# Patient Record
Sex: MALE | Race: WHITE | NOT HISPANIC OR LATINO | Employment: OTHER | URBAN - METROPOLITAN AREA
[De-identification: names, ages, dates, MRNs, and addresses within clinical notes are randomized per-mention and may not be internally consistent; named-entity substitution may affect disease eponyms.]

---

## 2017-01-06 ENCOUNTER — ANTICOAGULATION MONITORING (OUTPATIENT)
Dept: VASCULAR LAB | Facility: MEDICAL CENTER | Age: 77
End: 2017-01-06
Attending: NURSE PRACTITIONER
Payer: MEDICARE

## 2017-01-06 DIAGNOSIS — I48.91 ATRIAL FIBRILLATION, UNSPECIFIED TYPE (HCC): ICD-10-CM

## 2017-01-06 LAB — INR PPP: 2.2 (ref 2–3.5)

## 2017-01-06 PROCEDURE — 99211 OFF/OP EST MAY X REQ PHY/QHP: CPT | Performed by: NURSE PRACTITIONER

## 2017-01-06 PROCEDURE — 85610 PROTHROMBIN TIME: CPT

## 2017-01-06 NOTE — MR AVS SNAPSHOT
Duane Parkinson   2017 7:45 AM   Anticoagulation Monitoring   MRN: [id number]    Department:  Vascular Medicine   Dept Phone:  [phone]    Description:  Male : 1940   Provider:  [facility] EXAM 4           Allergies as of 2017     Allergen Noted Reactions    Feldene [Piroxicam] 2011       Percodan [Oxycodone-Aspirin] 2011         You were diagnosed with     Atrial fibrillation, unspecified type (HCC)   [[id number]]         Vital Signs     Smoking Status                   Former Smoker           Basic Information     Date Of Birth Sex Race Ethnicity Preferred Language    1940 Male White Non- English      Your appointments     2017  7:45 AM   Established Patient with [facility] EXAM 4   [facility]  (--)    [address]  [place] [address]   [phone]            2017  8:15 AM   FOLLOW UP with Vickey Rutherford M.D.   [facility] (--)    [address], [address]  [place] [address]   [phone]            2017  8:00 AM   Established Patient with Fransisco Graham M.D.   [facility] [address]    [address]  [address]  [place] [address]   [phone]           You will be receiving a confirmation call a few days before your appointment from our automated call confirmation system.              Problem List              ICD-10-CM Priority Class Noted - Resolved    Aortic stenosis I35.0   2011 - Present    Atrial fibrillation (HCC) I48.91   2011 - Present    Long term current use of anticoagulant therapy Z79.01   2011 - Present    Lumbar Disc Degeneration M51.37   2011 - Present    Mitral valve disorder I05.9   2011 - Present    Osteoarthrosis involving lower leg M17.9   2011 - Present    Essential hypertension I10   2015 - Present    Dyslipidemia E78.5   2015 - Present    IGT (impaired  glucose tolerance) R73.02   1/7/2015 - Present    LORENA (obstructive sleep apnea) G47.33   4/21/2016 - Present    Insomnia G47.00   5/26/2016 - Present    Localized primary osteoarthritis of carpometacarpal joint of left thumb M18.12   5/27/2016 - Present      Health Maintenance        Date Due Completion Dates    DIABETES MONOFILAMTENT / LE EXAM 1940 ---    IMM DTaP/Tdap/Td Vaccine (1 - Tdap) 1/11/1957 ---    IMM ZOSTER VACCINE 1/11/1998 ---    A1C SCREENING 8/22/2016 2/22/2016, 9/1/2015    FASTING LIPID PROFILE 9/1/2016 9/1/2015    URINE ACR / MICROALBUMIN 9/1/2016 9/1/2015    SERUM CREATININE 5/23/2017 5/23/2016, 2/22/2016, 9/1/2015    RETINAL SCREENING 9/6/2017 9/6/2016    IMM PNEUMOCOCCAL 65+ (ADULT) LOW/MEDIUM RISK SERIES (2 of 2 - PPSV23) 12/22/2017 12/22/2016    COLONOSCOPY 1/7/2022 1/7/2012 (N/S)    Override on 1/7/2012: (N/S)            Results     POCT Protime      Component    INR    2.2                        Current Immunizations     13-VALENT PCV PREVNAR 12/22/2016    Influenza Vaccine Adult HD 11/7/2016  7:55 AM, 1/7/2015      Below and/or attached are the medications your provider expects you to take. Review all of your home medications and newly ordered medications with your provider and/or pharmacist. Follow medication instructions as directed by your provider and/or pharmacist. Please keep your medication list with you and share with your provider. Update the information when medications are discontinued, doses are changed, or new medications (including over-the-counter products) are added; and carry medication information at all times in the event of emergency situations     Allergies:  FELDENE - (reactions not documented)     PERCODAN - (reactions not documented)               Medications  Valid as of: January 06, 2017 -  7:49 AM    Generic Name Brand Name Tablet Size Instructions for use    Ascorbic Acid (Tab) Vitamin C 1000 MG Take  by mouth.        Cholecalciferol (Tab) Vitamin D3 2000  UNITS Take  by mouth.        Clindamycin HCl (Cap) CLEOCIN 150 MG Take 1 Cap by mouth 2 Times a Day. Permanent, one am and 2 pm        Cyclobenzaprine HCl   by Does not apply route 1 time daily as needed.        Digoxin (Tab) LANOXIN 250 MCG Take 1 Tab by mouth every day.        Gabapentin (Cap) NEURONTIN 300 MG Take 2 Caps by mouth 2 Times a Day.        Glucosamine-Chondroit-Vit C-Mn (Cap) GLUCOSAMINE 1500 COMPLEX  Take  by mouth every day.        Lysine HCl (Tab) lysine 500 MG Take 500 mg by mouth every day.        Metoprolol Succinate (TABLET SR 24 HR) TOPROL XL 50 MG Take 1 Tab by mouth every day.        Multiple Vitamin (Tab) THERAGRAN  Take 1 Tab by mouth every day.        Omega-3 Fatty Acids (Cap) OMEGA 3 FA 1000 MG Take  by mouth every day.        Pravastatin Sodium (Tab) PRAVACHOL 80 MG Take 1 Tab by mouth every day.        Psyllium   Take  by mouth every day.        Warfarin Sodium (Tab) COUMADIN 4 MG Take 4 mg by mouth every day. Take as directed.         Zolpidem Tartrate (Tab) AMBIEN 5 MG         .                 Medicines prescribed today were sent to:     [id number] [place], [place] - [address]    [address] [facility] [address]    Phone: [phone] Fax: [phone]    Open 24 Hours?: No      Medication refill instructions:       If your prescription bottle indicates you have medication refills left, it is not necessary to call your provider’s office. Please contact your pharmacy and they will refill your medication.    If your prescription bottle indicates you do not have any refills left, you may request refills at any time through one of the following ways: The online simplifyMD system (except Urgent Care), by calling your provider’s office, or by asking your pharmacy to contact your provider’s office with a refill request. Medication refills are processed only during regular business hours and may not be available until the next business day. Your provider may request additional  information or to have a follow-up visit with you prior to refilling your medication.   *Please Note: Medication refills are assigned a new Rx number when refilled electronically. Your pharmacy may indicate that no refills were authorized even though a new prescription for the same medication is available at the pharmacy. Please request the medicine by name with the pharmacy before contacting your provider for a refill.        Warfarin Dosing Calendar   January 2017 Details    Sun Mon Tue Wed Thu Fri Sat     1               2               3               4               5               6   2.2   4 mg   See details      7      4 mg           8      4 mg         9      4 mg         10      4 mg         11      4 mg         12      4 mg         13      4 mg         14      6 mg           15      4 mg         16      4 mg         17      4 mg         18      4 mg         19      4 mg         20      4 mg         21      4 mg           22      4 mg         23      4 mg         24      4 mg         25      4 mg         26      4 mg         27      4 mg         28                 29               30               31                    Date Details   01/06 This INR check   INR: 2.2       Date of next INR:  1/27/2017         How to take your warfarin dose     To take:  4 mg Take 1 of the 4 mg tablets.    To take:  6 mg Take 1.5 of the 4 mg tablets.              [facility] Access Code: Activation code not generated  Current [facility] Status: Active

## 2017-01-06 NOTE — PROGRESS NOTES
Anticoagulation Summary as of 1/6/2017     INR goal 2.0-3.0   Selected INR 2.2 (1/6/2017)   Maintenance plan 6 mg (4 mg x 1.5) on Sat; 4 mg (4 mg x 1) all other days   Weekly total 30 mg   Plan last modified CLAYTON Jacob (10/10/2016)   Next INR check 1/27/2017   Target end date Indefinite    Indications   Atrial fibrillation (HCC) [I48.91]         Anticoagulation Episode Summary     INR check location     Preferred lab     Send INR reminders to     Comments Pt goes by Kinsey      Anticoagulation Care Providers     Provider Role Specialty Phone number    Vickey Rutherford M.D. Referring Cardiology [phone]    [facility] Responsible  [phone]        Patient is therapeutic today. Denies any medication or diet changes. No current symptoms of bleeding or thrombosis reported. Continue current regimen. Follow up in 3 weeks.    Next Appointment: Friday, January 27, 2017 at 7:45 am.      Britney DOE

## 2017-01-09 LAB — INR BLD: 2.2 (ref 0.9–1.2)

## 2017-01-27 ENCOUNTER — ANTICOAGULATION VISIT (OUTPATIENT)
Dept: VASCULAR LAB | Facility: MEDICAL CENTER | Age: 77
End: 2017-01-27
Attending: NURSE PRACTITIONER
Payer: MEDICARE

## 2017-01-27 DIAGNOSIS — I48.91 ATRIAL FIBRILLATION, UNSPECIFIED TYPE (HCC): ICD-10-CM

## 2017-01-27 LAB
INR BLD: 2.7 (ref 0.9–1.2)
INR PPP: 2.7 (ref 2–3.5)

## 2017-01-27 PROCEDURE — 99211 OFF/OP EST MAY X REQ PHY/QHP: CPT

## 2017-01-27 PROCEDURE — 85610 PROTHROMBIN TIME: CPT

## 2017-01-27 NOTE — MR AVS SNAPSHOT
Duane Parkinson   2017 7:45 AM   Anticoagulation Visit   MRN: [id number]    Department:  Vascular Medicine   Dept Phone:  [phone]    Description:  Male : 1940   Provider:  [facility] EXAM 4           Allergies as of 2017     Allergen Noted Reactions    Feldene [Piroxicam] 2011       Percodan [Oxycodone-Aspirin] 2011         Vital Signs     Smoking Status                   Former Smoker           Basic Information     Date Of Birth Sex Race Ethnicity Preferred Language    1940 Male White Non- English      Your appointments     2017  8:00 AM   Established Patient with [facility] EXAM 4   [facility]  (--)    [address]  [place] [address]   [phone]            2017  8:15 AM   FOLLOW UP with Vickey Rutherford M.D.   [facility] (--)    [address], [address]  [place] [address] [phone]            2017  8:00 AM   Established Patient with Fransisco Graham M.D.   [facility] [address]    [address]  [address]  [place] [address] [phone]           You will be receiving a confirmation call a few days before your appointment from our automated call confirmation system.              Problem List              ICD-10-CM Priority Class Noted - Resolved    Aortic stenosis I35.0   2011 - Present    Atrial fibrillation (CMS-HCC) I48.91   2011 - Present    Long term current use of anticoagulant therapy Z79.01   2011 - Present    Lumbar Disc Degeneration M51.37   2011 - Present    Mitral valve disorder I05.9   2011 - Present    Osteoarthrosis involving lower leg M17.9   2011 - Present    Essential hypertension I10   2015 - Present    Dyslipidemia E78.5   2015 - Present    IGT (impaired glucose tolerance) R73.02   2015 - Present    LORENA (obstructive sleep apnea) G47.33    4/21/2016 - Present    Insomnia G47.00   5/26/2016 - Present    Localized primary osteoarthritis of carpometacarpal joint of left thumb M18.12   5/27/2016 - Present      Health Maintenance        Date Due Completion Dates    DIABETES MONOFILAMENT / LE EXAM 1940 ---    IMM DTaP/Tdap/Td Vaccine (1 - Tdap) 1/11/1957 ---    IMM ZOSTER VACCINE 1/11/1998 ---    A1C SCREENING 8/22/2016 2/22/2016, 9/1/2015    FASTING LIPID PROFILE 9/1/2016 9/1/2015    URINE ACR / MICROALBUMIN 9/1/2016 9/1/2015    SERUM CREATININE 5/23/2017 5/23/2016, 2/22/2016, 9/1/2015    RETINAL SCREENING 9/6/2017 9/6/2016    IMM PNEUMOCOCCAL 65+ (ADULT) LOW/MEDIUM RISK SERIES (2 of 2 - PPSV23) 12/22/2017 12/22/2016    COLONOSCOPY 1/7/2022 1/7/2012 (N/S)    Override on 1/7/2012: (N/S)            Results     POCT Protime      Component    INR    2.7                        Current Immunizations     13-VALENT PCV PREVNAR 12/22/2016    Influenza Vaccine Adult HD 11/7/2016  7:55 AM, 1/7/2015      Below and/or attached are the medications your provider expects you to take. Review all of your home medications and newly ordered medications with your provider and/or pharmacist. Follow medication instructions as directed by your provider and/or pharmacist. Please keep your medication list with you and share with your provider. Update the information when medications are discontinued, doses are changed, or new medications (including over-the-counter products) are added; and carry medication information at all times in the event of emergency situations     Allergies:  FELDENE - (reactions not documented)     PERCODAN - (reactions not documented)               Medications  Valid as of: January 27, 2017 -  8:56 AM    Generic Name Brand Name Tablet Size Instructions for use    Ascorbic Acid (Tab) Vitamin C 1000 MG Take  by mouth.        Cholecalciferol (Tab) Vitamin D3 2000 UNITS Take  by mouth.        Clindamycin HCl (Cap) CLEOCIN 150 MG Take 1 Cap by mouth 2  Times a Day. Permanent, one am and 2 pm        Cyclobenzaprine HCl   by Does not apply route 1 time daily as needed.        Digoxin (Tab) LANOXIN 250 MCG Take 1 Tab by mouth every day.        Gabapentin (Cap) NEURONTIN 300 MG Take 2 Caps by mouth 2 Times a Day.        Glucosamine-Chondroit-Vit C-Mn (Cap) GLUCOSAMINE 1500 COMPLEX  Take  by mouth every day.        Lysine HCl (Tab) lysine 500 MG Take 500 mg by mouth every day.        Metoprolol Succinate (TABLET SR 24 HR) TOPROL XL 50 MG Take 1 Tab by mouth every day.        Multiple Vitamin (Tab) THERAGRAN  Take 1 Tab by mouth every day.        Omega-3 Fatty Acids (Cap) OMEGA 3 FA 1000 MG Take  by mouth every day.        Pravastatin Sodium (Tab) PRAVACHOL 80 MG Take 1 Tab by mouth every day.        Psyllium   Take  by mouth every day.        Warfarin Sodium (Tab) COUMADIN 4 MG Take 4 mg by mouth every day. Take as directed.         Zolpidem Tartrate (Tab) AMBIEN 5 MG         .                 Medicines prescribed today were sent to:     [id number] - [place] - [address]    [address] [address]    Phone: [phone] Fax: [phone]    Open 24 Hours?: No      Medication refill instructions:       If your prescription bottle indicates you have medication refills left, it is not necessary to call your provider’s office. Please contact your pharmacy and they will refill your medication.    If your prescription bottle indicates you do not have any refills left, you may request refills at any time through one of the following ways: The online [employer] system (except Urgent Care), by calling your provider’s office, or by asking your pharmacy to contact your provider’s office with a refill request. Medication refills are processed only during regular business hours and may not be available until the next business day. Your provider may request additional information or to have a follow-up visit with you prior to refilling your medication.   *Please  Note: Medication refills are assigned a new Rx number when refilled electronically. Your pharmacy may indicate that no refills were authorized even though a new prescription for the same medication is available at the pharmacy. Please request the medicine by name with the pharmacy before contacting your provider for a refill.        Warfarin Dosing Calendar   January 2017 Details    Sun Mon Tue Wed Thu Fri Sat     1               2               3               4               5               6               7                 8               9               10               11               12               13               14                 15               16               17               18               19               20               21                 22               23               24               25               26               27   2.7   4 mg   See details      28      6 mg           29      4 mg         30      4 mg         31      4 mg              Date Details   01/27 This INR check   INR: 2.7               How to take your warfarin dose     To take:  4 mg Take 1 of the 4 mg tablets.    To take:  6 mg Take 1.5 of the 4 mg tablets.           Warfarin Dosing Calendar   February 2017 Details    Sun Mon Tue Wed Thu Fri Sat        1      4 mg         2      4 mg         3      4 mg         4      4 mg           5      4 mg         6      4 mg         7      4 mg         8      4 mg         9      4 mg         10      4 mg         11      6 mg           12      4 mg         13      4 mg         14      4 mg         15      4 mg         16      4 mg         17      4 mg         18      4 mg           19      4 mg         20      4 mg         21      4 mg         22      4 mg         23      4 mg         24      4 mg         25                 26               27               28                    Date Details   No additional details    Date of next INR:  2/24/2017         How to take your warfarin  dose     To take:  4 mg Take 1 of the 4 mg tablets.    To take:  6 mg Take 1.5 of the 4 mg tablets.              [employer] Access Code: Activation code not generated  Current [employer] Status: Active

## 2017-01-27 NOTE — PROGRESS NOTES
Anticoagulation Summary as of 1/27/2017     INR goal 2.0-3.0   Selected INR 2.7 (1/27/2017)   Maintenance plan 6 mg (4 mg x 1.5) on Sat; 4 mg (4 mg x 1) all other days   Weekly total 30 mg   Plan last modified CLAYTON Jacob (10/10/2016)   Next INR check 2/24/2017   Target end date Indefinite    Indications   Atrial fibrillation (CMS-HCC) [I48.91]         Anticoagulation Episode Summary     INR check location     Preferred lab     Send INR reminders to     Comments Pt goes by Kin[facility]      Anticoagulation Care Providers     Provider Role Specialty Phone number    Vickey Rutherford M.D. Referring Cardiology [phone]    [facility] Responsible  [phone]        Anticoagulation Patient Findings    Patient's INR was therapeutic today in clinic.    Pt denies any unusual s/s of bleeding, bruising, clotting or any changes to diet or medications.  Confirmed dosing regimen.  Pt is to continue with current warfarin dosing regimen.    Follow up in 4 weeks.    Sin Huitron, PHARMD

## 2017-02-24 ENCOUNTER — ANTICOAGULATION VISIT (OUTPATIENT)
Dept: VASCULAR LAB | Facility: MEDICAL CENTER | Age: 77
End: 2017-02-24
Attending: INTERNAL MEDICINE
Payer: MEDICARE

## 2017-02-24 DIAGNOSIS — I48.91 ATRIAL FIBRILLATION, UNSPECIFIED TYPE (HCC): ICD-10-CM

## 2017-02-24 LAB
INR BLD: 1.8 (ref 0.9–1.2)
INR PPP: 1.8 (ref 2–3.5)

## 2017-02-24 PROCEDURE — 99212 OFFICE O/P EST SF 10 MIN: CPT | Performed by: NURSE PRACTITIONER

## 2017-02-24 PROCEDURE — 85610 PROTHROMBIN TIME: CPT

## 2017-02-24 NOTE — MR AVS SNAPSHOT
Duane Parkinson   2017 8:00 AM   Anticoagulation Visit   MRN: [id number]    Department:  Vascular Medicine   Dept Phone:  [phone]    Description:  Male : 1940   Provider:  [facility] EXAM 4           Allergies as of 2017     Allergen Noted Reactions    Feldene [Piroxicam] 2011       Percodan [Oxycodone-Aspirin] 2011         You were diagnosed with     Atrial fibrillation, unspecified type (CMS-HCC)   [7554119]         Vital Signs     Smoking Status                   Former Smoker           Basic Information     Date Of Birth Sex Race Ethnicity Preferred Language    1940 Male White Non- English      Your appointments     Mar 24, 2017  8:00 AM   Established Patient with [facility] EXAM 4   [facility]  (--)    [address]  [place] [address]   [phone]            2017  8:15 AM   FOLLOW UP with Vickey Rutherford M.D.   [facility] (--)    [address], [address]  [place] [address]   [phone]            2017  8:00 AM   Established Patient with Fransisco Graham M.D.   [facility] [address]    [address]  [address]  [place] [address]   [phone]           You will be receiving a confirmation call a few days before your appointment from our automated call confirmation system.              Problem List              ICD-10-CM Priority Class Noted - Resolved    Aortic stenosis I35.0   2011 - Present    Atrial fibrillation (CMS-HCC) I48.91   2011 - Present    Long term current use of anticoagulant therapy Z79.01   2011 - Present    Lumbar Disc Degeneration M51.37   2011 - Present    Mitral valve disorder I05.9   2011 - Present    Osteoarthrosis involving lower leg M17.9   2011 - Present    Essential hypertension I10   2015 - Present    Dyslipidemia E78.5   2015 - Present    IGT (impaired  glucose tolerance) R73.02   1/7/2015 - Present    LORENA (obstructive sleep apnea) G47.33   4/21/2016 - Present    Insomnia G47.00   5/26/2016 - Present    Localized primary osteoarthritis of carpometacarpal joint of left thumb M18.12   5/27/2016 - Present      Health Maintenance        Date Due Completion Dates    DIABETES MONOFILAMENT / LE EXAM 1940 ---    IMM DTaP/Tdap/Td Vaccine (1 - Tdap) 1/11/1957 ---    IMM ZOSTER VACCINE 1/11/1998 ---    A1C SCREENING 8/22/2016 2/22/2016, 9/1/2015    FASTING LIPID PROFILE 9/1/2016 9/1/2015    URINE ACR / MICROALBUMIN 9/1/2016 9/1/2015    SERUM CREATININE 5/23/2017 5/23/2016, 2/22/2016, 9/1/2015    RETINAL SCREENING 9/6/2017 9/6/2016    IMM PNEUMOCOCCAL 65+ (ADULT) LOW/MEDIUM RISK SERIES (2 of 2 - PPSV23) 12/22/2017 12/22/2016    COLONOSCOPY 1/7/2022 1/7/2012 (N/S)    Override on 1/7/2012: (N/S)            Results     POCT Protime      Component    INR    1.8                        Current Immunizations     13-VALENT PCV PREVNAR 12/22/2016    Influenza Vaccine Adult HD 11/7/2016  7:55 AM, 1/7/2015      Below and/or attached are the medications your provider expects you to take. Review all of your home medications and newly ordered medications with your provider and/or pharmacist. Follow medication instructions as directed by your provider and/or pharmacist. Please keep your medication list with you and share with your provider. Update the information when medications are discontinued, doses are changed, or new medications (including over-the-counter products) are added; and carry medication information at all times in the event of emergency situations     Allergies:  FELDENE - (reactions not documented)     PERCODAN - (reactions not documented)               Medications  Valid as of: February 24, 2017 -  8:10 AM    Generic Name Brand Name Tablet Size Instructions for use    Ascorbic Acid (Tab) Vitamin C 1000 MG Take  by mouth.        Cholecalciferol (Tab) Vitamin D3 2000  UNITS Take  by mouth.        Clindamycin HCl (Cap) CLEOCIN 150 MG Take 1 Cap by mouth 2 Times a Day. Permanent, one am and 2 pm        Cyclobenzaprine HCl   by Does not apply route 1 time daily as needed.        Digoxin (Tab) LANOXIN 250 MCG Take 1 Tab by mouth every day.        Gabapentin (Cap) NEURONTIN 300 MG Take 2 Caps by mouth 2 Times a Day.        Glucosamine-Chondroit-Vit C-Mn (Cap) GLUCOSAMINE 1500 COMPLEX  Take  by mouth every day.        Lysine HCl (Tab) lysine 500 MG Take 500 mg by mouth every day.        Metoprolol Succinate (TABLET SR 24 HR) TOPROL XL 50 MG Take 1 Tab by mouth every day.        Multiple Vitamin (Tab) THERAGRAN  Take 1 Tab by mouth every day.        Omega-3 Fatty Acids (Cap) OMEGA 3 FA 1000 MG Take  by mouth every day.        Pravastatin Sodium (Tab) PRAVACHOL 80 MG Take 1 Tab by mouth every day.        Psyllium   Take  by mouth every day.        Warfarin Sodium (Tab) COUMADIN 4 MG Take 4 mg by mouth every day. Take as directed.         Zolpidem Tartrate (Tab) AMBIEN 5 MG         .                 Medicines prescribed today were sent to:     [id number] - [place] [place] - [address]    [address] [facility] [address]    Phone: [phone] Fax: [phone]    Open 24 Hours?: No      Medication refill instructions:       If your prescription bottle indicates you have medication refills left, it is not necessary to call your provider’s office. Please contact your pharmacy and they will refill your medication.    If your prescription bottle indicates you do not have any refills left, you may request refills at any time through one of the following ways: The online [employer] system (except Urgent Care), by calling your provider’s office, or by asking your pharmacy to contact your provider’s office with a refill request. Medication refills are processed only during regular business hours and may not be available until the next business day. Your provider may request additional  information or to have a follow-up visit with you prior to refilling your medication.   *Please Note: Medication refills are assigned a new Rx number when refilled electronically. Your pharmacy may indicate that no refills were authorized even though a new prescription for the same medication is available at the pharmacy. Please request the medicine by name with the pharmacy before contacting your provider for a refill.        Warfarin Dosing Calendar   February 2017 Details    Sun Mon Tue Wed Thu Fri Sat        1               2               3               4                 5               6               7               8               9               10               11                 12               13               14               15               16               17               18                 19               20               21               22               23               24   1.8   6 mg   See details      25      6 mg           26      4 mg         27      4 mg         28      4 mg              Date Details   02/24 This INR check   INR: 1.8               How to take your warfarin dose     To take:  4 mg Take 1 of the 4 mg tablets.    To take:  6 mg Take 1.5 of the 4 mg tablets.           Warfarin Dosing Calendar   March 2017 Details    Sun Mon Tue Wed Thu Fri Sat        1      4 mg         2      4 mg         3      4 mg         4      4 mg           5      4 mg         6      4 mg         7      4 mg         8      4 mg         9      4 mg         10      4 mg         11      6 mg           12      4 mg         13      4 mg         14      4 mg         15      4 mg         16      4 mg         17      4 mg         18      4 mg           19      4 mg         20      4 mg         21      4 mg         22      4 mg         23      4 mg         24      4 mg         25                 26               27               28               29               30               31                 Date Details    No additional details    Date of next INR:  3/24/2017         How to take your warfarin dose     To take:  4 mg Take 1 of the 4 mg tablets.    To take:  6 mg Take 1.5 of the 4 mg tablets.              [employer] Access Code: Activation code not generated  Current [employer] Status: Active

## 2017-02-24 NOTE — PROGRESS NOTES
Anticoagulation Summary as of 2/24/2017     INR goal 2.0-3.0   Selected INR 1.8! (2/24/2017)   Maintenance plan 6 mg (4 mg x 1.5) on Sat; 4 mg (4 mg x 1) all other days   Weekly total 30 mg   Plan last modified CLAYTON Jacob (10/10/2016)   Next INR check 3/24/2017   Target end date Indefinite    Indications   Atrial fibrillation (CMS-HCC) [I48.91]         Anticoagulation Episode Summary     INR check location     Preferred lab     Send INR reminders to     Comments Pt goes by [facility]      Anticoagulation Care Providers     Provider Role Specialty Phone number    Vickey Rutherford M.D. Referring Cardiology [phone]    [facility] Responsible  [phone]        Patient is subtherapeutic today. Missed dose yesterday. Denies any medication or diet changes. No current symptoms of bleeding or thrombosis reported. Will increase tonight then continue current regimen. Follow up in 4 weeks per pt's preference.    Next Appointment: Friday , March 24, 2017 at 8:00 am.    Britnye DOE

## 2017-03-24 ENCOUNTER — ANTICOAGULATION VISIT (OUTPATIENT)
Dept: VASCULAR LAB | Facility: MEDICAL CENTER | Age: 77
End: 2017-03-24
Attending: INTERNAL MEDICINE
Payer: MEDICARE

## 2017-03-24 DIAGNOSIS — I48.91 ATRIAL FIBRILLATION, UNSPECIFIED TYPE (HCC): ICD-10-CM

## 2017-03-24 LAB
INR BLD: 2.3 (ref 0.9–1.2)
INR PPP: 2.3 (ref 2–3.5)

## 2017-03-24 PROCEDURE — 99211 OFF/OP EST MAY X REQ PHY/QHP: CPT | Performed by: NURSE PRACTITIONER

## 2017-03-24 PROCEDURE — 85610 PROTHROMBIN TIME: CPT

## 2017-03-24 NOTE — PROGRESS NOTES
Anticoagulation Summary as of 3/24/2017     INR goal 2.0-3.0   Selected INR 2.3 (3/24/2017)   Maintenance plan 6 mg (4 mg x 1.5) on Sat; 4 mg (4 mg x 1) all other days   Weekly total 30 mg   Plan last modified CLAYTON Jacob (10/10/2016)   Next INR check 4/21/2017   Target end date Indefinite    Indications   Atrial fibrillation (CMS-HCC) [I48.91]         Anticoagulation Episode Summary     INR check location     Preferred lab     Send INR reminders to     Comments Pt goes by Kinsey      Anticoagulation Care Providers     Provider Role Specialty Phone number    Vickey Rutherford M.D. Referring Cardiology [phone]    [facility] Responsible  [phone]        Patient is therapeutic today. Denies any medication or diet changes. No current symptoms of bleeding or thrombosis reported. Continue current regimen. Follow up in 4 weeks.    Next Appointment: Friday , April 21, 2017 at 7:30 am.     Britney DOE

## 2017-03-24 NOTE — MR AVS SNAPSHOT
Duane Vargasley   3/24/2017 8:00 AM   Anticoagulation Visit   MRN: [id number]    Department:  Vascular Medicine   Dept Phone:  [phone]    Description:  Male : 1940   Provider:  [facility] EXAM 4           Allergies as of 3/24/2017     Allergen Noted Reactions    Feldene [Piroxicam] 2011       Percodan [Oxycodone-Aspirin] 2011         You were diagnosed with     Atrial fibrillation, unspecified type (CMS-[facility])   [1212130]         Vital Signs     Smoking Status                   Former Smoker           Basic Information     Date Of Birth Sex Race Ethnicity Preferred Language    1940 Male White Non- English      Your appointments     Mar 24, 2017  8:00 AM   Established Patient with V EXAM 4   [facility]  (--)    [address]  [place] [address]   [phone]            2017  8:15 AM   FOLLOW UP with Vickey Rutherford M.D.   [facility] (--)    [address], [address]  [place] [address]   [phone]            2017  7:30 AM   Established Patient with [facility] EXAM 4   [facility]  (--)    [address]  [place] [address]   [phone]            2017  8:00 AM   Established Patient with Fransisco Graham M.D.   [facility] [address]    [address]  [address]  [place] [address]   [phone]           You will be receiving a confirmation call a few days before your appointment from our automated call confirmation system.              Problem List              ICD-10-CM Priority Class Noted - Resolved    Aortic stenosis I35.0   2011 - Present    Atrial fibrillation (CMS-[facility]) I48.91   2011 - Present    Long term current use of anticoagulant therapy Z79.01   2011 - Present    Lumbar Disc Degeneration M51.37   2011 - Present    Mitral valve disorder I05.9    12/30/2011 - Present    Osteoarthrosis involving lower leg M17.9   12/30/2011 - Present    Essential hypertension I10   1/7/2015 - Present    Dyslipidemia E78.5   1/7/2015 - Present    IGT (impaired glucose tolerance) R73.02   1/7/2015 - Present    LORENA (obstructive sleep apnea) G47.33   4/21/2016 - Present    Insomnia G47.00   5/26/2016 - Present    Localized primary osteoarthritis of carpometacarpal joint of left thumb M18.12   5/27/2016 - Present      Health Maintenance        Date Due Completion Dates    DIABETES MONOFILAMENT / LE EXAM 1940 ---    IMM DTaP/Tdap/Td Vaccine (1 - Tdap) 1/11/1957 ---    IMM ZOSTER VACCINE 1/11/1998 ---    A1C SCREENING 8/22/2016 2/22/2016, 9/1/2015    FASTING LIPID PROFILE 9/1/2016 9/1/2015    URINE ACR / MICROALBUMIN 9/1/2016 9/1/2015    SERUM CREATININE 5/23/2017 5/23/2016, 2/22/2016, 9/1/2015    RETINAL SCREENING 9/6/2017 9/6/2016    IMM PNEUMOCOCCAL 65+ (ADULT) LOW/MEDIUM RISK SERIES (2 of 2 - PPSV23) 12/22/2017 12/22/2016    COLONOSCOPY 1/7/2022 1/7/2012 (N/S)    Override on 1/7/2012: (N/S)            Results     POCT Protime      Component    INR    2.3                        Current Immunizations     13-VALENT PCV PREVNAR 12/22/2016    Influenza Vaccine Adult HD 11/7/2016  7:55 AM, 1/7/2015      Below and/or attached are the medications your provider expects you to take. Review all of your home medications and newly ordered medications with your provider and/or pharmacist. Follow medication instructions as directed by your provider and/or pharmacist. Please keep your medication list with you and share with your provider. Update the information when medications are discontinued, doses are changed, or new medications (including over-the-counter products) are added; and carry medication information at all times in the event of emergency situations     Allergies:  FELDENE - (reactions not documented)     PERCODAN - (reactions not documented)               Medications  Valid  as of: March 24, 2017 -  7:58 AM    Generic Name Brand Name Tablet Size Instructions for use    Ascorbic Acid (Tab) Vitamin C 1000 MG Take  by mouth.        Cholecalciferol (Tab) Vitamin D3 2000 UNITS Take  by mouth.        Clindamycin HCl (Cap) CLEOCIN 150 MG Take 1 Cap by mouth 2 Times a Day. Permanent, one am and 2 pm        Cyclobenzaprine HCl   by Does not apply route 1 time daily as needed.        Digoxin (Tab) LANOXIN 250 MCG Take 1 Tab by mouth every day.        Gabapentin (Cap) NEURONTIN 300 MG Take 2 Caps by mouth 2 Times a Day.        Glucosamine-Chondroit-Vit C-Mn (Cap) GLUCOSAMINE 1500 COMPLEX  Take  by mouth every day.        Lysine HCl (Tab) lysine 500 MG Take 500 mg by mouth every day.        Metoprolol Succinate (TABLET SR 24 HR) TOPROL XL 50 MG Take 1 Tab by mouth every day.        Multiple Vitamin (Tab) THERAGRAN  Take 1 Tab by mouth every day.        Omega-3 Fatty Acids (Cap) OMEGA 3 FA 1000 MG Take  by mouth every day.        Pravastatin Sodium (Tab) PRAVACHOL 80 MG Take 1 Tab by mouth every day.        Psyllium   Take  by mouth every day.        Warfarin Sodium (Tab) COUMADIN 4 MG Take 4 mg by mouth every day. Take as directed.         Zolpidem Tartrate (Tab) AMBIEN 5 MG         .                 Medicines prescribed today were sent to:     [id number] - [place] - [address] [facility]    [address] [facility] [address]    Phone: [phone] Fax: [phone]    Open 24 Hours?: No      Medication refill instructions:       If your prescription bottle indicates you have medication refills left, it is not necessary to call your provider’s office. Please contact your pharmacy and they will refill your medication.    If your prescription bottle indicates you do not have any refills left, you may request refills at any time through one of the following ways: The online [employer] system (except Urgent Care), by calling your provider’s office, or by asking your pharmacy to contact your provider’s  office with a refill request. Medication refills are processed only during regular business hours and may not be available until the next business day. Your provider may request additional information or to have a follow-up visit with you prior to refilling your medication.   *Please Note: Medication refills are assigned a new Rx number when refilled electronically. Your pharmacy may indicate that no refills were authorized even though a new prescription for the same medication is available at the pharmacy. Please request the medicine by name with the pharmacy before contacting your provider for a refill.        Warfarin Dosing Calendar March 2017 Details    Sun Mon Tue Wed Thu Fri Sat        1               2               3               4                 5               6               7               8               9               10               11                 12               13               14               15               16               17               18                 19               20               21               22               23               24   2.3   4 mg   See details      25      6 mg           26      4 mg         27      4 mg         28      4 mg         29      4 mg         30      4 mg         31      4 mg           Date Details   03/24 This INR check   INR: 2.3               How to take your warfarin dose     To take:  4 mg Take 1 of the 4 mg tablets.    To take:  6 mg Take 1.5 of the 4 mg tablets.           Warfarin Dosing Calendar April 2017 Details    Sun Mon Tue Wed Thu Fri Sat           1      4 mg           2      4 mg         3      4 mg         4      4 mg         5      4 mg         6      4 mg         7      4 mg         8      6 mg           9      4 mg         10      4 mg         11      4 mg         12      4 mg         13      4 mg         14      4 mg         15      6 mg           16      4 mg         17      4 mg         18      4 mg         19      4 mg          20      4 mg         21      4 mg         22                 23               24               25               26               27               28               29                 30                      Date Details   No additional details    Date of next INR:  4/21/2017         How to take your warfarin dose     To take:  4 mg Take 1 of the 4 mg tablets.    To take:  6 mg Take 1.5 of the 4 mg tablets.              [employer] Access Code: Activation code not generated  Current [employer] Status: Active

## 2017-04-10 DIAGNOSIS — I47.10 SVT (SUPRAVENTRICULAR TACHYCARDIA): ICD-10-CM

## 2017-04-10 RX ORDER — DIGOXIN 250 MCG
250 TABLET ORAL DAILY
Qty: 30 TAB | Refills: 6 | Status: SHIPPED | OUTPATIENT
Start: 2017-04-10 | End: 2017-11-14 | Stop reason: SDUPTHER

## 2017-04-17 ENCOUNTER — OFFICE VISIT (OUTPATIENT)
Dept: CARDIOLOGY | Facility: MEDICAL CENTER | Age: 77
End: 2017-04-17
Payer: MEDICARE

## 2017-04-17 VITALS
SYSTOLIC BLOOD PRESSURE: 110 MMHG | OXYGEN SATURATION: 96 % | WEIGHT: 229 LBS | BODY MASS INDEX: 32.78 KG/M2 | HEART RATE: 86 BPM | DIASTOLIC BLOOD PRESSURE: 80 MMHG | HEIGHT: 70 IN

## 2017-04-17 DIAGNOSIS — I35.0 AORTIC VALVE STENOSIS, UNSPECIFIED ETIOLOGY: ICD-10-CM

## 2017-04-17 DIAGNOSIS — I10 ESSENTIAL HYPERTENSION: ICD-10-CM

## 2017-04-17 DIAGNOSIS — I05.9 MITRAL VALVE DISORDER: ICD-10-CM

## 2017-04-17 DIAGNOSIS — I48.91 ATRIAL FIBRILLATION, UNSPECIFIED TYPE (HCC): ICD-10-CM

## 2017-04-17 DIAGNOSIS — Z79.01 LONG TERM CURRENT USE OF ANTICOAGULANT THERAPY: ICD-10-CM

## 2017-04-17 DIAGNOSIS — G47.33 OSA (OBSTRUCTIVE SLEEP APNEA): ICD-10-CM

## 2017-04-17 DIAGNOSIS — E78.5 DYSLIPIDEMIA: ICD-10-CM

## 2017-04-17 PROCEDURE — 1101F PT FALLS ASSESS-DOCD LE1/YR: CPT | Performed by: INTERNAL MEDICINE

## 2017-04-17 PROCEDURE — 4040F PNEUMOC VAC/ADMIN/RCVD: CPT | Performed by: INTERNAL MEDICINE

## 2017-04-17 PROCEDURE — G8419 CALC BMI OUT NRM PARAM NOF/U: HCPCS | Performed by: INTERNAL MEDICINE

## 2017-04-17 PROCEDURE — G8432 DEP SCR NOT DOC, RNG: HCPCS | Performed by: INTERNAL MEDICINE

## 2017-04-17 PROCEDURE — 99214 OFFICE O/P EST MOD 30 MIN: CPT | Performed by: INTERNAL MEDICINE

## 2017-04-17 PROCEDURE — 1036F TOBACCO NON-USER: CPT | Performed by: INTERNAL MEDICINE

## 2017-04-17 ASSESSMENT — ENCOUNTER SYMPTOMS
EYES NEGATIVE: 1
SORE THROAT: 0
PALPITATIONS: 0
LOSS OF CONSCIOUSNESS: 0
FEVER: 0
HEMOPTYSIS: 0
DIZZINESS: 0
COUGH: 0
CONSTITUTIONAL NEGATIVE: 1
BACK PAIN: 1
ORTHOPNEA: 0
NEUROLOGICAL NEGATIVE: 1
PND: 0
STRIDOR: 0
GASTROINTESTINAL NEGATIVE: 1
RESPIRATORY NEGATIVE: 1
CLAUDICATION: 0
BRUISES/BLEEDS EASILY: 0
WEAKNESS: 0
CARDIOVASCULAR NEGATIVE: 1
WHEEZING: 0
SPUTUM PRODUCTION: 0
CHILLS: 0
SHORTNESS OF BREATH: 0

## 2017-04-17 NOTE — MR AVS SNAPSHOT
"        Duane Parkinson   2017 8:15 AM   Office Visit   MRN: [id number]    Department:  [facility]   Dept Phone:  [phone]    Description:  Male : 1940   Provider:  Vickey Rutherford M.D.           Reason for Visit     Follow-Up           Allergies as of 2017     Allergen Noted Reactions    Feldene [Piroxicam] 2011       Percodan [Oxycodone-Aspirin] 2011         You were diagnosed with     Aortic valve stenosis, unspecified etiology   [0140634]       Dyslipidemia   [432316]       Essential hypertension   [9194422]       Atrial fibrillation, unspecified type (CMS-HCC)   [3368724]       Long term current use of anticoagulant therapy   [4076799]       Mitral valve disorder   [624631]       LORENA (obstructive sleep apnea)   [499417]         Vital Signs     Blood Pressure Pulse Height Weight Body Mass Index Oxygen Saturation    110/80 mmHg 86 1.778 m (5' 10\") 103.874 kg (229 lb) 32.86 kg/m2 96%    Smoking Status                   Former Smoker           Basic Information     Date Of Birth Sex Race Ethnicity Preferred Language    1940 Male White Non- English      Your appointments     2017  7:30 AM   Established Patient with [facility] EXAM 4   [facility]  (--)    [address] [address]   [phone]            2017  8:00 AM   Established Patient with Fransisco Graham M.D.   [facility] [address]    [address]  [address]  [facility] [address]   [phone]           You will be receiving a confirmation call a few days before your appointment from our automated call confirmation system.              Problem List              ICD-10-CM Priority Class Noted - Resolved    Aortic stenosis I35.0   2011 - Present    Atrial fibrillation (CMS-HCC) I48.91   2011 - Present    Long term current use of anticoagulant therapy Z79.01   2011 - Present    Lumbar Disc " Degeneration M51.37   12/30/2011 - Present    Mitral valve disorder I05.9   12/30/2011 - Present    Osteoarthrosis involving lower leg M17.9   12/30/2011 - Present    Essential hypertension I10   1/7/2015 - Present    Dyslipidemia E78.5   1/7/2015 - Present    IGT (impaired glucose tolerance) R73.02   1/7/2015 - Present    LORENA (obstructive sleep apnea) G47.33   4/21/2016 - Present    Insomnia G47.00   5/26/2016 - Present    Localized primary osteoarthritis of carpometacarpal joint of left thumb M18.12   5/27/2016 - Present      Health Maintenance        Date Due Completion Dates    DIABETES MONOFILAMENT / LE EXAM 1940 ---    IMM DTaP/Tdap/Td Vaccine (1 - Tdap) 1/11/1957 ---    IMM ZOSTER VACCINE 1/11/1998 ---    A1C SCREENING 8/22/2016 2/22/2016, 9/1/2015    FASTING LIPID PROFILE 9/1/2016 9/1/2015    URINE ACR / MICROALBUMIN 9/1/2016 9/1/2015    SERUM CREATININE 5/23/2017 5/23/2016, 2/22/2016, 9/1/2015    RETINAL SCREENING 9/6/2017 9/6/2016    IMM PNEUMOCOCCAL 65+ (ADULT) LOW/MEDIUM RISK SERIES (2 of 2 - PPSV23) 12/22/2017 12/22/2016    COLONOSCOPY 1/7/2022 1/7/2012 (N/S)    Override on 1/7/2012: (N/S)            Current Immunizations     13-VALENT PCV PREVNAR 12/22/2016    Influenza Vaccine Adult HD 11/7/2016  7:55 AM, 1/7/2015      Below and/or attached are the medications your provider expects you to take. Review all of your home medications and newly ordered medications with your provider and/or pharmacist. Follow medication instructions as directed by your provider and/or pharmacist. Please keep your medication list with you and share with your provider. Update the information when medications are discontinued, doses are changed, or new medications (including over-the-counter products) are added; and carry medication information at all times in the event of emergency situations     Allergies:  FELDENE - (reactions not documented)     PERCODAN - (reactions not documented)               Medications  Valid as  of: April 17, 2017 -  8:45 AM    Generic Name Brand Name Tablet Size Instructions for use    Ascorbic Acid (Tab) Vitamin C 1000 MG Take  by mouth.        Cholecalciferol (Tab) Vitamin D3 2000 UNITS Take  by mouth.        Clindamycin HCl (Cap) CLEOCIN 150 MG Take 1 Cap by mouth 2 Times a Day. Permanent, one am and 2 pm        Cyclobenzaprine HCl   by Does not apply route 1 time daily as needed.        Digoxin (Tab) LANOXIN 250 MCG Take 1 Tab by mouth every day.        Gabapentin (Cap) NEURONTIN 300 MG Take 2 Caps by mouth 2 Times a Day.        Glucosamine-Chondroit-Vit C-Mn (Cap) GLUCOSAMINE 1500 COMPLEX  Take  by mouth every day.        Lysine HCl (Tab) lysine 500 MG Take 500 mg by mouth every day.        Metoprolol Succinate (TABLET SR 24 HR) TOPROL XL 50 MG Take 1 Tab by mouth every day.        Multiple Vitamin (Tab) THERAGRAN  Take 1 Tab by mouth every day.        Omega-3 Fatty Acids (Cap) OMEGA 3 FA 1000 MG Take  by mouth every day.        Pravastatin Sodium (Tab) PRAVACHOL 80 MG Take 1 Tab by mouth every day.        Psyllium   Take  by mouth every day.        Warfarin Sodium (Tab) COUMADIN 4 MG Take 4 mg by mouth every day. Take as directed.         Zolpidem Tartrate (Tab) AMBIEN 5 MG         .                 Medicines prescribed today were sent to:     [id number] - [place] - [address] [address]    Phone: [phone] Fax: [phone]    Open 24 Hours?: No      Medication refill instructions:       If your prescription bottle indicates you have medication refills left, it is not necessary to call your provider’s office. Please contact your pharmacy and they will refill your medication.    If your prescription bottle indicates you do not have any refills left, you may request refills at any time through one of the following ways: The online [employer] system (except Urgent Care), by calling your provider’s office, or by asking your pharmacy to contact your provider’s office  with a refill request. Medication refills are processed only during regular business hours and may not be available until the next business day. Your provider may request additional information or to have a follow-up visit with you prior to refilling your medication.   *Please Note: Medication refills are assigned a new Rx number when refilled electronically. Your pharmacy may indicate that no refills were authorized even though a new prescription for the same medication is available at the pharmacy. Please request the medicine by name with the pharmacy before contacting your provider for a refill.           [employer] Access Code: Activation code not generated  Current [employer] Status: Active

## 2017-04-17 NOTE — PROGRESS NOTES
Subjective:   Duane Parkinson is a 77 y.o. male who presents today as a follow-up for his mild aortic stenosis peripheral vascular disease and hypertension. Since he was last seen he continues to be limited mostly by his back pain. He's had no lower extremity edema shortness of breath seemed to be palpitations or lightheadedness. This blood pressure is well controlled. He has a neurostimulator in his back that is not happy with 90s planning on seeing orthopedics within the next month for this issue.    Past Medical History   Diagnosis Date   • Atrial fibrillation (CMS-[facility])    • Encounter for long-term (current) use of other medications    • Urinary incontinence    • Heart valve disease    • Snoring    • Hypertension    • Hyperlipidemia    • Clotting disorder (CMS-[facility])    • Insomnia    • Pyogenic arthritis, lower leg (CMS-[facility])      thumb, back   • Diabetes (CMS-HCC)      diet controlled   • Sleep apnea      uses CPAP   • Pain      back & thumb     Past Surgical History   Procedure Laterality Date   • Cardioversion       on 7/17/06, sinus rhythm until January 2007,  paroxysmal atrial fibrillation   • Knee arthroplasty total     • Other orthopedic surgery       lower back   • Laminotomy N/A      multiple lumbar spine   • Toe arthroplasty     • Turp-vapor N/A    • Pr percut implnt neuroelect,epidural     • Finger arthroplasty Left 5/27/2016     Procedure: FINGER ARTHROPLASTY THUMB CARPOMETACARPAL EXCISIONAL, EXCISE TRAPEZIUM;  Surgeon: Raheel Salgado M.D.;  Location: SURGERY SAME DAY [facility];  Service:      Family History   Problem Relation Age of Onset   • Other Mother      unknown   • Heart Disease Mother    • Cancer Father      prostate   • Diabetes Brother    • Heart Disease Son      History   Smoking status   • Former Smoker -- 1.00 packs/day for 20 years   • Types: Cigarettes   • Quit date: 01/16/1972   Smokeless tobacco   • Former User   • Quit date: 01/16/1972     Allergies   Allergen Reactions   •  Feldene [Piroxicam]    • Percodan [Oxycodone-Aspirin]      Outpatient Encounter Prescriptions as of 4/17/2017   Medication Sig Dispense Refill   • digoxin (LANOXIN) 250 MCG Tab Take 1 Tab by mouth every day. 30 Tab 6   • gabapentin (NEURONTIN) 300 MG Cap Take 2 Caps by mouth 2 Times a Day. 120 Cap 6   • clindamycin (CLEOCIN) 150 MG Cap Take 1 Cap by mouth 2 Times a Day. Permanent, one am and 2 pm 40 Cap 0   • pravastatin (PRAVACHOL) 80 MG tablet Take 1 Tab by mouth every day. 90 Tab 3   • metoprolol SR (TOPROL XL) 50 MG TABLET SR 24 HR Take 1 Tab by mouth every day. 90 Tab 3   • zolpidem (AMBIEN) 5 MG Tab      • lysine 500 MG Tab Take 500 mg by mouth every day.     • Ascorbic Acid (VITAMIN C) 1000 MG Tab Take  by mouth.     • Cholecalciferol (VITAMIN D3) 2000 UNITS Tab Take  by mouth.     • multivitamin (THERAGRAN) TABS Take 1 Tab by mouth every day.     • CYCLOBENZAPRINE HCL by Does not apply route 1 time daily as needed.     • docosahexanoic acid (FISH OIL) 1000 MG CAPS Take  by mouth every day.     • Glucosamine-Chondroit-Vit C-Mn (GLUCOSAMINE 1500 COMPLEX) CAPS Take  by mouth every day.     • Psyllium (METAMUCIL PO) Take  by mouth every day.     • warfarin (COUMADIN) 4 MG TABS Take 4 mg by mouth every day. Take as directed.        No facility-administered encounter medications on file as of 4/17/2017.     Review of Systems   Constitutional: Negative.  Negative for fever, chills and malaise/fatigue.   HENT: Negative.  Negative for sore throat.    Eyes: Negative.    Respiratory: Negative.  Negative for cough, hemoptysis, sputum production, shortness of breath, wheezing and stridor.    Cardiovascular: Negative.  Negative for chest pain, palpitations, orthopnea, claudication, leg swelling and PND.   Gastrointestinal: Negative.    Genitourinary: Negative.    Musculoskeletal: Positive for back pain.   Skin: Negative.    Neurological: Negative.  Negative for dizziness, loss of consciousness and weakness.  "  Endo/Heme/Allergies: Negative.  Does not bruise/bleed easily.   All other systems reviewed and are negative.       Objective:   /80 mmHg  Pulse 86  Ht 1.778 m (5' 10\")  Wt 103.874 kg (229 lb)  BMI 32.86 kg/m2  SpO2 96%    Physical Exam   Constitutional: He is oriented to person, place, and time. He appears well-developed and well-nourished. No distress.   HENT:   Head: Normocephalic.   Mouth/Throat: Oropharynx is clear and moist.   Eyes: EOM are normal. Pupils are equal, round, and reactive to light. Right eye exhibits no discharge. Left eye exhibits no discharge. No scleral icterus.   Neck: Normal range of motion. Neck supple. No JVD present. No tracheal deviation present.   Cardiovascular: Normal rate, regular rhythm, S1 normal, S2 normal, intact distal pulses and normal pulses.  Exam reveals no gallop, no S3, no S4 and no friction rub.    Murmur heard.   Systolic murmur is present with a grade of 3/6    No diastolic murmur is present   Pulses:       Carotid pulses are 2+ on the right side, and 2+ on the left side.       Radial pulses are 2+ on the right side, and 2+ on the left side.        Dorsalis pedis pulses are 2+ on the right side, and 2+ on the left side.        Posterior tibial pulses are 2+ on the right side, and 2+ on the left side.   Preserved A2   Pulmonary/Chest: Effort normal and breath sounds normal. No respiratory distress. He has no wheezes. He has no rales.   Abdominal: Soft. Bowel sounds are normal. He exhibits no distension and no mass. There is no tenderness. There is no rebound and no guarding.   Musculoskeletal: He exhibits no edema.   Neurological: He is alert and oriented to person, place, and time. No cranial nerve deficit.   Skin: Skin is warm and dry. He is not diaphoretic. No pallor.   Psychiatric: He has a normal mood and affect. His behavior is normal. Judgment and thought content normal.   Nursing note and vitals reviewed.      Assessment:     1. Aortic valve stenosis, " unspecified etiology     2. Dyslipidemia     3. Essential hypertension     4. Atrial fibrillation, unspecified type (CMS-HCC)     5. Long term current use of anticoagulant therapy     6. Mitral valve disorder     7. LORENA (obstructive sleep apnea)         Medical Decision Making:  Today's Assessment / Status / Plan:     75 y/o M with atrial fibrillation and controlled VR, aortic stenosis, last HUY of 1.2 cm2 peak velocity of 3.2 m/sec. his repeat echocardiogram confirmed these findings. He is asymptomatic as far as I can tell. We will supply see him back in 6 months or sooner should he have any changes in his clinical status.    1. A-fib   - dig 250 mcg daily  - metop sr 50 daily  - followed by coumadin clinic    2. Aortic stenosis, moderate, clinical follow up, repeat echo in 1-2 years    - no changes    - repeat echo if changes in symptoms    Thank for you allowing me to take part in your patient's care, please call should you have any questions or would like to discuss this patient.

## 2017-04-17 NOTE — Clinical Note
[facility]   [address]  [place] [address]  Phone: [phone]  Fax: [phone]              Duane Parkinson  1940    Encounter Date: 4/17/2017    Vickey Rutherford M.D.          PROGRESS NOTE:  Subjective:   Duane Parkinson is a 77 y.o. male who presents today as a follow-up for his mild aortic stenosis peripheral vascular disease and hypertension. Since he was last seen he continues to be limited mostly by his back pain. He's had no lower extremity edema shortness of breath seemed to be palpitations or lightheadedness. This blood pressure is well controlled. He has a neurostimulator in his back that is not happy with 90s planning on seeing orthopedics within the next month for this issue.    Past Medical History   Diagnosis Date   • Atrial fibrillation (CMS-HCC)    • Encounter for long-term (current) use of other medications    • Urinary incontinence    • Heart valve disease    • Snoring    • Hypertension    • Hyperlipidemia    • Clotting disorder (CMS-[facility])    • Insomnia    • Pyogenic arthritis, lower leg (CMS-[facility])      thumb, back   • Diabetes (CMS-HCC)      diet controlled   • Sleep apnea      uses CPAP   • Pain      back & thumb     Past Surgical History   Procedure Laterality Date   • Cardioversion       on 7/17/06, sinus rhythm until January 2007,  paroxysmal atrial fibrillation   • Knee arthroplasty total     • Other orthopedic surgery       lower back   • Laminotomy N/A      multiple lumbar spine   • Toe arthroplasty     • Turp-vapor N/A    • Pr percut implnt neuroelect,epidural     • Finger arthroplasty Left 5/27/2016     Procedure: FINGER ARTHROPLASTY THUMB CARPOMETACARPAL EXCISIONAL, EXCISE TRAPEZIUM;  Surgeon: Raheel Salgado M.D.;  Location: SURGERY SAME DAY [facility] [place];  Service:      Family History   Problem Relation Age of Onset   • Other Mother      unknown   • Heart Disease Mother    • Cancer Father      prostate   •  Diabetes Brother    • Heart Disease Son      History   Smoking status   • Former Smoker -- 1.00 packs/day for 20 years   • Types: Cigarettes   • Quit date: 01/16/1972   Smokeless tobacco   • Former User   • Quit date: 01/16/1972     Allergies   Allergen Reactions   • Feldene [Piroxicam]    • Percodan [Oxycodone-Aspirin]      Outpatient Encounter Prescriptions as of 4/17/2017   Medication Sig Dispense Refill   • digoxin (LANOXIN) 250 MCG Tab Take 1 Tab by mouth every day. 30 Tab 6   • gabapentin (NEURONTIN) 300 MG Cap Take 2 Caps by mouth 2 Times a Day. 120 Cap 6   • clindamycin (CLEOCIN) 150 MG Cap Take 1 Cap by mouth 2 Times a Day. Permanent, one am and 2 pm 40 Cap 0   • pravastatin (PRAVACHOL) 80 MG tablet Take 1 Tab by mouth every day. 90 Tab 3   • metoprolol SR (TOPROL XL) 50 MG TABLET SR 24 HR Take 1 Tab by mouth every day. 90 Tab 3   • zolpidem (AMBIEN) 5 MG Tab      • lysine 500 MG Tab Take 500 mg by mouth every day.     • Ascorbic Acid (VITAMIN C) 1000 MG Tab Take  by mouth.     • Cholecalciferol (VITAMIN D3) 2000 UNITS Tab Take  by mouth.     • multivitamin (THERAGRAN) TABS Take 1 Tab by mouth every day.     • CYCLOBENZAPRINE HCL by Does not apply route 1 time daily as needed.     • docosahexanoic acid (FISH OIL) 1000 MG CAPS Take  by mouth every day.     • Glucosamine-Chondroit-Vit C-Mn (GLUCOSAMINE 1500 COMPLEX) CAPS Take  by mouth every day.     • Psyllium (METAMUCIL PO) Take  by mouth every day.     • warfarin (COUMADIN) 4 MG TABS Take 4 mg by mouth every day. Take as directed.        No facility-administered encounter medications on file as of 4/17/2017.     Review of Systems   Constitutional: Negative.  Negative for fever, chills and malaise/fatigue.   HENT: Negative.  Negative for sore throat.    Eyes: Negative.    Respiratory: Negative.  Negative for cough, hemoptysis, sputum production, shortness of breath, wheezing and stridor.    Cardiovascular: Negative.  Negative for chest pain, palpitations,  "orthopnea, claudication, leg swelling and PND.   Gastrointestinal: Negative.    Genitourinary: Negative.    Musculoskeletal: Positive for back pain.   Skin: Negative.    Neurological: Negative.  Negative for dizziness, loss of consciousness and weakness.   Endo/Heme/Allergies: Negative.  Does not bruise/bleed easily.   All other systems reviewed and are negative.       Objective:   /80 mmHg  Pulse 86  Ht 1.778 m (5' 10\")  Wt 103.874 kg (229 lb)  BMI 32.86 kg/m2  SpO2 96%    Physical Exam   Constitutional: He is oriented to person, place, and time. He appears well-developed and well-nourished. No distress.   HENT:   Head: Normocephalic.   Mouth/Throat: Oropharynx is clear and moist.   Eyes: EOM are normal. Pupils are equal, round, and reactive to light. Right eye exhibits no discharge. Left eye exhibits no discharge. No scleral icterus.   Neck: Normal range of motion. Neck supple. No JVD present. No tracheal deviation present.   Cardiovascular: Normal rate, regular rhythm, S1 normal, S2 normal, intact distal pulses and normal pulses.  Exam reveals no gallop, no S3, no S4 and no friction rub.    Murmur heard.   Systolic murmur is present with a grade of 3/6    No diastolic murmur is present   Pulses:       Carotid pulses are 2+ on the right side, and 2+ on the left side.       Radial pulses are 2+ on the right side, and 2+ on the left side.        Dorsalis pedis pulses are 2+ on the right side, and 2+ on the left side.        Posterior tibial pulses are 2+ on the right side, and 2+ on the left side.   Preserved A2   Pulmonary/Chest: Effort normal and breath sounds normal. No respiratory distress. He has no wheezes. He has no rales.   Abdominal: Soft. Bowel sounds are normal. He exhibits no distension and no mass. There is no tenderness. There is no rebound and no guarding.   Musculoskeletal: He exhibits no edema.   Neurological: He is alert and oriented to person, place, and time. No cranial nerve deficit.  "   Skin: Skin is warm and dry. He is not diaphoretic. No pallor.   Psychiatric: He has a normal mood and affect. His behavior is normal. Judgment and thought content normal.   Nursing note and vitals reviewed.      Assessment:     1. Aortic valve stenosis, unspecified etiology     2. Dyslipidemia     3. Essential hypertension     4. Atrial fibrillation, unspecified type (CMS-HCC)     5. Long term current use of anticoagulant therapy     6. Mitral valve disorder     7. LORENA (obstructive sleep apnea)         Medical Decision Making:  Today's Assessment / Status / Plan:     77 y/o M with atrial fibrillation and controlled VR, aortic stenosis, last HUY of 1.2 cm2 peak velocity of 3.2 m/sec. his repeat echocardiogram confirmed these findings. He is asymptomatic as far as I can tell. We will supply see him back in 6 months or sooner should he have any changes in his clinical status.    1. A-fib   - dig 250 mcg daily  - metop sr 50 daily  - followed by coumadin clinic    2. Aortic stenosis, moderate, clinical follow up, repeat echo in 1-2 years    - no changes    - repeat echo if changes in symptoms    Thank for you allowing me to take part in your patient's care, please call should you have any questions or would like to discuss this patient.      Fransisco Graham M.D.  [address] [place] [address] [place] [address]  VIA In Basket

## 2017-04-24 DIAGNOSIS — I10 ESSENTIAL HYPERTENSION: ICD-10-CM

## 2017-04-25 RX ORDER — METOPROLOL SUCCINATE 50 MG/1
TABLET, EXTENDED RELEASE ORAL
Qty: 90 TAB | Refills: 3 | Status: SHIPPED | OUTPATIENT
Start: 2017-04-25 | End: 2018-07-18 | Stop reason: SDUPTHER

## 2017-05-01 ENCOUNTER — ANTICOAGULATION VISIT (OUTPATIENT)
Dept: VASCULAR LAB | Facility: MEDICAL CENTER | Age: 77
End: 2017-05-01
Attending: INTERNAL MEDICINE
Payer: MEDICARE

## 2017-05-01 DIAGNOSIS — I48.91 ATRIAL FIBRILLATION, UNSPECIFIED TYPE (HCC): ICD-10-CM

## 2017-05-01 LAB — INR PPP: 2.8 (ref 2–3.5)

## 2017-05-01 PROCEDURE — 85610 PROTHROMBIN TIME: CPT

## 2017-05-01 PROCEDURE — 99211 OFF/OP EST MAY X REQ PHY/QHP: CPT | Performed by: NURSE PRACTITIONER

## 2017-05-01 NOTE — PROGRESS NOTES
Anticoagulation Summary as of 5/1/2017     INR goal 2.0-3.0   Selected INR 2.8 (5/1/2017)   Maintenance plan 6 mg (4 mg x 1.5) on Sat; 4 mg (4 mg x 1) all other days   Weekly total 30 mg   Plan last modified CLAYTON Jacob (10/10/2016)   Next INR check 6/19/2017   Target end date Indefinite    Indications   Atrial fibrillation (CMS-HCC) [I48.91]         Anticoagulation Episode Summary     INR check location     Preferred lab     Send INR reminders to     Comments Pt goes by [facility]      Anticoagulation Care Providers     Provider Role Specialty Phone number    Vickey Rutherford M.D. Referring Cardiology [phone]    [facility] Responsible  [phone]        Patient is therapeutic today. Denies any medication or diet changes. No current symptoms of bleeding or thrombosis reported. Continue current regimen. Follow up in 7 weeks.    Next Appointment: Monday, June 19, 2017 at 7:45 am.     Britney DOE

## 2017-05-01 NOTE — MR AVS SNAPSHOT
Duane Vargasley   2017 8:15 AM   Anticoagulation Visit   MRN: [id number]    Department:  Vascular Medicine   Dept Phone:  [phone]    Description:  Male : 1940   Provider:  [facility] EXAM 5           Allergies as of 2017     Allergen Noted Reactions    Feldene [Piroxicam] 2011       Percodan [Oxycodone-Aspirin] 2011         You were diagnosed with     Atrial fibrillation, unspecified type (CMS-HCC)   [7219560]         Vital Signs     Smoking Status                   Former Smoker           Basic Information     Date Of Birth Sex Race Ethnicity Preferred Language    1940 Male White Non- English      Your appointments     May 01, 2017  8:15 AM   Established Patient with V EXAM 5   [facility]  (--)    [address] [facility] [address]   [phone]            2017  7:45 AM   Established Patient with [facility] EXAM 5   [facility]  (--)    [address] [facility] [address]   [phone]            2017  8:00 AM   Established Patient with Fransisco Graham M.D.   [facility] [address]    [address]  [address]  [facility] [id number] [phone]           You will be receiving a confirmation call a few days before your appointment from our automated call confirmation system.              Problem List              ICD-10-CM Priority Class Noted - Resolved    Aortic stenosis I35.0   2011 - Present    Atrial fibrillation (CMS-HCC) I48.91   2011 - Present    Long term current use of anticoagulant therapy Z79.01   2011 - Present    Lumbar Disc Degeneration M51.37   2011 - Present    Mitral valve disorder I05.9   2011 - Present    Osteoarthrosis involving lower leg M17.9   2011 - Present    Essential hypertension I10   2015 - Present    Dyslipidemia E78.5   2015 - Present    IGT (impaired  glucose tolerance) R73.02   1/7/2015 - Present    LORENA (obstructive sleep apnea) G47.33   4/21/2016 - Present    Insomnia G47.00   5/26/2016 - Present    Localized primary osteoarthritis of carpometacarpal joint of left thumb M18.12   5/27/2016 - Present      Health Maintenance        Date Due Completion Dates    DIABETES MONOFILAMENT / LE EXAM 1940 ---    IMM DTaP/Tdap/Td Vaccine (1 - Tdap) 1/11/1957 ---    IMM ZOSTER VACCINE 1/11/1998 ---    A1C SCREENING 8/22/2016 2/22/2016, 9/1/2015    FASTING LIPID PROFILE 9/1/2016 9/1/2015    URINE ACR / MICROALBUMIN 9/1/2016 9/1/2015    SERUM CREATININE 5/23/2017 5/23/2016, 2/22/2016, 9/1/2015    RETINAL SCREENING 9/6/2017 9/6/2016    IMM PNEUMOCOCCAL 65+ (ADULT) LOW/MEDIUM RISK SERIES (2 of 2 - PPSV23) 12/22/2017 12/22/2016    COLONOSCOPY 1/7/2022 1/7/2012 (N/S)    Override on 1/7/2012: (N/S)            Results     POCT Protime      Component    INR    2.8                        Current Immunizations     13-VALENT PCV PREVNAR 12/22/2016    Influenza Vaccine Adult HD 11/7/2016  7:55 AM, 1/7/2015      Below and/or attached are the medications your provider expects you to take. Review all of your home medications and newly ordered medications with your provider and/or pharmacist. Follow medication instructions as directed by your provider and/or pharmacist. Please keep your medication list with you and share with your provider. Update the information when medications are discontinued, doses are changed, or new medications (including over-the-counter products) are added; and carry medication information at all times in the event of emergency situations     Allergies:  FELDENE - (reactions not documented)     PERCODAN - (reactions not documented)               Medications  Valid as of: May 01, 2017 -  8:11 AM    Generic Name Brand Name Tablet Size Instructions for use    Ascorbic Acid (Tab) Vitamin C 1000 MG Take  by mouth.        Cholecalciferol (Tab) Vitamin D3 2000 UNITS  Take  by mouth.        Clindamycin HCl (Cap) CLEOCIN 150 MG Take 1 Cap by mouth 2 Times a Day. Permanent, one am and 2 pm        Cyclobenzaprine HCl   by Does not apply route 1 time daily as needed.        Digoxin (Tab) LANOXIN 250 MCG Take 1 Tab by mouth every day.        Gabapentin (Cap) NEURONTIN 300 MG Take 2 Caps by mouth 2 Times a Day.        Glucosamine-Chondroit-Vit C-Mn (Cap) GLUCOSAMINE 1500 COMPLEX  Take  by mouth every day.        Lysine HCl (Tab) lysine 500 MG Take 500 mg by mouth every day.        Metoprolol Succinate (TABLET SR 24 HR) TOPROL XL 50 MG TAKE 1 TABLET BY MOUTH DAILY -GENERIC FOR TOPROL XL        Multiple Vitamin (Tab) THERAGRAN  Take 1 Tab by mouth every day.        Omega-3 Fatty Acids (Cap) OMEGA 3 FA 1000 MG Take  by mouth every day.        Pravastatin Sodium (Tab) PRAVACHOL 80 MG Take 1 Tab by mouth every day.        Psyllium   Take  by mouth every day.        Warfarin Sodium (Tab) COUMADIN 4 MG Take 4 mg by mouth every day. Take as directed.         Zolpidem Tartrate (Tab) AMBIEN 5 MG         .                 Medicines prescribed today were sent to:     [id number] - [place], [place] - [address]    [address] [facility] [address]    Phone: [phone] Fax: [phone]    Open 24 Hours?: No      Medication refill instructions:       If your prescription bottle indicates you have medication refills left, it is not necessary to call your provider’s office. Please contact your pharmacy and they will refill your medication.    If your prescription bottle indicates you do not have any refills left, you may request refills at any time through one of the following ways: The online [employer] system (except Urgent Care), by calling your provider’s office, or by asking your pharmacy to contact your provider’s office with a refill request. Medication refills are processed only during regular business hours and may not be available until the next business day. Your provider may request  additional information or to have a follow-up visit with you prior to refilling your medication.   *Please Note: Medication refills are assigned a new Rx number when refilled electronically. Your pharmacy may indicate that no refills were authorized even though a new prescription for the same medication is available at the pharmacy. Please request the medicine by name with the pharmacy before contacting your provider for a refill.        Warfarin Dosing Calendar   May 2017 Details    Sun Mon Tue Wed Thu Fri Sat      1   2.8   4 mg   See details      2      4 mg         3      4 mg         4      4 mg         5      4 mg         6      6 mg           7      4 mg         8      4 mg         9      4 mg         10      4 mg         11      4 mg         12      4 mg         13      4 mg           14      4 mg         15      4 mg         16      4 mg         17      4 mg         18      4 mg         19      4 mg         20      6 mg           21      4 mg         22      4 mg         23      4 mg         24      4 mg         25      4 mg         26      4 mg         27      4 mg           28      4 mg         29      4 mg         30      4 mg         31      4 mg             Date Details   05/01 This INR check   INR: 2.8               How to take your warfarin dose     To take:  4 mg Take 1 of the 4 mg tablets.    To take:  6 mg Take 1.5 of the 4 mg tablets.           Warfarin Dosing Calendar   June 2017 Details    Sun Mon Tue Wed Thu Fri Sat         1      4 mg         2      4 mg         3      6 mg           4      4 mg         5      4 mg         6      4 mg         7      4 mg         8      4 mg         9      4 mg         10      4 mg           11      4 mg         12      4 mg         13      4 mg         14      4 mg         15      4 mg         16      4 mg         17      6 mg           18      4 mg         19      4 mg         20               21               22               23               24                  25               26               27               28               29               30                 Date Details   No additional details    Date of next INR:  6/19/2017         How to take your warfarin dose     To take:  4 mg Take 1 of the 4 mg tablets.    To take:  6 mg Take 1.5 of the 4 mg tablets.              Feedback Access Code: Activation code not generated  Current Feedback Status: Active

## 2017-05-12 LAB — INR BLD: 2.8 (ref 0.9–1.2)

## 2017-06-16 ENCOUNTER — TELEPHONE (OUTPATIENT)
Dept: MEDICAL GROUP | Facility: MEDICAL CENTER | Age: 77
End: 2017-06-16

## 2017-06-16 NOTE — TELEPHONE ENCOUNTER
Future Appointments       Provider Department Center    6/19/2017 7:45 AM [facility] EXAM 5 [facility]      6/22/2017 8:00 AM Fransisco Graham M.D. [facility] [address]        ESTABLISHED PATIENT PRE-VISIT PLANNING     Note: Patient will not be contacted if there is no indication to call.     1.  Reviewed note from last office visit with PCP and/or other med group provider: Yes    2.  If any orders were placed at last visit, do we have Results/Consult Notes?        •  Labs - Labs ordered, NOT completed. Patient advised to complete prior to next appointment.via voicemail       •  Imaging - Imaging was not ordered at last office visit.       •  Referrals - No referrals were ordered at last office visit.    3.  Immunizations were updated in [employer] using [employer]?: Yes       •  [employer] Recommendations: HEPATITIS A  TD    4.  Patient is due for the following Health Maintenance Topics:   Health Maintenance Due   Topic Date Due   • DIABETES MONOFILAMENT / LE EXAM  1940   • A1C SCREENING  08/22/2016   • FASTING LIPID PROFILE  09/01/2016   • URINE ACR / MICROALBUMIN  09/01/2016   • SERUM CREATININE  05/23/2017   • Annual Wellness Visit  05/27/2017           5.  Patient was informed via voicemail to arrive 15 min prior to their scheduled appointment and bring in their medication bottles.

## 2017-06-19 ENCOUNTER — ANTICOAGULATION VISIT (OUTPATIENT)
Dept: VASCULAR LAB | Facility: MEDICAL CENTER | Age: 77
End: 2017-06-19
Attending: INTERNAL MEDICINE
Payer: MEDICARE

## 2017-06-19 VITALS — DIASTOLIC BLOOD PRESSURE: 56 MMHG | HEART RATE: 81 BPM | SYSTOLIC BLOOD PRESSURE: 92 MMHG

## 2017-06-19 DIAGNOSIS — I48.91 ATRIAL FIBRILLATION, UNSPECIFIED TYPE (HCC): ICD-10-CM

## 2017-06-19 LAB
INR BLD: 2.4 (ref 0.9–1.2)
INR PPP: 2.4 (ref 2–3.5)

## 2017-06-19 PROCEDURE — 85610 PROTHROMBIN TIME: CPT

## 2017-06-19 PROCEDURE — 99211 OFF/OP EST MAY X REQ PHY/QHP: CPT

## 2017-06-19 NOTE — PROGRESS NOTES
OP Anticoagulation Service Note    Date: 6/19/2017    Anticoagulation Summary as of 6/19/2017     INR goal 2.0-3.0   Selected INR 2.4 (6/19/2017)   Maintenance plan 6 mg (4 mg x 1.5) on Sat; 4 mg (4 mg x 1) all other days   Weekly total 30 mg   Plan last modified CLAYTON Jacob (10/10/2016)   Next INR check 8/14/2017   Target end date Indefinite    Indications   Atrial fibrillation (CMS-HCC) [I48.91]         Anticoagulation Episode Summary     INR check location     Preferred lab     Send INR reminders to     Comments Pt goes by Kinsey      Anticoagulation Care Providers     Provider Role Specialty Phone number    Vickey Rutherford M.D. Referring Cardiology [phone]    [facility] Responsible  [phone]        Plan:  Patient is therapeutic today. Confirmed dosing regimen. He reports he stopped his warfarin x 5 days for a procedure on his feet a few weeks ago. He reports starting CoQ10 a few weeks ago.  Patient denies any signs or symptoms of bleeding or clotting. Instructed patient to call clinic if any unusual bleeding or bruising occurs. Will continue dosing as outlined. Will follow-up with patient in 8 week(s).        Tiana Hebert, GRETCHEND

## 2017-06-19 NOTE — MR AVS SNAPSHOT
Duane Vargasley   2017 7:45 AM   Anticoagulation Visit   MRN: [id number]    Department:  Vascular Medicine   Dept Phone:  [phone]    Description:  Male : 1940   Provider:  [facility] EXAM 5           Allergies as of 2017     Allergen Noted Reactions    Feldene [Piroxicam] 2011       Percodan [Oxycodone-Aspirin] 2011         You were diagnosed with     Atrial fibrillation, unspecified type (CMS-[facility])   [2175011]         Vital Signs     Smoking Status                   Former Smoker           Basic Information     Date Of Birth Sex Race Ethnicity Preferred Language    1940 Male White Non- English      Your appointments     2017  7:45 AM   Established Patient with [facility] EXAM 5   [facility]  (--)    [address] [facility] [address]   [phone]            2017  8:00 AM   Established Patient with Fransisco Graham M.D.   [facility] [address]    [address]  [address]  [facility] [id number] [phone]           You will be receiving a confirmation call a few days before your appointment from our automated call confirmation system.            Aug 14, 2017  7:30 AM   Established Patient with [facility] EXAM 5   [facility]  (--)    [address] [facility] [address]   [phone]              Problem List              ICD-10-CM Priority Class Noted - Resolved    Aortic stenosis I35.0   2011 - Present    Atrial fibrillation (CMS-HCC) I48.91   2011 - Present    Long term current use of anticoagulant therapy Z79.01   2011 - Present    Lumbar Disc Degeneration M51.37   2011 - Present    Mitral valve disorder I05.9   2011 - Present    Osteoarthrosis involving lower leg M17.10   2011 - Present    Essential hypertension I10   2015 - Present    Dyslipidemia E78.5   2015 - Present    IGT  (impaired glucose tolerance) R73.02   1/7/2015 - Present    LROENA (obstructive sleep apnea) G47.33   4/21/2016 - Present    Insomnia G47.00   5/26/2016 - Present    Localized primary osteoarthritis of carpometacarpal joint of left thumb M18.12   5/27/2016 - Present      Health Maintenance        Date Due Completion Dates    DIABETES MONOFILAMENT / LE EXAM 1940 ---    A1C SCREENING 8/22/2016 2/22/2016, 9/1/2015    FASTING LIPID PROFILE 9/1/2016 9/1/2015    URINE ACR / MICROALBUMIN 9/1/2016 9/1/2015    SERUM CREATININE 5/23/2017 5/23/2016, 2/22/2016, 9/1/2015    RETINAL SCREENING 9/6/2017 9/6/2016    IMM DTaP/Tdap/Td Vaccine (2 - Td) 6/29/2019 6/29/2009    COLONOSCOPY 1/7/2022 1/7/2012 (N/S)    Override on 1/7/2012: (N/S)            Results     POCT Protime      Component    INR    2.4                        Current Immunizations     13-VALENT PCV PREVNAR 12/22/2016    Influenza Vaccine Adult HD 11/7/2016  7:55 AM, 1/7/2015    Pneumococcal polysaccharide vaccine (PPSV-23) 11/4/2011    SHINGLES VACCINE 4/1/2014    Tdap Vaccine 6/29/2009      Below and/or attached are the medications your provider expects you to take. Review all of your home medications and newly ordered medications with your provider and/or pharmacist. Follow medication instructions as directed by your provider and/or pharmacist. Please keep your medication list with you and share with your provider. Update the information when medications are discontinued, doses are changed, or new medications (including over-the-counter products) are added; and carry medication information at all times in the event of emergency situations     Allergies:  FELDENE - (reactions not documented)     PERCODAN - (reactions not documented)               Medications  Valid as of: June 19, 2017 -  7:33 AM    Generic Name Brand Name Tablet Size Instructions for use    Ascorbic Acid (Tab) Vitamin C 1000 MG Take  by mouth.        Cholecalciferol (Tab) Vitamin D3 2000 UNITS  Take  by mouth.        Clindamycin HCl (Cap) CLEOCIN 150 MG Take 1 Cap by mouth 2 Times a Day. Permanent, one am and 2 pm        Cyclobenzaprine HCl   by Does not apply route 1 time daily as needed.        Digoxin (Tab) LANOXIN 250 MCG Take 1 Tab by mouth every day.        Gabapentin (Cap) NEURONTIN 300 MG Take 2 Caps by mouth 2 Times a Day.        Glucosamine-Chondroit-Vit C-Mn (Cap) GLUCOSAMINE 1500 COMPLEX  Take  by mouth every day.        Lysine HCl (Tab) lysine 500 MG Take 500 mg by mouth every day.        Metoprolol Succinate (TABLET SR 24 HR) TOPROL XL 50 MG TAKE 1 TABLET BY MOUTH DAILY -GENERIC FOR TOPROL XL        Multiple Vitamin (Tab) THERAGRAN  Take 1 Tab by mouth every day.        Omega-3 Fatty Acids (Cap) OMEGA 3 FA 1000 MG Take  by mouth every day.        Pravastatin Sodium (Tab) PRAVACHOL 80 MG Take 1 Tab by mouth every day.        Psyllium   Take  by mouth every day.        Warfarin Sodium (Tab) COUMADIN 4 MG Take 4 mg by mouth every day. Take as directed.         Zolpidem Tartrate (Tab) AMBIEN 5 MG         .                 Medicines prescribed today were sent to:     [id number] - [place], [place] - [address]    [address] [facility] [address]    Phone: [phone] Fax: [phone]    Open 24 Hours?: No      Medication refill instructions:       If your prescription bottle indicates you have medication refills left, it is not necessary to call your provider’s office. Please contact your pharmacy and they will refill your medication.    If your prescription bottle indicates you do not have any refills left, you may request refills at any time through one of the following ways: The online [employer] system (except Urgent Care), by calling your provider’s office, or by asking your pharmacy to contact your provider’s office with a refill request. Medication refills are processed only during regular business hours and may not be available until the next business day. Your provider may request  additional information or to have a follow-up visit with you prior to refilling your medication.   *Please Note: Medication refills are assigned a new Rx number when refilled electronically. Your pharmacy may indicate that no refills were authorized even though a new prescription for the same medication is available at the pharmacy. Please request the medicine by name with the pharmacy before contacting your provider for a refill.        Warfarin Dosing Calendar   June 2017 Details    Sun Mon Tue Wed Thu Fri Sat         1               2               3                 4               5               6               7               8               9               10                 11               12               13               14               15               16               17                 18               19   2.4   4 mg   See details      20      4 mg         21      4 mg         22      4 mg         23      4 mg         24      4 mg           25      4 mg         26      4 mg         27      4 mg         28      4 mg         29      4 mg         30      4 mg           Date Details   06/19 This INR check   INR: 2.4               How to take your warfarin dose     To take:  4 mg Take 1 of the 4 mg tablets.           Warfarin Dosing Calendar   July 2017 Details    Sun Mon Tue Wed Thu Fri Sat           1      6 mg           2      4 mg         3      4 mg         4      4 mg         5      4 mg         6      4 mg         7      4 mg         8      4 mg           9      4 mg         10      4 mg         11      4 mg         12      4 mg         13      4 mg         14      4 mg         15      6 mg           16      4 mg         17      4 mg         18      4 mg         19      4 mg         20      4 mg         21      4 mg         22      4 mg           23      4 mg         24      4 mg         25      4 mg         26      4 mg         27      4 mg         28      4 mg         29      6 mg           30       4 mg         31      4 mg               Date Details   No additional details            How to take your warfarin dose     To take:  4 mg Take 1 of the 4 mg tablets.    To take:  6 mg Take 1.5 of the 4 mg tablets.           Warfarin Dosing Calendar   August 2017 Details    Sun Mon Tue Wed Thu Fri Sat       1      4 mg         2      4 mg         3      4 mg         4      4 mg         5      4 mg           6      4 mg         7      4 mg         8      4 mg         9      4 mg         10      4 mg         11      4 mg         12      6 mg           13      4 mg         14      4 mg         15               16               17               18               19                 20               21               22               23               24               25               26                 27               28               29               30               31                  Date Details   No additional details    Date of next INR:  8/14/2017         How to take your warfarin dose     To take:  4 mg Take 1 of the 4 mg tablets.    To take:  6 mg Take 1.5 of the 4 mg tablets.              [employer] Access Code: Activation code not generated  Current [employer] Status: Active

## 2017-06-20 ENCOUNTER — HOSPITAL ENCOUNTER (OUTPATIENT)
Dept: LAB | Facility: MEDICAL CENTER | Age: 77
End: 2017-06-20
Attending: INTERNAL MEDICINE
Payer: MEDICARE

## 2017-06-20 DIAGNOSIS — R73.02 IGT (IMPAIRED GLUCOSE TOLERANCE): ICD-10-CM

## 2017-06-20 DIAGNOSIS — E78.5 DYSLIPIDEMIA: ICD-10-CM

## 2017-06-20 LAB
ALBUMIN SERPL BCP-MCNC: 3.7 G/DL (ref 3.2–4.9)
ALBUMIN/GLOB SERPL: 1.2 G/DL
ALP SERPL-CCNC: 57 U/L (ref 30–99)
ALT SERPL-CCNC: 34 U/L (ref 2–50)
ANION GAP SERPL CALC-SCNC: 4 MMOL/L (ref 0–11.9)
AST SERPL-CCNC: 33 U/L (ref 12–45)
BILIRUB SERPL-MCNC: 0.8 MG/DL (ref 0.1–1.5)
BUN SERPL-MCNC: 11 MG/DL (ref 8–22)
CALCIUM SERPL-MCNC: 9.1 MG/DL (ref 8.5–10.5)
CHLORIDE SERPL-SCNC: 110 MMOL/L (ref 96–112)
CHOLEST SERPL-MCNC: 110 MG/DL (ref 100–199)
CO2 SERPL-SCNC: 25 MMOL/L (ref 20–33)
CREAT SERPL-MCNC: 0.74 MG/DL (ref 0.5–1.4)
CREAT UR-MCNC: 146.2 MG/DL
EST. AVERAGE GLUCOSE BLD GHB EST-MCNC: 114 MG/DL
GFR SERPL CREATININE-BSD FRML MDRD: >60 ML/MIN/1.73 M 2
GLOBULIN SER CALC-MCNC: 3.1 G/DL (ref 1.9–3.5)
GLUCOSE SERPL-MCNC: 95 MG/DL (ref 65–99)
HBA1C MFR BLD: 5.6 % (ref 0–5.6)
HDLC SERPL-MCNC: 35 MG/DL
LDLC SERPL CALC-MCNC: 53 MG/DL
MICROALBUMIN UR-MCNC: <0.7 MG/DL
MICROALBUMIN/CREAT UR: NORMAL MG/G (ref 0–30)
POTASSIUM SERPL-SCNC: 4.2 MMOL/L (ref 3.6–5.5)
PROT SERPL-MCNC: 6.8 G/DL (ref 6–8.2)
SODIUM SERPL-SCNC: 139 MMOL/L (ref 135–145)
TRIGL SERPL-MCNC: 109 MG/DL (ref 0–149)

## 2017-06-20 PROCEDURE — 82043 UR ALBUMIN QUANTITATIVE: CPT

## 2017-06-20 PROCEDURE — 80061 LIPID PANEL: CPT

## 2017-06-20 PROCEDURE — 82570 ASSAY OF URINE CREATININE: CPT

## 2017-06-20 PROCEDURE — 83036 HEMOGLOBIN GLYCOSYLATED A1C: CPT | Mod: GA

## 2017-06-20 PROCEDURE — 36415 COLL VENOUS BLD VENIPUNCTURE: CPT

## 2017-06-20 PROCEDURE — 80053 COMPREHEN METABOLIC PANEL: CPT

## 2017-06-22 ENCOUNTER — OFFICE VISIT (OUTPATIENT)
Dept: MEDICAL GROUP | Facility: MEDICAL CENTER | Age: 77
End: 2017-06-22
Payer: MEDICARE

## 2017-06-22 VITALS
RESPIRATION RATE: 16 BRPM | WEIGHT: 229.2 LBS | HEIGHT: 70 IN | DIASTOLIC BLOOD PRESSURE: 68 MMHG | TEMPERATURE: 97.9 F | SYSTOLIC BLOOD PRESSURE: 114 MMHG | HEART RATE: 78 BPM | OXYGEN SATURATION: 96 % | BODY MASS INDEX: 32.81 KG/M2

## 2017-06-22 DIAGNOSIS — I05.9 MITRAL VALVE DISORDER: ICD-10-CM

## 2017-06-22 DIAGNOSIS — Z00.00 MEDICARE ANNUAL WELLNESS VISIT, SUBSEQUENT: ICD-10-CM

## 2017-06-22 DIAGNOSIS — F51.01 PRIMARY INSOMNIA: ICD-10-CM

## 2017-06-22 DIAGNOSIS — G47.33 OSA (OBSTRUCTIVE SLEEP APNEA): ICD-10-CM

## 2017-06-22 DIAGNOSIS — I35.0 AORTIC VALVE STENOSIS, UNSPECIFIED ETIOLOGY: ICD-10-CM

## 2017-06-22 DIAGNOSIS — E66.9 OBESITY (BMI 30.0-34.9): ICD-10-CM

## 2017-06-22 DIAGNOSIS — E78.5 DYSLIPIDEMIA: ICD-10-CM

## 2017-06-22 DIAGNOSIS — R73.02 IGT (IMPAIRED GLUCOSE TOLERANCE): ICD-10-CM

## 2017-06-22 DIAGNOSIS — I48.91 ATRIAL FIBRILLATION, UNSPECIFIED TYPE (HCC): ICD-10-CM

## 2017-06-22 DIAGNOSIS — I10 ESSENTIAL HYPERTENSION: ICD-10-CM

## 2017-06-22 PROBLEM — E66.811 OBESITY (BMI 30.0-34.9): Status: ACTIVE | Noted: 2017-06-22

## 2017-06-22 PROCEDURE — G0439 PPPS, SUBSEQ VISIT: HCPCS | Performed by: INTERNAL MEDICINE

## 2017-06-22 ASSESSMENT — PATIENT HEALTH QUESTIONNAIRE - PHQ9: CLINICAL INTERPRETATION OF PHQ2 SCORE: 0

## 2017-06-22 NOTE — MR AVS SNAPSHOT
"        Duane Parkinson   2017 8:00 AM   Office Visit   MRN: [id number]    Department:  [address] [place] [address] Group   Dept Phone:  [phone]    Description:  Male : 1940   Provider:  Fransisco Graham M.D.           Reason for Visit     Results review lab results      Allergies as of 2017     Allergen Noted Reactions    Feldene [Piroxicam] 2011       Percodan [Oxycodone-Aspirin] 2011         You were diagnosed with     Medicare annual wellness visit, subsequent   [992650]       Atrial fibrillation, unspecified type (CMS-HCC)   [5353793]       Aortic valve stenosis, unspecified etiology   [5936245]       Mitral valve disorder   [469781]       Essential hypertension   [4937659]       Dyslipidemia   [156368]       Osteoarthrosis involving lower leg   [7644193]       Obesity (BMI 30.0-34.9)   [653997]       LORENA (obstructive sleep apnea)   [810024]       IGT (impaired glucose tolerance)   [505189]       Primary insomnia   [986257]         Vital Signs     Blood Pressure Pulse Temperature Respirations Height Weight    114/68 mmHg 78 36.6 °C (97.9 °F) 16 1.778 m (5' 10\") 103.964 kg (229 lb 3.2 oz)    Body Mass Index Oxygen Saturation Smoking Status             32.89 kg/m2 96% Former Smoker         Basic Information     Date Of Birth Sex Race Ethnicity Preferred Language    1940 Male White Non- English      Your appointments     Aug 14, 2017  7:30 AM   Established Patient with V EXAM 5   [facility]  (--)    [address] [facility] [place] [address]   [phone]            Dec 22, 2017  7:20 AM   Established Patient with Fransisco Graham M.D.   [facility] [address]    [address]  [address]  [place] [address] [phone]           You will be receiving a confirmation call a few days before your appointment from our automated call confirmation system.              Problem List              ICD-10-CM Priority " Class Noted - Resolved    Aortic stenosis I35.0   12/30/2011 - Present    Atrial fibrillation (CMS-[facility]) I48.91   12/30/2011 - Present    Long term current use of anticoagulant therapy Z79.01   12/30/2011 - Present    Lumbar Disc Degeneration M51.37   12/30/2011 - Present    Mitral valve disorder I05.9   12/30/2011 - Present    Osteoarthrosis involving lower leg M17.10   12/30/2011 - Present    Essential hypertension I10   1/7/2015 - Present    Dyslipidemia E78.5   1/7/2015 - Present    IGT (impaired glucose tolerance) R73.02   1/7/2015 - Present    LORENA (obstructive sleep apnea) G47.33   4/21/2016 - Present    Insomnia G47.00   5/26/2016 - Present    Localized primary osteoarthritis of carpometacarpal joint of left thumb M18.12   5/27/2016 - Present    Obesity (BMI 30.0-34.9) E66.9   6/22/2017 - Present      Health Maintenance        Date Due Completion Dates    IMM DTaP/Tdap/Td Vaccine (2 - Td) 6/29/2019 6/29/2009    COLONOSCOPY 1/7/2022 1/7/2012 (N/S)    Override on 1/7/2012: (N/S)            Current Immunizations     13-VALENT PCV PREVNAR 12/22/2016    Influenza Vaccine Adult HD 11/7/2016  7:55 AM, 1/7/2015    Pneumococcal polysaccharide vaccine (PPSV-23) 11/4/2011    SHINGLES VACCINE 4/1/2014    Tdap Vaccine 6/29/2009      Below and/or attached are the medications your provider expects you to take. Review all of your home medications and newly ordered medications with your provider and/or pharmacist. Follow medication instructions as directed by your provider and/or pharmacist. Please keep your medication list with you and share with your provider. Update the information when medications are discontinued, doses are changed, or new medications (including over-the-counter products) are added; and carry medication information at all times in the event of emergency situations     Allergies:  FELDENE - (reactions not documented)     PERCODAN - (reactions not documented)               Medications  Valid as of: June 22,  2017 -  8:16 AM    Generic Name Brand Name Tablet Size Instructions for use    Cholecalciferol (Tab) Vitamin D3 2000 UNITS Take  by mouth.        Clindamycin HCl (Cap) CLEOCIN 150 MG Take 1 Cap by mouth 2 Times a Day. Permanent, one am and 2 pm        Digoxin (Tab) LANOXIN 250 MCG Take 1 Tab by mouth every day.        Gabapentin (Cap) NEURONTIN 300 MG Take 2 Caps by mouth 2 Times a Day.        Glucosamine-Chondroit-Vit C-Mn (Cap) GLUCOSAMINE 1500 COMPLEX  Take  by mouth every day.        Lysine HCl (Tab) lysine 500 MG Take 500 mg by mouth every day.        Metoprolol Succinate (TABLET SR 24 HR) TOPROL XL 50 MG TAKE 1 TABLET BY MOUTH DAILY -GENERIC FOR TOPROL XL        Multiple Vitamin (Tab) THERAGRAN  Take 1 Tab by mouth every day.        Omega-3 Fatty Acids (Cap) OMEGA 3 FA 1000 MG Take  by mouth every day.        Pravastatin Sodium (Tab) PRAVACHOL 80 MG Take 1 Tab by mouth every day.        Warfarin Sodium (Tab) COUMADIN 4 MG Take 4 mg by mouth every day. Take as directed.         Zolpidem Tartrate (Tab) AMBIEN 5 MG         .                 Medicines prescribed today were sent to:     [id number] - [place], [place] - [address]    [address] [facility] [address]    Phone: [phone] Fax: [phone]    Open 24 Hours?: No      Medication refill instructions:       If your prescription bottle indicates you have medication refills left, it is not necessary to call your provider’s office. Please contact your pharmacy and they will refill your medication.    If your prescription bottle indicates you do not have any refills left, you may request refills at any time through one of the following ways: The online [employer] system (except Urgent Care), by calling your provider’s office, or by asking your pharmacy to contact your provider’s office with a refill request. Medication refills are processed only during regular business hours and may not be available until the next business day. Your provider may request  additional information or to have a follow-up visit with you prior to refilling your medication.   *Please Note: Medication refills are assigned a new Rx number when refilled electronically. Your pharmacy may indicate that no refills were authorized even though a new prescription for the same medication is available at the pharmacy. Please request the medicine by name with the pharmacy before contacting your provider for a refill.        Referral     A referral request has been sent to our patient care coordination department. Please allow 3-5 business days for us to process this request and contact you either by phone or mail. If you do not hear from us by the 5th business day, please call us at [phone].           [employer] Access Code: Activation code not generated  Current [employer] Status: Active

## 2017-06-22 NOTE — PROGRESS NOTES
CC: Follow-up multiple issues due for blood work.    HPI:   Duane presents today with the following.    1. Medicare annual wellness visit, subsequent  Screenings performed below    2. Atrial fibrillation, unspecified type (CMS-HCC)  Maintain on rate control agents good control today denying any chest pain or palpitations. He is maintained on anticoagulation and no sources of bleeding followed by cardiology.    3. Aortic valve stenosis, unspecified etiology  Denies any chest pain or shortness of breath no edema    4. Mitral valve disorder  Again no symptomology.    5. Essential hypertension  Blood pressure well controlled on current medications denying any side effects.    6. Dyslipidemia  Maintain on statin with good control denying any myalgias.    7. Osteoarthrosis involving lower leg  Is followed by orthopedics denying any advancement of symptoms.    8. Obesity (BMI 30.0-34.9)  Weight has been much higher in the past he has lost weight and actually has off all lectured her medications. It is difficult for activity but he is trying watch his diet.    9. LORENA (obstructive sleep apnea)  Maintain on CPAP his pulmonologist has retired would like referral to a new one. He is compliant wearing it nightly.    10. IGT (impaired glucose tolerance)  A1c comes back at 5.6 actually in the normal range. He does see an endocrinologist unclear why.    11. Primary insomnia  Reports his sleep is currently doing well again maintain on CPAP. He does take zolpidem as needed      Depression Screening    Little interest or pleasure in doing things?  0 - not at all  Feeling down, depressed , or hopeless? 0 - not at all  Trouble falling or staying asleep, or sleeping too much?     Feeling tired or having little energy?     Poor appetite or overeating?     Feeling bad about yourself - or that you are a failure or have let yourself or your family down?    Trouble concentrating on things, such as reading the newspaper or watching television?     Moving or speaking so slowly that other people could have noticed.  Or the opposite - being so fidgety or restless that you have been moving around a lot more than usual?     Thoughts that you would be better off dead, or of hurting yourself?     Patient Health Questionnaire Score:    If depressive symptoms identified deferred to follow up visit unless specifically addressed in assessment and plan.    Interpretation of PHQ-9 Total Score   Score Severity   1-4 Minimal Depression   5-9 Mild Depression   10-14 Moderate Depression   15-19 Moderately Severe Depression   20-27 Severe Depression    Screening for Cognitive Impairment    Three Minute Recall (banana, sunrise, fence)  2/3    Draw clock face with all 12 numbers set to the hand to show 10 minures past 11 o'clock  1 5/5  If cognitive concerns identified deferred to follow up visit unless specifically addressed in assessment and plan.    Fall Risk Assessment    Has the patient had two or more falls in the last year or any fall with injury in the last year?  No  If Fall Risk identified deferred to follow up visit unless specifically addressed in assessment and plan.    Safety Assessment    Throw rugs on floor.  Yes  Handrails on all stairs.  No  Good lighting in all hallways.  Yes  Difficulty hearing.  No  Patient counseled about all safety risks that were identified.    Functional Assessment ADLs    Are there any barriers preventing you from cooking for yourself or meeting nutritional needs?  No.    Are there any barriers preventing you from driving safely or obtaining transportation?  No.    Are there any barriers preventing you from using a telephone or calling for help?  No.    Are there any barriers preventing you from shopping?  No.    Are there any barriers preventing you from taking care of your own finances?  No.    Are there any barriers preventing you from managing your medications?  No.    Are currently engaing any exercise or physical activity?  No.        Health Maintenance Summary                Annual Wellness Visit Next Due 6/23/2018      Done 6/22/2017 Visit Dx: Medicare annual wellness visit, subsequent     Patient has more history with this topic...    IMM DTaP/Tdap/Td Vaccine Next Due 6/29/2019      Done 6/29/2009 Imm Admin: Tdap Vaccine    COLONOSCOPY Next Due 1/7/2022      Not specified 1/7/2012           Patient Care Team:  Fransisco Graham M.D. as PCP - General (Internal Medicine)  BERTA Irvin M.D. as Consulting Physician (Phys Med and Rehab)  Jarad Joseph M.D. as Consulting Physician (Gastroenterology)  CLAYTON Navarrete as Consulting Physician (Dermatology)  Alessio Cutler M.D. (Orthopaedics)  Neville Minor D.P.M. (Podiatry)  Vickey Rutherford M.D. as Consulting Physician (Cardiology)  [facility] as Consulting Physician      Patient Active Problem List    Diagnosis Date Noted   • Obesity (BMI 30.0-34.9) 06/22/2017   • Localized primary osteoarthritis of carpometacarpal joint of left thumb 05/27/2016   • Insomnia 05/26/2016   • LORENA (obstructive sleep apnea) 04/21/2016   • Essential hypertension 01/07/2015   • Dyslipidemia 01/07/2015   • IGT (impaired glucose tolerance) 01/07/2015   • Aortic stenosis 12/30/2011   • Atrial fibrillation (CMS-[facility]) 12/30/2011   • Long term current use of anticoagulant therapy 12/30/2011   • Lumbar Disc Degeneration 12/30/2011   • Mitral valve disorder 12/30/2011   • Osteoarthrosis involving lower leg 12/30/2011       Current Outpatient Prescriptions   Medication Sig Dispense Refill   • metoprolol SR (TOPROL XL) 50 MG TABLET SR 24 HR TAKE 1 TABLET BY MOUTH DAILY -GENERIC FOR TOPROL XL 90 Tab 3   • digoxin (LANOXIN) 250 MCG Tab Take 1 Tab by mouth every day. 30 Tab 6   • gabapentin (NEURONTIN) 300 MG Cap Take 2 Caps by mouth 2 Times a Day. 120 Cap 6   • clindamycin (CLEOCIN) 150 MG Cap Take 1 Cap by mouth 2 Times a Day. Permanent, one am and 2 pm 40 Cap 0   • pravastatin (PRAVACHOL)  "80 MG tablet Take 1 Tab by mouth every day. 90 Tab 3   • lysine 500 MG Tab Take 500 mg by mouth every day.     • Cholecalciferol (VITAMIN D3) 2000 UNITS Tab Take  by mouth.     • multivitamin (THERAGRAN) TABS Take 1 Tab by mouth every day.     • docosahexanoic acid (FISH OIL) 1000 MG CAPS Take  by mouth every day.     • Glucosamine-Chondroit-Vit C-Mn (GLUCOSAMINE 1500 COMPLEX) CAPS Take  by mouth every day.     • warfarin (COUMADIN) 4 MG TABS Take 4 mg by mouth every day. Take as directed.      • zolpidem (AMBIEN) 5 MG Tab        No current facility-administered medications for this visit.         Allergies as of 06/22/2017 - Kurt as Reviewed 06/22/2017   Allergen Reaction Noted   • Feldene [piroxicam]  12/30/2011   • Percodan [oxycodone-aspirin]  12/30/2011        ROS: As per HPI.    /68 mmHg  Pulse 78  Temp(Src) 36.6 °C (97.9 °F)  Resp 16  Ht 1.778 m (5' 10\")  Wt 103.964 kg (229 lb 3.2 oz)  BMI 32.89 kg/m2  SpO2 96%    Physical Exam:  Gen:         Alert and oriented, No apparent distress.  Neck:        No Lymphadenopathy or Bruits.  Lungs:     Clear to auscultation bilaterally  CV:          Regular rate and rhythm. No murmurs, rubs or gallops.  Abd:         Soft non tender, non distended. Normal active bowel sounds.  No  Hepatosplenomegaly, No pulsatile masses.                   Ext:          No clubbing, cyanosis, edema.      Assessment and Plan.   77 y.o. male with the following issues.    1. Medicare annual wellness visit, subsequent  Discussed healthy lifestyle habits as well as screening regimens.  - Annual Wellness Visit - Includes PPPS Subsequent ()    2. Atrial fibrillation, unspecified type (CMS-HCC)  Clinical stable no change to therapy  - Annual Wellness Visit - Includes PPPS Subsequent ()    3. Aortic valve stenosis, unspecified etiology  Clinically stable follow cardiology  - Annual Wellness Visit - Includes PPPS Subsequent ()    4. Mitral valve disorder  Denies any " symptomology  - Annual Wellness Visit - Includes PPPS Subsequent ()    5. Essential hypertension  Currently well controlled, Discuss diet, exercise and salt restriction.  - Annual Wellness Visit - Includes PPPS Subsequent ()    6. Dyslipidemia  Lipids currently well controlled. Discussed continued diet and exercise recheck 6 months to 1 year.  - Annual Wellness Visit - Includes PPPS Subsequent ()    7. Osteoarthrosis involving lower leg  Clinically stable follow specialist  - Annual Wellness Visit - Includes PPPS Subsequent ()    8. Obesity (BMI 30.0-34.9)  Discussed diet exercise and weight loss strategies. Have set a goal for 15 pounds in 3 months and will followup at that time.  - Annual Wellness Visit - Includes PPPS Subsequent ()  - Patient identified as having weight management issue.  Appropriate orders and counseling given.    9. LORENA (obstructive sleep apnea)  Continue CPAP referred to pulmonology  - REFERRAL TO PULMONOLOGY    10. IGT (impaired glucose tolerance)  No longer diabetic removing from the chart will see back in 6 months and continue to monitor.    11. Primary insomnia  Clinically stable refill medications when necessary.

## 2017-07-20 ENCOUNTER — ANTICOAGULATION VISIT (OUTPATIENT)
Dept: VASCULAR LAB | Facility: MEDICAL CENTER | Age: 77
End: 2017-07-20
Attending: INTERNAL MEDICINE
Payer: MEDICARE

## 2017-07-20 VITALS — SYSTOLIC BLOOD PRESSURE: 109 MMHG | HEART RATE: 69 BPM | DIASTOLIC BLOOD PRESSURE: 64 MMHG

## 2017-07-20 DIAGNOSIS — I48.91 ATRIAL FIBRILLATION, UNSPECIFIED TYPE (HCC): ICD-10-CM

## 2017-07-20 LAB — INR PPP: 2.5 (ref 2–3.5)

## 2017-07-20 PROCEDURE — 99211 OFF/OP EST MAY X REQ PHY/QHP: CPT | Performed by: NURSE PRACTITIONER

## 2017-07-20 PROCEDURE — 85610 PROTHROMBIN TIME: CPT

## 2017-07-20 NOTE — PROGRESS NOTES
Anticoagulation Summary as of 7/20/2017     INR goal 2.0-3.0   Selected INR 2.5 (7/20/2017)   Maintenance plan 6 mg (4 mg x 1.5) on Sat; 4 mg (4 mg x 1) all other days   Weekly total 30 mg   Plan last modified CLAYTON Jacob (10/10/2016)   Next INR check 8/31/2017   Target end date Indefinite    Indications   Atrial fibrillation (CMS-HCC) [I48.91]         Anticoagulation Episode Summary     INR check location     Preferred lab     Send INR reminders to     Comments Pt goes by [facility]      Anticoagulation Care Providers     Provider Role Specialty Phone number    Vickey Rutherford M.D. Referring Cardiology [phone]    [facility] Responsible  [phone]        Anticoagulation Patient Findings      Current Outpatient Prescriptions on File Prior to Visit   Medication Sig Dispense Refill   • metoprolol SR (TOPROL XL) 50 MG TABLET SR 24 HR TAKE 1 TABLET BY MOUTH DAILY -GENERIC FOR TOPROL XL 90 Tab 3   • digoxin (LANOXIN) 250 MCG Tab Take 1 Tab by mouth every day. 30 Tab 6   • gabapentin (NEURONTIN) 300 MG Cap Take 2 Caps by mouth 2 Times a Day. 120 Cap 6   • clindamycin (CLEOCIN) 150 MG Cap Take 1 Cap by mouth 2 Times a Day. Permanent, one am and 2 pm 40 Cap 0   • pravastatin (PRAVACHOL) 80 MG tablet Take 1 Tab by mouth every day. 90 Tab 3   • zolpidem (AMBIEN) 5 MG Tab      • lysine 500 MG Tab Take 500 mg by mouth every day.     • Cholecalciferol (VITAMIN D3) 2000 UNITS Tab Take  by mouth.     • multivitamin (THERAGRAN) TABS Take 1 Tab by mouth every day.     • docosahexanoic acid (FISH OIL) 1000 MG CAPS Take  by mouth every day.     • Glucosamine-Chondroit-Vit C-Mn (GLUCOSAMINE 1500 COMPLEX) CAPS Take  by mouth every day.     • warfarin (COUMADIN) 4 MG TABS Take 4 mg by mouth every day. Take as directed.        No current facility-administered medications on file prior to visit.       Lab Results   Component Value Date/Time    SODIUM 139 06/20/2017 07:36 AM    POTASSIUM 4.2 06/20/2017  07:36 AM    CHLORIDE 110 06/20/2017 07:36 AM    CO2 25 06/20/2017 07:36 AM    GLUCOSE 95 06/20/2017 07:36 AM    BUN 11 06/20/2017 07:36 AM    CREATININE 0.74 06/20/2017 07:36 AM          Duane Parkinson seen in clinic today  INR  therapeutic. Patient is having an ablation today and needed to be in range for the procedure.    Denies signs/symptoms of bleeding and/or thrombosis.    Denies changes to diet or medications.   Follow up appointment in 6 week(s).      Continue current medication regimen.        MARKELL Benedict.   Sin Huitron, PharmD

## 2017-07-20 NOTE — MR AVS SNAPSHOT
Duane Parkinson   2017 8:30 AM   Anticoagulation Visit   MRN: [id number]    Department:  Vascular Medicine   Dept Phone:  [phone]    Description:  Male : 1940   Provider:  V EXAM 5           Allergies as of 2017     Allergen Noted Reactions    Feldene [Piroxicam] 2011       Percodan [Oxycodone-Aspirin] 2011         You were diagnosed with     Atrial fibrillation, unspecified type (CMS-[facility])   [[id number]]         Vital Signs     Blood Pressure Pulse Smoking Status             109/64 mmHg 69 Former Smoker         Basic Information     Date Of Birth Sex Race Ethnicity Preferred Language    1940 Male White Non- English      Your appointments     2017  8:30 AM   Established Patient with IHV EXAM 5   [facility]  (--)    [address]  [place] [address]   [phone]            Aug 31, 2017  7:30 AM   Established Patient with IHV EXAM 4   [facility]  (--)    [address]  [place] [address]   [phone]            Sep 13, 2017 11:40 AM   New Patient with C Rotation   [facility] (--)    [address]  [address]  [place] [id number] [phone]           Please bring enclosed paperwork completed along with your insurance card and photo ID.            Dec 22, 2017  7:20 AM   Established Patient with Fransisco Graham M.D.   [facility] [address]    [address]  [place] [id number] [phone]           You will be receiving a confirmation call a few days before your appointment from our automated call confirmation system.              Problem List              ICD-10-CM Priority Class Noted - Resolved    Aortic stenosis I35.0   2011 - Present    Atrial fibrillation (CMS-HCC) I48.91   2011 - Present    Long term current use of anticoagulant therapy Z79.01    12/30/2011 - Present    Lumbar Disc Degeneration M51.37   12/30/2011 - Present    Mitral valve disorder I05.9   12/30/2011 - Present    Osteoarthrosis involving lower leg M17.10   12/30/2011 - Present    Essential hypertension I10   1/7/2015 - Present    Dyslipidemia E78.5   1/7/2015 - Present    IGT (impaired glucose tolerance) R73.02   1/7/2015 - Present    LORENA (obstructive sleep apnea) G47.33   4/21/2016 - Present    Insomnia G47.00   5/26/2016 - Present    Localized primary osteoarthritis of carpometacarpal joint of left thumb M18.12   5/27/2016 - Present    Obesity (BMI 30.0-34.9) E66.9   6/22/2017 - Present      Health Maintenance        Date Due Completion Dates    IMM INFLUENZA (1) 9/1/2017 11/7/2016, 1/7/2015    IMM DTaP/Tdap/Td Vaccine (2 - Td) 6/29/2019 6/29/2009    COLONOSCOPY 1/7/2022 1/7/2012 (N/S)    Override on 1/7/2012: (N/S)            Results     POCT Protime      Component    INR    2.5                        Current Immunizations     13-VALENT PCV PREVNAR 12/22/2016    Influenza Vaccine Adult HD 11/7/2016  7:55 AM, 1/7/2015    Pneumococcal polysaccharide vaccine (PPSV-23) 11/4/2011    SHINGLES VACCINE 4/1/2014    Tdap Vaccine 6/29/2009      Below and/or attached are the medications your provider expects you to take. Review all of your home medications and newly ordered medications with your provider and/or pharmacist. Follow medication instructions as directed by your provider and/or pharmacist. Please keep your medication list with you and share with your provider. Update the information when medications are discontinued, doses are changed, or new medications (including over-the-counter products) are added; and carry medication information at all times in the event of emergency situations     Allergies:  FELDENE - (reactions not documented)     PERCODAN - (reactions not documented)               Medications  Valid as of: July 20, 2017 -  8:26 AM    Generic Name Brand Name Tablet Size  Instructions for use    Cholecalciferol (Tab) Vitamin D3 2000 UNITS Take  by mouth.        Clindamycin HCl (Cap) CLEOCIN 150 MG Take 1 Cap by mouth 2 Times a Day. Permanent, one am and 2 pm        Digoxin (Tab) LANOXIN 250 MCG Take 1 Tab by mouth every day.        Gabapentin (Cap) NEURONTIN 300 MG Take 2 Caps by mouth 2 Times a Day.        Glucosamine-Chondroit-Vit C-Mn (Cap) GLUCOSAMINE 1500 COMPLEX  Take  by mouth every day.        Lysine HCl (Tab) lysine 500 MG Take 500 mg by mouth every day.        Metoprolol Succinate (TABLET SR 24 HR) TOPROL XL 50 MG TAKE 1 TABLET BY MOUTH DAILY -GENERIC FOR TOPROL XL        Multiple Vitamin (Tab) THERAGRAN  Take 1 Tab by mouth every day.        Omega-3 Fatty Acids (Cap) OMEGA 3 FA 1000 MG Take  by mouth every day.        Pravastatin Sodium (Tab) PRAVACHOL 80 MG Take 1 Tab by mouth every day.        Warfarin Sodium (Tab) COUMADIN 4 MG Take 4 mg by mouth every day. Take as directed.         Zolpidem Tartrate (Tab) AMBIEN 5 MG         .                 Medicines prescribed today were sent to:     [id number] - [place], [place] - [address]    [address] [address]    Phone: [phone] Fax: [phone]    Open 24 Hours?: No      Medication refill instructions:       If your prescription bottle indicates you have medication refills left, it is not necessary to call your provider’s office. Please contact your pharmacy and they will refill your medication.    If your prescription bottle indicates you do not have any refills left, you may request refills at any time through one of the following ways: The online [employer] system (except Urgent Care), by calling your provider’s office, or by asking your pharmacy to contact your provider’s office with a refill request. Medication refills are processed only during regular business hours and may not be available until the next business day. Your provider may request additional information or to have a follow-up visit with  you prior to refilling your medication.   *Please Note: Medication refills are assigned a new Rx number when refilled electronically. Your pharmacy may indicate that no refills were authorized even though a new prescription for the same medication is available at the pharmacy. Please request the medicine by name with the pharmacy before contacting your provider for a refill.        Warfarin Dosing Calendar   July 2017 Details    Sun Mon Tue Wed Thu Fri Sat           1                 2               3               4               5               6               7               8                 9               10               11               12               13               14               15                 16               17               18               19               20   2.5   4 mg   See details      21      4 mg         22      4 mg           23      4 mg         24      4 mg         25      4 mg         26      4 mg         27      4 mg         28      4 mg         29      6 mg           30      4 mg         31      4 mg               Date Details   07/20 This INR check   INR: 2.5               How to take your warfarin dose     To take:  4 mg Take 1 of the 4 mg tablets.    To take:  6 mg Take 1.5 of the 4 mg tablets.           Warfarin Dosing Calendar   August 2017 Details    Sun Mon Tue Wed Thu Fri Sat       1      4 mg         2      4 mg         3      4 mg         4      4 mg         5      4 mg           6      4 mg         7      4 mg         8      4 mg         9      4 mg         10      4 mg         11      4 mg         12      6 mg           13      4 mg         14      4 mg         15      4 mg         16      4 mg         17      4 mg         18      4 mg         19      6 mg           20      4 mg         21      4 mg         22      4 mg         23      4 mg         24      4 mg         25      4 mg         26      6 mg           27      4 mg         28      4 mg         29      4 mg            30      4 mg         31      4 mg            Date Details   No additional details    Date of next INR:  8/31/2017         How to take your warfarin dose     To take:  4 mg Take 1 of the 4 mg tablets.    To take:  6 mg Take 1.5 of the 4 mg tablets.              [employer] Access Code: Activation code not generated  Current [employer] Status: Active

## 2017-07-26 LAB — INR BLD: 2.5 (ref 0.9–1.2)

## 2017-08-31 ENCOUNTER — ANTICOAGULATION VISIT (OUTPATIENT)
Dept: VASCULAR LAB | Facility: MEDICAL CENTER | Age: 77
End: 2017-08-31
Attending: INTERNAL MEDICINE
Payer: MEDICARE

## 2017-08-31 VITALS — SYSTOLIC BLOOD PRESSURE: 120 MMHG | DIASTOLIC BLOOD PRESSURE: 71 MMHG | HEART RATE: 55 BPM

## 2017-08-31 DIAGNOSIS — I48.91 ATRIAL FIBRILLATION, UNSPECIFIED TYPE (HCC): ICD-10-CM

## 2017-08-31 LAB
INR BLD: 2.2 (ref 0.9–1.2)
INR PPP: 2.2 (ref 2–3.5)

## 2017-08-31 PROCEDURE — 85610 PROTHROMBIN TIME: CPT

## 2017-08-31 PROCEDURE — 99211 OFF/OP EST MAY X REQ PHY/QHP: CPT

## 2017-08-31 NOTE — PROGRESS NOTES
Anticoagulation Summary  As of 8/31/2017    INR goal:   2.0-3.0   TTR:   80.5 % (10.5 mo)   Today's INR:   2.2   Maintenance plan:   6 mg (4 mg x 1.5) on Sat; 4 mg (4 mg x 1) all other days   Weekly total:   30 mg   Plan last modified:   CLAYTON Jacob (10/10/2016)   Next INR check:   10/26/2017   Target end date:   Indefinite    Indications    Atrial fibrillation (CMS-HCC) [I48.91]             Anticoagulation Episode Summary     INR check location:       Preferred lab:       Send INR reminders to:       Comments:   Pt goes by Kinsey      Anticoagulation Care Providers     Provider Role Specialty Phone number    Vickey Rutherford M.D. Referring Cardiology [phone]    [facility] Responsible  [phone]        Anticoagulation Patient Findings    Patient's INR was therapeutic today in clinic.    Pt denies any unusual s/s of bleeding, bruising, clotting or any changes to diet or medications.   Confirmed dosing regimen.  Pt is to continue with current warfarin dosing regimen.    Follow up in 8 weeks.    Sin Huitron, AbhishekD

## 2017-09-13 ENCOUNTER — SLEEP CENTER VISIT (OUTPATIENT)
Dept: SLEEP MEDICINE | Facility: MEDICAL CENTER | Age: 77
End: 2017-09-13
Payer: MEDICARE

## 2017-09-13 VITALS
SYSTOLIC BLOOD PRESSURE: 118 MMHG | TEMPERATURE: 97.7 F | HEIGHT: 72 IN | HEART RATE: 81 BPM | OXYGEN SATURATION: 96 % | DIASTOLIC BLOOD PRESSURE: 70 MMHG | RESPIRATION RATE: 18 BRPM

## 2017-09-13 DIAGNOSIS — G47.10 HYPERSOMNOLENCE: ICD-10-CM

## 2017-09-13 DIAGNOSIS — G47.31 COMPLEX SLEEP APNEA SYNDROME: ICD-10-CM

## 2017-09-13 PROCEDURE — 99204 OFFICE O/P NEW MOD 45 MIN: CPT | Performed by: INTERNAL MEDICINE

## 2017-09-13 RX ORDER — ZOLPIDEM TARTRATE 5 MG/1
TABLET ORAL
Qty: 3 TAB | Refills: 0 | Status: SHIPPED | OUTPATIENT
Start: 2017-09-13 | End: 2017-12-22

## 2017-09-13 RX ORDER — CYCLOBENZAPRINE HCL 10 MG
10 TABLET ORAL EVERY 8 HOURS PRN
COMMUNITY
Start: 2017-06-23 | End: 2018-06-22

## 2017-09-22 NOTE — PROGRESS NOTES
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    HPI:   Mr. Parkinson is a 77-year-old man referred by Dr. 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    He was evaluated by Dr. Lewis in March 2016 with excessive daytime somnolence and at least mild nocturnal hypoxemia.  He underwent a home sleep test that suggested an apnea hypopnea index of 22 events per hour with a lowest arterial oxygen saturation of 81% on room air.  A titration polysomnogram was done on April 14, 2016.  That study demonstrated persisting central apnea and hypopnea events on CPAP, requiring the application of BiPAP therapy.  Hypopnea episodes were suppressed with expiratory pressures of 10 and above but central apnea episodes persisted.  Overall there were 49 episodes of central apnea in that study with no obstructive apneas and 57 episodes of hypopnea.  He was treated with BiPAP at 10/6 cm water pressure but noticed no improvement in daytime alertness.  He was last seen by Dr. Lewis on January 13, 2017 where the emergence central apneas were noted and the patient was to undergo polysomnographic titration of servo adaptive BiPAP ventilation.  That study has not yet been done.    He has a regular sleep schedule sufficient time in bed.  He does not have symptoms suggesting parasomnia or restless leg syndrome.  He continues to use the BiPAP nightly and is not having unusual problems with fit, leakage or airway dryness.    He does have a history of chronic atrial fibrillation with ongoing anticoagulation, and a history of aortic stenosis and systemic arterial hypertension.        Patient Active Problem List    Diagnosis Date Noted   • Obesity (BMI 30.0-34.9) 06/22/2017   • Localized primary osteoarthritis of carpometacarpal joint of left thumb 05/27/2016   • Insomnia 05/26/2016   • LORENA (obstructive sleep apnea) 04/21/2016   • Essential hypertension 01/07/2015   •  Dyslipidemia 01/07/2015   • IGT (impaired glucose tolerance) 01/07/2015   • Aortic stenosis 12/30/2011   • Atrial fibrillation (CMS-HCC) 12/30/2011   • Long term current use of anticoagulant therapy 12/30/2011   • Lumbar Disc Degeneration 12/30/2011   • Mitral valve disorder 12/30/2011   • Osteoarthrosis involving lower leg 12/30/2011       Past Medical History:   Diagnosis Date   • Apnea, sleep    • Atrial fibrillation (CMS-HCC)    • Back pain    • Clotting disorder (CMS-HCC)    • Depression    • Diabetes (CMS-HCC)     diet controlled   • Encounter for long-term (current) use of other medications    • Gasping for breath    • Vietnamese measles    • Heart valve disease    • Hyperlipidemia    • Hypertension    • Insomnia    • Mumps    • Nasal drainage    • Pain     back & thumb   • Pyogenic arthritis, lower leg (CMS-[facility])     thumb, back   • Restless leg syndrome    • Sleep apnea     uses CPAP   • Snoring    • Tonsillitis    • Urinary incontinence    • Valvular heart disease        Past Surgical History:   Procedure Laterality Date   • FINGER ARTHROPLASTY Left 5/27/2016    Procedure: FINGER ARTHROPLASTY THUMB CARPOMETACARPAL EXCISIONAL, EXCISE TRAPEZIUM;  Surgeon: Raheel Salgado M.D.;  Location: SURGERY SAME DAY [facility];  Service:    • CARDIOVERSION      on 7/17/06, sinus rhythm until January 2007,  paroxysmal atrial fibrillation   • KNEE ARTHROPLASTY TOTAL     • LAMINOTOMY N/A     multiple lumbar spine   • OTHER ORTHOPEDIC SURGERY      lower back   • PB PERCUT IMPLNT NEUROELECT,EPIDURAL     • TOE ARTHROPLASTY     • TURP-VAPOR N/A        Family History   Problem Relation Age of Onset   • Other Mother      unknown   • Heart Disease Mother    • Cancer Father      prostate   • Diabetes Brother    • Heart Disease Son    • Sleep Apnea Neg Hx        Social History     Social History   • Marital status:      Spouse name: N/A   • Number of children: N/A   • Years of education: N/A     Occupational History   •  "Not on file.     Social History Main Topics   • Smoking status: Former Smoker     Packs/day: 1.00     Years: 20.00     Types: Cigarettes     Quit date: 1/16/1972   • Smokeless tobacco: Former User     Quit date: 1/16/1972   • Alcohol use No   • Drug use: No   • Sexual activity: Not Currently     Partners: Female     Other Topics Concern   • Not on file     Social History Narrative   • No narrative on file       Current Outpatient Prescriptions   Medication Sig Dispense Refill   • cyclobenzaprine (FLEXERIL) 10 MG Tab every 8 hours as needed.     • zolpidem (AMBIEN) 5 MG Tab Take 1-2 tablets by mouth every evening as needed for insomnia. Bring to sleep study. 3 Tab 0   • metoprolol SR (TOPROL XL) 50 MG TABLET SR 24 HR TAKE 1 TABLET BY MOUTH DAILY -GENERIC FOR TOPROL XL 90 Tab 3   • digoxin (LANOXIN) 250 MCG Tab Take 1 Tab by mouth every day. 30 Tab 6   • gabapentin (NEURONTIN) 300 MG Cap Take 2 Caps by mouth 2 Times a Day. 120 Cap 6   • clindamycin (CLEOCIN) 150 MG Cap Take 1 Cap by mouth 2 Times a Day. Permanent, one am and 2 pm 40 Cap 0   • pravastatin (PRAVACHOL) 80 MG tablet Take 1 Tab by mouth every day. 90 Tab 3   • lysine 500 MG Tab Take 500 mg by mouth every day.     • Cholecalciferol (VITAMIN D3) 2000 UNITS Tab Take  by mouth.     • multivitamin (THERAGRAN) TABS Take 1 Tab by mouth every day.     • docosahexanoic acid (FISH OIL) 1000 MG CAPS Take  by mouth every day.     • Glucosamine-Chondroit-Vit C-Mn (GLUCOSAMINE 1500 COMPLEX) CAPS Take  by mouth every day.     • warfarin (COUMADIN) 4 MG TABS Take 4 mg by mouth every day. Take as directed.      • zolpidem (AMBIEN) 5 MG Tab        No current facility-administered medications for this visit.     \"CURRENT RX\"      Allergies: Feldene [piroxicam] and Percodan [oxycodone-aspirin]      ROS  Positive for the sleep, cardiac and endocrine issues reviewed above as well as the items in the past medical history and problem list.  All other aspects of the CPT review of " systems process are negative.      Physical Exam:   /70   Pulse 81   Temp 36.5 °C (97.7 °F)   Resp 18   Ht 1.829 m (6')   SpO2 96%    Head and neck examination demonstrates no mucosal lesion, purulent drainage or evident polyps. The pharynx is benign with a Mallampati II presentation. The neck is supple without thyromegaly. On chest examination there are symmetrical bilateral breath sounds without rales, wheezing or consolidation. On cardiac examination, the apical impulse and heart sounds are normal and the rhythm is regular. There is no murmur, gallop or rub and no jugular venous distention. The abdomen is soft with active bowel sounds and no palpable hepatosplenomegaly, mass, guarding or rebound. The extremities show no clubbing, cyanosis or edema and no signs of deep venous thrombosis. There is no warmth, redness, tenderness or palpable venous cord in the calves. The skin is clear, warm and dry. There is no unusual peripheral lymphadenopathy. Peripheral pulses are palpable in all 4 extremities. On neurologic examination, cranial nerve function is intact, motor tone is symmetrical, and the patient is alert, oriented and responsive.       Problems:  1. Complex sleep apnea syndrome  He presented with non-refreshing sleep, documented nocturnal hypoxemia and at least some daytime fatigue and somnolence.  Polysomnography demonstrated an apnea hypopnea index of 22 events per hour with a lowest arterial oxygen saturation of 81% on room air.  Polysomnography suggested no real response to CPAP or BiPAP therapy and he has not done well on BiPAP.  The treatment emergent central apneas as well as his history of atrial fibrillation strongly suggest complex sleep apnea.  Effective treatment is required in only to improve levels of daytime alertness but to reduce the further cardiac and neurologic risks associated with untreated sleep apnea hypopnea.  The only effective treatment in this circumstance would be servo  adaptive BiPAP ventilation. Echocardiography on February 18, 2016 demonstrated a preserved systolic ejection fraction of 60% but grade III diastolic dysfunction.    2. Hypersomnolence  Related to suboptimally treated sleep-disordered breathing.  He is spending sufficient time in bed and does not seem to have major issues with sleep hygiene.       Plan:   Polysomnography dedicated to titration of ASV.    Return visit afterwards to discuss test results and treatment options.    We appreciate the opportunity to assist in his care.

## 2017-10-26 ENCOUNTER — ANTICOAGULATION VISIT (OUTPATIENT)
Dept: VASCULAR LAB | Facility: MEDICAL CENTER | Age: 77
End: 2017-10-26
Attending: INTERNAL MEDICINE
Payer: MEDICARE

## 2017-10-26 VITALS — SYSTOLIC BLOOD PRESSURE: 137 MMHG | HEART RATE: 88 BPM | DIASTOLIC BLOOD PRESSURE: 81 MMHG

## 2017-10-26 DIAGNOSIS — I48.91 ATRIAL FIBRILLATION, UNSPECIFIED TYPE (HCC): ICD-10-CM

## 2017-10-26 LAB — INR PPP: 3.3 (ref 2–3.5)

## 2017-10-26 PROCEDURE — 99212 OFFICE O/P EST SF 10 MIN: CPT

## 2017-10-26 PROCEDURE — 85610 PROTHROMBIN TIME: CPT

## 2017-10-26 NOTE — PROGRESS NOTES
Anticoagulation Summary  As of 10/26/2017    INR goal:   2.0-3.0   TTR:   79.3 % (1 y)   Today's INR:   3.3!   Maintenance plan:   6 mg (4 mg x 1.5) on Sat; 4 mg (4 mg x 1) all other days   Weekly total:   30 mg   Plan last modified:   CLAYTON Jacob (10/10/2016)   Next INR check:   11/30/2017   Target end date:   Indefinite    Indications    Atrial fibrillation (CMS-HCC) [I48.91]             Anticoagulation Episode Summary     INR check location:       Preferred lab:       Send INR reminders to:       Comments:   Pt goes by Kinsey      Anticoagulation Care Providers     Provider Role Specialty Phone number    Vickey Rutherford M.D. Referring Cardiology [phone]    [facility] Responsible  [phone]        Anticoagulation Patient Findings      HPI:  Duane Parkinson seen in clinic today, on anticoagulation therapy with warfarin for afib.   Changes to current medical/health status since last appt: None.   Denies signs/symptoms of bleeding and/or thrombosis since the last appt.    Denies any interval changes to diet  Denies any interval changes to medications since last appt.   Denies any complications or cost restrictions with current therapy.   BP recorded in vitals.  Denies alcohol or cranberry use.     A/P   INR  SUPRA-therapeutic.   Reduce today then Pt is to continue with current warfarin dosing regimen.     Follow up appointment in 5 weeks per pt request.    Sin Huitron, PharmD

## 2017-10-30 LAB — INR BLD: 3.3 (ref 0.9–1.2)

## 2017-11-14 DIAGNOSIS — I47.10 SVT (SUPRAVENTRICULAR TACHYCARDIA): ICD-10-CM

## 2017-11-14 RX ORDER — DIGOXIN 250 MCG
250 TABLET ORAL DAILY
Qty: 30 TAB | Refills: 6 | Status: SHIPPED | OUTPATIENT
Start: 2017-11-14 | End: 2018-06-13 | Stop reason: SDUPTHER

## 2017-11-22 ENCOUNTER — SLEEP STUDY (OUTPATIENT)
Dept: SLEEP MEDICINE | Facility: MEDICAL CENTER | Age: 77
End: 2017-11-22
Attending: INTERNAL MEDICINE
Payer: MEDICARE

## 2017-11-22 DIAGNOSIS — G47.10 HYPERSOMNOLENCE: ICD-10-CM

## 2017-11-22 DIAGNOSIS — G47.31 COMPLEX SLEEP APNEA SYNDROME: ICD-10-CM

## 2017-11-22 PROCEDURE — 95811 POLYSOM 6/>YRS CPAP 4/> PARM: CPT | Performed by: INTERNAL MEDICINE

## 2017-11-27 NOTE — PROCEDURES
Clinical Comments:  The patient underwent an AutoSV titration using the standard montage for measurement of parameters of sleep, respiratory events, movement abnormalities, heart rate and rhythm. A microphone was used to monitor snoring.      ANALYSIS:  The total recording time was 470.4 minutes with a sleep period of 461.3 minutes and the total sleep time was 401.3 minutes with a sleep efficiency of 85.3%.  The sleep latency was 9.1 minutes, and REM latency was 134.5 minutes.  The patient experienced 42 arousals in total, for an arousal index of 6.3    RESPIRATORY: The patient had 0 apneas in total.  Of these, 0 were obstructive apneas, and 0 were central apneas.  This resulted in an apnea index (AI) of 0.0.  The patient had 35 hypopneas, for a hypopnea index of 5.2.  The overall AHI was 5.2, while the AHI during Stage R sleep was 10.2.  AHI while supine was 0.0.    OXIMETRY: Oxygen saturation monitoring showed a mean SpO2 of 93.5%, with a minimum oxygen saturation of 88.0%.  Oxygen saturations were less than or = 89% for 0.2 minutes of sleep time.    CARDIAC: The highest heart rate during the recording was 103.0 beats per minute.  The average heart rate during sleep was 73.6 bpm.    LIMB MOVEMENTS: There were a total of 266 PLMs during sleep, of which 11 were PLMs arousals.  This resulted in a PLMS index of 39.8.      Interpretation:    Mr. Parkinson presented with excessive daytime somnolence. A home sleep test elsewhere demonstrated an apnea hypopnea index of 22 events per hour with the lowest arterial oxygen saturation of 81% on room air. A titration polysomnogram done elsewhere demonstrated no effective response to CPAP therapy and a suboptimal response to BiPAP therapy with persistence of central apnea episodes. The patient had not done well clinically on BiPAP at 10/6 cm water pressure. The BiPAP data recording chip suggested a predominance of central apnea episodes consistent with complex sleep apnea. This  study was designed to titrate therapy.    There is fragmentation of sleep with increased wake after sleep onset time but significant REM sleep time was recorded. There are frequent periodic limb movements but the periodic limb movement with arousal index is only 1.6 events per hour. The average ventricular heart rate was 74 bpm with atrial fibrillation throughout the night.    Servo adaptive BiPAP ventilation was applied with a minimum expiratory pressure of 5-7 cm water. The ASV suppressed the central apnea events but hypopnea events persisted. In the final pressure stage, the apnea hypopnea index was 6.9 events per hour, consisting exclusively of scattered hypopneas. The mean arterial oxygen saturation was 93% with a minimum saturation of 88% on room air. That stage in the titration included 31 minutes of REM sleep, 65 minutes of non-REM sleep time, but no time in the supine body position.    Assessment:  This study demonstrates a significant but perhaps incomplete response to servo adaptive BiPAP ventilation in a patient with previously demonstrated sleep apnea hypopnea unresponsive to CPAP and BiPAP therapy, consistent with complex sleep apnea. Atrial fibrillation with a controlled ventricular rate.    Recommendations:  ASV therapy. With the persistent hypopnea episodes at the end of the titration and with the absence of supine sleep time, higher expiratory pressures are probably required. This might be best accomplished with Respironics ASV employing a minimum expiratory pressure of 5 and a maximum expiratory pressure of 10 cm water with pressure support of 4-15 cm water. If ResMed ASV is used, a minimum expiratory pressure of 8 cm water is suggested. He did best with a medium Dreamware mask.

## 2017-11-29 ENCOUNTER — SLEEP CENTER VISIT (OUTPATIENT)
Dept: SLEEP MEDICINE | Facility: MEDICAL CENTER | Age: 77
End: 2017-11-29
Payer: MEDICARE

## 2017-11-29 VITALS
HEART RATE: 66 BPM | HEIGHT: 72 IN | BODY MASS INDEX: 30.61 KG/M2 | SYSTOLIC BLOOD PRESSURE: 112 MMHG | DIASTOLIC BLOOD PRESSURE: 68 MMHG | RESPIRATION RATE: 16 BRPM | OXYGEN SATURATION: 99 % | WEIGHT: 226 LBS | TEMPERATURE: 97.7 F

## 2017-11-29 DIAGNOSIS — G47.31 CENTRAL SLEEP APNEA: ICD-10-CM

## 2017-11-29 DIAGNOSIS — G47.33 OSA (OBSTRUCTIVE SLEEP APNEA): ICD-10-CM

## 2017-11-29 DIAGNOSIS — E66.9 OBESITY (BMI 30.0-34.9): ICD-10-CM

## 2017-11-29 PROCEDURE — 99213 OFFICE O/P EST LOW 20 MIN: CPT | Performed by: NURSE PRACTITIONER

## 2017-11-29 NOTE — PROGRESS NOTES
CC:  Here for f/u sleep issues as listed below    HPI:   Duane presents today for follow up obstructive sleep apnea, central sleep apnea.  History of atrial fibrillation on Coumadin. HST from 2016 indicated an AHI of 22 and low oxygenation of 81%.  Currently he is being treated with BIPAP @ 10/6cmH20.  Compliance download is unavailable, but admits he uses it every single night for an average of 68 hours.   He completed a titration study for continued hypersomnolence completed November 2017 that was successful on ASV therapy. He did best on ASV pressure of EPAP of 7 with this pressure support between 4 and 15 with a reduced AHI of 6.9, mean oxygenation 92.5%, and REM rebound. Recommendations are to place him on auto therapy due to residual hypopnea episodes.  He does tolerate pressure and mask well.  He has never woken up refreshed and continues to be tired throughout the day.  He rarely naps, but could easily. Instead he moves about so he has better quality of sleep at night.  They deny morning H/A. He sleeps better overall. He will continue to clean supplies weekly and change them as insurance allows.       Patient Active Problem List    Diagnosis Date Noted   • Central sleep apnea 11/29/2017   • Obesity (BMI 30.0-34.9) 06/22/2017   • Localized primary osteoarthritis of carpometacarpal joint of left thumb 05/27/2016   • Insomnia 05/26/2016   • Essential hypertension 01/07/2015   • Dyslipidemia 01/07/2015   • IGT (impaired glucose tolerance) 01/07/2015   • Aortic stenosis 12/30/2011   • Atrial fibrillation (CMS-HCC) 12/30/2011   • Long term current use of anticoagulant therapy 12/30/2011   • Lumbar Disc Degeneration 12/30/2011   • Mitral valve disorder 12/30/2011   • Osteoarthrosis involving lower leg 12/30/2011       Past Medical History:   Diagnosis Date   • Apnea, sleep    • Atrial fibrillation (CMS-[facility])    • Back pain    • Clotting disorder (CMS-[facility])    • Depression    • Diabetes (CMS-[facility])     diet controlled    • Encounter for long-term (current) use of other medications    • Gasping for breath    • Malag[place] measles    • Heart valve disease    • Hyperlipidemia    • Hypertension    • Insomnia    • Mumps    • Nasal drainage    • Pain     back & thumb   • Pyogenic arthritis, lower leg (CMS-HCC)     thumb, back   • Restless leg syndrome    • Sleep apnea     uses CPAP   • Snoring    • Tonsillitis    • Urinary incontinence    • Valvular heart disease        Past Surgical History:   Procedure Laterality Date   • FINGER ARTHROPLASTY Left 5/27/2016    Procedure: FINGER ARTHROPLASTY THUMB CARPOMETACARPAL EXCISIONAL, EXCISE TRAPEZIUM;  Surgeon: Raheel Salgado M.D.;  Location: SURGERY SAME DAY [facility];  Service:    • CARDIOVERSION      on 7/17/06, sinus rhythm until January 2007,  paroxysmal atrial fibrillation   • KNEE ARTHROPLASTY TOTAL     • LAMINOTOMY N/A     multiple lumbar spine   • OTHER ORTHOPEDIC SURGERY      lower back   • PB PERCUT IMPLNT NEUROELECT,EPIDURAL     • TOE ARTHROPLASTY     • TURP-VAPOR N/A        Family History   Problem Relation Age of Onset   • Other Mother      unknown   • Heart Disease Mother    • Cancer Father      prostate   • Diabetes Brother    • Heart Disease Son    • Sleep Apnea Neg Hx        Social History     Social History   • Marital status:      Spouse name: N/A   • Number of children: N/A   • Years of education: N/A     Occupational History   • Not on file.     Social History Main Topics   • Smoking status: Former Smoker     Packs/day: 1.00     Years: 20.00     Types: Cigarettes     Quit date: 1/16/1972   • Smokeless tobacco: Former User     Quit date: 1/16/1972   • Alcohol use No      Comment: 0-1   • Drug use: No   • Sexual activity: Not Currently     Partners: Female     Other Topics Concern   • Not on file     Social History Narrative   • No narrative on file       Current Outpatient Prescriptions   Medication Sig Dispense Refill   • Coenzyme Q10 (COQ10 PO) Take  by mouth.      • digoxin (LANOXIN) 250 MCG Tab Take 1 Tab by mouth every day. 30 Tab 6   • pravastatin (PRAVACHOL) 80 MG tablet TAKE 1 TABLET BY MOUTH DAILY -GENERIC FOR PRAVACHOL 90 Tab 3   • cyclobenzaprine (FLEXERIL) 10 MG Tab every 8 hours as needed.     • zolpidem (AMBIEN) 5 MG Tab Take 1-2 tablets by mouth every evening as needed for insomnia. Bring to sleep study. (Patient not taking: Reported on 11/29/2017) 3 Tab 0   • metoprolol SR (TOPROL XL) 50 MG TABLET SR 24 HR TAKE 1 TABLET BY MOUTH DAILY -GENERIC FOR TOPROL XL 90 Tab 3   • gabapentin (NEURONTIN) 300 MG Cap Take 2 Caps by mouth 2 Times a Day. 120 Cap 6   • clindamycin (CLEOCIN) 150 MG Cap Take 1 Cap by mouth 2 Times a Day. Permanent, one am and 2 pm 40 Cap 0   • zolpidem (AMBIEN) 5 MG Tab      • lysine 500 MG Tab Take 500 mg by mouth every day.     • Cholecalciferol (VITAMIN D3) 2000 UNITS Tab Take  by mouth.     • multivitamin (THERAGRAN) TABS Take 1 Tab by mouth every day.     • docosahexanoic acid (FISH OIL) 1000 MG CAPS Take  by mouth every day.     • Glucosamine-Chondroit-Vit C-Mn (GLUCOSAMINE 1500 COMPLEX) CAPS Take  by mouth every day.     • warfarin (COUMADIN) 4 MG TABS Take 4 mg by mouth every day. Take as directed.        No current facility-administered medications for this visit.           Allergies: Feldene [piroxicam] and Percodan [oxycodone-aspirin]      ROS   Gen: Denies fever, chills, unintentional weight loss  Resp:Denies Dyspnea  CV: Denies chest pain, chest tightness  Sleep:Denies morning headache, insomnia, snoring, gasping for air, apnea  Neuro: Denies frequent headaches, weakness, dizziness  See HPI.  All other systems reviewed and negative        Vital signs for this encounter:  Vitals:    11/29/17 1046   Height: 1.829 m (6')   Weight: 102.5 kg (226 lb)   Weight % change since last entry.: 0 %   BP: 112/68   Pulse: 66   BMI (Calculated): 30.65   Resp: 16   Temp: 36.5 °C (97.7 °F)   O2 sat % room air: 99 %                   Physical Exam:    Gen:         Alert and oriented, No apparent distress.   Neck:        No Lymphadenopathy.  Lungs:     Clear to auscultation bilaterally.    CV:          Regular rate and rhythm. No murmurs, rubs or gallops.   Abd:         Soft non tender, non distended.            Ext:          No clubbing, cyanosis, edema.    Assessment   1. Central sleep apnea  DME ASV   2. LORENA (obstructive sleep apnea)     3. Obesity (BMI 30.0-34.9)         PLAN:   Patient Instructions   1) Start ASV at 8/10/PS [id number]. Stop Bipap  2) Clean mask and supplies weekly and change them as insurance allows  3) Vaccines: Up to date with Prevnar 13 and Pneumovax 23  4) Return in about 2 months (around 1/29/2018) for Compliance, review of symptoms, if not sooner, follow up with NADER Haywood.

## 2017-11-29 NOTE — PATIENT INSTRUCTIONS
1) Start ASV at 8/10/PS [id number]. Stop Bipap  2) Clean mask and supplies weekly and change them as insurance allows  3) Vaccines: Up to date with Prevnar 13 and Pneumovax 23  4) Return in about 2 months (around 1/29/2018) for Compliance, review of symptoms, if not sooner, follow up with NADER Haywood.

## 2017-11-30 ENCOUNTER — ANTICOAGULATION VISIT (OUTPATIENT)
Dept: VASCULAR LAB | Facility: MEDICAL CENTER | Age: 77
End: 2017-11-30
Attending: INTERNAL MEDICINE
Payer: MEDICARE

## 2017-11-30 VITALS — SYSTOLIC BLOOD PRESSURE: 124 MMHG | HEART RATE: 84 BPM | DIASTOLIC BLOOD PRESSURE: 75 MMHG

## 2017-11-30 DIAGNOSIS — I48.91 ATRIAL FIBRILLATION, UNSPECIFIED TYPE (HCC): ICD-10-CM

## 2017-11-30 LAB
INR BLD: 2.4 (ref 0.9–1.2)
INR PPP: 2.4 (ref 2–3.5)

## 2017-11-30 PROCEDURE — 85610 PROTHROMBIN TIME: CPT

## 2017-11-30 PROCEDURE — 90662 IIV NO PRSV INCREASED AG IM: CPT

## 2017-11-30 PROCEDURE — 99211 OFF/OP EST MAY X REQ PHY/QHP: CPT | Mod: 25

## 2017-11-30 PROCEDURE — 90471 IMMUNIZATION ADMIN: CPT

## 2017-11-30 NOTE — PROGRESS NOTES
Anticoagulation Summary  As of 11/30/2017    INR goal:   2.0-3.0   TTR:   78.3 % (1.1 y)   Today's INR:   2.4   Maintenance plan:   6 mg (4 mg x 1.5) on Sat; 4 mg (4 mg x 1) all other days   Weekly total:   30 mg   Plan last modified:   CLAYTON Jacob (10/10/2016)   Next INR check:   1/11/2018   Target end date:   Indefinite    Indications    Atrial fibrillation (CMS-HCC) [I48.91]             Anticoagulation Episode Summary     INR check location:       Preferred lab:       Send INR reminders to:       Comments:   Pt goes by Kinsey      Anticoagulation Care Providers     Provider Role Specialty Phone number    Vickey Rutherford M.D. Referring Cardiology [phone]    [facility] Responsible  [phone]        Anticoagulation Patient Findings      HPI:  Duane Parkinson seen in clinic today, on anticoagulation therapy with warfarin for Afib.   Changes to current medical/health status since last appt: None  Denies signs/symptoms of bleeding and/or thrombosis since the last appt.    Denies any interval changes to diet  Denies any interval changes to medications since last appt.   Denies any complications or cost restrictions with current therapy.   BP recorded in vitals.  Confirmed dosing regimen.   Pt consented to receiving immunization today.     A/P   INR  therapeutic.   Pt is to continue with current warfarin dosing regimen.     Follow up appointment in 6 week(s).    Sin Huitron, AbihshekD

## 2017-12-04 ENCOUNTER — TELEPHONE (OUTPATIENT)
Dept: SLEEP MEDICINE | Facility: MEDICAL CENTER | Age: 77
End: 2017-12-04

## 2017-12-04 NOTE — TELEPHONE ENCOUNTER
Per  patient walked in stating [facility] cannot order the mask. [facility] does not supply ASV machines and will be switched to DME:  [facility] / andrea [phone] / susan [phone]

## 2017-12-22 ENCOUNTER — OFFICE VISIT (OUTPATIENT)
Dept: MEDICAL GROUP | Facility: MEDICAL CENTER | Age: 77
End: 2017-12-22
Payer: MEDICARE

## 2017-12-22 VITALS
RESPIRATION RATE: 16 BRPM | DIASTOLIC BLOOD PRESSURE: 74 MMHG | OXYGEN SATURATION: 94 % | HEART RATE: 72 BPM | HEIGHT: 72 IN | WEIGHT: 229 LBS | SYSTOLIC BLOOD PRESSURE: 126 MMHG | BODY MASS INDEX: 31.02 KG/M2 | TEMPERATURE: 96.7 F

## 2017-12-22 DIAGNOSIS — E78.5 DYSLIPIDEMIA: ICD-10-CM

## 2017-12-22 DIAGNOSIS — G62.9 NEUROPATHY: ICD-10-CM

## 2017-12-22 DIAGNOSIS — E66.9 OBESITY (BMI 30.0-34.9): ICD-10-CM

## 2017-12-22 DIAGNOSIS — R73.02 IGT (IMPAIRED GLUCOSE TOLERANCE): ICD-10-CM

## 2017-12-22 PROCEDURE — 99214 OFFICE O/P EST MOD 30 MIN: CPT | Performed by: INTERNAL MEDICINE

## 2017-12-22 RX ORDER — HYDROCODONE BITARTRATE AND ACETAMINOPHEN 5; 325 MG/1; MG/1
TABLET ORAL
COMMUNITY
Start: 2017-11-01 | End: 2017-12-22

## 2017-12-22 NOTE — PROGRESS NOTES
CC: Multiple issues    HPI:   Duane presents today with the following.    1. Neuropathy (CMS-HCC)  Followed by pain specialist maintain on gabapentin. He did have an EMG results are not available. He is not taking narcotics per his report. Reports overly constipating. He is taking gabapentin currently reports when he remembers take it as pain is fairly well controlled.    2. Obesity (BMI 30.0-34.9)  Weight persistently elevated having some difficulties with exercise because of knee and back pain.    3. IGT (impaired glucose tolerance)  Blood sugars elevated in the past last A1c of 5.6. Weight has not gone up significantly. Currently not on any medications.    4. Dyslipidemia  Maintain on statin without myalgia coming due for blood work before next visit. Denying any chest pain or shortness of breath no edema.      Patient Active Problem List    Diagnosis Date Noted   • Neuropathy (CMS-[facility]) 12/22/2017   • Central sleep apnea 11/29/2017   • Obesity (BMI 30.0-34.9) 06/22/2017   • Localized primary osteoarthritis of carpometacarpal joint of left thumb 05/27/2016   • Insomnia 05/26/2016   • Essential hypertension 01/07/2015   • Dyslipidemia 01/07/2015   • IGT (impaired glucose tolerance) 01/07/2015   • Aortic stenosis 12/30/2011   • Atrial fibrillation (CMS-HCC) 12/30/2011   • Long term current use of anticoagulant therapy 12/30/2011   • Lumbar Disc Degeneration 12/30/2011   • Mitral valve disorder 12/30/2011   • Osteoarthrosis involving lower leg 12/30/2011       Current Outpatient Prescriptions   Medication Sig Dispense Refill   • Coenzyme Q10 (COQ10 PO) Take  by mouth.     • digoxin (LANOXIN) 250 MCG Tab Take 1 Tab by mouth every day. 30 Tab 6   • pravastatin (PRAVACHOL) 80 MG tablet TAKE 1 TABLET BY MOUTH DAILY -GENERIC FOR PRAVACHOL 90 Tab 3   • cyclobenzaprine (FLEXERIL) 10 MG Tab every 8 hours as needed.     • metoprolol SR (TOPROL XL) 50 MG TABLET SR 24 HR TAKE 1 TABLET BY MOUTH DAILY -GENERIC FOR TOPROL XL 90 Tab  3   • gabapentin (NEURONTIN) 300 MG Cap Take 2 Caps by mouth 2 Times a Day. 120 Cap 6   • clindamycin (CLEOCIN) 150 MG Cap Take 1 Cap by mouth 2 Times a Day. Permanent, one am and 2 pm 40 Cap 0   • Cholecalciferol (VITAMIN D3) 2000 UNITS Tab Take  by mouth.     • multivitamin (THERAGRAN) TABS Take 1 Tab by mouth every day.     • docosahexanoic acid (FISH OIL) 1000 MG CAPS Take  by mouth every day.     • warfarin (COUMADIN) 4 MG TABS Take 4 mg by mouth every day. Take as directed.        No current facility-administered medications for this visit.          Allergies as of 12/22/2017 - Reviewed 12/22/2017   Allergen Reaction Noted   • Feldene [piroxicam]  12/30/2011   • Percodan [oxycodone-aspirin]  12/30/2011        ROS: As per HPI.    /74   Pulse 72   Temp 35.9 °C (96.7 °F)   Resp 16   Ht 1.829 m (6')   Wt 103.9 kg (229 lb)   SpO2 94%   BMI 31.06 kg/m²     Physical Exam:  Gen:         Alert and oriented, No apparent distress.  Neck:        No Lymphadenopathy or Bruits.  Lungs:     Clear to auscultation bilaterally  CV:          Regular rate and rhythm. No murmurs, rubs or gallops.               Ext:          No clubbing, cyanosis, edema.      Assessment and Plan.   77 y.o. male with the following issues.    1. Neuropathy (CMS-HCC)  Discussion about titrating gabapentin he will follow up with pain specialist.    2. Obesity (BMI 30.0-34.9)  Discussion about diet exercise and weight loss  - Patient identified as having weight management issue.  Appropriate orders and counseling given.    3. IGT (impaired glucose tolerance)  Again discussed measures for glycemic control short of medications.  - HEMOGLOBIN A1C; Future    4. Dyslipidemia  Continue statin recheck blood work prior to visit.  - COMP METABOLIC PANEL; Future  - LIPID PROFILE; Future

## 2018-01-04 DIAGNOSIS — Z79.01 CHRONIC ANTICOAGULATION: ICD-10-CM

## 2018-01-08 DIAGNOSIS — I48.91 ATRIAL FIBRILLATION, UNSPECIFIED TYPE (HCC): ICD-10-CM

## 2018-01-08 RX ORDER — WARFARIN SODIUM 4 MG/1
TABLET ORAL
Qty: 60 TAB | Refills: 5 | Status: SHIPPED | OUTPATIENT
Start: 2018-01-08 | End: 2019-03-11

## 2018-01-11 ENCOUNTER — ANTICOAGULATION VISIT (OUTPATIENT)
Dept: VASCULAR LAB | Facility: MEDICAL CENTER | Age: 78
End: 2018-01-11
Attending: INTERNAL MEDICINE
Payer: MEDICARE

## 2018-01-11 VITALS — DIASTOLIC BLOOD PRESSURE: 69 MMHG | SYSTOLIC BLOOD PRESSURE: 123 MMHG | HEART RATE: 78 BPM

## 2018-01-11 DIAGNOSIS — I48.91 ATRIAL FIBRILLATION, UNSPECIFIED TYPE (HCC): ICD-10-CM

## 2018-01-11 LAB — INR PPP: 2.4 (ref 2–3.5)

## 2018-01-11 PROCEDURE — 99211 OFF/OP EST MAY X REQ PHY/QHP: CPT

## 2018-01-11 PROCEDURE — 85610 PROTHROMBIN TIME: CPT

## 2018-01-11 NOTE — PROGRESS NOTES
Anticoagulation Summary  As of 1/11/2018    INR goal:   2.0-3.0   TTR:   80.3 % (1.2 y)   Today's INR:   2.4   Maintenance plan:   6 mg (4 mg x 1.5) on Sat; 4 mg (4 mg x 1) all other days   Weekly total:   30 mg   Plan last modified:   CLAYTON Jacob (10/10/2016)   Next INR check:   3/8/2018   Target end date:   Indefinite    Indications    Atrial fibrillation (CMS-HCC) [I48.91]             Anticoagulation Episode Summary     INR check location:       Preferred lab:       Send INR reminders to:       Comments:   Pt goes by Kinsey      Anticoagulation Care Providers     Provider Role Specialty Phone number    Vickey Rutherford M.D. Referring Cardiology [phone]    [facility] Responsible  [phone]        Anticoagulation Patient Findings      HPI:  Duane Parkinson seen in clinic today, on anticoagulation therapy with warfarin for AFib.  Changes to current medical/health status since last appt: none  Denies signs/symptoms of bleeding and/or thrombosis since the last appt.    Denies any interval changes to diet  Denies any interval changes to medications since last appt.   Denies any complications or cost restrictions with current therapy.   BP recorded in vitals.  Confirmed dosing regimen.     A/P   INR  therapeutic.   Pt is to continue with current warfarin dosing regimen.     Follow up appointment in 8 weeks per pt.     Sin Huitron, PharmD

## 2018-01-12 LAB — INR BLD: 2.4 (ref 0.9–1.2)

## 2018-02-01 ENCOUNTER — SLEEP CENTER VISIT (OUTPATIENT)
Dept: SLEEP MEDICINE | Facility: MEDICAL CENTER | Age: 78
End: 2018-02-01
Payer: MEDICARE

## 2018-02-01 VITALS
WEIGHT: 226 LBS | OXYGEN SATURATION: 98 % | HEIGHT: 72 IN | BODY MASS INDEX: 30.61 KG/M2 | DIASTOLIC BLOOD PRESSURE: 68 MMHG | SYSTOLIC BLOOD PRESSURE: 106 MMHG | TEMPERATURE: 96.3 F | HEART RATE: 83 BPM | RESPIRATION RATE: 16 BRPM

## 2018-02-01 DIAGNOSIS — G47.31 CENTRAL SLEEP APNEA: ICD-10-CM

## 2018-02-01 DIAGNOSIS — G47.33 OSA (OBSTRUCTIVE SLEEP APNEA): ICD-10-CM

## 2018-02-01 PROCEDURE — 99213 OFFICE O/P EST LOW 20 MIN: CPT | Performed by: NURSE PRACTITIONER

## 2018-02-01 RX ORDER — MULTIVIT WITH MINERALS/LUTEIN
TABLET ORAL
COMMUNITY
End: 2018-06-13

## 2018-02-01 NOTE — PROGRESS NOTES
CC:  Here for f/u sleep issues as listed below    HPI:   Duane presents today for follow up obstructive sleep apnea, central sleep apnea.  History of atrial fibrillation on Coumadin. HST from 2016 indicated an AHI of 22 and low oxygenation of 81%.  He completed a titration study for continued hypersomnolence completed November 2017 that was successful on ASV therapy.   Currently he is being treated with ASV @ 8/10, PS 4/15, max pressure [id number].  Compliance download from the dates 12/30/2017 - 1/28/2018 indicates he is wearing the device 100% for an avg of 7 hours and 50 minutes per night with a reduced AHI of 4.3. Avg EPAP is 8.1 and PS is 6. He does not tolerate pressure and feels it is too much. He likes mask well.  He does not notice he wakes up refreshed and continues to be tired throughout the day. They deny morning H/A. He doesn't notice sleeps better overall. He rarely naps, but could easily. Instead he moves about so he has better quality of sleep at night.   He will continue to clean supplies weekly and change them as insurance allows.        Patient Active Problem List    Diagnosis Date Noted   • Neuropathy (CMS-[facility]) 12/22/2017   • Central sleep apnea 11/29/2017   • Obesity (BMI 30.0-34.9) 06/22/2017   • Localized primary osteoarthritis of carpometacarpal joint of left thumb 05/27/2016   • Insomnia 05/26/2016   • Essential hypertension 01/07/2015   • Dyslipidemia 01/07/2015   • IGT (impaired glucose tolerance) 01/07/2015   • Aortic stenosis 12/30/2011   • Atrial fibrillation (CMS-[facility]) 12/30/2011   • Long term current use of anticoagulant therapy 12/30/2011   • Lumbar Disc Degeneration 12/30/2011   • Mitral valve disorder 12/30/2011   • Osteoarthrosis involving lower leg 12/30/2011       Past Medical History:   Diagnosis Date   • Apnea, sleep    • Atrial fibrillation (CMS-HCC)    • Back pain    • Clotting disorder (CMS-HCC)    • Depression    • Diabetes (CMS-HCC)     diet controlled   • Encounter for  long-term (current) use of other medications    • Gasping for breath    • [place] measles    • Heart valve disease    • Hyperlipidemia    • Hypertension    • Insomnia    • Mumps    • Nasal drainage    • Pain     back & thumb   • Pyogenic arthritis, lower leg (CMS-HCC)     thumb, back   • Restless leg syndrome    • Sleep apnea     uses CPAP   • Snoring    • Tonsillitis    • Urinary incontinence    • Valvular heart disease        Past Surgical History:   Procedure Laterality Date   • FINGER ARTHROPLASTY Left 5/27/2016    Procedure: FINGER ARTHROPLASTY THUMB CARPOMETACARPAL EXCISIONAL, EXCISE TRAPEZIUM;  Surgeon: Raheel Salgado M.D.;  Location: SURGERY SAME DAY [facility];  Service:    • CARDIOVERSION      on 7/17/06, sinus rhythm until January 2007,  paroxysmal atrial fibrillation   • KNEE ARTHROPLASTY TOTAL     • LAMINOTOMY N/A     multiple lumbar spine   • OTHER ORTHOPEDIC SURGERY      lower back   • PB PERCUT IMPLNT NEUROELECT,EPIDURAL     • TOE ARTHROPLASTY     • TURP-VAPOR N/A        Family History   Problem Relation Age of Onset   • Other Mother      unknown   • Heart Disease Mother    • Cancer Father      prostate   • Diabetes Brother    • Heart Disease Son    • Sleep Apnea Neg Hx        Social History     Social History   • Marital status:      Spouse name: N/A   • Number of children: N/A   • Years of education: N/A     Occupational History   • Not on file.     Social History Main Topics   • Smoking status: Former Smoker     Packs/day: 1.00     Years: 20.00     Types: Cigarettes     Quit date: 1/16/1972   • Smokeless tobacco: Former User     Quit date: 1/16/1972   • Alcohol use No      Comment: occ    • Drug use: No   • Sexual activity: Not Currently     Partners: Female     Other Topics Concern   • Not on file     Social History Narrative   • No narrative on file       Current Outpatient Prescriptions   Medication Sig Dispense Refill   • Glucosamine HCl (GLUCOSAMINE PO) Take  by mouth.     •  Psyllium (METAMUCIL PO) Take  by mouth.     • VITAMIN B COMPLEX-C PO Take 100 mg by mouth.     • Ascorbic Acid (VITAMIN C) 1000 MG Tab Take  by mouth.     • warfarin (COUMADIN) 4 MG Tab Take 1 to 2 tablets by mouth daily as directed by [facility]. 60 Tab 5   • Coenzyme Q10 (COQ10 PO) Take  by mouth.     • digoxin (LANOXIN) 250 MCG Tab Take 1 Tab by mouth every day. 30 Tab 6   • pravastatin (PRAVACHOL) 80 MG tablet TAKE 1 TABLET BY MOUTH DAILY -GENERIC FOR PRAVACHOL 90 Tab 3   • cyclobenzaprine (FLEXERIL) 10 MG Tab Take 10 mg by mouth every 8 hours as needed.     • metoprolol SR (TOPROL XL) 50 MG TABLET SR 24 HR TAKE 1 TABLET BY MOUTH DAILY -GENERIC FOR TOPROL XL 90 Tab 3   • gabapentin (NEURONTIN) 300 MG Cap Take 2 Caps by mouth 2 Times a Day. 120 Cap 6   • clindamycin (CLEOCIN) 150 MG Cap Take 1 Cap by mouth 2 Times a Day. Permanent, one am and 2 pm 40 Cap 0   • Cholecalciferol (VITAMIN D3) 2000 UNITS Tab Take  by mouth.     • multivitamin (THERAGRAN) TABS Take 1 Tab by mouth every day.     • docosahexanoic acid (FISH OIL) 1000 MG CAPS Take  by mouth every day.       No current facility-administered medications for this visit.           Allergies: Feldene [piroxicam] and Percodan [oxycodone-aspirin]      ROS   Gen: Denies fever, chills, unintentional weight loss, fatigue  Resp:Denies Dyspnea  CV: Denies chest pain, chest tightness  Sleep:Denies morning headache, insomnia, snoring, gasping for air, apnea  Neuro: Denies frequent headaches, weakness, dizziness  See HPI.  All other systems reviewed and negative        Vital signs for this encounter:  Vitals:    02/01/18 0911   Height: 1.829 m (6')   Weight: 102.5 kg (226 lb)   Weight % change since last entry.: 0 %   BP: 106/68   Pulse: 83   BMI (Calculated): 30.65   Resp: 16   Temp: (!) 35.7 °C (96.3 °F)   O2 sat % room air: 98 %                   Physical Exam:   Gen:         Alert and oriented, No apparent distress.   Neck:        No Lymphadenopathy.  Lungs:      Clear to auscultation bilaterally.    CV:          Regular rate and rhythm. No murmurs, rubs or gallops.   Abd:         Soft non tender, non distended.            Ext:          No clubbing, cyanosis, edema.    Assessment   1. LORENA (obstructive sleep apnea)  DME OTHER   2. Central sleep apnea         PLAN:   Patient Instructions   1) Continue ASV @ and change EPAP to7/10, PS 4/15, max pressure [id number]  2) Clean mask and supplies weekly and change them as insurance allows  3) Vaccines: Up to date with Prevnar 13, Pneumovax 23, flu  4) f/u 3 months

## 2018-02-01 NOTE — PATIENT INSTRUCTIONS
1) Continue ASV @ and change EPAP to7/10, PS 4/15, max pressure [id number]  2) Clean mask and supplies weekly and change them as insurance allows  3) Vaccines: Up to date with Prevnar 13, Pneumovax 23, flu  4) Return in about 3 months (around 5/1/2018) for Compliance, review of symptoms, if not sooner, follow up with NADER Haywood.

## 2018-02-20 ENCOUNTER — APPOINTMENT (RX ONLY)
Dept: URBAN - METROPOLITAN AREA CLINIC 4 | Facility: CLINIC | Age: 78
Setting detail: DERMATOLOGY
End: 2018-02-20

## 2018-02-20 DIAGNOSIS — D485 NEOPLASM OF UNCERTAIN BEHAVIOR OF SKIN: ICD-10-CM

## 2018-02-20 DIAGNOSIS — L82.1 OTHER SEBORRHEIC KERATOSIS: ICD-10-CM

## 2018-02-20 DIAGNOSIS — D22 MELANOCYTIC NEVI: ICD-10-CM

## 2018-02-20 DIAGNOSIS — L57.0 ACTINIC KERATOSIS: ICD-10-CM

## 2018-02-20 DIAGNOSIS — L81.4 OTHER MELANIN HYPERPIGMENTATION: ICD-10-CM

## 2018-02-20 DIAGNOSIS — D18.0 HEMANGIOMA: ICD-10-CM

## 2018-02-20 PROBLEM — D22.72 MELANOCYTIC NEVI OF LEFT LOWER LIMB, INCLUDING HIP: Status: ACTIVE | Noted: 2018-02-20

## 2018-02-20 PROBLEM — D22.61 MELANOCYTIC NEVI OF RIGHT UPPER LIMB, INCLUDING SHOULDER: Status: ACTIVE | Noted: 2018-02-20

## 2018-02-20 PROBLEM — Z85.828 PERSONAL HISTORY OF OTHER MALIGNANT NEOPLASM OF SKIN: Status: ACTIVE | Noted: 2018-02-20

## 2018-02-20 PROBLEM — D22.5 MELANOCYTIC NEVI OF TRUNK: Status: ACTIVE | Noted: 2018-02-20

## 2018-02-20 PROBLEM — D22.62 MELANOCYTIC NEVI OF LEFT UPPER LIMB, INCLUDING SHOULDER: Status: ACTIVE | Noted: 2018-02-20

## 2018-02-20 PROBLEM — D22.71 MELANOCYTIC NEVI OF RIGHT LOWER LIMB, INCLUDING HIP: Status: ACTIVE | Noted: 2018-02-20

## 2018-02-20 PROBLEM — I10 ESSENTIAL (PRIMARY) HYPERTENSION: Status: ACTIVE | Noted: 2018-02-20

## 2018-02-20 PROBLEM — E78.5 HYPERLIPIDEMIA, UNSPECIFIED: Status: ACTIVE | Noted: 2018-02-20

## 2018-02-20 PROBLEM — D48.5 NEOPLASM OF UNCERTAIN BEHAVIOR OF SKIN: Status: ACTIVE | Noted: 2018-02-20

## 2018-02-20 PROBLEM — D18.01 HEMANGIOMA OF SKIN AND SUBCUTANEOUS TISSUE: Status: ACTIVE | Noted: 2018-02-20

## 2018-02-20 PROCEDURE — 17000 DESTRUCT PREMALG LESION: CPT

## 2018-02-20 PROCEDURE — ? BIOPSY BY SHAVE METHOD

## 2018-02-20 PROCEDURE — 17003 DESTRUCT PREMALG LES 2-14: CPT

## 2018-02-20 PROCEDURE — ? LIQUID NITROGEN

## 2018-02-20 PROCEDURE — ? SUNSCREEN RECOMMENDATIONS

## 2018-02-20 PROCEDURE — 99213 OFFICE O/P EST LOW 20 MIN: CPT | Mod: 25

## 2018-02-20 PROCEDURE — 11101: CPT

## 2018-02-20 PROCEDURE — ? COUNSELING

## 2018-02-20 PROCEDURE — 11100: CPT | Mod: 59

## 2018-02-20 ASSESSMENT — LOCATION ZONE DERM
LOCATION ZONE: LEG
LOCATION ZONE: SCALP
LOCATION ZONE: NECK
LOCATION ZONE: ARM
LOCATION ZONE: FACE
LOCATION ZONE: TRUNK
LOCATION ZONE: HAND

## 2018-02-20 ASSESSMENT — LOCATION DETAILED DESCRIPTION DERM
LOCATION DETAILED: RIGHT ANTERIOR PROXIMAL THIGH
LOCATION DETAILED: MEDIAL FRONTAL SCALP
LOCATION DETAILED: RIGHT RIB CAGE
LOCATION DETAILED: RIGHT CENTRAL MALAR CHEEK
LOCATION DETAILED: LEFT SUPERIOR FOREHEAD
LOCATION DETAILED: LEFT ANTERIOR PROXIMAL THIGH
LOCATION DETAILED: RIGHT DISTAL POSTERIOR UPPER ARM
LOCATION DETAILED: LEFT INFERIOR ANTERIOR NECK
LOCATION DETAILED: LEFT SUPERIOR MEDIAL UPPER BACK
LOCATION DETAILED: RIGHT SUPERIOR MEDIAL MIDBACK
LOCATION DETAILED: LEFT PROXIMAL POSTERIOR UPPER ARM
LOCATION DETAILED: LEFT PROXIMAL DORSAL FOREARM
LOCATION DETAILED: LEFT CENTRAL MALAR CHEEK
LOCATION DETAILED: RIGHT RADIAL DORSAL HAND
LOCATION DETAILED: RIGHT PROXIMAL DORSAL FOREARM
LOCATION DETAILED: PERIUMBILICAL SKIN
LOCATION DETAILED: RIGHT DISTAL DORSAL FOREARM
LOCATION DETAILED: LEFT ULNAR DORSAL HAND
LOCATION DETAILED: SUPERIOR THORACIC SPINE
LOCATION DETAILED: RIGHT SUPERIOR FOREHEAD

## 2018-02-20 ASSESSMENT — LOCATION SIMPLE DESCRIPTION DERM
LOCATION SIMPLE: LEFT CHEEK
LOCATION SIMPLE: LEFT FOREARM
LOCATION SIMPLE: UPPER BACK
LOCATION SIMPLE: LEFT POSTERIOR UPPER ARM
LOCATION SIMPLE: RIGHT LOWER BACK
LOCATION SIMPLE: RIGHT CHEEK
LOCATION SIMPLE: LEFT UPPER BACK
LOCATION SIMPLE: RIGHT POSTERIOR UPPER ARM
LOCATION SIMPLE: FRONTAL SCALP
LOCATION SIMPLE: RIGHT HAND
LOCATION SIMPLE: RIGHT THIGH
LOCATION SIMPLE: RIGHT FOREHEAD
LOCATION SIMPLE: LEFT FOREHEAD
LOCATION SIMPLE: RIGHT FOREARM
LOCATION SIMPLE: LEFT THIGH
LOCATION SIMPLE: LEFT ANTERIOR NECK
LOCATION SIMPLE: LEFT HAND
LOCATION SIMPLE: ABDOMEN

## 2018-02-20 NOTE — PROCEDURE: BIOPSY BY SHAVE METHOD
Render Post-Care Instructions In Note?: no
Detail Level: Detailed
Silver Nitrate Text: The wound bed was treated with silver nitrate after the biopsy was performed.
Dressing: bandage
Hemostasis: Aluminum Chloride and Electrocautery
Cryotherapy Text: The wound bed was treated with cryotherapy after the biopsy was performed.
Billing Type: Third-Party Bill
Consent: Written consent was obtained and risks were reviewed including but not limited to scarring, infection, bleeding, scabbing, incomplete removal, nerve damage and allergy to anesthesia.
Electrodesiccation And Curettage Text: The wound bed was treated with electrodesiccation and curettage after the biopsy was performed.
Size Of Lesion In Cm: 0
Electrodesiccation Text: The wound bed was treated with electrodesiccation after the biopsy was performed.
Lab: [id number]
Biopsy Method: Personna blade
Wound Care: Vaseline
Curettage Text: The wound bed was treated with curettage after the biopsy was performed.
Anesthesia Volume In Cc: 2
Biopsy Type: H and E
Notification Instructions: Patient will be notified of biopsy results. However, patient instructed to call the office if not contacted within 2 weeks.
Type Of Destruction Used: Electrodesiccation and Curettage
Lab Facility: 
Anesthesia Type: 1% lidocaine with epinephrine
Post-Care Instructions: I reviewed with the patient in detail post-care instructions. Patient is to keep the biopsy site dry overnight, and then apply bacitracin twice daily until healed. Patient may apply hydrogen peroxide soaks to remove any crusting.
Destruction After The Procedure: Yes

## 2018-02-20 NOTE — HPI: FULL BODY SKIN EXAMINATION
How Severe Are Your Spot(S)?: moderate
What Is The Reason For Today's Visit?: Full Body Skin Examination
What Is The Reason For Today's Visit? (Being Monitored For X): the risk of recurrence of previously treated lesion(s)
Additional History: Spot on right wrist for 10-11 months. FBE.

## 2018-02-20 NOTE — PROCEDURE: LIQUID NITROGEN
Number Of Freeze-Thaw Cycles: 2 freeze-thaw cycles
Consent: The patient's consent was obtained including but not limited to risks of crusting, scabbing, blistering, scarring, darker or lighter pigmentary change, recurrence, incomplete removal and infection.
Detail Level: Detailed
Post-Care Instructions: I reviewed with the patient in detail post-care instructions. Patient is to wear sunprotection, and avoid picking at any of the treated lesions. Pt may apply Vaseline to crusted or scabbing areas.
Duration Of Freeze Thaw-Cycle (Seconds): 3
Render Post-Care Instructions In Note?: no

## 2018-03-08 ENCOUNTER — ANTICOAGULATION VISIT (OUTPATIENT)
Dept: VASCULAR LAB | Facility: MEDICAL CENTER | Age: 78
End: 2018-03-08
Attending: INTERNAL MEDICINE
Payer: MEDICARE

## 2018-03-08 VITALS — HEART RATE: 61 BPM | DIASTOLIC BLOOD PRESSURE: 71 MMHG | SYSTOLIC BLOOD PRESSURE: 116 MMHG

## 2018-03-08 DIAGNOSIS — I48.91 ATRIAL FIBRILLATION, UNSPECIFIED TYPE (HCC): ICD-10-CM

## 2018-03-08 LAB
INR BLD: 1.7 (ref 0.9–1.2)
INR PPP: 1.7 (ref 2–3.5)

## 2018-03-08 PROCEDURE — 85610 PROTHROMBIN TIME: CPT

## 2018-03-08 PROCEDURE — 99212 OFFICE O/P EST SF 10 MIN: CPT

## 2018-03-08 NOTE — PROGRESS NOTES
Anticoagulation Summary  As of 3/8/2018    INR goal:   2.0-3.0   TTR:   77.7 % (1.4 y)   Today's INR:   1.7!   Maintenance plan:   6 mg (4 mg x 1.5) on Sat; 4 mg (4 mg x 1) all other days   Weekly total:   30 mg   Plan last modified:   CLAYTON Jacob (10/10/2016)   Next INR check:   5/3/2018   Target end date:   Indefinite    Indications    Atrial fibrillation (CMS-HCC) [I48.91]             Anticoagulation Episode Summary     INR check location:       Preferred lab:       Send INR reminders to:       Comments:   Pt goes by Kinsey      Anticoagulation Care Providers     Provider Role Specialty Phone number    Vickey Rutherford M.D. Referring Cardiology [phone]    [facility] Anticoagulation Services Responsible  [phone]        Anticoagulation Patient Findings      HPI:  Duane Parkinson seen in clinic today, on anticoagulation therapy with warfarin for AF.  Changes to current medical/health status since last appt: none.  Denies signs/symptoms of bleeding and/or thrombosis since the last appt.    Denies any interval changes to diet  Denies any interval changes to medications since last appt.   Denies any complications or cost restrictions with current therapy.   BP recorded in vitals.  States he missed a dose recently.     A/P   INR  SUB-therapeutic.   Bolus 8 mg today then Pt is to continue with current warfarin dosing regimen.     Follow up appointment in 8 weeks per pt.     Sin Huitron, AbhishekD

## 2018-04-03 ENCOUNTER — APPOINTMENT (RX ONLY)
Dept: URBAN - METROPOLITAN AREA CLINIC 4 | Facility: CLINIC | Age: 78
Setting detail: DERMATOLOGY
End: 2018-04-03

## 2018-04-03 DIAGNOSIS — L57.0 ACTINIC KERATOSIS: ICD-10-CM

## 2018-04-03 PROCEDURE — ? CURETTAGE AND DESTRUCTION

## 2018-04-03 PROCEDURE — ? ADDITIONAL NOTES

## 2018-04-03 PROCEDURE — 17000 DESTRUCT PREMALG LESION: CPT

## 2018-04-03 PROCEDURE — 99212 OFFICE O/P EST SF 10 MIN: CPT | Mod: 25

## 2018-04-03 ASSESSMENT — LOCATION SIMPLE DESCRIPTION DERM
LOCATION SIMPLE: RIGHT FOREARM
LOCATION SIMPLE: RIGHT UPPER BACK

## 2018-04-03 ASSESSMENT — LOCATION DETAILED DESCRIPTION DERM
LOCATION DETAILED: RIGHT SUPERIOR MEDIAL UPPER BACK
LOCATION DETAILED: RIGHT VENTRAL DISTAL FOREARM

## 2018-04-03 ASSESSMENT — LOCATION ZONE DERM
LOCATION ZONE: ARM
LOCATION ZONE: TRUNK

## 2018-04-03 NOTE — PROCEDURE: CURETTAGE AND DESTRUCTION
Bill For Surgical Tray: no
Number Of Curettages: 3
What Was Performed First?: Curettage
Anesthesia Type: 1% lidocaine with epinephrine
Detail Level: Detailed
Additional Information: (Optional): The wound was cleaned, and a pressure dressing was applied.  The patient received detailed post-op instructions.
Consent was obtained from the patient. The risks, benefits and alternatives to therapy were discussed in detail. Specifically, the risks of infection, scarring, bleeding, prolonged wound healing, nerve injury, incomplete removal, allergy to anesthesia and recurrence were addressed. Alternatives to ED&C, such as: surgical removal and XRT were also discussed.  Prior to the procedure, the treatment site was clearly identified and confirmed by the patient. All components of Universal Protocol/PAUSE Rule completed.
Cautery Type: electrodesiccation
Post-Care Instructions: I reviewed with the patient in detail post-care instructions. Patient is to keep the area dry for 48 hours, and not to engage in any swimming until the area is healed. Should the patient develop any fevers, chills, bleeding, severe pain patient will contact the office immediately.
Size Of Lesion After Curettage: 0

## 2018-04-03 NOTE — PROCEDURE: ADDITIONAL NOTES
Additional Notes: Area was recently biopsied and path read AK, area is still crusty so ED&C was preformed to get the remaining
Additional Notes: Area was recently biopsied, path read AK. Clear margins.

## 2018-05-03 ENCOUNTER — ANTICOAGULATION VISIT (OUTPATIENT)
Dept: VASCULAR LAB | Facility: MEDICAL CENTER | Age: 78
End: 2018-05-03
Attending: INTERNAL MEDICINE
Payer: MEDICARE

## 2018-05-03 VITALS — SYSTOLIC BLOOD PRESSURE: 137 MMHG | DIASTOLIC BLOOD PRESSURE: 91 MMHG | HEART RATE: 62 BPM

## 2018-05-03 DIAGNOSIS — I48.91 ATRIAL FIBRILLATION, UNSPECIFIED TYPE (HCC): ICD-10-CM

## 2018-05-03 LAB
INR BLD: 3.5 (ref 0.9–1.2)
INR PPP: 3.5 (ref 2–3.5)

## 2018-05-03 PROCEDURE — 99212 OFFICE O/P EST SF 10 MIN: CPT | Performed by: NURSE PRACTITIONER

## 2018-05-03 PROCEDURE — 85610 PROTHROMBIN TIME: CPT

## 2018-05-03 NOTE — PROGRESS NOTES
Anticoagulation Summary  As of 5/3/2018    INR goal:   2.0-3.0   TTR:   75.5 % (1.5 y)   Today's INR:   3.5!   Warfarin maintenance plan:   6 mg (4 mg x 1.5) on Sat; 4 mg (4 mg x 1) all other days   Weekly warfarin total:   30 mg   Plan last modified:   CLAYTON Jacob (10/10/2016)   Next INR check:   5/31/2018   Target end date:   Indefinite    Indications    Atrial fibrillation (HCC) [I48.91]             Anticoagulation Episode Summary     INR check location:       Preferred lab:       Send INR reminders to:       Comments:   Pt goes by Kinsey      Anticoagulation Care Providers     Provider Role Specialty Phone number    Vickey Rutherford M.D. Referring Cardiology [phone]    [facility] Anticoagulation Services Responsible  [phone]        Anticoagulation Patient Findings      HPI:  Duane Parkinson seen in clinic today for follow up on anticoagulation therapy in the presence of AF. Denies any changes to current medical/health status since last appointment. He is eating less greens but will return to his normal diet. Denies any medication changes. No current symptoms of bleeding or thrombosis reported.    A/P:   INR supratherapeutic. Decrease tonight then continue current regimen. BP recorded in vitals.    Follow up appointment in 4 week(s) per pt's preference.    Next Appointment: Thursday, May 31, 2018 at 7:30 am.     Britney DOE

## 2018-05-31 ENCOUNTER — ANTICOAGULATION VISIT (OUTPATIENT)
Dept: VASCULAR LAB | Facility: MEDICAL CENTER | Age: 78
End: 2018-05-31
Attending: INTERNAL MEDICINE
Payer: MEDICARE

## 2018-05-31 VITALS — HEART RATE: 59 BPM | SYSTOLIC BLOOD PRESSURE: 96 MMHG | DIASTOLIC BLOOD PRESSURE: 50 MMHG

## 2018-05-31 DIAGNOSIS — Z79.01 CHRONIC ANTICOAGULATION: ICD-10-CM

## 2018-05-31 LAB
INR BLD: 2.3 (ref 0.9–1.2)
INR PPP: 2.3 (ref 2–3.5)

## 2018-05-31 PROCEDURE — 99211 OFF/OP EST MAY X REQ PHY/QHP: CPT

## 2018-05-31 PROCEDURE — 85610 PROTHROMBIN TIME: CPT

## 2018-05-31 NOTE — PROGRESS NOTES
Anticoagulation Summary  As of 5/31/2018    INR goal:   2.0-3.0   TTR:   74.7 % (1.6 y)   Today's INR:   2.3   Warfarin maintenance plan:   6 mg (4 mg x 1.5) on Sat; 4 mg (4 mg x 1) all other days   Weekly warfarin total:   30 mg   Plan last modified:   CLAYTON Jacob (10/10/2016)   Next INR check:   7/12/2018   Target end date:   Indefinite    Indications    Atrial fibrillation (HCC) [I48.91]             Anticoagulation Episode Summary     INR check location:       Preferred lab:       Send INR reminders to:       Comments:   Pt goes by Kinsey      Anticoagulation Care Providers     Provider Role Specialty Phone number    Vickey Rutherford M.D. Referring Cardiology [phone]    [facility] Anticoagulation Services Responsible  [phone]        Anticoagulation Patient Findings      HPI:  Duane Parkinson seen in clinic today, on anticoagulation therapy with warfarin for AF.  Changes to current medical/health status since last appt: none  Denies signs/symptoms of bleeding and/or thrombosis since the last appt.    Denies any interval changes to diet  Denies any interval changes to medications since last appt.   Denies any complications or cost restrictions with current therapy.   BP recorded in vitals.  Confirmed dosing regimen.     A/P   INR  therapeutic.   Pt is to continue with current warfarin dosing regimen.     Follow up appointment in 6 week(s).    Sin Huitron, PharmD

## 2018-06-07 LAB
ALBUMIN SERPL-MCNC: 3.8 G/DL (ref 3.5–4.7)
ALBUMIN/GLOB SERPL: 1.2 {RATIO} (ref 1.2–2.2)
ALP SERPL-CCNC: 71 IU/L (ref 39–117)
ALT SERPL-CCNC: 42 IU/L (ref 0–44)
AST SERPL-CCNC: 41 IU/L (ref 0–40)
BILIRUB SERPL-MCNC: 0.5 MG/DL (ref 0–1.2)
BUN SERPL-MCNC: 13 MG/DL (ref 8–27)
BUN/CREAT SERPL: 15 (ref 10–24)
CALCIUM SERPL-MCNC: 8.9 MG/DL (ref 8.6–10.2)
CHLORIDE SERPL-SCNC: 105 MMOL/L (ref 96–106)
CHOLEST SERPL-MCNC: 123 MG/DL (ref 100–199)
CO2 SERPL-SCNC: 23 MMOL/L (ref 18–29)
CREAT SERPL-MCNC: 0.86 MG/DL (ref 0.76–1.27)
GFR SERPLBLD CREATININE-BSD FMLA CKD-EPI: 82 ML/MIN/1.73
GFR SERPLBLD CREATININE-BSD FMLA CKD-EPI: 95 ML/MIN/1.73
GLOBULIN SER CALC-MCNC: 3.3 G/DL (ref 1.5–4.5)
GLUCOSE SERPL-MCNC: 97 MG/DL (ref 65–99)
HBA1C MFR BLD: 5.4 % (ref 4.8–5.6)
HDLC SERPL-MCNC: 32 MG/DL
LABORATORY COMMENT REPORT: ABNORMAL
LDLC SERPL CALC-MCNC: 53 MG/DL (ref 0–99)
POTASSIUM SERPL-SCNC: 4.9 MMOL/L (ref 3.5–5.2)
PROT SERPL-MCNC: 7.1 G/DL (ref 6–8.5)
SODIUM SERPL-SCNC: 139 MMOL/L (ref 134–144)
TRIGL SERPL-MCNC: 189 MG/DL (ref 0–149)
VLDLC SERPL CALC-MCNC: 38 MG/DL (ref 5–40)

## 2018-06-13 DIAGNOSIS — I47.10 SVT (SUPRAVENTRICULAR TACHYCARDIA): ICD-10-CM

## 2018-06-13 RX ORDER — DIGOXIN 250 MCG
250 TABLET ORAL DAILY
Qty: 30 TAB | Refills: 3 | Status: SHIPPED | OUTPATIENT
Start: 2018-06-13 | End: 2018-06-13 | Stop reason: SDUPTHER

## 2018-06-13 RX ORDER — DIGOXIN 250 MCG
250 TABLET ORAL DAILY
Qty: 30 TAB | Refills: 3 | Status: SHIPPED | OUTPATIENT
Start: 2018-06-13 | End: 2018-08-24 | Stop reason: SDUPTHER

## 2018-06-15 ENCOUNTER — ANTICOAGULATION MONITORING (OUTPATIENT)
Dept: VASCULAR LAB | Facility: MEDICAL CENTER | Age: 78
End: 2018-06-15

## 2018-06-15 NOTE — PROGRESS NOTES
Anticoagulation Summary  As of 6/15/2018    INR goal:   2.0-3.0   TTR:   74.7 % (1.6 y)   Today's INR:      Warfarin maintenance plan:   6 mg (4 mg x 1.5) on Sat; 4 mg (4 mg x 1) all other days   Weekly warfarin total:   30 mg   Plan last modified:   CLAYTON Jacob (10/10/2016)   Next INR check:   6/29/2018   Target end date:   Indefinite    Indications    Atrial fibrillation (HCC) [I48.91]             Anticoagulation Episode Summary     INR check location:       Preferred lab:       Send INR reminders to:       Comments:   Pt goes by Kinsey      Anticoagulation Care Providers     Provider Role Specialty Phone number    Vickey Rutherford M.D. Referring Cardiology [phone]    [facility] Anticoagulation Services Responsible  [phone]        Anticoagulation Patient Findings    He will be having a liver biopsy on 6/26, he will follow the plan below for the procedure.     2017 ACC Expert Consensus Decision Pathway for Periprocedural  Management of Anticoagulation in Patients With Nonvalvular Atrial Fibrillation.    [id number] <5, low risk (with no PMH of stroke)   no bridge needed      6/21 stop warfarin until after procedure  6/26 procedure, restart warfarin within 24hrs  (if approved by MD) at 6mg once daily   6/29 next coagulation clinic appt.       Sean Guzman, AbhishekD

## 2018-06-20 ENCOUNTER — HOSPITAL ENCOUNTER (OUTPATIENT)
Dept: CARDIOLOGY | Facility: MEDICAL CENTER | Age: 78
End: 2018-06-20
Attending: INTERNAL MEDICINE | Admitting: INTERNAL MEDICINE
Payer: MEDICARE

## 2018-06-20 LAB
INR PPP: 2.12 (ref 0.87–1.13)
PLATELET # BLD AUTO: 245 K/UL (ref 164–446)
PROTHROMBIN TIME: 23.4 SEC (ref 12–14.6)

## 2018-06-20 PROCEDURE — 85610 PROTHROMBIN TIME: CPT

## 2018-06-20 PROCEDURE — 36415 COLL VENOUS BLD VENIPUNCTURE: CPT

## 2018-06-20 PROCEDURE — 85049 AUTOMATED PLATELET COUNT: CPT

## 2018-06-20 RX ORDER — ASCORBIC ACID 500 MG
1000 TABLET ORAL EVERY MORNING
Status: ON HOLD | COMMUNITY
End: 2019-07-30

## 2018-06-20 RX ORDER — SODIUM PHOSPHATE,MONO-DIBASIC 19G-7G/118
500 ENEMA (ML) RECTAL 2 TIMES DAILY WITH MEALS
COMMUNITY
End: 2019-07-22

## 2018-06-20 RX ORDER — THIAMINE MONONITRATE (VIT B1) 100 MG
100 TABLET ORAL DAILY
COMMUNITY
End: 2018-11-07

## 2018-06-22 ENCOUNTER — OFFICE VISIT (OUTPATIENT)
Dept: MEDICAL GROUP | Facility: MEDICAL CENTER | Age: 78
End: 2018-06-22
Payer: MEDICARE

## 2018-06-22 VITALS
TEMPERATURE: 97.2 F | RESPIRATION RATE: 16 BRPM | DIASTOLIC BLOOD PRESSURE: 82 MMHG | HEIGHT: 73 IN | WEIGHT: 226 LBS | HEART RATE: 72 BPM | SYSTOLIC BLOOD PRESSURE: 116 MMHG | OXYGEN SATURATION: 92 % | BODY MASS INDEX: 29.95 KG/M2

## 2018-06-22 DIAGNOSIS — K75.4 AUTOIMMUNE HEPATITIS (HCC): ICD-10-CM

## 2018-06-22 DIAGNOSIS — I10 ESSENTIAL HYPERTENSION: ICD-10-CM

## 2018-06-22 DIAGNOSIS — M51.37 DEGENERATION OF LUMBAR OR LUMBOSACRAL INTERVERTEBRAL DISC: ICD-10-CM

## 2018-06-22 PROCEDURE — 99214 OFFICE O/P EST MOD 30 MIN: CPT | Performed by: INTERNAL MEDICINE

## 2018-06-22 NOTE — PROGRESS NOTES
CC: Back pain with upcoming liver biopsy.    HPI:   Duane presents today with the following.    1. Autoimmune hepatitis (HCC)  Surprised to see in the chart that he has a liver biopsy scheduled for next month.  The only note in his chart is from less than a month ago from digestive health reporting a history of autoimmune hepatitis.  This very slight AST elevation.  Apparently had an ultrasound elsewhere showing steatosis.  He denies abdominal pain or jaundice.  Biopsy is scheduled for next week.    2. Lumbar Disc Degeneration  Does have chronic back pain will be planning on seeing a neurosurgeon in the coming month.  He is questioning whether he can use some over-the-counter lidocaine patches.    3. Essential hypertension  Well-controlled on current medications denying any chest pain or shortness of breath.      Patient Active Problem List    Diagnosis Date Noted   • Autoimmune hepatitis (HCC) 06/22/2018   • Neuropathy (HCC) 12/22/2017   • Central sleep apnea 11/29/2017   • Obesity (BMI 30.0-34.9) 06/22/2017   • Localized primary osteoarthritis of carpometacarpal joint of left thumb 05/27/2016   • Insomnia 05/26/2016   • Essential hypertension 01/07/2015   • Dyslipidemia 01/07/2015   • IGT (impaired glucose tolerance) 01/07/2015   • Aortic stenosis 12/30/2011   • Atrial fibrillation (HCC) 12/30/2011   • Long term current use of anticoagulant therapy 12/30/2011   • Lumbar Disc Degeneration 12/30/2011   • Mitral valve disorder 12/30/2011   • Osteoarthrosis involving lower leg 12/30/2011       Current Outpatient Prescriptions   Medication Sig Dispense Refill   • glucosamine Sulfate 500 MG Cap Take 500 mg by mouth 2 times a day, with meals.     • thiamine (THIAMINE) 100 MG Tab Take 100 mg by mouth every day. Super Complex     • ascorbic acid (ASCORBIC ACID) 500 MG Tab Take 1,000 mg by mouth every evening.     • digoxin (LANOXIN) 250 MCG Tab Take 1 Tab by mouth every day. 30 Tab 3   • Psyllium (METAMUCIL PO) Take  by  "mouth.     • warfarin (COUMADIN) 4 MG Tab Take 1 to 2 tablets by mouth daily as directed by [facility]. 60 Tab 5   • Coenzyme Q10 (COQ10 PO) Take  by mouth.     • pravastatin (PRAVACHOL) 80 MG tablet TAKE 1 TABLET BY MOUTH DAILY -GENERIC FOR PRAVACHOL 90 Tab 3   • metoprolol SR (TOPROL XL) 50 MG TABLET SR 24 HR TAKE 1 TABLET BY MOUTH DAILY -GENERIC FOR TOPROL XL 90 Tab 3   • gabapentin (NEURONTIN) 300 MG Cap Take 2 Caps by mouth 2 Times a Day. (Patient taking differently: Take 300 mg by mouth. 1 cap in am, 1 cap at noon, 1 cap in pm, 3 caps at bedtime) 120 Cap 6   • clindamycin (CLEOCIN) 150 MG Cap Take 1 Cap by mouth 2 Times a Day. Permanent, one am and 2 pm 40 Cap 0   • Cholecalciferol (VITAMIN D3) 2000 UNITS Tab Take  by mouth every 7 days. Mondays     • multivitamin (THERAGRAN) TABS Take 1 Tab by mouth every day.     • docosahexanoic acid (FISH OIL) 1000 MG CAPS Take  by mouth every day.       No current facility-administered medications for this visit.          Allergies as of 06/22/2018 - Reviewed 06/22/2018   Allergen Reaction Noted   • Feldene [piroxicam] Itching 12/30/2011   • Percodan [oxycodone-aspirin] Itching 12/30/2011        ROS: Denies Chest pain, SOB, LE edema.    /82   Pulse 72   Temp 36.2 °C (97.2 °F)   Resp 16   Ht 1.854 m (6' 1\")   Wt 102.5 kg (226 lb)   SpO2 92%   BMI 29.82 kg/m²     Physical Exam:  Gen:         Alert and oriented, No apparent distress.  Neck:        No Lymphadenopathy or Bruits.  Lungs:     Clear to auscultation bilaterally  CV:          Regular rate and rhythm. No murmurs, rubs or gallops.               Ext:          No clubbing, cyanosis, edema.      Assessment and Plan.   78 y.o. male with the following issues.    1. Autoimmune hepatitis (HCC)  We will get further records from digestive health he is somewhat of a poor historian therefore very difficult to piece together.    2. Lumbar Disc Degeneration  Have approved the Lidoderm patches over-the-counter " will see back after he sees the neurosurgeon.    3. Essential hypertension  Currently well controlled, Discuss diet, exercise and salt restriction.  No change to medication therapy.

## 2018-06-26 ENCOUNTER — APPOINTMENT (OUTPATIENT)
Dept: RADIOLOGY | Facility: MEDICAL CENTER | Age: 78
End: 2018-06-26
Attending: INTERNAL MEDICINE
Payer: MEDICARE

## 2018-06-26 ENCOUNTER — HOSPITAL ENCOUNTER (OUTPATIENT)
Facility: MEDICAL CENTER | Age: 78
End: 2018-06-26
Attending: INTERNAL MEDICINE | Admitting: INTERNAL MEDICINE
Payer: MEDICARE

## 2018-06-26 VITALS
BODY MASS INDEX: 29.16 KG/M2 | WEIGHT: 220 LBS | HEIGHT: 73 IN | OXYGEN SATURATION: 99 % | RESPIRATION RATE: 17 BRPM | DIASTOLIC BLOOD PRESSURE: 78 MMHG | SYSTOLIC BLOOD PRESSURE: 136 MMHG | HEART RATE: 74 BPM

## 2018-06-26 DIAGNOSIS — K74.69 OTHER CIRRHOSIS OF LIVER (HCC): ICD-10-CM

## 2018-06-26 DIAGNOSIS — K75.4 AUTOIMMUNE HEPATITIS (HCC): ICD-10-CM

## 2018-06-26 LAB — INR BLD: 1.1 (ref 0.9–1.2)

## 2018-06-26 PROCEDURE — 88305 TISSUE EXAM BY PATHOLOGIST: CPT

## 2018-06-26 PROCEDURE — 47000 NEEDLE BIOPSY OF LIVER PERQ: CPT

## 2018-06-26 PROCEDURE — 76942 ECHO GUIDE FOR BIOPSY: CPT

## 2018-06-26 PROCEDURE — 160002 HCHG RECOVERY MINUTES (STAT)

## 2018-06-26 PROCEDURE — 700111 HCHG RX REV CODE 636 W/ 250 OVERRIDE (IP)

## 2018-06-26 RX ORDER — ONDANSETRON 2 MG/ML
4 INJECTION INTRAMUSCULAR; INTRAVENOUS PRN
Status: DISCONTINUED | OUTPATIENT
Start: 2018-06-26 | End: 2018-06-26 | Stop reason: HOSPADM

## 2018-06-26 RX ORDER — MIDAZOLAM HYDROCHLORIDE 1 MG/ML
INJECTION INTRAMUSCULAR; INTRAVENOUS
Status: COMPLETED
Start: 2018-06-26 | End: 2018-06-26

## 2018-06-26 RX ORDER — SODIUM CHLORIDE 9 MG/ML
500 INJECTION, SOLUTION INTRAVENOUS PRN
Status: DISCONTINUED | OUTPATIENT
Start: 2018-06-26 | End: 2018-06-26 | Stop reason: HOSPADM

## 2018-06-26 RX ORDER — MIDAZOLAM HYDROCHLORIDE 1 MG/ML
.5-2 INJECTION INTRAMUSCULAR; INTRAVENOUS PRN
Status: DISCONTINUED | OUTPATIENT
Start: 2018-06-26 | End: 2018-06-26 | Stop reason: HOSPADM

## 2018-06-26 RX ADMIN — MIDAZOLAM HYDROCHLORIDE 2 MG: 1 INJECTION INTRAMUSCULAR; INTRAVENOUS at 08:20

## 2018-06-26 RX ADMIN — FENTANYL CITRATE 25 MCG: 50 INJECTION, SOLUTION INTRAMUSCULAR; INTRAVENOUS at 08:20

## 2018-06-26 RX ADMIN — MIDAZOLAM 2 MG: 1 INJECTION INTRAMUSCULAR; INTRAVENOUS at 08:20

## 2018-06-26 ASSESSMENT — PAIN SCALES - GENERAL: PAINLEVEL_OUTOF10: 0

## 2018-06-26 NOTE — DISCHARGE INSTRUCTIONS
ACTIVITY: Rest and take it easy for the first 24 hours.  A responsible adult is recommended to remain with you during that time.  It is normal to feel sleepy.  We encourage you to not do anything that requires balance, judgment or coordination.    MILD FLU-LIKE SYMPTOMS ARE NORMAL. YOU MAY EXPERIENCE GENERALIZED MUSCLE ACHES, THROAT IRRITATION, HEADACHE AND/OR SOME NAUSEA.    FOR 24 HOURS DO NOT:  Drive, operate machinery or run household appliances.  Drink beer or alcoholic beverages.   Make important decisions or sign legal documents.    SPECIAL INSTRUCTIONS:  Keep surgical site clean dry and covered until fully healed. Do not lift over 10 lbs for 2 week      DIET: To avoid nausea, slowly advance diet as tolerated, avoiding spicy or greasy foods for the first day.  Add more substantial food to your diet according to your physician's instructions.  Babies can be fed formula or breast milk as soon as they are hungry.  INCREASE FLUIDS AND FIBER TO AVOID CONSTIPATION.    SURGICAL DRESSING/BATHING: Keep surgical site clean dry and covered until fully healed.    FOLLOW-UP APPOINTMENT:  A follow-up appointment should be arranged with your doctor in 1 week; call to schedule.    You should CALL YOUR PHYSICIAN if you develop:  Fever greater than 101 degrees F.  Pain not relieved by medication, or persistent nausea or vomiting.  Excessive bleeding (blood soaking through dressing) or unexpected drainage from the wound.  Extreme redness or swelling around the incision site, drainage of pus or foul smelling drainage.  Inability to urinate or empty your bladder within 8 hours.  Problems with breathing or chest pain.    You should call 911 if you develop problems with breathing or chest pain.  If you are unable to contact your doctor or surgical center, you should go to the nearest emergency room or urgent care center.     If any questions arise, call your doctor.  If your doctor is not available, please feel free to call the  Surgical Center at [phone].  The Center is open Monday through Friday from 7AM to 7PM.  You can also call the HEALTH HOTLINE open 24 hours/day, 7 days/week and speak to a nurse at [phone], or toll free at [phone].    A registered nurse may call you a few days after your surgery to see how you are doing after your procedure.    MEDICATIONS: Resume taking daily medication.  Take prescribed pain medication with food.  If no medication is prescribed, you may take non-aspirin pain medication if needed.  PAIN MEDICATION CAN BE VERY CONSTIPATING.  Take a stool softener or laxative such as senokot, pericolace, or milk of magnesia if needed.      If your physician has prescribed pain medication that includes Acetaminophen (Tylenol), do not take additional Acetaminophen (Tylenol) while taking the prescribed medication.    Depression / Suicide Risk    As you are discharged from this [facility] facility, it is important to learn how to keep safe from harming yourself.    Recognize the warning signs:  · Abrupt changes in personality, positive or negative- including increase in energy   · Giving away possessions  · Change in eating patterns- significant weight changes-  positive or negative  · Change in sleeping patterns- unable to sleep or sleeping all the time   · Unwillingness or inability to communicate  · Depression  · Unusual sadness, discouragement and loneliness  · Talk of wanting to die  · Neglect of personal appearance   · Rebelliousness- reckless behavior  · Withdrawal from people/activities they love  · Confusion- inability to concentrate     If you or a loved one observes any of these behaviors or has concerns about self-harm, here's what you can do:  · Talk about it- your feelings and reasons for harming yourself  · Remove any means that you might use to hurt yourself (examples: pills, rope, extension cords, firearm)  · Get professional help from the community (Mental Health, Substance Abuse,  psychological counseling)  · Do not be alone:Call your Safe Contact- someone whom you trust who will be there for you.  · Call your local CRISIS HOTLINE 658-5221 or 638-947-7057  · Call your local Children's Mobile Crisis Response Team [place] (561) 172-7793 or www.Channelinsight  · Call the toll free National Suicide Prevention Hotlines   · National Suicide Prevention Lifeline 678-889-KLQB (9167)  · [employer] 800-SUICIDE (781-3664)      Liver Biopsy, Care After  Refer to this sheet in the next few weeks. These instructions provide you with information on caring for yourself after your procedure. Your health care provider may also give you more specific instructions. Your treatment has been planned according to current medical practices, but problems sometimes occur. Call your health care provider if you have any problems or questions after your procedure.  WHAT TO EXPECT AFTER THE PROCEDURE  After your procedure, it is typical to have the following:  · A small amount of discomfort in the area where the biopsy was done and in the right shoulder or shoulder blade.  · A small amount of bruising around the area where the biopsy was done and on the skin over the liver.  · Sleepiness and fatigue for the rest of the day.  HOME CARE INSTRUCTIONS   · Rest at home for 1-2 days or as directed by your health care provider.  · Have a friend or family member stay with you for at least 24 hours.  · Because of the medicines used during the procedure, you should not do the following things in the first 24 hours:  ¨ Drive.  ¨ Use machinery.  ¨ Be responsible for the care of other people.  ¨ Sign legal documents.  ¨ Take a bath or shower.  · There are many different ways to close and cover an incision, including stitches, skin glue, and adhesive strips. Follow your health care provider's instructions on:  ¨ Incision care.  ¨ Bandage (dressing) changes and removal.  ¨ Incision closure removal.  · Do not drink  alcohol in the first week.  · Do not lift more than 5 pounds or play contact sports for 2 weeks after this test.  · Take medicines only as directed by your health care provider. Do not take medicine containing aspirin or non-steroidal anti-inflammatory medicines such as ibuprofen for 1 week after this test.  · It is your responsibility to get your test results.  SEEK MEDICAL CARE IF:   · You have increased bleeding from an incision that results in more than a small spot of blood.  · You have redness, swelling, or increasing pain in any incisions.  · You notice a discharge or a bad smell coming from any of your incisions.  · You have a fever or chills.  SEEK IMMEDIATE MEDICAL CARE IF:   · You develop swelling, bloating, or pain in your abdomen.  · You become dizzy or faint.  · You develop a rash.  · You are nauseous or vomit.  · You have difficulty breathing, feel short of breath, or feel faint.  · You develop chest pain.  · You have problems with your speech or vision.  · You have trouble balancing or moving your arms or legs.     This information is not intended to replace advice given to you by your health care provider. Make sure you discuss any questions you have with your health care provider.     Document Released: 07/07/2006 Document Revised: 01/08/2016 Document Reviewed: 02/13/2015  [employer] Interactive Patient Education ©2016 [employer]      Liver Biopsy  The liver is a large organ in the upper right-hand side of your abdomen. A liver biopsy is a procedure in which a tissue sample is taken from the liver and examined under a microscope. The procedure is done to confirm a suspected problem.  There are three types of liver biopsies:  · Percutaneous. In this type, an incision is made in your abdomen. The sample is removed through the incision with a needle.  · Laparoscopic. In this type, several incisions are made in the abdomen. A tiny camera is passed through one of the incisions to help guide the health  care provider. The sample is removed through the other incision or incisions.  · Transjugular. In this type, an incision is made in the neck. A tube is passed through the incision to the liver. The sample is removed through the tube with a needle.  Tell a health care provider about:  · Any allergies you have.  · All medicines you are taking, including vitamins, herbs, eye drops, creams, and over-the-counter medicines.  · Any problems you or family members have had with anesthetic medicines.  · Any blood disorders you have.  · Any surgeries you have had.  · Any medical conditions you have.  · Possibility of pregnancy, if this applies.  What are the risks?  Generally, this is a safe procedure. However, problems can occur and include:  · Bleeding.  · Infection.  · Bruising.  · Collapsed lung.  · Leak of digestive juices (bile) from the liver or gallbladder.  · Problems with heart rhythm.  · Pain at the biopsy site or in the right shoulder.  · Low blood pressure (hypotension).  · Injury to nearby organs or tissues.  What happens before the procedure?  · Your health care provider may do some blood or urine tests. These will help your health care provider learn how well your kidneys and liver are working and how well your blood clots.  · Ask your health care provider if you will be able to go home the day of the procedure. Arrange for someone to take you home and stay with you for at least 24 hours.  · Do not eat or drink anything after midnight on the night before the procedure or as directed by your health care provider.  · Ask your health care provider about:  ¨ Changing or stopping your regular medicines. This is especially important if you are taking diabetes medicines or blood thinners.  ¨ Taking medicines such as aspirin and ibuprofen. These medicines can thin your blood. Do not take these medicines before your procedure if your health care provider asks you not to.  What happens during the procedure?  Regardless  of the type of biopsy that will be done, you will have an IV line placed. Through this line, you will receive fluids and medicine to relax you. If you will be having a laparoscopic biopsy, you may also receive medicine through this line to make you sleep during the procedure (general anesthetic).  Percutaneous Liver Biopsy  · You will positioned on your back, with your right hand over your head.  · A health care provider will locate your liver by tapping and pressing on the right side of your abdomen or with the help of an ultrasound machine or CT scan.  · An area at the bottom of your last right rib will be numbed.  · An incision will be made in the numbed area.  · The biopsy needle will be inserted into the incision.  · Several samples of liver tissue will be taken with the biopsy needle. You will be asked to hold your breath as each sample is taken.  Laparoscopic Liver Biopsy  · You will be positioned on your back.  · Several small incisions will be made in your abdomen.  · Your doctor will pass a tiny camera through one incision. The camera will allow the liver to be viewed on a TV monitor in the operating room.  · Tools will be passed through the other incision or incisions. These tools will be used to remove samples of liver tissue.  Transjugular Liver Biopsy  · You will be positioned on your back on an X-ray table, with your head turned to your left.  · An area on your neck just over your jugular vein will be numbed.  · An incision will be made in the numbed area.  · A tiny tube will be inserted through the incision. It will be pushed through the jugular vein to a blood vessel in the liver called the hepatic vein.  · Dye will be inserted through the tube, and X-rays will be taken. The dye will make the blood vessels in the liver light up on the X-rays.  · The biopsy needle will be pushed through the tube until it reaches the liver.  · Samples of liver tissue will be taken with the biopsy needle.  · The needle  and the tube will be removed.  After the samples are obtained, the incision or incisions will be closed.  What happens after the procedure?  · You will be taken to a recovery area.  · You may have to lie on your right side for 1-2 hours. This will prevent bleeding from the biopsy site.  · Your progress will be watched. Your blood pressure, pulse, and the biopsy site will be checked often.  · You may have some pain or feel sick. If this happens, tell your health care provider.  · As you begin to feel better, you will be offered ice and beverages.  · You may be allowed to go home when the medicines have worn off and you can walk, drink, eat, and use the bathroom.  This information is not intended to replace advice given to you by your health care provider. Make sure you discuss any questions you have with your health care provider.  Document Released: 03/09/2005 Document Revised: 05/22/2017 Document Reviewed: 02/13/2015  [employer] Interactive Patient Education © 2017 [employer]

## 2018-06-26 NOTE — PROGRESS NOTES
Discharge instructions reviewed with patient and family, all questions answered. IV removed and belongings returned.  Patient ambulated out in a stable condition.       ACTIVITY: Rest and take it easy for the first 24 hours.  A responsible adult is recommended to remain with you during that time.  It is normal to feel sleepy.  We encourage you to not do anything that requires balance, judgment or coordination.    MILD FLU-LIKE SYMPTOMS ARE NORMAL. YOU MAY EXPERIENCE GENERALIZED MUSCLE ACHES, THROAT IRRITATION, HEADACHE AND/OR SOME NAUSEA.    FOR 24 HOURS DO NOT:  Drive, operate machinery or run household appliances.  Drink beer or alcoholic beverages.   Make important decisions or sign legal documents.    SPECIAL INSTRUCTIONS:  Keep surgical site clean dry and covered until fully healed. Do not lift over 10 lbs for 2 week      DIET: To avoid nausea, slowly advance diet as tolerated, avoiding spicy or greasy foods for the first day.  Add more substantial food to your diet according to your physician's instructions.  Babies can be fed formula or breast milk as soon as they are hungry.  INCREASE FLUIDS AND FIBER TO AVOID CONSTIPATION.    SURGICAL DRESSING/BATHING: Keep surgical site clean dry and covered until fully healed.    FOLLOW-UP APPOINTMENT:  A follow-up appointment should be arranged with your doctor in 1 week; call to schedule.    You should CALL YOUR PHYSICIAN if you develop:  Fever greater than 101 degrees F.  Pain not relieved by medication, or persistent nausea or vomiting.  Excessive bleeding (blood soaking through dressing) or unexpected drainage from the wound.  Extreme redness or swelling around the incision site, drainage of pus or foul smelling drainage.  Inability to urinate or empty your bladder within 8 hours.  Problems with breathing or chest pain.    You should call 911 if you develop problems with breathing or chest pain.  If you are unable to contact your doctor or surgical center, you should  go to the nearest emergency room or urgent care center.     If any questions arise, call your doctor.  If your doctor is not available, please feel free to call the Surgical Center at [phone].  The Center is open Monday through Friday from 7AM to 7PM.  You can also call the HEALTH HOTLINE open 24 hours/day, 7 days/week and speak to a nurse at [phone], or toll free at [phone].    A registered nurse may call you a few days after your surgery to see how you are doing after your procedure.    MEDICATIONS: Resume taking daily medication.  Take prescribed pain medication with food.  If no medication is prescribed, you may take non-aspirin pain medication if needed.  PAIN MEDICATION CAN BE VERY CONSTIPATING.  Take a stool softener or laxative such as senokot, pericolace, or milk of magnesia if needed.      If your physician has prescribed pain medication that includes Acetaminophen (Tylenol), do not take additional Acetaminophen (Tylenol) while taking the prescribed medication.    Depression / Suicide Risk    As you are discharged from this [facility] facility, it is important to learn how to keep safe from harming yourself.    Recognize the warning signs:  · Abrupt changes in personality, positive or negative- including increase in energy   · Giving away possessions  · Change in eating patterns- significant weight changes-  positive or negative  · Change in sleeping patterns- unable to sleep or sleeping all the time   · Unwillingness or inability to communicate  · Depression  · Unusual sadness, discouragement and loneliness  · Talk of wanting to die  · Neglect of personal appearance   · Rebelliousness- reckless behavior  · Withdrawal from people/activities they love  · Confusion- inability to concentrate     If you or a loved one observes any of these behaviors or has concerns about self-harm, here's what you can do:  · Talk about it- your feelings and reasons for harming yourself  · Remove any  means that you might use to hurt yourself (examples: pills, rope, extension cords, firearm)  · Get professional help from the community (Mental Health, Substance Abuse, psychological counseling)  · Do not be alone:Call your Safe Contact- someone whom you trust who will be there for you.  · Call your local CRISIS HOTLINE 749-9874 or 063-618-7146  · Call your local Children's Mobile Crisis Response Team [place] (666) 557-0574 or [employer]  · Call the toll free National Suicide Prevention Hotlines   · National Suicide Prevention Lifeline 529-758-KHER (8321)  · National Hope Line Network 800-SUICIDE (418-4570)      Liver Biopsy, Care After  Refer to this sheet in the next few weeks. These instructions provide you with information on caring for yourself after your procedure. Your health care provider may also give you more specific instructions. Your treatment has been planned according to current medical practices, but problems sometimes occur. Call your health care provider if you have any problems or questions after your procedure.  WHAT TO EXPECT AFTER THE PROCEDURE  After your procedure, it is typical to have the following:  · A small amount of discomfort in the area where the biopsy was done and in the right shoulder or shoulder blade.  · A small amount of bruising around the area where the biopsy was done and on the skin over the liver.  · Sleepiness and fatigue for the rest of the day.  HOME CARE INSTRUCTIONS   · Rest at home for 1-2 days or as directed by your health care provider.  · Have a friend or family member stay with you for at least 24 hours.  · Because of the medicines used during the procedure, you should not do the following things in the first 24 hours:  ¨ Drive.  ¨ Use machinery.  ¨ Be responsible for the care of other people.  ¨ Sign legal documents.  ¨ Take a bath or shower.  · There are many different ways to close and cover an incision, including stitches, skin glue, and  adhesive strips. Follow your health care provider's instructions on:  ¨ Incision care.  ¨ Bandage (dressing) changes and removal.  ¨ Incision closure removal.  · Do not drink alcohol in the first week.  · Do not lift more than 5 pounds or play contact sports for 2 weeks after this test.  · Take medicines only as directed by your health care provider. Do not take medicine containing aspirin or non-steroidal anti-inflammatory medicines such as ibuprofen for 1 week after this test.  · It is your responsibility to get your test results.  SEEK MEDICAL CARE IF:   · You have increased bleeding from an incision that results in more than a small spot of blood.  · You have redness, swelling, or increasing pain in any incisions.  · You notice a discharge or a bad smell coming from any of your incisions.  · You have a fever or chills.  SEEK IMMEDIATE MEDICAL CARE IF:   · You develop swelling, bloating, or pain in your abdomen.  · You become dizzy or faint.  · You develop a rash.  · You are nauseous or vomit.  · You have difficulty breathing, feel short of breath, or feel faint.  · You develop chest pain.  · You have problems with your speech or vision.  · You have trouble balancing or moving your arms or legs.     This information is not intended to replace advice given to you by your health care provider. Make sure you discuss any questions you have with your health care provider.     Document Released: 07/07/2006 Document Revised: 01/08/2016 Document Reviewed: 02/13/2015  [employer] Interactive Patient Education ©2016 [employer]      Liver Biopsy  The liver is a large organ in the upper right-hand side of your abdomen. A liver biopsy is a procedure in which a tissue sample is taken from the liver and examined under a microscope. The procedure is done to confirm a suspected problem.  There are three types of liver biopsies:  · Percutaneous. In this type, an incision is made in your abdomen. The sample is removed through the  incision with a needle.  · Laparoscopic. In this type, several incisions are made in the abdomen. A tiny camera is passed through one of the incisions to help guide the health care provider. The sample is removed through the other incision or incisions.  · Transjugular. In this type, an incision is made in the neck. A tube is passed through the incision to the liver. The sample is removed through the tube with a needle.  Tell a health care provider about:  · Any allergies you have.  · All medicines you are taking, including vitamins, herbs, eye drops, creams, and over-the-counter medicines.  · Any problems you or family members have had with anesthetic medicines.  · Any blood disorders you have.  · Any surgeries you have had.  · Any medical conditions you have.  · Possibility of pregnancy, if this applies.  What are the risks?  Generally, this is a safe procedure. However, problems can occur and include:  · Bleeding.  · Infection.  · Bruising.  · Collapsed lung.  · Leak of digestive juices (bile) from the liver or gallbladder.  · Problems with heart rhythm.  · Pain at the biopsy site or in the right shoulder.  · Low blood pressure (hypotension).  · Injury to nearby organs or tissues.  What happens before the procedure?  · Your health care provider may do some blood or urine tests. These will help your health care provider learn how well your kidneys and liver are working and how well your blood clots.  · Ask your health care provider if you will be able to go home the day of the procedure. Arrange for someone to take you home and stay with you for at least 24 hours.  · Do not eat or drink anything after midnight on the night before the procedure or as directed by your health care provider.  · Ask your health care provider about:  ¨ Changing or stopping your regular medicines. This is especially important if you are taking diabetes medicines or blood thinners.  ¨ Taking medicines such as aspirin and ibuprofen. These  medicines can thin your blood. Do not take these medicines before your procedure if your health care provider asks you not to.  What happens during the procedure?  Regardless of the type of biopsy that will be done, you will have an IV line placed. Through this line, you will receive fluids and medicine to relax you. If you will be having a laparoscopic biopsy, you may also receive medicine through this line to make you sleep during the procedure (general anesthetic).  Percutaneous Liver Biopsy  · You will positioned on your back, with your right hand over your head.  · A health care provider will locate your liver by tapping and pressing on the right side of your abdomen or with the help of an ultrasound machine or CT scan.  · An area at the bottom of your last right rib will be numbed.  · An incision will be made in the numbed area.  · The biopsy needle will be inserted into the incision.  · Several samples of liver tissue will be taken with the biopsy needle. You will be asked to hold your breath as each sample is taken.  Laparoscopic Liver Biopsy  · You will be positioned on your back.  · Several small incisions will be made in your abdomen.  · Your doctor will pass a tiny camera through one incision. The camera will allow the liver to be viewed on a TV monitor in the operating room.  · Tools will be passed through the other incision or incisions. These tools will be used to remove samples of liver tissue.  Transjugular Liver Biopsy  · You will be positioned on your back on an X-ray table, with your head turned to your left.  · An area on your neck just over your jugular vein will be numbed.  · An incision will be made in the numbed area.  · A tiny tube will be inserted through the incision. It will be pushed through the jugular vein to a blood vessel in the liver called the hepatic vein.  · Dye will be inserted through the tube, and X-rays will be taken. The dye will make the blood vessels in the liver light up  on the X-rays.  · The biopsy needle will be pushed through the tube until it reaches the liver.  · Samples of liver tissue will be taken with the biopsy needle.  · The needle and the tube will be removed.  After the samples are obtained, the incision or incisions will be closed.  What happens after the procedure?  · You will be taken to a recovery area.  · You may have to lie on your right side for 1-2 hours. This will prevent bleeding from the biopsy site.  · Your progress will be watched. Your blood pressure, pulse, and the biopsy site will be checked often.  · You may have some pain or feel sick. If this happens, tell your health care provider.  · As you begin to feel better, you will be offered ice and beverages.  · You may be allowed to go home when the medicines have worn off and you can walk, drink, eat, and use the bathroom.  This information is not intended to replace advice given to you by your health care provider. Make sure you discuss any questions you have with your health care provider.  Document Released: 03/09/2005 Document Revised: 05/22/2017 Document Reviewed: 02/13/2015  [employer] Interactive Patient Education © 2017 [employer]

## 2018-06-26 NOTE — OR SURGEON
Immediate Post- Operative Note        PostOp Diagnosis: liver disease      Procedure(s): US guided liver biopsy      Estimated Blood Loss: Less than 5 ml        Complications: None            6/26/2018     8:42 AM     Giovanni Parr

## 2018-06-26 NOTE — PROGRESS NOTES
OP IR RN note:    Site Marked and Confirmed with MD, patient and RN pre procedure   Liver BX by MD Parr assisted by RT Christy, Right mid abdomen access site;  End tidal CO2 range 25-33 during procedure   Patient tolerated procedure, hemodynamically stable; pt awake and talking post procedure; see flow sheet for vitals and post op print out    patient transported back to OP IR pod 1 via IR RN monitored    Pt to lay on right side for 1 hour with Q15 RN site checks then to lay on back for additional hour with site checks   x2 cores in [place] sent to lab

## 2018-06-28 ENCOUNTER — SLEEP CENTER VISIT (OUTPATIENT)
Dept: SLEEP MEDICINE | Facility: MEDICAL CENTER | Age: 78
End: 2018-06-28
Payer: MEDICARE

## 2018-06-28 VITALS
HEIGHT: 73 IN | WEIGHT: 222 LBS | OXYGEN SATURATION: 94 % | DIASTOLIC BLOOD PRESSURE: 68 MMHG | HEART RATE: 90 BPM | BODY MASS INDEX: 29.42 KG/M2 | TEMPERATURE: 97.5 F | RESPIRATION RATE: 16 BRPM | SYSTOLIC BLOOD PRESSURE: 112 MMHG

## 2018-06-28 DIAGNOSIS — G47.31 CENTRAL SLEEP APNEA: ICD-10-CM

## 2018-06-28 DIAGNOSIS — G47.33 OSA (OBSTRUCTIVE SLEEP APNEA): ICD-10-CM

## 2018-06-28 PROBLEM — E66.9 OBESITY (BMI 30.0-34.9): Status: RESOLVED | Noted: 2017-06-22 | Resolved: 2018-06-28

## 2018-06-28 PROBLEM — E66.811 OBESITY (BMI 30.0-34.9): Status: RESOLVED | Noted: 2017-06-22 | Resolved: 2018-06-28

## 2018-06-28 PROCEDURE — 99213 OFFICE O/P EST LOW 20 MIN: CPT | Performed by: NURSE PRACTITIONER

## 2018-06-28 RX ORDER — LYSINE 500 MG
TABLET ORAL PRN
COMMUNITY
End: 2019-10-02

## 2018-06-28 RX ORDER — CYCLOBENZAPRINE HCL 10 MG
10 TABLET ORAL 3 TIMES DAILY PRN
COMMUNITY
End: 2020-08-24

## 2018-06-28 NOTE — PROGRESS NOTES
CC:  Here for f/u sleep issues as listed below    HPI:   Duane presents today for follow up obstructive sleep apnea, central sleep apnea.  History of atrial fibrillation on Coumadin, aortic stenosis, grade III diastolic dysfunction. HST from 2016 indicated an AHI of 22 and low oxygenation of 81%.  He completed a titration study for continued hypersomnolence completed November 2017 that was successful on ASV therapy.   Currently he is being treated with ASV @ 7/10, PS 4/15, max pressure [id number].  Compliance download from the dates 5/29/2018 - 6/27/2018 indicates he is wearing the device 96.7% for an avg of 7 hours and 40 minutes per night with a reduced AHI of 3.9.  Avg EPAP is 7.3 and PS is 6.6. He does tolerate pressure. He likes mask well.  He does not notice he wakes up refreshed and continues to be tired throughout the day. They deny morning H/A. He doesn't notice he sleep better overall. He rarely naps, but could easily doze. He feels fatigued without change since starting the machine.  He moves about so he has better quality of sleep at night and does not nap.  He will continue to clean supplies weekly and change them as insurance allows.          Patient Active Problem List    Diagnosis Date Noted   • Autoimmune hepatitis (HCC) 06/22/2018   • Neuropathy (HCC) 12/22/2017   • Central sleep apnea 11/29/2017   • Localized primary osteoarthritis of carpometacarpal joint of left thumb 05/27/2016   • Insomnia 05/26/2016   • LORENA (obstructive sleep apnea) 04/21/2016   • Essential hypertension 01/07/2015   • Dyslipidemia 01/07/2015   • IGT (impaired glucose tolerance) 01/07/2015   • Aortic stenosis 12/30/2011   • Atrial fibrillation (HCC) 12/30/2011   • Long term current use of anticoagulant therapy 12/30/2011   • Lumbar Disc Degeneration 12/30/2011   • Mitral valve disorder 12/30/2011   • Osteoarthrosis involving lower leg 12/30/2011       Past Medical History:   Diagnosis Date   • Apnea, sleep    • Atrial fibrillation  (Formerly [facility])    • Back pain    • Cataract     early   • Clotting disorder (Formerly [facility])    • Depression     Lost wife 10 yeasr ago; still gets tearful   • Diabetes (Formerly [facility])     diet controlled   • Encounter for long-term (current) use of other medications    • Gasping for breath    • [place] measles    • Heart murmur    • Heart valve disease    • Hyperlipidemia    • Hypertension    • Insomnia    • Mumps    • Nasal drainage    • Pain     back & thumb   • Pyogenic arthritis, lower leg (HCC)     thumb, back   • Restless leg syndrome    • Sleep apnea     uses CPAP   • Snoring    • Tonsillitis    • Urinary incontinence    • Valvular heart disease        Past Surgical History:   Procedure Laterality Date   • FINGER ARTHROPLASTY Left 5/27/2016    Procedure: FINGER ARTHROPLASTY THUMB CARPOMETACARPAL EXCISIONAL, EXCISE TRAPEZIUM;  Surgeon: Raheel Salgado M.D.;  Location: SURGERY SAME DAY [facility];  Service:    • CARDIOVERSION      on 7/17/06, sinus rhythm until January 2007,  paroxysmal atrial fibrillation   • KNEE ARTHROPLASTY TOTAL     • LAMINOTOMY N/A     multiple lumbar spine   • OTHER ORTHOPEDIC SURGERY      lower back   • PB PERCUT IMPLNT NEUROELECT,EPIDURAL     • TOE ARTHROPLASTY     • TURP-VAPOR N/A        Family History   Problem Relation Age of Onset   • Other Mother      unknown   • Heart Disease Mother    • Cancer Father      prostate   • Diabetes Brother    • Heart Disease Son    • Sleep Apnea Neg Hx        Social History     Social History   • Marital status:      Spouse name: N/A   • Number of children: N/A   • Years of education: N/A     Occupational History   • Not on file.     Social History Main Topics   • Smoking status: Former Smoker     Packs/day: 1.00     Years: 20.00     Types: Cigarettes     Quit date: 1/16/1972   • Smokeless tobacco: Never Used   • Alcohol use No   • Drug use: No   • Sexual activity: Not Currently     Partners: Female     Other Topics Concern   • Not on file     Social History  Narrative   • No narrative on file       Current Outpatient Prescriptions   Medication Sig Dispense Refill   • cyclobenzaprine (FLEXERIL) 10 MG Tab Take 10 mg by mouth 3 times a day as needed.     • L-Lysine 500 MG Tab Take  by mouth.     • B Complex Vitamins (VITAMIN B COMPLEX PO) Take 100 Units by mouth.     • glucosamine Sulfate 500 MG Cap Take 500 mg by mouth 2 times a day, with meals.     • ascorbic acid (ASCORBIC ACID) 500 MG Tab Take 1,000 mg by mouth every evening.     • digoxin (LANOXIN) 250 MCG Tab Take 1 Tab by mouth every day. 30 Tab 3   • Psyllium (METAMUCIL PO) Take  by mouth.     • warfarin (COUMADIN) 4 MG Tab Take 1 to 2 tablets by mouth daily as directed by [facility]. 60 Tab 5   • Coenzyme Q10 (COQ10 PO) Take  by mouth.     • pravastatin (PRAVACHOL) 80 MG tablet TAKE 1 TABLET BY MOUTH DAILY -GENERIC FOR PRAVACHOL 90 Tab 3   • metoprolol SR (TOPROL XL) 50 MG TABLET SR 24 HR TAKE 1 TABLET BY MOUTH DAILY -GENERIC FOR TOPROL XL 90 Tab 3   • gabapentin (NEURONTIN) 300 MG Cap Take 2 Caps by mouth 2 Times a Day. (Patient taking differently: Take 300 mg by mouth. 1 cap in am, 1 cap at noon, 1 cap in pm, 3 caps at bedtime) 120 Cap 6   • clindamycin (CLEOCIN) 150 MG Cap Take 1 Cap by mouth 2 Times a Day. Permanent, one am and 2 pm 40 Cap 0   • Cholecalciferol (VITAMIN D3) 2000 UNITS Tab Take  by mouth every 7 days. Mondays     • docosahexanoic acid (FISH OIL) 1000 MG CAPS Take  by mouth every day.     • thiamine (THIAMINE) 100 MG Tab Take 100 mg by mouth every day. Super Complex     • multivitamin (THERAGRAN) TABS Take 1 Tab by mouth every day.       No current facility-administered medications for this visit.           Allergies: Feldene [piroxicam] and Percodan [oxycodone-aspirin]      ROS   Gen: Denies fever, chills, unintentional weight loss,  Resp:Denies Dyspnea  CV: Denies chest pain, chest tightness  Sleep:Denies morning headache, insomnia, daytime somnolence, snoring, gasping for air,  "apnea  Neuro: Denies frequent headaches, weakness, dizziness  See HPI.  All other systems reviewed and negative        Vital signs for this encounter:  Vitals:    06/28/18 0839   Height: 1.854 m (6' 1\")   Weight: 100.7 kg (222 lb)   Weight % change since last entry.: 0 %   BP: 112/68   Pulse: 90   BMI (Calculated): 29.29   Resp: 16   Temp: 36.4 °C (97.5 °F)   O2 sat % room air: 94 %                   Physical Exam:   Gen:         Alert and oriented, No apparent distress.   Neck:        No Lymphadenopathy.  Lungs:     Clear to auscultation bilaterally.    CV:          Regular rate and rhythm. Grade 2 systolic murmur   Abd:         Soft non tender, non distended.            Ext:          No clubbing, cyanosis, edema.    Assessment   1. LORENA (obstructive sleep apnea)  DME [facility]   2. Central sleep apnea  DME [facility]       Patient is clinically stable and adequately treated per his compliance and will proceed with following plan.After discussing his case with Dr. Garibay, his continued fatigue most likely is multifactorial including cardiovascular disease and autoimmune hepatitis. Will get an over night oximetry to confirm oxygenation is adequate at night.     PLAN:   Patient Instructions   1) Continue ASV @ EPAP 7/10, PS 4/15, max pressure [id number]  2) Clean mask and supplies weekly and change them as insurance allows  3) Vaccines: Up to date with Prevnar 13, Pneumovax 23, flu  4) [facility]NOX on current settings - can call with results  5) Return in about 3 months (around 9/28/2018) for Compliance, review of symptoms, if not sooner.          "

## 2018-06-28 NOTE — PATIENT INSTRUCTIONS
1) Continue ASV @ and change EPAP to7/10, PS 4/15, max pressure [id number]  2) Clean mask and supplies weekly and change them as insurance allows  3) Vaccines: Up to date with Prevnar 13, Pneumovax 23, flu  4) Return in about 3 months (around 9/28/2018) for Compliance, review of symptoms, if not sooner.

## 2018-06-29 ENCOUNTER — ANTICOAGULATION VISIT (OUTPATIENT)
Dept: VASCULAR LAB | Facility: MEDICAL CENTER | Age: 78
End: 2018-06-29
Attending: INTERNAL MEDICINE
Payer: MEDICARE

## 2018-06-29 VITALS — SYSTOLIC BLOOD PRESSURE: 112 MMHG | DIASTOLIC BLOOD PRESSURE: 67 MMHG | HEART RATE: 58 BPM

## 2018-06-29 DIAGNOSIS — Z79.01 CHRONIC ANTICOAGULATION: ICD-10-CM

## 2018-06-29 LAB
INR BLD: 1.4 (ref 0.9–1.2)
INR PPP: 1.4 (ref 2–3.5)

## 2018-06-29 PROCEDURE — 99212 OFFICE O/P EST SF 10 MIN: CPT

## 2018-06-29 PROCEDURE — 85610 PROTHROMBIN TIME: CPT

## 2018-06-29 NOTE — PROGRESS NOTES
Anticoagulation Summary  As of 6/29/2018    INR goal:   2.0-3.0   TTR:   74.7 % (1.7 y)   Today's INR:   1.4!   Warfarin maintenance plan:   6 mg (4 mg x 1.5) on Sat; 4 mg (4 mg x 1) all other days   Weekly warfarin total:   30 mg   Plan last modified:   CLAYTON Jacob (10/10/2016)   Next INR check:   7/12/2018   Target end date:   Indefinite    Indications    Atrial fibrillation (HCC) [I48.91]             Anticoagulation Episode Summary     INR check location:       Preferred lab:       Send INR reminders to:       Comments:   Pt goes by Kinsey      Anticoagulation Care Providers     Provider Role Specialty Phone number    Vickey Rutherford M.D. Referring Cardiology [phone]    [facility] Responsible  [phone]        Anticoagulation Patient Findings      HPI:  Duane Ángelshira Vargasley seen in clinic today, on anticoagulation therapy with warfarin for AF.  Changes to current medical/health status since last appt: Recent procedure, he held doses without bridging.   Denies signs/symptoms of bleeding and/or thrombosis since the last appt.    Denies any interval changes to diet  Denies any interval changes to medications since last appt.   Denies any complications or cost restrictions with current therapy.   BP recorded in vitals.  Confirmed dosing regimen.     A/P   INR  SUB-therapeutic.   Bolus 6 mg x2 more days then Pt is to continue with current warfarin dosing regimen.     Follow up appointment in 2 week(s).    Sin Huitron, PharmD

## 2018-07-12 ENCOUNTER — ANTICOAGULATION VISIT (OUTPATIENT)
Dept: VASCULAR LAB | Facility: MEDICAL CENTER | Age: 78
End: 2018-07-12
Attending: INTERNAL MEDICINE
Payer: MEDICARE

## 2018-07-12 VITALS — SYSTOLIC BLOOD PRESSURE: 105 MMHG | HEART RATE: 58 BPM | DIASTOLIC BLOOD PRESSURE: 65 MMHG

## 2018-07-12 DIAGNOSIS — I48.91 ATRIAL FIBRILLATION, UNSPECIFIED TYPE (HCC): ICD-10-CM

## 2018-07-12 LAB — INR PPP: 2 (ref 2–3.5)

## 2018-07-12 PROCEDURE — 99211 OFF/OP EST MAY X REQ PHY/QHP: CPT | Performed by: NURSE PRACTITIONER

## 2018-07-12 PROCEDURE — 85610 PROTHROMBIN TIME: CPT

## 2018-07-12 NOTE — PROGRESS NOTES
Anticoagulation Summary  As of 7/12/2018    INR goal:   2.0-3.0   TTR:   73.2 % (1.7 y)   Today's INR:   2.0   Warfarin maintenance plan:   6 mg (4 mg x 1.5) on Sat; 4 mg (4 mg x 1) all other days   Weekly warfarin total:   30 mg   Plan last modified:   CLAYTON Jacob (10/10/2016)   Next INR check:      Target end date:   Indefinite    Indications    Atrial fibrillation (HCC) [I48.91]             Anticoagulation Episode Summary     INR check location:       Preferred lab:       Send INR reminders to:       Comments:   Pt goes by Kinsey      Anticoagulation Care Providers     Provider Role Specialty Phone number    Vickey Rutherford M.D. Referring Cardiology [phone]    [facility] Antico[facility] Responsible  [phone]        Anticoagulation Patient Findings      HPI:  Duane Parkinson seen in clinic today for follow up on anticoagulation therapy in the presence of AF. Denies any changes to current medical/health status since last appointment. Denies any medication or diet changes. No current symptoms of bleeding or thrombosis reported.    A/P:   INR therapeutic. Discussed increasing regimen to 6 mg every Sat, however, pt prefers to continue current regimen. BP recorded in vitals.    Follow up appointment in 2 week(s).    Next Appointment: Thursday, July 26, 2018 at 7:45 am.     Britney DOE

## 2018-07-13 LAB — INR BLD: 2 (ref 0.9–1.2)

## 2018-07-16 ENCOUNTER — PHYSICAL THERAPY (OUTPATIENT)
Dept: PHYSICAL THERAPY | Facility: REHABILITATION | Age: 78
End: 2018-07-16
Attending: NEUROLOGICAL SURGERY
Payer: MEDICARE

## 2018-07-16 DIAGNOSIS — M48.061 SPINAL STENOSIS OF LUMBAR REGION, UNSPECIFIED WHETHER NEUROGENIC CLAUDICATION PRESENT: ICD-10-CM

## 2018-07-16 PROCEDURE — 97163 PT EVAL HIGH COMPLEX 45 MIN: CPT

## 2018-07-16 PROCEDURE — G8979 MOBILITY GOAL STATUS: HCPCS | Mod: CI

## 2018-07-16 PROCEDURE — G8978 MOBILITY CURRENT STATUS: HCPCS | Mod: CJ

## 2018-07-16 ASSESSMENT — ENCOUNTER SYMPTOMS
PAIN SCALE: 6
PAIN TIMING: CONSTANT
EXACERBATED BY: STANDING
QUALITY: ACHING
EXACERBATED BY: ACTIVITY
PAIN SCALE AT LOWEST: 6
EXACERBATED BY: BENDING
EXACERBATED BY: LIFTING
PAIN SCALE AT HIGHEST: 6
EXACERBATED BY: CARRYING
EXACERBATED BY: STAIR CLIMBING

## 2018-07-16 NOTE — OP THERAPY EVALUATION
Outpatient Physical Therapy  INITIAL EVALUATION    [facility] Physical Therapy [id number], [address]  [place] [address]  Phone:  [phone]    Date of Evaluation: 2018    Patient: Duane Parkinson  YOB: 1940  MRN: [id number]     Referring Provider: Neville James M.D.  [address]  [address]  [place] [address]   Referring Diagnosis Low back pain [M54.5]     Time Calculation  Start time: 0200  Stop time: 0305 Time Calculation (min): 65 minutes     Physical Therapy Occurrence Codes    Date of onset of impairment:  1978   Date physical therapy care plan established or reviewed:  18   Date physical therapy treatment started:  18          Chief Complaint: No chief complaint on file.    Visit Diagnoses     ICD-10-CM   1. Spinal stenosis of lumbar region, unspecified whether neurogenic claudication present M48.061         Subjective:   History of Present Illness:     Mechanism of injury:  Long history dating back to the s of low back problems. Has had nerve ablasions, SIJ fusion, laminotomy but no fusions. Has a stimulator installed in low back for pain inhibition, bilateral TKA. Experiencing neuropathy in both feet. Denies any falls. Walking without any assistive devices. Home environment: lives alone, yard, wood floors, One step within home no rails, does own cooking and grocery shopping, vacuuming in particular is painful. Able to walk a long distance with a cart but is limited without that support secondary to back pain. Reports sensation to feet is good.   Pain:     Current pain ratin    At best pain ratin    At worst pain ratin    Location:  Lumbar, central     Quality:  Aching    Pain timing:  Constant    Aggravating factors:  Activity, stair climbing, lifting, bending, carrying and standing  Patient Goals:     Other patient goals:  Go  hunting; improve standing tolerance; wood working and metal working      Past Medical  History:   Diagnosis Date   • Apnea, sleep    • Atrial fibrillation (HCC)    • Back pain    • Cataract     early   • Clotting disorder (HCC)    • Depression     Lost wife 10 yeasr ago; still gets tearful   • Diabetes (HCC)     diet controlled   • Encounter for long-term (current) use of other medications    • Gasping for breath    • Burmese measles    • Heart murmur    • Heart valve disease    • Hyperlipidemia    • Hypertension    • Insomnia    • Mumps    • Nasal drainage    • Pain     back & thumb   • Pyogenic arthritis, lower leg (HCC)     thumb, back   • Restless leg syndrome    • Sleep apnea     uses CPAP   • Snoring    • Tonsillitis    • Urinary incontinence    • Valvular heart disease      Past Surgical History:   Procedure Laterality Date   • FINGER ARTHROPLASTY Left 5/27/2016    Procedure: FINGER ARTHROPLASTY THUMB CARPOMETACARPAL EXCISIONAL, EXCISE TRAPEZIUM;  Surgeon: Raheel Salgado M.D.;  Location: SURGERY SAME DAY [facility];  Service:    • CARDIOVERSION      on 7/17/06, sinus rhythm until January 2007,  paroxysmal atrial fibrillation   • KNEE ARTHROPLASTY TOTAL     • LAMINOTOMY N/A     multiple lumbar spine   • OTHER ORTHOPEDIC SURGERY      lower back   • PB PERCUT IMPLNT NEUROELECT,EPIDURAL     • TOE ARTHROPLASTY     • TURP-VAPOR N/A      Social History   Substance Use Topics   • Smoking status: Former Smoker     Packs/day: 1.00     Years: 20.00     Types: Cigarettes     Quit date: 1/16/1972   • Smokeless tobacco: Never Used   • Alcohol use No     Family and Occupational History     Social History   • Marital status:      Spouse name: N/A   • Number of children: N/A   • Years of education: N/A       Objective     Observations   Central spine     Positive for hypolordosis and thoracic kyphosis.    Additional Observation Details  Scar down spine T5-T7 and second scar down the entire length of lumbar  Lumbar scoliosis  and Atrophy of gluteal muscles bilateral     Neurological Testing      Myotome testing   Lumbar (right)   L4 (ankle dorsiflexors): 4-    Strength:      Right Hip   Planes of Motion   Flexion: 4-  Extension: 4-  Abduction: 4-    Isolated Muscles   Gluteus maximums: 4-    Functional Assessment     Comments  BALANCE    Good standing static balance  Fair dynamic balance    Measures include: narrow ELZA, sharpened rhomberg, tandem stance, multi directional stepping        Therapeutic Exercises (CPT 52647):     1. Knee sway str in hip flexion    2. Sit to stand    3. Unilateral sh. extension thoracolumbar stabilization      Time-based treatments/modalities:  Therapeutic exercise minutes (CPT 32551): 15 minutes       Assessment, Response and Plan:   Impairments: pain with function    Assessment details:  Mr. Parkinson is a pleasan 78 year old man who is referred to PT for lumbar stenosis with report of neuropathy going to both feet. He had good sensation in his feet for light touch but did report a burning sensation in his left foot, he reported that neuropathic leg pain has been reducing with gabapentin. His functional limitaiton is primarily standing tolerance, he is able to stand for long durations of time with trunk support via grocery cart but is limited to minutes before back pain requires him to sit down. His balance was good for an 78 year old and he represents a low fall risk. He will benefit from education on stretches for back pain relief, strengthening exercises to help improve standing endurance, and exercises to help him maintain good proprioception and strength in his feet for balance.   Prognosis: fair    Goals:   Short Term Goals:   Establish stretch and mobilization strategies to help improve mobility and reduce pain  Exercise for thoracolumbar endurance extension that is well tolerated  Exercise for improving lower quarter proprioception and balance.   Short term goal time span:  2-4 weeks      Long Term Goals:    Improved standing tolerance to 30 min with reduced low back  pain  Demo independence for HEP  Improved Esdras LBP score   Long term goal time span:  4-6 weeks    Plan:   Frequency:  2x week  Duration in weeks:  6      Functional Limitation G-Codes and Severity Modifiers  PT Functional Assessment Tool Used: Esdras LBP  PT Functional Assessment Score: 29  Esdras Francis Low Back Pain and Disability Score: 29.17   Current: Mobility: Current (): CJ   Goal: Mobility: Goal (): CI     Referring provider co-signature:  I have reviewed this plan of care and my co-signature certifies the need for services.  Certification Dates:   From 7/16/18     To 9/1/18    Physician Signature: ________________________________ Date: ______________

## 2018-07-18 ENCOUNTER — PHYSICAL THERAPY (OUTPATIENT)
Dept: PHYSICAL THERAPY | Facility: REHABILITATION | Age: 78
End: 2018-07-18
Attending: NEUROLOGICAL SURGERY
Payer: MEDICARE

## 2018-07-18 DIAGNOSIS — I10 ESSENTIAL HYPERTENSION: ICD-10-CM

## 2018-07-18 DIAGNOSIS — M48.061 SPINAL STENOSIS OF LUMBAR REGION, UNSPECIFIED WHETHER NEUROGENIC CLAUDICATION PRESENT: ICD-10-CM

## 2018-07-18 PROCEDURE — 97110 THERAPEUTIC EXERCISES: CPT

## 2018-07-18 PROCEDURE — 97014 ELECTRIC STIMULATION THERAPY: CPT

## 2018-07-18 RX ORDER — METOPROLOL SUCCINATE 50 MG/1
50 TABLET, EXTENDED RELEASE ORAL DAILY
Qty: 90 TAB | Refills: 0 | Status: SHIPPED | OUTPATIENT
Start: 2018-07-18 | End: 2018-10-23 | Stop reason: SDUPTHER

## 2018-07-18 NOTE — OP THERAPY DAILY TREATMENT
Outpatient Physical Therapy  DAILY TREATMENT     [facility] Outpatient Physical Therapy [address] [facility], [address]  [place] [address]  Phone:  [phone]    Date: 07/18/2018    Patient: Duane Parkinson  YOB: 1940  MRN: [id number]     Time Calculation  Start time: 1100  Stop time: 1135 Time Calculation (min): 35 minutes     Chief Complaint: No chief complaint on file.    Visit #: 2    SUBJECTIVE:  My back was killing me yesterday, more than usual pain. Pain was in lumbar spine, it did not cause increased burning or numbness in feet. Back felt a little better on Monday after eval.     OBJECTIVE:  Current objective measures:        Unstable L. Caudal rotation lumbar spine, observed with L. Clams and supine knee sways, supine knee sways causing sharp pain and L. Clams requiring increased manual stabilization in lumbar to prevent unwanted rotation    Therapeutic Exercises (CPT 85887):     1. Thoracic rotation in SL    2. Clams in SL    3. Thoracic extension seated w/ TL towel support.       Time-based treatments/modalities:  Therapeutic exercise minutes (CPT 62268): 35 minutes         ASSESSMENT:   Response to treatment: TL junction scoliosis with instability on L. Side of spine. Reported that sitting with good posture causes pain. Even in seated position significant TL lordosis present, sxs improved with support roll allowing us to work on breathing with gentle thoracic extension. Tolerated treatment well.     PLAN/RECOMMENDATIONS:   Plan for treatment: therapy treatment to continue next visit.  Planned interventions for next visit: continue with current treatment.

## 2018-07-23 ENCOUNTER — PHYSICAL THERAPY (OUTPATIENT)
Dept: PHYSICAL THERAPY | Facility: REHABILITATION | Age: 78
End: 2018-07-23
Attending: NEUROLOGICAL SURGERY
Payer: MEDICARE

## 2018-07-23 NOTE — OP THERAPY DAILY TREATMENT
"  Outpatient Physical Therapy  DAILY TREATMENT     [facility] Outpatient Physical Therapy [address], [address]  [place] [address]  Phone:  [phone]    Date: 07/23/2018    Patient: Duane Parkinson  YOB: 1940  MRN: [id number]     Time Calculation             Chief Complaint: No chief complaint on file.    Visit #: 3    SUBJECTIVE:  Every time I see you my back hurts worse the next AM.     OBJECTIVE:  Current objective measures:     TL hyperextension and scoliosis  Pain indicated to be at lower lumbar, L4/5 region      Therapeutic Exercises (CPT 29905):     1. L. clams, 2 x 10 x 5'' hold    2. Knee sway for controlled caudal rotation, 20B    3. Shuttle squat, 3 bands 2 x 20      Time-based treatments/modalities:          ASSESSMENT:   Response to treatment: Patient reporting adverse response with PT, pain the next day in lumbar. Revised program for working on improved control of lumbar, hip strengthening with a soraida spine.     PLAN/RECOMMENDATIONS:   Plan for treatment: {AMB OP THERAPY - THERAPY PLAN:[id number]::\"therapy treatment to continue next visit\"}.  Planned interventions for next visit: {PT PLANNED THERAPY INTERVENTIONS:[id number]}.      "

## 2018-07-25 ENCOUNTER — APPOINTMENT (OUTPATIENT)
Dept: PHYSICAL THERAPY | Facility: REHABILITATION | Age: 78
End: 2018-07-25
Attending: NEUROLOGICAL SURGERY
Payer: MEDICARE

## 2018-07-26 ENCOUNTER — ANTICOAGULATION VISIT (OUTPATIENT)
Dept: VASCULAR LAB | Facility: MEDICAL CENTER | Age: 78
End: 2018-07-26
Attending: INTERNAL MEDICINE
Payer: MEDICARE

## 2018-07-26 VITALS — HEART RATE: 66 BPM | DIASTOLIC BLOOD PRESSURE: 77 MMHG | SYSTOLIC BLOOD PRESSURE: 134 MMHG

## 2018-07-26 DIAGNOSIS — I48.91 ATRIAL FIBRILLATION, UNSPECIFIED TYPE (HCC): ICD-10-CM

## 2018-07-26 LAB — INR PPP: 1.9 (ref 2–3.5)

## 2018-07-26 PROCEDURE — 85610 PROTHROMBIN TIME: CPT

## 2018-07-26 PROCEDURE — 99212 OFFICE O/P EST SF 10 MIN: CPT | Performed by: NURSE PRACTITIONER

## 2018-07-26 NOTE — PROGRESS NOTES
Anticoagulation Summary  As of 7/26/2018    INR goal:   2.0-3.0   TTR:   71.6 % (1.8 y)   Today's INR:   1.9!   Warfarin maintenance plan:   6 mg (4 mg x 1.5) on Sat; 4 mg (4 mg x 1) all other days   Weekly warfarin total:   30 mg   Plan last modified:   CLAYTON Jacob (10/10/2016)   Next INR check:   8/9/2018   Target end date:   Indefinite    Indications    Atrial fibrillation (HCC) [I48.91]             Anticoagulation Episode Summary     INR check location:       Preferred lab:       Send INR reminders to:       Comments:   Pt goes by Kinsey      Anticoagulation Care Providers     Provider Role Specialty Phone number    Vickey Rutherford M.D. Referring Cardiology [phone]    [facility] Responsible  [phone]        Anticoagulation Patient Findings      HPI:  Duane Parkinson seen in clinic today for follow up on anticoagulation therapy in the presence of AF. Denies any changes to current medical/health status since last appointment. Denies any medication or diet changes. No current symptoms of bleeding or thrombosis reported.    A/P:   INR slightly subtherapeutic. INR trends low end of range. Will increase regimen to 6 mg every Saturday instead of every other Sat. BP recorded in vitals.    Follow up appointment in 2 week(s).    Next Appointment: Thursday, August 9, 2018 at 7:45 am.    Britney DOE

## 2018-07-27 ENCOUNTER — OFFICE VISIT (OUTPATIENT)
Dept: CARDIOLOGY | Facility: MEDICAL CENTER | Age: 78
End: 2018-07-27
Payer: MEDICARE

## 2018-07-27 VITALS
WEIGHT: 220 LBS | DIASTOLIC BLOOD PRESSURE: 72 MMHG | SYSTOLIC BLOOD PRESSURE: 106 MMHG | OXYGEN SATURATION: 96 % | BODY MASS INDEX: 29.8 KG/M2 | HEIGHT: 72 IN | HEART RATE: 108 BPM

## 2018-07-27 DIAGNOSIS — I48.91 ATRIAL FIBRILLATION, UNSPECIFIED TYPE (HCC): ICD-10-CM

## 2018-07-27 DIAGNOSIS — G47.33 OSA (OBSTRUCTIVE SLEEP APNEA): ICD-10-CM

## 2018-07-27 DIAGNOSIS — Z79.899 HIGH RISK MEDICATION USE: ICD-10-CM

## 2018-07-27 DIAGNOSIS — E78.5 DYSLIPIDEMIA: ICD-10-CM

## 2018-07-27 DIAGNOSIS — I70.0 ATHEROSCLEROSIS OF AORTA (HCC): ICD-10-CM

## 2018-07-27 DIAGNOSIS — I35.0 AORTIC VALVE STENOSIS, ETIOLOGY OF CARDIAC VALVE DISEASE UNSPECIFIED: ICD-10-CM

## 2018-07-27 LAB — INR BLD: 1.9 (ref 0.9–1.2)

## 2018-07-27 PROCEDURE — 99214 OFFICE O/P EST MOD 30 MIN: CPT | Performed by: INTERNAL MEDICINE

## 2018-07-27 ASSESSMENT — ENCOUNTER SYMPTOMS
COUGH: 0
STRIDOR: 0
GASTROINTESTINAL NEGATIVE: 1
DIZZINESS: 0
HEMOPTYSIS: 0
SHORTNESS OF BREATH: 0
SPUTUM PRODUCTION: 0
ORTHOPNEA: 0
RESPIRATORY NEGATIVE: 1
WHEEZING: 0
SORE THROAT: 0
PALPITATIONS: 0
EYES NEGATIVE: 1
FEVER: 0
NEUROLOGICAL NEGATIVE: 1
WEAKNESS: 0
BRUISES/BLEEDS EASILY: 0
CONSTITUTIONAL NEGATIVE: 1
CARDIOVASCULAR NEGATIVE: 1
LOSS OF CONSCIOUSNESS: 0
CHILLS: 0
MUSCULOSKELETAL NEGATIVE: 1
CLAUDICATION: 0
PND: 0

## 2018-07-27 NOTE — PROGRESS NOTES
Chief Complaint   Patient presents with   • Aortic Stenosis     F/V: AFIB       Subjective:   Duane Parkinson is a 78 y.o. male who presents today as a follow-up for his aortic stenosis atrial fibrillation and hyperlipidemia.  He continues on the pravastatin.  His last lipids are not within our system.  He gets a lot of his labs done outside of our system.  He has had no chest pain syncope lower extremity edema or shortness of breath.  He did recently have a liver biopsy results are not available.  He can is on warfarin for his atrial fibrillation.    Past Medical History:   Diagnosis Date   • Apnea, sleep    • Atrial fibrillation (HCC)    • Back pain    • Cataract     early   • Clotting disorder (HCC)    • Depression     Lost wife 10 yeasr ago; still gets tearful   • Diabetes (HCC)     diet controlled   • Encounter for long-term (current) use of other medications    • Gasping for breath    • Sinhala measles    • Heart murmur    • Heart valve disease    • Hyperlipidemia    • Hypertension    • Insomnia    • Mumps    • Nasal drainage    • Pain     back & thumb   • Pyogenic arthritis, lower leg (HCC)     thumb, back   • Restless leg syndrome    • Sleep apnea     uses CPAP   • Snoring    • Tonsillitis    • Urinary incontinence    • Valvular heart disease      Past Surgical History:   Procedure Laterality Date   • FINGER ARTHROPLASTY Left 5/27/2016    Procedure: FINGER ARTHROPLASTY THUMB CARPOMETACARPAL EXCISIONAL, EXCISE TRAPEZIUM;  Surgeon: Raheel Salgado M.D.;  Location: SURGERY SAME DAY [facility];  Service:    • CARDIOVERSION      on 7/17/06, sinus rhythm until January 2007,  paroxysmal atrial fibrillation   • KNEE ARTHROPLASTY TOTAL     • LAMINOTOMY N/A     multiple lumbar spine   • OTHER ORTHOPEDIC SURGERY      lower back   • PB PERCUT IMPLNT NEUROELECT,EPIDURAL     • TOE ARTHROPLASTY     • TURP-VAPOR N/A      Family History   Problem Relation Age of Onset   • Other Mother         unknown   • Heart  Disease Mother    • Cancer Father         prostate   • Diabetes Brother    • Heart Disease Son    • Sleep Apnea Neg Hx      Social History     Social History   • Marital status:      Spouse name: N/A   • Number of children: N/A   • Years of education: N/A     Occupational History   • Not on file.     Social History Main Topics   • Smoking status: Former Smoker     Packs/day: 1.00     Years: 20.00     Types: Cigarettes     Quit date: 1/16/1972   • Smokeless tobacco: Never Used   • Alcohol use No   • Drug use: No   • Sexual activity: Not Currently     Partners: Female     Other Topics Concern   • Not on file     Social History Narrative   • No narrative on file     Allergies   Allergen Reactions   • Feldene [Piroxicam] Itching   • Percodan [Oxycodone-Aspirin] Itching     Outpatient Encounter Prescriptions as of 7/27/2018   Medication Sig Dispense Refill   • metoprolol SR (TOPROL XL) 50 MG TABLET SR 24 HR Take 1 Tab by mouth every day. Needs to be seen for further refills. Thank you 90 Tab 0   • cyclobenzaprine (FLEXERIL) 10 MG Tab Take 10 mg by mouth 3 times a day as needed.     • L-Lysine 500 MG Tab Take  by mouth.     • B Complex Vitamins (VITAMIN B COMPLEX PO) Take 100 Units by mouth.     • glucosamine Sulfate 500 MG Cap Take 500 mg by mouth 2 times a day, with meals.     • thiamine (THIAMINE) 100 MG Tab Take 100 mg by mouth every day. Super Complex     • ascorbic acid (ASCORBIC ACID) 500 MG Tab Take 1,000 mg by mouth every evening.     • digoxin (LANOXIN) 250 MCG Tab Take 1 Tab by mouth every day. 30 Tab 3   • Psyllium (METAMUCIL PO) Take  by mouth.     • warfarin (COUMADIN) 4 MG Tab Take 1 to 2 tablets by mouth daily as directed by [facility]. 60 Tab 5   • Coenzyme Q10 (COQ10 PO) Take  by mouth.     • pravastatin (PRAVACHOL) 80 MG tablet TAKE 1 TABLET BY MOUTH DAILY -GENERIC FOR PRAVACHOL 90 Tab 3   • clindamycin (CLEOCIN) 150 MG Cap Take 1 Cap by mouth 2 Times a Day. Permanent, one am and 2 pm 40 Cap  0   • Cholecalciferol (VITAMIN D3) 2000 UNITS Tab Take  by mouth every day. Mondays     • multivitamin (THERAGRAN) TABS Take 1 Tab by mouth every day.     • docosahexanoic acid (FISH OIL) 1000 MG CAPS Take  by mouth every day.     • [DISCONTINUED] gabapentin (NEURONTIN) 300 MG Cap Take 2 Caps by mouth 2 Times a Day. (Patient taking differently: Take 300 mg by mouth. 1 cap in am, 1 cap at noon, 1 cap in pm, 3 caps at bedtime) 120 Cap 6     No facility-administered encounter medications on file as of 7/27/2018.      Review of Systems   Constitutional: Negative.  Negative for chills, fever and malaise/fatigue.   HENT: Negative.  Negative for sore throat.    Eyes: Negative.    Respiratory: Negative.  Negative for cough, hemoptysis, sputum production, shortness of breath, wheezing and stridor.    Cardiovascular: Negative.  Negative for chest pain, palpitations, orthopnea, claudication, leg swelling and PND.   Gastrointestinal: Negative.    Genitourinary: Negative.    Musculoskeletal: Negative.    Skin: Negative.    Neurological: Negative.  Negative for dizziness, loss of consciousness and weakness.   Endo/Heme/Allergies: Negative.  Does not bruise/bleed easily.   All other systems reviewed and are negative.       Objective:   /72   Pulse (!) 108   Ht 1.829 m (6')   Wt 99.8 kg (220 lb)   SpO2 96%   BMI 29.84 kg/m²     Physical Exam   Constitutional: He appears well-developed and well-nourished. No distress.   HENT:   Head: Normocephalic and atraumatic.   Right Ear: External ear normal.   Left Ear: External ear normal.   Nose: Nose normal.   Mouth/Throat: No oropharyngeal exudate.   Eyes: Pupils are equal, round, and reactive to light. Conjunctivae and EOM are normal. Right eye exhibits no discharge. Left eye exhibits no discharge. No scleral icterus.   Neck: Neck supple. No JVD present.   Cardiovascular: Normal rate, regular rhythm and intact distal pulses.  Exam reveals no gallop and no friction rub.    No  murmur heard.  Pulmonary/Chest: Effort normal. No stridor. No respiratory distress. He has no wheezes. He has no rales. He exhibits no tenderness.   Abdominal: Soft. He exhibits no distension. There is no guarding.   Musculoskeletal: Normal range of motion. He exhibits no edema, tenderness or deformity.   Neurological: He is alert. He has normal reflexes. He displays normal reflexes. No cranial nerve deficit. He exhibits normal muscle tone. Coordination normal.   Skin: Skin is warm and dry. No rash noted. He is not diaphoretic. No erythema. No pallor.   Psychiatric: He has a normal mood and affect. His behavior is normal. Judgment and thought content normal.   Nursing note and vitals reviewed.      Assessment:     1. High risk medication use  DIGOXIN   2. Dyslipidemia  Carotid Duplex   3. Atrial fibrillation, unspecified type (HCC)     4. LORENA (obstructive sleep apnea)     5. Aortic valve stenosis, etiology of cardiac valve disease unspecified  Echocardiogram Comp w/o Cont    Carotid Duplex   6. Atherosclerosis of aorta (HCC)   Carotid Duplex       Medical Decision Making:  Today's Assessment / Status / Plan:     77 y/o M with atrial fibrillation and controlled VR, aortic stenosis, last HUY of 1.2 cm2 peak velocity of 3.2 m/sec. we will go ahead and check a dig level today.  It has been 2 years since his last echocardiogram will we will repeat that today.  We will also check a carotid duplex given his reported history of some stenosis but no duplex within our system.  I will see him back in 6 months.     1. A-fib   - dig 250 mcg daily  - metop sr 50 daily  - followed by coumadin clinic  - dig level today     2. Aortic stenosis, moderate    - no changes    - repeat echo    3. HLD    - cont prava 80    Thank for you allowing me to take part in your patient's care, please call should you have any questions or would like to discuss this patient.

## 2018-07-27 NOTE — LETTER
[facility]   [address], [facility] [address]  [place] [address]  Phone: [phone]  Fax: [phone]              Duane Parkinson  1940    Encounter Date: 7/27/2018    Vickey Rutherford M.D.          PROGRESS NOTE:  Chief Complaint   Patient presents with   • Aortic Stenosis     F/V: AFIB       Subjective:   Duane Parkinson is a 78 y.o. male who presents today as a follow-up for his aortic stenosis atrial fibrillation and hyperlipidemia.  He continues on the pravastatin.  His last lipids are not within our system.  He gets a lot of his labs done outside of our system.  He has had no chest pain syncope lower extremity edema or shortness of breath.  He did recently have a liver biopsy results are not available.  He can is on warfarin for his atrial fibrillation.    Past Medical History:   Diagnosis Date   • Apnea, sleep    • Atrial fibrillation (HCC)    • Back pain    • Cataract     early   • Clotting disorder (HCC)    • Depression     Lost wife 10 yeasr ago; still gets tearful   • Diabetes (HCC)     diet controlled   • Encounter for long-term (current) use of other medications    • Gasping for breath    • Arabic measles    • Heart murmur    • Heart valve disease    • Hyperlipidemia    • Hypertension    • Insomnia    • Mumps    • Nasal drainage    • Pain     back & thumb   • Pyogenic arthritis, lower leg (HCC)     thumb, back   • Restless leg syndrome    • Sleep apnea     uses CPAP   • Snoring    • Tonsillitis    • Urinary incontinence    • Valvular heart disease      Past Surgical History:   Procedure Laterality Date   • FINGER ARTHROPLASTY Left 5/27/2016    Procedure: FINGER ARTHROPLASTY THUMB CARPOMETACARPAL EXCISIONAL, EXCISE TRAPEZIUM;  Surgeon: Raheel Salgado M.D.;  Location: SURGERY SAME DAY [facility];  Service:    • CARDIOVERSION      on 7/17/06, sinus rhythm until January 2007,  paroxysmal atrial fibrillation   • KNEE ARTHROPLASTY TOTAL     •  LAMINOTOMY N/A     multiple lumbar spine   • OTHER ORTHOPEDIC SURGERY      lower back   • PB PERCUT IMPLNT NEUROELECT,EPIDURAL     • TOE ARTHROPLASTY     • TURP-VAPOR N/A      Family History   Problem Relation Age of Onset   • Other Mother         unknown   • Heart Disease Mother    • Cancer Father         prostate   • Diabetes Brother    • Heart Disease Son    • Sleep Apnea Neg Hx      Social History     Social History   • Marital status:      Spouse name: N/A   • Number of children: N/A   • Years of education: N/A     Occupational History   • Not on file.     Social History Main Topics   • Smoking status: Former Smoker     Packs/day: 1.00     Years: 20.00     Types: Cigarettes     Quit date: 1/16/1972   • Smokeless tobacco: Never Used   • Alcohol use No   • Drug use: No   • Sexual activity: Not Currently     Partners: Female     Other Topics Concern   • Not on file     Social History Narrative   • No narrative on file     Allergies   Allergen Reactions   • Feldene [Piroxicam] Itching   • Percodan [Oxycodone-Aspirin] Itching     Outpatient Encounter Prescriptions as of 7/27/2018   Medication Sig Dispense Refill   • metoprolol SR (TOPROL XL) 50 MG TABLET SR 24 HR Take 1 Tab by mouth every day. Needs to be seen for further refills. Thank you 90 Tab 0   • cyclobenzaprine (FLEXERIL) 10 MG Tab Take 10 mg by mouth 3 times a day as needed.     • L-Lysine 500 MG Tab Take  by mouth.     • B Complex Vitamins (VITAMIN B COMPLEX PO) Take 100 Units by mouth.     • glucosamine Sulfate 500 MG Cap Take 500 mg by mouth 2 times a day, with meals.     • thiamine (THIAMINE) 100 MG Tab Take 100 mg by mouth every day. Super Complex     • ascorbic acid (ASCORBIC ACID) 500 MG Tab Take 1,000 mg by mouth every evening.     • digoxin (LANOXIN) 250 MCG Tab Take 1 Tab by mouth every day. 30 Tab 3   • Psyllium (METAMUCIL PO) Take  by mouth.     • warfarin (COUMADIN) 4 MG Tab Take 1 to 2 tablets by mouth daily as directed by Coumadin  Clinic. 60 Tab 5   • Coenzyme Q10 (COQ10 PO) Take  by mouth.     • pravastatin (PRAVACHOL) 80 MG tablet TAKE 1 TABLET BY MOUTH DAILY -GENERIC FOR PRAVACHOL 90 Tab 3   • clindamycin (CLEOCIN) 150 MG Cap Take 1 Cap by mouth 2 Times a Day. Permanent, one am and 2 pm 40 Cap 0   • Cholecalciferol (VITAMIN D3) 2000 UNITS Tab Take  by mouth every day. Mondays     • multivitamin (THERAGRAN) TABS Take 1 Tab by mouth every day.     • docosahexanoic acid (FISH OIL) 1000 MG CAPS Take  by mouth every day.     • [DISCONTINUED] gabapentin (NEURONTIN) 300 MG Cap Take 2 Caps by mouth 2 Times a Day. (Patient taking differently: Take 300 mg by mouth. 1 cap in am, 1 cap at noon, 1 cap in pm, 3 caps at bedtime) 120 Cap 6     No facility-administered encounter medications on file as of 7/27/2018.      Review of Systems   Constitutional: Negative.  Negative for chills, fever and malaise/fatigue.   HENT: Negative.  Negative for sore throat.    Eyes: Negative.    Respiratory: Negative.  Negative for cough, hemoptysis, sputum production, shortness of breath, wheezing and stridor.    Cardiovascular: Negative.  Negative for chest pain, palpitations, orthopnea, claudication, leg swelling and PND.   Gastrointestinal: Negative.    Genitourinary: Negative.    Musculoskeletal: Negative.    Skin: Negative.    Neurological: Negative.  Negative for dizziness, loss of consciousness and weakness.   Endo/Heme/Allergies: Negative.  Does not bruise/bleed easily.   All other systems reviewed and are negative.       Objective:   /72   Pulse (!) 108   Ht 1.829 m (6')   Wt 99.8 kg (220 lb)   SpO2 96%   BMI 29.84 kg/m²      Physical Exam   Constitutional: He appears well-developed and well-nourished. No distress.   HENT:   Head: Normocephalic and atraumatic.   Right Ear: External ear normal.   Left Ear: External ear normal.   Nose: Nose normal.   Mouth/Throat: No oropharyngeal exudate.   Eyes: Pupils are equal, round, and reactive to light.  Conjunctivae and EOM are normal. Right eye exhibits no discharge. Left eye exhibits no discharge. No scleral icterus.   Neck: Neck supple. No JVD present.   Cardiovascular: Normal rate, regular rhythm and intact distal pulses.  Exam reveals no gallop and no friction rub.    No murmur heard.  Pulmonary/Chest: Effort normal. No stridor. No respiratory distress. He has no wheezes. He has no rales. He exhibits no tenderness.   Abdominal: Soft. He exhibits no distension. There is no guarding.   Musculoskeletal: Normal range of motion. He exhibits no edema, tenderness or deformity.   Neurological: He is alert. He has normal reflexes. He displays normal reflexes. No cranial nerve deficit. He exhibits normal muscle tone. Coordination normal.   Skin: Skin is warm and dry. No rash noted. He is not diaphoretic. No erythema. No pallor.   Psychiatric: He has a normal mood and affect. His behavior is normal. Judgment and thought content normal.   Nursing note and vitals reviewed.      Assessment:     1. High risk medication use  DIGOXIN   2. Dyslipidemia  Carotid Duplex   3. Atrial fibrillation, unspecified type (HCC)     4. LORENA (obstructive sleep apnea)     5. Aortic valve stenosis, etiology of cardiac valve disease unspecified  Echocardiogram Comp w/o Cont    Carotid Duplex   6. Atherosclerosis of aorta (HCC)   Carotid Duplex       Medical Decision Making:  Today's Assessment / Status / Plan:     79 y/o M with atrial fibrillation and controlled VR, aortic stenosis, last HUY of 1.2 cm2 peak velocity of 3.2 m/sec. we will go ahead and check a dig level today.  It has been 2 years since his last echocardiogram will we will repeat that today.  We will also check a carotid duplex given his reported history of some stenosis but no duplex within our system.  I will see him back in 6 months.     1. A-fib   - dig 250 mcg daily  - metop sr 50 daily  - followed by coumadin clinic  - dig level today     2. Aortic stenosis, moderate    - no  changes    - repeat echo    3. HLD    - cont prava 80    Thank for you allowing me to take part in your patient's care, please call should you have any questions or would like to discuss this patient.      Fransisco Graham M.D.  [address] [place] [id number]  VIA In Basket

## 2018-07-28 LAB — DIGOXIN SERPL-MCNC: 0.6 NG/ML (ref 0.5–0.9)

## 2018-07-30 ENCOUNTER — PHYSICAL THERAPY (OUTPATIENT)
Dept: PHYSICAL THERAPY | Facility: REHABILITATION | Age: 78
End: 2018-07-30
Attending: NEUROLOGICAL SURGERY
Payer: MEDICARE

## 2018-07-30 DIAGNOSIS — M48.061 SPINAL STENOSIS OF LUMBAR REGION, UNSPECIFIED WHETHER NEUROGENIC CLAUDICATION PRESENT: ICD-10-CM

## 2018-07-30 PROCEDURE — 97535 SELF CARE MNGMENT TRAINING: CPT

## 2018-07-30 PROCEDURE — 97110 THERAPEUTIC EXERCISES: CPT

## 2018-07-30 NOTE — OP THERAPY DAILY TREATMENT
Outpatient Physical Therapy  DAILY TREATMENT     [facility] Outpatient Physical Therapy [address], [address]  [place] [address]  Phone:  [phone]    Date: 07/30/2018    Patient: Duane Parkinson  YOB: 1940  MRN: [id number]     Time Calculation  Start time: 0930  Stop time: 1000 Time Calculation (min): 30 minutes     Chief Complaint: No chief complaint on file.    Visit #: 4    SUBJECTIVE:  Feeling a difference after PT sessions now, some reduced back pain. Having difficulty recalling what to do at home though.     OBJECTIVE:  Current objective measures:           Therapeutic Exercises (CPT 22332):     1. Clams, 2 x 10 x 5'' hold    2. Knee sway for controlled caudal rotation, 20B    3. Shuttle squat, 3 bands 2 x 20    4. Sit to stand    5. Sitting posture w/ towel roll TL junction      Time-based treatments/modalities:  Therapeutic exercise minutes (CPT 46683): 20 minutes  Functional training, self care minutes (CPT 49438): 10 minutes         ASSESSMENT:   Response to treatment: Reviewed all exercises up to this point per patient request. Tolerated treatment well.     PLAN/RECOMMENDATIONS:   Plan for treatment: therapy treatment to continue next visit.  Planned interventions for next visit: continue with current treatment.

## 2018-08-01 ENCOUNTER — PHYSICAL THERAPY (OUTPATIENT)
Dept: PHYSICAL THERAPY | Facility: REHABILITATION | Age: 78
End: 2018-08-01
Attending: NEUROLOGICAL SURGERY
Payer: MEDICARE

## 2018-08-01 DIAGNOSIS — M48.061 SPINAL STENOSIS OF LUMBAR REGION, UNSPECIFIED WHETHER NEUROGENIC CLAUDICATION PRESENT: ICD-10-CM

## 2018-08-01 PROCEDURE — 97110 THERAPEUTIC EXERCISES: CPT

## 2018-08-01 NOTE — OP THERAPY DAILY TREATMENT
Outpatient Physical Therapy  DAILY TREATMENT     [facility] Outpatient Physical Therapy [address], [address]  [place] [address]  Phone:  [phone]    Date: 08/01/2018    Patient: Duane Parkinson  YOB: 1940  MRN: [id number]     Time Calculation  Start time: 1030  Stop time: 1100 Time Calculation (min): 30 minutes     Chief Complaint: No chief complaint on file.    Visit #: 5    SUBJECTIVE:  Feeling pretty good today. Not too much back pain. Print out helped recall exercises.    OBJECTIVE:  Current objective measures:           Therapeutic Exercises (CPT 75121):     1. Thoracic rotation perch stand, green 2 x 15B    2. Shuttle squat, 3 bands 4 min    3. Thoracic extension seated, 20      Time-based treatments/modalities:  Therapeutic exercise minutes (CPT 25702): 30 minutes         ASSESSMENT:   Response to treatment: Tolerated treatment well, reporting reduced pain with gait post tx.     PLAN/RECOMMENDATIONS:   Plan for treatment: therapy treatment to continue next visit.  Planned interventions for next visit: continue with current treatment.

## 2018-08-09 ENCOUNTER — ANTICOAGULATION VISIT (OUTPATIENT)
Dept: VASCULAR LAB | Facility: MEDICAL CENTER | Age: 78
End: 2018-08-09
Attending: INTERNAL MEDICINE
Payer: MEDICARE

## 2018-08-09 VITALS — HEART RATE: 95 BPM | DIASTOLIC BLOOD PRESSURE: 62 MMHG | SYSTOLIC BLOOD PRESSURE: 91 MMHG

## 2018-08-09 DIAGNOSIS — I48.91 ATRIAL FIBRILLATION, UNSPECIFIED TYPE (HCC): ICD-10-CM

## 2018-08-09 LAB
INR BLD: 1.7 (ref 0.9–1.2)
INR PPP: 1.7 (ref 2–3.5)

## 2018-08-09 PROCEDURE — 99212 OFFICE O/P EST SF 10 MIN: CPT | Performed by: NURSE PRACTITIONER

## 2018-08-09 PROCEDURE — 85610 PROTHROMBIN TIME: CPT

## 2018-08-09 NOTE — PROGRESS NOTES
Anticoagulation Summary  As of 8/9/2018    INR goal:   2.0-3.0   TTR:   70.1 % (1.8 y)   Today's INR:   1.7!   Warfarin maintenance plan:   6 mg (4 mg x 1.5) on Sat; 4 mg (4 mg x 1) all other days   Weekly warfarin total:   30 mg   Plan last modified:   CLAYTON Jacob (10/10/2016)   Next INR check:   8/23/2018   Target end date:   Indefinite    Indications    Atrial fibrillation (HCC) [I48.91]             Anticoagulation Episode Summary     INR check location:       Preferred lab:       Send INR reminders to:       Comments:   Pt goes by Kinsey      Anticoagulation Care Providers     Provider Role Specialty Phone number    Vickey Rutherford M.D. Referring Cardiology [phone]    [facility] Responsible  [phone]        Anticoagulation Patient Findings      HPI:  Duane Parkinson seen in clinic today for follow up on anticoagulation therapy in the presence of AF. Off warfarin last week for a procedure with Dr. James. Restarted last Friday. Denies any medication or diet changes. No current symptoms of bleeding or thrombosis reported.    A/P:   INR subtherapeutic. Increase tonight then continue current regimen. BP recorded in vitals. BP 91/62, 116/68 - repeated. He will recheck at home tonight.     Follow up appointment in 2 week(s).    Next Appointment: Thursday, August 23, 2018 at 8:00 am.    Britney DOE

## 2018-08-10 ENCOUNTER — HOSPITAL ENCOUNTER (OUTPATIENT)
Dept: CARDIOLOGY | Facility: MEDICAL CENTER | Age: 78
End: 2018-08-10
Attending: INTERNAL MEDICINE
Payer: MEDICARE

## 2018-08-10 ENCOUNTER — HOSPITAL ENCOUNTER (OUTPATIENT)
Dept: RADIOLOGY | Facility: MEDICAL CENTER | Age: 78
End: 2018-08-10
Attending: INTERNAL MEDICINE
Payer: MEDICARE

## 2018-08-10 DIAGNOSIS — E78.5 DYSLIPIDEMIA: ICD-10-CM

## 2018-08-10 DIAGNOSIS — I70.0 ATHEROSCLEROSIS OF AORTA (HCC): ICD-10-CM

## 2018-08-10 DIAGNOSIS — I35.0 AORTIC VALVE STENOSIS, ETIOLOGY OF CARDIAC VALVE DISEASE UNSPECIFIED: ICD-10-CM

## 2018-08-10 PROCEDURE — 93306 TTE W/DOPPLER COMPLETE: CPT | Mod: 26 | Performed by: INTERNAL MEDICINE

## 2018-08-10 PROCEDURE — 93880 EXTRACRANIAL BILAT STUDY: CPT

## 2018-08-10 PROCEDURE — 93306 TTE W/DOPPLER COMPLETE: CPT

## 2018-08-10 PROCEDURE — 93308 TTE F-UP OR LMTD: CPT | Mod: 26 | Performed by: INTERNAL MEDICINE

## 2018-08-12 LAB
LV EJECT FRACT  99904: 70
LV EJECT FRACT MOD 2C 99903: 68.31
LV EJECT FRACT MOD 4C 99902: 71.99
LV EJECT FRACT MOD BP 99901: 70.9

## 2018-08-23 ENCOUNTER — ANTICOAGULATION VISIT (OUTPATIENT)
Dept: VASCULAR LAB | Facility: MEDICAL CENTER | Age: 78
End: 2018-08-23
Attending: INTERNAL MEDICINE
Payer: MEDICARE

## 2018-08-23 VITALS — DIASTOLIC BLOOD PRESSURE: 51 MMHG | SYSTOLIC BLOOD PRESSURE: 123 MMHG | HEART RATE: 62 BPM

## 2018-08-23 DIAGNOSIS — I48.91 ATRIAL FIBRILLATION, UNSPECIFIED TYPE (HCC): ICD-10-CM

## 2018-08-23 LAB
INR BLD: 2.7 (ref 0.9–1.2)
INR PPP: 2.7 (ref 2–3.5)

## 2018-08-23 PROCEDURE — 85610 PROTHROMBIN TIME: CPT

## 2018-08-23 PROCEDURE — 99211 OFF/OP EST MAY X REQ PHY/QHP: CPT | Performed by: PHARMACIST

## 2018-08-23 NOTE — PROGRESS NOTES
Anticoagulation Summary  As of 8/23/2018    INR goal:   2.0-3.0   TTR:   70.1 % (1.8 y)   Today's INR:   2.7   Warfarin maintenance plan:   6 mg (4 mg x 1.5) on Sat; 4 mg (4 mg x 1) all other days   Weekly warfarin total:   30 mg   Plan last modified:   CLAYTON Jacob (10/10/2016)   Next INR check:   9/20/2018   Target end date:   Indefinite    Indications    Atrial fibrillation (HCC) [I48.91]             Anticoagulation Episode Summary     INR check location:       Preferred lab:       Send INR reminders to:       Comments:   Pt goes by Kinsey      Anticoagulation Care Providers     Provider Role Specialty Phone number    Vickey Rutherford M.D. Referring Cardiology [phone]    [facility] Responsible  [phone]        Anticoagulation Patient Findings      HPI:  Duane Parkinson seen in clinic today, on anticoagulation therapy with warfarin for Afib  Changes to current medical/health status since last appt: denies  Denies signs/symptoms of bleeding and/or thrombosis since the last appt.    Denies any interval changes to diet  Denies any interval changes to medications since last appt.   Denies any complications or cost restrictions with current therapy.   BP recorded in vitals.      A/P   INR  is-therapeutic.   Will continue with the same warfarin dosing.     Follow up appointment in 4 week(s).    eKll Cano, PharmD

## 2018-08-24 DIAGNOSIS — I47.10 SVT (SUPRAVENTRICULAR TACHYCARDIA): ICD-10-CM

## 2018-08-24 RX ORDER — DIGOXIN 250 MCG
250 TABLET ORAL DAILY
Qty: 90 TAB | Refills: 3 | Status: CANCELLED | OUTPATIENT
Start: 2018-08-24

## 2018-08-24 RX ORDER — DIGOXIN 125 MCG
250 TABLET ORAL DAILY
Qty: 180 TAB | Refills: 3 | Status: SHIPPED | OUTPATIENT
Start: 2018-08-24 | End: 2018-10-19 | Stop reason: SDUPTHER

## 2018-09-15 DIAGNOSIS — E78.2 MIXED HYPERLIPIDEMIA: ICD-10-CM

## 2018-09-17 RX ORDER — PRAVASTATIN SODIUM 80 MG/1
TABLET ORAL
Qty: 90 TAB | Refills: 2 | Status: SHIPPED | OUTPATIENT
Start: 2018-09-17 | End: 2019-06-10 | Stop reason: SDUPTHER

## 2018-09-20 ENCOUNTER — ANTICOAGULATION VISIT (OUTPATIENT)
Dept: VASCULAR LAB | Facility: MEDICAL CENTER | Age: 78
End: 2018-09-20
Attending: INTERNAL MEDICINE
Payer: MEDICARE

## 2018-09-20 VITALS — DIASTOLIC BLOOD PRESSURE: 65 MMHG | SYSTOLIC BLOOD PRESSURE: 100 MMHG | HEART RATE: 65 BPM

## 2018-09-20 DIAGNOSIS — Z79.01 CHRONIC ANTICOAGULATION: ICD-10-CM

## 2018-09-20 LAB
INR BLD: 3.2 (ref 0.9–1.2)
INR PPP: 3.2 (ref 2–3.5)

## 2018-09-20 PROCEDURE — 99212 OFFICE O/P EST SF 10 MIN: CPT

## 2018-09-20 PROCEDURE — 85610 PROTHROMBIN TIME: CPT

## 2018-09-20 NOTE — PROGRESS NOTES
Anticoagulation Summary  As of 9/20/2018    INR goal:   2.0-3.0   TTR:   69.6 % (1.9 y)   Today's INR:   3.2!   Warfarin maintenance plan:   6 mg (4 mg x 1.5) on Sat; 4 mg (4 mg x 1) all other days   Weekly warfarin total:   30 mg   Plan last modified:   CLAYTON Jacob (10/10/2016)   Next INR check:   10/18/2018   Target end date:   Indefinite    Indications    Atrial fibrillation (HCC) [I48.91]             Anticoagulation Episode Summary     INR check location:       Preferred lab:       Send INR reminders to:       Comments:   Pt goes by Kinsey      Anticoagulation Care Providers     Provider Role Specialty Phone number    Vickey Rutherford M.D. Referring Cardiology [phone]    [facility] Responsible  [phone]        Anticoagulation Patient Findings      HPI:  Duane Parkinson seen in clinic today, on anticoagulation therapy with warfarin for AF.  Changes to current medical/health status since last appt: none  Denies signs/symptoms of bleeding and/or thrombosis since the last appt.    Denies any interval changes to diet  Denies any interval changes to medications since last appt.   Denies any complications or cost restrictions with current therapy.   BP recorded in vitals.  Confirmed dosing regimen.     A/P   INR  SUPRA-therapeutic.   Unknown cause of elevated INR.   Reduce today then Pt is to continue with current warfarin dosing regimen.     Pt has upcoming EGD, low Chads and no hx of TIA or stroke.  Pt to hold warfarin 5 days prior then restart normal regimen after procedure.  Pt understands these directions.     Follow up appointment in 4 week(s).    Sin Huitron, PharmD

## 2018-09-24 ENCOUNTER — OFFICE VISIT (OUTPATIENT)
Dept: MEDICAL GROUP | Facility: MEDICAL CENTER | Age: 78
End: 2018-09-24
Payer: MEDICARE

## 2018-09-24 VITALS
OXYGEN SATURATION: 97 % | WEIGHT: 221 LBS | BODY MASS INDEX: 29.93 KG/M2 | TEMPERATURE: 97.5 F | HEIGHT: 72 IN | DIASTOLIC BLOOD PRESSURE: 78 MMHG | RESPIRATION RATE: 16 BRPM | HEART RATE: 80 BPM | SYSTOLIC BLOOD PRESSURE: 122 MMHG

## 2018-09-24 DIAGNOSIS — K75.4 AUTOIMMUNE HEPATITIS (HCC): ICD-10-CM

## 2018-09-24 DIAGNOSIS — I35.0 AORTIC VALVE STENOSIS, ETIOLOGY OF CARDIAC VALVE DISEASE UNSPECIFIED: ICD-10-CM

## 2018-09-24 DIAGNOSIS — I10 ESSENTIAL HYPERTENSION: ICD-10-CM

## 2018-09-24 DIAGNOSIS — Z23 NEED FOR VACCINATION: ICD-10-CM

## 2018-09-24 PROCEDURE — 99214 OFFICE O/P EST MOD 30 MIN: CPT | Mod: 25 | Performed by: INTERNAL MEDICINE

## 2018-09-24 PROCEDURE — G0008 ADMIN INFLUENZA VIRUS VAC: HCPCS | Performed by: INTERNAL MEDICINE

## 2018-09-24 PROCEDURE — 90662 IIV NO PRSV INCREASED AG IM: CPT | Performed by: INTERNAL MEDICINE

## 2018-09-24 NOTE — PROGRESS NOTES
CC: Follow-up liver, aortic stenosis.    HPI:   Duane presents today with the following.    1. Autoimmune hepatitis (HCC)  Presents after having biopsy of his liver without any specific findings of disease process.  It still being called autoimmune hepatitis liver function tests have remained stable.  Denies abdominal pain or jaundice.  He does have upcoming endoscopy.    2. Aortic valve stenosis, etiology of cardiac valve disease unspecified  Recent echocardiogram shows severe aortic stenosis.  He is denying any dizziness or chest pains.  He has not followed back up with cardiology nor does he have an appointment available.    3. Essential hypertension  Well-controlled again no dizziness    4. Need for vaccination      Patient Active Problem List    Diagnosis Date Noted   • High risk medication use 07/27/2018   • Autoimmune hepatitis (HCC) 06/22/2018   • Neuropathy (HCC) 12/22/2017   • Central sleep apnea 11/29/2017   • Localized primary osteoarthritis of carpometacarpal joint of left thumb 05/27/2016   • Insomnia 05/26/2016   • LORENA (obstructive sleep apnea) 04/21/2016   • Essential hypertension 01/07/2015   • Dyslipidemia 01/07/2015   • IGT (impaired glucose tolerance) 01/07/2015   • Aortic stenosis 12/30/2011   • Atrial fibrillation (HCC) 12/30/2011   • Long term current use of anticoagulant therapy 12/30/2011   • Lumbar Disc Degeneration 12/30/2011   • Mitral valve disorder 12/30/2011   • Osteoarthrosis involving lower leg 12/30/2011       Current Outpatient Prescriptions   Medication Sig Dispense Refill   • pravastatin (PRAVACHOL) 80 MG tablet TAKE 1 TABLET BY MOUTH ONCE DAILY 90 Tab 2   • digoxin (DIGOX) 125 MCG Tab Take 2 Tabs by mouth every day. 180 Tab 3   • metoprolol SR (TOPROL XL) 50 MG TABLET SR 24 HR Take 1 Tab by mouth every day. Needs to be seen for further refills. Thank you 90 Tab 0   • cyclobenzaprine (FLEXERIL) 10 MG Tab Take 10 mg by mouth 3 times a day as needed.     • L-Lysine 500 MG Tab Take   "by mouth.     • B Complex Vitamins (VITAMIN B COMPLEX PO) Take 100 Units by mouth.     • glucosamine Sulfate 500 MG Cap Take 500 mg by mouth 2 times a day, with meals.     • thiamine (THIAMINE) 100 MG Tab Take 100 mg by mouth every day. Super Complex     • ascorbic acid (ASCORBIC ACID) 500 MG Tab Take 1,000 mg by mouth every evening.     • Psyllium (METAMUCIL PO) Take  by mouth.     • warfarin (COUMADIN) 4 MG Tab Take 1 to 2 tablets by mouth daily as directed by Coumadin Clinic. 60 Tab 5   • Coenzyme Q10 (COQ10 PO) Take  by mouth.     • clindamycin (CLEOCIN) 150 MG Cap Take 1 Cap by mouth 2 Times a Day. Permanent, one am and 2 pm 40 Cap 0   • Cholecalciferol (VITAMIN D3) 2000 UNITS Tab Take  by mouth every day. Mondays     • multivitamin (THERAGRAN) TABS Take 1 Tab by mouth every day.     • docosahexanoic acid (FISH OIL) 1000 MG CAPS Take  by mouth every day.       No current facility-administered medications for this visit.          Allergies as of 09/24/2018 - Reviewed 09/24/2018   Allergen Reaction Noted   • Feldene [piroxicam] Itching 12/30/2011   • Percodan [oxycodone-aspirin] Itching 12/30/2011        ROS: Denies Chest pain, SOB, LE edema.    /78   Pulse 80   Temp 36.4 °C (97.5 °F)   Resp 16   Ht 1.82 m (5' 11.65\")   Wt 100.2 kg (221 lb)   SpO2 97%   BMI 30.26 kg/m²     Physical Exam:  Gen:         Alert and oriented, No apparent distress.  Neck:        No Lymphadenopathy or Bruits.  Lungs:     Clear to auscultation bilaterally  CV:          Regular rate and rhythm.  3 out of 6 systolic murmur            Ext:          No clubbing, cyanosis, edema.      Assessment and Plan.   78 y.o. male with the following issues.    1. Autoimmune hepatitis (HCC)  Stable follow along with gastroenterology    2. Aortic valve stenosis, etiology of cardiac valve disease unspecified  Have placed referral to the valve clinic caution about what side effects to watch for  - REFERRAL TO CARDIOLOGY    3. Essential " hypertension  Currently well controlled, Discuss diet, exercise and salt restriction.  No change to medication therapy.    4. Need for vaccination    - Influenza Vaccine, High Dose (65+ Only)

## 2018-10-02 ENCOUNTER — SLEEP CENTER VISIT (OUTPATIENT)
Dept: SLEEP MEDICINE | Facility: MEDICAL CENTER | Age: 78
End: 2018-10-02
Payer: MEDICARE

## 2018-10-02 VITALS
BODY MASS INDEX: 28.63 KG/M2 | DIASTOLIC BLOOD PRESSURE: 74 MMHG | HEIGHT: 73 IN | HEART RATE: 83 BPM | RESPIRATION RATE: 15 BRPM | SYSTOLIC BLOOD PRESSURE: 120 MMHG | OXYGEN SATURATION: 96 % | WEIGHT: 216 LBS

## 2018-10-02 DIAGNOSIS — G47.31 CENTRAL SLEEP APNEA: ICD-10-CM

## 2018-10-02 PROCEDURE — 99213 OFFICE O/P EST LOW 20 MIN: CPT | Performed by: FAMILY MEDICINE

## 2018-10-02 RX ORDER — GABAPENTIN 300 MG/1
300 CAPSULE ORAL 3 TIMES DAILY
COMMUNITY
Start: 2018-10-01 | End: 2019-07-22

## 2018-10-02 NOTE — PROGRESS NOTES
[facility] Follow Up Note     Date: 10/2/2018 / Time: 11:42 AM    Patient ID:   Name:             Duane Parkinson   YOB: 1940  Age:                 78 y.o.  male   MRN:               [id number]      Thank you for requesting a sleep medicine consultation on Duane Parkinson at the sleep center. He presents today with the chief complaints of CSA follow up.     HISTORY OF PRESENT ILLNESS:       Pt is currently on ASV @ EPAP 7/10, PS 4/15, max pressure [id number]. He goes to sleep around 9-10:30 pm and wakes up around 5-6 am. He is getting about 8-9 hrs of sleep on a good night and about 5-6 hr of sleep on a bad night. The bad nights are about 3 per week. His sleep is disturbed due to neuropathy on the nights he misses his gabapentin. He is using ASv most days of the week. Pt reports 6 hrs of average nightly use of ASV. Pt denies snoring, gasping,choking.Pt also denies significant mask leak that is interfering with sleep.      The 30 day compliance was downloaded which shows adequate compliance with more that 4 hr usage about 73%. The AHI is has improved to 4.3/hr. The mask leak is normal. Avg EPAP 7.3 cm and Avg PS 11.6 cm. The symptoms of excessive daytime, snoring and gasping has improved.         SLEEP HISTORY   HST from 2016 indicated an AHI of 22 and low oxygenation of 81%.  He completed a titration study for continued hypersomnolence completed November 2017 that was successful on ASV therapy.       REVIEW OF SYSTEMS:       Constitutional: Denies fevers, Denies weight changes  Eyes: Denies changes in vision, no eye pain  Ears/Nose/Throat/Mouth: Denies nasal congestion or sore throat   Cardiovascular: Denies chest pain or palpitations   Respiratory: Denies shortness of breath , Denies cough  Gastrointestinal/Hepatic: Denies abdominal pain, nausea, vomiting, diarrhea, constipation or GI bleeding   Genitourinary: Denies bladder dysfunction, dysuria or frequency  Musculoskeletal/Rheum:  Denies  joint pain and swelling   Skin/Breast: Denies rash,   Neurological: Denies headache, confusion, memory loss or focal weakness/parasthesias  Psychiatric: denies mood disorder     Comprehensive review of systems form is reviewed with the patient and is attached in the EMR.     PMH:  has a past medical history of Apnea, sleep; Atrial fibrillation (HCC); Back pain; Cataract; Clotting disorder (HCC); Depression; Diabetes (HCC); Encounter for long-term (current) use of other medications; Gasping for breath; Maldivian measles; Heart murmur; Heart valve disease; Hyperlipidemia; Hypertension; Insomnia; Mumps; Nasal drainage; Pain; Pyogenic arthritis, lower leg (HCC); Restless leg syndrome; Sleep apnea; Snoring; Tonsillitis; Urinary incontinence; and Valvular heart disease.  MEDS:   Current Outpatient Prescriptions:   •  gabapentin (NEURONTIN) 300 MG Cap, , Disp: , Rfl:   •  pravastatin (PRAVACHOL) 80 MG tablet, TAKE 1 TABLET BY MOUTH ONCE DAILY, Disp: 90 Tab, Rfl: 2  •  metoprolol SR (TOPROL XL) 50 MG TABLET SR 24 HR, Take 1 Tab by mouth every day. Needs to be seen for further refills. Thank you, Disp: 90 Tab, Rfl: 0  •  cyclobenzaprine (FLEXERIL) 10 MG Tab, Take 10 mg by mouth 3 times a day as needed., Disp: , Rfl:   •  L-Lysine 500 MG Tab, Take  by mouth., Disp: , Rfl:   •  B Complex Vitamins (VITAMIN B COMPLEX PO), Take 100 Units by mouth., Disp: , Rfl:   •  glucosamine Sulfate 500 MG Cap, Take 500 mg by mouth 2 times a day, with meals., Disp: , Rfl:   •  ascorbic acid (ASCORBIC ACID) 500 MG Tab, Take 1,000 mg by mouth every evening., Disp: , Rfl:   •  warfarin (COUMADIN) 4 MG Tab, Take 1 to 2 tablets by mouth daily as directed by [facility]., Disp: 60 Tab, Rfl: 5  •  Coenzyme Q10 (COQ10 PO), Take  by mouth., Disp: , Rfl:   •  clindamycin (CLEOCIN) 150 MG Cap, Take 1 Cap by mouth 2 Times a Day. Permanent, one am and 2 pm, Disp: 40 Cap, Rfl: 0  •  Cholecalciferol (VITAMIN D3) 2000 UNITS Tab, Take  by mouth every  "day. Mondays, Disp: , Rfl:   •  multivitamin (THERAGRAN) TABS, Take 1 Tab by mouth every day., Disp: , Rfl:   •  docosahexanoic acid (FISH OIL) 1000 MG CAPS, Take  by mouth every day., Disp: , Rfl:   •  digoxin (DIGOX) 125 MCG Tab, Take 2 Tabs by mouth every day., Disp: 180 Tab, Rfl: 3  •  thiamine (THIAMINE) 100 MG Tab, Take 100 mg by mouth every day. Super Complex, Disp: , Rfl:   •  Psyllium (METAMUCIL PO), Take  by mouth., Disp: , Rfl:   ALLERGIES:   Allergies   Allergen Reactions   • Feldene [Piroxicam] Itching   • Percodan [Oxycodone-Aspirin] Itching     SURGHX:   Past Surgical History:   Procedure Laterality Date   • FINGER ARTHROPLASTY Left 5/27/2016    Procedure: FINGER ARTHROPLASTY THUMB CARPOMETACARPAL EXCISIONAL, EXCISE TRAPEZIUM;  Surgeon: Raheel Salgado M.D.;  Location: SURGERY SAME DAY [facility];  Service:    • CARDIOVERSION      on 7/17/06, sinus rhythm until January 2007,  paroxysmal atrial fibrillation   • KNEE ARTHROPLASTY TOTAL     • LAMINOTOMY N/A     multiple lumbar spine   • OTHER ORTHOPEDIC SURGERY      lower back   • PB PERCUT IMPLNT NEUROELECT,EPIDURAL     • TOE ARTHROPLASTY     • TURP-VAPOR N/A      SOCHX:  reports that he quit smoking about 46 years ago. His smoking use included Cigarettes. He has a 20.00 pack-year smoking history. He has never used smokeless tobacco. He reports that he does not drink alcohol or use drugs..  FH:   Family History   Problem Relation Age of Onset   • Other Mother         unknown   • Heart Disease Mother    • Cancer Father         prostate   • Diabetes Brother    • Heart Disease Son    • Sleep Apnea Neg Hx          Physical Exam:  Vitals/ General Appearance:   Weight/BMI: Body mass index is 28.5 kg/m².  Blood pressure 120/74, pulse 83, resp. rate 15, height 1.854 m (6' 1\"), weight 98 kg (216 lb), SpO2 96 %.  Vitals:    10/02/18 1106   BP: 120/74   BP Location: Right arm   Patient Position: Sitting   BP Cuff Size: Adult   Pulse: 83   Resp: 15   SpO2: " "96%   Weight: 98 kg (216 lb)   Height: 1.854 m (6' 1\")       Pt. is alert and oriented to time, place and person. Cooperative and in no apparent distress.       1. Head: Atraumatic, normocephalic.   2. Ears: Normal tympanic membrane and no discharge  3. Nose: No inferior turbinate hypertophy, no septal deviation, no polyp.   4. Throat: Oropharynx appears crowded in that the palate is overhanging (5. Neck: Supple. No thyromegaly  6. Chest: Trachea central, no spine deformity   7. Lungs auscultation: B/L good air entry, vesicular breath sounds, no adventitious sounds  8. Heart auscultation: 1st and 2nd heart sounds normal, regular rhythm.4/6 murmur.  9. Abdomen: Soft, non tender, no organomegaly. Bowel sounds present  10. Extremities: no pedal edema.  11. Skin: No rash      INVESTIGATIONS:           ASSESSMENT AND PLAN     1.Central Sleep Apnea (LORENA).      The pathophysiology of sleep anea and the increased risk of cardiovascular morbidity from untreated sleep apnea is discussed in detail with the patient.    He is urged to avoid supine sleep, weight gain and alcoholic beverages since all of these can worsen sleep apnea. He is cautioned against drowsy driving. If He feels sleepy while driving, He must pull over for a break/nap, rather than persist on the road, in the interest of He own safety and that of others on the road.   Plan   - Continue ASV @ EPAP 7/10, PS 4/15, max pressure [id number] nasal pillow mask    - OPo was reviewed and discussed which was unremarkable   - compliance download was reviewed and discussed with the pt   - compliance was reinforced     2.  Regarding treatment of other past medical problems and general health maintenance,  He is urged to follow up with PCP.          "

## 2018-10-03 ENCOUNTER — TELEPHONE (OUTPATIENT)
Dept: MEDICAL GROUP | Facility: MEDICAL CENTER | Age: 78
End: 2018-10-03

## 2018-10-03 NOTE — TELEPHONE ENCOUNTER
1. Caller Name: Duane Parkinson                                           Call Back Number: [phone] (home)         Patient approves a detailed voicemail message: yes    PT called in sated he is having an EGD done at [facility] 11/15/18 and Dr. Joseph is requesting he stop all meds 7 days prior to procedure. PT wants to know if you approve of this.     Please advise

## 2018-10-16 ENCOUNTER — HOSPITAL ENCOUNTER (OUTPATIENT)
Dept: RADIOLOGY | Facility: MEDICAL CENTER | Age: 78
End: 2018-10-16
Attending: INTERNAL MEDICINE
Payer: MEDICARE

## 2018-10-16 DIAGNOSIS — K74.60 HEPATIC CIRRHOSIS, UNSPECIFIED HEPATIC CIRRHOSIS TYPE, UNSPECIFIED WHETHER ASCITES PRESENT (HCC): ICD-10-CM

## 2018-10-16 PROCEDURE — 76700 US EXAM ABDOM COMPLETE: CPT

## 2018-10-18 ENCOUNTER — ANTICOAGULATION VISIT (OUTPATIENT)
Dept: VASCULAR LAB | Facility: MEDICAL CENTER | Age: 78
End: 2018-10-18
Attending: INTERNAL MEDICINE
Payer: MEDICARE

## 2018-10-18 DIAGNOSIS — Z79.01 CHRONIC ANTICOAGULATION: ICD-10-CM

## 2018-10-18 LAB
INR BLD: 3.2 (ref 0.9–1.2)
INR PPP: 3.2 (ref 2–3.5)

## 2018-10-18 PROCEDURE — 85610 PROTHROMBIN TIME: CPT

## 2018-10-18 PROCEDURE — 99211 OFF/OP EST MAY X REQ PHY/QHP: CPT

## 2018-10-18 NOTE — PROGRESS NOTES
Anticoagulation Summary  As of 10/18/2018    INR goal:   2.0-3.0   TTR:   67.0 % (2 y)   Today's INR:   3.2!   Warfarin maintenance plan:   4 mg (4 mg x 1) every day   Weekly warfarin total:   28 mg   Plan last modified:   Sin Huitron, PharmD (10/18/2018)   Next INR check:   11/15/2018   Target end date:   Indefinite    Indications    Atrial fibrillation (HCC) [I48.91]             Anticoagulation Episode Summary     INR check location:       Preferred lab:       Send INR reminders to:       Comments:   Pt goes by Kinsey      Anticoagulation Care Providers     Provider Role Specialty Phone number    Vickey Rutherford M.D. Referring Cardiology [phone]    [facility] Responsible  [phone]        Anticoagulation Patient Findings      HPI:  Duane Parkinson seen in clinic today, on anticoagulation therapy with warfarin for AF.  Changes to current medical/health status since last appt: none  Denies signs/symptoms of bleeding and/or thrombosis since the last appt.    Denies any interval changes to diet  Denies any interval changes to medications since last appt.   Denies any complications or cost restrictions with current therapy.   BP recorded in vitals.  Confirmed dosing regimen.     A/P   INR  SUPRA-therapeutic.   Begin reduced regimen.     Follow up appointment in 4 weeks per pt.    Sin Huitron, AbhishekD

## 2018-10-19 RX ORDER — DIGOXIN 125 MCG
250 TABLET ORAL DAILY
Qty: 180 TAB | Refills: 3 | Status: SHIPPED | OUTPATIENT
Start: 2018-10-19 | End: 2019-04-11

## 2018-10-23 DIAGNOSIS — I10 ESSENTIAL HYPERTENSION: ICD-10-CM

## 2018-10-24 RX ORDER — METOPROLOL SUCCINATE 50 MG/1
TABLET, EXTENDED RELEASE ORAL
Qty: 90 TAB | Refills: 2 | Status: SHIPPED | OUTPATIENT
Start: 2018-10-24 | End: 2019-07-18 | Stop reason: SDUPTHER

## 2018-10-30 ENCOUNTER — TELEPHONE (OUTPATIENT)
Dept: VASCULAR LAB | Facility: MEDICAL CENTER | Age: 78
End: 2018-10-30

## 2018-10-30 NOTE — TELEPHONE ENCOUNTER
[facility]    Received clearance request for upcoming EGD scheduled for 11/15.  Left VM for pt to call the clinic to discuss.  Per MR, pt has HTN, Age 78, and DM = Chads score of 3.  Unless pt has CHF or Stroke, suggest against bridging for upcoming procedure.      Will wait until we hear back from the pt to issue his clearance.     Sin Huitron, PharmD

## 2018-10-31 ENCOUNTER — TELEPHONE (OUTPATIENT)
Dept: CARDIOLOGY | Facility: MEDICAL CENTER | Age: 78
End: 2018-10-31

## 2018-11-01 ENCOUNTER — DOCUMENTATION (OUTPATIENT)
Dept: VASCULAR LAB | Facility: MEDICAL CENTER | Age: 78
End: 2018-11-01

## 2018-11-05 ENCOUNTER — TELEPHONE (OUTPATIENT)
Dept: VASCULAR LAB | Facility: MEDICAL CENTER | Age: 78
End: 2018-11-05

## 2018-11-06 NOTE — TELEPHONE ENCOUNTER
Called and left msg for pt to call back regarding Clearance for EGD 11/15/18 and holding warfarin.    No history stroke/TIA/CVA. No VTE.   CHADsVASc = 5 (age, HTN, DM2, vasc dx)    Kell Cano, PharmD

## 2018-11-07 ENCOUNTER — OFFICE VISIT (OUTPATIENT)
Dept: CARDIOLOGY | Facility: MEDICAL CENTER | Age: 78
End: 2018-11-07
Payer: MEDICARE

## 2018-11-07 VITALS
WEIGHT: 222 LBS | OXYGEN SATURATION: 99 % | HEIGHT: 71 IN | BODY MASS INDEX: 31.08 KG/M2 | DIASTOLIC BLOOD PRESSURE: 80 MMHG | HEART RATE: 76 BPM | SYSTOLIC BLOOD PRESSURE: 110 MMHG

## 2018-11-07 DIAGNOSIS — I35.0 SEVERE AORTIC STENOSIS: ICD-10-CM

## 2018-11-07 DIAGNOSIS — I10 ESSENTIAL HYPERTENSION: ICD-10-CM

## 2018-11-07 DIAGNOSIS — I48.20 CHRONIC ATRIAL FIBRILLATION (HCC): ICD-10-CM

## 2018-11-07 DIAGNOSIS — E78.2 MIXED HYPERLIPIDEMIA: ICD-10-CM

## 2018-11-07 PROCEDURE — 99214 OFFICE O/P EST MOD 30 MIN: CPT | Performed by: NURSE PRACTITIONER

## 2018-11-07 ASSESSMENT — ENCOUNTER SYMPTOMS
PALPITATIONS: 0
ABDOMINAL PAIN: 0
MYALGIAS: 0
DIZZINESS: 1
PND: 0
SHORTNESS OF BREATH: 0
FEVER: 0
ORTHOPNEA: 0
BACK PAIN: 1
COUGH: 0
CLAUDICATION: 0

## 2018-11-07 NOTE — PROGRESS NOTES
Chief Complaint   Patient presents with   • Aortic Stenosis     F/V: 4 MO       Subjective:   Duane Parkinson is a 78 y.o. male who presents today for follow-up on his aortic stenosis, A. fib and hyperlipidemia.    Patient Dr. Rutherford.  Patient was last seen in clinic on 7/27/2018.  During his last visit, patient was sent for repeat echocardiogram and carotid ultrasound.  Pt completed testing.    For his symptoms, he is reporting fatigue, occasional episodes of dizziness, in which he is receiving physical therapy for and back pain/fatigue.  Patient denies any chest pain, palpitations, orthopnea, PND, edema or shortness of breath at rest and with ADLs.  Patient states he is unable to exert much because of his back he is unsure if he has shortness of breath with exertion.  Patient is limited by his activity due to his back.    Patient reports he has an EGD next week.    Additonally, patient has the following medical problems:    -Atrial fibrillation, history of a failed cardioversion in the past, taking warfarin, followed by the anticoagulation clinic    -Hyperlipidemia: Taking pravastatin    -Sleep apnea: Using CPAP regularly    -History of hypertension    -History of diabetes    -History of depression    -History of a back injury which resulted in 5 back surgeries and currently he has a back stimulator    Past Medical History:   Diagnosis Date   • Apnea, sleep    • Atrial fibrillation (HCC)    • Back pain    • Cataract     early   • Clotting disorder (HCC)    • Depression     Lost wife 10 yeasr ago; still gets tearful   • Diabetes (HCC)     diet controlled   • Encounter for long-term (current) use of other medications    • Gasping for breath    • Hebrew measles    • Heart murmur    • Heart valve disease    • Hyperlipidemia    • Hypertension    • Insomnia    • Mumps    • Nasal drainage    • Pain     back & thumb   • Pyogenic arthritis, lower leg (HCC)     thumb, back   • Restless leg syndrome    • Sleep apnea      uses CPAP   • Snoring    • Tonsillitis    • Urinary incontinence    • Valvular heart disease      Past Surgical History:   Procedure Laterality Date   • FINGER ARTHROPLASTY Left 5/27/2016    Procedure: FINGER ARTHROPLASTY THUMB CARPOMETACARPAL EXCISIONAL, EXCISE TRAPEZIUM;  Surgeon: Raheel Salgado M.D.;  Location: SURGERY SAME DAY [facility];  Service:    • CARDIOVERSION      on 7/17/06, sinus rhythm until January 2007,  paroxysmal atrial fibrillation   • KNEE ARTHROPLASTY TOTAL     • LAMINOTOMY N/A     multiple lumbar spine   • OTHER ORTHOPEDIC SURGERY      lower back   • PB PERCUT IMPLNT NEUROELECT,EPIDURAL     • TOE ARTHROPLASTY     • TURP-VAPOR N/A      Family History   Problem Relation Age of Onset   • Other Mother         unknown   • Heart Disease Mother    • Cancer Father         prostate   • Diabetes Brother    • Heart Disease Son    • Sleep Apnea Neg Hx      Social History     Social History   • Marital status:      Spouse name: N/A   • Number of children: N/A   • Years of education: N/A     Occupational History   • Not on file.     Social History Main Topics   • Smoking status: Former Smoker     Packs/day: 1.00     Years: 20.00     Types: Cigarettes     Quit date: 1/16/1972   • Smokeless tobacco: Never Used   • Alcohol use No   • Drug use: No   • Sexual activity: Not Currently     Partners: Female     Other Topics Concern   • Not on file     Social History Narrative   • No narrative on file     Allergies   Allergen Reactions   • Feldene [Piroxicam] Itching   • Percodan [Oxycodone-Aspirin] Itching     Outpatient Encounter Prescriptions as of 11/7/2018   Medication Sig Dispense Refill   • metoprolol SR (TOPROL XL) 50 MG TABLET SR 24 HR TAKE ONE TABLET BY MOUTH EVERY DAY 90 Tab 2   • digoxin (DIGOX) 125 MCG Tab Take 2 Tabs by mouth every day. (Patient taking differently: Take 125 mcg by mouth every bedtime.) 180 Tab 3   • gabapentin (NEURONTIN) 300 MG Cap Take 300 mg by mouth 3 times a day.    "  • pravastatin (PRAVACHOL) 80 MG tablet TAKE 1 TABLET BY MOUTH ONCE DAILY 90 Tab 2   • L-Lysine 500 MG Tab Take  by mouth.     • B Complex Vitamins (VITAMIN B COMPLEX PO) Take 100 Units by mouth.     • glucosamine Sulfate 500 MG Cap Take 500 mg by mouth 2 times a day, with meals.     • ascorbic acid (ASCORBIC ACID) 500 MG Tab Take 1,000 mg by mouth every evening.     • Psyllium (METAMUCIL PO) Take  by mouth.     • warfarin (COUMADIN) 4 MG Tab Take 1 to 2 tablets by mouth daily as directed by [facility]. 60 Tab 5   • Coenzyme Q10 (COQ10 PO) Take  by mouth every day.     • Cholecalciferol (VITAMIN D3) 2000 UNITS Tab Take  by mouth every day. Mondays     • multivitamin (THERAGRAN) TABS Take 1 Tab by mouth every day.     • docosahexanoic acid (FISH OIL) 1000 MG CAPS Take  by mouth every day.     • cyclobenzaprine (FLEXERIL) 10 MG Tab Take 10 mg by mouth 3 times a day as needed.     • thiamine (THIAMINE) 100 MG Tab Take 100 mg by mouth every day. Super Complex     • clindamycin (CLEOCIN) 150 MG Cap Take 1 Cap by mouth 2 Times a Day. Permanent, one am and 2 pm (Patient not taking: Reported on 11/7/2018) 40 Cap 0     No facility-administered encounter medications on file as of 11/7/2018.      Review of Systems   Constitutional: Positive for malaise/fatigue. Negative for fever.   Respiratory: Negative for cough and shortness of breath.    Cardiovascular: Negative for chest pain, palpitations, orthopnea, claudication, leg swelling and PND.   Gastrointestinal: Negative for abdominal pain.   Musculoskeletal: Positive for back pain (fatigue). Negative for myalgias.   Neurological: Positive for dizziness (occ).   All other systems reviewed and are negative.       Objective:   /80 (BP Location: Right arm, Patient Position: Sitting, BP Cuff Size: Adult)   Pulse 76   Ht 1.803 m (5' 11\")   Wt 100.7 kg (222 lb)   SpO2 99%   BMI 30.96 kg/m²     Physical Exam   Constitutional: He is oriented to person, place, and time. " He appears well-developed and well-nourished.   HENT:   Head: Normocephalic and atraumatic.   Eyes: Pupils are equal, round, and reactive to light. EOM are normal.   Neck: Normal range of motion. Neck supple. No JVD present.   Cardiovascular: Normal rate.  An irregularly irregular rhythm present.   Murmur heard.  Pulmonary/Chest: Effort normal and breath sounds normal. No respiratory distress. He has no wheezes. He has no rales.   Abdominal: Soft. Bowel sounds are normal.   Musculoskeletal: He exhibits no edema.   Neurological: He is alert and oriented to person, place, and time.   Skin: Skin is warm and dry.   Psychiatric: He has a normal mood and affect. His behavior is normal.   Vitals reviewed.    Lab Results   Component Value Date/Time    CHOLSTRLTOT 123 06/05/2018 08:29 AM    CHOLSTRLTOT 110 06/20/2017 07:36 AM    LDL 53 06/05/2018 08:29 AM    LDL 53 06/20/2017 07:36 AM    HDL 32 (L) 06/05/2018 08:29 AM    HDL 35 (A) 06/20/2017 07:36 AM    TRIGLYCERIDE 189 (H) 06/05/2018 08:29 AM    TRIGLYCERIDE 109 06/20/2017 07:36 AM       Lab Results   Component Value Date/Time    SODIUM 139 06/05/2018 08:29 AM    SODIUM 139 06/20/2017 07:36 AM    POTASSIUM 4.9 06/05/2018 08:29 AM    POTASSIUM 4.2 06/20/2017 07:36 AM    CHLORIDE 105 06/05/2018 08:29 AM    CHLORIDE 110 06/20/2017 07:36 AM    CO2 23 06/05/2018 08:29 AM    CO2 25 06/20/2017 07:36 AM    GLUCOSE 97 06/05/2018 08:29 AM    GLUCOSE 95 06/20/2017 07:36 AM    BUN 13 06/05/2018 08:29 AM    BUN 11 06/20/2017 07:36 AM    CREATININE 0.86 06/05/2018 08:29 AM    CREATININE 0.74 06/20/2017 07:36 AM    BUNCREATRAT 15 06/05/2018 08:29 AM     Lab Results   Component Value Date/Time    ALKPHOSPHAT 71 06/05/2018 08:29 AM    ALKPHOSPHAT 57 06/20/2017 07:36 AM    ASTSGOT 41 (H) 06/05/2018 08:29 AM    ASTSGOT 33 06/20/2017 07:36 AM    ALTSGPT 42 06/05/2018 08:29 AM    ALTSGPT 34 06/20/2017 07:36 AM    TBILIRUBIN 0.5 06/05/2018 08:29 AM    TBILIRUBIN 0.8 06/20/2017 07:36 AM       Transthoracic Echo Report 2/18/2016  1. Normal left ventricular size, wall thickness, and systolic function.   Left ventricular ejection fraction is visually estimated to be 60%.   Normal regional wall motion. Grade III diastolic dysfunction     2. Moderately calcified aortic valve leaflets.  Moderate aortic   stenosis by HUY but mild by gradient.  Low stroke volume index at 28 ml/m2BSA.     Carotid Duplex Report 8/10/2018  Mild bilateral internal carotid artery stenosis (<50%).   Flow within both subclavian arteries appears to be within normal limits.   Antegrade flow, bilateral vertebral arteries.   No prior study is available for comparison..     Transthoracic Echo Report 8/10/2018  Compared to the images of the prior study done 2/18/16, the aortic   stenosis appears severe.  Likely severe aortic stenosis with a low flow, low gradient.  Left ventricular ejection fraction is visually estimated to be 70%.  Unable to estimate pulmonary artery pressure due to an inadequate   tricuspid regurgitant jet.    Assessment:     1. Severe aortic stenosis  REFERRAL TO CARDIOLOGY   2. Chronic atrial fibrillation (HCC)     3. Mixed hyperlipidemia     4. Essential hypertension         Medical Decision Making:  Today's Assessment / Status / Plan:   1.  Severe aortic stenosis: low flow, low gradient per recent echo  -pt appears to have minimal symptoms  -Will refer over to valve clinic for monitoring and further evaluation    2. HLD: last LDL 53 on 6/5/2018  -Continue pravastatin 80 mg daily    3.  Atrial fibrillation: Rate controlled  -Continue digoxin 250 mcg daily  -Continue Toprol-XL 50 mg daily  -Continue warfarin, followed by the anticoagulation clinic    4.  Hypertension: Stable  -Continue Toprol-XL 50 mg daily    FU in clinic in 6 months with Dr. Rutherford and Per Valve clinic. Sooner if needed.    Patient verbalizes understanding and agrees with the plan of care.     Collaborating MD: Vickey Rutherford MD

## 2018-11-09 ENCOUNTER — TELEPHONE (OUTPATIENT)
Dept: CARDIOLOGY | Facility: MEDICAL CENTER | Age: 78
End: 2018-11-09

## 2018-11-09 NOTE — TELEPHONE ENCOUNTER
L/m for patient to call and get scheduled for an appointment for Mod AS; my name and office number were given.    Spoke w/pt reg appointment that scheduled 1/10 @ 11 am

## 2018-11-14 ENCOUNTER — ANTICOAGULATION VISIT (OUTPATIENT)
Dept: VASCULAR LAB | Facility: MEDICAL CENTER | Age: 78
End: 2018-11-14
Attending: INTERNAL MEDICINE
Payer: MEDICARE

## 2018-11-14 VITALS — HEART RATE: 77 BPM | DIASTOLIC BLOOD PRESSURE: 64 MMHG | SYSTOLIC BLOOD PRESSURE: 119 MMHG

## 2018-11-14 DIAGNOSIS — Z79.01 CHRONIC ANTICOAGULATION: ICD-10-CM

## 2018-11-14 LAB — INR PPP: 1.1 (ref 2–3.5)

## 2018-11-14 PROCEDURE — 99212 OFFICE O/P EST SF 10 MIN: CPT

## 2018-11-14 PROCEDURE — 85610 PROTHROMBIN TIME: CPT

## 2018-11-15 NOTE — PROGRESS NOTES
Anticoagulation Summary  As of 11/14/2018    INR goal:   2.0-3.0   TTR:   66.3 % (2.1 y)   Today's INR:   1.1!   Warfarin maintenance plan:   4 mg (4 mg x 1) every day   Weekly warfarin total:   28 mg   Plan last modified:   Sin Huitron, PharmD (10/18/2018)   Next INR check:   11/27/2018   Target end date:   Indefinite    Indications    Atrial fibrillation (HCC) [I48.91]             Anticoagulation Episode Summary     INR check location:       Preferred lab:       Send INR reminders to:       Comments:   Pt goes by Kinsey      Anticoagulation Care Providers     Provider Role Specialty Phone number    Vickey Rutherford M.D. Referring Cardiology [phone]    [facility] Responsible  [phone]        Anticoagulation Patient Findings      HPI:  Duane Parkinson seen in clinic today, on anticoagulation therapy with warfarin for AF.   Changes to current medical/health status since last appt: Pt has been holding for upcoming EGD.   Denies signs/symptoms of bleeding and/or thrombosis since the last appt.    Denies any interval changes to diet  Denies any interval changes to medications since last appt.   Denies any complications or cost restrictions with current therapy.   BP recorded in vitals.  Confirmed dosing regimen.     A/P   INR  SUB-therapeutic.   Restart warfarin with 6 mg x2 days starting tomorrow post procedure.     Follow up appointment in 1.5 week(s).    Sin Huitron, PharmD

## 2018-11-21 LAB — INR BLD: 1.1 (ref 0.9–1.2)

## 2018-11-27 ENCOUNTER — ANTICOAGULATION VISIT (OUTPATIENT)
Dept: VASCULAR LAB | Facility: MEDICAL CENTER | Age: 78
End: 2018-11-27
Attending: INTERNAL MEDICINE
Payer: MEDICARE

## 2018-11-27 DIAGNOSIS — Z79.01 CHRONIC ANTICOAGULATION: ICD-10-CM

## 2018-11-27 LAB — INR PPP: 2.2 (ref 2–3.5)

## 2018-11-27 PROCEDURE — 99211 OFF/OP EST MAY X REQ PHY/QHP: CPT

## 2018-11-27 PROCEDURE — 85610 PROTHROMBIN TIME: CPT

## 2018-12-26 ENCOUNTER — ANTICOAGULATION VISIT (OUTPATIENT)
Dept: VASCULAR LAB | Facility: MEDICAL CENTER | Age: 78
End: 2018-12-26
Attending: INTERNAL MEDICINE
Payer: MEDICARE

## 2018-12-26 VITALS — HEART RATE: 80 BPM | DIASTOLIC BLOOD PRESSURE: 87 MMHG | SYSTOLIC BLOOD PRESSURE: 117 MMHG

## 2018-12-26 DIAGNOSIS — I48.91 ATRIAL FIBRILLATION, UNSPECIFIED TYPE (HCC): ICD-10-CM

## 2018-12-26 LAB — INR PPP: 1.4 (ref 2–3.5)

## 2018-12-26 PROCEDURE — 85610 PROTHROMBIN TIME: CPT

## 2018-12-26 PROCEDURE — 99212 OFFICE O/P EST SF 10 MIN: CPT | Performed by: NURSE PRACTITIONER

## 2018-12-26 NOTE — PROGRESS NOTES
Anticoagulation Summary  As of 12/26/2018    INR goal:   2.0-3.0   TTR:   64.0 % (2.2 y)   Today's INR:   1.4!   Warfarin maintenance plan:   4 mg (4 mg x 1) every day   Weekly warfarin total:   28 mg   Plan last modified:   Sin Huitron, PharmD (10/18/2018)   Next INR check:   1/9/2019   Target end date:   Indefinite    Indications    Atrial fibrillation (HCC) [I48.91]             Anticoagulation Episode Summary     INR check location:       Preferred lab:       Send INR reminders to:       Comments:   Pt goes by Kinsey      Anticoagulation Care Providers     Provider Role Specialty Phone number    Vickey Rutherford M.D. Referring Cardiology [phone]    [facility] Responsible  [phone]        Anticoagulation Patient Findings      HPI:  Duane Parkinson seen in clinic today for follow up on anticoagulation therapy in the presence of AF. Denies any changes to current medical/health status since last appointment. Denies any medication or diet changes. No current symptoms of bleeding or thrombosis reported. Missed two doses last week by mistake.    A/P:   INR subtherapeutic. CHADS 2 score = 2. Increase two doses then continue current regimen. BP recorded in vitals.    Follow up appointment in 2 week(s).    Next Appointment: Wednesday, January 9, 2019 at 7:45 am.    Britney DOE

## 2019-01-03 DIAGNOSIS — I48.91 ATRIAL FIBRILLATION, UNSPECIFIED TYPE (HCC): ICD-10-CM

## 2019-01-04 LAB — INR BLD: 1.4 (ref 0.9–1.2)

## 2019-01-09 ENCOUNTER — ANTICOAGULATION VISIT (OUTPATIENT)
Dept: VASCULAR LAB | Facility: MEDICAL CENTER | Age: 79
End: 2019-01-09
Attending: INTERNAL MEDICINE
Payer: MEDICARE

## 2019-01-09 VITALS — HEART RATE: 62 BPM | SYSTOLIC BLOOD PRESSURE: 106 MMHG | DIASTOLIC BLOOD PRESSURE: 71 MMHG

## 2019-01-09 DIAGNOSIS — Z79.01 CHRONIC ANTICOAGULATION: ICD-10-CM

## 2019-01-09 LAB
INR BLD: 3 (ref 0.9–1.2)
INR PPP: 3 (ref 2–3.5)

## 2019-01-09 PROCEDURE — 99211 OFF/OP EST MAY X REQ PHY/QHP: CPT | Performed by: PHARMACIST

## 2019-01-09 PROCEDURE — 85610 PROTHROMBIN TIME: CPT

## 2019-01-09 RX ORDER — DOXYCYCLINE HYCLATE 100 MG
100 TABLET ORAL 2 TIMES DAILY
COMMUNITY
End: 2019-04-11

## 2019-01-09 NOTE — PROGRESS NOTES
Anticoagulation Summary  As of 1/9/2019    INR goal:   2.0-3.0   TTR:   64.0 % (2.2 y)   Today's INR:   3.0   Warfarin maintenance plan:   4 mg (4 mg x 1) every day   Weekly warfarin total:   28 mg   Plan last modified:   Sin Huitron, PharmD (10/18/2018)   Next INR check:   1/30/2019   Target end date:   Indefinite    Indications    Atrial fibrillation (HCC) [I48.91]             Anticoagulation Episode Summary     INR check location:       Preferred lab:       Send INR reminders to:       Comments:   Pt goes by Kinsey      Anticoagulation Care Providers     Provider Role Specialty Phone number    Vickey Rutherford M.D. Referring Cardiology [phone]    [facility] Responsible  [phone]        Anticoagulation Patient Findings      HPI:  Duane Parkinson seen in clinic today, on anticoagulation therapy with warfarin for Afib  Changes to current medical/health status since last appt: Pt is now on long term doxycycline  Denies signs/symptoms of bleeding and/or thrombosis since the last appt.    Denies any interval changes to diet  Denies any interval changes to medications since last appt.   Denies any complications or cost restrictions with current therapy.   BP recorded in vitals.      A/P   INR  is-therapeutic.   Will have pt continue with the same warfarin dosing.    Follow up appointment in 3 week(s).    Kell Cano, PharmD

## 2019-01-10 ENCOUNTER — OFFICE VISIT (OUTPATIENT)
Dept: CARDIOLOGY | Facility: MEDICAL CENTER | Age: 79
End: 2019-01-10
Payer: MEDICARE

## 2019-01-10 VITALS
OXYGEN SATURATION: 96 % | WEIGHT: 220.24 LBS | SYSTOLIC BLOOD PRESSURE: 112 MMHG | BODY MASS INDEX: 30.83 KG/M2 | HEART RATE: 98 BPM | HEIGHT: 71 IN | DIASTOLIC BLOOD PRESSURE: 90 MMHG

## 2019-01-10 DIAGNOSIS — I35.0 AORTIC VALVE STENOSIS, ETIOLOGY OF CARDIAC VALVE DISEASE UNSPECIFIED: ICD-10-CM

## 2019-01-10 DIAGNOSIS — I48.91 ATRIAL FIBRILLATION, UNSPECIFIED TYPE (HCC): ICD-10-CM

## 2019-01-10 DIAGNOSIS — Z79.01 LONG TERM CURRENT USE OF ANTICOAGULANT THERAPY: ICD-10-CM

## 2019-01-10 LAB — EKG IMPRESSION: NORMAL

## 2019-01-10 PROCEDURE — 93000 ELECTROCARDIOGRAM COMPLETE: CPT | Performed by: INTERNAL MEDICINE

## 2019-01-10 PROCEDURE — 99204 OFFICE O/P NEW MOD 45 MIN: CPT | Performed by: INTERNAL MEDICINE

## 2019-01-10 ASSESSMENT — ENCOUNTER SYMPTOMS
VOMITING: 0
FOCAL WEAKNESS: 0
WEIGHT LOSS: 0
CHILLS: 0
MYALGIAS: 0
CONSTITUTIONAL NEGATIVE: 1
BRUISES/BLEEDS EASILY: 0
GASTROINTESTINAL NEGATIVE: 1
BACK PAIN: 1
FEVER: 0
CLAUDICATION: 0
DIZZINESS: 0
BLURRED VISION: 0
EYES NEGATIVE: 1
COUGH: 1
DEPRESSION: 0
SHORTNESS OF BREATH: 1
CARDIOVASCULAR NEGATIVE: 1
WEAKNESS: 0
NERVOUS/ANXIOUS: 0
DOUBLE VISION: 0
NAUSEA: 0
PSYCHIATRIC NEGATIVE: 1
ABDOMINAL PAIN: 0
HEADACHES: 1
PALPITATIONS: 0

## 2019-01-10 NOTE — LETTER
[facility]   [address], [address]  [place] [address]  Phone: [phone]  Fax: [phone]              Duane Parkinson  1940    Encounter Date: 1/10/2019    Chava Pierre M.D.          PROGRESS NOTE:  Chief Complaint   Patient presents with   • Aortic Stenosis     NP       Subjective:   Duane Parkinson is a 78 y.o. male who presents today for interventional consult/aortic stenosis surveillance as requested by Merary Yaens for moderate aortic stenosis.    Thank you for allowing me to evaluate Mr. Parkinson, who as you know is a 78 year old male with aortic stenosis    Past Medical History:   Diagnosis Date   • Apnea, sleep    • Atrial fibrillation (HCC)    • Back pain    • Cataract     early   • Clotting disorder (HCC)    • Depression     Lost wife 10 yeasr ago; still gets tearful   • Diabetes (HCC)     diet controlled   • Encounter for long-term (current) use of other medications    • Gasping for breath    • Latvian measles    • Heart murmur    • Heart valve disease    • Hyperlipidemia    • Hypertension    • Insomnia    • Mumps    • Nasal drainage    • Pain     back & thumb   • Pyogenic arthritis, lower leg (HCC)     thumb, back   • Restless leg syndrome    • Sleep apnea     uses CPAP   • Snoring    • Tonsillitis    • Urinary incontinence    • Valvular heart disease      Past Surgical History:   Procedure Laterality Date   • FINGER ARTHROPLASTY Left 5/27/2016    Procedure: FINGER ARTHROPLASTY THUMB CARPOMETACARPAL EXCISIONAL, EXCISE TRAPEZIUM;  Surgeon: Raehel Salgado M.D.;  Location: SURGERY SAME DAY [facility];  Service:    • CARDIOVERSION      on 7/17/06, sinus rhythm until January 2007,  paroxysmal atrial fibrillation   • KNEE ARTHROPLASTY TOTAL     • LAMINOTOMY N/A     multiple lumbar spine   • OTHER ORTHOPEDIC SURGERY      lower back   • PB PERCUT IMPLNT NEUROELECT,EPIDURAL     • TOE ARTHROPLASTY     • TURP-VAPOR N/A      Family History     Problem Relation Age of Onset   • Other Mother         unknown   • Heart Disease Mother    • Cancer Father         prostate, skin   • No Known Problems Brother    • Diabetes Brother    • Heart Disease Son    • Sleep Apnea Neg Hx      Social History     Social History   • Marital status:      Spouse name: N/A   • Number of children: N/A   • Years of education: N/A     Occupational History   • Not on file.     Social History Main Topics   • Smoking status: Former Smoker     Packs/day: 1.00     Years: 20.00     Types: Cigarettes     Quit date: 1/16/1972   • Smokeless tobacco: Former User   • Alcohol use No   • Drug use: No   • Sexual activity: Not Currently     Partners: Female     Other Topics Concern   • Not on file     Social History Narrative   • No narrative on file     Allergies   Allergen Reactions   • Feldene [Piroxicam] Itching   • Percodan [Oxycodone-Aspirin] Itching     Medications reviewed.    Outpatient Encounter Prescriptions as of 1/10/2019   Medication Sig Dispense Refill   • doxycycline (VIBRAMYCIN) 100 MG Tab Take 100 mg by mouth 2 times a day.     • metoprolol SR (TOPROL XL) 50 MG TABLET SR 24 HR TAKE ONE TABLET BY MOUTH EVERY DAY 90 Tab 2   • digoxin (DIGOX) 125 MCG Tab Take 2 Tabs by mouth every day. (Patient taking differently: Take 250 mcg by mouth every bedtime.) 180 Tab 3   • gabapentin (NEURONTIN) 300 MG Cap Take 300 mg by mouth 3 times a day.     • pravastatin (PRAVACHOL) 80 MG tablet TAKE 1 TABLET BY MOUTH ONCE DAILY 90 Tab 2   • cyclobenzaprine (FLEXERIL) 10 MG Tab Take 10 mg by mouth 3 times a day as needed.     • L-Lysine 500 MG Tab Take  by mouth.     • B Complex Vitamins (VITAMIN B COMPLEX PO) Take 100 Units by mouth.     • glucosamine Sulfate 500 MG Cap Take 500 mg by mouth 2 times a day, with meals.     • ascorbic acid (ASCORBIC ACID) 500 MG Tab Take 1,000 mg by mouth every evening.     • Psyllium (METAMUCIL PO) Take  by mouth.     • warfarin (COUMADIN) 4 MG Tab Take 1 to 2  "tablets by mouth daily as directed by [facility]. 60 Tab 5   • Coenzyme Q10 (COQ10 PO) Take  by mouth every day.     • Cholecalciferol (VITAMIN D3) 2000 UNITS Tab Take  by mouth every day. Mondays     • multivitamin (THERAGRAN) TABS Take 1 Tab by mouth every day.     • docosahexanoic acid (FISH OIL) 1000 MG CAPS Take  by mouth every day.       No facility-administered encounter medications on file as of 1/10/2019.      Review of Systems   Constitutional: Negative.  Negative for chills, fever, malaise/fatigue and weight loss.   HENT: Positive for hearing loss and tinnitus.    Eyes: Negative.  Negative for blurred vision and double vision.   Respiratory: Positive for cough and shortness of breath.    Cardiovascular: Negative.  Negative for chest pain, palpitations, claudication and leg swelling.   Gastrointestinal: Negative.  Negative for abdominal pain, nausea and vomiting.   Genitourinary: Negative.  Negative for dysuria and urgency.   Musculoskeletal: Positive for back pain. Negative for joint pain and myalgias.   Skin: Negative.  Negative for itching and rash.   Neurological: Positive for headaches. Negative for dizziness, focal weakness and weakness.   Endo/Heme/Allergies: Negative.  Does not bruise/bleed easily.   Psychiatric/Behavioral: Negative.  Negative for depression. The patient is not nervous/anxious.         Objective:   /90 (BP Location: Left arm, Patient Position: Sitting, BP Cuff Size: Adult)   Pulse 98   Ht 1.803 m (5' 11\")   Wt 99.9 kg (220 lb 3.8 oz)   SpO2 96%   BMI 30.72 kg/m²      Physical Exam   Constitutional: He is oriented to person, place, and time. He appears well-developed and well-nourished.   HENT:   Head: Normocephalic and atraumatic.   Eyes: Pupils are equal, round, and reactive to light. Conjunctivae are normal.   Neck: Normal range of motion. Neck supple.   Cardiovascular: Normal rate.  An irregularly irregular rhythm present.   Pulmonary/Chest: Effort normal and " breath sounds normal.   Abdominal: Soft. Bowel sounds are normal.   Musculoskeletal: Normal range of motion.   Neurological: He is alert and oriented to person, place, and time.   Skin: Skin is warm and dry.   Psychiatric: He has a normal mood and affect.     CARDIAC STUDIES/PROCEDURES:    CAROTID ULTRASOUND (08/10/18)  Mild bilateral internal carotid artery stenosis (<50%).   Flow within both subclavian arteries appears to be within normal limits.   Antegrade flow, bilateral vertebral arteries.   No prior study is available for comparison..   (study result reviewed)    ECHOCARDIOGRAM CONCLUSIONS (08/10/18)  Compared to the images of the prior study done 2/18/16, the aortic stenosis appears severe.  Likely severe aortic stenosis with a low flow, low gradient.  Left ventricular ejection fraction is visually estimated to be 70%.  Unable to estimate pulmonary artery pressure due to an inadequate tricuspid regurgitant jet.  (Moderate aortic stenosis.   Aortic valve area calculated from the continuity equation is 0.8 cm2.  Vmax is 2.9 m/s. Transvalvular gradients are - Peak: 31 mmHg, Mean: 21 mmHg.)  (study result reviewed)    EKG was ordered for aortic stenosis, performed on (01/10/19) and reviewed: EKG shows atrial fibrillation.    Assessment:     1. Aortic valve stenosis, etiology of cardiac valve disease unspecified  EKG   2. Atrial fibrillation, unspecified type (HCC)     3. Long term current use of anticoagulant therapy       Medical Decision Making:  Today's Assessment / Status / Plan:     1. Aortic stenosis: He remains mildly symptomatic with moderate aortic stenosis. We will follow him clinically and repeat an echocardiogram in 3 months.  2. Atrial fibrillation on anticoagulation therapy (warfarin): He is doing well on anticoagulation and the ventricular rate is well controlled.    We will follow up in three months with echocardiogram.    Thank you for this consult.    CC Fransisco Portillo        No  Recipients

## 2019-01-10 NOTE — PROGRESS NOTES
Chief Complaint   Patient presents with   • Aortic Stenosis     NP       Subjective:   Duane Parkinson is a 78 y.o. male who presents today for interventional consult/aortic stenosis surveillance as requested by Merary Yanes for moderate aortic stenosis.    Thank you for allowing me to evaluate Mr. Parkinson, who as you know is a 78 year old male with aortic stenosis and atrial fibrillation on chronic anticoagulation therapy. He admits to mild shortness of breath. He denies chest pain, palpitations, nausea/vomiting or diaphoresis.    Past Medical History:   Diagnosis Date   • Apnea, sleep    • Atrial fibrillation (HCC)    • Back pain    • Cataract     early   • Clotting disorder (HCC)    • Depression     Lost wife 10 yeasr ago; still gets tearful   • Diabetes (HCC)     diet controlled   • Encounter for long-term (current) use of other medications    • Gasping for breath    • Italian measles    • Heart murmur    • Heart valve disease    • Hyperlipidemia    • Hypertension    • Insomnia    • Mumps    • Nasal drainage    • Pain     back & thumb   • Pyogenic arthritis, lower leg (HCC)     thumb, back   • Restless leg syndrome    • Sleep apnea     uses CPAP   • Snoring    • Tonsillitis    • Urinary incontinence    • Valvular heart disease      Past Surgical History:   Procedure Laterality Date   • FINGER ARTHROPLASTY Left 5/27/2016    Procedure: FINGER ARTHROPLASTY THUMB CARPOMETACARPAL EXCISIONAL, EXCISE TRAPEZIUM;  Surgeon: Raheel Salgado M.D.;  Location: SURGERY SAME DAY [facility];  Service:    • CARDIOVERSION      on 7/17/06, sinus rhythm until January 2007,  paroxysmal atrial fibrillation   • KNEE ARTHROPLASTY TOTAL     • LAMINOTOMY N/A     multiple lumbar spine   • OTHER ORTHOPEDIC SURGERY      lower back   • PB PERCUT IMPLNT NEUROELECT,EPIDURAL     • TOE ARTHROPLASTY     • TURP-VAPOR N/A      Family History   Problem Relation Age of Onset   • Other Mother         unknown   • Heart Disease Mother    •  Cancer Father         prostate, skin   • No Known Problems Brother    • Diabetes Brother    • Heart Disease Son    • Sleep Apnea Neg Hx      Social History     Social History   • Marital status:      Spouse name: N/A   • Number of children: N/A   • Years of education: N/A     Occupational History   • Not on file.     Social History Main Topics   • Smoking status: Former Smoker     Packs/day: 1.00     Years: 20.00     Types: Cigarettes     Quit date: 1/16/1972   • Smokeless tobacco: Former User   • Alcohol use No   • Drug use: No   • Sexual activity: Not Currently     Partners: Female     Other Topics Concern   • Not on file     Social History Narrative   • No narrative on file     Allergies   Allergen Reactions   • Feldene [Piroxicam] Itching   • Percodan [Oxycodone-Aspirin] Itching     Medications reviewed.    Outpatient Encounter Prescriptions as of 1/10/2019   Medication Sig Dispense Refill   • doxycycline (VIBRAMYCIN) 100 MG Tab Take 100 mg by mouth 2 times a day.     • metoprolol SR (TOPROL XL) 50 MG TABLET SR 24 HR TAKE ONE TABLET BY MOUTH EVERY DAY 90 Tab 2   • digoxin (DIGOX) 125 MCG Tab Take 2 Tabs by mouth every day. (Patient taking differently: Take 250 mcg by mouth every bedtime.) 180 Tab 3   • gabapentin (NEURONTIN) 300 MG Cap Take 300 mg by mouth 3 times a day.     • pravastatin (PRAVACHOL) 80 MG tablet TAKE 1 TABLET BY MOUTH ONCE DAILY 90 Tab 2   • cyclobenzaprine (FLEXERIL) 10 MG Tab Take 10 mg by mouth 3 times a day as needed.     • L-Lysine 500 MG Tab Take  by mouth.     • B Complex Vitamins (VITAMIN B COMPLEX PO) Take 100 Units by mouth.     • glucosamine Sulfate 500 MG Cap Take 500 mg by mouth 2 times a day, with meals.     • ascorbic acid (ASCORBIC ACID) 500 MG Tab Take 1,000 mg by mouth every evening.     • Psyllium (METAMUCIL PO) Take  by mouth.     • warfarin (COUMADIN) 4 MG Tab Take 1 to 2 tablets by mouth daily as directed by [facility]. 60 Tab 5   • Coenzyme Q10 (COQ10 PO) Take  " by mouth every day.     • Cholecalciferol (VITAMIN D3) 2000 UNITS Tab Take  by mouth every day. Mondays     • multivitamin (THERAGRAN) TABS Take 1 Tab by mouth every day.     • docosahexanoic acid (FISH OIL) 1000 MG CAPS Take  by mouth every day.       No facility-administered encounter medications on file as of 1/10/2019.      Review of Systems   Constitutional: Negative.  Negative for chills, fever, malaise/fatigue and weight loss.   HENT: Positive for hearing loss and tinnitus.    Eyes: Negative.  Negative for blurred vision and double vision.   Respiratory: Positive for cough and shortness of breath.    Cardiovascular: Negative.  Negative for chest pain, palpitations, claudication and leg swelling.   Gastrointestinal: Negative.  Negative for abdominal pain, nausea and vomiting.   Genitourinary: Negative.  Negative for dysuria and urgency.   Musculoskeletal: Positive for back pain. Negative for joint pain and myalgias.   Skin: Negative.  Negative for itching and rash.   Neurological: Positive for headaches. Negative for dizziness, focal weakness and weakness.   Endo/Heme/Allergies: Negative.  Does not bruise/bleed easily.   Psychiatric/Behavioral: Negative.  Negative for depression. The patient is not nervous/anxious.         Objective:   /90 (BP Location: Left arm, Patient Position: Sitting, BP Cuff Size: Adult)   Pulse 98   Ht 1.803 m (5' 11\")   Wt 99.9 kg (220 lb 3.8 oz)   SpO2 96%   BMI 30.72 kg/m²     Physical Exam   Constitutional: He is oriented to person, place, and time. He appears well-developed and well-nourished.   HENT:   Head: Normocephalic and atraumatic.   Eyes: Pupils are equal, round, and reactive to light. Conjunctivae are normal.   Neck: Normal range of motion. Neck supple.   Cardiovascular: Normal rate.  An irregularly irregular rhythm present.   Pulmonary/Chest: Effort normal and breath sounds normal.   Abdominal: Soft. Bowel sounds are normal.   Musculoskeletal: Normal range of " motion.   Neurological: He is alert and oriented to person, place, and time.   Skin: Skin is warm and dry.   Psychiatric: He has a normal mood and affect.     CARDIAC STUDIES/PROCEDURES:    CAROTID ULTRASOUND (08/10/18)  Mild bilateral internal carotid artery stenosis (<50%).   Flow within both subclavian arteries appears to be within normal limits.   Antegrade flow, bilateral vertebral arteries.   No prior study is available for comparison..   (study result reviewed)    ECHOCARDIOGRAM CONCLUSIONS (08/10/18)  Compared to the images of the prior study done 2/18/16, the aortic stenosis appears severe.  Likely severe aortic stenosis with a low flow, low gradient.  Left ventricular ejection fraction is visually estimated to be 70%.  Unable to estimate pulmonary artery pressure due to an inadequate tricuspid regurgitant jet.  (Moderate aortic stenosis.   Aortic valve area calculated from the continuity equation is 0.8 cm2.  Vmax is 2.9 m/s. Transvalvular gradients are - Peak: 31 mmHg, Mean: 21 mmHg.)  (study result reviewed)    EKG was ordered for aortic stenosis, performed on (01/10/19) and reviewed: EKG shows atrial fibrillation.    Assessment:     1. Aortic valve stenosis, etiology of cardiac valve disease unspecified  EKG   2. Atrial fibrillation, unspecified type (HCC)     3. Long term current use of anticoagulant therapy       Medical Decision Making:  Today's Assessment / Status / Plan:     1. Aortic stenosis: He remains mildly symptomatic with moderate aortic stenosis. We will follow him clinically and repeat an echocardiogram in 3 months.  2. Atrial fibrillation on anticoagulation therapy (warfarin): He is doing well on anticoagulation and the ventricular rate is well controlled.    We will follow up in three months with echocardiogram.    Thank you for this consult.    CC Fransisco Portillo

## 2019-01-30 ENCOUNTER — ANTICOAGULATION VISIT (OUTPATIENT)
Dept: VASCULAR LAB | Facility: MEDICAL CENTER | Age: 79
End: 2019-01-30
Attending: INTERNAL MEDICINE
Payer: MEDICARE

## 2019-01-30 VITALS — SYSTOLIC BLOOD PRESSURE: 96 MMHG | HEART RATE: 58 BPM | DIASTOLIC BLOOD PRESSURE: 42 MMHG

## 2019-01-30 DIAGNOSIS — I48.91 ATRIAL FIBRILLATION, UNSPECIFIED TYPE (HCC): ICD-10-CM

## 2019-01-30 LAB — INR PPP: 2.7 (ref 2–3.5)

## 2019-01-30 PROCEDURE — 85610 PROTHROMBIN TIME: CPT

## 2019-01-30 PROCEDURE — 99211 OFF/OP EST MAY X REQ PHY/QHP: CPT | Performed by: NURSE PRACTITIONER

## 2019-01-30 NOTE — PROGRESS NOTES
Anticoagulation Summary  As of 2019    INR goal:   2.0-3.0   TTR:   64.9 % (2.3 y)   INR used for dosin.7 (2019)   Warfarin maintenance plan:   4 mg (4 mg x 1) every day   Weekly warfarin total:   28 mg   Plan last modified:   Sin Huitron, PharmD (10/18/2018)   Next INR check:   2019   Target end date:   Indefinite    Indications    Atrial fibrillation (HCC) [I48.91]             Anticoagulation Episode Summary     INR check location:       Preferred lab:       Send INR reminders to:       Comments:   Pt goes by Kinsey      Anticoagulation Care Providers     Provider Role Specialty Phone number    Vickey Rutherford M.D. Referring Cardiology [phone]    [facility] Responsible  [phone]        Anticoagulation Patient Findings      HPI:  Duane Parkinson seen in clinic today for follow up on anticoagulation therapy in the presence of AF. Denies any changes to current medical/health status since last appointment. Denies any medication or diet changes. No current symptoms of bleeding or thrombosis reported.    A/P:   INR therapeutic. Continue current regimen. BP recorded in vitals. BP low today. Asymptomatic. He will check BP at home.    Follow up appointment in 4 week(s).    Next Appointment: 2019 at 7:30 am.    Britney DOE

## 2019-01-31 LAB — INR BLD: 2.7 (ref 0.9–1.2)

## 2019-02-27 ENCOUNTER — ANTICOAGULATION VISIT (OUTPATIENT)
Dept: VASCULAR LAB | Facility: MEDICAL CENTER | Age: 79
End: 2019-02-27
Attending: INTERNAL MEDICINE
Payer: MEDICARE

## 2019-02-27 VITALS — DIASTOLIC BLOOD PRESSURE: 64 MMHG | HEART RATE: 89 BPM | SYSTOLIC BLOOD PRESSURE: 110 MMHG

## 2019-02-27 DIAGNOSIS — I48.91 ATRIAL FIBRILLATION, UNSPECIFIED TYPE (HCC): ICD-10-CM

## 2019-02-27 LAB — INR PPP: 3 (ref 2–3.5)

## 2019-02-27 PROCEDURE — 99211 OFF/OP EST MAY X REQ PHY/QHP: CPT | Performed by: PHARMACIST

## 2019-02-27 PROCEDURE — 85610 PROTHROMBIN TIME: CPT

## 2019-02-27 NOTE — PROGRESS NOTES
Anticoagulation Summary  As of 2/27/2019    INR goal:   2.0-3.0   TTR:   66.0 % (2.4 y)   INR used for dosing:   3.0 (2/27/2019)   Warfarin maintenance plan:   4 mg (4 mg x 1) every day   Weekly warfarin total:   28 mg   Plan last modified:   Abhishek CorbinD (10/18/2018)   Next INR check:   3/27/2019   Target end date:   Indefinite    Indications    Atrial fibrillation (HCC) [I48.91]             Anticoagulation Episode Summary     INR check location:       Preferred lab:       Send INR reminders to:       Comments:   Pt goes by Kinsey      Anticoagulation Care Providers     Provider Role Specialty Phone number    Vickey Rutherford M.D. Referring Cardiology [phone]    [facility] Responsible  [phone]        Anticoagulation Patient Findings      HPI:  Duane Parkinson seen in clinic today, on anticoagulation therapy with warfarin for Afib  Changes to current medical/health status since last appt: pt has been switched from clindamycin to doxycyline about a month ago, however will be switching back again in about 2 weeks.   Denies signs/symptoms of bleeding and/or thrombosis since the last appt.    Denies any interval changes to diet  Denies any interval changes to medications since last appt.   Denies any complications or cost restrictions with current therapy.   BP recorded in vitals.      A/P   INR  is-therapeutic.   Will continue with the same warfarin dosing. Will see pt again 2 weeks after switching back from doxycycline to clindamycin. Pt was on clindamycin before with no issues.    Follow up appointment in 4 week(s).    Kell Cano, PharmD

## 2019-03-04 LAB — INR BLD: 3 (ref 0.9–1.2)

## 2019-03-11 RX ORDER — WARFARIN SODIUM 4 MG/1
TABLET ORAL
Qty: 90 TAB | Refills: 1 | Status: SHIPPED | OUTPATIENT
Start: 2019-03-11 | End: 2019-09-30 | Stop reason: SDUPTHER

## 2019-03-27 ENCOUNTER — ANTICOAGULATION VISIT (OUTPATIENT)
Dept: VASCULAR LAB | Facility: MEDICAL CENTER | Age: 79
End: 2019-03-27
Attending: INTERNAL MEDICINE
Payer: MEDICARE

## 2019-03-27 VITALS — DIASTOLIC BLOOD PRESSURE: 70 MMHG | SYSTOLIC BLOOD PRESSURE: 109 MMHG | HEART RATE: 92 BPM

## 2019-03-27 DIAGNOSIS — Z79.01 CHRONIC ANTICOAGULATION: ICD-10-CM

## 2019-03-27 LAB
INR BLD: 1.8 (ref 0.9–1.2)
INR PPP: 1.8 (ref 2–3.5)

## 2019-03-27 PROCEDURE — 99212 OFFICE O/P EST SF 10 MIN: CPT | Performed by: PHARMACIST

## 2019-03-27 PROCEDURE — 85610 PROTHROMBIN TIME: CPT

## 2019-03-27 NOTE — PROGRESS NOTES
Anticoagulation Summary  As of 3/27/2019    INR goal:   2.0-3.0   TTR:   66.6 % (2.4 y)   INR used for dosin.8! (3/27/2019)   Warfarin maintenance plan:   4 mg (4 mg x 1) every day   Weekly warfarin total:   28 mg   Plan last modified:   Abhishek CorbinD (10/18/2018)   Next INR check:   4/10/2019   Target end date:   Indefinite    Indications    Atrial fibrillation (HCC) [I48.91]             Anticoagulation Episode Summary     INR check location:       Preferred lab:       Send INR reminders to:       Comments:   Pt goes by Kinsey      Anticoagulation Care Providers     Provider Role Specialty Phone number    Vickey Rutherford M.D. Referring Cardiology [phone]    [facility] Responsible  [phone]        Anticoagulation Patient Findings      HPI:  Duane Parkinson seen in clinic today, on anticoagulation therapy with warfarin for Afib  Changes to current medical/health status since last appt: pt is still finishing up his doxycycline before switching back to clindamycin, pt has concluded that his itchy skin was due to dry skin. Pt has two bruises on each forearm, have been there a week.  Denies signs/symptoms of bleeding and/or thrombosis since the last appt.    Denies any interval changes to diet  Denies any interval changes to medications since last appt.   Denies any complications or cost restrictions with current therapy.   BP recorded in vitals.       A/P   INR  is sub-therapeutic.   At this point due to bruising will not adjust pt's warfarin. Will have pt continue with the same warfarin dosing for 2 weeks and then reaccess.     Follow up appointment in 2 week(s).    Kell Cano, PharmD , CDE

## 2019-04-04 ENCOUNTER — SLEEP CENTER VISIT (OUTPATIENT)
Dept: SLEEP MEDICINE | Facility: MEDICAL CENTER | Age: 79
End: 2019-04-04
Payer: MEDICARE

## 2019-04-04 VITALS
SYSTOLIC BLOOD PRESSURE: 112 MMHG | HEIGHT: 71 IN | RESPIRATION RATE: 16 BRPM | HEART RATE: 77 BPM | OXYGEN SATURATION: 97 % | BODY MASS INDEX: 31.08 KG/M2 | TEMPERATURE: 98.2 F | WEIGHT: 222 LBS | DIASTOLIC BLOOD PRESSURE: 66 MMHG

## 2019-04-04 DIAGNOSIS — G47.33 OSA (OBSTRUCTIVE SLEEP APNEA): ICD-10-CM

## 2019-04-04 DIAGNOSIS — G47.31 CENTRAL SLEEP APNEA: ICD-10-CM

## 2019-04-04 PROCEDURE — 99214 OFFICE O/P EST MOD 30 MIN: CPT | Performed by: NURSE PRACTITIONER

## 2019-04-04 RX ORDER — DIGOXIN 250 MCG
250 TABLET ORAL
COMMUNITY
Start: 2019-03-30 | End: 2019-10-02 | Stop reason: SDUPTHER

## 2019-04-04 RX ORDER — CLINDAMYCIN HYDROCHLORIDE 150 MG/1
300 CAPSULE ORAL 2 TIMES DAILY
COMMUNITY
Start: 2019-01-31 | End: 2019-10-02

## 2019-04-04 NOTE — PROGRESS NOTES
CC:  Here for f/u sleep issues as listed below    HPI:     Duane presents today for follow up obstructive sleep apnea, central sleep apnea.  History of atrial fibrillation on Coumadin, aortic stenosis, grade III diastolic dysfunction, autoimmune hepatitis, HTN, insomnia, mitral valve disorder.    HST from 2016 indicated an AHI of 22 and low oxygenation of 81%.  He completed a titration study for continued hypersomnolence completed November 2017 that was successful on ASV therapy.   Currently he is being treated with ASV @ 7/10, PS 4/15, max pressure [id number].   Compliance download from the dates 3/5/2019 - 4/3/2019 indicates he is wearing the device 93.3% for an avg of 7 hours and 33 minutes per night with a reduced AHI of 4.0.  Avg EPAP is 7.1 and PS is 6.9. He does tolerate pressure. He likes mask well.  He does not notice he wakes up refreshed and continues to be tired throughout the day. They deny morning H/A. He doesn't notice he sleep better overall. He rarely naps, but could easily doze. He feels fatigued without change since starting the machine. CNOX from 2018 showed adequate oxygenation. He moves about so he has better quality of sleep at night and does not nap.  He will continue to clean supplies weekly and change them as insurance allows.          Patient Active Problem List    Diagnosis Date Noted   • High risk medication use 07/27/2018   • Autoimmune hepatitis (HCC) 06/22/2018   • Neuropathy (HCC) 12/22/2017   • Central sleep apnea 11/29/2017   • Localized primary osteoarthritis of carpometacarpal joint of left thumb 05/27/2016   • Insomnia 05/26/2016   • LORENA (obstructive sleep apnea) 04/21/2016   • Essential hypertension 01/07/2015   • Dyslipidemia 01/07/2015   • IGT (impaired glucose tolerance) 01/07/2015   • Aortic stenosis 12/30/2011   • Atrial fibrillation (HCC) 12/30/2011   • Long term current use of anticoagulant therapy 12/30/2011   • Lumbar Disc Degeneration 12/30/2011   • Mitral valve disorder  12/30/2011   • Osteoarthrosis involving lower leg 12/30/2011       Past Medical History:   Diagnosis Date   • Apnea, sleep    • Atrial fibrillation (HCC)    • Back pain    • Cataract     early   • Clotting disorder (HCC)    • Depression     Lost wife 10 yeasr ago; still gets tearful   • Diabetes (HCC)     diet controlled   • Encounter for long-term (current) use of other medications    • Gasping for breath    • [place] measles    • Heart murmur    • Heart valve disease    • Hyperlipidemia    • Hypertension    • Insomnia    • Mumps    • Nasal drainage    • Pain     back & thumb   • Pyogenic arthritis, lower leg (HCC)     thumb, back   • Restless leg syndrome    • Sleep apnea     uses CPAP   • Snoring    • Tonsillitis    • Urinary incontinence    • Valvular heart disease        Past Surgical History:   Procedure Laterality Date   • FINGER ARTHROPLASTY Left 5/27/2016    Procedure: FINGER ARTHROPLASTY THUMB CARPOMETACARPAL EXCISIONAL, EXCISE TRAPEZIUM;  Surgeon: Raheel Salgado M.D.;  Location: SURGERY SAME DAY [facility];  Service:    • CARDIOVERSION      on 7/17/06, sinus rhythm until January 2007,  paroxysmal atrial fibrillation   • KNEE ARTHROPLASTY TOTAL     • LAMINOTOMY N/A     multiple lumbar spine   • OTHER ORTHOPEDIC SURGERY      lower back   • PB PERCUT IMPLNT NEUROELECT,EPIDURAL     • TOE ARTHROPLASTY     • TURP-VAPOR N/A        Family History   Problem Relation Age of Onset   • Other Mother         unknown   • Heart Disease Mother    • Cancer Father         prostate, skin   • No Known Problems Brother    • Diabetes Brother    • Heart Disease Son    • Sleep Apnea Neg Hx        Social History     Social History   • Marital status:      Spouse name: N/A   • Number of children: N/A   • Years of education: N/A     Occupational History   • Not on file.     Social History Main Topics   • Smoking status: Former Smoker     Packs/day: 1.00     Years: 20.00     Types: Cigarettes     Quit date: 1/16/1972    • Smokeless tobacco: Former User   • Alcohol use No   • Drug use: No   • Sexual activity: Not Currently     Partners: Female     Other Topics Concern   • Not on file     Social History Narrative   • No narrative on file       Current Outpatient Prescriptions   Medication Sig Dispense Refill   • clindamycin (CLEOCIN) 150 MG Cap      • digoxin (LANOXIN) 250 MCG Tab      • Metoprolol Succinate 50 MG Capsule ER 24 Hour Sprinkle      • warfarin (COUMADIN) 4 MG Tab Take one tablet by mouth one time daily or as directed by coumadin clinic 90 Tab 1   • metoprolol SR (TOPROL XL) 50 MG TABLET SR 24 HR TAKE ONE TABLET BY MOUTH EVERY DAY 90 Tab 2   • digoxin (DIGOX) 125 MCG Tab Take 2 Tabs by mouth every day. (Patient taking differently: Take 250 mcg by mouth every bedtime.) 180 Tab 3   • gabapentin (NEURONTIN) 300 MG Cap Take 300 mg by mouth 3 times a day.     • pravastatin (PRAVACHOL) 80 MG tablet TAKE 1 TABLET BY MOUTH ONCE DAILY 90 Tab 2   • L-Lysine 500 MG Tab Take  by mouth.     • B Complex Vitamins (VITAMIN B COMPLEX PO) Take 100 Units by mouth.     • glucosamine Sulfate 500 MG Cap Take 500 mg by mouth 2 times a day, with meals.     • ascorbic acid (ASCORBIC ACID) 500 MG Tab Take 1,000 mg by mouth every evening.     • Psyllium (METAMUCIL PO) Take  by mouth.     • Coenzyme Q10 (COQ10 PO) Take  by mouth every day.     • Cholecalciferol (VITAMIN D3) 2000 UNITS Tab Take  by mouth every day. Mondays     • multivitamin (THERAGRAN) TABS Take 1 Tab by mouth every day.     • docosahexanoic acid (FISH OIL) 1000 MG CAPS Take  by mouth every day.     • doxycycline (VIBRAMYCIN) 100 MG Tab Take 100 mg by mouth 2 times a day.     • cyclobenzaprine (FLEXERIL) 10 MG Tab Take 10 mg by mouth 3 times a day as needed.       No current facility-administered medications for this visit.           Allergies: Feldene [piroxicam] and Percodan [oxycodone-aspirin]      ROS   Gen: Denies fever, chills, unintentional weight loss,  "fatigue  Resp:Denies Dyspnea  CV: Denies chest pain, chest tightness  Sleep:Denies morning headache, insomnia, daytime somnolence, snoring, gasping for air, apnea  Neuro: Denies frequent headaches, weakness, dizziness  See HPI.  All other systems reviewed and negative        Vital signs for this encounter:  Vitals:    04/04/19 0758 04/04/19 0801   Height:  1.803 m (5' 11\")   Weight:  100.7 kg (222 lb)   Weight % change since last entry.:  0 %   BP:  112/66   Pulse:  77   BMI (Calculated):  30.96   Resp:  16   Temp:  36.8 °C (98.2 °F)   TempSrc:  Temporal   O2 sat % room air: 97 %                    Physical Exam:   Gen:         Alert and oriented, No apparent distress.   Neck:        No Lymphadenopathy.  Lungs:     Clear to auscultation bilaterally.    CV:          Regular rate and rhythm. No murmurs, rubs or gallops.   Abd:         Soft non tender, non distended.            Ext:          No clubbing, cyanosis, edema.    Assessment   1. Central sleep apnea  DME Mask and Supplies    OBESITY COUNSELING (No Charge): Patient identified as having weight management issue.  Appropriate orders and counseling given.    DME Mask Fitting   2. LORENA (obstructive sleep apnea)  DME Mask and Supplies    DME Mask Fitting   3. BMI 30.0-30.9,adult  OBESITY COUNSELING (No Charge): Patient identified as having weight management issue.  Appropriate orders and counseling given.       Patient is clinically stable and will proceed with following plan.     PLAN:   Patient Instructions   1) Continue ASV @ EPAP 7/10, PS 4/15, max pressure [id number]  2) Clean mask and supplies weekly and change them as insurance allows - mask fitting with DME per patient choice  3) Vaccines: Up to date with Prevnar 13, Pneumovax 23, flu  4) Light conditioning encouraged  5) Continue smoking cessation   6) Return in about 1 year (around 4/4/2020) for follow up with NADER Haywood, if not sooner, review of symptoms, Compliance.        "

## 2019-04-04 NOTE — PATIENT INSTRUCTIONS
1) Continue ASV @ EPAP 7/10, PS 4/15, max pressure [id number]  2) Clean mask and supplies weekly and change them as insurance allows - mask fitting with DME per patient choice  3) Vaccines: Up to date with Prevnar 13, Pneumovax 23, flu  4) Light conditioning encouraged  5) Continue smoking cessation   6) Return in about 1 year (around 4/4/2020) for follow up with NADER Haywood, if not sooner, review of symptoms, Compliance.

## 2019-04-10 ENCOUNTER — ANTICOAGULATION VISIT (OUTPATIENT)
Dept: VASCULAR LAB | Facility: MEDICAL CENTER | Age: 79
End: 2019-04-10
Attending: INTERNAL MEDICINE
Payer: MEDICARE

## 2019-04-10 ENCOUNTER — HOSPITAL ENCOUNTER (OUTPATIENT)
Dept: CARDIOLOGY | Facility: MEDICAL CENTER | Age: 79
End: 2019-04-10
Attending: INTERNAL MEDICINE
Payer: MEDICARE

## 2019-04-10 VITALS — DIASTOLIC BLOOD PRESSURE: 55 MMHG | HEART RATE: 58 BPM | SYSTOLIC BLOOD PRESSURE: 130 MMHG

## 2019-04-10 DIAGNOSIS — I35.0 AORTIC VALVE STENOSIS, ETIOLOGY OF CARDIAC VALVE DISEASE UNSPECIFIED: ICD-10-CM

## 2019-04-10 DIAGNOSIS — I48.91 ATRIAL FIBRILLATION, UNSPECIFIED TYPE (HCC): ICD-10-CM

## 2019-04-10 LAB — INR PPP: 2.1 (ref 2–3.5)

## 2019-04-10 PROCEDURE — 85610 PROTHROMBIN TIME: CPT

## 2019-04-10 PROCEDURE — 99211 OFF/OP EST MAY X REQ PHY/QHP: CPT | Mod: 25 | Performed by: NURSE PRACTITIONER

## 2019-04-10 PROCEDURE — 93306 TTE W/DOPPLER COMPLETE: CPT | Mod: 26 | Performed by: INTERNAL MEDICINE

## 2019-04-10 PROCEDURE — 93306 TTE W/DOPPLER COMPLETE: CPT

## 2019-04-10 NOTE — PROGRESS NOTES
Anticoagulation Summary  As of 4/10/2019    INR goal:   2.0-3.0   TTR:   66.0 % (2.5 y)   INR used for dosin.1 (4/10/2019)   Warfarin maintenance plan:   4 mg (4 mg x 1) every day   Weekly warfarin total:   28 mg   Plan last modified:   Sin Huitron, PharmD (10/18/2018)   Next INR check:   2019   Target end date:   Indefinite    Indications    Atrial fibrillation (HCC) [I48.91]             Anticoagulation Episode Summary     INR check location:       Preferred lab:       Send INR reminders to:       Comments:   Pt goes by Kinsey      Anticoagulation Care Providers     Provider Role Specialty Phone number    Vickey Rutherford M.D. Referring Cardiology [phone]    [facility] Anticoagulation [facility] Responsible  [phone]        Anticoagulation Patient Findings      HPI:  Duane Parkinson seen in clinic today for follow up on anticoagulation therapy in the presence of AF. Denies any changes to current medical/health status since last appointment. Denies any medication or diet changes. No current symptoms of bleeding or thrombosis reported.    A/P:   INR therapeutic. Continue current regimen. BP recorded in vitals.    Follow up appointment in 2 week(s).    Next Appointment: 2019 at 8:00 am.      Britney DOE

## 2019-04-11 ENCOUNTER — OFFICE VISIT (OUTPATIENT)
Dept: CARDIOLOGY | Facility: MEDICAL CENTER | Age: 79
End: 2019-04-11
Payer: MEDICARE

## 2019-04-11 ENCOUNTER — TELEPHONE (OUTPATIENT)
Dept: CARDIOLOGY | Facility: MEDICAL CENTER | Age: 79
End: 2019-04-11

## 2019-04-11 VITALS
SYSTOLIC BLOOD PRESSURE: 104 MMHG | WEIGHT: 225.6 LBS | HEIGHT: 71 IN | BODY MASS INDEX: 31.58 KG/M2 | OXYGEN SATURATION: 95 % | DIASTOLIC BLOOD PRESSURE: 82 MMHG | HEART RATE: 80 BPM

## 2019-04-11 DIAGNOSIS — I35.0 AORTIC VALVE STENOSIS, ETIOLOGY OF CARDIAC VALVE DISEASE UNSPECIFIED: ICD-10-CM

## 2019-04-11 DIAGNOSIS — I48.91 ATRIAL FIBRILLATION, UNSPECIFIED TYPE (HCC): ICD-10-CM

## 2019-04-11 DIAGNOSIS — Z79.01 LONG TERM CURRENT USE OF ANTICOAGULANT THERAPY: ICD-10-CM

## 2019-04-11 LAB
INR BLD: 2.1 (ref 0.9–1.2)
LV EJECT FRACT  99904: 65
LV EJECT FRACT MOD 2C 99903: 75.61
LV EJECT FRACT MOD 4C 99902: 72.05
LV EJECT FRACT MOD BP 99901: 74.38

## 2019-04-11 PROCEDURE — 99213 OFFICE O/P EST LOW 20 MIN: CPT | Performed by: INTERNAL MEDICINE

## 2019-04-11 ASSESSMENT — ENCOUNTER SYMPTOMS
NERVOUS/ANXIOUS: 0
BRUISES/BLEEDS EASILY: 0
GASTROINTESTINAL NEGATIVE: 1
HEADACHES: 0
DOUBLE VISION: 0
CHILLS: 0
NAUSEA: 0
WEAKNESS: 0
COUGH: 0
PND: 0
EYES NEGATIVE: 1
BLURRED VISION: 0
FEVER: 0
WEIGHT LOSS: 0
SHORTNESS OF BREATH: 0
MYALGIAS: 0
SENSORY CHANGE: 1
LOSS OF CONSCIOUSNESS: 0
ABDOMINAL PAIN: 0
CARDIOVASCULAR NEGATIVE: 1
PALPITATIONS: 0
DEPRESSION: 0
CLAUDICATION: 0
BACK PAIN: 1
PSYCHIATRIC NEGATIVE: 1
VOMITING: 0
FOCAL WEAKNESS: 0
ORTHOPNEA: 0
DIZZINESS: 0
INSOMNIA: 0

## 2019-04-11 NOTE — LETTER
[facility]   [address], [place] [facility] [address]  [place] [address]  Phone: [phone]  Fax: [phone]              Duane Parkinson  1940    Encounter Date: 4/11/2019    Chava Pierre M.D.          PROGRESS NOTE:  Chief Complaint   Patient presents with   • Aortic Stenosis       Subjective:   Duane Parkinson is a 79 y.o. male who presents today for follow up of moderate aortic stenosis.    01/10/19    Past Medical History:   Diagnosis Date   • Aortic stenosis    • Apnea, sleep    • Atrial fibrillation (HCC)    • Back pain    • Cataract     early   • Clotting disorder (HCC)    • Depression     Lost wife 10 yeasr ago; still gets tearful   • Diabetes (HCC)     diet controlled   • Encounter for long-term (current) use of other medications    • Gasping for breath    • [place]ri[place] measles    • Heart murmur    • Heart valve disease    • Hyperlipidemia    • Hypertension    • Insomnia    • Mumps    • Nasal drainage    • Pain     back & thumb   • Pyogenic arthritis, lower leg (HCC)     thumb, back   • Restless leg syndrome    • Sleep apnea     uses CPAP   • Snoring    • Tonsillitis    • Urinary incontinence    • Valvular heart disease      Past Surgical History:   Procedure Laterality Date   • FINGER ARTHROPLASTY Left 5/27/2016    Procedure: FINGER ARTHROPLASTY THUMB CARPOMETACARPAL EXCISIONAL, EXCISE TRAPEZIUM;  Surgeon: Raheel Salgado M.D.;  Location: SURGERY SAME DAY [facility];  Service:    • CARDIOVERSION      on 7/17/06, sinus rhythm until January 2007,  paroxysmal atrial fibrillation   • KNEE ARTHROPLASTY TOTAL     • LAMINOTOMY N/A     multiple lumbar spine   • OTHER ORTHOPEDIC SURGERY      lower back   • PB PERCUT IMPLNT NEUROELECT,EPIDURAL     • TOE ARTHROPLASTY     • TURP-VAPOR N/A      Family History   Problem Relation Age of Onset   • Other Mother         unknown   • Heart Disease Mother    • Cancer Father         prostate, skin   • No Known Problems Brother       • Diabetes Brother    • Heart Disease Son    • Sleep Apnea Neg Hx      Social History     Social History   • Marital status:      Spouse name: N/A   • Number of children: N/A   • Years of education: N/A     Occupational History   • Not on file.     Social History Main Topics   • Smoking status: Former Smoker     Packs/day: 1.00     Years: 20.00     Types: Cigarettes     Quit date: 1/16/1972   • Smokeless tobacco: Former User   • Alcohol use No   • Drug use: No   • Sexual activity: Not Currently     Partners: Female     Other Topics Concern   • Not on file     Social History Narrative   • No narrative on file     Allergies   Allergen Reactions   • Feldene [Piroxicam] Itching   • Percodan [Oxycodone-Aspirin] Itching     Medications reviewed.    Outpatient Encounter Prescriptions as of 4/11/2019   Medication Sig Dispense Refill   • clindamycin (CLEOCIN) 150 MG Cap 300 mg.     • digoxin (LANOXIN) 250 MCG Tab      • warfarin (COUMADIN) 4 MG Tab Take one tablet by mouth one time daily or as directed by coumadin clinic 90 Tab 1   • metoprolol SR (TOPROL XL) 50 MG TABLET SR 24 HR TAKE ONE TABLET BY MOUTH EVERY DAY 90 Tab 2   • gabapentin (NEURONTIN) 300 MG Cap Take 300 mg by mouth 3 times a day.     • pravastatin (PRAVACHOL) 80 MG tablet TAKE 1 TABLET BY MOUTH ONCE DAILY 90 Tab 2   • cyclobenzaprine (FLEXERIL) 10 MG Tab Take 10 mg by mouth 3 times a day as needed.     • L-Lysine 500 MG Tab Take  by mouth.     • B Complex Vitamins (VITAMIN B COMPLEX PO) Take 100 Units by mouth.     • glucosamine Sulfate 500 MG Cap Take 500 mg by mouth 2 times a day, with meals.     • ascorbic acid (ASCORBIC ACID) 500 MG Tab Take 1,000 mg by mouth every evening.     • Psyllium (METAMUCIL PO) Take  by mouth.     • Coenzyme Q10 (COQ10 PO) Take  by mouth every day.     • Cholecalciferol (VITAMIN D3) 2000 UNITS Tab Take  by mouth every day. Mondays     • multivitamin (THERAGRAN) TABS Take 1 Tab by mouth every day.     • docosahexanoic  "acid (FISH OIL) 1000 MG CAPS Take  by mouth every day.     • [DISCONTINUED] Metoprolol Succinate 50 MG Capsule ER 24 Hour Sprinkle      • [DISCONTINUED] doxycycline (VIBRAMYCIN) 100 MG Tab Take 100 mg by mouth 2 times a day.     • [DISCONTINUED] digoxin (DIGOX) 125 MCG Tab Take 2 Tabs by mouth every day. (Patient taking differently: Take 250 mcg by mouth every bedtime.) 180 Tab 3     No facility-administered encounter medications on file as of 4/11/2019.      Review of Systems   Constitutional: Negative.  Negative for chills, fever, malaise/fatigue and weight loss.   HENT: Negative.  Negative for congestion and hearing loss.    Eyes: Negative.  Negative for blurred vision and double vision.   Respiratory: Negative.  Negative for cough and shortness of breath.    Cardiovascular: Negative.  Negative for chest pain, palpitations, orthopnea, claudication, leg swelling and PND.   Gastrointestinal: Negative.  Negative for abdominal pain, nausea and vomiting.   Genitourinary: Negative.  Negative for dysuria and urgency.   Musculoskeletal: Negative.  Negative for joint pain and myalgias.   Skin: Negative.  Negative for itching and rash.   Neurological: Negative.  Negative for dizziness, focal weakness, loss of consciousness, weakness and headaches.   Endo/Heme/Allergies: Negative.  Does not bruise/bleed easily.   Psychiatric/Behavioral: Negative.  Negative for depression. The patient is not nervous/anxious and does not have insomnia.         Objective:   /82 (BP Location: Right arm, Patient Position: Sitting, BP Cuff Size: Adult)   Pulse 80   Ht 1.803 m (5' 11\")   Wt 102.3 kg (225 lb 9.6 oz)   SpO2 95%   BMI 31.46 kg/m²      Physical Exam   Constitutional: He is oriented to person, place, and time. He appears well-developed and well-nourished.   HENT:   Head: Normocephalic and atraumatic.   Eyes: Pupils are equal, round, and reactive to light. Conjunctivae are normal.   Neck: Normal range of motion. Neck supple. "   Cardiovascular: Normal rate and regular rhythm.    Murmur heard.  Pulmonary/Chest: Effort normal and breath sounds normal.   Abdominal: Soft. Bowel sounds are normal.   Musculoskeletal: Normal range of motion.   Neurological: He is alert and oriented to person, place, and time.   Skin: Skin is warm and dry.   Psychiatric: He has a normal mood and affect.      CARDIAC STUDIES/PROCEDURES:     CAROTID ULTRASOUND (08/10/18)  Mild bilateral internal carotid artery stenosis (<50%).   Flow within both subclavian arteries appears to be within normal limits.   Antegrade flow, bilateral vertebral arteries.   No prior study is available for comparison..     ECHOCARDIOGRAM CONCLUSIONS (04/10/19)  Prior study done - 8/10/18. Compared to the report of the study done -   there has been mild increase across aortic valve.   Normal left ventricular systolic function.  Left ventricular ejection fraction is visually estimated to be 65%.  Heavily calcified aortic valve leaflets with restrictive movements.  Moderate aortic stenosis by gradients. Consider paradoxical low flow   low gradient severe aortic stenosis.  Aortic valve area calculated from the continuity equation is 0.8 cm2.  Max is 3.06  m/s. Transvalvular gradients are - Peak: 30 mmHg, Mean: 23 mmHg.   Mild tricuspid regurgitation.  (study result reviewed)    ECHOCARDIOGRAM CONCLUSIONS (08/10/18)  Compared to the images of the prior study done 2/18/16, the aortic stenosis appears severe.  Likely severe aortic stenosis with a low flow, low gradient.  Left ventricular ejection fraction is visually estimated to be 70%.  Unable to estimate pulmonary artery pressure due to an inadequate tricuspid regurgitant jet.  (Moderate aortic stenosis.   Aortic valve area calculated from the continuity equation is 0.8 cm2.  Vmax is 2.9 m/s. Transvalvular gradients are - Peak: 31 mmHg, Mean: 21 mmHg.)    EKG performed on (01/10/19) and reviewed: EKG shows atrial fibrillation.    Laboratory  results of (06/05/18) were reviewed. Cholesterol profile of 123/189/32/53 noted.    Assessment:     1. Aortic valve stenosis, etiology of cardiac valve disease unspecified     2. Atrial fibrillation, unspecified type (HCC)     3. Long term current use of anticoagulant therapy         Medical Decision Making:  Today's Assessment / Status / Plan:     1. Aortic stenosis: He remains mildly symptomatic with moderate aortic stenosis. We will follow him clinically and repeat an echocardiogram in 3 months.  2. Atrial fibrillation on anticoagulation therapy (warfarin): He is doing well on anticoagulation and the ventricular rate is well controlled.    CC Merary Yanes and Fransisco Portillo           No Recipients

## 2019-04-11 NOTE — TELEPHONE ENCOUNTER
Message   Received: Today   Message Contents   Chava Pierre M.D.  Carissa Robertson R.N.; Xena Johnson R.N.             Please call patient with echocardiogram results showing moderate to severe aortic stenosis. Please discuss this patient at valve conference.     Thanks.  PEACE.      Follow up today with Dr. Pierre, 4/11.

## 2019-04-11 NOTE — PROGRESS NOTES
Chief Complaint   Patient presents with   • Aortic Stenosis       Subjective:   Duane Parkinson is a 79 y.o. male who presents today for follow up of moderate aortic stenosis.    01/10/19    Past Medical History:   Diagnosis Date   • Aortic stenosis    • Apnea, sleep    • Atrial fibrillation (HCC)    • Back pain    • Cataract     early   • Clotting disorder (HCC)    • Depression     Lost wife 10 yeasr ago; still gets tearful   • Diabetes (HCC)     diet controlled   • Encounter for long-term (current) use of other medications    • Gasping for breath    • [place] measles    • Heart murmur    • Heart valve disease    • Hyperlipidemia    • Hypertension    • Insomnia    • Mumps    • Nasal drainage    • Pain     back & thumb   • Pyogenic arthritis, lower leg (HCC)     thumb, back   • Restless leg syndrome    • Sleep apnea     uses CPAP   • Snoring    • Tonsillitis    • Urinary incontinence    • Valvular heart disease      Past Surgical History:   Procedure Laterality Date   • FINGER ARTHROPLASTY Left 5/27/2016    Procedure: FINGER ARTHROPLASTY THUMB CARPOMETACARPAL EXCISIONAL, EXCISE TRAPEZIUM;  Surgeon: Raheel Salgado M.D.;  Location: SURGERY SAME DAY [facility];  Service:    • CARDIOVERSION      on 7/17/06, sinus rhythm until January 2007,  paroxysmal atrial fibrillation   • KNEE ARTHROPLASTY TOTAL     • LAMINOTOMY N/A     multiple lumbar spine   • OTHER ORTHOPEDIC SURGERY      lower back   • PB PERCUT IMPLNT NEUROELECT,EPIDURAL     • TOE ARTHROPLASTY     • TURP-VAPOR N/A      Family History   Problem Relation Age of Onset   • Other Mother         unknown   • Heart Disease Mother    • Cancer Father         prostate, skin   • No Known Problems Brother    • Diabetes Brother    • Heart Disease Son    • Sleep Apnea Neg Hx      Social History     Social History   • Marital status:      Spouse name: N/A   • Number of children: N/A   • Years of education: N/A     Occupational History   • Not on file.     Social  History Main Topics   • Smoking status: Former Smoker     Packs/day: 1.00     Years: 20.00     Types: Cigarettes     Quit date: 1/16/1972   • Smokeless tobacco: Former User   • Alcohol use No   • Drug use: No   • Sexual activity: Not Currently     Partners: Female     Other Topics Concern   • Not on file     Social History Narrative   • No narrative on file     Allergies   Allergen Reactions   • Feldene [Piroxicam] Itching   • Percodan [Oxycodone-Aspirin] Itching     Medications reviewed.    Outpatient Encounter Prescriptions as of 4/11/2019   Medication Sig Dispense Refill   • clindamycin (CLEOCIN) 150 MG Cap 300 mg.     • digoxin (LANOXIN) 250 MCG Tab      • warfarin (COUMADIN) 4 MG Tab Take one tablet by mouth one time daily or as directed by coumadin clinic 90 Tab 1   • metoprolol SR (TOPROL XL) 50 MG TABLET SR 24 HR TAKE ONE TABLET BY MOUTH EVERY DAY 90 Tab 2   • gabapentin (NEURONTIN) 300 MG Cap Take 300 mg by mouth 3 times a day.     • pravastatin (PRAVACHOL) 80 MG tablet TAKE 1 TABLET BY MOUTH ONCE DAILY 90 Tab 2   • cyclobenzaprine (FLEXERIL) 10 MG Tab Take 10 mg by mouth 3 times a day as needed.     • L-Lysine 500 MG Tab Take  by mouth.     • B Complex Vitamins (VITAMIN B COMPLEX PO) Take 100 Units by mouth.     • glucosamine Sulfate 500 MG Cap Take 500 mg by mouth 2 times a day, with meals.     • ascorbic acid (ASCORBIC ACID) 500 MG Tab Take 1,000 mg by mouth every evening.     • Psyllium (METAMUCIL PO) Take  by mouth.     • Coenzyme Q10 (COQ10 PO) Take  by mouth every day.     • Cholecalciferol (VITAMIN D3) 2000 UNITS Tab Take  by mouth every day. Mondays     • multivitamin (THERAGRAN) TABS Take 1 Tab by mouth every day.     • docosahexanoic acid (FISH OIL) 1000 MG CAPS Take  by mouth every day.     • [DISCONTINUED] Metoprolol Succinate 50 MG Capsule ER 24 Hour Sprinkle      • [DISCONTINUED] doxycycline (VIBRAMYCIN) 100 MG Tab Take 100 mg by mouth 2 times a day.     • [DISCONTINUED] digoxin (DIGOX) 125  "MCG Tab Take 2 Tabs by mouth every day. (Patient taking differently: Take 250 mcg by mouth every bedtime.) 180 Tab 3     No facility-administered encounter medications on file as of 4/11/2019.      Review of Systems   Constitutional: Positive for malaise/fatigue. Negative for chills, fever and weight loss.   HENT: Negative.  Negative for congestion and hearing loss.    Eyes: Negative.  Negative for blurred vision and double vision.   Respiratory: Negative for cough and shortness of breath.    Cardiovascular: Negative.  Negative for chest pain, palpitations, orthopnea, claudication, leg swelling and PND.   Gastrointestinal: Negative.  Negative for abdominal pain, nausea and vomiting.   Genitourinary: Negative.  Negative for dysuria and urgency.   Musculoskeletal: Positive for back pain. Negative for joint pain and myalgias.   Skin: Negative.  Negative for itching and rash.   Neurological: Positive for sensory change. Negative for dizziness, focal weakness, loss of consciousness, weakness and headaches.   Endo/Heme/Allergies: Negative.  Does not bruise/bleed easily.   Psychiatric/Behavioral: Negative.  Negative for depression. The patient is not nervous/anxious and does not have insomnia.         Objective:   /82 (BP Location: Right arm, Patient Position: Sitting, BP Cuff Size: Adult)   Pulse 80   Ht 1.803 m (5' 11\")   Wt 102.3 kg (225 lb 9.6 oz)   SpO2 95%   BMI 31.46 kg/m²     Physical Exam   Constitutional: He is oriented to person, place, and time. He appears well-developed and well-nourished.   HENT:   Head: Normocephalic and atraumatic.   Eyes: Pupils are equal, round, and reactive to light. Conjunctivae are normal.   Neck: Normal range of motion. Neck supple.   Cardiovascular: Normal rate.  An irregularly irregular rhythm present.   Murmur heard.  Pulmonary/Chest: Effort normal and breath sounds normal.   Abdominal: Soft. Bowel sounds are normal.   Musculoskeletal: Normal range of motion. "   Neurological: He is alert and oriented to person, place, and time.   Skin: Skin is warm and dry.   Psychiatric: He has a normal mood and affect.      CARDIAC STUDIES/PROCEDURES:     CAROTID ULTRASOUND (08/10/18)  Mild bilateral internal carotid artery stenosis (<50%).   Flow within both subclavian arteries appears to be within normal limits.   Antegrade flow, bilateral vertebral arteries.   No prior study is available for comparison..     ECHOCARDIOGRAM CONCLUSIONS (04/10/19)  Prior study done - 8/10/18. Compared to the report of the study done -   there has been mild increase across aortic valve.   Normal left ventricular systolic function.  Left ventricular ejection fraction is visually estimated to be 65%.  Heavily calcified aortic valve leaflets with restrictive movements.  Moderate aortic stenosis by gradients. Consider paradoxical low flow   low gradient severe aortic stenosis.  Aortic valve area calculated from the continuity equation is 0.8 cm2.  Max is 3.06  m/s. Transvalvular gradients are - Peak: 30 mmHg, Mean: 23 mmHg.   Mild tricuspid regurgitation.  (study result reviewed)    ECHOCARDIOGRAM CONCLUSIONS (08/10/18)  Compared to the images of the prior study done 2/18/16, the aortic stenosis appears severe.  Likely severe aortic stenosis with a low flow, low gradient.  Left ventricular ejection fraction is visually estimated to be 70%.  Unable to estimate pulmonary artery pressure due to an inadequate tricuspid regurgitant jet.  Moderate aortic stenosis.   Aortic valve area calculated from the continuity equation is 0.8 cm2.  Vmax is 2.9 m/s. Transvalvular gradients are - Peak: 31 mmHg, Mean: 21 mmHg.    EKG performed on (01/10/19) and reviewed: EKG shows atrial fibrillation.    Laboratory results of (06/05/18) were reviewed. Cholesterol profile of 123/189/32/53 noted.    Assessment:     1. Aortic valve stenosis, etiology of cardiac valve disease unspecified     2. Atrial fibrillation, unspecified type  (HCC)     3. Long term current use of anticoagulant therapy       Medical Decision Making:  Today's Assessment / Status / Plan:     1. Aortic stenosis: His echocardiogram showed heavily calcified valve with gradient still consistent with moderate aortic stenosis. He remains mildly symptomatic. We will follow him clinically and repeat an echocardiogram in 6 months.  2. Atrial fibrillation on anticoagulation therapy (warfarin): He is doing well on anticoagulation and the ventricular rate is well controlled.    We will follow up in six months with echocardiogram.    CC Merary Yanes and Fransisco Portillo

## 2019-04-15 ENCOUNTER — TELEPHONE (OUTPATIENT)
Dept: CARDIOLOGY | Facility: MEDICAL CENTER | Age: 79
End: 2019-04-15

## 2019-04-16 NOTE — TELEPHONE ENCOUNTER
The patient sees Dr. Rutherford in 10 days in the office. He can further discuss his symptoms and he will order more tests if warranted. Although, there may not be any tests at this time. We will also discuss this with Dr. Pierre after Dr. Rutherford's visit. Please reassure the patient. SC

## 2019-04-16 NOTE — TELEPHONE ENCOUNTER
patient calling with correct information   Received: Today   Message Contents   KATHRYN Joy/Feliz       Patient said he gave Dr. Pierre the wrong information on his meds, he would like to clear it up so you have the correct information. He can be reached at [phone].      Pt said that he told Dr. Pierre that he didn't have any symptoms from his aortic stenosis but after thinking about it he realizes that he does. Pt explains that he gets EGAN, fatigues easily, and very occasional, mild chest discomfort with exertion.  He is wondering what other tests need to be performed since he is symptomatic.     To Dr. Pierre

## 2019-04-25 ENCOUNTER — HOSPITAL ENCOUNTER (OUTPATIENT)
Dept: LAB | Facility: MEDICAL CENTER | Age: 79
End: 2019-04-25
Attending: INTERNAL MEDICINE
Payer: MEDICARE

## 2019-04-25 ENCOUNTER — ANTICOAGULATION VISIT (OUTPATIENT)
Dept: VASCULAR LAB | Facility: MEDICAL CENTER | Age: 79
End: 2019-04-25
Attending: INTERNAL MEDICINE
Payer: MEDICARE

## 2019-04-25 DIAGNOSIS — I48.91 ATRIAL FIBRILLATION, UNSPECIFIED TYPE (HCC): ICD-10-CM

## 2019-04-25 LAB — INR PPP: 3.6 (ref 2–3.5)

## 2019-04-25 PROCEDURE — 99212 OFFICE O/P EST SF 10 MIN: CPT

## 2019-04-25 PROCEDURE — 85610 PROTHROMBIN TIME: CPT

## 2019-04-25 PROCEDURE — 36415 COLL VENOUS BLD VENIPUNCTURE: CPT

## 2019-04-25 PROCEDURE — 82105 ALPHA-FETOPROTEIN SERUM: CPT

## 2019-04-25 NOTE — PROGRESS NOTES
Anticoagulation Summary  As of 4/25/2019    INR goal:   2.0-3.0   TTR:   66.0 % (2.5 y)   INR used for dosing:   3.60! (4/25/2019)   Warfarin maintenance plan:   4 mg (4 mg x 1) every day   Weekly warfarin total:   28 mg   Plan last modified:   Sin Huitron, PharmD (10/18/2018)   Next INR check:   5/8/2019   Target end date:   Indefinite    Indications    Atrial fibrillation (HCC) [I48.91]             Anticoagulation Episode Summary     INR check location:       Preferred lab:       Send INR reminders to:       Comments:   Pt goes by Kinsey      Anticoagulation Care Providers     Provider Role Specialty Phone number    Vickey Rutherford M.D. Referring Cardiology [phone]    [facility] Responsible  [phone]        Anticoagulation Patient Findings    History of Present Illness: follow up appointment for chronic anticoagulation with the high risk medication, warfarin for atrial fibrillation    Last INR was at goal, pt is now supra therapeutic today.  He has been having trouble with the days of the week running into one another so it is possible he took a double dose recently.  Will decrease tonight's dose to 2 mg then resume current dosing regimen. Follow up in 2 weeks, to reduce the risk of adverse events related to this high risk medication, warfarin.    Pratima Ricks, Clinical Pharmacist      Pt declines vitals today

## 2019-04-26 ENCOUNTER — TELEPHONE (OUTPATIENT)
Dept: CARDIOLOGY | Facility: MEDICAL CENTER | Age: 79
End: 2019-04-26

## 2019-04-26 ENCOUNTER — HOSPITAL ENCOUNTER (OUTPATIENT)
Dept: LAB | Facility: MEDICAL CENTER | Age: 79
End: 2019-04-26
Attending: INTERNAL MEDICINE
Payer: MEDICARE

## 2019-04-26 ENCOUNTER — OFFICE VISIT (OUTPATIENT)
Dept: CARDIOLOGY | Facility: MEDICAL CENTER | Age: 79
End: 2019-04-26
Payer: MEDICARE

## 2019-04-26 VITALS
OXYGEN SATURATION: 98 % | DIASTOLIC BLOOD PRESSURE: 70 MMHG | HEIGHT: 71 IN | WEIGHT: 225 LBS | SYSTOLIC BLOOD PRESSURE: 120 MMHG | HEART RATE: 82 BPM | BODY MASS INDEX: 31.5 KG/M2

## 2019-04-26 DIAGNOSIS — E78.5 DYSLIPIDEMIA: ICD-10-CM

## 2019-04-26 DIAGNOSIS — I48.91 ATRIAL FIBRILLATION, UNSPECIFIED TYPE (HCC): ICD-10-CM

## 2019-04-26 DIAGNOSIS — Z79.899 HIGH RISK MEDICATION USE: ICD-10-CM

## 2019-04-26 DIAGNOSIS — G47.33 OSA (OBSTRUCTIVE SLEEP APNEA): ICD-10-CM

## 2019-04-26 DIAGNOSIS — I05.9 MITRAL VALVE DISORDER: ICD-10-CM

## 2019-04-26 DIAGNOSIS — I10 ESSENTIAL HYPERTENSION: ICD-10-CM

## 2019-04-26 DIAGNOSIS — R06.02 SOB (SHORTNESS OF BREATH): ICD-10-CM

## 2019-04-26 DIAGNOSIS — I35.0 AORTIC VALVE STENOSIS, ETIOLOGY OF CARDIAC VALVE DISEASE UNSPECIFIED: ICD-10-CM

## 2019-04-26 LAB
BNP SERPL-MCNC: 79 PG/ML (ref 0–100)
INR BLD: 3.6 (ref 0.9–1.2)

## 2019-04-26 PROCEDURE — 99215 OFFICE O/P EST HI 40 MIN: CPT | Performed by: INTERNAL MEDICINE

## 2019-04-26 PROCEDURE — 36415 COLL VENOUS BLD VENIPUNCTURE: CPT

## 2019-04-26 PROCEDURE — 83880 ASSAY OF NATRIURETIC PEPTIDE: CPT

## 2019-04-26 ASSESSMENT — ENCOUNTER SYMPTOMS
STRIDOR: 0
PND: 0
DIZZINESS: 0
CARDIOVASCULAR NEGATIVE: 1
FEVER: 0
HEMOPTYSIS: 0
CHILLS: 0
MUSCULOSKELETAL NEGATIVE: 1
WHEEZING: 0
SPUTUM PRODUCTION: 0
CONSTITUTIONAL NEGATIVE: 1
LOSS OF CONSCIOUSNESS: 0
BRUISES/BLEEDS EASILY: 0
ORTHOPNEA: 0
CLAUDICATION: 0
SORE THROAT: 0
WEAKNESS: 0
RESPIRATORY NEGATIVE: 1
NEUROLOGICAL NEGATIVE: 1
EYES NEGATIVE: 1
GASTROINTESTINAL NEGATIVE: 1
COUGH: 0
PALPITATIONS: 0
SHORTNESS OF BREATH: 0

## 2019-04-26 NOTE — TELEPHONE ENCOUNTER
----- Message from Sammie Conde, Med Ass't sent at 4/26/2019 11:57 AM PDT -----  Regarding: lab code   Contact: [phone]  HUBERT Lozada from the lab needs an ICD 10 code for the Btype lab order.  He is at ext. [phone]

## 2019-04-26 NOTE — PROGRESS NOTES
Chief Complaint   Patient presents with   • Aortic Stenosis     F/V: 4 MO       Subjective:   Duane Parkinson is a 79 y.o. male who presents today as a follow-up for his aortic stenosis.  He had a repeat echocardiogram ordered but did not complete it.  His previous echocardiogram showed a stroke volume of 22 with a calculated valve area of 0.8.  On exam he clearly has a musical sounding murmur with no A2 component.  He has been complaining of exertional shortness of breath for some time.  He has no lower extremity edema.  Is not been getting chest pain or syncope.  He continues on the Coumadin for his atrial fibrillation.  His blood pressures currently at goal.    Past Medical History:   Diagnosis Date   • Aortic stenosis    • Apnea, sleep    • Atrial fibrillation (HCC)    • Back pain    • Cataract     early   • Clotting disorder (HCC)    • Depression     Lost wife 10 yeasr ago; still gets tearful   • Diabetes (HCC)     diet controlled   • Encounter for long-term (current) use of other medications    • Gasping for breath    • [place] measles    • Heart murmur    • Heart valve disease    • Hyperlipidemia    • Hypertension    • Insomnia    • Mumps    • Nasal drainage    • Pain     back & thumb   • Pyogenic arthritis, lower leg (HCC)     thumb, back   • Restless leg syndrome    • Sleep apnea     uses CPAP   • Snoring    • Tonsillitis    • Urinary incontinence    • Valvular heart disease      Past Surgical History:   Procedure Laterality Date   • FINGER ARTHROPLASTY Left 5/27/2016    Procedure: FINGER ARTHROPLASTY THUMB CARPOMETACARPAL EXCISIONAL, EXCISE TRAPEZIUM;  Surgeon: Raheel Salgado M.D.;  Location: SURGERY SAME DAY [facility];  Service:    • CARDIOVERSION      on 7/17/06, sinus rhythm until January 2007,  paroxysmal atrial fibrillation   • KNEE ARTHROPLASTY TOTAL     • LAMINOTOMY N/A     multiple lumbar spine   • OTHER ORTHOPEDIC SURGERY      lower back   • PB PERCUT IMPLNT NEUROELECT,EPIDURAL     • TOE  ARTHROPLASTY     • TURP-VAPOR N/A      Family History   Problem Relation Age of Onset   • Other Mother         unknown   • Heart Disease Mother    • Cancer Father         prostate, skin   • No Known Problems Brother    • Diabetes Brother    • Heart Disease Son    • Sleep Apnea Neg Hx      Social History     Social History   • Marital status:      Spouse name: N/A   • Number of children: N/A   • Years of education: N/A     Occupational History   • Not on file.     Social History Main Topics   • Smoking status: Former Smoker     Packs/day: 1.00     Years: 20.00     Types: Cigarettes     Quit date: 1/16/1972   • Smokeless tobacco: Former User   • Alcohol use No   • Drug use: No   • Sexual activity: Not Currently     Partners: Female     Other Topics Concern   • Not on file     Social History Narrative   • No narrative on file     Allergies   Allergen Reactions   • Feldene [Piroxicam] Itching   • Percodan [Oxycodone-Aspirin] Itching     Outpatient Encounter Prescriptions as of 4/26/2019   Medication Sig Dispense Refill   • clindamycin (CLEOCIN) 150 MG Cap Take 300 mg by mouth 2 Times a Day.     • digoxin (LANOXIN) 250 MCG Tab      • warfarin (COUMADIN) 4 MG Tab Take one tablet by mouth one time daily or as directed by coumadin clinic 90 Tab 1   • metoprolol SR (TOPROL XL) 50 MG TABLET SR 24 HR TAKE ONE TABLET BY MOUTH EVERY DAY 90 Tab 2   • gabapentin (NEURONTIN) 300 MG Cap Take 300 mg by mouth 3 times a day.     • pravastatin (PRAVACHOL) 80 MG tablet TAKE 1 TABLET BY MOUTH ONCE DAILY 90 Tab 2   • L-Lysine 500 MG Tab Take  by mouth.     • B Complex Vitamins (VITAMIN B COMPLEX PO) Take 100 Units by mouth.     • glucosamine Sulfate 500 MG Cap Take 500 mg by mouth 2 times a day, with meals.     • ascorbic acid (ASCORBIC ACID) 500 MG Tab Take 1,000 mg by mouth every evening.     • Psyllium (METAMUCIL PO) Take  by mouth.     • Coenzyme Q10 (COQ10 PO) Take  by mouth every day.     • Cholecalciferol (VITAMIN D3) 2000  "UNITS Tab Take  by mouth every day. Mondays     • multivitamin (THERAGRAN) TABS Take 1 Tab by mouth every day.     • docosahexanoic acid (FISH OIL) 1000 MG CAPS Take  by mouth every day.     • cyclobenzaprine (FLEXERIL) 10 MG Tab Take 10 mg by mouth 3 times a day as needed.       No facility-administered encounter medications on file as of 4/26/2019.      Review of Systems   Constitutional: Negative.  Negative for chills, fever and malaise/fatigue.   HENT: Negative.  Negative for sore throat.    Eyes: Negative.    Respiratory: Negative.  Negative for cough, hemoptysis, sputum production, shortness of breath, wheezing and stridor.    Cardiovascular: Negative.  Negative for chest pain, palpitations, orthopnea, claudication, leg swelling and PND.   Gastrointestinal: Negative.    Genitourinary: Negative.    Musculoskeletal: Negative.    Skin: Negative.    Neurological: Negative.  Negative for dizziness, loss of consciousness and weakness.   Endo/Heme/Allergies: Negative.  Does not bruise/bleed easily.   All other systems reviewed and are negative.       Objective:   /70 (BP Location: Left arm, Patient Position: Sitting, BP Cuff Size: Adult)   Pulse 82   Ht 1.803 m (5' 11\")   Wt 102.1 kg (225 lb)   SpO2 98%   BMI 31.38 kg/m²     Physical Exam   Constitutional: He appears well-developed and well-nourished. No distress.   HENT:   Head: Normocephalic and atraumatic.   Right Ear: External ear normal.   Left Ear: External ear normal.   Nose: Nose normal.   Mouth/Throat: No oropharyngeal exudate.   Eyes: Pupils are equal, round, and reactive to light. Conjunctivae and EOM are normal. Right eye exhibits no discharge. Left eye exhibits no discharge. No scleral icterus.   Neck: Neck supple. No JVD present.   Cardiovascular: Normal rate, regular rhythm and intact distal pulses.  Exam reveals no gallop and no friction rub.    No murmur heard.  Pulmonary/Chest: Effort normal. No stridor. No respiratory distress. He has no " wheezes. He has no rales. He exhibits no tenderness.   Abdominal: Soft. He exhibits no distension. There is no guarding.   Musculoskeletal: Normal range of motion. He exhibits no edema, tenderness or deformity.   Neurological: He is alert. He has normal reflexes. He displays normal reflexes. No cranial nerve deficit. He exhibits normal muscle tone. Coordination normal.   Skin: Skin is warm and dry. No rash noted. He is not diaphoretic. No erythema. No pallor.   Psychiatric: He has a normal mood and affect. His behavior is normal. Judgment and thought content normal.   Nursing note and vitals reviewed.      Assessment:     1. Aortic valve stenosis, etiology of cardiac valve disease unspecified  BTYPE NATRIURETIC PEPTIDE    EC-ECHOCARDIOGRAM DOBUTAMINE REST/STRESS W/O CONT   2. Atrial fibrillation, unspecified type (HCC)  BTYPE NATRIURETIC PEPTIDE    EC-ECHOCARDIOGRAM DOBUTAMINE REST/STRESS W/O CONT   3. Dyslipidemia  BTYPE NATRIURETIC PEPTIDE    EC-ECHOCARDIOGRAM DOBUTAMINE REST/STRESS W/O CONT   4. Essential hypertension  BTYPE NATRIURETIC PEPTIDE    EC-ECHOCARDIOGRAM DOBUTAMINE REST/STRESS W/O CONT   5. High risk medication use  BTYPE NATRIURETIC PEPTIDE    EC-ECHOCARDIOGRAM DOBUTAMINE REST/STRESS W/O CONT   6. LORENA (obstructive sleep apnea)  BTYPE NATRIURETIC PEPTIDE    EC-ECHOCARDIOGRAM DOBUTAMINE REST/STRESS W/O CONT   7. Mitral valve disorder  BTYPE NATRIURETIC PEPTIDE    EC-ECHOCARDIOGRAM DOBUTAMINE REST/STRESS W/O CONT   8. SOB (shortness of breath)  EC-ECHOCARDIOGRAM DOBUTAMINE REST/STRESS W/O CONT       Medical Decision Making:  Today's Assessment / Status / Plan:     79-year-old male with what appears to be low flow low gradient aortic stenosis with mild to moderate symptoms.  At this point we need to get him qualified for a valve.  We will send him for a dobutamine stress echo given his poor windows and low gradient seen on his prior echocardiograms.  I will also get a BNP today to risk stratify him.   Otherwise for remainder of his risk factors are stable.  I anticipate sending him for cardiac catheterization once we have his valve better characterize.    1. A-fib   - dig 250 mcg daily  - metop sr 50 daily  - followed by coumadin clinic     2. Aortic stenosis, low flow low gradient, severe, HUY 0.7 cm    -  stress echo    - BNP today    - hold warfarin for procedure     3. HLD    - cont prava 80    4. High risk meds    - labs reviewed, instructions given for procedure    Thank for you allowing me to take part in your patient's care, please call should you have any questions or would like to discuss this patient.

## 2019-04-26 NOTE — LETTER
[facility]   [address], [address]  [place] [address]  Phone: [phone]  Fax: [phone]              Duane Kinsey Parkinson  1940    Encounter Date: 4/26/2019    Vikcey Rutherford M.D.          PROGRESS NOTE:  No notes on file      Fransisco Graham M.D.  [address]  [address]  [facility] [address]  VIA In Basket

## 2019-04-27 LAB — AFP-TM SERPL-MCNC: 3 NG/ML (ref 0–9)

## 2019-05-08 ENCOUNTER — ANTICOAGULATION VISIT (OUTPATIENT)
Dept: VASCULAR LAB | Facility: MEDICAL CENTER | Age: 79
End: 2019-05-08
Attending: INTERNAL MEDICINE
Payer: MEDICARE

## 2019-05-08 VITALS — HEART RATE: 82 BPM | DIASTOLIC BLOOD PRESSURE: 64 MMHG | SYSTOLIC BLOOD PRESSURE: 117 MMHG

## 2019-05-08 DIAGNOSIS — I48.91 ATRIAL FIBRILLATION, UNSPECIFIED TYPE (HCC): ICD-10-CM

## 2019-05-08 LAB
INR BLD: 2.7 (ref 0.9–1.2)
INR PPP: 2.7 (ref 2–3.5)

## 2019-05-08 PROCEDURE — 99211 OFF/OP EST MAY X REQ PHY/QHP: CPT | Performed by: PHARMACIST

## 2019-05-08 PROCEDURE — 85610 PROTHROMBIN TIME: CPT

## 2019-05-08 NOTE — PROGRESS NOTES
Anticoagulation Summary  As of 2019    INR goal:   2.0-3.0   TTR:   65.5 % (2.5 y)   INR used for dosin.70 (2019)   Warfarin maintenance plan:   4 mg (4 mg x 1) every day   Weekly warfarin total:   28 mg   Plan last modified:   Sin Huitron, PharmD (10/18/2018)   Next INR check:   2019   Target end date:   Indefinite    Indications    Atrial fibrillation (HCC) [I48.91]             Anticoagulation Episode Summary     INR check location:       Preferred lab:       Send INR reminders to:       Comments:   Pt goes by Kinsey      Anticoagulation Care Providers     Provider Role Specialty Phone number    Vickey Rutherford M.D. Referring Cardiology [phone]    [facility] Responsible  [phone]        Anticoagulation Patient Findings  Patient Findings     Negatives:   Signs/symptoms of thrombosis, Signs/symptoms of bleeding, Laboratory test error suspected, Change in health, Change in alcohol use, Change in activity, Upcoming invasive procedure, Emergency department visit, Upcoming dental procedure, Missed doses, Extra doses, Change in medications, Change in diet/appetite, Hospital admission, Bruising, Other complaints          HPI:  Duane Parkinson seen in clinic today, on anticoagulation therapy with warfarin for stroke prevention due to hx of atrial fibrillation.  Changes to current medical/health status since last appt: NONE  Denies signs/symptoms of bleeding and/or thrombosis since the last appt.    Denies any interval changes to diet  Denies any interval changes to medications since last appt.   Denies any complications or cost restrictions with current therapy.   BP recorded in vitals.       A/P   INR  remains -therapeutic at 2.7.   Pt is to continue with current warfarin dosing regimen.    Follow up appointment in 2 week(s).    Eagle Escobar, AbhishekD

## 2019-05-22 ENCOUNTER — ANTICOAGULATION VISIT (OUTPATIENT)
Dept: VASCULAR LAB | Facility: MEDICAL CENTER | Age: 79
End: 2019-05-22
Attending: INTERNAL MEDICINE
Payer: MEDICARE

## 2019-05-22 ENCOUNTER — HOSPITAL ENCOUNTER (OUTPATIENT)
Dept: LAB | Facility: MEDICAL CENTER | Age: 79
End: 2019-05-22
Attending: INTERNAL MEDICINE
Payer: MEDICARE

## 2019-05-22 VITALS — DIASTOLIC BLOOD PRESSURE: 81 MMHG | SYSTOLIC BLOOD PRESSURE: 117 MMHG | HEART RATE: 69 BPM

## 2019-05-22 DIAGNOSIS — Z79.01 CHRONIC ANTICOAGULATION: ICD-10-CM

## 2019-05-22 LAB
ALBUMIN SERPL BCP-MCNC: 3.9 G/DL (ref 3.2–4.9)
ALBUMIN/GLOB SERPL: 1 G/DL
ALP SERPL-CCNC: 78 U/L (ref 30–99)
ALT SERPL-CCNC: 52 U/L (ref 2–50)
ANION GAP SERPL CALC-SCNC: 6 MMOL/L (ref 0–11.9)
AST SERPL-CCNC: 53 U/L (ref 12–45)
BASOPHILS # BLD AUTO: 0.8 % (ref 0–1.8)
BASOPHILS # BLD: 0.06 K/UL (ref 0–0.12)
BILIRUB SERPL-MCNC: 0.6 MG/DL (ref 0.1–1.5)
BUN SERPL-MCNC: 11 MG/DL (ref 8–22)
CALCIUM SERPL-MCNC: 9.2 MG/DL (ref 8.5–10.5)
CHLORIDE SERPL-SCNC: 106 MMOL/L (ref 96–112)
CO2 SERPL-SCNC: 28 MMOL/L (ref 20–33)
CREAT SERPL-MCNC: 0.74 MG/DL (ref 0.5–1.4)
EOSINOPHIL # BLD AUTO: 0.26 K/UL (ref 0–0.51)
EOSINOPHIL NFR BLD: 3.5 % (ref 0–6.9)
ERYTHROCYTE [DISTWIDTH] IN BLOOD BY AUTOMATED COUNT: 47.8 FL (ref 35.9–50)
GLOBULIN SER CALC-MCNC: 3.9 G/DL (ref 1.9–3.5)
GLUCOSE SERPL-MCNC: 83 MG/DL (ref 65–99)
HCT VFR BLD AUTO: 49.3 % (ref 42–52)
HGB BLD-MCNC: 16.4 G/DL (ref 14–18)
IMM GRANULOCYTES # BLD AUTO: 0.03 K/UL (ref 0–0.11)
IMM GRANULOCYTES NFR BLD AUTO: 0.4 % (ref 0–0.9)
INR BLD: 3.4 (ref 0.9–1.2)
INR PPP: 3.06 (ref 0.87–1.13)
INR PPP: 3.4 (ref 2–3.5)
LYMPHOCYTES # BLD AUTO: 2.55 K/UL (ref 1–4.8)
LYMPHOCYTES NFR BLD: 34.6 % (ref 22–41)
MCH RBC QN AUTO: 31.8 PG (ref 27–33)
MCHC RBC AUTO-ENTMCNC: 33.3 G/DL (ref 33.7–35.3)
MCV RBC AUTO: 95.7 FL (ref 81.4–97.8)
MONOCYTES # BLD AUTO: 0.8 K/UL (ref 0–0.85)
MONOCYTES NFR BLD AUTO: 10.9 % (ref 0–13.4)
NEUTROPHILS # BLD AUTO: 3.67 K/UL (ref 1.82–7.42)
NEUTROPHILS NFR BLD: 49.8 % (ref 44–72)
NRBC # BLD AUTO: 0 K/UL
NRBC BLD-RTO: 0 /100 WBC
PLATELET # BLD AUTO: 247 K/UL (ref 164–446)
PMV BLD AUTO: 10 FL (ref 9–12.9)
POTASSIUM SERPL-SCNC: 4.1 MMOL/L (ref 3.6–5.5)
PROT SERPL-MCNC: 7.8 G/DL (ref 6–8.2)
PROTHROMBIN TIME: 31.7 SEC (ref 12–14.6)
RBC # BLD AUTO: 5.15 M/UL (ref 4.7–6.1)
SODIUM SERPL-SCNC: 140 MMOL/L (ref 135–145)
WBC # BLD AUTO: 7.4 K/UL (ref 4.8–10.8)

## 2019-05-22 PROCEDURE — 85025 COMPLETE CBC W/AUTO DIFF WBC: CPT

## 2019-05-22 PROCEDURE — 99212 OFFICE O/P EST SF 10 MIN: CPT | Performed by: PHARMACIST

## 2019-05-22 PROCEDURE — 85610 PROTHROMBIN TIME: CPT

## 2019-05-22 PROCEDURE — 80053 COMPREHEN METABOLIC PANEL: CPT

## 2019-05-22 PROCEDURE — 36415 COLL VENOUS BLD VENIPUNCTURE: CPT

## 2019-05-22 NOTE — PROGRESS NOTES
Anticoagulation Summary  As of 5/22/2019    INR goal:   2.0-3.0   TTR:   65.2 % (2.6 y)   INR used for dosing:   3.40! (5/22/2019)   Warfarin maintenance plan:   4 mg (4 mg x 1) every day   Weekly warfarin total:   28 mg   Plan last modified:   Sin Huitron, PharmD (10/18/2018)   Next INR check:   6/7/2019   Target end date:   Indefinite    Indications    Atrial fibrillation (HCC) [I48.91]             Anticoagulation Episode Summary     INR check location:       Preferred lab:       Send INR reminders to:       Comments:   Pt goes by Kinsey      Anticoagulation Care Providers     Provider Role Specialty Phone number    Vickey Rutherford M.D. Referring Cardiology [phone]    [facility] Anticoagulation [facility] Responsible  [phone]        Anticoagulation Patient Findings      HPI:  Duane Parkinson seen in clinic today, on anticoagulation therapy with warfarin for Afib  Changes to current medical/health status since last appt: nothing  Denies signs/symptoms of bleeding and/or thrombosis since the last appt.    Denies any interval changes to diet  Denies any interval changes to medications since last appt.   Denies any complications or cost restrictions with current therapy.   BP recorded in vitals.       A/P   INR  is supra-therapeutic.   Not clear why, will have pt take a decreased dose of 2mg today and then have pt resume with his normal dosing.     Follow up appointment in 2 week(s).    Kell Cano, PharmD

## 2019-05-23 ENCOUNTER — HOSPITAL ENCOUNTER (OUTPATIENT)
Dept: RADIOLOGY | Facility: MEDICAL CENTER | Age: 79
End: 2019-05-23
Attending: INTERNAL MEDICINE
Payer: MEDICARE

## 2019-05-23 DIAGNOSIS — K75.4 AUTOIMMUNE HEPATITIS (HCC): ICD-10-CM

## 2019-05-23 DIAGNOSIS — K74.60 CIRRHOSIS OF LIVER WITHOUT ASCITES, UNSPECIFIED HEPATIC CIRRHOSIS TYPE (HCC): ICD-10-CM

## 2019-05-23 PROCEDURE — 76700 US EXAM ABDOM COMPLETE: CPT

## 2019-06-07 ENCOUNTER — HOSPITAL ENCOUNTER (OUTPATIENT)
Dept: CARDIOLOGY | Facility: MEDICAL CENTER | Age: 79
End: 2019-06-07
Attending: INTERNAL MEDICINE
Payer: MEDICARE

## 2019-06-07 ENCOUNTER — ANTICOAGULATION VISIT (OUTPATIENT)
Dept: VASCULAR LAB | Facility: MEDICAL CENTER | Age: 79
End: 2019-06-07
Attending: INTERNAL MEDICINE
Payer: MEDICARE

## 2019-06-07 DIAGNOSIS — Z79.899 HIGH RISK MEDICATION USE: ICD-10-CM

## 2019-06-07 DIAGNOSIS — I48.91 ATRIAL FIBRILLATION, UNSPECIFIED TYPE (HCC): ICD-10-CM

## 2019-06-07 DIAGNOSIS — Z79.01 CHRONIC ANTICOAGULATION: ICD-10-CM

## 2019-06-07 DIAGNOSIS — G47.33 OSA (OBSTRUCTIVE SLEEP APNEA): ICD-10-CM

## 2019-06-07 DIAGNOSIS — R06.02 SOB (SHORTNESS OF BREATH): ICD-10-CM

## 2019-06-07 DIAGNOSIS — E78.5 DYSLIPIDEMIA: ICD-10-CM

## 2019-06-07 DIAGNOSIS — I10 ESSENTIAL HYPERTENSION: ICD-10-CM

## 2019-06-07 DIAGNOSIS — I35.0 AORTIC VALVE STENOSIS, ETIOLOGY OF CARDIAC VALVE DISEASE UNSPECIFIED: ICD-10-CM

## 2019-06-07 DIAGNOSIS — I05.9 MITRAL VALVE DISORDER: ICD-10-CM

## 2019-06-07 LAB — INR PPP: 2.2 (ref 2–3.5)

## 2019-06-07 PROCEDURE — 99211 OFF/OP EST MAY X REQ PHY/QHP: CPT

## 2019-06-07 PROCEDURE — 85610 PROTHROMBIN TIME: CPT

## 2019-06-07 NOTE — PROGRESS NOTES
Anticoagulation Summary  As of 2019    INR goal:   2.0-3.0   TTR:   65.2 % (2.6 y)   INR used for dosin.20 (2019)   Warfarin maintenance plan:   4 mg (4 mg x 1) every day   Weekly warfarin total:   28 mg   Plan last modified:   Abhishek CorbinD (10/18/2018)   Next INR check:   2019   Target end date:   Indefinite    Indications    Atrial fibrillation (HCC) [I48.91]             Anticoagulation Episode Summary     INR check location:       Preferred lab:       Send INR reminders to:       Comments:   Pt goes by Kinsey      Anticoagulation Care Providers     Provider Role Specialty Phone number    Vickey Rutherford M.D. Referring Cardiology [phone]    [facility] Anticoagulation Services Responsible  [phone]           Anticoagulation Patient Findings  Patient Findings     Negatives:   Signs/symptoms of thrombosis, Signs/symptoms of bleeding, Laboratory test error suspected, Change in health, Change in alcohol use, Change in activity, Upcoming invasive procedure, Emergency department visit, Upcoming dental procedure, Missed doses, Extra doses, Change in medications, Change in diet/appetite, Hospital admission, Bruising, Other complaints          HPI:  Duane Parkinson seen in clinic today, on anticoagulation therapy with warfarin for AFib  Changes to current medical/health status since last appt: none  Denies signs/symptoms of bleeding and/or thrombosis since the last appt.    Denies any interval changes to diet  Denies any interval changes to medications since last appt.   Denies any complications or cost restrictions with current therapy.   BP check declined      A/P   INR therapeutic.   Pt is to continue with current warfarin dosing regimen.    Follow up appointment in 3 week(s).    Sun Diaz, PharmD

## 2019-06-10 DIAGNOSIS — E78.2 MIXED HYPERLIPIDEMIA: ICD-10-CM

## 2019-06-10 LAB — INR BLD: 2.2 (ref 0.9–1.2)

## 2019-06-11 RX ORDER — PRAVASTATIN SODIUM 80 MG/1
TABLET ORAL
Qty: 90 TAB | Refills: 3 | Status: SHIPPED | OUTPATIENT
Start: 2019-06-11 | End: 2019-07-01 | Stop reason: SDUPTHER

## 2019-06-17 ENCOUNTER — OFFICE VISIT (OUTPATIENT)
Dept: CARDIOLOGY | Facility: MEDICAL CENTER | Age: 79
End: 2019-06-17
Payer: MEDICARE

## 2019-06-17 ENCOUNTER — HOSPITAL ENCOUNTER (OUTPATIENT)
Dept: LAB | Facility: MEDICAL CENTER | Age: 79
End: 2019-06-17
Attending: INTERNAL MEDICINE
Payer: MEDICARE

## 2019-06-17 VITALS
HEART RATE: 90 BPM | HEIGHT: 71 IN | DIASTOLIC BLOOD PRESSURE: 64 MMHG | WEIGHT: 225 LBS | SYSTOLIC BLOOD PRESSURE: 118 MMHG | BODY MASS INDEX: 31.5 KG/M2 | OXYGEN SATURATION: 93 %

## 2019-06-17 DIAGNOSIS — I05.9 MITRAL VALVE DISORDER: ICD-10-CM

## 2019-06-17 DIAGNOSIS — E78.5 DYSLIPIDEMIA: ICD-10-CM

## 2019-06-17 DIAGNOSIS — Z79.899 HIGH RISK MEDICATION USE: ICD-10-CM

## 2019-06-17 DIAGNOSIS — I10 ESSENTIAL HYPERTENSION: ICD-10-CM

## 2019-06-17 DIAGNOSIS — G47.31 CENTRAL SLEEP APNEA: ICD-10-CM

## 2019-06-17 DIAGNOSIS — G47.33 OSA (OBSTRUCTIVE SLEEP APNEA): ICD-10-CM

## 2019-06-17 DIAGNOSIS — R06.02 SOB (SHORTNESS OF BREATH): ICD-10-CM

## 2019-06-17 DIAGNOSIS — I35.0 AORTIC VALVE STENOSIS, ETIOLOGY OF CARDIAC VALVE DISEASE UNSPECIFIED: ICD-10-CM

## 2019-06-17 LAB
ANION GAP SERPL CALC-SCNC: 5 MMOL/L (ref 0–11.9)
BASOPHILS # BLD AUTO: 0.5 % (ref 0–1.8)
BASOPHILS # BLD: 0.04 K/UL (ref 0–0.12)
BNP SERPL-MCNC: 94 PG/ML (ref 0–100)
BUN SERPL-MCNC: 18 MG/DL (ref 8–22)
CALCIUM SERPL-MCNC: 9.8 MG/DL (ref 8.5–10.5)
CHLORIDE SERPL-SCNC: 105 MMOL/L (ref 96–112)
CO2 SERPL-SCNC: 26 MMOL/L (ref 20–33)
CREAT SERPL-MCNC: 0.72 MG/DL (ref 0.5–1.4)
DIGOXIN SERPL-MCNC: 0.4 NG/ML (ref 0.8–2)
EOSINOPHIL # BLD AUTO: 0.2 K/UL (ref 0–0.51)
EOSINOPHIL NFR BLD: 2.4 % (ref 0–6.9)
ERYTHROCYTE [DISTWIDTH] IN BLOOD BY AUTOMATED COUNT: 48.4 FL (ref 35.9–50)
FASTING STATUS PATIENT QL REPORTED: NORMAL
GLUCOSE SERPL-MCNC: 93 MG/DL (ref 65–99)
HCT VFR BLD AUTO: 48.4 % (ref 42–52)
HGB BLD-MCNC: 15.7 G/DL (ref 14–18)
IMM GRANULOCYTES # BLD AUTO: 0.03 K/UL (ref 0–0.11)
IMM GRANULOCYTES NFR BLD AUTO: 0.4 % (ref 0–0.9)
LYMPHOCYTES # BLD AUTO: 2.47 K/UL (ref 1–4.8)
LYMPHOCYTES NFR BLD: 29.3 % (ref 22–41)
MCH RBC QN AUTO: 31.4 PG (ref 27–33)
MCHC RBC AUTO-ENTMCNC: 32.4 G/DL (ref 33.7–35.3)
MCV RBC AUTO: 96.8 FL (ref 81.4–97.8)
MONOCYTES # BLD AUTO: 1.04 K/UL (ref 0–0.85)
MONOCYTES NFR BLD AUTO: 12.4 % (ref 0–13.4)
NEUTROPHILS # BLD AUTO: 4.64 K/UL (ref 1.82–7.42)
NEUTROPHILS NFR BLD: 55 % (ref 44–72)
NRBC # BLD AUTO: 0 K/UL
NRBC BLD-RTO: 0 /100 WBC
PLATELET # BLD AUTO: 257 K/UL (ref 164–446)
PMV BLD AUTO: 10.1 FL (ref 9–12.9)
POTASSIUM SERPL-SCNC: 4.4 MMOL/L (ref 3.6–5.5)
RBC # BLD AUTO: 5 M/UL (ref 4.7–6.1)
SODIUM SERPL-SCNC: 136 MMOL/L (ref 135–145)
WBC # BLD AUTO: 8.4 K/UL (ref 4.8–10.8)

## 2019-06-17 PROCEDURE — 85025 COMPLETE CBC W/AUTO DIFF WBC: CPT

## 2019-06-17 PROCEDURE — 99215 OFFICE O/P EST HI 40 MIN: CPT | Performed by: INTERNAL MEDICINE

## 2019-06-17 PROCEDURE — 80162 ASSAY OF DIGOXIN TOTAL: CPT

## 2019-06-17 PROCEDURE — 83880 ASSAY OF NATRIURETIC PEPTIDE: CPT | Mod: GA

## 2019-06-17 PROCEDURE — 36415 COLL VENOUS BLD VENIPUNCTURE: CPT

## 2019-06-17 PROCEDURE — 80048 BASIC METABOLIC PNL TOTAL CA: CPT

## 2019-06-17 ASSESSMENT — ENCOUNTER SYMPTOMS
FEVER: 0
MUSCULOSKELETAL NEGATIVE: 1
SHORTNESS OF BREATH: 1
WHEEZING: 0
DIZZINESS: 0
LOSS OF CONSCIOUSNESS: 0
COUGH: 0
SORE THROAT: 0
PALPITATIONS: 0
NEUROLOGICAL NEGATIVE: 1
CLAUDICATION: 0
BRUISES/BLEEDS EASILY: 0
EYES NEGATIVE: 1
PND: 0
STRIDOR: 0
GASTROINTESTINAL NEGATIVE: 1
HEMOPTYSIS: 0
SPUTUM PRODUCTION: 0
WEAKNESS: 0
CARDIOVASCULAR NEGATIVE: 1
ORTHOPNEA: 0
CHILLS: 0

## 2019-06-17 NOTE — PROGRESS NOTES
Chief Complaint   Patient presents with   • Aortic Stenosis       Subjective:   Duane Parkinson is a 79 y.o. male who presents today as a follow-up for his presumed severe aortic stenosis with low flow and low gradient.  Since he was last seen he accidentally no showed to his dobutamine stress echo.  He was rescheduled for a long distance out now a month from today.  He has been having a lot of fatigue and lack of energy.  He is also been having shortness of breath.  He denies chest pain or lower extremity edema.  He continues on his medications for his A. fib and is oral anticoagulation.  He is been having no syncopal events.    Past Medical History:   Diagnosis Date   • Aortic stenosis    • Apnea, sleep    • Atrial fibrillation (HCC)    • Back pain    • Cataract     early   • Clotting disorder (HCC)    • Depression     Lost wife 10 yeasr ago; still gets tearful   • Diabetes (HCC)     diet controlled   • Encounter for long-term (current) use of other medications    • Gasping for breath    • Kiswahili measles    • Heart murmur    • Heart valve disease    • Hyperlipidemia    • Hypertension    • Insomnia    • Mumps    • Nasal drainage    • Pain     back & thumb   • Pyogenic arthritis, lower leg (HCC)     thumb, back   • Restless leg syndrome    • Sleep apnea     uses CPAP   • Snoring    • Tonsillitis    • Urinary incontinence    • Valvular heart disease      Past Surgical History:   Procedure Laterality Date   • FINGER ARTHROPLASTY Left 5/27/2016    Procedure: FINGER ARTHROPLASTY THUMB CARPOMETACARPAL EXCISIONAL, EXCISE TRAPEZIUM;  Surgeon: Raheel Salgado M.D.;  Location: SURGERY SAME DAY [facility];  Service:    • CARDIOVERSION      on 7/17/06, sinus rhythm until January 2007,  paroxysmal atrial fibrillation   • KNEE ARTHROPLASTY TOTAL     • LAMINOTOMY N/A     multiple lumbar spine   • OTHER ORTHOPEDIC SURGERY      lower back   • PB PERCUT IMPLNT NEUROELECT,EPIDURAL     • TOE ARTHROPLASTY     • TURP-VAPOR N/A       Family History   Problem Relation Age of Onset   • Other Mother         unknown   • Heart Disease Mother    • Cancer Father         prostate, skin   • No Known Problems Brother    • Diabetes Brother    • Heart Disease Son    • Sleep Apnea Neg Hx      Social History     Social History   • Marital status:      Spouse name: N/A   • Number of children: N/A   • Years of education: N/A     Occupational History   • Not on file.     Social History Main Topics   • Smoking status: Former Smoker     Packs/day: 1.00     Years: 20.00     Types: Cigarettes     Quit date: 1/16/1972   • Smokeless tobacco: Former User   • Alcohol use No   • Drug use: No   • Sexual activity: Not Currently     Partners: Female     Other Topics Concern   • Not on file     Social History Narrative   • No narrative on file     Allergies   Allergen Reactions   • Feldene [Piroxicam] Itching   • Percodan [Oxycodone-Aspirin] Itching     Outpatient Encounter Prescriptions as of 6/17/2019   Medication Sig Dispense Refill   • pravastatin (PRAVACHOL) 80 MG tablet TAKE ONE TABLET BY MOUTH EVERY DAY 90 Tab 3   • clindamycin (CLEOCIN) 150 MG Cap Take 300 mg by mouth 2 Times a Day.     • digoxin (LANOXIN) 250 MCG Tab      • warfarin (COUMADIN) 4 MG Tab Take one tablet by mouth one time daily or as directed by coumadin clinic 90 Tab 1   • metoprolol SR (TOPROL XL) 50 MG TABLET SR 24 HR TAKE ONE TABLET BY MOUTH EVERY DAY 90 Tab 2   • gabapentin (NEURONTIN) 300 MG Cap Take 300 mg by mouth 3 times a day.     • cyclobenzaprine (FLEXERIL) 10 MG Tab Take 10 mg by mouth 3 times a day as needed.     • L-Lysine 500 MG Tab Take  by mouth.     • B Complex Vitamins (VITAMIN B COMPLEX PO) Take 100 Units by mouth.     • glucosamine Sulfate 500 MG Cap Take 500 mg by mouth 2 times a day, with meals.     • ascorbic acid (ASCORBIC ACID) 500 MG Tab Take 1,000 mg by mouth every evening.     • Psyllium (METAMUCIL PO) Take  by mouth.     • Coenzyme Q10 (COQ10 PO) Take  by mouth  "every day.     • Cholecalciferol (VITAMIN D3) 2000 UNITS Tab Take  by mouth every day. Mondays     • multivitamin (THERAGRAN) TABS Take 1 Tab by mouth every day.     • docosahexanoic acid (FISH OIL) 1000 MG CAPS Take  by mouth every day.       No facility-administered encounter medications on file as of 6/17/2019.      Review of Systems   Constitutional: Positive for malaise/fatigue. Negative for chills and fever.   HENT: Negative.  Negative for sore throat.    Eyes: Negative.    Respiratory: Positive for shortness of breath. Negative for cough, hemoptysis, sputum production, wheezing and stridor.    Cardiovascular: Negative.  Negative for chest pain, palpitations, orthopnea, claudication, leg swelling and PND.   Gastrointestinal: Negative.    Genitourinary: Negative.    Musculoskeletal: Negative.    Skin: Negative.    Neurological: Negative.  Negative for dizziness, loss of consciousness and weakness.   Endo/Heme/Allergies: Negative.  Does not bruise/bleed easily.   All other systems reviewed and are negative.       Objective:   /64 (BP Location: Left arm, Patient Position: Sitting, BP Cuff Size: Adult)   Pulse 90   Ht 1.803 m (5' 11\")   Wt 102.1 kg (225 lb)   SpO2 93%   BMI 31.38 kg/m²     Physical Exam   Constitutional: He appears well-developed and well-nourished. No distress.   HENT:   Head: Normocephalic and atraumatic.   Right Ear: External ear normal.   Left Ear: External ear normal.   Nose: Nose normal.   Mouth/Throat: No oropharyngeal exudate.   Eyes: Pupils are equal, round, and reactive to light. Conjunctivae and EOM are normal. Right eye exhibits no discharge. Left eye exhibits no discharge. No scleral icterus.   Neck: Neck supple. No JVD present.   Cardiovascular: Normal rate, regular rhythm and intact distal pulses.  Exam reveals no gallop and no friction rub.    No murmur heard.  Pulmonary/Chest: Effort normal. No stridor. No respiratory distress. He has no wheezes. He has no rales. He " exhibits no tenderness.   Abdominal: Soft. He exhibits no distension. There is no guarding.   Musculoskeletal: Normal range of motion. He exhibits no edema, tenderness or deformity.   Neurological: He is alert. He has normal reflexes. He displays normal reflexes. No cranial nerve deficit. He exhibits normal muscle tone. Coordination normal.   Skin: Skin is warm and dry. No rash noted. He is not diaphoretic. No erythema. No pallor.   Psychiatric: He has a normal mood and affect. His behavior is normal. Judgment and thought content normal.   Nursing note and vitals reviewed.      Assessment:     1. Aortic valve stenosis, etiology of cardiac valve disease unspecified  CBC WITH DIFFERENTIAL    Basic Metabolic Panel    BTYPE NATRIURETIC PEPTIDE    CANCELED: EC-ECHOCARDIOGRAM DOBUTAMINE REST/STRESS W/O CONT   2. Dyslipidemia     3. Central sleep apnea     4. Essential hypertension  CANCELED: EC-ECHOCARDIOGRAM DOBUTAMINE REST/STRESS W/O CONT   5. High risk medication use  CBC WITH DIFFERENTIAL    Basic Metabolic Panel    BTYPE NATRIURETIC PEPTIDE   6. Mitral valve disorder     7. LORENA (obstructive sleep apnea)     8. SOB (shortness of breath)         Medical Decision Making:  Today's Assessment / Status / Plan:     79-year-old male with atrial fibrillation and aortic stenosis presumed to be low flow low gradient.  I will order his to be mean stress echo is urgent test stat to get this done today or within the next couple of days.  I will also get labs to look at his progression of his valve disease.  I would like to see him back in 1 month.    1. A-fib   - dig 250 mcg daily  - metop sr 50 daily  - followed by coumadin clinic     2. Aortic stenosis, low flow low gradient, severe, HUY 0.7 cm    -  stress echo stat    - BNP today    - cont warfarin     3. HLD    - cont prava 80     4. High risk meds    - labs reviewed, instructions given for procedure    Thank for you allowing me to take part in your patient's care, please  call should you have any questions or would like to discuss this patient.

## 2019-06-17 NOTE — LETTER
[facility]   [address]   [address]  [place] [address]  Phone: [phone]  Fax: [phone]              Duane Parkinson  1940    Encounter Date: 6/17/2019    Vickey Rutherford M.D.          PROGRESS NOTE:  Chief Complaint   Patient presents with   • Aortic Stenosis       Subjective:   Duane Parkinson is a 79 y.o. male who presents today as a follow-up for his presumed severe aortic stenosis with low flow and low gradient.  Since he was last seen he accidentally no showed to his dobutamine stress echo.  He was rescheduled for a long distance out now a month from today.  He has been having a lot of fatigue and lack of energy.  He is also been having shortness of breath.  He denies chest pain or lower extremity edema.  He continues on his medications for his A. fib and is oral anticoagulation.  He is been having no syncopal events.    Past Medical History:   Diagnosis Date   • Aortic stenosis    • Apnea, sleep    • Atrial fibrillation (HCC)    • Back pain    • Cataract     early   • Clotting disorder (HCC)    • Depression     Lost wife 10 yeasr ago; still gets tearful   • Diabetes (HCC)     diet controlled   • Encounter for long-term (current) use of other medications    • Gasping for breath    • Kyrgyz measles    • Heart murmur    • Heart valve disease    • Hyperlipidemia    • Hypertension    • Insomnia    • Mumps    • Nasal drainage    • Pain     back & thumb   • Pyogenic arthritis, lower leg (HCC)     thumb, back   • Restless leg syndrome    • Sleep apnea     uses CPAP   • Snoring    • Tonsillitis    • Urinary incontinence    • Valvular heart disease      Past Surgical History:   Procedure Laterality Date   • FINGER ARTHROPLASTY Left 5/27/2016    Procedure: FINGER ARTHROPLASTY THUMB CARPOMETACARPAL EXCISIONAL, EXCISE TRAPEZIUM;  Surgeon: Raheel Salgado M.D.;  Location: SURGERY SAME DAY [facility];  Service:    • CARDIOVERSION       on 7/17/06, sinus rhythm until January 2007,  paroxysmal atrial fibrillation   • KNEE ARTHROPLASTY TOTAL     • LAMINOTOMY N/A     multiple lumbar spine   • OTHER ORTHOPEDIC SURGERY      lower back   • PB PERCUT IMPLNT NEUROELECT,EPIDURAL     • TOE ARTHROPLASTY     • TURP-VAPOR N/A      Family History   Problem Relation Age of Onset   • Other Mother         unknown   • Heart Disease Mother    • Cancer Father         prostate, skin   • No Known Problems Brother    • Diabetes Brother    • Heart Disease Son    • Sleep Apnea Neg Hx      Social History     Social History   • Marital status:      Spouse name: N/A   • Number of children: N/A   • Years of education: N/A     Occupational History   • Not on file.     Social History Main Topics   • Smoking status: Former Smoker     Packs/day: 1.00     Years: 20.00     Types: Cigarettes     Quit date: 1/16/1972   • Smokeless tobacco: Former User   • Alcohol use No   • Drug use: No   • Sexual activity: Not Currently     Partners: Female     Other Topics Concern   • Not on file     Social History Narrative   • No narrative on file     Allergies   Allergen Reactions   • Feldene [Piroxicam] Itching   • Percodan [Oxycodone-Aspirin] Itching     Outpatient Encounter Prescriptions as of 6/17/2019   Medication Sig Dispense Refill   • pravastatin (PRAVACHOL) 80 MG tablet TAKE ONE TABLET BY MOUTH EVERY DAY 90 Tab 3   • clindamycin (CLEOCIN) 150 MG Cap Take 300 mg by mouth 2 Times a Day.     • digoxin (LANOXIN) 250 MCG Tab      • warfarin (COUMADIN) 4 MG Tab Take one tablet by mouth one time daily or as directed by coumadin clinic 90 Tab 1   • metoprolol SR (TOPROL XL) 50 MG TABLET SR 24 HR TAKE ONE TABLET BY MOUTH EVERY DAY 90 Tab 2   • gabapentin (NEURONTIN) 300 MG Cap Take 300 mg by mouth 3 times a day.     • cyclobenzaprine (FLEXERIL) 10 MG Tab Take 10 mg by mouth 3 times a day as needed.     • L-Lysine 500 MG Tab Take  by mouth.     • B Complex Vitamins (VITAMIN B COMPLEX  "PO) Take 100 Units by mouth.     • glucosamine Sulfate 500 MG Cap Take 500 mg by mouth 2 times a day, with meals.     • ascorbic acid (ASCORBIC ACID) 500 MG Tab Take 1,000 mg by mouth every evening.     • Psyllium (METAMUCIL PO) Take  by mouth.     • Coenzyme Q10 (COQ10 PO) Take  by mouth every day.     • Cholecalciferol (VITAMIN D3) 2000 UNITS Tab Take  by mouth every day. Mondays     • multivitamin (THERAGRAN) TABS Take 1 Tab by mouth every day.     • docosahexanoic acid (FISH OIL) 1000 MG CAPS Take  by mouth every day.       No facility-administered encounter medications on file as of 6/17/2019.      Review of Systems   Constitutional: Positive for malaise/fatigue. Negative for chills and fever.   HENT: Negative.  Negative for sore throat.    Eyes: Negative.    Respiratory: Positive for shortness of breath. Negative for cough, hemoptysis, sputum production, wheezing and stridor.    Cardiovascular: Negative.  Negative for chest pain, palpitations, orthopnea, claudication, leg swelling and PND.   Gastrointestinal: Negative.    Genitourinary: Negative.    Musculoskeletal: Negative.    Skin: Negative.    Neurological: Negative.  Negative for dizziness, loss of consciousness and weakness.   Endo/Heme/Allergies: Negative.  Does not bruise/bleed easily.   All other systems reviewed and are negative.       Objective:   /64 (BP Location: Left arm, Patient Position: Sitting, BP Cuff Size: Adult)   Pulse 90   Ht 1.803 m (5' 11\")   Wt 102.1 kg (225 lb)   SpO2 93%   BMI 31.38 kg/m²      Physical Exam   Constitutional: He appears well-developed and well-nourished. No distress.   HENT:   Head: Normocephalic and atraumatic.   Right Ear: External ear normal.   Left Ear: External ear normal.   Nose: Nose normal.   Mouth/Throat: No oropharyngeal exudate.   Eyes: Pupils are equal, round, and reactive to light. Conjunctivae and EOM are normal. Right eye exhibits no discharge. Left eye exhibits no discharge. No scleral " icterus.   Neck: Neck supple. No JVD present.   Cardiovascular: Normal rate, regular rhythm and intact distal pulses.  Exam reveals no gallop and no friction rub.    No murmur heard.  Pulmonary/Chest: Effort normal. No stridor. No respiratory distress. He has no wheezes. He has no rales. He exhibits no tenderness.   Abdominal: Soft. He exhibits no distension. There is no guarding.   Musculoskeletal: Normal range of motion. He exhibits no edema, tenderness or deformity.   Neurological: He is alert. He has normal reflexes. He displays normal reflexes. No cranial nerve deficit. He exhibits normal muscle tone. Coordination normal.   Skin: Skin is warm and dry. No rash noted. He is not diaphoretic. No erythema. No pallor.   Psychiatric: He has a normal mood and affect. His behavior is normal. Judgment and thought content normal.   Nursing note and vitals reviewed.      Assessment:     1. Aortic valve stenosis, etiology of cardiac valve disease unspecified  CBC WITH DIFFERENTIAL    Basic Metabolic Panel    BTYPE NATRIURETIC PEPTIDE    CANCELED: EC-ECHOCARDIOGRAM DOBUTAMINE REST/STRESS W/O CONT   2. Dyslipidemia     3. Central sleep apnea     4. Essential hypertension  CANCELED: EC-ECHOCARDIOGRAM DOBUTAMINE REST/STRESS W/O CONT   5. High risk medication use  CBC WITH DIFFERENTIAL    Basic Metabolic Panel    BTYPE NATRIURETIC PEPTIDE   6. Mitral valve disorder     7. LORENA (obstructive sleep apnea)     8. SOB (shortness of breath)         Medical Decision Making:  Today's Assessment / Status / Plan:     79-year-old male with atrial fibrillation and aortic stenosis presumed to be low flow low gradient.  I will order his to be mean stress echo is urgent test stat to get this done today or within the next couple of days.  I will also get labs to look at his progression of his valve disease.  I would like to see him back in 1 month.    1. A-fib   - dig 250 mcg daily  - metop sr 50 daily  - followed by coumadin clinic     2.  Aortic stenosis, low flow low gradient, severe, HUY 0.7 cm    -  stress echo stat    - BNP today    -  cont warfarin     3. HLD    - cont prava 80     4. High risk meds    - labs reviewed, instructions given for procedure    Thank for you allowing me to take part in your patient's care, please call should you have any questions or would like to discuss this patient.      Fransisco Graham M.D.  [address] [place] [id number]  VIA In Basket

## 2019-06-19 ENCOUNTER — HOSPITAL ENCOUNTER (OUTPATIENT)
Dept: CARDIOLOGY | Facility: MEDICAL CENTER | Age: 79
End: 2019-06-19
Attending: INTERNAL MEDICINE
Payer: MEDICARE

## 2019-06-19 PROCEDURE — 93350 STRESS TTE ONLY: CPT

## 2019-06-19 PROCEDURE — 700111 HCHG RX REV CODE 636 W/ 250 OVERRIDE (IP)

## 2019-06-19 PROCEDURE — 93350 STRESS TTE ONLY: CPT | Mod: 26 | Performed by: INTERNAL MEDICINE

## 2019-06-19 RX ORDER — LABETALOL HYDROCHLORIDE 5 MG/ML
INJECTION, SOLUTION INTRAVENOUS
Status: COMPLETED
Start: 2019-06-19 | End: 2019-06-19

## 2019-06-19 RX ORDER — NITROGLYCERIN 0.4 MG/1
TABLET SUBLINGUAL
Status: COMPLETED
Start: 2019-06-19 | End: 2019-06-19

## 2019-06-19 RX ADMIN — DOBUTAMINE HYDROCHLORIDE 5 MCG/KG/MIN: 100 INJECTION INTRAVENOUS at 15:20

## 2019-06-19 NOTE — PROGRESS NOTES
Dobutamine Stress Test    PRE-PROCEDURE:   Clinical History: Completed  Medical Diagnosis: Aortic Stenosis, Atrial Fibrillation    Pertinent Previous Physical Findings: Murmurs  History of Symptoms: Shortness of Breath and severe fatigue   Recent Health: None  Other Habits: None  Family History: Not Applicable  NPO Status: Since 0900  Medications Taken Day of Test: None  Consent: Completed  Time Out: Completed    ASSESSMENT:  Neuro: Within Normal Limits  Pulse Rate: Irregular  Resting Blood Pressure: 110/58  Lung Auscultation: Clear to Auscultation, Good Aeration, No Wheezes, Rales or Rhonchi  Palpation and Auscultation of Carotid Arteries: done  Auscultation of Heart Sounds: Murmur  Palpation and Inspection of Lower Extremities: No Edema     CONTRAINDICATIONS: None    PROCEDURE PREPARATION:  IV started: # 20 gauge IV started in left forearm   Explanation of procedure to patient: done  Crash cart: present  Labetalol, atropine, and nitro at bedside: Present     PRE-PROCEDURE: Started at 1520  HR: 76  BP: 110/58    5 mcg at 5 minutes:  HR:79  BP:109/64    10 mcg at 10 minutes:  HR:103  BP: 104/49    15 mcg at 15 minutes: Stopped at 1535  HR: 145  BP: 115/42      Please refer to MAR for medications.     POST PROCEDURE (RECOVERY):  HR: 97  BP: 115/70  IV Discontinued: Completed    TEST TERMINATION: Dr. Vickey Rutherford Notified of Test Termination Reason : AV peak velocity of 4 m/s reached.

## 2019-06-20 ENCOUNTER — APPOINTMENT (RX ONLY)
Dept: URBAN - METROPOLITAN AREA CLINIC 4 | Facility: CLINIC | Age: 79
Setting detail: DERMATOLOGY
End: 2019-06-20

## 2019-06-20 DIAGNOSIS — L82.0 INFLAMED SEBORRHEIC KERATOSIS: ICD-10-CM

## 2019-06-20 DIAGNOSIS — B07.8 OTHER VIRAL WARTS: ICD-10-CM

## 2019-06-20 DIAGNOSIS — D18.0 HEMANGIOMA: ICD-10-CM

## 2019-06-20 DIAGNOSIS — L57.0 ACTINIC KERATOSIS: ICD-10-CM

## 2019-06-20 DIAGNOSIS — D22 MELANOCYTIC NEVI: ICD-10-CM

## 2019-06-20 DIAGNOSIS — L81.4 OTHER MELANIN HYPERPIGMENTATION: ICD-10-CM

## 2019-06-20 DIAGNOSIS — L82.1 OTHER SEBORRHEIC KERATOSIS: ICD-10-CM

## 2019-06-20 PROBLEM — D22.62 MELANOCYTIC NEVI OF LEFT UPPER LIMB, INCLUDING SHOULDER: Status: ACTIVE | Noted: 2019-06-20

## 2019-06-20 PROBLEM — D18.01 HEMANGIOMA OF SKIN AND SUBCUTANEOUS TISSUE: Status: ACTIVE | Noted: 2019-06-20

## 2019-06-20 PROBLEM — D22.72 MELANOCYTIC NEVI OF LEFT LOWER LIMB, INCLUDING HIP: Status: ACTIVE | Noted: 2019-06-20

## 2019-06-20 PROBLEM — D22.71 MELANOCYTIC NEVI OF RIGHT LOWER LIMB, INCLUDING HIP: Status: ACTIVE | Noted: 2019-06-20

## 2019-06-20 PROBLEM — D22.5 MELANOCYTIC NEVI OF TRUNK: Status: ACTIVE | Noted: 2019-06-20

## 2019-06-20 PROBLEM — D22.61 MELANOCYTIC NEVI OF RIGHT UPPER LIMB, INCLUDING SHOULDER: Status: ACTIVE | Noted: 2019-06-20

## 2019-06-20 PROCEDURE — 17110 DESTRUCTION B9 LES UP TO 14: CPT

## 2019-06-20 PROCEDURE — 17003 DESTRUCT PREMALG LES 2-14: CPT | Mod: 59

## 2019-06-20 PROCEDURE — 17000 DESTRUCT PREMALG LESION: CPT | Mod: 59

## 2019-06-20 PROCEDURE — ? COUNSELING

## 2019-06-20 PROCEDURE — ? LIQUID NITROGEN

## 2019-06-20 PROCEDURE — ? SUNSCREEN RECOMMENDATIONS

## 2019-06-20 PROCEDURE — 99214 OFFICE O/P EST MOD 30 MIN: CPT | Mod: 25

## 2019-06-20 PROCEDURE — ? ADDITIONAL NOTES

## 2019-06-20 ASSESSMENT — LOCATION DETAILED DESCRIPTION DERM
LOCATION DETAILED: LEFT SUPERIOR MEDIAL UPPER BACK
LOCATION DETAILED: LEFT RIB CAGE
LOCATION DETAILED: LEFT CENTRAL MALAR CHEEK
LOCATION DETAILED: LEFT INFERIOR ANTERIOR NECK
LOCATION DETAILED: RIGHT MEDIAL UPPER BACK
LOCATION DETAILED: LEFT ULNAR DORSAL HAND
LOCATION DETAILED: DORSAL INTERPHALANGEAL JOINT RIGHT THUMB
LOCATION DETAILED: RIGHT SUPERIOR MEDIAL MIDBACK
LOCATION DETAILED: RIGHT DISTAL POSTERIOR UPPER ARM
LOCATION DETAILED: LEFT LATERAL ABDOMEN
LOCATION DETAILED: LEFT DISTAL DORSAL FOREARM
LOCATION DETAILED: RIGHT INFERIOR LATERAL UPPER BACK
LOCATION DETAILED: RIGHT RADIAL DORSAL HAND
LOCATION DETAILED: SUPERIOR THORACIC SPINE
LOCATION DETAILED: RIGHT RIB CAGE
LOCATION DETAILED: LEFT LATERAL ELBOW
LOCATION DETAILED: RIGHT PROXIMAL DORSAL FOREARM
LOCATION DETAILED: RIGHT CENTRAL MALAR CHEEK
LOCATION DETAILED: LEFT INFERIOR LATERAL UPPER BACK
LOCATION DETAILED: RIGHT ANTERIOR PROXIMAL THIGH
LOCATION DETAILED: LEFT SUPERIOR LATERAL MIDBACK
LOCATION DETAILED: LEFT MID-UPPER BACK
LOCATION DETAILED: LEFT PROXIMAL POSTERIOR UPPER ARM
LOCATION DETAILED: PERIUMBILICAL SKIN
LOCATION DETAILED: RIGHT LATERAL UPPER BACK
LOCATION DETAILED: LEFT SUPERIOR FRONTAL SCALP
LOCATION DETAILED: LEFT ANTERIOR PROXIMAL THIGH
LOCATION DETAILED: LEFT PROXIMAL DORSAL FOREARM

## 2019-06-20 ASSESSMENT — LOCATION ZONE DERM
LOCATION ZONE: TRUNK
LOCATION ZONE: LEG
LOCATION ZONE: FACE
LOCATION ZONE: SCALP
LOCATION ZONE: HAND
LOCATION ZONE: ARM
LOCATION ZONE: NECK
LOCATION ZONE: FINGER

## 2019-06-20 ASSESSMENT — LOCATION SIMPLE DESCRIPTION DERM
LOCATION SIMPLE: LEFT ELBOW
LOCATION SIMPLE: RIGHT LOWER BACK
LOCATION SIMPLE: RIGHT THUMB
LOCATION SIMPLE: RIGHT UPPER BACK
LOCATION SIMPLE: ABDOMEN
LOCATION SIMPLE: RIGHT THIGH
LOCATION SIMPLE: LEFT ANTERIOR NECK
LOCATION SIMPLE: RIGHT FOREARM
LOCATION SIMPLE: RIGHT POSTERIOR UPPER ARM
LOCATION SIMPLE: SCALP
LOCATION SIMPLE: LEFT THIGH
LOCATION SIMPLE: LEFT CHEEK
LOCATION SIMPLE: LEFT POSTERIOR UPPER ARM
LOCATION SIMPLE: LEFT UPPER BACK
LOCATION SIMPLE: RIGHT HAND
LOCATION SIMPLE: LEFT LOWER BACK
LOCATION SIMPLE: RIGHT CHEEK
LOCATION SIMPLE: LEFT HAND
LOCATION SIMPLE: UPPER BACK
LOCATION SIMPLE: LEFT FOREARM

## 2019-06-20 NOTE — PROCEDURE: LIQUID NITROGEN
Detail Level: Simple
Consent: The patient's consent was obtained including but not limited to risks of crusting, scabbing, blistering, scarring, darker or lighter pigmentary change, recurrence, incomplete removal and infection.
Number Of Freeze-Thaw Cycles: 2 freeze-thaw cycles
Post-Care Instructions: I reviewed with the patient in detail post-care instructions. Patient is to wear sunprotection, and avoid picking at any of the treated lesions. Pt may apply Vaseline to crusted or scabbing areas.
Render Post-Care Instructions In Note?: no
Duration Of Freeze Thaw-Cycle (Seconds): 3
Detail Level: Detailed
Medical Necessity Clause: This procedure was medically necessary because the lesions that were treated were: inflamed and itchy and very bothersome
Medical Necessity Information: It is in your best interest to select a reason for this procedure from the list below. All of these items fulfill various CMS LCD requirements except the new and changing color options.

## 2019-06-20 NOTE — HPI: FULL BODY SKIN EXAMINATION
How Severe Are Your Spot(S)?: moderate
What Type Of Note Output Would You Prefer (Optional)?: Standard Output
What Is The Reason For Today's Visit?: Full Body Skin Examination
What Is The Reason For Today's Visit? (Being Monitored For X): concerning skin lesions on an annual basis
Additional History: Multiple concerns on hands and face for six months. FBE.

## 2019-06-26 ENCOUNTER — TELEPHONE (OUTPATIENT)
Dept: CARDIOLOGY | Facility: MEDICAL CENTER | Age: 79
End: 2019-06-26

## 2019-06-26 NOTE — TELEPHONE ENCOUNTER
Called pt with results. He is asking about his referral to the valve program. Message given to valve coordinator to contact pt.

## 2019-06-26 NOTE — TELEPHONE ENCOUNTER
----- Message from Nona Wilson sent at 6/26/2019 11:22 AM PDT -----  Regarding: echo stress results  Contact: [phone]  HUBERT/erik Chavez calling for echo stress results, please call Duane  or on cell# [phone] if no answer on .

## 2019-06-27 ENCOUNTER — TELEPHONE (OUTPATIENT)
Dept: CARDIOLOGY | Facility: MEDICAL CENTER | Age: 79
End: 2019-06-27

## 2019-06-27 NOTE — TELEPHONE ENCOUNTER
Spoke with patient regarding referral to valve clinic. Reviewed process for consulting with CTS first, then valve clinic if minimally invasive treatment is recommended.     Reviewed all such appointments associated with such. Patients states that he has written all information. Assured patient that I will mail apt letter with date, time, location and campus map to address on file.     Patient states understanding, states no further instructions at this time, and very thankful for assistance.

## 2019-06-28 ENCOUNTER — ANTICOAGULATION VISIT (OUTPATIENT)
Dept: VASCULAR LAB | Facility: MEDICAL CENTER | Age: 79
End: 2019-06-28
Attending: INTERNAL MEDICINE
Payer: MEDICARE

## 2019-06-28 VITALS — HEART RATE: 65 BPM | OXYGEN SATURATION: 97 % | DIASTOLIC BLOOD PRESSURE: 70 MMHG | SYSTOLIC BLOOD PRESSURE: 105 MMHG

## 2019-06-28 DIAGNOSIS — Z79.01 CHRONIC ANTICOAGULATION: ICD-10-CM

## 2019-06-28 LAB
INR BLD: 3.6 (ref 0.9–1.2)
INR PPP: 3.6 (ref 2–3.5)

## 2019-06-28 PROCEDURE — 99212 OFFICE O/P EST SF 10 MIN: CPT

## 2019-06-28 PROCEDURE — 85610 PROTHROMBIN TIME: CPT

## 2019-06-28 NOTE — PROGRESS NOTES
Anticoagulation Summary  As of 6/28/2019    INR goal:   2.0-3.0   TTR:   65.0 % (2.7 y)   INR used for dosing:   3.60! (6/28/2019)   Warfarin maintenance plan:   4 mg (4 mg x 1) every day   Weekly warfarin total:   28 mg   Plan last modified:   Sin Huitron, PharmD (10/18/2018)   Next INR check:   7/8/2019   Target end date:   Indefinite    Indications    Atrial fibrillation (HCC) [I48.91]             Anticoagulation Episode Summary     INR check location:       Preferred lab:       Send INR reminders to:       Comments:   Pt goes by Kinsey      Anticoagulation Care Providers     Provider Role Specialty Phone number    Vickey Rutherford M.D. Referring Cardiology [phone]    [facility] Anticoagulation Services Responsible  [phone]        Anticoagulation Patient Findings  Patient Findings     Positives:   Missed doses          HPI:  Duane Parkinson seen in clinic today for follow up on anticoagulation therapy in the presence of Afib.   Denies any changes to current medical/health status since last appointment.   Denies any medication or diet changes.   No current symptoms of bleeding or thrombosis reported.    A/P:   INR is supra-therapeutic at 3.6. Despite missed dose on Wednesday.   Patient to take 2 mg tonight then continue current regimen.   BP recorded in vitals.    Follow up appointment in 10 days .    Next Appointment: , 07/08/2019    Sergio Pan, Pharm.D

## 2019-07-01 DIAGNOSIS — E78.2 MIXED HYPERLIPIDEMIA: ICD-10-CM

## 2019-07-01 RX ORDER — PRAVASTATIN SODIUM 80 MG/1
80 TABLET ORAL
Qty: 90 TAB | Refills: 0 | Status: SHIPPED | OUTPATIENT
Start: 2019-07-01 | End: 2020-08-17

## 2019-07-02 DIAGNOSIS — Z01.810 PRE-OPERATIVE CARDIOVASCULAR EXAMINATION: ICD-10-CM

## 2019-07-02 DIAGNOSIS — R06.02 SHORTNESS OF BREATH: ICD-10-CM

## 2019-07-02 DIAGNOSIS — I35.0 SEVERE AORTIC STENOSIS: ICD-10-CM

## 2019-07-08 ENCOUNTER — ANTICOAGULATION VISIT (OUTPATIENT)
Dept: VASCULAR LAB | Facility: MEDICAL CENTER | Age: 79
End: 2019-07-08
Attending: INTERNAL MEDICINE
Payer: MEDICARE

## 2019-07-08 VITALS — SYSTOLIC BLOOD PRESSURE: 114 MMHG | DIASTOLIC BLOOD PRESSURE: 59 MMHG | HEART RATE: 76 BPM | OXYGEN SATURATION: 97 %

## 2019-07-08 DIAGNOSIS — I48.91 ATRIAL FIBRILLATION, UNSPECIFIED TYPE (HCC): ICD-10-CM

## 2019-07-08 LAB — INR PPP: 2.2 (ref 2–3.5)

## 2019-07-08 PROCEDURE — 99211 OFF/OP EST MAY X REQ PHY/QHP: CPT | Performed by: NURSE PRACTITIONER

## 2019-07-08 PROCEDURE — 85610 PROTHROMBIN TIME: CPT

## 2019-07-08 NOTE — PROGRESS NOTES
Anticoagulation Summary  As of 2019    INR goal:   2.0-3.0   TTR:   64.9 % (2.7 y)   INR used for dosin.20 (2019)   Warfarin maintenance plan:   4 mg (4 mg x 1) every day   Weekly warfarin total:   28 mg   Plan last modified:   Sin Huitron, PharmD (10/18/2018)   Next INR check:      Target end date:   Indefinite    Indications    Atrial fibrillation (HCC) [I48.91]             Anticoagulation Episode Summary     INR check location:       Preferred lab:       Send INR reminders to:       Comments:   Pt goes by Kinsey      Anticoagulation Care Providers     Provider Role Specialty Phone number    Vickey Rutherford M.D. Referring Cardiology [phone]    [facility] Responsible  [phone]        Anticoagulation Patient Findings      HPI:  Duane Parkinson seen in clinic today for follow up on anticoagulation therapy in the presence of AF.   Denies any changes to current medical/health status since last appointment.   Denies any medication or diet changes.   No current symptoms of bleeding or thrombosis reported.    A/P:   INR therapeutic.   Continue current regimen.   BP recorded in vitals.    Follow up appointment in 3 week(s).    Next Appointment: Monday, 2019 at 7:45 am.    Britney DOE

## 2019-07-09 LAB — INR BLD: 2.2 (ref 0.9–1.2)

## 2019-07-10 NOTE — PROGRESS NOTES
REFERRING PHYSICIAN: Vickey Rutherford MD.     CONSULTING PHYSICIAN: Mayco Pichardo MD, FACS.    CHIEF COMPLAINT: Fatigue.    HISTORY OF PRESENT ILLNESS: The patient is a 79 y.o. male with history significant for atrial fibrillation, dyslipidemia, central sleep apnea, HTN, cirrhosis and severe aortic stenosis. Today he states he had increasing fatigue and shortness of breath for the last 6 months.  He has an exercise capacity of one half block. He also complains of occasional dizziness with position changes. He denies chest pain/pressure, orthopnea, dizziness, lower extremity edema, syncope, or near syncope.      PAST MEDICAL HISTORY:   Past Medical History:   Diagnosis Date   • Aortic stenosis    • Apnea, sleep    • Atrial fibrillation (HCC)    • Back pain    • Cataract     early   • Cirrhosis of liver without ascites (HCC)    • Clotting disorder (HCC)    • Depression     Lost wife 10 yeasr ago; still gets tearful   • Diabetes (HCC)     diet controlled   • Encounter for long-term (current) use of other medications    • Gasping for breath    • [place] measles    • Heart murmur    • Heart valve disease    • Hyperlipidemia    • Hypertension    • Insomnia    • Mumps    • Nasal drainage    • Pain     back & thumb   • Pyogenic arthritis, lower leg (HCC)     thumb, back   • Restless leg syndrome    • Sleep apnea     uses CPAP   • Snoring    • Tonsillitis    • Urinary incontinence    • Valvular heart disease        PAST SURGICAL HISTORY:   Past Surgical History:   Procedure Laterality Date   • FINGER ARTHROPLASTY Left 5/27/2016    Procedure: FINGER ARTHROPLASTY THUMB CARPOMETACARPAL EXCISIONAL, EXCISE TRAPEZIUM;  Surgeon: Raheel Salgado M.D.;  Location: SURGERY SAME DAY [facility];  Service:    • CARDIOVERSION      on 7/17/06, sinus rhythm until January 2007,  paroxysmal atrial fibrillation   • KNEE ARTHROPLASTY TOTAL     • LAMINOTOMY N/A     multiple lumbar spine   • OTHER ORTHOPEDIC SURGERY      lower back   •  PB PERCUT IMPLNT NEUROELECT,EPIDURAL     • TOE ARTHROPLASTY     • TURP-VAPOR N/A        FAMILY HISTORY:   Family History   Problem Relation Age of Onset   • Other Mother         unknown   • Heart Disease Mother    • Cancer Father         prostate, skin   • No Known Problems Brother    • Diabetes Brother    • Heart Disease Son    • Sleep Apnea Neg Hx         SOCIAL HISTORY:   Social History     Social History   • Marital status:      Spouse name: N/A   • Number of children: N/A   • Years of education: N/A     Occupational History   • Not on file.     Social History Main Topics   • Smoking status: Former Smoker     Packs/day: 1.00     Years: 20.00     Types: Cigarettes     Quit date: 1/16/1972   • Smokeless tobacco: Former User   • Alcohol use No   • Drug use: No   • Sexual activity: Not Currently     Partners: Female     Other Topics Concern   • Not on file     Social History Narrative   • No narrative on file       ALLERGIES:   Allergies   Allergen Reactions   • Feldene [Piroxicam] Itching   • Percodan [Oxycodone-Aspirin] Itching        CURRENT MEDICATIONS:     Current Outpatient Prescriptions:   •  pravastatin (PRAVACHOL) 80 MG tablet, Take 1 Tab by mouth every day., Disp: 90 Tab, Rfl: 0  •  clindamycin (CLEOCIN) 150 MG Cap, Take 300 mg by mouth 2 Times a Day., Disp: , Rfl:   •  digoxin (LANOXIN) 250 MCG Tab, , Disp: , Rfl:   •  warfarin (COUMADIN) 4 MG Tab, Take one tablet by mouth one time daily or as directed by coumadin clinic, Disp: 90 Tab, Rfl: 1  •  metoprolol SR (TOPROL XL) 50 MG TABLET SR 24 HR, TAKE ONE TABLET BY MOUTH EVERY DAY, Disp: 90 Tab, Rfl: 2  •  gabapentin (NEURONTIN) 300 MG Cap, Take 300 mg by mouth 3 times a day., Disp: , Rfl:   •  cyclobenzaprine (FLEXERIL) 10 MG Tab, Take 10 mg by mouth 3 times a day as needed., Disp: , Rfl:   •  L-Lysine 500 MG Tab, Take  by mouth., Disp: , Rfl:   •  B Complex Vitamins (VITAMIN B COMPLEX PO), Take 100 Units by mouth., Disp: , Rfl:   •  glucosamine  Sulfate 500 MG Cap, Take 500 mg by mouth 2 times a day, with meals., Disp: , Rfl:   •  ascorbic acid (ASCORBIC ACID) 500 MG Tab, Take 1,000 mg by mouth every evening., Disp: , Rfl:   •  Psyllium (METAMUCIL PO), Take  by mouth., Disp: , Rfl:   •  Coenzyme Q10 (COQ10 PO), Take  by mouth every day., Disp: , Rfl:   •  Cholecalciferol (VITAMIN D3) 2000 UNITS Tab, Take  by mouth every day. Mondays, Disp: , Rfl:   •  multivitamin (THERAGRAN) TABS, Take 1 Tab by mouth every day., Disp: , Rfl:   •  docosahexanoic acid (FISH OIL) 1000 MG CAPS, Take  by mouth every day., Disp: , Rfl:      LABS REVIEWED:  Lab Results   Component Value Date/Time    SODIUM 136 06/17/2019 09:51 AM    POTASSIUM 4.4 06/17/2019 09:51 AM    CHLORIDE 105 06/17/2019 09:51 AM    CO2 26 06/17/2019 09:51 AM    GLUCOSE 93 06/17/2019 09:51 AM    BUN 18 06/17/2019 09:51 AM    CREATININE 0.72 06/17/2019 09:51 AM    BUNCREATRAT 15 06/05/2018 08:29 AM      Lab Results   Component Value Date/Time    PROTHROMBTM 31.7 (H) 05/22/2019 03:22 PM    INR 2.20 07/08/2019      Lab Results   Component Value Date/Time    WBC 8.4 06/17/2019 09:51 AM    RBC 5.00 06/17/2019 09:51 AM    HEMOGLOBIN 15.7 06/17/2019 09:51 AM    HEMATOCRIT 48.4 06/17/2019 09:51 AM    MCV 96.8 06/17/2019 09:51 AM    MCH 31.4 06/17/2019 09:51 AM    MCHC 32.4 (L) 06/17/2019 09:51 AM    MPV 10.1 06/17/2019 09:51 AM    NEUTSPOLYS 55.00 06/17/2019 09:51 AM    LYMPHOCYTES 29.30 06/17/2019 09:51 AM    MONOCYTES 12.40 06/17/2019 09:51 AM    EOSINOPHILS 2.40 06/17/2019 09:51 AM    BASOPHILS 0.50 06/17/2019 09:51 AM        IMAGING REVIEWED AND INTERPRETED:    ECHOCARDIOGRAM 1/10/2019 [facility]  Normal left ventricular systolic function.  Left ventricular ejection fraction is visually estimated to be 65%.  Heavily calcified aortic valve leaflets with restrictive movements.  Moderate aortic stenosis by gradients. Consider paradoxical low flow   low gradient severe aortic stenosis.  Aortic valve area calculated from  "the continuity equation is 0.8 cm2.  Max is 3.06  m/s. Transvalvular gradients are - Peak: 30 mmHg, Mean: 23   mmHg.   Mild tricuspid regurgitation.    Dobutamine Stress Echocardiogram: 4/26/2019 [facility] [place]  CONCLUSIONS  Max velocity of 4.11 m/sec,   Ejection fraction at rest is 65 %. Normal hyperkinesis of all wall   segments with exercise.. Peak Vmax is 4.1 m/s.. Peak transvalvular   gradients are - Peak: 68 mmHg, Mean: 38 mmHg.. Peak aortic valve area   calculated by the continuity equation is 1.2 sq  cm .      ANGIOGRAM:   None available    REVIEW OF SYSTEMS:   Review of Systems   Constitutional: Positive for malaise/fatigue.   HENT: Positive for hearing loss.    Eyes: Negative.    Respiratory: Positive for shortness of breath.    Cardiovascular: Negative.    Gastrointestinal: Negative.    Genitourinary: Negative.    Musculoskeletal: Positive for back pain and myalgias.   Skin: Negative.    Neurological: Positive for dizziness.   Endo/Heme/Allergies: Negative.    Psychiatric/Behavioral: Negative.        PHYSICAL EXAMINATION:   Physical Exam  CONSTITUTIONAL:  /64 (BP Location: Left arm, Patient Position: Sitting, BP Cuff Size: Adult)   Pulse 71   Temp 36.5 °C (97.7 °F) (Temporal)   Ht 1.803 m (5' 11\")   Wt 103.8 kg (228 lb 13.4 oz)   SpO2 94% .    General appearance: Frail, no apparent distress, normal development.  HEENT:  Anicteric sclerae, moist conjunctivae, moist mucous membranes, good dentition.  NECK:  Supple without jugular venous distention, without lymphadenopathy.  SKIN:   Loose skin turgor, no stasis dermatitis or ulcers noted.  RESPIRATORY:  Clear to auscultation bilaterally, respirations are regular, symmetrical and unlabored.   CARDIAC:  Regular rate and rhythm, 3/6 systolic murmur. No carotid bruits. Peripheral pulses palpable.   GASTROINTESTINAL:  Soft, non-distended, non-tender with bowel sounds present, no hepatosplenomegaly.  EXTREMITIES:  No clubbing, cyanosis, or changes consistent with " peripheral vascular disease. No peripheral edema or varicosities.  NEUROLOGIC/ PSYCHIATRIC: Alert and oriented times three. Mood and affect normal.  MUSCULOSKELETAL:  Normal gait and station.      IMPRESSION:  Severe symptomatic aortic stenosis, obstructive sleep apnea, atrial fibrillation, history of liver cirrhosis.      PLAN:  I do not recommend surgical aortic valve replacement due to excessive operative risk.  I estimate this to be greater than 5%. The STS mortality risk score is 4% and the morbidity and mortality risk score is 19%. The scores were discussed with patient.    He is a better candidate for transcatheter aortic valve replacement (TAVR) and I would recommend proceeding with work-up.    Findings and recommendations have been discussed with the patient’s cardiologist Vickey Rutherford MD.  Thank you for this very challenging consultation and participation in the patient’s care.  I will keep you apprised of all future developments.    Sincerely,    Mayco Pichardo MD, FACS.        I,  Mayco Pichardo MD, FACS performed a substantial portion of the EM visit face-to-face with the same patient on the same date of service with the APRN, Jeri Aquino PA-C. I was personally involved in reviewing and interpreting the films and conducted elements of the history and physical exam. I performed all of the medical decision making for the patient.

## 2019-07-17 ENCOUNTER — HOSPITAL ENCOUNTER (OUTPATIENT)
Dept: LAB | Facility: MEDICAL CENTER | Age: 79
End: 2019-07-17
Attending: INTERNAL MEDICINE
Payer: MEDICARE

## 2019-07-17 ENCOUNTER — OFFICE VISIT (OUTPATIENT)
Dept: SURGERY | Facility: MEDICAL CENTER | Age: 79
End: 2019-07-17
Payer: MEDICARE

## 2019-07-17 VITALS
DIASTOLIC BLOOD PRESSURE: 64 MMHG | HEART RATE: 71 BPM | SYSTOLIC BLOOD PRESSURE: 112 MMHG | TEMPERATURE: 97.7 F | OXYGEN SATURATION: 94 % | BODY MASS INDEX: 32.04 KG/M2 | WEIGHT: 228.84 LBS | HEIGHT: 71 IN

## 2019-07-17 DIAGNOSIS — I35.0 SEVERE AORTIC STENOSIS: ICD-10-CM

## 2019-07-17 LAB
ALBUMIN SERPL BCP-MCNC: 3.8 G/DL (ref 3.2–4.9)
ALBUMIN/GLOB SERPL: 1 G/DL
ALP SERPL-CCNC: 88 U/L (ref 30–99)
ALT SERPL-CCNC: 59 U/L (ref 2–50)
ANION GAP SERPL CALC-SCNC: 6 MMOL/L (ref 0–11.9)
AST SERPL-CCNC: 57 U/L (ref 12–45)
BASOPHILS # BLD AUTO: 0.7 % (ref 0–1.8)
BASOPHILS # BLD: 0.06 K/UL (ref 0–0.12)
BILIRUB SERPL-MCNC: 0.5 MG/DL (ref 0.1–1.5)
BUN SERPL-MCNC: 14 MG/DL (ref 8–22)
CALCIUM SERPL-MCNC: 9.8 MG/DL (ref 8.5–10.5)
CHLORIDE SERPL-SCNC: 104 MMOL/L (ref 96–112)
CO2 SERPL-SCNC: 27 MMOL/L (ref 20–33)
CREAT SERPL-MCNC: 0.64 MG/DL (ref 0.5–1.4)
EOSINOPHIL # BLD AUTO: 0.31 K/UL (ref 0–0.51)
EOSINOPHIL NFR BLD: 3.6 % (ref 0–6.9)
ERYTHROCYTE [DISTWIDTH] IN BLOOD BY AUTOMATED COUNT: 49.1 FL (ref 35.9–50)
GLOBULIN SER CALC-MCNC: 3.7 G/DL (ref 1.9–3.5)
GLUCOSE SERPL-MCNC: 107 MG/DL (ref 65–99)
HCT VFR BLD AUTO: 49.1 % (ref 42–52)
HGB BLD-MCNC: 15.7 G/DL (ref 14–18)
IMM GRANULOCYTES # BLD AUTO: 0.02 K/UL (ref 0–0.11)
IMM GRANULOCYTES NFR BLD AUTO: 0.2 % (ref 0–0.9)
INR PPP: 2.45 (ref 0.87–1.13)
LYMPHOCYTES # BLD AUTO: 2.92 K/UL (ref 1–4.8)
LYMPHOCYTES NFR BLD: 33.8 % (ref 22–41)
MCH RBC QN AUTO: 31 PG (ref 27–33)
MCHC RBC AUTO-ENTMCNC: 32 G/DL (ref 33.7–35.3)
MCV RBC AUTO: 96.8 FL (ref 81.4–97.8)
MONOCYTES # BLD AUTO: 1 K/UL (ref 0–0.85)
MONOCYTES NFR BLD AUTO: 11.6 % (ref 0–13.4)
NEUTROPHILS # BLD AUTO: 4.32 K/UL (ref 1.82–7.42)
NEUTROPHILS NFR BLD: 50.1 % (ref 44–72)
NRBC # BLD AUTO: 0 K/UL
NRBC BLD-RTO: 0 /100 WBC
PLATELET # BLD AUTO: 244 K/UL (ref 164–446)
PMV BLD AUTO: 10.4 FL (ref 9–12.9)
POTASSIUM SERPL-SCNC: 4.3 MMOL/L (ref 3.6–5.5)
PROT SERPL-MCNC: 7.5 G/DL (ref 6–8.2)
PROTHROMBIN TIME: 27.5 SEC (ref 12–14.6)
RBC # BLD AUTO: 5.07 M/UL (ref 4.7–6.1)
SODIUM SERPL-SCNC: 137 MMOL/L (ref 135–145)
WBC # BLD AUTO: 8.6 K/UL (ref 4.8–10.8)

## 2019-07-17 PROCEDURE — 36415 COLL VENOUS BLD VENIPUNCTURE: CPT

## 2019-07-17 PROCEDURE — 85610 PROTHROMBIN TIME: CPT

## 2019-07-17 PROCEDURE — 80053 COMPREHEN METABOLIC PANEL: CPT

## 2019-07-17 PROCEDURE — 99215 OFFICE O/P EST HI 40 MIN: CPT | Performed by: THORACIC SURGERY (CARDIOTHORACIC VASCULAR SURGERY)

## 2019-07-17 PROCEDURE — 85025 COMPLETE CBC W/AUTO DIFF WBC: CPT

## 2019-07-17 ASSESSMENT — ENCOUNTER SYMPTOMS
MYALGIAS: 1
BACK PAIN: 1
SHORTNESS OF BREATH: 1
EYES NEGATIVE: 1
CARDIOVASCULAR NEGATIVE: 1
PSYCHIATRIC NEGATIVE: 1
DIZZINESS: 1
GASTROINTESTINAL NEGATIVE: 1

## 2019-07-17 NOTE — PROGRESS NOTES
REFERRING PHYSICIAN: Vickey Rutherfrod MD.     CONSULTING PHYSICIAN: Yogi Nash MD, FACS    CHIEF COMPLAINT: Fatigue    HISTORY OF PRESENT ILLNESS: The patient is a 79 y.o. male with a history of atrial fibrillation, on anticoagulation, HTN, cirrhosis and known aortic stenosis.  He is here today for a second opinion of SAVR versus TAVR.  He complains of dizziness.  He denies any other associated signs or symptoms.      PAST MEDICAL HISTORY:   Past Medical History:   Diagnosis Date   • Aortic stenosis    • Apnea, sleep    • Atrial fibrillation (HCC)    • Back pain    • Cataract     early   • Cirrhosis of liver without ascites (HCC)    • Clotting disorder (HCC)    • Depression     Lost wife 10 yeasr ago; still gets tearful   • Diabetes (HCC)     diet controlled   • Encounter for long-term (current) use of other medications    • Gasping for breath    • Swedish measles    • Heart murmur    • Heart valve disease    • Hyperlipidemia    • Hypertension    • Insomnia    • Mumps    • Nasal drainage    • Pain     back & thumb   • Pyogenic arthritis, lower leg (HCC)     thumb, back   • Restless leg syndrome    • Sleep apnea     uses CPAP   • Snoring    • Tonsillitis    • Urinary incontinence    • Valvular heart disease        PAST SURGICAL HISTORY:   Past Surgical History:   Procedure Laterality Date   • FINGER ARTHROPLASTY Left 5/27/2016    Procedure: FINGER ARTHROPLASTY THUMB CARPOMETACARPAL EXCISIONAL, EXCISE TRAPEZIUM;  Surgeon: Raheel Salgado M.D.;  Location: SURGERY SAME DAY [facility];  Service:    • CARDIOVERSION      on 7/17/06, sinus rhythm until January 2007,  paroxysmal atrial fibrillation   • KNEE ARTHROPLASTY TOTAL     • LAMINOTOMY N/A     multiple lumbar spine   • OTHER ORTHOPEDIC SURGERY      lower back   • PB PERCUT IMPLNT NEUROELECT,EPIDURAL     • TOE ARTHROPLASTY     • TURP-VAPOR N/A        FAMILY HISTORY:   Family History   Problem Relation Age of Onset   • Other Mother         unknown   •  Heart Disease Mother    • Cancer Father         prostate, skin   • No Known Problems Brother    • Diabetes Brother    • Heart Disease Son    • Sleep Apnea Neg Hx         SOCIAL HISTORY:   Social History     Social History   • Marital status:      Spouse name: N/A   • Number of children: N/A   • Years of education: N/A     Occupational History   • Not on file.     Social History Main Topics   • Smoking status: Former Smoker     Packs/day: 1.00     Years: 20.00     Types: Cigarettes     Quit date: 1/16/1972   • Smokeless tobacco: Former User   • Alcohol use No   • Drug use: No   • Sexual activity: Not Currently     Partners: Female     Other Topics Concern   • Not on file     Social History Narrative   • No narrative on file       ALLERGIES:   Allergies   Allergen Reactions   • Feldene [Piroxicam] Itching   • Percodan [Oxycodone-Aspirin] Itching        CURRENT MEDICATIONS:     Current Outpatient Prescriptions:   •  pravastatin (PRAVACHOL) 80 MG tablet, Take 1 Tab by mouth every day., Disp: 90 Tab, Rfl: 0  •  clindamycin (CLEOCIN) 150 MG Cap, Take 300 mg by mouth 2 Times a Day., Disp: , Rfl:   •  digoxin (LANOXIN) 250 MCG Tab, , Disp: , Rfl:   •  warfarin (COUMADIN) 4 MG Tab, Take one tablet by mouth one time daily or as directed by coumadin clinic, Disp: 90 Tab, Rfl: 1  •  metoprolol SR (TOPROL XL) 50 MG TABLET SR 24 HR, TAKE ONE TABLET BY MOUTH EVERY DAY, Disp: 90 Tab, Rfl: 2  •  gabapentin (NEURONTIN) 300 MG Cap, Take 300 mg by mouth 3 times a day., Disp: , Rfl:   •  cyclobenzaprine (FLEXERIL) 10 MG Tab, Take 10 mg by mouth 3 times a day as needed., Disp: , Rfl:   •  L-Lysine 500 MG Tab, Take  by mouth., Disp: , Rfl:   •  B Complex Vitamins (VITAMIN B COMPLEX PO), Take 100 Units by mouth., Disp: , Rfl:   •  glucosamine Sulfate 500 MG Cap, Take 500 mg by mouth 2 times a day, with meals., Disp: , Rfl:   •  ascorbic acid (ASCORBIC ACID) 500 MG Tab, Take 1,000 mg by mouth every evening., Disp: , Rfl:   •   Psyllium (METAMUCIL PO), Take  by mouth., Disp: , Rfl:   •  Coenzyme Q10 (COQ10 PO), Take  by mouth every day., Disp: , Rfl:   •  Cholecalciferol (VITAMIN D3) 2000 UNITS Tab, Take  by mouth every day. Mondays, Disp: , Rfl:   •  multivitamin (THERAGRAN) TABS, Take 1 Tab by mouth every day., Disp: , Rfl:   •  docosahexanoic acid (FISH OIL) 1000 MG CAPS, Take  by mouth every day., Disp: , Rfl:      LABS REVIEWED:  Lab Results   Component Value Date/Time    SODIUM 136 07/18/2019 01:25 PM    POTASSIUM 4.1 07/18/2019 01:25 PM    CHLORIDE 105 07/18/2019 01:25 PM    CO2 26 07/18/2019 01:25 PM    GLUCOSE 115 (H) 07/18/2019 01:25 PM    BUN 13 07/18/2019 01:25 PM    CREATININE 0.72 07/18/2019 01:25 PM    BUNCREATRAT 15 06/05/2018 08:29 AM      Lab Results   Component Value Date/Time    PROTHROMBTM 27.4 (H) 07/18/2019 01:25 PM    INR 2.45 (H) 07/18/2019 01:25 PM      Lab Results   Component Value Date/Time    WBC 7.9 07/18/2019 01:32 PM    RBC 5.08 07/18/2019 01:32 PM    HEMOGLOBIN 15.9 07/18/2019 01:32 PM    HEMATOCRIT 49.3 07/18/2019 01:32 PM    MCV 97.0 07/18/2019 01:32 PM    MCH 31.3 07/18/2019 01:32 PM    MCHC 32.3 (L) 07/18/2019 01:32 PM    MPV 9.8 07/18/2019 01:32 PM    NEUTSPOLYS 50.10 07/17/2019 04:35 PM    LYMPHOCYTES 33.80 07/17/2019 04:35 PM    MONOCYTES 11.60 07/17/2019 04:35 PM    EOSINOPHILS 3.60 07/17/2019 04:35 PM    BASOPHILS 0.70 07/17/2019 04:35 PM        IMAGING REVIEWED AND INTERPRETED:    ECHOCARDIOGRAM:   Normal left ventricular systolic function.  Left ventricular ejection fraction is visually estimated to be 65%.  Heavily calcified aortic valve leaflets with restrictive movements.  Moderate aortic stenosis by gradients. Consider paradoxical low flow   low gradient severe aortic stenosis.  Aortic valve area calculated from the continuity equation is 0.8 cm2.  Max is 3.06  m/s. Transvalvular gradients are - Peak: 30 mmHg, Mean: 23   mmHg.   Mild tricuspid regurgitation.     Dobutamine Stress  "Echocardiogram: 4/26/2019 [facility] [place]  CONCLUSIONS  Max velocity of 4.11 m/sec,   Ejection fraction at rest is 65 %. Normal hyperkinesis of all wall   segments with exercise.. Peak Vmax is 4.1 m/s.. Peak transvalvular   gradients are - Peak: 68 mmHg, Mean: 38 mmHg.. Peak aortic valve area   calculated by the continuity equation is 1.2 sq  cm .      ANGIOGRAM:   Pending    REVIEW OF SYSTEMS:   ROS  Constitutional:  negative for chills, fever and positive malaise/fatigue.  Respiratory:  Negative for cough.  Cardiovascular:  Negative for chest pain.  Positive for palpitations, negative orthopnea, claudication, and PND.  Gastrointestinal:  negative for abdominal pain.  Musculoskeletal:  Negative for myalgias.  Neurological  Positive for dizziness.      All other systems were reviewed with the patient and are negative.    PHYSICAL EXAMINATION:   Physical Exam  CONSTITUTIONAL:  /64 (BP Location: Right arm, Patient Position: Sitting, BP Cuff Size: Adult)   Pulse 84   Temp 36.5 °C (97.7 °F) (Temporal)   Ht 1.803 m (5' 11\")   Wt 103.1 kg (227 lb 4.7 oz)   SpO2 92%     General appearance: No apparent distress, normal development  HEENT:  Anicteric sclerae, moist conjunctivae, moist mucous membranes, good dentition   NECK:  Supple without jugular venous distention, without lymphadenopathy    SKIN:   Normal skin turgor, no stasis dermatitis or ulcers noted.  RESPIRATORY:  Clear to auscultation bilaterally, respirations are regular, symmetrical and unlabored.   CARDIAC:  Regular rate and rhythm, sytolic murmur. No carotid bruits. Peripheral pulses palpable.   GASTROINTESTINAL:  Soft, non-distended, non-tender with bowel sounds present, no hepatosplenomegaly  EXTREMITIES:  No clubbing, cyanosis, or changes consistent with peripheral vascular disease. No peripheral edema or varicosities   NEUROLOGIC/ PSYCHIATRIC: Alert and oriented times three. Mood and affect normal    IMPRESSION:  Severe aortic stenosis " (calcific/degenrative)      PLAN:  I recommend TAVR not SAVR    The STS mortality risk score is 3% and the morbidity and mortality risk score is 15%. The scores were discussed with patient.    Findings and recommendations have been discussed with the patient’s cardiologist Vickey Rutherford.  Thank you for this very challenging consultation and participation in the patient’s care.  I will keep you apprised of all future developments.          Sincerely,       Yogi Nash MD, FACS

## 2019-07-18 ENCOUNTER — OFFICE VISIT (OUTPATIENT)
Dept: SURGERY | Facility: MEDICAL CENTER | Age: 79
End: 2019-07-18
Payer: MEDICARE

## 2019-07-18 ENCOUNTER — HOSPITAL ENCOUNTER (OUTPATIENT)
Dept: RADIOLOGY | Facility: MEDICAL CENTER | Age: 79
End: 2019-07-18
Attending: INTERNAL MEDICINE
Payer: MEDICARE

## 2019-07-18 ENCOUNTER — TELEPHONE (OUTPATIENT)
Dept: CARDIOLOGY | Facility: MEDICAL CENTER | Age: 79
End: 2019-07-18

## 2019-07-18 ENCOUNTER — HOSPITAL ENCOUNTER (OUTPATIENT)
Dept: CARDIOLOGY | Facility: MEDICAL CENTER | Age: 79
DRG: 267 | End: 2019-07-18
Attending: INTERNAL MEDICINE | Admitting: INTERNAL MEDICINE
Payer: MEDICARE

## 2019-07-18 ENCOUNTER — OFFICE VISIT (OUTPATIENT)
Dept: CARDIOLOGY | Facility: MEDICAL CENTER | Age: 79
End: 2019-07-18
Payer: MEDICARE

## 2019-07-18 VITALS
HEIGHT: 71 IN | OXYGEN SATURATION: 97 % | SYSTOLIC BLOOD PRESSURE: 112 MMHG | BODY MASS INDEX: 31.71 KG/M2 | HEART RATE: 75 BPM | DIASTOLIC BLOOD PRESSURE: 72 MMHG | WEIGHT: 226.52 LBS

## 2019-07-18 VITALS
OXYGEN SATURATION: 92 % | HEIGHT: 71 IN | TEMPERATURE: 97.7 F | DIASTOLIC BLOOD PRESSURE: 64 MMHG | HEART RATE: 84 BPM | BODY MASS INDEX: 31.82 KG/M2 | SYSTOLIC BLOOD PRESSURE: 102 MMHG | WEIGHT: 227.29 LBS

## 2019-07-18 DIAGNOSIS — Z01.810 PRE-OPERATIVE CARDIOVASCULAR EXAMINATION: ICD-10-CM

## 2019-07-18 DIAGNOSIS — I35.0 NONRHEUMATIC AORTIC VALVE STENOSIS: ICD-10-CM

## 2019-07-18 DIAGNOSIS — I35.0 SEVERE AORTIC STENOSIS: ICD-10-CM

## 2019-07-18 DIAGNOSIS — I35.0 AORTIC VALVE STENOSIS, ETIOLOGY OF CARDIAC VALVE DISEASE UNSPECIFIED: ICD-10-CM

## 2019-07-18 DIAGNOSIS — R06.02 SHORTNESS OF BREATH: ICD-10-CM

## 2019-07-18 DIAGNOSIS — I10 ESSENTIAL HYPERTENSION: ICD-10-CM

## 2019-07-18 LAB
ABO + RH BLD: NORMAL
ABO GROUP BLD: NORMAL
ANION GAP SERPL CALC-SCNC: 5 MMOL/L (ref 0–11.9)
BLD GP AB SCN SERPL QL: NORMAL
BUN SERPL-MCNC: 13 MG/DL (ref 8–22)
CALCIUM SERPL-MCNC: 9.3 MG/DL (ref 8.5–10.5)
CHLORIDE SERPL-SCNC: 105 MMOL/L (ref 96–112)
CO2 SERPL-SCNC: 26 MMOL/L (ref 20–33)
CREAT SERPL-MCNC: 0.72 MG/DL (ref 0.5–1.4)
ERYTHROCYTE [DISTWIDTH] IN BLOOD BY AUTOMATED COUNT: 48.7 FL (ref 35.9–50)
GLUCOSE SERPL-MCNC: 115 MG/DL (ref 65–99)
HCT VFR BLD AUTO: 49.3 % (ref 42–52)
HGB BLD-MCNC: 15.9 G/DL (ref 14–18)
INR PPP: 2.45 (ref 0.87–1.13)
MCH RBC QN AUTO: 31.3 PG (ref 27–33)
MCHC RBC AUTO-ENTMCNC: 32.3 G/DL (ref 33.7–35.3)
MCV RBC AUTO: 97 FL (ref 81.4–97.8)
NT-PROBNP SERPL IA-MCNC: 953 PG/ML (ref 0–125)
PLATELET # BLD AUTO: 258 K/UL (ref 164–446)
PMV BLD AUTO: 9.8 FL (ref 9–12.9)
POTASSIUM SERPL-SCNC: 4.1 MMOL/L (ref 3.6–5.5)
PROTHROMBIN TIME: 27.4 SEC (ref 12–14.6)
RBC # BLD AUTO: 5.08 M/UL (ref 4.7–6.1)
RH BLD: NORMAL
SODIUM SERPL-SCNC: 136 MMOL/L (ref 135–145)
WBC # BLD AUTO: 7.9 K/UL (ref 4.8–10.8)

## 2019-07-18 PROCEDURE — 86900 BLOOD TYPING SEROLOGIC ABO: CPT

## 2019-07-18 PROCEDURE — 71275 CT ANGIOGRAPHY CHEST: CPT

## 2019-07-18 PROCEDURE — 700117 HCHG RX CONTRAST REV CODE 255: Performed by: INTERNAL MEDICINE

## 2019-07-18 PROCEDURE — 85027 COMPLETE CBC AUTOMATED: CPT

## 2019-07-18 PROCEDURE — 99204 OFFICE O/P NEW MOD 45 MIN: CPT | Performed by: THORACIC SURGERY (CARDIOTHORACIC VASCULAR SURGERY)

## 2019-07-18 PROCEDURE — 85610 PROTHROMBIN TIME: CPT

## 2019-07-18 PROCEDURE — 80048 BASIC METABOLIC PNL TOTAL CA: CPT

## 2019-07-18 PROCEDURE — 86850 RBC ANTIBODY SCREEN: CPT

## 2019-07-18 PROCEDURE — 83880 ASSAY OF NATRIURETIC PEPTIDE: CPT

## 2019-07-18 PROCEDURE — 86901 BLOOD TYPING SEROLOGIC RH(D): CPT

## 2019-07-18 PROCEDURE — 36415 COLL VENOUS BLD VENIPUNCTURE: CPT

## 2019-07-18 PROCEDURE — 74174 CTA ABD&PLVS W/CONTRAST: CPT

## 2019-07-18 PROCEDURE — 94010 BREATHING CAPACITY TEST: CPT

## 2019-07-18 PROCEDURE — 99215 OFFICE O/P EST HI 40 MIN: CPT | Performed by: INTERNAL MEDICINE

## 2019-07-18 RX ADMIN — IOHEXOL 100 ML: 350 INJECTION, SOLUTION INTRAVENOUS at 16:19

## 2019-07-18 ASSESSMENT — PULMONARY FUNCTION TESTS
PREDICTED_VC: 79%
FEV1_PREDICTED: 76%
FEV1/FVC: 77%
PEFR_PREDICTED: 50%

## 2019-07-18 NOTE — TELEPHONE ENCOUNTER
Valve Program Functional Assessment: 19 pre-op     KCCQ12   1a) Showering/bathin  1b) Walking 1 block on ground:  1  1c) Hurrying or joggin  2) Swellin  3) Fatigue:1   4) Shortness of breath: 2  5) Sleep sitting up: 5  6) Limited enjoyment of life: 2  7) Spend the rest of your life with HF: 1  8a) Hobbies, recreational activities:1  8b) Working or doing household chores:1  8c) Visiting family or friends: 1    5 meter walk test  1) _4.62_____ s/5m  2) _5.65_____ s/5m  3) _6.07_____ s/5m     Strength   1) _45_____ kg  2) _45_____ kg  3) _40_____ kg    RING ADLs  Patient independently preforms...   - Bathing: Yes   - Dressing: Yes   - Toileting: Yes   - Transferring: Yes   - Continence: Yes   - Feeding: Yes     Living Situation  Patient lives: alone, daughter Karolina lives close by in [place], Son Bashir also lives close by as well as grandson Ruddy.     Mobility Aids   Patient uses:  none    Patients post operative goal: he would like to finish building his daughter's garage which is 2/3 way built. 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     Mental Status Assessment:  Appearance: normal  Behavior: normal  Mood/Affect: pleasant/normal  Thought process/content: normal  Cognition: normal  Functional ability: normal  Dental Concerns: natural teeth, routine dental exams every 6 months  Dental Clearance warranted: none  Further mental assessment needed: none    Post-op Plan of Care:  Support systems: daughter Karolina, son Bashir, grandson Ruddy  Patient understands discharge plan: YES  DME: CPAP, will bring day of procedure  Home health warranted prior to procedure: n/a  Social concerns for discharge: none  PCP alerted of social concerns: n/a  POLST: n/a  Advance directive: on file  Flu/PNA vaccines completed: YES    Concerns prior to procedure: none    Met with patient during New TAVR consult.     Reviewed patient TAVR  education packet explaining that information is provided regarding preparation for TAVR the night prior, what to expect during the hospital stay, average LOS, and what to look out for post TAVR discharge.Explained that patient will require SBE prophylactic antibiotic prior to any procedures or surgery, including dental post TAVR. Patient states understanding and agrees to carry SBE antibiotic wallet card at all times.    Explained that patient should not eat or drink anything past midnight Sunday. Encouraged patient to wear something clean and comfortable, easy to get on/off to check in Monday morning at 0530. Patient may have friends and family in the pre-operational area until patient is taken to the operating room, at which time family and friends will be asked to wait on floor 1 [facility] surgical waiting area. On completion of TAVR heart team will update family. Once update is given there is some time before family/friends may visit patient in their assigned room. Length of stay on average is one night, however patient may stay a couple days depending on specific needs at that time. Patient given printed instructions sheet with all above stated instructions.Patient states understanding of all material and education presented today and has no further questions at this time. Encouraged patient again, to contact me with any questions or concerns during this work-up process. Patient states understanding and thankful.

## 2019-07-18 NOTE — FLOWSHEET NOTE
Bedside PFT    Bedside PFT screen pre/post bronchodilator:    Value Pre Post Predicted   PEF  3.87l/s (07/18/19 1325)   50% (07/18/19 1325)   FEV1  2.38L (07/18/19 1325)   76% (07/18/19 1325)   VC  3.44L (07/18/19 1325)   79% (07/18/19 1325)   GALINA  77% (07/18/19 1325)  108% (07/18/19 1325)

## 2019-07-18 NOTE — PROGRESS NOTES
Cardiology Initial Consultation Note    Date of note:    7/18/2019    Primary Care Provider: Fransisco Graham M.D.  Referring Provider: Vickey Rutherford M*     Patient Name: Duane Parkinson   YOB: 1940  MRN:              [id number]    Chief Complaint: Fatigue, shortness of breath.    Duane Parkinson is a 79 y.o. male referred by Dr. Rutherford, presented today with fatigue, shortness of breath with exertion.  He has difficulty with day-to-day activities because of shortness of breath with exertion and fatigue.  He cannot shop, has to sit down several times.  He has a very small front yard, it takes him an hour to mow the lawn compared to 10 minutes in the past.  He feels dizzy in the morning when he gets up.  He was evaluated by Dr. Rutherford echocardiogram showed paradoxical low flow gradient.  He underwent dobutamine stress echocardiogram which suggested severe aortic stenosis.  He lives alone, his daughter lives 10 minutes away, his grandson lives 30 minutes away, they will be helping him if needed    ROS  Positive for itching, fatigue, headache, hearing loss, ringing in ears, shortness of breath, back pain, memory loss, dizziness.  All other systems reviewed and discussed using a comprehensive questionnaire and are negative.     Past medical history, family history, social history, allergies and labs are reviewed and updated as needed as documented below.    Past Medical History:   Diagnosis Date   • Aortic stenosis    • Apnea, sleep    • Atrial fibrillation (HCC)    • Back pain    • Cataract     early   • Cirrhosis of liver without ascites (HCC)    • Clotting disorder (HCC)    • Depression     Lost wife 10 yeasr ago; still gets tearful   • Diabetes (HCC)     diet controlled   • Encounter for long-term (current) use of other medications    • Gasping for breath    • [place] measles    • Heart murmur    • Heart valve disease    • Hyperlipidemia    • Hypertension    • Insomnia    • Mumps    •  Nasal drainage    • Pain     back & thumb   • Pyogenic arthritis, lower leg (HCC)     thumb, back   • Restless leg syndrome    • Sleep apnea     uses CPAP   • Snoring    • Tonsillitis    • Urinary incontinence    • Valvular heart disease          Past Surgical History:   Procedure Laterality Date   • FINGER ARTHROPLASTY Left 5/27/2016    Procedure: FINGER ARTHROPLASTY THUMB CARPOMETACARPAL EXCISIONAL, EXCISE TRAPEZIUM;  Surgeon: Raheel Salgado M.D.;  Location: SURGERY SAME DAY [facility];  Service:    • CARDIOVERSION      on 7/17/06, sinus rhythm until January 2007,  paroxysmal atrial fibrillation   • KNEE ARTHROPLASTY TOTAL     • LAMINOTOMY N/A     multiple lumbar spine   • OTHER ORTHOPEDIC SURGERY      lower back   • PB PERCUT IMPLNT NEUROELECT,EPIDURAL     • TOE ARTHROPLASTY     • TURP-VAPOR N/A          Current Outpatient Prescriptions   Medication Sig Dispense Refill   • pravastatin (PRAVACHOL) 80 MG tablet Take 1 Tab by mouth every day. 90 Tab 0   • clindamycin (CLEOCIN) 150 MG Cap Take 300 mg by mouth 2 Times a Day.     • digoxin (LANOXIN) 250 MCG Tab      • warfarin (COUMADIN) 4 MG Tab Take one tablet by mouth one time daily or as directed by coumadin clinic 90 Tab 1   • metoprolol SR (TOPROL XL) 50 MG TABLET SR 24 HR TAKE ONE TABLET BY MOUTH EVERY DAY 90 Tab 2   • gabapentin (NEURONTIN) 300 MG Cap Take 300 mg by mouth 3 times a day.     • cyclobenzaprine (FLEXERIL) 10 MG Tab Take 10 mg by mouth 3 times a day as needed.     • L-Lysine 500 MG Tab Take  by mouth.     • B Complex Vitamins (VITAMIN B COMPLEX PO) Take 100 Units by mouth.     • glucosamine Sulfate 500 MG Cap Take 500 mg by mouth 2 times a day, with meals.     • ascorbic acid (ASCORBIC ACID) 500 MG Tab Take 1,000 mg by mouth every evening.     • Psyllium (METAMUCIL PO) Take  by mouth.     • Coenzyme Q10 (COQ10 PO) Take  by mouth every day.     • Cholecalciferol (VITAMIN D3) 2000 UNITS Tab Take  by mouth every day. Mondays     • multivitamin  "(THERAGRAN) TABS Take 1 Tab by mouth every day.     • docosahexanoic acid (FISH OIL) 1000 MG CAPS Take  by mouth every day.       No current facility-administered medications for this visit.          Allergies   Allergen Reactions   • Feldene [Piroxicam] Itching   • Percodan [Oxycodone-Aspirin] Itching         Family History   Problem Relation Age of Onset   • Other Mother         unknown   • Heart Disease Mother    • Cancer Father         prostate, skin   • No Known Problems Brother    • Diabetes Brother    • Heart Disease Son    • Sleep Apnea Neg Hx          Social History     Social History   • Marital status:      Spouse name: N/A   • Number of children: N/A   • Years of education: N/A     Occupational History   • Not on file.     Social History Main Topics   • Smoking status: Former Smoker     Packs/day: 1.00     Years: 20.00     Types: Cigarettes     Quit date: 1/16/1972   • Smokeless tobacco: Former User   • Alcohol use No   • Drug use: No   • Sexual activity: Not Currently     Partners: Female     Other Topics Concern   • Not on file     Social History Narrative   • No narrative on file         Physical Exam:  Ambulatory Vitals  /72 (BP Location: Left arm, Patient Position: Sitting, BP Cuff Size: Adult)   Pulse 75   Ht 1.803 m (5' 11\")   Wt 102.7 kg (226 lb 8.4 oz)   SpO2 97%    Oxygen Therapy:  Pulse Oximetry: 97 %  BP Readings from Last 4 Encounters:   07/18/19 112/72   07/17/19 112/64   07/08/19 114/59   06/28/19 105/70       Weight/BMI: Body mass index is 31.59 kg/m².  Wt Readings from Last 4 Encounters:   07/18/19 102.7 kg (226 lb 8.4 oz)   07/17/19 103.8 kg (228 lb 13.4 oz)   06/17/19 102.1 kg (225 lb)   04/26/19 102.1 kg (225 lb)       General: Well appearing and in no apparent distress  Head: atrumatic  Eyes: No conjunctival pallor   ENT: normal external appearance of nose and ears  Neck: JVD absent, carotid bruits absent  Lungs: respiratory sounds  normal, additional breath sounds " absent  Heart: Irregularly irregular rhythm,   No palpable thrills on palpation, 3 x 6 systolic murmur aortic area, no rubs,   Lower extremity edema absent.   Pedal pulses normal  Abdomen: soft, non tender, non distended.  Extremities/MSK: no clubbing, no cyanosis  Neurological: normal orientation, Gait normal   Psychiatric: Appropriate affect, intact judgement and insight  Skin: Warm extremities        Lab Data Review:  Lab Results   Component Value Date/Time    CHOLSTRLTOT 123 06/05/2018 08:29 AM    CHOLSTRLTOT 110 06/20/2017 07:36 AM    LDL 53 06/05/2018 08:29 AM    LDL 53 06/20/2017 07:36 AM    HDL 32 (L) 06/05/2018 08:29 AM    HDL 35 (A) 06/20/2017 07:36 AM    TRIGLYCERIDE 189 (H) 06/05/2018 08:29 AM    TRIGLYCERIDE 109 06/20/2017 07:36 AM       Lab Results   Component Value Date/Time    SODIUM 137 07/17/2019 04:35 PM    POTASSIUM 4.3 07/17/2019 04:35 PM    CHLORIDE 104 07/17/2019 04:35 PM    CO2 27 07/17/2019 04:35 PM    GLUCOSE 107 (H) 07/17/2019 04:35 PM    BUN 14 07/17/2019 04:35 PM    CREATININE 0.64 07/17/2019 04:35 PM    BUNCREATRAT 15 06/05/2018 08:29 AM     Lab Results   Component Value Date/Time    ALKPHOSPHAT 88 07/17/2019 04:35 PM    ASTSGOT 57 (H) 07/17/2019 04:35 PM    ALTSGPT 59 (H) 07/17/2019 04:35 PM    TBILIRUBIN 0.5 07/17/2019 04:35 PM      Lab Results   Component Value Date/Time    WBC 8.6 07/17/2019 04:35 PM       Cardiac Imaging and Procedures Review:    EKG dated 7/18/2019 : My personal interpretation is Afib     Echo 6/19/2019  CONCLUSIONS  Prior echo 04/10/19  Max velocity of 4.11 m/sec,   Ejection fraction at rest is 65 %. Normal hyperkinesis of all wall   segments with exercise.. Peak Vmax is 4.1 m/s.. Peak transvalvular   gradients are - Peak: 68 mmHg, Mean: 38 mmHg.. Peak aortic valve area   calculated by the continuity equation is 1.2 sq  cm .    Ekg 1/10/2019  Afib, no significant conduction abnormality    CONCLUSIONS  Prior study done - 8/10/18. Compared to the report of the  study done -   there has been mild increase across aortic valve.   Normal left ventricular systolic function.  Mild right ventricular dilatation  Left ventricular ejection fraction is visually estimated to be 65%.  Heavily calcified aortic valve leaflets with restrictive movements.  Moderate aortic stenosis by gradients. Consider paradoxical low flow   low gradient severe aortic stenosis.  Aortic valve area calculated from the continuity equation is 0.8 cm2.  Max is 3.06  m/s. Transvalvular gradients are - Peak: 30 mmHg, Mean: 23   mmHg.  Dimention less index 0.17. Stroke volume index is 20   Mild tricuspid regurgitation.    Medical Decision Makin-year-old male patient with  1.  Symptomatic paradoxical low flow low gradient severe aortic stenosis  2.  Permanent atrial fibrillation  3.  Essential hypertension  4.  Dyslipidemia  5.  Obstructive sleep apnea  7.  History of autoimmune hepatitis    Based on his symptoms I do believe he has severe aortic stenosis, however his valve area with dobutamine stress test was calculated as 1.2 cm².  I would like to get a calcium score of the valve, as well as transesophageal echocardiogram to directly visualized and get planimetry area.  We will schedule for coronary angiogram.  Once all the information is obtained, we will proceed with TAVR if his evaluation is consistent with severe aortic stenosis.    The risks, benefits, and alternatives to TAVR, general anesthesia and transesophageal echocardiogram were discussed in great detail. Specific risks mentioned include bleeding, infection, kidney damage, allergic reaction, cardiac perforation with possible tamponade requiring kristal-cardiocentesis or possible open heart surgery if the patient is a candidate. Lastly the risks of heart attack, stroke, and death were discussed; the risks of major complications includingall cause mortality of 2.2%, disabling stroke of 1.1%, the risk on new pacemaker of 12% and the risk of vascular  complications of 4.1%. The patient verbalized understanding of these potential complications and wishes to proceed with this procedure. (Source 3 Registry).  The procedure will be performed completely in collaboration with cardiac surgery.    This note was dictated using Dragon speech recognition software.    Leander MCCONNELL  Interventional cardiologist  [facility], [facility].  [address] [address]  Phone: [phone]  Fax: [phone]

## 2019-07-18 NOTE — TELEPHONE ENCOUNTER
Patient scheduled for Pre TAVR RHC and C w/poss w/GIBRAN on 7-23-19 at [facility] with Dr. Barrera (heart cath) and Dr. Buckner (GIBRAN) at 7:30. FYI to Dr. Buckner.

## 2019-07-18 NOTE — LETTER
[facility]   [address] [facility] [address]  [place] [address]  Phone: [phone]  Fax: [phone]              Duane Parkinson  1940    Encounter Date: 7/18/2019    Leander Buckner M.D.          PROGRESS NOTE:      Cardiology Initial Consultation Note    Date of note:    7/18/2019    Primary Care Provider: Fransisco Graham M.D.  Referring Provider: Vickey Rutherford M*     Patient Name: Duane Parkinson   YOB: 1940  MRN:              [id number]    Chief Complaint: Fatigue, shortness of breath.    Duane Parkinson is a 79 y.o. male referred by Dr. Rutherford, presented today with fatigue, shortness of breath with exertion.  He has difficulty with day-to-day activities because of shortness of breath with exertion and fatigue.  He cannot shop, has to sit down several times.  He has a very small front yard, it takes him an hour to mow the lawn compared to 10 minutes in the past.  He feels dizzy in the morning when he gets up.  He was evaluated by Dr. Rutherford echocardiogram showed paradoxical low flow gradient.  He underwent dobutamine stress echocardiogram which suggested severe aortic stenosis.  He lives alone, his daughter lives 10 minutes away, his grandson lives 30 minutes away, they will be helping him if needed    ROS  Positive for itching, fatigue, headache, hearing loss, ringing in ears, shortness of breath, back pain, memory loss, dizziness.  All other systems reviewed and discussed using a comprehensive questionnaire and are negative.     Past medical history, family history, social history, allergies and labs are reviewed and updated as needed as documented below.    Past Medical History:   Diagnosis Date   • Aortic stenosis    • Apnea, sleep    • Atrial fibrillation (HCC)    • Back pain    • Cataract     early   • Cirrhosis of liver without ascites (HCC)    • Clotting disorder (HCC)    • Depression     Lost wife 10 yeasr ago; still gets  tearful   • Diabetes (HCC)     diet controlled   • Encounter for long-term (current) use of other medications    • Gasping for breath    • [place] measles    • Heart murmur    • Heart valve disease    • Hyperlipidemia    • Hypertension    • Insomnia    • Mumps    • Nasal drainage    • Pain     back & thumb   • Pyogenic arthritis, lower leg (HCC)     thumb, back   • Restless leg syndrome    • Sleep apnea     uses CPAP   • Snoring    • Tonsillitis    • Urinary incontinence    • Valvular heart disease          Past Surgical History:   Procedure Laterality Date   • FINGER ARTHROPLASTY Left 5/27/2016    Procedure: FINGER ARTHROPLASTY THUMB CARPOMETACARPAL EXCISIONAL, EXCISE TRAPEZIUM;  Surgeon: Raheel Salgado M.D.;  Location: SURGERY SAME DAY [facility];  Service:    • CARDIOVERSION      on 7/17/06, sinus rhythm until January 2007,  paroxysmal atrial fibrillation   • KNEE ARTHROPLASTY TOTAL     • LAMINOTOMY N/A     multiple lumbar spine   • OTHER ORTHOPEDIC SURGERY      lower back   • PB PERCUT IMPLNT NEUROELECT,EPIDURAL     • TOE ARTHROPLASTY     • TURP-VAPOR N/A          Current Outpatient Prescriptions   Medication Sig Dispense Refill   • pravastatin (PRAVACHOL) 80 MG tablet Take 1 Tab by mouth every day. 90 Tab 0   • clindamycin (CLEOCIN) 150 MG Cap Take 300 mg by mouth 2 Times a Day.     • digoxin (LANOXIN) 250 MCG Tab      • warfarin (COUMADIN) 4 MG Tab Take one tablet by mouth one time daily or as directed by coumadin clinic 90 Tab 1   • metoprolol SR (TOPROL XL) 50 MG TABLET SR 24 HR TAKE ONE TABLET BY MOUTH EVERY DAY 90 Tab 2   • gabapentin (NEURONTIN) 300 MG Cap Take 300 mg by mouth 3 times a day.     • cyclobenzaprine (FLEXERIL) 10 MG Tab Take 10 mg by mouth 3 times a day as needed.     • L-Lysine 500 MG Tab Take  by mouth.     • B Complex Vitamins (VITAMIN B COMPLEX PO) Take 100 Units by mouth.     • glucosamine Sulfate 500 MG Cap Take 500 mg by mouth 2 times a day, with meals.     • ascorbic acid  "(ASCORBIC ACID) 500 MG Tab Take 1,000 mg by mouth every evening.     • Psyllium (METAMUCIL PO) Take  by mouth.     • Coenzyme Q10 (COQ10 PO) Take  by mouth every day.     • Cholecalciferol (VITAMIN D3) 2000 UNITS Tab Take  by mouth every day. Mondays     • multivitamin (THERAGRAN) TABS Take 1 Tab by mouth every day.     • docosahexanoic acid (FISH OIL) 1000 MG CAPS Take  by mouth every day.       No current facility-administered medications for this visit.          Allergies   Allergen Reactions   • Feldene [Piroxicam] Itching   • Percodan [Oxycodone-Aspirin] Itching         Family History   Problem Relation Age of Onset   • Other Mother         unknown   • Heart Disease Mother    • Cancer Father         prostate, skin   • No Known Problems Brother    • Diabetes Brother    • Heart Disease Son    • Sleep Apnea Neg Hx          Social History     Social History   • Marital status:      Spouse name: N/A   • Number of children: N/A   • Years of education: N/A     Occupational History   • Not on file.     Social History Main Topics   • Smoking status: Former Smoker     Packs/day: 1.00     Years: 20.00     Types: Cigarettes     Quit date: 1/16/1972   • Smokeless tobacco: Former User   • Alcohol use No   • Drug use: No   • Sexual activity: Not Currently     Partners: Female     Other Topics Concern   • Not on file     Social History Narrative   • No narrative on file         Physical Exam:  Ambulatory Vitals  /72 (BP Location: Left arm, Patient Position: Sitting, BP Cuff Size: Adult)   Pulse 75   Ht 1.803 m (5' 11\")   Wt 102.7 kg (226 lb 8.4 oz)   SpO2 97%    Oxygen Therapy:  Pulse Oximetry: 97 %  BP Readings from Last 4 Encounters:   07/18/19 112/72   07/17/19 112/64   07/08/19 114/59   06/28/19 105/70       Weight/BMI: Body mass index is 31.59 kg/m².  Wt Readings from Last 4 Encounters:   07/18/19 102.7 kg (226 lb 8.4 oz)   07/17/19 103.8 kg (228 lb 13.4 oz)   06/17/19 102.1 kg (225 lb)   04/26/19 102.1 " kg (225 lb)       General: Well appearing and in no apparent distress  Head: atrumatic  Eyes: No conjunctival pallor   ENT: normal external appearance of nose and ears  Neck: JVD absent, carotid bruits absent  Lungs: respiratory sounds  normal, additional breath sounds absent  Heart: Irregularly irregular rhythm,   No palpable thrills on palpation, 3 x 6 systolic murmur aortic area, no rubs,   Lower extremity edema absent.   Pedal pulses normal  Abdomen: soft, non tender, non distended.  Extremities/MSK: no clubbing, no cyanosis  Neurological: normal orientation, Gait normal   Psychiatric: Appropriate affect, intact judgement and insight  Skin: Warm extremities        Lab Data Review:  Lab Results   Component Value Date/Time    CHOLSTRLTOT 123 06/05/2018 08:29 AM    CHOLSTRLTOT 110 06/20/2017 07:36 AM    LDL 53 06/05/2018 08:29 AM    LDL 53 06/20/2017 07:36 AM    HDL 32 (L) 06/05/2018 08:29 AM    HDL 35 (A) 06/20/2017 07:36 AM    TRIGLYCERIDE 189 (H) 06/05/2018 08:29 AM    TRIGLYCERIDE 109 06/20/2017 07:36 AM       Lab Results   Component Value Date/Time    SODIUM 137 07/17/2019 04:35 PM    POTASSIUM 4.3 07/17/2019 04:35 PM    CHLORIDE 104 07/17/2019 04:35 PM    CO2 27 07/17/2019 04:35 PM    GLUCOSE 107 (H) 07/17/2019 04:35 PM    BUN 14 07/17/2019 04:35 PM    CREATININE 0.64 07/17/2019 04:35 PM    BUNCREATRAT 15 06/05/2018 08:29 AM     Lab Results   Component Value Date/Time    ALKPHOSPHAT 88 07/17/2019 04:35 PM    ASTSGOT 57 (H) 07/17/2019 04:35 PM    ALTSGPT 59 (H) 07/17/2019 04:35 PM    TBILIRUBIN 0.5 07/17/2019 04:35 PM      Lab Results   Component Value Date/Time    WBC 8.6 07/17/2019 04:35 PM       Cardiac Imaging and Procedures Review:    EKG dated 7/18/2019 : My personal interpretation is Afib     Echo 6/19/2019  CONCLUSIONS  Prior echo 04/10/19  Max velocity of 4.11 m/sec,   Ejection fraction at rest is 65 %. Normal hyperkinesis of all wall   segments with exercise.. Peak Vmax is 4.1 m/s.. Peak  transvalvular   gradients are - Peak: 68 mmHg, Mean: 38 mmHg.. Peak aortic valve area   calculated by the continuity equation is 1.2 sq  cm .    Ekg 1/10/2019  Afib, no significant conduction abnormality    CONCLUSIONS  Prior study done - 8/10/18. Compared to the report of the study done -   there has been mild increase across aortic valve.   Normal left ventricular systolic function.  Mild right ventricular dilatation  Left ventricular ejection fraction is visually estimated to be 65%.  Heavily calcified aortic valve leaflets with restrictive movements.  Moderate aortic stenosis by gradients. Consider paradoxical low flow   low gradient severe aortic stenosis.  Aortic valve area calculated from the continuity equation is 0.8 cm2.  Max is 3.06  m/s. Transvalvular gradients are - Peak: 30 mmHg, Mean: 23   mmHg.  Dimention less index 0.17. Stroke volume index is 20   Mild tricuspid regurgitation.    Medical Decision Makin-year-old male patient with  1.  Symptomatic paradoxical low flow low gradient severe aortic stenosis  2.  Permanent atrial fibrillation  3.  Essential hypertension  4.  Dyslipidemia  5.  Obstructive sleep apnea  7.  History of autoimmune hepatitis    Based on his symptoms I do believe he has severe aortic stenosis, however his valve area with dobutamine stress test was calculated as 1.2 cm².  I would like to get a calcium score of the valve, as well as transesophageal echocardiogram to directly visualized and get planimetry area.  We will schedule for coronary angiogram.  Once all the information is obtained, we will proceed with TAVR if his evaluation is consistent with severe aortic stenosis.    The risks, benefits, and alternatives to TAVR, general anesthesia and transesophageal echocardiogram were discussed in great detail. Specific risks mentioned include bleeding, infection, kidney damage, allergic reaction, cardiac perforation with possible tamponade requiring kristal-cardiocentesis or  possible open heart surgery if the patient is a candidate. Lastly the risks of heart attack, stroke, and death were discussed; the risks of major complications includingall cause mortality of 2.2%, disabling stroke of 1.1%, the risk on new pacemaker of 12% and the risk of vascular complications of 4.1%. The patient verbalized understanding of these potential complications and wishes to proceed with this procedure. (Source 3 Registry).  The procedure will be performed completely in collaboration with cardiac surgery.    This note was dictated using Dragon speech recognition software.    Leander MCCONNELL  Interventional cardiologist  [facility], [address]  [address] [address]  Phone: [phone]  Fax: [phone]                  No Recipients

## 2019-07-19 RX ORDER — METOPROLOL SUCCINATE 50 MG/1
TABLET, EXTENDED RELEASE ORAL
Qty: 90 TAB | Refills: 3 | Status: SHIPPED | OUTPATIENT
Start: 2019-07-19 | End: 2020-07-13

## 2019-07-22 ENCOUNTER — APPOINTMENT (OUTPATIENT)
Dept: ADMISSIONS | Facility: MEDICAL CENTER | Age: 79
End: 2019-07-22
Attending: INTERNAL MEDICINE
Payer: MEDICARE

## 2019-07-22 DIAGNOSIS — Z01.810 PRE-OPERATIVE CARDIOVASCULAR EXAMINATION: ICD-10-CM

## 2019-07-22 LAB — EKG IMPRESSION: NORMAL

## 2019-07-22 RX ORDER — GABAPENTIN 300 MG/1
900 CAPSULE ORAL 4 TIMES DAILY
COMMUNITY

## 2019-07-23 ENCOUNTER — HOSPITAL ENCOUNTER (OUTPATIENT)
Facility: MEDICAL CENTER | Age: 79
End: 2019-07-23
Attending: INTERNAL MEDICINE | Admitting: INTERNAL MEDICINE
Payer: MEDICARE

## 2019-07-23 ENCOUNTER — APPOINTMENT (OUTPATIENT)
Dept: CARDIOLOGY | Facility: MEDICAL CENTER | Age: 79
End: 2019-07-23
Attending: INTERNAL MEDICINE
Payer: MEDICARE

## 2019-07-23 ENCOUNTER — TELEPHONE (OUTPATIENT)
Dept: CARDIOLOGY | Facility: MEDICAL CENTER | Age: 79
End: 2019-07-23

## 2019-07-23 VITALS
TEMPERATURE: 97.2 F | BODY MASS INDEX: 29.98 KG/M2 | WEIGHT: 221.34 LBS | HEART RATE: 67 BPM | RESPIRATION RATE: 18 BRPM | OXYGEN SATURATION: 97 % | SYSTOLIC BLOOD PRESSURE: 146 MMHG | DIASTOLIC BLOOD PRESSURE: 90 MMHG | HEIGHT: 72 IN

## 2019-07-23 DIAGNOSIS — I35.0 SEVERE AORTIC STENOSIS: ICD-10-CM

## 2019-07-23 DIAGNOSIS — I35.0 NONRHEUMATIC AORTIC VALVE STENOSIS: ICD-10-CM

## 2019-07-23 DIAGNOSIS — Z00.6 EXAMINATION OF PARTICIPANT IN CLINICAL TRIAL: ICD-10-CM

## 2019-07-23 DIAGNOSIS — I35.0 AORTIC VALVE STENOSIS, ETIOLOGY OF CARDIAC VALVE DISEASE UNSPECIFIED: ICD-10-CM

## 2019-07-23 LAB
INR PPP: 1.21 (ref 0.87–1.13)
PROTHROMBIN TIME: 15.6 SEC (ref 12–14.6)
SAO2 % BLD: 81 %
SAO2 % BLD: 96.6 %
SPECIMEN DRAWN FROM PATIENT: NORMAL SOURCE
SPECIMEN DRAWN FROM PATIENT: NORMAL SOURCE

## 2019-07-23 PROCEDURE — 93567 NJX CAR CTH SPRVLV AORTGRPHY: CPT

## 2019-07-23 PROCEDURE — 99153 MOD SED SAME PHYS/QHP EA: CPT

## 2019-07-23 PROCEDURE — 700101 HCHG RX REV CODE 250

## 2019-07-23 PROCEDURE — 85610 PROTHROMBIN TIME: CPT

## 2019-07-23 PROCEDURE — 160002 HCHG RECOVERY MINUTES (STAT)

## 2019-07-23 PROCEDURE — 700117 HCHG RX CONTRAST REV CODE 255: Performed by: INTERNAL MEDICINE

## 2019-07-23 PROCEDURE — 93567 NJX CAR CTH SPRVLV AORTGRPHY: CPT | Performed by: INTERNAL MEDICINE

## 2019-07-23 PROCEDURE — 700105 HCHG RX REV CODE 258: Performed by: INTERNAL MEDICINE

## 2019-07-23 PROCEDURE — 93312 ECHO TRANSESOPHAGEAL: CPT | Mod: 74

## 2019-07-23 PROCEDURE — 93460 R&L HRT ART/VENTRICLE ANGIO: CPT | Mod: 26 | Performed by: INTERNAL MEDICINE

## 2019-07-23 PROCEDURE — 82810 BLOOD GASES O2 SAT ONLY: CPT | Mod: 91

## 2019-07-23 PROCEDURE — 99152 MOD SED SAME PHYS/QHP 5/>YRS: CPT | Performed by: INTERNAL MEDICINE

## 2019-07-23 PROCEDURE — 700111 HCHG RX REV CODE 636 W/ 250 OVERRIDE (IP)

## 2019-07-23 PROCEDURE — G0278 ILIAC ART ANGIO,CARDIAC CATH: HCPCS | Performed by: INTERNAL MEDICINE

## 2019-07-23 PROCEDURE — 99152 MOD SED SAME PHYS/QHP 5/>YRS: CPT

## 2019-07-23 RX ORDER — VERAPAMIL HYDROCHLORIDE 2.5 MG/ML
INJECTION, SOLUTION INTRAVENOUS
Status: COMPLETED
Start: 2019-07-23 | End: 2019-07-23

## 2019-07-23 RX ORDER — HEPARIN SODIUM,PORCINE 1000/ML
VIAL (ML) INJECTION
Status: COMPLETED
Start: 2019-07-23 | End: 2019-07-23

## 2019-07-23 RX ORDER — MIDAZOLAM HYDROCHLORIDE 1 MG/ML
INJECTION INTRAMUSCULAR; INTRAVENOUS
Status: COMPLETED
Start: 2019-07-23 | End: 2019-07-23

## 2019-07-23 RX ORDER — SODIUM CHLORIDE 9 MG/ML
1.5 INJECTION, SOLUTION INTRAVENOUS CONTINUOUS
Status: DISCONTINUED | OUTPATIENT
Start: 2019-07-23 | End: 2019-07-23 | Stop reason: HOSPADM

## 2019-07-23 RX ORDER — SODIUM CHLORIDE 9 MG/ML
INJECTION, SOLUTION INTRAVENOUS
Status: DISCONTINUED | OUTPATIENT
Start: 2019-07-23 | End: 2019-07-23 | Stop reason: HOSPADM

## 2019-07-23 RX ORDER — LIDOCAINE HYDROCHLORIDE 20 MG/ML
INJECTION, SOLUTION INFILTRATION; PERINEURAL
Status: COMPLETED
Start: 2019-07-23 | End: 2019-07-23

## 2019-07-23 RX ORDER — SODIUM CHLORIDE 9 MG/ML
500 INJECTION, SOLUTION INTRAVENOUS
Status: CANCELLED | OUTPATIENT
Start: 2019-07-23 | End: 2019-07-23

## 2019-07-23 RX ORDER — MIDAZOLAM HYDROCHLORIDE 1 MG/ML
.5-2 INJECTION INTRAMUSCULAR; INTRAVENOUS PRN
Status: CANCELLED | OUTPATIENT
Start: 2019-07-23 | End: 2019-07-23

## 2019-07-23 RX ADMIN — HEPARIN SODIUM: 1000 INJECTION, SOLUTION INTRAVENOUS; SUBCUTANEOUS at 08:45

## 2019-07-23 RX ADMIN — VERAPAMIL HYDROCHLORIDE 2.5 MG: 2.5 INJECTION, SOLUTION INTRAVENOUS at 09:47

## 2019-07-23 RX ADMIN — SODIUM CHLORIDE: 9 INJECTION, SOLUTION INTRAVENOUS at 11:58

## 2019-07-23 RX ADMIN — NITROGLYCERIN 10 ML: 20 INJECTION INTRAVENOUS at 08:45

## 2019-07-23 RX ADMIN — LIDOCAINE HYDROCHLORIDE: 20 INJECTION, SOLUTION INFILTRATION; PERINEURAL at 08:45

## 2019-07-23 RX ADMIN — MIDAZOLAM 6 MG: 1 INJECTION INTRAMUSCULAR; INTRAVENOUS at 11:15

## 2019-07-23 RX ADMIN — FENTANYL CITRATE 75 MCG: 0.05 INJECTION, SOLUTION INTRAMUSCULAR; INTRAVENOUS at 10:06

## 2019-07-23 RX ADMIN — HEPARIN SODIUM 2000 UNITS: 200 INJECTION, SOLUTION INTRAVENOUS at 08:45

## 2019-07-23 RX ADMIN — SODIUM CHLORIDE: 9 INJECTION, SOLUTION INTRAVENOUS at 07:05

## 2019-07-23 RX ADMIN — FENTANYL CITRATE 150 MCG: 50 INJECTION, SOLUTION INTRAMUSCULAR; INTRAVENOUS at 11:15

## 2019-07-23 RX ADMIN — MIDAZOLAM HYDROCHLORIDE 2 MG: 1 INJECTION, SOLUTION INTRAMUSCULAR; INTRAVENOUS at 09:47

## 2019-07-23 RX ADMIN — IOHEXOL 84 ML: 350 INJECTION, SOLUTION INTRAVENOUS at 10:09

## 2019-07-23 NOTE — PROCEDURES
CARDIAC CATHETERIZATION REPORT    PROCEDURE PHYSICIAN: Ashwin Barrera MD, [facility], [facility]  ASSISTANT: None    IMPRESSIONS:  1. Moderate to severe aortic stenosis  2. Low cardiac output by thermodilution, normal by bubba  2. Non-obstructive three vessel CAD  3. Moderate elevation in biventricular filling pressures with pulmonary hypertension due to left sided heart disease    Recommendations:  Await GIBRAN, further recommendations per Dr. Buckner    Procedure performed  Selective coronary angiography  Left heart catheterization  Right heart catheterization   Abdominal aortography  Aortic root angiography    Conscious sedation was supervised by myself and administered by trained personnel using fentanyl and versed between 926 and 1005. The patient tolerated sedation without complication.     Procedure Description  1. Access: 6 [place] right radial artery and brachial vein. Micropuncture technique    2. Diagnostic description: 6F  TIG 4.0, Pigtail and [place] Lauren Catheters were used to inject the coronary circulation during invasive hemodynamic monitoring. Simultaneous Ao and LV tracings were obtained with a Sur[place] Ride dual lumen catheter. Because TAVR is being considered, the aortic root and abdominal aorta angiograms were performed    3. Hemostasis: Radial band and Manual Compression     Findings   Hemodynamics: Aorta: 131/85 mmHg  LV: 165/23 mmHg  RA: 14 mmHg  RV: 33/12 mmHg  PA: 34/22/26 mmHg  PCWP: 23 mmHg  PA Saturation: 81  Arterial saturation: 96   Cardiac output/index: 7.4 L/min /3.3 L/min/m^2 Bubba,  3.8 L/min /1.7 L/min/m^2 Thermodilution  Aortic valve mean gradient: 25 mmHg  Aortic valve area: 1.48 mm^2 Bubba, 0.85 mm^2 Thermodilution    Coronary Anatomy   Left Main: 10% proximal stenosis with mild distal tapering   LAD: 40% proximal and 50% mid vessel stenosis   LCx: 30% proximal stenosis, OM1 is small to medium sized with long 40% proximal stenosis, the OM2 is small to moderate in size with 40% proximal stenosis,  OM3 is large with a 50% stenosis   RCA: Dominant, 30% proximal and mid stenosis.     Abdominal aortography: normal abdominal aorta and bilateral iliofemoral system  Aortic root angiography: Normal caliber root without significant AI    Technical Factors  1. Complications: None  2. Estimated Blood Loss: <50 cc  3. Specimens: None  4. Contrast Volume: 84 ml  5. Radiation: 362 mGy  6. Medications: Heparin 5000 units

## 2019-07-23 NOTE — OR NURSING
1155 Patient arrived to unit, arouses to voice.  Right radial TR band and brachial site clean, dry and soft.  Patient denies pain at this time.  Updated on plan of care, verbalized understanding.  1210 1 ml air removed from TR band, no bleeding to site.  1230 2 ml air removed from TR band, no bleeding to site.  Daughter at bedside, updated on plan of care.  1300 2 ml air removed from TR band, no bleeding to site.  1320 3 ml air removed from TR band, no bleeding to site.  1335 3 ml air removed from TR band, no bleeding to site.  1400 3 ml air removed from TR band, no bleeding to site, TR band removed, dressing placed.  1420 Access site clean, dry and soft.  Patient provided with sling for arm.  All lines and monitors discontinued. Reviewed discharge paperwork with pt and daughter. Discussed diet, activity, medications, follow up care and worsening symptoms. No questions at this time.   1430 Pt discharged home with daughter via wheelchair by volunteer.

## 2019-07-23 NOTE — OR NURSING
1031 Patient arrived to unit, awake and alert.  Right radial and right brachial clean, dry and soft.  Patient denies pain at this time.  Updated on plan of care, verbalized understanding.  1055 Patient taken back for GIBRAN with echo RN.

## 2019-07-23 NOTE — PROCEDURES
Procedures    Patient was brought to cath lab holding.  Consent was obtained. Risks and benefits of procedures were explained.    Moderate sedation was used for sedation process.    I was not able to pass the GIBRAN probe due to difficult anatomy.       Lino Quintero MD.  [facility].

## 2019-07-23 NOTE — DISCHARGE INSTRUCTIONS
ACTIVITY: Rest and take it easy for the first 24 hours.  A responsible adult is recommended to remain with you during that time.  It is normal to feel sleepy.  We encourage you to not do anything that requires balance, judgment or coordination.    MILD FLU-LIKE SYMPTOMS ARE NORMAL. YOU MAY EXPERIENCE GENERALIZED MUSCLE ACHES, THROAT IRRITATION, HEADACHE AND/OR SOME NAUSEA.    FOR 24 HOURS DO NOT:  Drive, operate machinery or run household appliances.  Drink beer or alcoholic beverages.   Make important decisions or sign legal documents.    SPECIAL INSTRUCTIONS:     *** HOLD COUMADIN UNTIL YOU SPEAK WITH MD***    Radial Catherization Discharge Instructions      · Do not subject hand/arm to any forceful movements for 24 hours    i.e. supporting weight when rising from the chair or bed.   · Do not drive a car for 24 hours  · You may remove the dressing tomorrow  · You may shower on the day following your procedure.  Do not take a tub bath or submerge the puncture site in water for 3 days following the procedure.  · No Lifting more than 3-5 pounds with affected wrist for 5 days  · Follow up with your cardiologist [phone] 2-4 weeks.  · Increase fluids for 2 days post procedure.  · Continue all previous medications unless otherwise instructed.    If bleeding should occur following discharge:  · Sit down and apply firm pressure to site with your fingers for 10 minutes  · If the bleeding stops, continue to sit quietly, keeping your wrist straight for 2 hours.  Notify physician as soon as possible ( [phone])  · If bleeding does not stop after 10 minutes, or if there is a large amount of bleeding or spurting, call 911 immediately.  Do not drive yourself to the hospital.    DIET: To avoid nausea, slowly advance diet as tolerated, avoiding spicy or greasy foods for the first day.  Add more substantial food to your diet according to your physician's instructions.  Babies can be fed formula or breast milk as soon as they  are hungry.  INCREASE FLUIDS AND FIBER TO AVOID CONSTIPATION.    SURGICAL DRESSING/BATHING: Keep dressing and incision dry and intact for 24 hours, may remove dressing and shower after 2 PM on 7/24, do not need to replace.  Do not submerge site in water for 7 days.    FOLLOW-UP APPOINTMENT:  A follow-up appointment should be arranged with Dr. Buckner [phone]; call to schedule.    You should CALL YOUR PHYSICIAN if you develop:  Fever greater than 101 degrees F.  Pain not relieved by medication, or persistent nausea or vomiting.  Excessive bleeding (blood soaking through dressing) or unexpected drainage from the wound.  Extreme redness or swelling around the incision site, drainage of pus or foul smelling drainage.  Inability to urinate or empty your bladder within 8 hours.  Problems with breathing or chest pain.    You should call 911 if you develop problems with breathing or chest pain.  If you are unable to contact your doctor or surgical center, you should go to the nearest emergency room or urgent care center.  Physician's telephone #: [phone]    If any questions arise, call your doctor.  If your doctor is not available, please feel free to call the Surgical Center at [phone].  The Center is open Monday through Friday from 7AM to 7PM.  You can also call the [facility] HOTLINE open 24 hours/day, 7 days/week and speak to a nurse at [phone], or toll free at [phone].    A registered nurse may call you a few days after your surgery to see how you are doing after your procedure.    MEDICATIONS: Resume taking daily medication.  Take prescribed pain medication with food.  If no medication is prescribed, you may take non-aspirin pain medication if needed.  PAIN MEDICATION CAN BE VERY CONSTIPATING.  Take a stool softener or laxative such as senokot, pericolace, or milk of magnesia if needed.    If your physician has prescribed pain medication that includes Acetaminophen (Tylenol), do not take  additional Acetaminophen (Tylenol) while taking the prescribed medication.    Depression / Suicide Risk    As you are discharged from this [facility] facility, it is important to learn how to keep safe from harming yourself.    Recognize the warning signs:  · Abrupt changes in personality, positive or negative- including increase in energy   · Giving away possessions  · Change in eating patterns- significant weight changes-  positive or negative  · Change in sleeping patterns- unable to sleep or sleeping all the time   · Unwillingness or inability to communicate  · Depression  · Unusual sadness, discouragement and loneliness  · Talk of wanting to die  · Neglect of personal appearance   · Rebelliousness- reckless behavior  · Withdrawal from people/activities they love  · Confusion- inability to concentrate     If you or a loved one observes any of these behaviors or has concerns about self-harm, here's what you can do:  · Talk about it- your feelings and reasons for harming yourself  · Remove any means that you might use to hurt yourself (examples: pills, rope, extension cords, firearm)  · Get professional help from the community (Mental Health, Substance Abuse, psychological counseling)  · Do not be alone:Call your Safe Contact- someone whom you trust who will be there for you.  · Call your local CRISIS HOTLINE 850-0222 or 032-625-5139  · Call your local Children's Mobile Crisis Response Team [place] (658) 184-5391 or www.[employer]  · Call the toll free National Suicide Prevention Hotlines   · National Suicide Prevention Lifeline 326-226-UGKJ (5727)  · National Hope Line Network 800-SUICIDE (635-6417)

## 2019-07-24 ENCOUNTER — TELEPHONE (OUTPATIENT)
Dept: CARDIOLOGY | Facility: MEDICAL CENTER | Age: 79
End: 2019-07-24

## 2019-07-24 PROBLEM — K86.89 PANCREATIC MASS: Status: ACTIVE | Noted: 2019-07-24

## 2019-07-24 NOTE — TELEPHONE ENCOUNTER
Valve Conference Heart Team Plan of Care: 7/24/19    TAVR candidate: Yes   With Possible open heart: Yes   Access: TF, right iliac  Valve Size:   Anesthesia: GA /GIBRAN for sizing  Unit: Tele / [facility]  Incidental findings: none  Clearance needed prior to procedure: none  Plan of care: Will obtain calcium scoring on aortic annulus. Additionally, will have radiology review pancreatic mass and refer patient to [facility] if indicated.       Spoke with patient and reviewed pre op instructions including to stop warfarin now, fish oil, vit c, check in at 0530. Reviewed also that radiology will be reviewing calcium scoring on AV annulus in addition to pancreatic mass. Patient denies any medical history r/t pancreas additionally denies sxs pancreatitis. Patient states he understands all information reviewed, states no further questions and thankful for call.

## 2019-07-26 ENCOUNTER — OFFICE VISIT (OUTPATIENT)
Dept: MEDICAL GROUP | Facility: MEDICAL CENTER | Age: 79
End: 2019-07-26
Payer: MEDICARE

## 2019-07-26 ENCOUNTER — HOSPITAL ENCOUNTER (OUTPATIENT)
Dept: LAB | Facility: MEDICAL CENTER | Age: 79
DRG: 267 | End: 2019-07-26
Attending: INTERNAL MEDICINE
Payer: MEDICARE

## 2019-07-26 VITALS
DIASTOLIC BLOOD PRESSURE: 74 MMHG | OXYGEN SATURATION: 96 % | HEART RATE: 89 BPM | SYSTOLIC BLOOD PRESSURE: 126 MMHG | BODY MASS INDEX: 30.48 KG/M2 | TEMPERATURE: 97 F | HEIGHT: 72 IN | WEIGHT: 225 LBS

## 2019-07-26 DIAGNOSIS — K86.2 PANCREATIC CYST: ICD-10-CM

## 2019-07-26 DIAGNOSIS — D37.9 NEOPLASM OF UNCERTAIN BEHAVIOR OF DIGESTIVE ORGAN: ICD-10-CM

## 2019-07-26 PROBLEM — R06.02 SOB (SHORTNESS OF BREATH): Status: RESOLVED | Noted: 2019-04-26 | Resolved: 2019-07-26

## 2019-07-26 PROBLEM — K86.89 PANCREATIC MASS: Status: RESOLVED | Noted: 2019-07-24 | Resolved: 2019-07-26

## 2019-07-26 LAB — CANCER AG19-9 SERPL-ACNC: 8.2 U/ML (ref 0–35)

## 2019-07-26 PROCEDURE — 36415 COLL VENOUS BLD VENIPUNCTURE: CPT

## 2019-07-26 PROCEDURE — 99213 OFFICE O/P EST LOW 20 MIN: CPT | Performed by: INTERNAL MEDICINE

## 2019-07-26 PROCEDURE — 86301 IMMUNOASSAY TUMOR CA 19-9: CPT

## 2019-07-26 NOTE — PROGRESS NOTES
CC: Follow-up abnormal CT.                                                                                                                                      HPI:   Duane presents today with the following.    1. Pancreatic cyst  Presents after having a CT for preop evaluation for aortic stenosis.  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  He has no history of pancreatitis.  He reports his appetite is poor weight has not trended down recently.  Denies any abdominal pain no nausea or vomiting.  No other gastrointestinal complaints.  He is followed by gastroenterology for autoimmune hepatitis.  Last seen a year ago.          Patient Active Problem List    Diagnosis Date Noted   • High risk medication use 07/27/2018   • Autoimmune hepatitis (HCC) 06/22/2018   • Neuropathy (HCC) 12/22/2017   • Central sleep apnea 11/29/2017   • Localized primary osteoarthritis of carpometacarpal joint of left thumb 05/27/2016   • Insomnia 05/26/2016   • LORENA (obstructive sleep apnea) 04/21/2016   • Essential hypertension 01/07/2015   • Dyslipidemia 01/07/2015   • IGT (impaired glucose tolerance) 01/07/2015   • Aortic stenosis 12/30/2011   • Atrial fibrillation (HCC) 12/30/2011   • Long term current use of anticoagulant therapy 12/30/2011   • Lumbar Disc Degeneration 12/30/2011   • Mitral valve disorder 12/30/2011   • Osteoarthrosis involving lower leg 12/30/2011       Current Outpatient Prescriptions   Medication Sig Dispense Refill   • Coenzyme Q10 (CO Q 10) 100 MG Cap Take 300 mg by mouth every morning.     • Glucosamine HCl (GLUCOSAMINE PO) Take 1 Tab by mouth 2 Times a Day.     • gabapentin (NEURONTIN) 300 MG Cap Take 300 mg by mouth 3 times a day. 1 in the morning, 1 at noon, 2 at 4 pm, 3 at bedtime     • metoprolol SR (TOPROL XL) 50 MG TABLET SR 24 HR TAKE ONE TABLET BY MOUTH EVERY DAY 90 Tab 3   • pravastatin (PRAVACHOL) 80 MG tablet Take 1 Tab by mouth every day. 90 Tab 0   • clindamycin  (CLEOCIN) 150 MG Cap Take 300 mg by mouth 2 Times a Day.     • digoxin (LANOXIN) 250 MCG Tab Take 250 mcg by mouth every bedtime.     • warfarin (COUMADIN) 4 MG Tab Take one tablet by mouth one time daily or as directed by coumadin clinic 90 Tab 1   • cyclobenzaprine (FLEXERIL) 10 MG Tab Take 10 mg by mouth 3 times a day as needed for Muscle Spasms.     • L-Lysine 500 MG Tab Take  by mouth as needed (cold sores).     • B Complex Vitamins (VITAMIN B COMPLEX PO) Take 1 Tab by mouth every evening.     • ascorbic acid (ASCORBIC ACID) 500 MG Tab Take 1,000 mg by mouth every morning.     • Psyllium (METAMUCIL PO) Take  by mouth every morning.     • Cholecalciferol (VITAMIN D3) 2000 UNITS Tab Take 1 Tab by mouth every evening.     • multivitamin (THERAGRAN) TABS Take 1 Tab by mouth every bedtime.     • docosahexanoic acid (FISH OIL) 1000 MG CAPS Take 1,000 mg by mouth every day.       No current facility-administered medications for this visit.          Allergies as of 07/26/2019 - Reviewed 07/26/2019   Allergen Reaction Noted   • Feldene [piroxicam] Itching 12/30/2011   • Percodan [oxycodone-aspirin] Itching and Photosensitivity 12/30/2011        ROS: Denies Chest pain, SOB, LE edema.    /74 (BP Location: Right arm, Patient Position: Sitting)   Pulse 89   Temp 36.1 °C (97 °F)   Ht 1.829 m (6')   Wt 102.1 kg (225 lb)   SpO2 96%   BMI 30.52 kg/m²     Physical Exam:  Gen:         Alert and oriented, No apparent distress.  Neck:        No Lymphadenopathy or Bruits.  Lungs:     Clear to auscultation bilaterally  CV:          Regular rate and rhythm. No murmurs, rubs or gallops.     ABD:      Soft non tender, non distended. Normal active bowel sounds.  No  Hepatosplenomegaly, No pulsatile masses.                Ext:          No clubbing, cyanosis, edema.      Assessment and Plan.   79 y.o. male with the following issues.    1. Pancreatic cyst  Patient reports he does have a pain stimulator in place and is told not  to have MRIs.  Given that being the case have referred to gastroenterology for possible ERCP.  He will get more information about his pain stimulator.  - REFERRAL TO GASTROENTEROLOGY  - CA 19-9; Future

## 2019-07-29 ENCOUNTER — APPOINTMENT (OUTPATIENT)
Dept: VASCULAR LAB | Facility: MEDICAL CENTER | Age: 79
End: 2019-07-29
Attending: INTERNAL MEDICINE
Payer: MEDICARE

## 2019-07-29 ENCOUNTER — HOSPITAL ENCOUNTER (INPATIENT)
Facility: MEDICAL CENTER | Age: 79
LOS: 1 days | DRG: 267 | End: 2019-07-30
Attending: INTERNAL MEDICINE | Admitting: INTERNAL MEDICINE
Payer: MEDICARE

## 2019-07-29 ENCOUNTER — ANESTHESIA (OUTPATIENT)
Dept: SURGERY | Facility: MEDICAL CENTER | Age: 79
DRG: 267 | End: 2019-07-29
Payer: MEDICARE

## 2019-07-29 ENCOUNTER — APPOINTMENT (OUTPATIENT)
Dept: CARDIOLOGY | Facility: MEDICAL CENTER | Age: 79
DRG: 267 | End: 2019-07-29
Attending: INTERNAL MEDICINE
Payer: MEDICARE

## 2019-07-29 ENCOUNTER — ANESTHESIA EVENT (OUTPATIENT)
Dept: SURGERY | Facility: MEDICAL CENTER | Age: 79
DRG: 267 | End: 2019-07-29
Payer: MEDICARE

## 2019-07-29 ENCOUNTER — APPOINTMENT (OUTPATIENT)
Dept: RADIOLOGY | Facility: MEDICAL CENTER | Age: 79
DRG: 267 | End: 2019-07-29
Attending: INTERNAL MEDICINE
Payer: MEDICARE

## 2019-07-29 DIAGNOSIS — I35.0 NONRHEUMATIC AORTIC VALVE STENOSIS: ICD-10-CM

## 2019-07-29 LAB
ABO GROUP BLD: NORMAL
ACT BLD: 125 SEC (ref 74–137)
ACT BLD: 136 SEC (ref 74–137)
ACT BLD: 257 SEC (ref 74–137)
ALBUMIN SERPL BCP-MCNC: 3.4 G/DL (ref 3.2–4.9)
ALBUMIN/GLOB SERPL: 0.9 G/DL
ALP SERPL-CCNC: 69 U/L (ref 30–99)
ALT SERPL-CCNC: 37 U/L (ref 2–50)
ANION GAP SERPL CALC-SCNC: 7 MMOL/L (ref 0–11.9)
AST SERPL-CCNC: 37 U/L (ref 12–45)
BILIRUB SERPL-MCNC: 0.7 MG/DL (ref 0.1–1.5)
BLD GP AB SCN SERPL QL: NORMAL
BUN SERPL-MCNC: 12 MG/DL (ref 8–22)
CALCIUM SERPL-MCNC: 8.4 MG/DL (ref 8.5–10.5)
CHLORIDE SERPL-SCNC: 109 MMOL/L (ref 96–112)
CO2 SERPL-SCNC: 21 MMOL/L (ref 20–33)
CREAT SERPL-MCNC: 0.67 MG/DL (ref 0.5–1.4)
EKG IMPRESSION: NORMAL
ERYTHROCYTE [DISTWIDTH] IN BLOOD BY AUTOMATED COUNT: 47.3 FL (ref 35.9–50)
GLOBULIN SER CALC-MCNC: 3.6 G/DL (ref 1.9–3.5)
GLUCOSE SERPL-MCNC: 105 MG/DL (ref 65–99)
HCT VFR BLD AUTO: 45.1 % (ref 42–52)
HGB BLD-MCNC: 15.4 G/DL (ref 14–18)
INR PPP: 1.07 (ref 0.87–1.13)
MCH RBC QN AUTO: 32.8 PG (ref 27–33)
MCHC RBC AUTO-ENTMCNC: 34.1 G/DL (ref 33.7–35.3)
MCV RBC AUTO: 96 FL (ref 81.4–97.8)
NT-PROBNP SERPL IA-MCNC: 1033 PG/ML (ref 0–125)
PLATELET # BLD AUTO: 214 K/UL (ref 164–446)
PMV BLD AUTO: 9.7 FL (ref 9–12.9)
POTASSIUM SERPL-SCNC: 4.2 MMOL/L (ref 3.6–5.5)
PROT SERPL-MCNC: 7 G/DL (ref 6–8.2)
PROTHROMBIN TIME: 14.2 SEC (ref 12–14.6)
RBC # BLD AUTO: 4.7 M/UL (ref 4.7–6.1)
RH BLD: NORMAL
SODIUM SERPL-SCNC: 137 MMOL/L (ref 135–145)
WBC # BLD AUTO: 7.8 K/UL (ref 4.8–10.8)

## 2019-07-29 PROCEDURE — 160035 HCHG PACU - 1ST 60 MINS PHASE I: Performed by: INTERNAL MEDICINE

## 2019-07-29 PROCEDURE — A9270 NON-COVERED ITEM OR SERVICE: HCPCS | Performed by: INTERNAL MEDICINE

## 2019-07-29 PROCEDURE — C1725 CATH, TRANSLUMIN NON-LASER: HCPCS | Performed by: INTERNAL MEDICINE

## 2019-07-29 PROCEDURE — 85347 COAGULATION TIME ACTIVATED: CPT | Mod: 91

## 2019-07-29 PROCEDURE — 110372 HCHG SHELL REV 278: Performed by: INTERNAL MEDICINE

## 2019-07-29 PROCEDURE — 700111 HCHG RX REV CODE 636 W/ 250 OVERRIDE (IP): Performed by: ANESTHESIOLOGY

## 2019-07-29 PROCEDURE — 86850 RBC ANTIBODY SCREEN: CPT

## 2019-07-29 PROCEDURE — 86900 BLOOD TYPING SEROLOGIC ABO: CPT

## 2019-07-29 PROCEDURE — 700117 HCHG RX CONTRAST REV CODE 255: Performed by: INTERNAL MEDICINE

## 2019-07-29 PROCEDURE — 02RF38Z REPLACEMENT OF AORTIC VALVE WITH ZOOPLASTIC TISSUE, PERCUTANEOUS APPROACH: ICD-10-PCS | Performed by: INTERNAL MEDICINE

## 2019-07-29 PROCEDURE — 501745 HCHG WIRE, SURGICAL STEEL: Performed by: INTERNAL MEDICINE

## 2019-07-29 PROCEDURE — 85027 COMPLETE CBC AUTOMATED: CPT

## 2019-07-29 PROCEDURE — 700111 HCHG RX REV CODE 636 W/ 250 OVERRIDE (IP): Performed by: INTERNAL MEDICINE

## 2019-07-29 PROCEDURE — A9270 NON-COVERED ITEM OR SERVICE: HCPCS | Performed by: ANESTHESIOLOGY

## 2019-07-29 PROCEDURE — 503000 HCHG SUTURE, OHS: Performed by: INTERNAL MEDICINE

## 2019-07-29 PROCEDURE — 700102 HCHG RX REV CODE 250 W/ 637 OVERRIDE(OP): Performed by: INTERNAL MEDICINE

## 2019-07-29 PROCEDURE — 503001 HCHG PERFUSION: Performed by: INTERNAL MEDICINE

## 2019-07-29 PROCEDURE — A9270 NON-COVERED ITEM OR SERVICE: HCPCS | Performed by: NURSE PRACTITIONER

## 2019-07-29 PROCEDURE — 700111 HCHG RX REV CODE 636 W/ 250 OVERRIDE (IP)

## 2019-07-29 PROCEDURE — C9248 INJ, CLEVIDIPINE BUTYRATE: HCPCS | Performed by: ANESTHESIOLOGY

## 2019-07-29 PROCEDURE — A6402 STERILE GAUZE <= 16 SQ IN: HCPCS | Performed by: INTERNAL MEDICINE

## 2019-07-29 PROCEDURE — B24BZZ4 ULTRASONOGRAPHY OF HEART WITH AORTA, TRANSESOPHAGEAL: ICD-10-PCS | Performed by: INTERNAL MEDICINE

## 2019-07-29 PROCEDURE — 85610 PROTHROMBIN TIME: CPT

## 2019-07-29 PROCEDURE — 83880 ASSAY OF NATRIURETIC PEPTIDE: CPT

## 2019-07-29 PROCEDURE — 80053 COMPREHEN METABOLIC PANEL: CPT

## 2019-07-29 PROCEDURE — 700102 HCHG RX REV CODE 250 W/ 637 OVERRIDE(OP): Performed by: NURSE PRACTITIONER

## 2019-07-29 PROCEDURE — C1769 GUIDE WIRE: HCPCS | Performed by: INTERNAL MEDICINE

## 2019-07-29 PROCEDURE — 500002 HCHG ADHESIVE, DERMABOND: Performed by: INTERNAL MEDICINE

## 2019-07-29 PROCEDURE — 500385 HCHG DRAIN, PLEUROVAC ADUL: Performed by: INTERNAL MEDICINE

## 2019-07-29 PROCEDURE — 160031 HCHG SURGERY MINUTES - 1ST 30 MINS LEVEL 5: Performed by: INTERNAL MEDICINE

## 2019-07-29 PROCEDURE — 700105 HCHG RX REV CODE 258: Performed by: ANESTHESIOLOGY

## 2019-07-29 PROCEDURE — 160036 HCHG PACU - EA ADDL 30 MINS PHASE I: Performed by: INTERNAL MEDICINE

## 2019-07-29 PROCEDURE — 33361 REPLACE AORTIC VALVE PERQ: CPT | Mod: 62,Q0 | Performed by: INTERNAL MEDICINE

## 2019-07-29 PROCEDURE — 160048 HCHG OR STATISTICAL LEVEL 1-5: Performed by: INTERNAL MEDICINE

## 2019-07-29 PROCEDURE — 770020 HCHG ROOM/CARE - TELE (206)

## 2019-07-29 PROCEDURE — 700101 HCHG RX REV CODE 250: Performed by: ANESTHESIOLOGY

## 2019-07-29 PROCEDURE — 160042 HCHG SURGERY MINUTES - EA ADDL 1 MIN LEVEL 5: Performed by: INTERNAL MEDICINE

## 2019-07-29 PROCEDURE — 501589 HCHG TUBE, CHEST 28FR: Performed by: INTERNAL MEDICINE

## 2019-07-29 PROCEDURE — 160009 HCHG ANES TIME/MIN: Performed by: INTERNAL MEDICINE

## 2019-07-29 PROCEDURE — 700105 HCHG RX REV CODE 258: Performed by: INTERNAL MEDICINE

## 2019-07-29 PROCEDURE — 93010 ELECTROCARDIOGRAM REPORT: CPT | Performed by: INTERNAL MEDICINE

## 2019-07-29 PROCEDURE — 71045 X-RAY EXAM CHEST 1 VIEW: CPT

## 2019-07-29 PROCEDURE — 501838 HCHG SUTURE GENERAL: Performed by: INTERNAL MEDICINE

## 2019-07-29 PROCEDURE — 700101 HCHG RX REV CODE 250: Performed by: INTERNAL MEDICINE

## 2019-07-29 PROCEDURE — C1883 ADAPT/EXT, PACING/NEURO LEAD: HCPCS | Performed by: INTERNAL MEDICINE

## 2019-07-29 PROCEDURE — 03HY32Z INSERTION OF MONITORING DEVICE INTO UPPER ARTERY, PERCUTANEOUS APPROACH: ICD-10-PCS | Performed by: ANESTHESIOLOGY

## 2019-07-29 PROCEDURE — 700102 HCHG RX REV CODE 250 W/ 637 OVERRIDE(OP): Performed by: ANESTHESIOLOGY

## 2019-07-29 PROCEDURE — 502240 HCHG MISC OR SUPPLY RC 0272: Performed by: INTERNAL MEDICINE

## 2019-07-29 PROCEDURE — 33361 REPLACE AORTIC VALVE PERQ: CPT | Mod: 62,Q0 | Performed by: THORACIC SURGERY (CARDIOTHORACIC VASCULAR SURGERY)

## 2019-07-29 PROCEDURE — 93005 ELECTROCARDIOGRAM TRACING: CPT | Performed by: INTERNAL MEDICINE

## 2019-07-29 PROCEDURE — 160002 HCHG RECOVERY MINUTES (STAT): Performed by: INTERNAL MEDICINE

## 2019-07-29 PROCEDURE — C1894 INTRO/SHEATH, NON-LASER: HCPCS | Performed by: INTERNAL MEDICINE

## 2019-07-29 PROCEDURE — 86901 BLOOD TYPING SEROLOGIC RH(D): CPT

## 2019-07-29 PROCEDURE — 93355 ECHO TRANSESOPHAGEAL (TEE): CPT

## 2019-07-29 PROCEDURE — C1760 CLOSURE DEV, VASC: HCPCS | Performed by: INTERNAL MEDICINE

## 2019-07-29 DEVICE — DEVICE CLSR 6FR HMST IMPL SLF STS PLUS ANGIOSEAL (10EA/CA): Type: IMPLANTABLE DEVICE | Status: FUNCTIONAL

## 2019-07-29 DEVICE — KIT TRANSCATHETER HEART VALVE  SAPIEN-3 29MM: Type: IMPLANTABLE DEVICE | Status: FUNCTIONAL

## 2019-07-29 RX ORDER — GABAPENTIN 300 MG/1
300 CAPSULE ORAL 3 TIMES DAILY
Status: DISCONTINUED | OUTPATIENT
Start: 2019-07-29 | End: 2019-07-30 | Stop reason: HOSPADM

## 2019-07-29 RX ORDER — SODIUM CHLORIDE 9 MG/ML
INJECTION, SOLUTION INTRAVENOUS CONTINUOUS
Status: DISCONTINUED | OUTPATIENT
Start: 2019-07-29 | End: 2019-07-29

## 2019-07-29 RX ORDER — OXYCODONE HCL 5 MG/5 ML
10 SOLUTION, ORAL ORAL
Status: COMPLETED | OUTPATIENT
Start: 2019-07-29 | End: 2019-07-29

## 2019-07-29 RX ORDER — WARFARIN SODIUM 4 MG/1
4 TABLET ORAL
Status: DISCONTINUED | OUTPATIENT
Start: 2019-07-30 | End: 2019-07-30

## 2019-07-29 RX ORDER — HYDROMORPHONE HYDROCHLORIDE 1 MG/ML
0.4 INJECTION, SOLUTION INTRAMUSCULAR; INTRAVENOUS; SUBCUTANEOUS
Status: DISCONTINUED | OUTPATIENT
Start: 2019-07-29 | End: 2019-07-29 | Stop reason: HOSPADM

## 2019-07-29 RX ORDER — HALOPERIDOL 5 MG/ML
1 INJECTION INTRAMUSCULAR
Status: DISCONTINUED | OUTPATIENT
Start: 2019-07-29 | End: 2019-07-29 | Stop reason: HOSPADM

## 2019-07-29 RX ORDER — ACETAMINOPHEN 500 MG
500-1000 TABLET ORAL EVERY 6 HOURS PRN
COMMUNITY

## 2019-07-29 RX ORDER — IODIXANOL 270 MG/ML
INJECTION, SOLUTION INTRAVASCULAR
Status: DISCONTINUED | OUTPATIENT
Start: 2019-07-29 | End: 2019-07-29 | Stop reason: HOSPADM

## 2019-07-29 RX ORDER — HYDRALAZINE HYDROCHLORIDE 20 MG/ML
5 INJECTION INTRAMUSCULAR; INTRAVENOUS
Status: DISCONTINUED | OUTPATIENT
Start: 2019-07-29 | End: 2019-07-29 | Stop reason: HOSPADM

## 2019-07-29 RX ORDER — ONDANSETRON 2 MG/ML
4 INJECTION INTRAMUSCULAR; INTRAVENOUS
Status: DISCONTINUED | OUTPATIENT
Start: 2019-07-29 | End: 2019-07-29 | Stop reason: HOSPADM

## 2019-07-29 RX ORDER — DIPHENHYDRAMINE HCL 25 MG
25 TABLET ORAL NIGHTLY PRN
Status: DISCONTINUED | OUTPATIENT
Start: 2019-07-29 | End: 2019-07-30 | Stop reason: HOSPADM

## 2019-07-29 RX ORDER — DIPHENHYDRAMINE HYDROCHLORIDE 50 MG/ML
25 INJECTION INTRAMUSCULAR; INTRAVENOUS EVERY 6 HOURS PRN
Status: DISCONTINUED | OUTPATIENT
Start: 2019-07-29 | End: 2019-07-30 | Stop reason: HOSPADM

## 2019-07-29 RX ORDER — LIDOCAINE HYDROCHLORIDE 20 MG/ML
INJECTION, SOLUTION INFILTRATION; PERINEURAL
Status: DISCONTINUED | OUTPATIENT
Start: 2019-07-29 | End: 2019-07-29 | Stop reason: HOSPADM

## 2019-07-29 RX ORDER — HYDROMORPHONE HYDROCHLORIDE 1 MG/ML
0.2 INJECTION, SOLUTION INTRAMUSCULAR; INTRAVENOUS; SUBCUTANEOUS
Status: DISCONTINUED | OUTPATIENT
Start: 2019-07-29 | End: 2019-07-29 | Stop reason: HOSPADM

## 2019-07-29 RX ORDER — LIDOCAINE HYDROCHLORIDE 10 MG/ML
INJECTION, SOLUTION EPIDURAL; INFILTRATION; INTRACAUDAL; PERINEURAL
Status: COMPLETED
Start: 2019-07-29 | End: 2019-07-29

## 2019-07-29 RX ORDER — METOCLOPRAMIDE HYDROCHLORIDE 5 MG/ML
INJECTION INTRAMUSCULAR; INTRAVENOUS PRN
Status: DISCONTINUED | OUTPATIENT
Start: 2019-07-29 | End: 2019-07-29 | Stop reason: SURG

## 2019-07-29 RX ORDER — METOPROLOL TARTRATE 1 MG/ML
INJECTION, SOLUTION INTRAVENOUS PRN
Status: DISCONTINUED | OUTPATIENT
Start: 2019-07-29 | End: 2019-07-29 | Stop reason: SURG

## 2019-07-29 RX ORDER — ACETAMINOPHEN 325 MG/1
650 TABLET ORAL EVERY 6 HOURS PRN
Status: DISCONTINUED | OUTPATIENT
Start: 2019-07-29 | End: 2019-07-30 | Stop reason: HOSPADM

## 2019-07-29 RX ORDER — DEXAMETHASONE SODIUM PHOSPHATE 4 MG/ML
INJECTION, SOLUTION INTRA-ARTICULAR; INTRALESIONAL; INTRAMUSCULAR; INTRAVENOUS; SOFT TISSUE PRN
Status: DISCONTINUED | OUTPATIENT
Start: 2019-07-29 | End: 2019-07-29 | Stop reason: SURG

## 2019-07-29 RX ORDER — BUPIVACAINE HYDROCHLORIDE 2.5 MG/ML
INJECTION, SOLUTION EPIDURAL; INFILTRATION; INTRACAUDAL
Status: DISCONTINUED | OUTPATIENT
Start: 2019-07-29 | End: 2019-07-29 | Stop reason: HOSPADM

## 2019-07-29 RX ORDER — CYCLOBENZAPRINE HCL 10 MG
10 TABLET ORAL 3 TIMES DAILY PRN
Status: DISCONTINUED | OUTPATIENT
Start: 2019-07-29 | End: 2019-07-30 | Stop reason: HOSPADM

## 2019-07-29 RX ORDER — SODIUM CHLORIDE, SODIUM GLUCONATE, SODIUM ACETATE, POTASSIUM CHLORIDE AND MAGNESIUM CHLORIDE 526; 502; 368; 37; 30 MG/100ML; MG/100ML; MG/100ML; MG/100ML; MG/100ML
INJECTION, SOLUTION INTRAVENOUS
Status: DISCONTINUED | OUTPATIENT
Start: 2019-07-29 | End: 2019-07-29 | Stop reason: SURG

## 2019-07-29 RX ORDER — OXYCODONE HCL 5 MG/5 ML
5 SOLUTION, ORAL ORAL
Status: COMPLETED | OUTPATIENT
Start: 2019-07-29 | End: 2019-07-29

## 2019-07-29 RX ORDER — WARFARIN SODIUM 6 MG/1
6 TABLET ORAL
Status: COMPLETED | OUTPATIENT
Start: 2019-07-29 | End: 2019-07-29

## 2019-07-29 RX ORDER — ASPIRIN 81 MG/1
81 TABLET, CHEWABLE ORAL DAILY
Status: DISCONTINUED | OUTPATIENT
Start: 2019-07-29 | End: 2019-07-30 | Stop reason: HOSPADM

## 2019-07-29 RX ORDER — PROTAMINE SULFATE 10 MG/ML
INJECTION, SOLUTION INTRAVENOUS PRN
Status: DISCONTINUED | OUTPATIENT
Start: 2019-07-29 | End: 2019-07-29 | Stop reason: SURG

## 2019-07-29 RX ORDER — HEPARIN SODIUM,PORCINE 1000/ML
VIAL (ML) INJECTION PRN
Status: DISCONTINUED | OUTPATIENT
Start: 2019-07-29 | End: 2019-07-29 | Stop reason: SURG

## 2019-07-29 RX ORDER — SODIUM CHLORIDE, SODIUM LACTATE, POTASSIUM CHLORIDE, CALCIUM CHLORIDE 600; 310; 30; 20 MG/100ML; MG/100ML; MG/100ML; MG/100ML
INJECTION, SOLUTION INTRAVENOUS CONTINUOUS
Status: DISCONTINUED | OUTPATIENT
Start: 2019-07-29 | End: 2019-07-29 | Stop reason: HOSPADM

## 2019-07-29 RX ORDER — DIGOXIN 250 MCG
250 TABLET ORAL
Status: DISCONTINUED | OUTPATIENT
Start: 2019-07-30 | End: 2019-07-30 | Stop reason: HOSPADM

## 2019-07-29 RX ORDER — SODIUM CHLORIDE 9 MG/ML
INJECTION, SOLUTION INTRAVENOUS CONTINUOUS
Status: DISPENSED | OUTPATIENT
Start: 2019-07-29 | End: 2019-07-29

## 2019-07-29 RX ORDER — MEPERIDINE HYDROCHLORIDE 25 MG/ML
12.5 INJECTION INTRAMUSCULAR; INTRAVENOUS; SUBCUTANEOUS
Status: DISCONTINUED | OUTPATIENT
Start: 2019-07-29 | End: 2019-07-29 | Stop reason: HOSPADM

## 2019-07-29 RX ORDER — ONDANSETRON 2 MG/ML
4 INJECTION INTRAMUSCULAR; INTRAVENOUS EVERY 4 HOURS PRN
Status: DISCONTINUED | OUTPATIENT
Start: 2019-07-29 | End: 2019-07-30 | Stop reason: HOSPADM

## 2019-07-29 RX ORDER — ONDANSETRON 2 MG/ML
INJECTION INTRAMUSCULAR; INTRAVENOUS PRN
Status: DISCONTINUED | OUTPATIENT
Start: 2019-07-29 | End: 2019-07-29 | Stop reason: SURG

## 2019-07-29 RX ORDER — HYDRALAZINE HYDROCHLORIDE 20 MG/ML
10 INJECTION INTRAMUSCULAR; INTRAVENOUS
Status: DISCONTINUED | OUTPATIENT
Start: 2019-07-29 | End: 2019-07-30 | Stop reason: HOSPADM

## 2019-07-29 RX ORDER — PRAVASTATIN SODIUM 20 MG
80 TABLET ORAL DAILY
Status: DISCONTINUED | OUTPATIENT
Start: 2019-07-29 | End: 2019-07-30 | Stop reason: HOSPADM

## 2019-07-29 RX ORDER — HYDROMORPHONE HYDROCHLORIDE 1 MG/ML
0.1 INJECTION, SOLUTION INTRAMUSCULAR; INTRAVENOUS; SUBCUTANEOUS
Status: DISCONTINUED | OUTPATIENT
Start: 2019-07-29 | End: 2019-07-29 | Stop reason: HOSPADM

## 2019-07-29 RX ORDER — METOPROLOL SUCCINATE 50 MG/1
50 TABLET, EXTENDED RELEASE ORAL DAILY
Status: DISCONTINUED | OUTPATIENT
Start: 2019-07-29 | End: 2019-07-30 | Stop reason: HOSPADM

## 2019-07-29 RX ORDER — CEFAZOLIN SODIUM 1 G/3ML
INJECTION, POWDER, FOR SOLUTION INTRAMUSCULAR; INTRAVENOUS PRN
Status: DISCONTINUED | OUTPATIENT
Start: 2019-07-29 | End: 2019-07-29 | Stop reason: SURG

## 2019-07-29 RX ADMIN — SUGAMMADEX 200 MG: 100 INJECTION, SOLUTION INTRAVENOUS at 10:22

## 2019-07-29 RX ADMIN — METOPROLOL SUCCINATE 50 MG: 50 TABLET, EXTENDED RELEASE ORAL at 13:36

## 2019-07-29 RX ADMIN — CLEVIDIPINE 250 MCG: 0.5 EMULSION INTRAVENOUS at 10:03

## 2019-07-29 RX ADMIN — OXYCODONE HYDROCHLORIDE 5 MG: 5 SOLUTION ORAL at 11:19

## 2019-07-29 RX ADMIN — ONDANSETRON 4 MG: 2 INJECTION INTRAMUSCULAR; INTRAVENOUS at 10:14

## 2019-07-29 RX ADMIN — METOPROLOL TARTRATE 2.5 MG: 5 INJECTION, SOLUTION INTRAVENOUS at 10:21

## 2019-07-29 RX ADMIN — FENTANYL CITRATE 50 MCG: 0.05 INJECTION, SOLUTION INTRAMUSCULAR; INTRAVENOUS at 11:03

## 2019-07-29 RX ADMIN — SODIUM CHLORIDE: 9 INJECTION, SOLUTION INTRAVENOUS at 13:36

## 2019-07-29 RX ADMIN — CEFAZOLIN 2 G: 330 INJECTION, POWDER, FOR SOLUTION INTRAMUSCULAR; INTRAVENOUS at 09:30

## 2019-07-29 RX ADMIN — CLEVIDIPINE 250 MCG: 0.5 EMULSION INTRAVENOUS at 09:55

## 2019-07-29 RX ADMIN — PROPOFOL 200 MG: 10 INJECTION, EMULSION INTRAVENOUS at 09:30

## 2019-07-29 RX ADMIN — GABAPENTIN 300 MG: 300 CAPSULE ORAL at 13:35

## 2019-07-29 RX ADMIN — CLEVIDIPINE 500 MCG: 0.5 EMULSION INTRAVENOUS at 09:30

## 2019-07-29 RX ADMIN — ASPIRIN 81 MG 81 MG: 81 TABLET ORAL at 13:35

## 2019-07-29 RX ADMIN — CLEVIDIPINE 500 MCG: 0.5 EMULSION INTRAVENOUS at 09:49

## 2019-07-29 RX ADMIN — SODIUM CHLORIDE, SODIUM GLUCONATE, SODIUM ACETATE, POTASSIUM CHLORIDE AND MAGNESIUM CHLORIDE: 526; 502; 368; 37; 30 INJECTION, SOLUTION INTRAVENOUS at 09:17

## 2019-07-29 RX ADMIN — METOCLOPRAMIDE 10 MG: 5 INJECTION, SOLUTION INTRAMUSCULAR; INTRAVENOUS at 10:14

## 2019-07-29 RX ADMIN — HEPARIN SODIUM 10000 UNITS: 1000 INJECTION, SOLUTION INTRAVENOUS; SUBCUTANEOUS at 09:56

## 2019-07-29 RX ADMIN — CLEVIDIPINE 500 MCG: 0.5 EMULSION INTRAVENOUS at 10:02

## 2019-07-29 RX ADMIN — Medication 0.5 ML: at 08:46

## 2019-07-29 RX ADMIN — FENTANYL CITRATE 100 MCG: 50 INJECTION, SOLUTION INTRAMUSCULAR; INTRAVENOUS at 09:20

## 2019-07-29 RX ADMIN — DEXAMETHASONE SODIUM PHOSPHATE 4 MG: 4 INJECTION, SOLUTION INTRA-ARTICULAR; INTRALESIONAL; INTRAMUSCULAR; INTRAVENOUS; SOFT TISSUE at 09:30

## 2019-07-29 RX ADMIN — ROCURONIUM BROMIDE 50 MG: 10 INJECTION, SOLUTION INTRAVENOUS at 09:30

## 2019-07-29 RX ADMIN — CLEVIDIPINE 500 MCG: 0.5 EMULSION INTRAVENOUS at 10:10

## 2019-07-29 RX ADMIN — FENTANYL CITRATE 50 MCG: 0.05 INJECTION, SOLUTION INTRAMUSCULAR; INTRAVENOUS at 11:15

## 2019-07-29 RX ADMIN — GABAPENTIN 300 MG: 300 CAPSULE ORAL at 17:28

## 2019-07-29 RX ADMIN — PRAVASTATIN SODIUM 80 MG: 20 TABLET ORAL at 13:35

## 2019-07-29 RX ADMIN — FENTANYL CITRATE 100 MCG: 50 INJECTION, SOLUTION INTRAMUSCULAR; INTRAVENOUS at 09:17

## 2019-07-29 RX ADMIN — LIDOCAINE HYDROCHLORIDE 0.5 ML: 10 INJECTION, SOLUTION EPIDURAL; INFILTRATION; INTRACAUDAL at 08:46

## 2019-07-29 RX ADMIN — WARFARIN SODIUM 6 MG: 6 TABLET ORAL at 17:29

## 2019-07-29 RX ADMIN — PROTAMINE SULFATE 50 MG: 10 INJECTION, SOLUTION INTRAVENOUS at 10:13

## 2019-07-29 ASSESSMENT — COPD QUESTIONNAIRES
HAVE YOU SMOKED AT LEAST 100 CIGARETTES IN YOUR ENTIRE LIFE: YES
DURING THE PAST 4 WEEKS HOW MUCH DID YOU FEEL SHORT OF BREATH: SOME OF THE TIME
COPD SCREENING SCORE: 8
DO YOU EVER COUGH UP ANY MUCUS OR PHLEGM?: YES, EVERY DAY
IN THE PAST 12 MONTHS DO YOU DO LESS THAN YOU USED TO BECAUSE OF YOUR BREATHING PROBLEMS: AGREE

## 2019-07-29 ASSESSMENT — PATIENT HEALTH QUESTIONNAIRE - PHQ9
2. FEELING DOWN, DEPRESSED, IRRITABLE, OR HOPELESS: NOT AT ALL
SUM OF ALL RESPONSES TO PHQ9 QUESTIONS 1 AND 2: 0
1. LITTLE INTEREST OR PLEASURE IN DOING THINGS: NOT AT ALL

## 2019-07-29 ASSESSMENT — COGNITIVE AND FUNCTIONAL STATUS - GENERAL
MOBILITY SCORE: 24
SUGGESTED CMS G CODE MODIFIER DAILY ACTIVITY: CH
SUGGESTED CMS G CODE MODIFIER MOBILITY: CH
DAILY ACTIVITIY SCORE: 24

## 2019-07-29 ASSESSMENT — CHA2DS2 SCORE
HYPERTENSION: YES
VASCULAR DISEASE: YES
PRIOR STROKE OR TIA OR THROMBOEMBOLISM: NO
CHF OR LEFT VENTRICULAR DYSFUNCTION: NO
DIABETES: NO
CHA2DS2 VASC SCORE: 4
AGE 75 OR GREATER: YES
AGE 65 TO 74: NO
SEX: MALE

## 2019-07-29 ASSESSMENT — LIFESTYLE VARIABLES
EVER_SMOKED: YES
ALCOHOL_USE: NO

## 2019-07-29 NOTE — ANESTHESIA PROCEDURE NOTES
Arterial Line  Performed by: JENNA SALCIDO  Authorized by: JENNA SALCIDO     Start Time:  7/29/2019 9:24 AM  End Time:  7/29/2019 9:30 AM  Localization: ultrasound guidance  Image captured, interpreted and electronically stored.  Patient Location:  OR  Indication: continuous blood pressure monitoring and blood sampling needed    Catheter Size:  20 G  Seldinger Technique?: Yes    Laterality:  Right  Site:  Radial artery  Line Secured:  Antimicrobial disc, tape and transparent dressing  Events: patient tolerated procedure well with no complications

## 2019-07-29 NOTE — OR SURGEON
Immediate Post OP Note    PreOp Diagnosis: Severe aortic stenosis    PostOp Diagnosis: Severe aortic stenosis    Procedure(s):  REPLACEMENT, AORTIC VALVE, TRANSCATHETER (TAVR 29 mm S3 Lane pericardial valve)  ECHOCARDIOGRAM, TRANSESOPHAGEAL    Surgeon(s):  RADHA Michelle M.D.    Anesthesiologist/Type of Anesthesia:  Anesthesiologist: Jordan Ventura M.D./General    Surgical Staff:  Circulator: Asuncion Harris R.N.; Ezio Pena R.N.  Perfusionist: Yosvany Davila  Relief Scrub: Neville David  Cath Lab Tech: Donell Da Silva    Specimens removed if any: None    Estimated Blood Loss: Minimal    Findings: Severe aortic stenosis    Complications: None        7/29/2019 10:26 AM Mayco Pichardo M.D.

## 2019-07-29 NOTE — PROGRESS NOTES
Pt up from PACU. BL groin sites in place no s/s of hematoma or bleeding, CMS intact. Monitor on pt. Verified with monitor room. Pt Afib 80's per monitor room. VSS, post sed vitals in place.

## 2019-07-29 NOTE — ANESTHESIA TIME REPORT
Anesthesia Start and Stop Event Times     Date Time Event    7/29/2019 0917 Anesthesia Start     1031 Anesthesia Stop        Responsible Staff  07/29/19    Name Role Begin End    Jordan Ventura M.D. Anesth 0917 1031        Preop Diagnosis (Free Text):  Pre-op Diagnosis     SEVERE AORTIC STENOSIS        Preop Diagnosis (Codes):  Diagnosis Information     Diagnosis Code(s):         Post op Diagnosis  Severe aortic stenosis      Premium Reason  Non-Premium    Comments:

## 2019-07-29 NOTE — PROGRESS NOTES
2 RN skin check complete with ABHI Hunt.   Devices in place O2 tubing.  Skin assessed under devices red/blanching. Elbows dry, heels dry, bruise under L eye. BL groin/ R radial sites CDI soft.  Confirmed pressure ulcers found on NA.  New potential pressure ulcers noted on NA. Wound consult placed NA.

## 2019-07-29 NOTE — PROCEDURES
DATE OF PROCEDURE: 7/29/2019    REFERRING PHYSICIAN:     PROCEDURES performed:  1. Transcatheter aortic valve replacement.  2. Insertion and removal of temporary pacer wire    PRE PROCEDURAL DIAGNOSIS:  1. Severe symptomatic paradoxical low flow low gradient, normal EF aortic stenosis, NYHA III.    Operators:  CT Surgeon: Dr.Romanaus  Interventional cardiologist: Dr. Buckner    DESCRIPTION OF PROCEDURE:  After informed consent was signed by the   patient, the patient was brought into the operating room, was prepped and draped in   usual sterile manner.    General anesthesia and   Transesophagealechocardiogram was provided by .    Primary Arterial access:   right femoral artery was cannulated using modified Seldinger technique under ultrasound guidance, a 6 Palestinian sheath was inserted.  Abdominal aortogram with bilateral iliofemoral runoff was performed by placing pigtail in descending abdominal aorta and confirmed common femoral arterial access. 2 Perclose sutures were placed, arteriotomy was serially dilated,16 Fr sheath was inserted in the femoral artery over a stiff Lunderquist wire.    Secondary arterial access:   left femoral artery was cannulated with 6 Palestinian sheath.  A pigtail catheter was advanced through 6 Palestinian sheath, aortic root angiogram was performed to determine coplanar view.    Venous Access:   left femoral vein was cannulated with 6 Palestinian sheath, a temporary pacer wire was advanced to RV apex in usual fashion.    Through Lane self-expandable sheath, AL-1 catheter was advanced to aortic root, straight wire was used to cross the aortic valve, a stiff Amplatz wire was placed in the LV apex and AL-1 catheter was withdrawn. 29 mm Lane Antwan 3 herber was prepped in usual fashion, orientation was confirmed.  Sapient 3 valve was slowly advanced over a stiff Amplatz wire to aortic position.  Rapid pacing was achieved using temporary pacer wire, aortic root angiogram was performed  and after confirming good positioning, valve was slowly deployed under fluoroscopic guidance.  Post procedure echocardiogram showed no significant paravalvular leak, good valve positioning.    The patient tolerated the procedure well. At the end of procedure, all pacemaker wire,   pigtail catheters and sheaths were removed.  The primary arterial access site was closed with the PreClose system.  The secondary arterial access was closed via Angio-Seal. The patient was then extubated and transferred to PACU in stable condition.    Complications: none  Specimens: none  Estimated blood loss: <50cc      IMPRESSION:  Successful transcatheter aortic valve replacement using 29 mm Lane theresa 3 valve    RECOMMENDATION:   Medical therapy.    Leander Buckner  Interventional cardiologist

## 2019-07-29 NOTE — PROGRESS NOTES
Inpatient Anticoagulation Service Note    Date: 7/29/2019    Reason for Anticoagulation: Atrial Fibrillation   Target INR: 2.0 to 3.0  [id number] VASc Score: 4  HAS-BLED Score: 2   Hemoglobin Value: 15.4  Hematocrit Value: 45.1    INR from last 7 days     Date/Time INR Value    07/29/19 0814  1.07        Dose from last 7 days     Date/Time Dose (mg)    07/29/19 1309  6        Average Dose (mg):  (4mg daily per [facility])  Significant Interactions: Aspirin, Statin  Bridge Therapy: No     Comments: Continuation of home warfarin for atrial fibrilation s/p TAVR for aortic stenosis, completed 7/29 AM.  Okay to resume with concurrent aspirin per surgery.  H/H stable, INR expected to subtherapeutic as doses held since 7/19 per med rec.  Will give bolus dose of 6mg tonight and resume home therapy thereafter.    Plan:  Warfarin 6mg. INR in AM.  Education Material Provided?: No (chronic warfarin patient)  Pharmacist suggested discharge dosing: Resume home regimen of 4mg daily with follow up within 96hrs of discharge.     Kody Romano

## 2019-07-29 NOTE — OP REPORT
DATE OF SERVICE:  07/29/2019    REFERRING PHYSICIAN:  Vickey Rutherford MD    PREOPERATIVE DIAGNOSES:  Severe aortic stenosis (calcific or degenerative),   atrial fibrillation, history of autoimmune hepatitis.    POSTOPERATIVE DIAGNOSES:  Severe aortic stenosis (calcific or degenerative),   atrial fibrillation, history of autoimmune hepatitis.    PROCEDURES:  Transcatheter aortic valve replacement (TAVR 29 mm S3 Lane   pericardial valve), and intraoperative transesophageal echocardiography.    SURGEON:  Leander Buckner MD and Mayco Pichardo MD    ANESTHESIOLOGIST:  Jordan Ventura MD    ANESTHESIA:  General endotracheal.    ESTIMATED BLOOD LOSS:  Minimal.    COMPLICATIONS:  None.    INDICATIONS:  The patient is an 79-year-old white male with severe symptomatic   aortic stenosis.    DESCRIPTION OF PROCEDURE:  The patient was brought to the operating room and   placed on the operating room table in the supine position.  After successful   induction of general anesthesia and endotracheal intubation, the patient was   prepped and draped in the usual sterile fashion.  In collaboration with DR. Buckner, bilateral femoral ultrasound-guided arteriovenous access was   obtained.  Dr. Buckner positioned a temporary transvenous pacemaker in the   right ventricle and a pigtail catheter in the right coronary sinus.  The   deployment angle was determined.  A small 1 cm incision was made in the right   groin and 2 Perclose sutures were deployed.  A 16-Bangladeshi eSheath was then   placed in the right common femoral artery and its tip was positioned in the   abdominal aorta.  The aortic valve was crossed with a wire.  A deployment   catheter with a 29 mm S3 Lane pericardial valve at its tip was positioned   across the aortic valve.  I inflated the balloon during valve implantation.    Wires and catheters were removed.  Intraoperative transesophageal   echocardiography did not show any paravalvular or central leaks.   The right   femoral eSheath was removed and the Perclose sutures were tightened down.    When adequate hemostasis had been obtained, Dermabond was applied over the   right small groin incision.  The remainder of the operation will be dictated   by Dr. Buckner.  There were no apparent complications.  The patient   tolerated the procedure well and left the operating room in stable condition.       ____________________________________     Mayco IRWIN    DD:  07/29/2019 10:32:02  DT:  07/29/2019 12:02:57    D#:  [id number]  Job#:  [id number]

## 2019-07-29 NOTE — ANESTHESIA PROCEDURE NOTES
Airway  Date/Time: 7/29/2019 9:32 AM  Performed by: JENNA SALCIDO  Authorized by: JENNA SALCIDO     Location:  OR  Urgency:  Elective  Difficult Airway: No    Indications for Airway Management:  Anesthesia  Spontaneous Ventilation: absent    Sedation Level:  Deep  Preoxygenated: Yes    Patient Position:  Sniffing  Final Airway Type:  Endotracheal airway  Final Endotracheal Airway:  ETT  Cuffed: Yes    Technique Used for Successful ETT Placement:  Direct laryngoscopy  Insertion Site:  Oral  Blade Type:  [place]  Laryngoscope Blade/Videolaryngoscope Blade Size:  4  ETT Size (mm):  8.0  Measured from:  Teeth  ETT to Teeth (cm):  24  Placement Verified by: auscultation and capnometry    Cormack-Lehane Classification:  Grade I - full view of glottis  Number of Attempts at Approach:  1

## 2019-07-29 NOTE — ANESTHESIA PREPROCEDURE EVALUATION
Relevant Problems   (+) Aortic stenosis   (+) Atrial fibrillation (HCC)   (+) Autoimmune hepatitis (HCC)   (+) Dyslipidemia   (+) Essential hypertension   (+) Long term current use of anticoagulant therapy   (+) Lumbar Disc Degeneration   (+) Neuropathy   (+) LORENA (obstructive sleep apnea)       Physical Exam    Airway   Mallampati: I  TM distance: >3 FB  Neck ROM: full       Cardiovascular - normal exam  Rhythm: irregular  Rate: normal  (-) murmur     Dental       Very poor dentition   Pulmonary - normal exam  Breath sounds clear to auscultation     Abdominal    Neurological - normal exam                 Anesthesia Plan    ASA 4   ASA physical status 4 criteria: severe valve dysfunction    Plan - general       Airway plan will be ETT        Induction: intravenous      Pertinent diagnostic labs and testing reviewed    Informed Consent:    Anesthetic plan and risks discussed with patient.

## 2019-07-29 NOTE — ANESTHESIA QCDR
2019 [facility] Clinical Data Registry (for Quality Improvement)     Postoperative nausea/vomiting risk protocol (Adult = 18 yrs and Pediatric 3-17 yrs)- (430 and 463)  General inhalation anesthetic (NOT TIVA) with PONV risk factors: Yes  Provision of anti-emetic therapy with at least 2 different classes of agents: Yes   Patient DID NOT receive anti-emetic therapy and reason is documented in Medical Record:  N/A    Multimodal Pain Management- (AQI59)  Patient undergoing Elective Surgery (i.e. Outpatient, or ASC, or Prescheduled Surgery prior to Hospital Admission): Yes  Use of Multimodal Pain Management, two or more drugs and/or interventions, NOT including systemic opioids: No   Exception: Documented allergy to multiple classes of analgesics:         PACU assessment of acute postoperative pain prior to Anesthesia Care End- Applies to Patients Age = 18- (ABG7)  Initial PACU pain score is which of the following: < 7/10  Patient unable to report pain score: N/A    Post-anesthetic transfer of care checklist/protocol to PACU/ICU- (426 and 427)  Upon conclusion of case, patient transferred to which of the following locations: PACU/Non-ICU  Use of transfer checklist/protocol: Yes  Exclusion: Service Performed in Patient Hospital Room (and thus did not require transfer): N/A    PACU Reintubation- (AQI31)  General anesthesia requiring endotracheal intubation (ETT) along with subsequent extubation in OR or PACU: Yes  Required reintubation in the PACU: No   Extubation was a planned trial documented in the medical record prior to removal of the original airway device:  N/A    Unplanned admission to ICU related to anesthesia service up through end of PACU care- (MD51)  Unplanned admission to ICU (not initially anticipated at anesthesia start time): No

## 2019-07-30 ENCOUNTER — APPOINTMENT (OUTPATIENT)
Dept: RADIOLOGY | Facility: MEDICAL CENTER | Age: 79
DRG: 267 | End: 2019-07-30
Attending: INTERNAL MEDICINE
Payer: MEDICARE

## 2019-07-30 ENCOUNTER — APPOINTMENT (OUTPATIENT)
Dept: CARDIOLOGY | Facility: MEDICAL CENTER | Age: 79
DRG: 267 | End: 2019-07-30
Attending: NURSE PRACTITIONER
Payer: MEDICARE

## 2019-07-30 VITALS
OXYGEN SATURATION: 95 % | BODY MASS INDEX: 30.25 KG/M2 | WEIGHT: 223.33 LBS | TEMPERATURE: 97.8 F | HEART RATE: 76 BPM | DIASTOLIC BLOOD PRESSURE: 61 MMHG | SYSTOLIC BLOOD PRESSURE: 115 MMHG | HEIGHT: 72 IN | RESPIRATION RATE: 18 BRPM

## 2019-07-30 PROBLEM — G47.31 CENTRAL SLEEP APNEA: Status: RESOLVED | Noted: 2017-11-29 | Resolved: 2019-07-30

## 2019-07-30 PROBLEM — Z79.899 HIGH RISK MEDICATION USE: Status: RESOLVED | Noted: 2018-07-27 | Resolved: 2019-07-30

## 2019-07-30 LAB
ALBUMIN SERPL BCP-MCNC: 3.4 G/DL (ref 3.2–4.9)
ALBUMIN/GLOB SERPL: 1 G/DL
ALP SERPL-CCNC: 65 U/L (ref 30–99)
ALT SERPL-CCNC: 34 U/L (ref 2–50)
ANION GAP SERPL CALC-SCNC: 6 MMOL/L (ref 0–11.9)
AST SERPL-CCNC: 35 U/L (ref 12–45)
BILIRUB SERPL-MCNC: 0.6 MG/DL (ref 0.1–1.5)
BUN SERPL-MCNC: 19 MG/DL (ref 8–22)
CALCIUM SERPL-MCNC: 8.8 MG/DL (ref 8.5–10.5)
CHLORIDE SERPL-SCNC: 107 MMOL/L (ref 96–112)
CO2 SERPL-SCNC: 24 MMOL/L (ref 20–33)
CREAT SERPL-MCNC: 0.8 MG/DL (ref 0.5–1.4)
EKG IMPRESSION: NORMAL
ERYTHROCYTE [DISTWIDTH] IN BLOOD BY AUTOMATED COUNT: 47.4 FL (ref 35.9–50)
GLOBULIN SER CALC-MCNC: 3.5 G/DL (ref 1.9–3.5)
GLUCOSE SERPL-MCNC: 108 MG/DL (ref 65–99)
HCT VFR BLD AUTO: 45.2 % (ref 42–52)
HGB BLD-MCNC: 14.6 G/DL (ref 14–18)
INR PPP: 1.1 (ref 0.87–1.13)
LV EJECT FRACT  99904: 70
LV EJECT FRACT MOD 2C 99903: 77.27
LV EJECT FRACT MOD 4C 99902: 67.54
LV EJECT FRACT MOD BP 99901: 71.78
MCH RBC QN AUTO: 31.1 PG (ref 27–33)
MCHC RBC AUTO-ENTMCNC: 32.3 G/DL (ref 33.7–35.3)
MCV RBC AUTO: 96.4 FL (ref 81.4–97.8)
NT-PROBNP SERPL IA-MCNC: 1632 PG/ML (ref 0–125)
PLATELET # BLD AUTO: 225 K/UL (ref 164–446)
PMV BLD AUTO: 10.2 FL (ref 9–12.9)
POTASSIUM SERPL-SCNC: 4.4 MMOL/L (ref 3.6–5.5)
PROT SERPL-MCNC: 6.9 G/DL (ref 6–8.2)
PROTHROMBIN TIME: 14.4 SEC (ref 12–14.6)
RBC # BLD AUTO: 4.69 M/UL (ref 4.7–6.1)
SODIUM SERPL-SCNC: 137 MMOL/L (ref 135–145)
WBC # BLD AUTO: 18 K/UL (ref 4.8–10.8)

## 2019-07-30 PROCEDURE — 93010 ELECTROCARDIOGRAM REPORT: CPT | Performed by: INTERNAL MEDICINE

## 2019-07-30 PROCEDURE — 700117 HCHG RX CONTRAST REV CODE 255: Performed by: NURSE PRACTITIONER

## 2019-07-30 PROCEDURE — 700102 HCHG RX REV CODE 250 W/ 637 OVERRIDE(OP): Performed by: INTERNAL MEDICINE

## 2019-07-30 PROCEDURE — 83880 ASSAY OF NATRIURETIC PEPTIDE: CPT

## 2019-07-30 PROCEDURE — A9270 NON-COVERED ITEM OR SERVICE: HCPCS | Performed by: INTERNAL MEDICINE

## 2019-07-30 PROCEDURE — 80053 COMPREHEN METABOLIC PANEL: CPT

## 2019-07-30 PROCEDURE — 99238 HOSP IP/OBS DSCHRG MGMT 30/<: CPT | Performed by: NURSE PRACTITIONER

## 2019-07-30 PROCEDURE — 93005 ELECTROCARDIOGRAM TRACING: CPT | Performed by: INTERNAL MEDICINE

## 2019-07-30 PROCEDURE — 93306 TTE W/DOPPLER COMPLETE: CPT | Mod: 26 | Performed by: INTERNAL MEDICINE

## 2019-07-30 PROCEDURE — 97161 PT EVAL LOW COMPLEX 20 MIN: CPT

## 2019-07-30 PROCEDURE — 85027 COMPLETE CBC AUTOMATED: CPT

## 2019-07-30 PROCEDURE — 71045 X-RAY EXAM CHEST 1 VIEW: CPT

## 2019-07-30 PROCEDURE — A9270 NON-COVERED ITEM OR SERVICE: HCPCS | Performed by: NURSE PRACTITIONER

## 2019-07-30 PROCEDURE — 36415 COLL VENOUS BLD VENIPUNCTURE: CPT

## 2019-07-30 PROCEDURE — 700102 HCHG RX REV CODE 250 W/ 637 OVERRIDE(OP): Performed by: NURSE PRACTITIONER

## 2019-07-30 PROCEDURE — 93306 TTE W/DOPPLER COMPLETE: CPT

## 2019-07-30 PROCEDURE — 85610 PROTHROMBIN TIME: CPT

## 2019-07-30 RX ORDER — ASPIRIN 81 MG/1
81 TABLET, CHEWABLE ORAL DAILY
Qty: 90 TAB | Refills: 3 | Status: SHIPPED | OUTPATIENT
Start: 2019-07-31 | End: 2021-09-10 | Stop reason: SDUPTHER

## 2019-07-30 RX ORDER — WARFARIN SODIUM 6 MG/1
6 TABLET ORAL
Status: DISCONTINUED | OUTPATIENT
Start: 2019-07-30 | End: 2019-07-30 | Stop reason: HOSPADM

## 2019-07-30 RX ORDER — WARFARIN SODIUM 6 MG/1
6 TABLET ORAL DAILY
Qty: 30 TAB | Refills: 3 | Status: SHIPPED | OUTPATIENT
Start: 2019-07-30 | End: 2020-10-08

## 2019-07-30 RX ORDER — WARFARIN SODIUM 4 MG/1
4 TABLET ORAL
Status: DISCONTINUED | OUTPATIENT
Start: 2019-07-31 | End: 2019-07-30 | Stop reason: HOSPADM

## 2019-07-30 RX ADMIN — HUMAN ALBUMIN MICROSPHERES AND PERFLUTREN 3 ML: 10; .22 INJECTION, SOLUTION INTRAVENOUS at 14:30

## 2019-07-30 RX ADMIN — ASPIRIN 81 MG 81 MG: 81 TABLET ORAL at 05:07

## 2019-07-30 RX ADMIN — GABAPENTIN 300 MG: 300 CAPSULE ORAL at 12:34

## 2019-07-30 RX ADMIN — GABAPENTIN 300 MG: 300 CAPSULE ORAL at 05:07

## 2019-07-30 RX ADMIN — PRAVASTATIN SODIUM 80 MG: 20 TABLET ORAL at 05:07

## 2019-07-30 RX ADMIN — METOPROLOL SUCCINATE 50 MG: 50 TABLET, EXTENDED RELEASE ORAL at 05:07

## 2019-07-30 ASSESSMENT — COGNITIVE AND FUNCTIONAL STATUS - GENERAL
MOBILITY SCORE: 24
SUGGESTED CMS G CODE MODIFIER MOBILITY: CH

## 2019-07-30 ASSESSMENT — GAIT ASSESSMENTS
DISTANCE (FEET): 500
GAIT LEVEL OF ASSIST: SUPERVISED

## 2019-07-30 NOTE — PROGRESS NOTES
Pt getting up without calling. Re-educated pt on need to call for ambulating to bathroom or transfers from chair to bed. Pt agreeable.

## 2019-07-30 NOTE — CARE PLAN
Problem: Post Op Day 1 CABG/Heart Valve Replacement  Intervention: Encourage ambulation  Ambulate pt in hallway. Up to chair for meals.

## 2019-07-30 NOTE — DISCHARGE PLANNING
Care Transition Team Assessment    LSW met with pt at bedside to complete assessment and discuss potential discharge plans and barriers. Pt is a 79 year old  male who lives alone in his home.  He reported he has a sunken living room and master bedroom; thus, has one step down in each of these areas. He denies any difficulties with navigating this step. He reported strong support from his neighbors and family-2 children that live in the area and 2 grandchildren. Pt reported he has the following DME in his home from his late-wife: cane, crutches, walker, wheelchair. He does not use any of these devices. He does report he has a CPAP machine that he does use. Pt denied any difficulties with completing his ADL/iADL needs. He denied any difficulties with financial barriers or meeting his basic needs. Pt reported he already is starting to feel more energy and is hopeful to resume his favorite activities like hiking and [place] hunting. Pt's goal is to return home and eported his daughter will pick him up when ready to discharge. Pt has information for Cardiac Rehab at bedside and notified that program will reach out to him after discharge to schedule. Pt voiced understanding.     Pt does have an Advanced Directive on file. Healthcare Power of  designates Karolina Steward as Primary POA and Jonatan Parkinson as 1st Alternative. LSW reviewed current document with pt and confirmed document still represents pt's wishes. Pt confirmed.     Information Source  Orientation : Oriented x 4  Information Given By: Patient  Informant's Name: Duane  Who is responsible for making decisions for patient? : Patient    Readmission Evaluation  Is this a readmission?: No    Elopement Risk  Legal Hold: No  Ambulatory or Self Mobile in Wheelchair: Yes  Disoriented: No  Psychiatric Symptoms: None  History of Wandering: No  Elopement this Admit: No  Vocalizing Wanting to Leave: No  Displays Behaviors, Body Language Wanting to Leave:  No-Not at Risk for Elopement  Elopement Risk: Not at Risk for Elopement    Interdisciplinary Discharge Planning  Does Admitting Nurse Feel This Could be a Complex Discharge?: No  Primary Care Physician: GADIEL MELENDREZ  Lives with - Patient's Self Care Capacity: Alone and Able to Care For Self, Other (Comments)  Patient or legal guardian wants to designate a caregiver (see row info): No  Support Systems: Family Member(s), Friends / Neighbors  Housing / Facility: [address]  Do You Take your Prescribed Medications Regularly: Yes  Able to Return to Previous ADL's: Yes  Mobility Issues: No  Prior Services: Home-Independent  Patient Expects to be Discharged to:: Home  Assistance Needed: No  Durable Medical Equipment: Other - Specify (CPAP)  DME Provider / Phone: ASHLYN    Discharge Preparedness  What is your plan after discharge?: Home with help  What are your discharge supports?: Other (comment) (Family and Neighbors)  Prior Functional Level: Ambulatory, Independent with Activities of Daily Living, Drives Self, Independent with Medication Management  Difficulity with ADLs: None  Difficulity with IADLs: None    Functional Assesment  Prior Functional Level: Ambulatory, Independent with Activities of Daily Living, Drives Self, Independent with Medication Management    Finances  Financial Barriers to Discharge: No  Prescription Coverage: Yes    Vision / Hearing Impairment  Vision Impairment : Yes  Right Eye Vision: Impaired, Wears Glasses  Left Eye Vision: Impaired, Wears Glasses  Hearing Impairment: Both Ears, Hearing Device Not Available    Advance Directive  Advance Directive?: DPOA for Health Care  Durable Power of  Name and Contact : Karolina Steward-Primary; Jonatan Parkinson-1st Alternative     Domestic Abuse  Have you ever been the victim of abuse or violence?: No  Physical Abuse or Sexual Abuse: No  Verbal Abuse or Emotional Abuse: No  Possible Abuse Reported to:: Not Applicable    Psychological  Assessment  History of Substance Abuse: None  History of Psychiatric Problems: No    Discharge Risks or Barriers  Discharge risks or barriers?: No    Anticipated Discharge Information  Anticipated discharge disposition: Home  Discharge Address: [address] [place] [address]  Discharge Contact Phone Number: [phone]; [phone]

## 2019-07-30 NOTE — PROGRESS NOTES
Inpatient Anticoagulation Service Note    Date: 7/30/2019    Reason for Anticoagulation: Atrial Fibrillation   Target INR: 2.0 to 3.0  TVQ2QF9 VASc Score: 4  HAS-BLED Score: 2   Hemoglobin Value: 14.6  Hematocrit Value: 45.2    INR from last 7 days     Date/Time INR Value    07/30/19 0148  1.1    07/29/19 0814  1.07        Dose from last 7 days     Date/Time Dose (mg)    07/30/19 1311  6    07/29/19 1309  6        Average Dose (mg):  (4mg daily per [facility])  Significant Interactions: Aspirin, Statin  Bridge Therapy: No    Comments: Home warfarin for afib s/p TAVR 7/29 - previously held warfarin since 7/19 per medication reconciliation.  INR understandably subtherapeutic.  DDI noted.  No s/sx of bleeding.  Will give repeat 6mg bolus and then resume home regimen of 4mg daily.    Plan:  Warfarin 6mg. INR in AM.  Education Material Provided?: No (chronic warfarin patient)  Pharmacist suggested discharge dosing: Warfarin 4mg daily with follow up within 4-5 days of discharge.     Kody Romano

## 2019-07-30 NOTE — CARE PLAN
Problem: Post Op Day 1 CABG/Heart Valve Replacement  Intervention: BLEEDING PRECAUTIONS  Monitoring groin sites q2 hrs   Intervention: Monitor circulation, sensation and/or motion of extremity  Q2 neuro, groin, pulse checks

## 2019-07-30 NOTE — RESPIRATORY CARE
COPD EDUCATION by COPD CLINICAL EDUCATOR  7/30/2019 at 7:29 AM by Veronica Albarran     Patient reviewed by COPD education team. Patient does not have a history or diagnosis of COPD and is a non-smoker, therefore does not qualify for the COPD program.   - - -

## 2019-07-30 NOTE — DISCHARGE SUMMARY
"PRIMARY DISCHARGE DIAGNOSIS: Status post transcatheter aortic valve replacement.    PROCEDURES:    1. Successful transcatheter aortic valve replacement (TAVR) with #29 Edward Antwan 3 valve, transfemoral approach under general anesthesia on 7/29/19  2. Intraoperative transesophageal echocardiogram showing results pending  3. Echocardiogram on 7/30/19 showing results pending \"notified by echo team that there was no obvious abnormalities\"  4. CXR on 7/30/19 showing:  stable prominence of the cardiomediastinal silhouette.  5. EKG on 7/30/19 showing:  ATRIAL FIBRILLATION rate of 77  INFEROPOSTERIOR INFARCT, RECENT     HOSPITAL COURSE: The patient is a pleasant 79 year old male with severe symptomatic aortic stenosis, chronic atrial fibrillation, hypertension, hyperlipidemia, impaired glucose, and LORENA on CPAP. Due to the patient's symptoms, the patient underwent successful TAVR described as above. Post-procedure, the patient did well. They didn't require IV diuresis during their stay. They were able to ambulate without difficulty. No further events were noted during their stay. They are now off oxygen and are to be discharged to home with a friend.    DISCHARGE MEDICATIONS:      Medication List      START taking these medications      Instructions   aspirin 81 MG Chew chewable tablet  Start taking on:  7/31/2019  Commonly known as:  ASA   Take 1 Tab by mouth every day.  Dose:  81 mg        CONTINUE taking these medications      Instructions   acetaminophen 500 MG Tabs  Commonly known as:  TYLENOL   Take 500-1,000 mg by mouth every 6 hours as needed.  Dose:  500-1000 mg     clindamycin 150 MG Caps  Commonly known as:  CLEOCIN   Take 300 mg by mouth 2 Times a Day.  Dose:  300 mg     Co Q 10 100 MG Caps   Take 300 mg by mouth every morning.  Dose:  300 mg     cyclobenzaprine 10 MG Tabs  Commonly known as:  FLEXERIL   Take 10 mg by mouth 3 times a day as needed for Muscle Spasms.  Dose:  10 mg     digoxin 250 MCG " Tabs  Commonly known as:  LANOXIN   Take 250 mcg by mouth every bedtime.  Dose:  250 mcg     gabapentin 300 MG Caps  Commonly known as:  NEURONTIN   Take 300 mg by mouth 3 times a day. 1 in the morning, 1 at noon, 2 at 4 pm, 3 at bedtime  Dose:  300 mg     GLUCOSAMINE PO   Take 1 Tab by mouth 2 Times a Day.  Dose:  1 Tab     L-Lysine 500 MG Tabs   Take  by mouth as needed (cold sores).     METAMUCIL PO   Take  by mouth every morning.     metoprolol SR 50 MG Tb24  Commonly known as:  TOPROL XL   TAKE ONE TABLET BY MOUTH EVERY DAY     multivitamin Tabs   Take 1 Tab by mouth every bedtime.  Dose:  1 Tab     pravastatin 80 MG tablet  Commonly known as:  PRAVACHOL   Doctor's comments:  Patient lost his bottle, please call for override. Thank you  Take 1 Tab by mouth every day.  Dose:  80 mg     VITAMIN B COMPLEX PO   Take 1 Tab by mouth every evening.  Dose:  1 Tab     Vitamin D3 2000 units Tabs   Take 1 Tab by mouth every evening.  Dose:  1 Tab     warfarin 4 MG Tabs  Commonly known as:  COUMADIN   Doctor's comments:  This rx was submitted by a pharmacist working under a collaborative practice agreement.  Take one tablet by mouth one time daily or as directed by coumadin clinic        STOP taking these medications    ascorbic acid 500 MG Tabs  Commonly known as:  ascorbic acid     docosahexanoic acid 1000 MG Caps  Commonly known as:  OMEGA 3 FA          DISCHARGE INSTRUCTIONS: They are given discharge instructions on potential post-operative complications and symptoms to watch out for. Their groin sites were checked and were clean, dry, and intact. Patient or family to notify us for any complications noted on the discharge instructions. They will follow up with myself, Melissa DOE, on Friday in our cardiology office. They will not get labs before their follow up appointment. They will then follow up with Dr. Buckner with a repeat echocardiogram in one month for post TAVR assessment.      FOLLOW UP  Future  Appointments  Date Time Provider Department Center   8/2/2019 12:45 PM ALEXANDRA Torres [facility] None   8/2/2019 2:00 PM [facility] EXAM 5 [facility] None   8/29/2019 7:00 AM Fransisco Graham M.D. [address]   9/5/19 2:00 PM  Leander Buckner M.D. [facility] None          4/2/2020 8:00 AM ALEXANDRA Thayer [facility] None

## 2019-07-30 NOTE — CARE PLAN
Problem: Post Op Day 1 CABG/Heart Valve Replacement  Intervention: Encourage ambulation  Ambulating in hallway. Up to chair for all meals.  Intervention: BLEEDING PRECAUTIONS  Q2 groin checks, morning H/H labs  Intervention: Monitor circulation, sensation and/or motion of extremity  Q2 neuro checks, pulse checks

## 2019-07-30 NOTE — THERAPY
"Physical Therapy Evaluation completed.   Bed Mobility:  Supine to Sit:  (Pt upright in chair at start of session)  Transfers: Sit to Stand: Supervised  Gait: Level Of Assist: Supervised with No Equipment Needed       Plan of Care: Patient with no further skilled PT needs in the acute care setting at this time  Discharge Recommendations: Equipment: No Equipment Needed. Post-acute therapy Discharge to home with outpatient for additional skilled therapy services.    See \"Rehab Therapy-Acute\" Patient Summary Report for complete documentation.     Pt is an 79 y.o. male admitted for aortic stenosis and subsequent TAVR. Pt demonstrates excellent mobility and ambulation skills. Pt was educated on exercising with cardiac rehab, and was educated on outpatient cardiac rehab. I advised the pt to ask his a  to give him options for outpatient cardiac rehab and the pt responded with interest. Pt shows motivation to improve his condition and to walk more at home. Educated pt on importance of walking and staying mobile. Pt does not demonstrate need for any further skilled PT in the acute setting at this time. Anticipate pt will d/c home with outpatient cardiac rehab.  "

## 2019-07-30 NOTE — DIETARY
Nutrition Services - Poor po and/or weight loss reported on admission. Malnutrition Screening Tool score <2. No other risk for malnutrition, or other nutrition concerns,  identified on screening. Nutrition assessment not indicated at this time. RD will monitor per department guidelines.

## 2019-07-30 NOTE — PROGRESS NOTES
Report received. Pt care assumed. Pt sleeping at this time. Pt laying supine in bed. Pt doesn't appear to be in any pain and no signs of distress. Bed alarm on. Bed in low, locked position. Call light within reach. Treaded socks on pt.  Hourly rounding in place.

## 2019-07-30 NOTE — PROGRESS NOTES
Seen post op day 1 TAVR.  Uncomplicated procedure and post-op course.  Rhythm is Afib with HR 74-95.  Both groins are without hematoma.  Ambulating well.  Labs are stable. Awaiting echo.  Stable for discharge.

## 2019-07-30 NOTE — PROGRESS NOTES
Bedside report received by day RN Carmita. Patient sitting in chair by bed watching TV at this time. POC discussed, verbalized understanding. Groin sites soft, mild bruising on left groin, CDI. Pulses intact. No immediate concerns for patient at this time. All safety measures in place.

## 2019-07-30 NOTE — PROGRESS NOTES
Pt discharged to home with daughter. Pt escorted down by this RN in a wheelchair. Discharge teaching performed. Questions answered as needed. Discharge paperwork, prescriptions, and belongings home with pt.

## 2019-07-30 NOTE — DISCHARGE INSTRUCTIONS
Discharge Instructions    Discharged to home by car with relative. Discharged via wheelchair, hospital escort: Yes.  Special equipment needed: Not Applicable    Be sure to schedule a follow-up appointment with your primary care doctor or any specialists as instructed.     Discharge Plan:   Diet Plan: Discussed  Activity Level: Discussed  Confirmed Follow up Appointment: Appointment Scheduled  Confirmed Symptoms Management: Discussed  Medication Reconciliation Updated: Yes  Pneumococcal Vaccine Administered/Refused: Not given - Patient refused pneumococcal vaccine  Influenza Vaccine Indication: Patient Refuses    I understand that a diet low in cholesterol, fat, and sodium is recommended for good health. Unless I have been given specific instructions below for another diet, I accept this instruction as my diet prescription.   Other diet: Cardiac    Special Instructions:   Transcatheter Aortic Valve Replacement (TAVR)    General Instructions:  1. Take Medication as directed.  You will likely need to take aspirin and another blood thinner (antiplatelet medication) for a period.  You'll need to take the aspirin for the rest of your life.  Be sure your doctor knows about all other medicines you take, including over-the-counter medicines and dietary supplements.    Incision Care Instructions:  1. Look and feel the site(s) of your TAVR TODAY so you can recognize changes that should be called to your doctor (see below).  2. It's normal for your incision to be bruised, itchy, or sore while it's healing.  Your incision may take a week or more to heal.  3. Bruising may occur.  Some of the discoloration may travel down the leg.  As it resolves, the color should change from blue to green to yellow-brown.  4. A small, round lump under the TAVR site may remain for up to 6 weeks.  5. Wash your incision site every day with warm water and soap.  Gently pat it dry.  Don't put powder, lotion, or ointment on the incision until it's  healed.    Activity:  1. No driving for one week  2. If you must take a long car ride (not as a ), stop every hour and walk around the car.  3. No lifting or pulling objects over 5 pounds for 4-5 days.  4. Warm showers or baths are permitted after the bandage is removed.  5. Walk regularly.  One of the best ways to get stronger is to walk.  Walk a little more each day.  Take someone with you until you feel OK to walk alone.    When to call your health care provider:  1. You develop a fever.  2. Redness, swelling, bleeding, warmth, or fluid draining at the incision site.  3. Bruising appears to be new or does not look like it is getting better.  4. The small, round lump in the groin in the area of the TAVR site increases in size.    Seek immediate medical help if you have any of the following symptoms:  1. You experience any leg numbness, aching, or discomfort  2. Chest pain or trouble breathing (call 911)  3. Sudden numbness or weakness in your face, arms, or legs (call 911)  4. Bowel movement that is bright red  5. Dizziness or fainting  6. Weight gain of more than 2 pounds in 24 hours or more than 5 pounds in 1 week  7. Swelling in your hands, feet, or ankles  8. Shortness of breath that doesn't get better when you rest  9. Pain that gets worse or doesn't go away  10. Fast or irregular pulse       · Is patient discharged on Warfarin / Coumadin?   Yes    You are receiving the drug warfarin. Please understand the importance of monitoring warfarin with scheduled PT/INR blood draws.  [facility] for [facility]R lab this Friday at 2pm    IMPORTANT: HOW TO USE THIS INFORMATION:  This is a summary and does NOT have all possible information about this product. This information does not assure that this product is safe, effective, or appropriate for you. This information is not individual medical advice and does not substitute for the advice of your health care professional. Always ask your health care  "professional for complete information about this product and your specific health needs.      WARFARIN - ORAL (WARF-uh-rin)      COMMON BRAND NAME(S): Coumadin      WARNING:  Warfarin can cause very serious (possibly fatal) bleeding. This is more likely to occur when you first start taking this medication or if you take too much warfarin. To decrease your risk for bleeding, your doctor or other health care provider will monitor you closely and check your lab results (INR test) to make sure you are not taking too much warfarin. Keep all medical and laboratory appointments. Tell your doctor right away if you notice any signs of serious bleeding. See also Side Effects section.      USES:  This medication is used to treat blood clots (such as in deep vein thrombosis-DVT or pulmonary embolus-PE) and/or to prevent new clots from forming in your body. Preventing harmful blood clots helps to reduce the risk of a stroke or heart attack. Conditions that increase your risk of developing blood clots include a certain type of irregular heart rhythm (atrial fibrillation), heart valve replacement, recent heart attack, and certain surgeries (such as hip/knee replacement). Warfarin is commonly called a \"blood thinner,\" but the more correct term is \"anticoagulant.\" It helps to keep blood flowing smoothly in your body by decreasing the amount of certain substances (clotting proteins) in your blood.      HOW TO USE:  Read the Medication Guide provided by your pharmacist before you start taking warfarin and each time you get a refill. If you have any questions, ask your doctor or pharmacist. Take this medication by mouth with or without food as directed by your doctor or other health care professional, usually once a day. It is very important to take it exactly as directed. Do not increase the dose, take it more frequently, or stop using it unless directed by your doctor. Dosage is based on your medical condition, laboratory tests (such " as INR), and response to treatment. Your doctor or other health care provider will monitor you closely while you are taking this medication to determine the right dose for you. Use this medication regularly to get the most benefit from it. To help you remember, take it at the same time each day. It is important to eat a balanced, consistent diet while taking warfarin. Some foods can affect how warfarin works in your body and may affect your treatment and dose. Avoid sudden large increases or decreases in your intake of foods high in vitamin K (such as broccoli, cauliflower, cabbage, brussels sprouts, kale, spinach, and other green leafy vegetables, liver, green tea, certain vitamin supplements). If you are trying to lose weight, check with your doctor before you try to go on a diet. Cranberry products may also affect how your warfarin works. Limit the amount of cranberry juice (16 ounces/480 milliliters a day) or other cranberry products you may drink or eat.      SIDE EFFECTS:  Nausea, loss of appetite, or stomach/abdominal pain may occur. If any of these effects persist or worsen, tell your doctor or pharmacist promptly. Remember that your doctor has prescribed this medication because he or she has judged that the benefit to you is greater than the risk of side effects. Many people using this medication do not have serious side effects. This medication can cause serious bleeding if it affects your blood clotting proteins too much (shown by unusually high INR lab results). Even if your doctor stops your medication, this risk of bleeding can continue for up to a week. Tell your doctor right away if you have any signs of serious bleeding, including: unusual pain/swelling/discomfort, unusual/easy bruising, prolonged bleeding from cuts or gums, persistent/frequent nosebleeds, unusually heavy/prolonged menstrual flow, pink/dark urine, coughing up blood, vomit that is bloody or looks like coffee grounds, severe headache,  dizziness/fainting, unusual or persistent tiredness/weakness, bloody/black/tarry stools, chest pain, shortness of breath, difficulty swallowing. Tell your doctor right away if any of these unlikely but serious side effects occur: persistent nausea/vomiting, severe stomach/abdominal pain, yellowing eyes/skin. This drug rarely has caused very serious (possibly fatal) problems if its effects lead to small blood clots (usually at the beginning of treatment). This can lead to severe skin/tissue damage that may require surgery or amputation if left untreated. Patients with certain blood conditions (protein C or S deficiency) may be at greater risk. Get medical help right away if any of these rare but serious side effects occur: painful/red/purplish patches on the skin (such as on the toe, breast, abdomen), change in the amount of urine, vision changes, confusion, slurred speech, weakness on one side of the body. A very serious allergic reaction to this drug is rare. However, get medical help right away if you notice any symptoms of a serious allergic reaction, including: rash, itching/swelling (especially of the face/tongue/throat), severe dizziness, trouble breathing. This is not a complete list of possible side effects. If you notice other effects not listed above, contact your doctor or pharmacist. In the [place] - Call your doctor for medical advice about side effects. You may report side effects to FDA at 0-106-MRS-0391. In [place] - Call your doctor for medical advice about side effects. You may report side effects to [facility] [place] at 1-518.457.5488.      PRECAUTIONS:  Before taking warfarin, tell your doctor or pharmacist if you are allergic to it; or if you have any other allergies. This product may contain inactive ingredients, which can cause allergic reactions or other problems. Talk to your pharmacist for more details. Before using this medication, tell your doctor or pharmacist your medical history, especially of:  blood disorders (such as anemia, hemophilia), bleeding problems (such as bleeding of the stomach/intestines, bleeding in the brain), blood vessel disorders (such as aneurysms), recent major injury/surgery, liver disease, alcohol use, mental/mood disorders (including memory problems), frequent falls/injuries. It is important that all your doctors and dentists know that you take warfarin. Before having surgery or any medical/dental procedures, tell your doctor or dentist that you are taking this medication and about all the products you use (including prescription drugs, nonprescription drugs, and herbal products). Avoid getting injections into the muscles. If you must have an injection into a muscle (for example, a flu shot), it should be given in the arm. This way, it will be easier to check for bleeding and/or apply pressure bandages. This medication may cause stomach bleeding. Daily use of alcohol while using this medicine will increase your risk for stomach bleeding and may also affect how this medication works. Limit or avoid alcoholic beverages. If you have not been eating well, if you have an illness or infection that causes fever, vomiting, or diarrhea for more than 2 days, or if you start using any antibiotic medications, contact your doctor or pharmacist immediately because these conditions can affect how warfarin works. This medication can cause heavy bleeding. To lower the chance of getting cut, bruised, or injured, use great caution with sharp objects like safety razors and nail cutters. Use an electric razor when shaving and a soft toothbrush when brushing your teeth. Avoid activities such as contact sports. If you fall or injure yourself, especially if you hit your head, call your doctor immediately. Your doctor may need to check you. The Food & Drug Administration has stated that generic warfarin products are interchangeable. However, consult your doctor or pharmacist before switching warfarin  "products. Be careful not to take more than one medication that contains warfarin unless specifically directed by the doctor or health care provider who is monitoring your warfarin treatment. Older adults may be at greater risk for bleeding while using this drug. This medication is not recommended for use during pregnancy because of serious (possibly fatal) harm to an unborn baby. Discuss the use of reliable forms of birth control with your doctor. If you become pregnant or think you may be pregnant, tell your doctor immediately. If you are planning pregnancy, discuss a plan for managing your condition with your doctor before you become pregnant. Your doctor may switch the type of medication you use during pregnancy. Very small amounts of this medication may pass into breast milk but is unlikely to harm a nursing infant. Consult your doctor before breast-feeding.      DRUG INTERACTIONS:  Drug interactions may change how your medications work or increase your risk for serious side effects. This document does not contain all possible drug interactions. Keep a list of all the products you use (including prescription/nonprescription drugs and herbal products) and share it with your doctor and pharmacist. Do not start, stop, or change the dosage of any medicines without your doctor's approval. Warfarin interacts with many prescription, nonprescription, vitamin, and herbal products. This includes medications that are applied to the skin or inside the vagina or rectum. The interactions with warfarin usually result in an increase or decrease in the \"blood-thinning\" (anticoagulant) effect. Your doctor or other health care professional should closely monitor you to prevent serious bleeding or clotting problems. While taking warfarin, it is very important to tell your doctor or pharmacist of any changes in medications, vitamins, or herbal products that you are taking. Some products that may interact with this drug include: " capecitabine, imatinib, mifepristone. Aspirin, aspirin-like drugs (salicylates), and nonsteroidal anti-inflammatory drugs (NSAIDs such as ibuprofen, naproxen, celecoxib) may have effects similar to warfarin. These drugs may increase the risk of bleeding problems if taken during treatment with warfarin. Carefully check all prescription/nonprescription product labels (including drugs applied to the skin such as pain-relieving creams) since the products may contain NSAIDs or salicylates. Talk to your doctor about using a different medication (such as acetaminophen) to treat pain/fever. Low-dose aspirin and related drugs (such as clopidogrel, ticlopidine) should be continued if prescribed by your doctor for specific medical reasons such as heart attack or stroke prevention. Consult your doctor or pharmacist for more details. Many herbal products interact with warfarin. Tell your doctor before taking any herbal products, especially bromelains, coenzyme Q10, cranberry, danshen, dong quai, fenugreek, garlic, ginkgo biloba, ginseng, and Jonna's wort, among others. This medication may interfere with a certain laboratory test to measure theophylline levels, possibly causing false test results. Make sure laboratory personnel and all your doctors know you use this drug.      OVERDOSE:  If overdose is suspected, contact a poison control center or emergency room immediately. [place] residents can call the [place] National Poison Hotline at 1-708.822.4729. [place] residents can call a provincial poison control center. Symptoms of overdose may include: bloody/black/tarry stools, pink/dark urine, unusual/prolonged bleeding.      NOTES:  Do not share this medication with others. Laboratory and/or medical tests (such as INR, complete blood count) must be performed periodically to monitor your progress or check for side effects. Consult your doctor for more details.      MISSED DOSE:  For the best possible benefit, do not miss any doses. If  you do miss a dose and remember on the same day, take it as soon as you remember. If you remember on the next day, skip the missed dose and resume your usual dosing schedule. Do not double the dose to catch up because this could increase your risk for bleeding. Keep a record of missed doses to give to your doctor or pharmacist. Contact your doctor or pharmacist if you miss 2 or more doses in a row.      STORAGE:  Store at room temperature away from light and moisture. Do not store in the bathroom. Keep all medications away from children and pets. Do not flush medications down the toilet or pour them into a drain unless instructed to do so. Properly discard this product when it is  or no longer needed. Consult your pharmacist or local waste disposal company for more details about how to safely discard your product.      MEDICAL ALERT:  Your condition and medication can cause complications in a medical emergency. For information about enrolling in MedicAlert, call 1-931.693.8499 ([place]) or 1-610.999.7560 ([place]).      Information last revised 2010 Copyright(c) 2010 [employer]             Depression / Suicide Risk    As you are discharged from this [facility] facility, it is important to learn how to keep safe from harming yourself.    Recognize the warning signs:  · Abrupt changes in personality, positive or negative- including increase in energy   · Giving away possessions  · Change in eating patterns- significant weight changes-  positive or negative  · Change in sleeping patterns- unable to sleep or sleeping all the time   · Unwillingness or inability to communicate  · Depression  · Unusual sadness, discouragement and loneliness  · Talk of wanting to die  · Neglect of personal appearance   · Rebelliousness- reckless behavior  · Withdrawal from people/activities they love  · Confusion- inability to concentrate     If you or a loved one observes any of these behaviors or has concerns about  self-harm, here's what you can do:  · Talk about it- your feelings and reasons for harming yourself  · Remove any means that you might use to hurt yourself (examples: pills, rope, extension cords, firearm)  · Get professional help from the community (Mental Health, Substance Abuse, psychological counseling)  · Do not be alone:Call your Safe Contact- someone whom you trust who will be there for you.  · Call your local CRISIS HOTLINE 531-9141 or 542-841-4007  · Call your local Children's Mobile Crisis Response Team [place] (574) 806-9096 or www.[facility]  · Call the toll free National Suicide Prevention Hotlines   · National Suicide Prevention Lifeline 785-553-FVDD (8058)  · [employer] Line Network 800-SUICIDE (188-1463)    Aspirin, ASA oral tablets  What is this medicine?  ASPIRIN (AS pir in) is a pain reliever. It is used to treat mild pain and fever. This medicine is also used as directed by a doctor to prevent and to treat heart attacks, to prevent strokes, and to treat arthritis or inflammation.  This medicine may be used for other purposes; ask your health care provider or pharmacist if you have questions.  COMMON BRAND NAME(S): Aspir-Low, Aspir-Tania, Aspirtab, Bry Advanced Aspirin, Bry Aspirin, Bry Aspirin Extra Strength, Bry Aspirin Plus, Bry Extra Strength, Bry Extra Strength Plus, Bry Genuine Aspirin, Bry Womens Aspirin, Bufferin, Bufferin Extra Strength, Bufferin Low Dose  What should I tell my health care provider before I take this medicine?  They need to know if you have any of these conditions:  -anemia  -asthma  -bleeding problems  -child with chickenpox, the flu, or other viral infection  -diabetes  -gout  -if you frequently drink alcohol containing drinks  -kidney disease  -liver disease  -low level of vitamin K  -lupus  -smoke tobacco  -stomach ulcers or other problems  -an unusual or allergic reaction to aspirin, tartrazine dye, other medicines, dyes, or  preservatives  -pregnant or trying to get pregnant  -breast-feeding  How should I use this medicine?  Take this medicine by mouth with a glass of water. Follow the directions on the package or prescription label. You can take this medicine with or without food. If it upsets your stomach, take it with food. Do not take your medicine more often than directed.  Talk to your pediatrician regarding the use of this medicine in children. While this drug may be prescribed for children as young as 12 years of age for selected conditions, precautions do apply. Children and teenagers should not use this medicine to treat chicken pox or flu symptoms unless directed by a doctor.  Patients over 65 years old may have a stronger reaction and need a smaller dose.  Overdosage: If you think you have taken too much of this medicine contact a poison control center or emergency room at once.  NOTE: This medicine is only for you. Do not share this medicine with others.  What if I miss a dose?  If you are taking this medicine on a regular schedule and miss a dose, take it as soon as you can. If it is almost time for your next dose, take only that dose. Do not take double or extra doses.  What may interact with this medicine?  Do not take this medicine with any of the following medications:  -cidofovir  -ketorolac  -probenecid  This medicine may also interact with the following medications:  -alcohol  -alendronate  -bismuth subsalicylate  -flavocoxid  -herbal supplements like feverfew, garlic, delroy, ginkgo biloba, horse chestnut  -medicines for diabetes or glaucoma like acetazolamide, methazolamide  -medicines for gout  -medicines that treat or prevent blood clots like enoxaparin, heparin, ticlopidine, warfarin  -other aspirin and aspirin-like medicines  -NSAIDs, medicines for pain and inflammation, like ibuprofen or naproxen  -pemetrexed  -sulfinpyrazone  -varicella live vaccine  This list may not describe all possible interactions. Give  your health care provider a list of all the medicines, herbs, non-prescription drugs, or dietary supplements you use. Also tell them if you smoke, drink alcohol, or use illegal drugs. Some items may interact with your medicine.  What should I watch for while using this medicine?  If you are treating yourself for pain, tell your doctor or health care professional if the pain lasts more than 10 days, if it gets worse, or if there is a new or different kind of pain. Tell your doctor if you see redness or swelling. Also, check with your doctor if you have a fever that lasts for more than 3 days. Only take this medicine to prevent heart attacks or blood clotting if prescribed by your doctor or health care professional.  Do not take aspirin or aspirin-like medicines with this medicine. Too much aspirin can be dangerous. Always read the labels carefully.  This medicine can irritate your stomach or cause bleeding problems. Do not smoke cigarettes or drink alcohol while taking this medicine. Do not lie down for 30 minutes after taking this medicine to prevent irritation to your throat.  If you are scheduled for any medical or dental procedure, tell your healthcare provider that you are taking this medicine. You may need to stop taking this medicine before the procedure.  This medicine may be used to treat migraines. If you take migraine medicines for 10 or more days a month, your migraines may get worse. Keep a diary of headache days and medicine use. Contact your healthcare professional if your migraine attacks occur more frequently.  What side effects may I notice from receiving this medicine?  Side effects that you should report to your doctor or health care professional as soon as possible:  -allergic reactions like skin rash, itching or hives, swelling of the face, lips, or tongue  -breathing problems  -changes in hearing, ringing in the ears  -confusion  -general ill feeling or flu-like symptoms  -pain on  swallowing  -redness, blistering, peeling or loosening of the skin, including inside the mouth or nose  -signs and symptoms of bleeding such as bloody or black, tarry stools; red or dark-brown urine; spitting up blood or brown material that looks like coffee grounds; red spots on the skin; unusual bruising or bleeding from the eye, gums, or nose  -trouble passing urine or change in the amount of urine  -unusually weak or tired  -yellowing of the eyes or skin  Side effects that usually do not require medical attention (report to your doctor or health care professional if they continue or are bothersome):  -diarrhea or constipation  -headache  -nausea, vomiting  -stomach gas, heartburn  This list may not describe all possible side effects. Call your doctor for medical advice about side effects. You may report side effects to FDA at 2-718-FDA-9384.  Where should I keep my medicine?  Keep out of the reach of children.  Store at room temperature between 15 and 30 degrees C (59 and 86 degrees F). Protect from heat and moisture. Do not use this medicine if it has a strong vinegar smell. Throw away any unused medicine after the expiration date.  NOTE: This sheet is a summary. It may not cover all possible information. If you have questions about this medicine, talk to your doctor, pharmacist, or health care provider.  © 2018 Elsevier/Gold Standard (2014-08-19 11:30:31)

## 2019-07-30 NOTE — CARE PLAN
Problem: Post Op Day 1 CABG/Heart Valve Replacement  Intervention: Encourage ambulation  Pt ambulated in hallway  Intervention: BLEEDING PRECAUTIONS  Monitor circulation, sensation and/or motion of extremity  Q2 neuro, groin, pulse checks

## 2019-08-02 ENCOUNTER — ANTICOAGULATION VISIT (OUTPATIENT)
Dept: VASCULAR LAB | Facility: MEDICAL CENTER | Age: 79
End: 2019-08-02
Attending: INTERNAL MEDICINE
Payer: MEDICARE

## 2019-08-02 ENCOUNTER — OFFICE VISIT (OUTPATIENT)
Dept: CARDIOLOGY | Facility: MEDICAL CENTER | Age: 79
End: 2019-08-02
Payer: MEDICARE

## 2019-08-02 VITALS — DIASTOLIC BLOOD PRESSURE: 62 MMHG | SYSTOLIC BLOOD PRESSURE: 97 MMHG | HEART RATE: 90 BPM

## 2019-08-02 VITALS
HEART RATE: 78 BPM | DIASTOLIC BLOOD PRESSURE: 80 MMHG | HEIGHT: 71 IN | WEIGHT: 222 LBS | BODY MASS INDEX: 31.08 KG/M2 | SYSTOLIC BLOOD PRESSURE: 104 MMHG | OXYGEN SATURATION: 96 %

## 2019-08-02 DIAGNOSIS — E78.5 DYSLIPIDEMIA: ICD-10-CM

## 2019-08-02 DIAGNOSIS — Z95.3 STATUS POST TRANSCATHETER AORTIC VALVE REPLACEMENT (TAVR) USING BIOPROSTHESIS: ICD-10-CM

## 2019-08-02 DIAGNOSIS — I48.91 ATRIAL FIBRILLATION, UNSPECIFIED TYPE (HCC): ICD-10-CM

## 2019-08-02 DIAGNOSIS — G47.33 OSA (OBSTRUCTIVE SLEEP APNEA): ICD-10-CM

## 2019-08-02 DIAGNOSIS — I10 ESSENTIAL HYPERTENSION: ICD-10-CM

## 2019-08-02 LAB — INR PPP: 1.8 (ref 2–3.5)

## 2019-08-02 PROCEDURE — 99214 OFFICE O/P EST MOD 30 MIN: CPT | Performed by: NURSE PRACTITIONER

## 2019-08-02 PROCEDURE — 99212 OFFICE O/P EST SF 10 MIN: CPT

## 2019-08-02 PROCEDURE — 93000 ELECTROCARDIOGRAM COMPLETE: CPT | Performed by: INTERNAL MEDICINE

## 2019-08-02 PROCEDURE — 85610 PROTHROMBIN TIME: CPT

## 2019-08-02 ASSESSMENT — ENCOUNTER SYMPTOMS
COUGH: 0
ORTHOPNEA: 0
CLAUDICATION: 0
FEVER: 0
SHORTNESS OF BREATH: 0
PND: 0
PALPITATIONS: 0
MYALGIAS: 0
ABDOMINAL PAIN: 0
DIZZINESS: 0

## 2019-08-02 NOTE — PROGRESS NOTES
Chief Complaint   Patient presents with   • Aortic Stenosis     Post TAVR appt     Subjective:   Kinsey Parkinson is a 79 y.o. male who presents today for hospital follow up S/P TAVR.    He is a patient of Dr. Rutherford in our office.    Hx of chronic afib on coumadin, HLD, HTN, LORENA on CPAP, hepatitis with cirrhosis, OA, and obesity.    He is overall doing well. His daughter is in the appointment with him today.    His groins are healing well. He has no concerns.    He is getting his INR done later this afternoon.    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)    • Clotting disorder (HCC)     reports nose bleeds   • Depression     Lost wife ,still gets tearful   • Diabetes (HCC)     diet controlled   • Encounter for long-term (current) use of other medications    • Gasping for breath    • Hungarian measles    • Heart murmur    • Heart valve disease    • Hyperlipidemia    • Hypertension    • Insomnia    • Mumps    • Nasal drainage    • Pain     back & thumb   • Pyogenic arthritis, lower leg (HCC)     thumb, back   • Restless leg syndrome    • Sleep apnea     uses CPAP   • Snoring    • Tonsillitis    • Valvular heart disease      Past Surgical History:   Procedure Laterality Date   • GIBRAN N/A 7/29/2019    Procedure: ECHOCARDIOGRAM, TRANSESOPHAGEAL;  Surgeon: Leander Buckner M.D.;  Location: SURGERY [facility];  Service: Cardiac   • TRANSCATHETER AORTIC VALVE REPLACEMENT Bilateral 7/29/2019    Procedure: REPLACEMENT, AORTIC VALVE, TRANSCATHETER;  Surgeon: Leander Buckner M.D.;  Location: SURGERY [facility];  Service: Cardiac   • FINGER ARTHROPLASTY Left 5/27/2016    Procedure: FINGER ARTHROPLASTY THUMB CARPOMETACARPAL EXCISIONAL, EXCISE TRAPEZIUM;  Surgeon: Raheel Salgado M.D.;  Location: SURGERY SAME DAY [facility];  Service:    • CARDIOVERSION      on 7/17/06, sinus rhythm  until 2007,  paroxysmal atrial fibrillation   • KNEE ARTHROPLASTY TOTAL     • LAMINOTOMY N/A     multiple lumbar spine   • OTHER ORTHOPEDIC SURGERY      lower back   • PB PERCUT IMPLNT NEUROELECT,EPIDURAL     • TOE ARTHROPLASTY     • TURP-VAPOR N/A      Family History   Problem Relation Age of Onset   • Other Mother         unknown   • Heart Disease Mother    • Cancer Father         prostate, skin   • No Known Problems Brother    • Diabetes Brother    • Heart Disease Son    • Sleep Apnea Neg Hx      Social History     Socioeconomic History   • Marital status:      Spouse name: Not on file   • Number of children: Not on file   • Years of education: Not on file   • Highest education level: Not on file   Occupational History   • Not on file   Social Needs   • Financial resource strain: Not on file   • Food insecurity:     Worry: Not on file     Inability: Not on file   • Transportation needs:     Medical: Not on file     Non-medical: Not on file   Tobacco Use   • Smoking status: Former Smoker     Packs/day: 1.00     Years: 20.00     Pack years: 20.00     Types: Cigarettes     Last attempt to quit: 1972     Years since quittin.5   • Smokeless tobacco: Former User   Substance and Sexual Activity   • Alcohol use: No     Alcohol/week: 0.0 oz   • Drug use: No   • Sexual activity: Not Currently     Partners: Female   Lifestyle   • Physical activity:     Days per week: Not on file     Minutes per session: Not on file   • Stress: Not on file   Relationships   • Social connections:     Talks on phone: Not on file     Gets together: Not on file     Attends Latter-day service: Not on file     Active member of club or organization: Not on file     Attends meetings of clubs or organizations: Not on file     Relationship status: Not on file   • Intimate partner violence:     Fear of current or ex partner: Not on file     Emotionally abused: Not on file     Physically abused: Not on file     Forced sexual  "activity: Not on file   Other Topics Concern   • Not on file   Social History Narrative   • Not on file     Allergies   Allergen Reactions   • Feldene [Piroxicam] Itching   • Percodan [Oxycodone-Aspirin] Itching and Photosensitivity     \"I sunburn\"     Outpatient Encounter Medications as of 8/2/2019   Medication Sig Dispense Refill   • aspirin (ASA) 81 MG Chew Tab chewable tablet Take 1 Tab by mouth every day. 90 Tab 3   • acetaminophen (TYLENOL) 500 MG Tab Take 500-1,000 mg by mouth every 6 hours as needed.     • Coenzyme Q10 (CO Q 10) 100 MG Cap Take 300 mg by mouth every morning.     • Glucosamine HCl (GLUCOSAMINE PO) Take 1 Tab by mouth 2 Times a Day.     • gabapentin (NEURONTIN) 300 MG Cap Take 300 mg by mouth 3 times a day. 1 in the morning, 1 at noon, 2 at 4 pm, 3 at bedtime     • metoprolol SR (TOPROL XL) 50 MG TABLET SR 24 HR TAKE ONE TABLET BY MOUTH EVERY DAY 90 Tab 3   • pravastatin (PRAVACHOL) 80 MG tablet Take 1 Tab by mouth every day. 90 Tab 0   • clindamycin (CLEOCIN) 150 MG Cap Take 300 mg by mouth 2 Times a Day.     • digoxin (LANOXIN) 250 MCG Tab Take 250 mcg by mouth every bedtime.     • warfarin (COUMADIN) 4 MG Tab Take one tablet by mouth one time daily or as directed by coumadin clinic 90 Tab 1   • cyclobenzaprine (FLEXERIL) 10 MG Tab Take 10 mg by mouth 3 times a day as needed for Muscle Spasms.     • L-Lysine 500 MG Tab Take  by mouth as needed (cold sores).     • B Complex Vitamins (VITAMIN B COMPLEX PO) Take 1 Tab by mouth every evening.     • Psyllium (METAMUCIL PO) Take  by mouth every morning.     • Cholecalciferol (VITAMIN D3) 2000 UNITS Tab Take 1 Tab by mouth every evening.     • multivitamin (THERAGRAN) TABS Take 1 Tab by mouth every bedtime.     • warfarin (COUMADIN) 6 MG Tab Take 1 Tab by mouth every day. 30 Tab 3     No facility-administered encounter medications on file as of 8/2/2019.      Review of Systems   Constitutional: Negative for fever and malaise/fatigue.   Respiratory: " "Negative for cough and shortness of breath.    Cardiovascular: Negative for chest pain, palpitations, orthopnea, claudication, leg swelling and PND.   Gastrointestinal: Negative for abdominal pain.   Musculoskeletal: Negative for myalgias.   Neurological: Negative for dizziness.        Objective:   /80 (BP Location: Left arm, Patient Position: Sitting, BP Cuff Size: Adult)   Pulse 78   Ht 1.803 m (5' 11\")   Wt 100.7 kg (222 lb 0.1 oz)   SpO2 96%   BMI 30.96 kg/m²     Physical Exam    Assessment:     1. Status post transcatheter aortic valve replacement (TAVR) using bioprosthesis  EKG   2. Atrial fibrillation, unspecified type (HCC)     3. LORENA (obstructive sleep apnea)     4. Dyslipidemia     5. Essential hypertension       Medical Decision Making:  Today's Assessment / Status / Plan:     1. S/P TAVR  -doing well, groins CDI  -mild bruising on arms  -SBE prophylaxis discussed pre-op  -EKG WNL  -echo 1 month with valve clinic follow up with AK  -cont asa lifelong    2. Chronic afib  -rate controlled and asymptomatic  -cont toprol and dig for rate control  -coumadin per coumadin clinic, INR today  -no bleeding per patient    3. LORENA on CPAP  -tolerating and compliant    4. HTN  -good control on toprol    5. HLD  -statin  -yearly cmp and lipid per PCP    FU in clinic in 1 months with echo with valve clinic apt    Patient verbalizes understanding and agrees with the plan of care.     Collaborating MD: Soila BRADLEY    "

## 2019-08-02 NOTE — PROGRESS NOTES
Anticoagulation Summary  As of 2019    INR goal:   2.0-3.0   TTR:   65.2 % (2.8 y)   INR used for dosin.80! (2019)   Warfarin maintenance plan:   4 mg (4 mg x 1) every day   Weekly warfarin total:   28 mg   Plan last modified:   Abihshek CorbinD (10/18/2018)   Next INR check:   2019   Target end date:   Indefinite    Indications    Atrial fibrillation (HCC) [I48.91]             Anticoagulation Episode Summary     INR check location:       Preferred lab:       Send INR reminders to:       Comments:   Pt goes by Kinsey      Anticoagulation Care Providers     Provider Role Specialty Phone number    Vickey Rutherford M.D. Referring Cardiology [phone]    [facility] Responsible  [phone]        Anticoagulation Patient Findings      HPI:  Duane Parkinson seen in clinic today for follow up on anticoagulation therapy in the presence of 1.8.   Denies any changes to current medical/health status since last appointment.   Denies any medication or diet changes.   No current symptoms of bleeding or thrombosis reported.    A/P:   INR is sub-therapeutic at 1.8, patient recently had a TAVR 2019, he had to hold warfarin for the procedure. Patient had already been on warfarin in the past for Afib.  INR has a steady upward trend hence he is to continue on 4 mg daily  Asa has been started  BP recorded in vitals.    Follow up appointment in 4 day(s).    Next Appointment: 19, at 9AM    Cristal GilbertD

## 2019-08-02 NOTE — PATIENT INSTRUCTIONS
We will obtain an echocardiogram 2-3 days prior to seeing Dr. Buckner.    BP goal 110-130 on the top #.    Call if BP uncontrolled low or high.     Okay to reduce your toprol to 25 mg if your BP remains low.    If panting or shortness of breath worsens, please call.

## 2019-08-04 LAB — LV EJECT FRACT  99904: 55

## 2019-08-05 ENCOUNTER — OFFICE VISIT (OUTPATIENT)
Dept: MEDICAL GROUP | Facility: MEDICAL CENTER | Age: 79
End: 2019-08-05
Payer: MEDICARE

## 2019-08-05 ENCOUNTER — TELEPHONE (OUTPATIENT)
Dept: VASCULAR LAB | Facility: MEDICAL CENTER | Age: 79
End: 2019-08-05

## 2019-08-05 VITALS
OXYGEN SATURATION: 94 % | HEART RATE: 77 BPM | HEIGHT: 71 IN | BODY MASS INDEX: 31.08 KG/M2 | DIASTOLIC BLOOD PRESSURE: 70 MMHG | SYSTOLIC BLOOD PRESSURE: 124 MMHG | TEMPERATURE: 97 F | WEIGHT: 222 LBS

## 2019-08-05 DIAGNOSIS — K86.89 PANCREATIC MASS: ICD-10-CM

## 2019-08-05 DIAGNOSIS — G47.33 OSA (OBSTRUCTIVE SLEEP APNEA): ICD-10-CM

## 2019-08-05 DIAGNOSIS — G62.9 NEUROPATHY: ICD-10-CM

## 2019-08-05 DIAGNOSIS — R41.3 MEMORY LOSS: ICD-10-CM

## 2019-08-05 DIAGNOSIS — K75.4 AUTOIMMUNE HEPATITIS (HCC): ICD-10-CM

## 2019-08-05 DIAGNOSIS — Z95.3 STATUS POST TRANSCATHETER AORTIC VALVE REPLACEMENT (TAVR) USING BIOPROSTHESIS: ICD-10-CM

## 2019-08-05 DIAGNOSIS — F51.01 PRIMARY INSOMNIA: ICD-10-CM

## 2019-08-05 DIAGNOSIS — R73.02 IGT (IMPAIRED GLUCOSE TOLERANCE): ICD-10-CM

## 2019-08-05 DIAGNOSIS — I10 ESSENTIAL HYPERTENSION: ICD-10-CM

## 2019-08-05 DIAGNOSIS — I48.91 ATRIAL FIBRILLATION, UNSPECIFIED TYPE (HCC): ICD-10-CM

## 2019-08-05 DIAGNOSIS — Z00.00 MEDICARE ANNUAL WELLNESS VISIT, SUBSEQUENT: ICD-10-CM

## 2019-08-05 DIAGNOSIS — E78.5 DYSLIPIDEMIA: ICD-10-CM

## 2019-08-05 LAB
EKG IMPRESSION: NORMAL
INR BLD: 1.8 (ref 0.9–1.2)

## 2019-08-05 PROCEDURE — 99214 OFFICE O/P EST MOD 30 MIN: CPT | Mod: 25 | Performed by: INTERNAL MEDICINE

## 2019-08-05 PROCEDURE — G0439 PPPS, SUBSEQ VISIT: HCPCS | Performed by: INTERNAL MEDICINE

## 2019-08-05 RX ORDER — OMEPRAZOLE 20 MG/1
20 CAPSULE, DELAYED RELEASE ORAL DAILY
Qty: 30 CAP | Refills: 1 | Status: SHIPPED | OUTPATIENT
Start: 2019-08-05 | End: 2020-08-24

## 2019-08-05 ASSESSMENT — ENCOUNTER SYMPTOMS: GENERAL WELL-BEING: GOOD

## 2019-08-05 ASSESSMENT — PATIENT HEALTH QUESTIONNAIRE - PHQ9: CLINICAL INTERPRETATION OF PHQ2 SCORE: 0

## 2019-08-05 ASSESSMENT — ACTIVITIES OF DAILY LIVING (ADL): BATHING_REQUIRES_ASSISTANCE: 0

## 2019-08-05 NOTE — PROGRESS NOTES
CC: Pancreatic mass, memory concerns, status post aortic valve replacement    HPI:   Duane presents today with the following.      1. Medicare annual wellness visit, subsequent  Screenings performed below advance directives already on file    2. Essential hypertension  Controlled on current regimen denying any chest pain or shortness of breath.    3. Memory loss  Brought to the attention of memory loss by daughter.  She reports postprocedure he was quite confused.  She is noticed memory issues for some time they would like to be evaluated further.  No focal numbness or weakness postprocedure    4. 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  He does have pacemaker wire and cannot have MR.  He was sent for a tumor marker which was normal.  Denies any appetite loss or changes to taste.    5. Status post transcatheter aortic valve replacement (TAVR) using bioprosthesis  Status post valve doing quite well he is becoming more functionally able with time progressing.          Information for advance directives given to patient or instructed to bring in advance directives into to office to put in chart.      Depression Screening    Little interest or pleasure in doing things?  0 - not at all  Feeling down, depressed , or hopeless? 0 - not at all  Patient Health Questionnaire Score: 0     If depressive symptoms identified deferred to follow up visit unless specifically addressed in assessment and plan.    Interpretation of PHQ-9 Total Score   Score Severity   1-4 No Depression   5-9 Mild Depression   10-14 Moderate Depression   15-19 Moderately Severe Depression   20-27 Severe Depression    Screening for Cognitive Impairment    Three Minute Recall (village, kitchen, baby) 2/3    Braydon clock face with all 12 numbers and set the hands to show 10 past 10.  Yes 4/5  Cognitive concerns identified deferred for follow up unless specifically addressed in  assessment and plan.    Fall Risk Assessment    Has the patient had two or more falls in the last year or any fall with injury in the last year?  No    Safety Assessment    Throw rugs on floor.  No  Handrails on all stairs.  Yes  Good lighting in all hallways.  Yes  Difficulty hearing.  Yes  Patient counseled about all safety risks that were identified.    Functional Assessment ADLs    Are there any barriers preventing you from cooking for yourself or meeting nutritional needs?  No.    Are there any barriers preventing you from driving safely or obtaining transportation?  No.    Are there any barriers preventing you from using a telephone or calling for help?  No.    Are there any barriers preventing you from shopping?  No.    Are there any barriers preventing you from taking care of your own finances?  No.    Are there any barriers preventing you from managing your medications?  No.    Are there any barriers preventing you from showering, bathing or dressing yourself?  No.    Are you currently engaging in any exercise or physical activity?  No.     What is your perception of your health?  Good.      Health Maintenance Summary                Annual Wellness Visit Overdue 6/23/2018      Done 6/22/2017      Patient has more history with this topic...    IMM ZOSTER VACCINES Postponed 9/13/2030 Originally 5/27/2014. Insurance/Financial     Done 4/1/2014 Imm Admin: Zoster Vaccine Live (ZVL) (Zostavax)    IMM DTaP/Tdap/Td Vaccine Postponed 8/12/2037 Originally 6/29/2019. Insurance/Financial     Done 6/29/2009 Imm Admin: Tdap Vaccine    IMM INFLUENZA Next Due 9/1/2019      Done 9/24/2018 Imm Admin: Influenza Vaccine Adult HD     Patient has more history with this topic...    COLONOSCOPY Next Due 1/7/2022      Not specified 1/7/2012           Patient Care Team:  Fransisco Graham M.D. as PCP - General (Internal Medicine)  Gus Irvin M.D. as Consulting Physician (Phys Med and Rehab)  Jarad Joseph M.D. (Inactive) as  Consulting Physician (Gastroenterology)  CLAYTON Navarrete as Consulting Physician (Dermatology)  Alessio Cutler M.D. (Orthopaedics)  Neville Minor D.P.M. (Podiatry)  Vickey Rutherford M.D. as Consulting Physician (Cardiology)  [facility] as Consulting Physician  Neville James M.D. (Neurosurgery)  Fransisco Bearden, PT as Physical Therapy (Physical Therapy)            Health Care Screening: Age-appropriate preventive services that Medicare covers were discussed today and ordered as indicated and agreed upon by the patient, and as recommended by USPTF and ACIP.     Patient Active Problem List    Diagnosis Date Noted   • Autoimmune hepatitis (HCC) 06/22/2018   • Neuropathy (HCC) 12/22/2017   • Localized primary osteoarthritis of carpometacarpal joint of left thumb 05/27/2016   • Insomnia 05/26/2016   • LORENA (obstructive sleep apnea) 04/21/2016   • Essential hypertension 01/07/2015   • Dyslipidemia 01/07/2015   • IGT (impaired glucose tolerance) 01/07/2015   • Status post transcatheter aortic valve replacement (TAVR) using bioprosthesis 12/30/2011   • Atrial fibrillation (HCC) 12/30/2011   • Long term current use of anticoagulant therapy 12/30/2011   • Lumbar Disc Degeneration 12/30/2011   • Osteoarthrosis involving lower leg 12/30/2011       Current Outpatient Medications   Medication Sig Dispense Refill   • omeprazole (PRILOSEC) 20 MG delayed-release capsule Take 1 Cap by mouth every day. 30 Cap 1   • aspirin (ASA) 81 MG Chew Tab chewable tablet Take 1 Tab by mouth every day. 90 Tab 3   • warfarin (COUMADIN) 6 MG Tab Take 1 Tab by mouth every day. 30 Tab 3   • acetaminophen (TYLENOL) 500 MG Tab Take 500-1,000 mg by mouth every 6 hours as needed.     • Coenzyme Q10 (CO Q 10) 100 MG Cap Take 300 mg by mouth every morning.     • Glucosamine HCl (GLUCOSAMINE PO) Take 1 Tab by mouth 2 Times a Day.     • gabapentin (NEURONTIN) 300 MG Cap Take 300 mg by mouth 3 times a day. 1 in the  morning, 1 at noon, 2 at 4 pm, 3 at bedtime     • metoprolol SR (TOPROL XL) 50 MG TABLET SR 24 HR TAKE ONE TABLET BY MOUTH EVERY DAY 90 Tab 3   • pravastatin (PRAVACHOL) 80 MG tablet Take 1 Tab by mouth every day. 90 Tab 0   • clindamycin (CLEOCIN) 150 MG Cap Take 300 mg by mouth 2 Times a Day.     • digoxin (LANOXIN) 250 MCG Tab Take 250 mcg by mouth every bedtime.     • warfarin (COUMADIN) 4 MG Tab Take one tablet by mouth one time daily or as directed by coumadin clinic 90 Tab 1   • cyclobenzaprine (FLEXERIL) 10 MG Tab Take 10 mg by mouth 3 times a day as needed for Muscle Spasms.     • L-Lysine 500 MG Tab Take  by mouth as needed (cold sores).     • B Complex Vitamins (VITAMIN B COMPLEX PO) Take 1 Tab by mouth every evening.     • Psyllium (METAMUCIL PO) Take  by mouth every morning.     • Cholecalciferol (VITAMIN D3) 2000 UNITS Tab Take 1 Tab by mouth every evening.     • multivitamin (THERAGRAN) TABS Take 1 Tab by mouth every bedtime.       No current facility-administered medications for this visit.        Family History   Problem Relation Age of Onset   • Other Mother         unknown   • Heart Disease Mother    • Cancer Father         prostate, skin   • No Known Problems Brother    • Diabetes Brother    • Heart Disease Son    • Sleep Apnea Neg Hx        Social History     Socioeconomic History   • Marital status:      Spouse name: Not on file   • Number of children: Not on file   • Years of education: Not on file   • Highest education level: Not on file   Occupational History   • Not on file   Social Needs   • Financial resource strain: Not on file   • Food insecurity:     Worry: Not on file     Inability: Not on file   • Transportation needs:     Medical: Not on file     Non-medical: Not on file   Tobacco Use   • Smoking status: Former Smoker     Packs/day: 1.00     Years: 20.00     Pack years: 20.00     Types: Cigarettes     Last attempt to quit: 1972     Years since quittin.5   •  Smokeless tobacco: Former User   Substance and Sexual Activity   • Alcohol use: No     Alcohol/week: 0.0 oz   • Drug use: No   • Sexual activity: Not Currently     Partners: Female   Lifestyle   • Physical activity:     Days per week: Not on file     Minutes per session: Not on file   • Stress: Not on file   Relationships   • Social connections:     Talks on phone: Not on file     Gets together: Not on file     Attends Gnosticism service: Not on file     Active member of club or organization: Not on file     Attends meetings of clubs or organizations: Not on file     Relationship status: Not on file   • Intimate partner violence:     Fear of current or ex partner: Not on file     Emotionally abused: Not on file     Physically abused: Not on file     Forced sexual activity: Not on file   Other Topics Concern   • Not on file   Social History Narrative   • Not on file       Past Surgical History:   Procedure Laterality Date   • GIBRAN N/A 7/29/2019    Procedure: ECHOCARDIOGRAM, TRANSESOPHAGEAL;  Surgeon: Leander Buckner M.D.;  Location: SURGERY [facility];  Service: Cardiac   • TRANSCATHETER AORTIC VALVE REPLACEMENT Bilateral 7/29/2019    Procedure: REPLACEMENT, AORTIC VALVE, TRANSCATHETER;  Surgeon: Leander Buckner M.D.;  Location: SURGERY [facility];  Service: Cardiac   • FINGER ARTHROPLASTY Left 5/27/2016    Procedure: FINGER ARTHROPLASTY THUMB CARPOMETACARPAL EXCISIONAL, EXCISE TRAPEZIUM;  Surgeon: Raheel Salgado M.D.;  Location: SURGERY SAME DAY [facility];  Service:    • CARDIOVERSION      on 7/17/06, sinus rhythm until January 2007,  paroxysmal atrial fibrillation   • KNEE ARTHROPLASTY TOTAL     • LAMINOTOMY N/A     multiple lumbar spine   • OTHER ORTHOPEDIC SURGERY      lower back   • PB PERCUT IMPLNT NEUROELECT,EPIDURAL     • TOE ARTHROPLASTY     • TURP-VAPOR N/A        Allergies as of 08/05/2019 - Reviewed 08/05/2019   Allergen Reaction Noted   • Feldene [piroxicam] Itching 12/30/2011  "  • Percodan [oxycodone-aspirin] Itching and Photosensitivity 12/30/2011        ROS: Denies Chest pain, SOB, LE edema.    /70 (BP Location: Right arm, Patient Position: Sitting)   Pulse 77   Temp 36.1 °C (97 °F)   Ht 1.803 m (5' 11\")   Wt 100.7 kg (222 lb)   SpO2 94%   BMI 30.96 kg/m²     Physical Exam:  Gen:         Alert and oriented, No apparent distress.  Neck:        No Lymphadenopathy or Bruits.  Lungs:     Clear to auscultation bilaterally  CV:          Regular rate and rhythm. No murmurs, rubs or gallops.  Abd:         Soft non tender, non distended. Normal active bowel sounds.  No  Hepatosplenomegaly, No pulsatile masses.                   Ext:          No clubbing, cyanosis, edema.      Assessment and Plan.   79 y.o. male with the following issues.    1. Medicare annual wellness visit, subsequent  Discussed healthy lifestyle habits as well as screening regimens.  - Subsequent Annual Wellness Visit - Includes PPPS ()    2. Essential hypertension  Currently well controlled, Discuss diet, exercise and salt restriction.  No change to medication therapy.    3. Memory loss  Likely some underlying pathology referring to neurology  - REFERRAL TO NEUROLOGY    4. Pancreatic mass  30 ordered referral to gastroenterology for ERCP if they feel indicated.    5. Status post transcatheter aortic valve replacement (TAVR) using bioprosthesis  Clinically doing well no change therapy    6. Atrial fibrillation, unspecified type (HCC)  Rate controlled  - Subsequent Annual Wellness Visit - Includes PPPS ()    7. Dyslipidemia  Lipids currently well controlled. Discussed continued diet and exercise recheck 6 months to 1 year.  - Subsequent Annual Wellness Visit - Includes PPPS ()    8. IGT (impaired glucose tolerance)  Recheck 6 months  - Subsequent Annual Wellness Visit - Includes PPPS ()    9. LORENA (obstructive sleep apnea)  Compliant with therapy  - Subsequent Annual Wellness Visit - Includes PPPS " ()    10. Autoimmune hepatitis (HCC)  Stable no new symptomology  - Subsequent Annual Wellness Visit - Includes PPPS ()    11. Primary insomnia  Table no change to therapy  - Subsequent Annual Wellness Visit - Includes PPPS ()    12. Neuropathy (HCC)  Controlled no change to therapy  - Subsequent Annual Wellness Visit - Includes PPPS ()        Referrals offered: Community-based lifestyle interventions to reduce health risks and promote self-management and wellness, fall prevention, nutrition, physical activity, tobacco-use cessation, weight loss, and mental health services as per orders if indicated.    Discussion today about general wellness and lifestyle habits:    · Prevent falls and reduce trip hazards; Cautioned about securing or removing rugs.  · Have a working fire alarm and carbon monoxide detector;   · Engage in regular physical activity and social activities

## 2019-08-05 NOTE — TELEPHONE ENCOUNTER
Pt called to report that he started a new medication: omeperazole. Minimal interaction.    Pratima Ricks, Clinical Pharmacist, CDE, CACP

## 2019-08-07 ENCOUNTER — ANTICOAGULATION VISIT (OUTPATIENT)
Dept: VASCULAR LAB | Facility: MEDICAL CENTER | Age: 79
End: 2019-08-07
Attending: INTERNAL MEDICINE
Payer: MEDICARE

## 2019-08-07 VITALS — HEART RATE: 69 BPM | DIASTOLIC BLOOD PRESSURE: 87 MMHG | SYSTOLIC BLOOD PRESSURE: 110 MMHG

## 2019-08-07 DIAGNOSIS — I48.91 ATRIAL FIBRILLATION, UNSPECIFIED TYPE (HCC): ICD-10-CM

## 2019-08-07 DIAGNOSIS — Z95.3 STATUS POST TRANSCATHETER AORTIC VALVE REPLACEMENT (TAVR) USING BIOPROSTHESIS: ICD-10-CM

## 2019-08-07 LAB
INR BLD: 2.3 (ref 0.9–1.2)
INR PPP: 2.3 (ref 2–3.5)

## 2019-08-07 PROCEDURE — 85610 PROTHROMBIN TIME: CPT

## 2019-08-07 PROCEDURE — 99211 OFF/OP EST MAY X REQ PHY/QHP: CPT | Performed by: NURSE PRACTITIONER

## 2019-08-07 NOTE — PROGRESS NOTES
Anticoagulation Summary  As of 2019    INR goal:   2.0-3.0   TTR:   65.1 % (2.8 y)   INR used for dosin.30 (2019)   Warfarin maintenance plan:   4 mg (4 mg x 1) every day   Weekly warfarin total:   28 mg   Plan last modified:   Sin Huirton, PharmD (10/18/2018)   Next INR check:   2019   Target end date:   Indefinite    Indications    Atrial fibrillation (HCC) [I48.91]             Anticoagulation Episode Summary     INR check location:       Preferred lab:       Send INR reminders to:       Comments:   Pt goes by Kinsey      Anticoagulation Care Providers     Provider Role Specialty Phone number    Vickey Rutherford M.D. Referring Cardiology [phone]    [facility] Anticoagulation Services Responsible  [phone]        Anticoagulation Patient Findings      HPI:  Duane Parkinson seen in clinic today for follow up on anticoagulation therapy in the presence of AF, recent TAVR.   Denies any changes to current medical/health status since last appointment.   Denies any medication or diet changes.   No current symptoms of bleeding or thrombosis reported.    A/P:   INR therapeutic.   Continue current regimen.   BP recorded in vitals.    Follow up appointment in 1 week(s).    Next Appointment: 2019 at 8:45 am.    Britney DOE

## 2019-08-14 ENCOUNTER — ANTICOAGULATION VISIT (OUTPATIENT)
Dept: VASCULAR LAB | Facility: MEDICAL CENTER | Age: 79
End: 2019-08-14
Attending: INTERNAL MEDICINE
Payer: MEDICARE

## 2019-08-14 VITALS — HEART RATE: 65 BPM | DIASTOLIC BLOOD PRESSURE: 63 MMHG | SYSTOLIC BLOOD PRESSURE: 102 MMHG

## 2019-08-14 DIAGNOSIS — Z95.3 STATUS POST TRANSCATHETER AORTIC VALVE REPLACEMENT (TAVR) USING BIOPROSTHESIS: ICD-10-CM

## 2019-08-14 DIAGNOSIS — I48.91 ATRIAL FIBRILLATION, UNSPECIFIED TYPE (HCC): ICD-10-CM

## 2019-08-14 LAB — INR PPP: 3.2 (ref 2–3.5)

## 2019-08-14 PROCEDURE — 99212 OFFICE O/P EST SF 10 MIN: CPT | Performed by: NURSE PRACTITIONER

## 2019-08-14 PROCEDURE — 85610 PROTHROMBIN TIME: CPT

## 2019-08-14 NOTE — PROGRESS NOTES
Anticoagulation Summary  As of 8/14/2019    INR goal:   2.0-3.0   TTR:   65.2 % (2.8 y)   INR used for dosing:   3.20! 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 Referring Cardiology [phone]    [facility] Anticoagulation Services Responsible  [phone]        Anticoagulation Patient Findings      HPI:  Duane Parkinson seen in clinic today for follow up on anticoagulation therapy in the presence of AF, TAVR.   Denies any changes to current medical/health status since last appointment.   Denies any medication or diet changes.   No current symptoms of bleeding or thrombosis reported.    A/P:   INR supratherapeutic.   Decrease tonight then continue current regimen.   BP recorded in vitals.    Follow up appointment in 1 week(s).    Next Appointment: Wednesday, August 21, 2019 at 7:45 am.    Britney DOE

## 2019-08-15 LAB — INR BLD: 3.2 (ref 0.9–1.2)

## 2019-08-21 ENCOUNTER — ANTICOAGULATION VISIT (OUTPATIENT)
Dept: VASCULAR LAB | Facility: MEDICAL CENTER | Age: 79
End: 2019-08-21
Attending: INTERNAL MEDICINE
Payer: MEDICARE

## 2019-08-21 VITALS — HEART RATE: 63 BPM | SYSTOLIC BLOOD PRESSURE: 135 MMHG | DIASTOLIC BLOOD PRESSURE: 76 MMHG

## 2019-08-21 DIAGNOSIS — I48.91 ATRIAL FIBRILLATION, UNSPECIFIED TYPE (HCC): ICD-10-CM

## 2019-08-21 DIAGNOSIS — Z95.3 STATUS POST TRANSCATHETER AORTIC VALVE REPLACEMENT (TAVR) USING BIOPROSTHESIS: ICD-10-CM

## 2019-08-21 LAB — INR PPP: 3.4 (ref 2–3.5)

## 2019-08-21 PROCEDURE — 85610 PROTHROMBIN TIME: CPT

## 2019-08-21 PROCEDURE — 99212 OFFICE O/P EST SF 10 MIN: CPT | Performed by: PHARMACIST

## 2019-08-21 NOTE — PROGRESS NOTES
Anticoagulation Summary  As of 8/21/2019    INR goal:   2.0-3.0   TTR:   64.8 % (2.8 y)   INR used for dosing:   3.40! 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 Referring Cardiology [phone]    [facility] Anticoagulation Services Responsible  [phone]          Anticoagulation Patient Findings  Patient Findings     Negatives:   Signs/symptoms of thrombosis, Signs/symptoms of bleeding, Laboratory test error suspected, Change in health, Change in alcohol use, Change in activity, Upcoming invasive procedure, Emergency department visit, Upcoming dental procedure, Missed doses, Extra doses, Change in medications, Change in diet/appetite, Hospital admission, Bruising, Other complaints          HPI:  Duane Parkinson seen in clinic today, on anticoagulation therapy with warfarin due to hx of atrial fibrillation and TAVR.  Changes to current medical/health status since last appt: NONE  Denies signs/symptoms of bleeding and/or thrombosis since the last appt.    Denies any interval changes to diet  Denies any interval changes to medications since last appt.   Denies any complications or cost restrictions with current therapy.   BP recorded in vitals.      A/P   INR  remains supra-therapeutic at 3.4.   Instructed patient to decrease today's dose to 2mg, then decrease weekly warfarin regimen by ~7% as detailed  above.    Follow up appointment in 1 week(s).    Eagle Escobar, PharmD

## 2019-08-22 LAB — INR BLD: 3.4 (ref 0.9–1.2)

## 2019-08-27 ENCOUNTER — TELEPHONE (OUTPATIENT)
Dept: CARDIOLOGY | Facility: MEDICAL CENTER | Age: 79
End: 2019-08-27

## 2019-08-27 ENCOUNTER — NON-PROVIDER VISIT (OUTPATIENT)
Dept: CARDIOLOGY | Facility: MEDICAL CENTER | Age: 79
End: 2019-08-27
Payer: MEDICARE

## 2019-08-27 DIAGNOSIS — Z95.3 STATUS POST AORTIC VALVE REPLACEMENT WITH BIOPROSTHETIC VALVE: Primary | ICD-10-CM

## 2019-08-27 LAB — EKG IMPRESSION: NORMAL

## 2019-08-27 PROCEDURE — G0423 INTENS CARDIAC REHAB NO EXER: HCPCS | Mod: 59 | Performed by: INTERNAL MEDICINE

## 2019-08-27 PROCEDURE — G0422 INTENS CARDIAC REHAB W/EXERC: HCPCS | Performed by: INTERNAL MEDICINE

## 2019-08-27 ASSESSMENT — PATIENT HEALTH QUESTIONNAIRE - PHQ9
5. POOR APPETITE OR OVEREATING: 0
2. FEELING DOWN, DEPRESSED, IRRITABLE, OR HOPELESS: 0
6. FEELING BAD ABOUT YOURSELF - OR THAT YOU ARE A FAILURE OR HAVE LET YOURSELF OR YOUR FAMILY DOWN: 0
4. FEELING TIRED OR HAVING LITTLE ENERGY: 1
SUM OF ALL RESPONSES TO PHQ QUESTIONS 1-9: 4
SUM OF ALL RESPONSES TO PHQ9 QUESTIONS 1 AND 2: 0
SUM OF ALL RESPONSES TO PHQ QUESTIONS 1-9: 4
7. TROUBLE CONCENTRATING ON THINGS, SUCH AS READING THE NEWSPAPER OR WATCHING TELEVISION: 1
3. TROUBLE FALLING OR STAYING ASLEEP OR SLEEPING TOO MUCH: 2
1. LITTLE INTEREST OR PLEASURE IN DOING THINGS: 0
9. THOUGHTS THAT YOU WOULD BE BETTER OFF DEAD, OR OF HURTING YOURSELF: 0
8. MOVING OR SPEAKING SO SLOWLY THAT OTHER PEOPLE COULD HAVE NOTICED. OR THE OPPOSITE, BEING SO FIGETY OR RESTLESS THAT YOU HAVE BEEN MOVING AROUND A LOT MORE THAN USUAL: 0

## 2019-08-27 NOTE — PROGRESS NOTES
Individualized Treatment Plan   Cardiac Rehab Individual Treatment Plan  Initial/Reassessment/Discharge Assessment/ ReAssessment/ Discharge: Initial 19 Session #     1   MRN: [id number] Allergies: Feldene [piroxicam] and Percodan [oxycodone-aspirin]   Patient Name: Duane Parkinson : 1940 Risk Stratification: High    Diagnoses:   1. Status post aortic valve replacement with bioprosthetic valve     Age: 79 y.o. Physician: Fransisco Graham M.D.    Date of Event: 19 Specialist: Sita   Risk Factors:  Hypertension, Hyperlipidemia, Obesity, Stress, Sedentary Lifestyle, Age, Male > 45   Exercise Nutrition Other Core Components/ Risk Factors Psychosocial   Stages of change Stages of change Stages of change Stages of change   Preparation Preparation Preparation Preparation   Fitness Test Lipids Learning Barriers Plan   DASI: (n/a) Available: Yes Learning Barriers: Vision Psychosocial Plan: Yes   DIST: 747 ft Date: 18 Family Support Intervention   Max HR: 96 Total: 123 mg/dL Yes Behavioral Health Consult: No   RPE: 11 Tri mg/dL Lives: Alone(. Has family support; children/grandchildren.) Physician Referral: No   SPO2: 95 % HDL: 32 mg/dL Other Risk Factors Plan Identifies Stressors: Yes   MET: 2.07 LDL: 53 mg/dL Other Risk Factors/Plan: Yes Drug Intervention: No   EF= 70 Diabetes Tobacco Use Outcome Survey Tools   Ambulatory Status Diabetes: No(Hx of impaired glucose tollerance) Social History     Tobacco Use   Smoking Status Former Smoker   • Packs/day: 1.00   • Years: 20.00   • Pack years: 20.00   • Types: Cigarettes   • Last attempt to quit: 1972   • Years since quittin.6   Smokeless Tobacco Former User    FP QOL Overall Score: 27.6   Fall Risk Assessed: Yes HbA1C: 5.4 % Date: 18 Smoking Intervention PHQ-9: 4   Exercise Ambulatory Status Assist Devices: None Monitors BS at Home: No Smoking Cessation Referral: No     Intervention Frequency: (n/a) Random BS:  "108(7/30/19) Ind. Education / Counseling: Yes Education   Intervention: Yes    MBSR Lectures/Videos: Yes   Exercise Prescription/ Exercise Plan       Mode: Treadmill, NuStep, UBE, Airdyne Weight Management Tobacco Adjunct: No Psychosocial Education: Coping Techniques, Positive Support System, S/S Depression, MBSR Lectures   Frequency: 3 days/week Weight: 100.5 kg (221 lb 8 oz) Target Goal Target Goal   Duration: 30-45 minutes Height: 177.8 cm (5' 10\") Complete Tobacco Cessation: Assess presence or absence of depression using a valid screening: Yes   Intensity: 11-13 RPE BMI (Calculated): 31.78 Complete Tobacco Cessation: No(Former smoker: 1 pk/day, 20 yrs, Quit date 1/16/1972) Use Stress Management: Yes   METS: 1.0-3.0 Goal weight: 200lbs Intervention Adverse Events: No   Progression: ^ increments of 1-3 to THR/RPE as tolerated Waist: 46 inch Healthy Heart Education: Class Schedule Given, Medications Reviewed, Patient Education Binder Provided, Risk Factors Discussed, Videos Viewed per Fernie Unexpected Events: No      Goals     THR: THR: (113-123bpm) Social History     Substance and Sexual Activity   Alcohol Use No   • Alcohol/week: 0.0 oz    Educate / Review and have understanding of cardiac disease prevention:    Angina with Exercise? Angina with Exercise: No Nutrition Plan Educate / Review and have understanding of cardiac disease prevention: Yes      Nutrition Plan: Yes Medication Compliance: Yes    Resistance Training? Resistance Training: Yes Intervention Hypertension Intervention      Dietician Consult/Class: Pending     Goals Nurse/Patient Discussion: Yes Weekly Lectures/ Videos/ Interactive Cooking School: Yes    Home Exercise: Yes Diabetes Ed Referral: No Hypertension Management    Mode: Walk, Bike, Gym Discuss Maintenance /Wt Loss: Yes Resting BP: 113/70    Duration: 5-10 minutes daily Attend Cooking School: Pending     Frequency: 7 days active Dietary Goal: Rate Your Plate >=58    Education Education  " "  Yes Nutrition Education: Healthy Plant Based Eating, Sodium Reduction Cooking/ Lectures/ Cooking School/  RD 1:1     Equipment Orientation, Exercise Safety, S/S to Report, RPE Scale, Warm Up / Cool Down, Physically Active Target Goal    Target Goal LDL-C < 100 if trig. > 200:  No    Start Individual Exercise Rx Yes LDL-C < 70 for high risk patient:  Yes    BP < 140/90 or < 130/80 if DM or CKD Yes Non HDL-C Should be < 130:  Yes    Aerobic activity 30 + min / day 5 days / wk Yes HbA1C < 7%: Yes      BMI < 25: No      Initial Assessment  Heart Sounds: irregular          Lung Sounds: clear, diminished bilat posteriorly; respirations 15.         Edema: none     Right Peripheral Pulses:  Carotid: 2+  Radial: 2+  Dorsalis Pedis: 1+  Posterior Tibialis: 1+      Left Peripheral Pulses:  Carotid: 2+  Radial: 2+  Dorsalis Pedis: 1+  Posterior Tibialis: 1+       Incision: femoral--pt reports healing well.       Color: pink    Frame Size: Large       Cognitive Assessment: A&Ox4(family states concerns--memory loss, confusion)    Fall Assessment:    Gait: steady  Balance: fair  Upper Body: good strength bilat  Lower Body: good strength, left leg stronger than right, neuropathy.   Recent Falls: none     Current Medications and Vaccinations: Reviewed     Patient Stated Goals: \"I'd like to improve my balance and activity as best I can with my back.\"            Exercise     Current: Current : Not currently exercising. States he has a TM, stationary bike, and 1-10lb dumbbells at home but is not currently using them.   Goal: Goal: Improve balance. Return to hunting and fishing.   Progress: Progress: Motivated to begin exercising in [facility].      Nutrition     Current: Current: Reports eating mixed hot cereal most mornings, toast with jam occasionally. Eats red meat including ribs, sausage, hot dogs. Does not cook. Lives alone.   Goal: Goal: \"I'd like to learn how to grocery shop for heart health.\"  Progress: Progress: Open to learning " "Pritikin Eating plan.      Hypertension     Current: Current: 113/70; BP WNL  Goal: Goal: Medication compliance, low sodium diet, home BP monitoring.  Progress: Progress: Medication compliance.      Stress     Current: Current : Grieving the loss of his wife 15 yrs ago. Enjoys working with wood and metal, hunting and spending time with his dog \"Pepper.\"  Goal: Goal: Stress management through the activities he enjoys.   Progress: Progress: Healthy Mindset lectures.      Other     Notes: Chronic AFib. Monitor 3 visits. Has internal pain stimulator for DDD. Neuropathy in LE bilat. Sleep apnea; wears CPAP. Grand-daughter-In-Law accompanied him to his appointment. Daughter may in the future. Good family involvement in his care. Family has expressed concern about memory loss and confusion to MD.           "

## 2019-08-27 NOTE — TELEPHONE ENCOUNTER
[facility].  Per Melissa DOE pt unable to be cleared to have elective procedures or hold AOC for 3mo post TAVR on 7/29/19.  Form completed and faxed back to [phone] as requested. Confirmation received. Form sent for scanning.

## 2019-08-29 ENCOUNTER — ANTICOAGULATION VISIT (OUTPATIENT)
Dept: VASCULAR LAB | Facility: MEDICAL CENTER | Age: 79
End: 2019-08-29
Attending: INTERNAL MEDICINE
Payer: MEDICARE

## 2019-08-29 VITALS — HEART RATE: 67 BPM | SYSTOLIC BLOOD PRESSURE: 118 MMHG | DIASTOLIC BLOOD PRESSURE: 59 MMHG

## 2019-08-29 DIAGNOSIS — I48.91 ATRIAL FIBRILLATION, UNSPECIFIED TYPE (HCC): ICD-10-CM

## 2019-08-29 DIAGNOSIS — Z95.3 STATUS POST TRANSCATHETER AORTIC VALVE REPLACEMENT (TAVR) USING BIOPROSTHESIS: ICD-10-CM

## 2019-08-29 LAB
INR BLD: 3.2 (ref 0.9–1.2)
INR PPP: 3.2 (ref 2–3.5)

## 2019-08-29 PROCEDURE — 85610 PROTHROMBIN TIME: CPT

## 2019-08-29 PROCEDURE — 99212 OFFICE O/P EST SF 10 MIN: CPT

## 2019-08-29 NOTE — PROGRESS NOTES
Anticoagulation Summary  As of 8/29/2019    INR goal:   2.0-3.0   TTR:   64.3 % (2.8 y)   INR used for dosing:   3.20! 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 Referring Cardiology [phone]    [facility] Anticoagulation Services Responsible  [phone]        Anticoagulation Patient Findings  Patient Findings     Negatives:   Signs/symptoms of thrombosis, Signs/symptoms of bleeding, Laboratory test error suspected, Change in health, Change in alcohol use, Change in activity, Upcoming invasive procedure, Emergency department visit, Upcoming dental procedure, Missed doses, Extra doses, Change in medications, Change in diet/appetite, Hospital admission, Bruising, Other complaints            History of Present Illness: follow up appointment for chronic anticoagulation with the high risk medication, warfarin for TAVR/atrial fibrillation    Last INR was out of range, dosage adjusted: pt remains supratherapeutic today.  Will reduce weekly dose by 7%. Follow up in 1 weeks, to reduce the risk of adverse events related to this high risk medication, warfarin.    Pratima Ricks, Clinical Pharmacist

## 2019-09-02 ENCOUNTER — HOSPITAL ENCOUNTER (OUTPATIENT)
Dept: CARDIOLOGY | Facility: MEDICAL CENTER | Age: 79
End: 2019-09-02
Attending: INTERNAL MEDICINE
Payer: MEDICARE

## 2019-09-02 DIAGNOSIS — Z95.3 STATUS POST TRANSCATHETER AORTIC VALVE REPLACEMENT (TAVR) USING BIOPROSTHESIS: ICD-10-CM

## 2019-09-02 PROCEDURE — 93306 TTE W/DOPPLER COMPLETE: CPT

## 2019-09-02 PROCEDURE — 93306 TTE W/DOPPLER COMPLETE: CPT | Mod: 26 | Performed by: INTERNAL MEDICINE

## 2019-09-03 ENCOUNTER — TELEPHONE (OUTPATIENT)
Dept: VASCULAR LAB | Facility: MEDICAL CENTER | Age: 79
End: 2019-09-03

## 2019-09-03 LAB
LV EJECT FRACT  99904: 60
LV EJECT FRACT MOD 2C 99903: 55.61
LV EJECT FRACT MOD 4C 99902: 64.15
LV EJECT FRACT MOD BP 99901: 60.46

## 2019-09-03 NOTE — TELEPHONE ENCOUNTER
Left voicemail message for patient to resume warfarin tonight.  Pt was holding for a procedure that was cancelled.  Pratima Ricks, Clinical Pharmacist, CDE, CACP

## 2019-09-06 ENCOUNTER — ANTICOAGULATION VISIT (OUTPATIENT)
Dept: VASCULAR LAB | Facility: MEDICAL CENTER | Age: 79
End: 2019-09-06
Attending: INTERNAL MEDICINE
Payer: MEDICARE

## 2019-09-06 VITALS — HEART RATE: 57 BPM | SYSTOLIC BLOOD PRESSURE: 102 MMHG | DIASTOLIC BLOOD PRESSURE: 53 MMHG

## 2019-09-06 DIAGNOSIS — Z95.3 STATUS POST TRANSCATHETER AORTIC VALVE REPLACEMENT (TAVR) USING BIOPROSTHESIS: ICD-10-CM

## 2019-09-06 LAB — INR PPP: 1.8 (ref 2–3.5)

## 2019-09-06 PROCEDURE — 85610 PROTHROMBIN TIME: CPT

## 2019-09-06 PROCEDURE — 99212 OFFICE O/P EST SF 10 MIN: CPT

## 2019-09-06 NOTE — PROGRESS NOTES
Anticoagulation Summary  As of 2019    INR goal:   2.0-3.0   TTR:   64.3 % (2.9 y)   INR used for dosin.80! 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 Referring Cardiology [phone]    [facility] Anticoagulation Services Responsible  [phone]        Anticoagulation Patient Findings      HPI:  Duane Parkinson seen in clinic today for follow up on anticoagulation therapy in the presence of Afib and TAVR.   Denies any changes to current medical/health status since last appointment.   Denies any medication or diet changes.   No current symptoms of bleeding or thrombosis reported.    A/P:   INR is sub-therapeutic at 1.8. Patient states that he was to have an upcoming procedure which required holding of warfarin, he started holding warfarin through the weekend but procedure was postponed.  As INR is close to goal and patient had previously been elevated he is to continue on current regimen.     BP recorded in vitals.    Follow up appointment in 1 week(s).    Next Appointment: 2019    Sergio Pan, Pharm.D

## 2019-09-09 ENCOUNTER — APPOINTMENT (OUTPATIENT)
Dept: CARDIOLOGY | Facility: MEDICAL CENTER | Age: 79
End: 2019-09-09
Payer: MEDICARE

## 2019-09-09 LAB — INR BLD: 1.8 (ref 0.9–1.2)

## 2019-09-11 ENCOUNTER — NON-PROVIDER VISIT (OUTPATIENT)
Dept: CARDIOLOGY | Facility: MEDICAL CENTER | Age: 79
End: 2019-09-11
Payer: MEDICARE

## 2019-09-11 DIAGNOSIS — Z95.3 STATUS POST AORTIC VALVE REPLACEMENT WITH BIOPROSTHETIC VALVE: ICD-10-CM

## 2019-09-11 LAB — EKG IMPRESSION: NORMAL

## 2019-09-11 PROCEDURE — G0423 INTENS CARDIAC REHAB NO EXER: HCPCS | Mod: 59 | Performed by: INTERNAL MEDICINE

## 2019-09-11 PROCEDURE — G0422 INTENS CARDIAC REHAB W/EXERC: HCPCS | Performed by: INTERNAL MEDICINE

## 2019-09-11 NOTE — PROGRESS NOTES
Duane Parkinson attended: cooking school from 9 to 10 am.  Today  prepared Garden Burgers.    Patient received handouts and nutrition information regarding the specific recipes.

## 2019-09-12 ENCOUNTER — ANTICOAGULATION VISIT (OUTPATIENT)
Dept: VASCULAR LAB | Facility: MEDICAL CENTER | Age: 79
End: 2019-09-12
Attending: INTERNAL MEDICINE
Payer: MEDICARE

## 2019-09-12 VITALS — SYSTOLIC BLOOD PRESSURE: 101 MMHG | HEART RATE: 55 BPM | DIASTOLIC BLOOD PRESSURE: 33 MMHG

## 2019-09-12 DIAGNOSIS — Z95.3 STATUS POST TRANSCATHETER AORTIC VALVE REPLACEMENT (TAVR) USING BIOPROSTHESIS: ICD-10-CM

## 2019-09-12 LAB
INR BLD: 2.3 (ref 0.9–1.2)
INR PPP: 2.3 (ref 2–3.5)

## 2019-09-12 PROCEDURE — 99211 OFF/OP EST MAY X REQ PHY/QHP: CPT

## 2019-09-12 PROCEDURE — 85610 PROTHROMBIN TIME: CPT

## 2019-09-12 NOTE — PROGRESS NOTES
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 Referring Cardiology [phone]    [facility] Anticoagulation Services Responsible  [phone]        Anticoagulation Patient Findings      HPI:  Duane Parkinson seen in clinic today, on anticoagulation therapy with warfarin for Afib.   Changes to current medical/health status since last appt: none  Denies signs/symptoms of bleeding and/or thrombosis since the last appt.    Denies any interval changes to diet  Denies any interval changes to medications since last appt.   Denies any complications or cost restrictions with current therapy.   BP recorded in vitals.  Pt was unable to hold still, likely BP is inacurate.   Confirmed dosing regimen.     A/P   INR therapeutic.   Pt is to continue with current warfarin dosing regimen.     Follow up appointment in 2 week(s).    Sin Huitron, PharmD

## 2019-09-13 ENCOUNTER — NON-PROVIDER VISIT (OUTPATIENT)
Dept: CARDIOLOGY | Facility: MEDICAL CENTER | Age: 79
End: 2019-09-13
Payer: MEDICARE

## 2019-09-13 DIAGNOSIS — Z95.3 STATUS POST AORTIC VALVE REPLACEMENT WITH BIOPROSTHETIC VALVE: ICD-10-CM

## 2019-09-13 LAB — EKG IMPRESSION: NORMAL

## 2019-09-13 PROCEDURE — G0423 INTENS CARDIAC REHAB NO EXER: HCPCS | Mod: 59 | Performed by: INTERNAL MEDICINE

## 2019-09-13 PROCEDURE — G0422 INTENS CARDIAC REHAB W/EXERC: HCPCS | Performed by: INTERNAL MEDICINE

## 2019-09-16 ENCOUNTER — NON-PROVIDER VISIT (OUTPATIENT)
Dept: CARDIOLOGY | Facility: MEDICAL CENTER | Age: 79
End: 2019-09-16
Payer: MEDICARE

## 2019-09-16 DIAGNOSIS — Z95.3 STATUS POST AORTIC VALVE REPLACEMENT WITH BIOPROSTHETIC VALVE: ICD-10-CM

## 2019-09-16 LAB — EKG IMPRESSION: NORMAL

## 2019-09-16 PROCEDURE — G0422 INTENS CARDIAC REHAB W/EXERC: HCPCS | Performed by: INTERNAL MEDICINE

## 2019-09-16 PROCEDURE — G0423 INTENS CARDIAC REHAB NO EXER: HCPCS | Mod: 59 | Performed by: INTERNAL MEDICINE

## 2019-09-16 ASSESSMENT — PATIENT HEALTH QUESTIONNAIRE - PHQ9: SUM OF ALL RESPONSES TO PHQ QUESTIONS 1-9: 4

## 2019-09-16 NOTE — PROGRESS NOTES
Intensive Cardiac Rehabilitation (ICR) and Individual Treatment Plan (ITP) reviewed and signed.    Electronically Signed by:  Cecilio Mc M.D., [facility]  9/16/2019  4:55 PM

## 2019-09-16 NOTE — PROGRESS NOTES
Individualized Treatment Plan   Cardiac Rehab Individual Treatment Plan  Initial/Reassessment/Discharge Assessment/ ReAssessment/ Discharge: (P) 30 day 19 Session #     (P) 4   MRN: [id number] Allergies: Feldene [piroxicam] and Percodan [oxycodone-aspirin]   Patient Name: Duane Parkinson : 1940 Risk Stratification: (P) High    Diagnoses:   1. Status post aortic valve replacement with bioprosthetic valve     Age: 79 y.o. Physician: Fransisco Graham M.D.    Date of Event: (P) 19 Specialist: (P) Sita   Risk Factors:  (P) Hypertension, Hyperlipidemia, Obesity, Stress, Sedentary Lifestyle, Age, Male > 45   Exercise Nutrition Other Core Components/ Risk Factors Psychosocial   Stages of change Stages of change Stages of change Stages of change   (P) Preparation (P) Preparation (P) Preparation (P) Preparation   Fitness Test Lipids Learning Barriers Psychosocial Plan   DASI: (P) (n/a) Available: (P) Yes Learning Barriers: (P) Vision Psychosocial Plan: (P) Yes   DIST: (P) (No Data) Date: (P) 18 Family Support Goals   Max HR: (P) (No Data) Total: (P) 123 mg/dL (P) Yes Assess presence or absence of depression using a valid screening: (P) Yes   Max BP: Peak Ex BP: (P) (No Data) Trig: (P) 189 mg/dL Lives: (P) Alone(. Has family support; children/grandchildren.) Use Stress Management: (P) Yes   RPE: (P) (No Data) HDL: (P) 32 mg/dL Other Risk Factors Plan Adverse Events: (P) No   SPO2: (P) (No Data)       MET: (P) (No Data) LDL: (P) 53 mg/dL Other Risk Factors/Plan: (P) Yes Unexpected Events: (P) No   EF= (P) 70 Diabetes Tobacco Use Intervention   Ambulatory Status Diabetes: (P) No(Hx of impaired glucose tollerance) Social History     Tobacco Use   Smoking Status Former Smoker   • Packs/day: 1.00   • Years: 20.00   • Pack years: 20.00   • Types: Cigarettes   • Last attempt to quit: 1972   • Years since quittin.6   Smokeless Tobacco Former User    Behavioral Health Consult: (P) No  "  Fall Risk Assessed: (P) Yes HbA1C: (P) 5.4 % Date: (P) 06/05/18 Goals Physician Referral: (P) No   Exercise Ambulatory Status Assist Devices: (P) None Monitors BS at Home: (P) No Educate / Review and have understanding of cardiac disease prevention: Identifies Stressors: (P) Yes   Exercise Plan Frequency: (P) (n/a) Random BS: (P) 108(7/30/19)  Drug Intervention: (P) No     Weight Management  Outcome Survey Tools   Goals Weight: (P) 101.2 kg (223 lb) Educate / Review and have understanding of cardiac disease prevention: (P) Yes FP QOL Overall Score: (P) 27.6(orientation data)   Home Exercise: (P) Yes Height: (P) 177.8 cm (5' 10\") Medication Compliance: (P) Yes PHQ-9: (P) 4(orientation data)   Mode: (P) Walk, Bike, Gym BMI (Calculated): (P) 32 Complete Tobacco Cessation: Education   Duration: (P) 5-10 minutes daily Goal weight: (P) 200lbs Complete Tobacco Cessation: (P) No(Former smoker: 1 pk/day, 20 yrs, Quit date 1/16/1972) MBSR Lectures/Videos: (P) Yes   Frequency: (P) 7 days active Waist: (P) 46 inch Intervention Psychosocial Education: (P) Coping Techniques, Positive Support System, S/S Depression, MBSR Lectures   Intervention Social History     Substance and Sexual Activity   Alcohol Use No   • Alcohol/week: 0.0 oz    Smoking Cessation Referral: (P) No     Intervention: (P) Yes Nutrition Plan Ind. Education / Counseling: (P) Yes    Exercise Prescription Nutrition Plan: (P) Yes Tobacco Adjunct: (P) No    Mode: (P) Treadmill, NuStep, UBE, Airdyne Nutrition Related Target Goals Healthy Heart Education: (P) Class Schedule Given, Medications Reviewed, Patient Education Binder Provided, Risk Factors Discussed, Videos Viewed per Fernie    Frequency: (P) 3 days/week LDL-C <100 if trig. > 200: (P) No Weekly Lectures/ Videos/ Interactive Cooking School: (P) Yes    Duration: (P) 30-45 minutes LDL-C < 70 for high risk patient:  LDL-C<70 for high risk patients: (P) Yes Education    Intensity: (P) 11-13 RPE  Healthy Heart " Education: (P) Class Schedule Given, Medications Reviewed, Patient Education Binder Provided, Risk Factors Discussed, Videos Viewed per Fernie    METS: (P) 1.0-3.0 Non HDL-C Should be < 130:  Non HDL-C Should be < 130: (P) Yes Hypertension Management   (Active Problem)    Progression: (P) ^ increments of 1-3 to THR/RPE as tolerated      THR: THR: (P) (113-123bpm) HbA1C < 7%: (P) Yes Resting BP: (P) 119/88    Angina with Exercise?   Angina with Exercise: (P) No   BMI < 25: (P) No Hypertension Education      Dietary Goal: (P) Rate Your Plate >=58     Resistance Training? Resistance Training: (P) Yes Rate your Plate: (P) Pre(40) Lectures/ Videos/ Cooking School: (P) Yes    Education Intervention Hypertension Goals    (P) Yes Dietician Consult/Class: (P) Pending Medication Adherence : (P) Yes    (P) Equipment Orientation, Exercise Safety, S/S to Report, RPE Scale, Warm Up / Cool Down, Physically Active Nurse/Patient Discussion: (P) Yes Home Self Monitoring : (P) Yes    Exercise “Target Goals” Diabetes Ed Referral: (P) No     Start Individual Exercise Rx (P) Yes Discuss Maintenance /Wt Loss: (P) Yes       Attend Cooking School: (P) Pending     BP < 140/90 or < 130/80 if DM or CKD (P) Yes Education       Nutrition Education: (P) Healthy Plant Based Eating, Sodium Reduction Cooking/ Lectures/ Cooking School/  RD 1:1     Aerobic activity 30 + min / day 5 days / wk (P) Yes        Exercise    Current : (P) While at [facility] Iggy Euceda completes thre exercises for 5 minutes, plus an initial weight routine. On the days Kinsey is not at [facility] he is recovering from [facility]. Kinsey has a really bad back problem that causes him pain. Kinsey has an implant device that has a remote that he can use for pain. Kinsey is hoping that the consistent Mon,Wed,Friday exercise will help with his back pain.  Goal: (P) Kinsey has a long term goal to improve balance and to be able to do more things instead of less and less each year.  Progress: (P) Motivated  "to increase his times and workloads while at [facility].     Nutrition     Current:Kinsey says he has not really learned to cook much since his wife did most of the cooking until she passed. Kisney says he eats alot of frozen foods. We talked about possibly adding in more vegetables to his frozen meals. Kinsey says he goes to [employer] so he may  some berries or strawberries to help increase his fruit intake.   Goal:  \"I'd like to learn how to grocery shop for heart health.\"  Progress:  Open to learning [employer]. Eating plan.      Hypertension     Current:  Today blood pressure is WNL  Goal: Medication compliance, low sodium diet, home BP monitoring.  Progress:Medication compliance.      Stress     Current :Grieving the loss of his wife 15 yrs ago. Enjoys working with wood and metal, hunting and spending time with his dog \"Pepper.\"  Goal:Stress management through the activities he enjoys.   Progress:  Healthy Mindset lectures.      Other     Notes: Chronic AFib. Monitor 3 visits. Has internal pain stimulator for DDD. Neuropathy in LE bilat. Sleep apnea; wears CPAP. Grand-daughter-In-Law accompanied him to his appointment. Daughter may in the future. Good family involvement in his care. Family has expressed concern about memory loss and confusion to MD.                  "

## 2019-09-20 ENCOUNTER — NON-PROVIDER VISIT (OUTPATIENT)
Dept: CARDIOLOGY | Facility: MEDICAL CENTER | Age: 79
End: 2019-09-20
Payer: MEDICARE

## 2019-09-20 DIAGNOSIS — Z95.3 STATUS POST AORTIC VALVE REPLACEMENT WITH BIOPROSTHETIC VALVE: ICD-10-CM

## 2019-09-20 LAB — EKG IMPRESSION: NORMAL

## 2019-09-20 PROCEDURE — G0422 INTENS CARDIAC REHAB W/EXERC: HCPCS | Performed by: INTERNAL MEDICINE

## 2019-09-20 PROCEDURE — G0423 INTENS CARDIAC REHAB NO EXER: HCPCS | Mod: 59 | Performed by: INTERNAL MEDICINE

## 2019-09-20 NOTE — PROGRESS NOTES
Patient attended: Exercise Workshop from 9:00-10:00am     The topic was: Biomechanical limitations    Patient received handouts regarding the specific exercise information

## 2019-09-23 ENCOUNTER — NON-PROVIDER VISIT (OUTPATIENT)
Dept: CARDIOLOGY | Facility: MEDICAL CENTER | Age: 79
End: 2019-09-23
Payer: MEDICARE

## 2019-09-23 DIAGNOSIS — Z95.3 STATUS POST AORTIC VALVE REPLACEMENT WITH BIOPROSTHETIC VALVE: ICD-10-CM

## 2019-09-23 LAB — EKG IMPRESSION: NORMAL

## 2019-09-23 PROCEDURE — G0422 INTENS CARDIAC REHAB W/EXERC: HCPCS | Performed by: INTERNAL MEDICINE

## 2019-09-23 PROCEDURE — G0423 INTENS CARDIAC REHAB NO EXER: HCPCS | Mod: 59 | Performed by: INTERNAL MEDICINE

## 2019-09-24 ENCOUNTER — ANTICOAGULATION VISIT (OUTPATIENT)
Dept: VASCULAR LAB | Facility: MEDICAL CENTER | Age: 79
End: 2019-09-24
Attending: INTERNAL MEDICINE
Payer: MEDICARE

## 2019-09-24 DIAGNOSIS — I48.91 ATRIAL FIBRILLATION, UNSPECIFIED TYPE (HCC): ICD-10-CM

## 2019-09-24 DIAGNOSIS — Z95.3 STATUS POST TRANSCATHETER AORTIC VALVE REPLACEMENT (TAVR) USING BIOPROSTHESIS: ICD-10-CM

## 2019-09-24 LAB — INR PPP: 3 (ref 2–3.5)

## 2019-09-24 PROCEDURE — 99211 OFF/OP EST MAY X REQ PHY/QHP: CPT

## 2019-09-24 PROCEDURE — 85610 PROTHROMBIN TIME: CPT

## 2019-09-24 NOTE — PROGRESS NOTES
Anticoagulation Summary  As of 9/24/2019    INR goal:   2.0-3.0   TTR:   64.7 % (2.9 y)   INR used for dosing:   3.00 (9/24/2019)   Warfarin maintenance plan:   2 mg (4 mg x 0.5) every Tue, Thu, Sat; 4 mg (4 mg x 1) all other days   Weekly warfarin total:   22 mg   Plan last modified:   Pratima Ricks (9/24/2019)   Next INR check:   10/8/2019   Target end date:   Indefinite    Indications    Atrial fibrillation (HCC) [I48.91]  Status post transcatheter aortic valve replacement (TAVR) using bioprosthesis [Z95.3]             Anticoagulation Episode Summary     INR check location:       Preferred lab:       Send INR reminders to:       Comments:   Pt goes by Kinsey      Anticoagulation Care Providers     Provider Role Specialty Phone number    Vickey Rutherford M.D. Referring Cardiology [phone]    [facility] Responsible  [phone]        Anticoagulation Patient Findings  Patient Findings     Positives:   Missed doses    Negatives:   Signs/symptoms of thrombosis, Signs/symptoms of bleeding, Laboratory test error suspected, Change in health, Change in alcohol use, Change in activity, Upcoming invasive procedure, Emergency department visit, Upcoming dental procedure, Extra doses, Change in medications, Change in diet/appetite, Hospital admission, Bruising, Other complaints              History of Present Illness: follow up appointment for chronic anticoagulation with the high risk medication, warfarin for atrial fibrillation/sp TAVR    Pt remains therapeutic today, despite missing a dose last week.  Will reduce weekly dose by 8%. Follow up in 2 weeks, to reduce the risk of adverse events related to this high risk medication, warfarin.    Pratima Ricks Clinical Pharmacist  Pt declines vitals today

## 2019-09-25 ENCOUNTER — NON-PROVIDER VISIT (OUTPATIENT)
Dept: CARDIOLOGY | Facility: MEDICAL CENTER | Age: 79
End: 2019-09-25
Payer: MEDICARE

## 2019-09-25 DIAGNOSIS — Z95.3 STATUS POST AORTIC VALVE REPLACEMENT WITH BIOPROSTHETIC VALVE: ICD-10-CM

## 2019-09-25 LAB — EKG IMPRESSION: NORMAL

## 2019-09-25 PROCEDURE — G0422 INTENS CARDIAC REHAB W/EXERC: HCPCS | Performed by: INTERNAL MEDICINE

## 2019-09-25 PROCEDURE — G0423 INTENS CARDIAC REHAB NO EXER: HCPCS | Mod: 59 | Performed by: INTERNAL MEDICINE

## 2019-09-25 NOTE — PROGRESS NOTES
Duane Parkinson attended: cooking school from 9 to 10 am.  Today  prepared Energy Bites.    Patient received handouts and nutrition information regarding the specific recipes.

## 2019-09-27 ENCOUNTER — HOSPITAL ENCOUNTER (OUTPATIENT)
Dept: LAB | Facility: MEDICAL CENTER | Age: 79
End: 2019-09-27
Attending: INTERNAL MEDICINE
Payer: MEDICARE

## 2019-09-27 ENCOUNTER — NON-PROVIDER VISIT (OUTPATIENT)
Dept: CARDIOLOGY | Facility: MEDICAL CENTER | Age: 79
End: 2019-09-27
Payer: MEDICARE

## 2019-09-27 DIAGNOSIS — R53.83 FATIGUE, UNSPECIFIED TYPE: ICD-10-CM

## 2019-09-27 DIAGNOSIS — Z95.3 STATUS POST AORTIC VALVE REPLACEMENT WITH BIOPROSTHETIC VALVE: ICD-10-CM

## 2019-09-27 LAB
EKG IMPRESSION: NORMAL
FOLATE SERPL-MCNC: >22.4 NG/ML
TSH SERPL DL<=0.005 MIU/L-ACNC: 1.52 UIU/ML (ref 0.38–5.33)
VIT B12 SERPL-MCNC: 644 PG/ML (ref 211–911)

## 2019-09-27 PROCEDURE — G0422 INTENS CARDIAC REHAB W/EXERC: HCPCS | Performed by: INTERNAL MEDICINE

## 2019-09-27 PROCEDURE — 82746 ASSAY OF FOLIC ACID SERUM: CPT

## 2019-09-27 PROCEDURE — G0423 INTENS CARDIAC REHAB NO EXER: HCPCS | Mod: 59 | Performed by: INTERNAL MEDICINE

## 2019-09-27 PROCEDURE — 82607 VITAMIN B-12: CPT

## 2019-09-27 PROCEDURE — 84443 ASSAY THYROID STIM HORMONE: CPT

## 2019-09-27 PROCEDURE — 36415 COLL VENOUS BLD VENIPUNCTURE: CPT

## 2019-09-27 NOTE — PROGRESS NOTES
Nutrition Consult Note:    Judy Hernandez  Date & Time note created:    9/27/2019   9:02 AM     Referring MD:   No ref. provider found  Time: 9:00-10:00am     Patient ID:   Name:             Duane Parkinson   YOB: 1940  Age:                 79 y.o.  male   MRN:               [id number]      Duane Parkinson is here today for Intensive Cardiac Rehab.  This program includes Nutrition education and counseling following the Pritikin guidelines, which promote whole foods-fruits, vegetables, beans/legumes, whole grains, lean animal protein and not fat dairy-while avoiding added salt, refined/salty/sugary/fatty processed foods, trans/saturated fat, added sugar, tropical oils and heavy use of added oils.     Nutrition Hx  Pt takes a multivitamin.   Pt w/ Hx controlled DM  Last HbA1c readings were great  Patient only eats 2 meals per day    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)    • Clotting disorder (HCC)     reports nose bleeds   • Depression     Lost wife ,still gets tearful   • Diabetes (HCC)     diet controlled   • Encounter for long-term (current) use of other medications    • Gasping for breath    • Polish measles    • Heart murmur    • Heart valve disease    • Hyperlipidemia    • Hypertension    • Insomnia    • Mumps    • Nasal drainage    • Pain     back & thumb   • Pyogenic arthritis, lower leg (HCC)     thumb, back   • Restless leg syndrome    • Sleep apnea     uses CPAP   • Snoring    • Tonsillitis    • Valvular heart disease        Past Surgical History:  Past Surgical History:   Procedure Laterality Date   • GIBRAN N/A 7/29/2019    Procedure: ECHOCARDIOGRAM, TRANSESOPHAGEAL;  Surgeon: Leander Buckner M.D.;  Location: SURGERY [facility];  Service: Cardiac   • TRANSCATHETER AORTIC VALVE REPLACEMENT Bilateral 7/29/2019     Procedure: REPLACEMENT, AORTIC VALVE, TRANSCATHETER;  Surgeon: Leander Buckner M.D.;  Location: SURGERY [facility];  Service: Cardiac   • FINGER ARTHROPLASTY Left 5/27/2016    Procedure: FINGER ARTHROPLASTY THUMB CARPOMETACARPAL EXCISIONAL, EXCISE TRAPEZIUM;  Surgeon: Raheel Salgado M.D.;  Location: SURGERY SAME DAY [facility];  Service:    • CARDIOVERSION      on 7/17/06, sinus rhythm until January 2007,  paroxysmal atrial fibrillation   • KNEE ARTHROPLASTY TOTAL     • LAMINOTOMY N/A     multiple lumbar spine   • OTHER ORTHOPEDIC SURGERY      lower back   • PB PERCUT IMPLNT NEUROELECT,EPIDURAL     • TOE ARTHROPLASTY     • TURP-VAPOR N/A        Current Outpatient Medications:  Current Outpatient Medications   Medication Sig Dispense Refill   • omeprazole (PRILOSEC) 20 MG delayed-release capsule Take 1 Cap by mouth every day. 30 Cap 1   • aspirin (ASA) 81 MG Chew Tab chewable tablet Take 1 Tab by mouth every day. 90 Tab 3   • warfarin (COUMADIN) 6 MG Tab Take 1 Tab by mouth every day. 30 Tab 3   • acetaminophen (TYLENOL) 500 MG Tab Take 500-1,000 mg by mouth every 6 hours as needed.     • Coenzyme Q10 (CO Q 10) 100 MG Cap Take 300 mg by mouth every morning.     • Glucosamine HCl (GLUCOSAMINE PO) Take 1 Tab by mouth 2 Times a Day.     • gabapentin (NEURONTIN) 300 MG Cap Take 300 mg by mouth 3 times a day. 1 in the morning, 1 at noon, 2 at 4 pm, 3 at bedtime     • metoprolol SR (TOPROL XL) 50 MG TABLET SR 24 HR TAKE ONE TABLET BY MOUTH EVERY DAY 90 Tab 3   • pravastatin (PRAVACHOL) 80 MG tablet Take 1 Tab by mouth every day. 90 Tab 0   • clindamycin (CLEOCIN) 150 MG Cap Take 300 mg by mouth 2 Times a Day.     • digoxin (LANOXIN) 250 MCG Tab Take 250 mcg by mouth every bedtime.     • warfarin (COUMADIN) 4 MG Tab Take one tablet by mouth one time daily or as directed by coumadin clinic 90 Tab 1   • cyclobenzaprine (FLEXERIL) 10 MG Tab Take 10 mg by mouth 3 times a day as needed for Muscle Spasms.     •  "L-Lysine 500 MG Tab Take  by mouth as needed (cold sores).     • B Complex Vitamins (VITAMIN B COMPLEX PO) Take 1 Tab by mouth every evening.     • Psyllium (METAMUCIL PO) Take  by mouth every morning.     • Cholecalciferol (VITAMIN D3) 2000 UNITS Tab Take 1 Tab by mouth every evening.     • multivitamin (THERAGRAN) TABS Take 1 Tab by mouth every bedtime.       No current facility-administered medications for this visit.          Allergies:  Allergies   Allergen Reactions   • Feldene [Piroxicam] Itching   • Percodan [Oxycodone-Aspirin] Itching and Photosensitivity     \"I sunburn\"       Family History:  Family History   Problem Relation Age of Onset   • Other Mother         unknown   • Heart Disease Mother    • Cancer Father         prostate, skin   • No Known Problems Brother    • Diabetes Brother    • Heart Disease Son    • Sleep Apnea Neg Hx        Social History:  Social History     Socioeconomic History   • Marital status:      Spouse name: Not on file   • Number of children: Not on file   • Years of education: Not on file   • Highest education level: Not on file   Occupational History   • Not on file   Social Needs   • Financial resource strain: Not on file   • Food insecurity:     Worry: Not on file     Inability: Not on file   • Transportation needs:     Medical: Not on file     Non-medical: Not on file   Tobacco Use   • Smoking status: Former Smoker     Packs/day: 1.00     Years: 20.00     Pack years: 20.00     Types: Cigarettes     Last attempt to quit: 1972     Years since quittin.7   • Smokeless tobacco: Former User   Substance and Sexual Activity   • Alcohol use: No     Alcohol/week: 0.0 oz   • Drug use: No   • Sexual activity: Not Currently     Partners: Female   Lifestyle   • Physical activity:     Days per week: Not on file     Minutes per session: Not on file   • Stress: Not on file   Relationships   • Social connections:     Talks on phone: Not on file     Gets together: Not on file " "    Attends Taoist service: Not on file     Active member of club or organization: Not on file     Attends meetings of clubs or organizations: Not on file     Relationship status: Not on file   • Intimate partner violence:     Fear of current or ex partner: Not on file     Emotionally abused: Not on file     Physically abused: Not on file     Forced sexual activity: Not on file   Other Topics Concern   • Not on file   Social History Narrative   • Not on file         Food Log    Day 1    Breakfast: Cheerios and Raisin Bran mixed   Lunch: none   Dinner: steak and toast     Day 2    Breakfast: Hot cereal   Lunch: none   Dinner: Goulash     Anthropometrics:    Height: 70\"     Initial Program Weight: 221.5 lbs     Current Weight: 213 lbs     Ideal Body Weight Range: 166 lbs +/- 10%     5% weight loss: 210.425 lbs     10% Weight loss: 199.35 lbs     Current Exercise Minutes:    Monday: 25 ICR  Tuesday:   Wednesday: 25 ICR  Thursday:  Friday: 25 ICR  Saturday:  Sunday     Goal: 300 minutes/week       Current BP: 130/64     Current Lipids:    Lab Results   Component Value Date/Time    CHOLSTRLTOT 123 06/05/2018 08:29 AM    CHOLSTRLTOT 110 06/20/2017 07:36 AM    LDL 53 06/05/2018 08:29 AM    LDL 53 06/20/2017 07:36 AM    HDL 32 (L) 06/05/2018 08:29 AM    HDL 35 (A) 06/20/2017 07:36 AM    TRIGLYCERIDE 189 (H) 06/05/2018 08:29 AM    TRIGLYCERIDE 109 06/20/2017 07:36 AM       Lab Results   Component Value Date/Time    SODIUM 137 07/30/2019 01:48 AM    POTASSIUM 4.4 07/30/2019 01:48 AM    CHLORIDE 107 07/30/2019 01:48 AM    CO2 24 07/30/2019 01:48 AM    GLUCOSE 108 (H) 07/30/2019 01:48 AM    BUN 19 07/30/2019 01:48 AM    CREATININE 0.80 07/30/2019 01:48 AM    BUNCREATRAT 15 06/05/2018 08:29 AM     Lab Results   Component Value Date/Time    ALKPHOSPHAT 65 07/30/2019 01:48 AM    ASTSGOT 35 07/30/2019 01:48 AM    ALTSGPT 34 07/30/2019 01:48 AM    TBILIRUBIN 0.6 07/30/2019 01:48 AM        Goal:     Lipids  Goal   Total <200 "   Triglycerides <150   HDL 40 Men/50 Women   LDL <70       Positive Changes Since Beginning the Program:   1. Increased exercise       Nutrition Goals:  1. Adequate nutrition to meet needs - Pt w/impaired intake since his wife's death   2. Gradual weight loss closer to IBWR without meal skipping and with adequate nutrition/hydration     Exercise Goals:  1. Home exercise regimen per exercise physiologist     Blood Pressure Goals:  1. Decrease processed foods   2.  No added salt   3. Sleep Adequacy    Lipid Goals:  1. TG <150   2. HDL >40     Barriers/Biggest Struggles:  1. Memory problems - Pt thought he was 80 vs 79 and thought his wife  10 years ago, when it is actually nearly 5. Sent message to PCP  2. Knee and back pain   3. Suspected nutrient deficiency r/t back pain/memory issues impairing appetite/nutrition    Other Interventions:  1.Walked patient to lab for MD-ordered folate, B12 and TSH  2. Encouraged patient to make an appointment with his PCP r/t impaired memory/worsening neuropathy.  3. Will encourage Pt to join the [employer] congregate lunch program     Educational Handouts:  Alternatives List  Protein content of foods  Cholesterol content of foods   14 day meal plan example

## 2019-09-30 ENCOUNTER — NON-PROVIDER VISIT (OUTPATIENT)
Dept: CARDIOLOGY | Facility: MEDICAL CENTER | Age: 79
End: 2019-09-30
Payer: MEDICARE

## 2019-09-30 DIAGNOSIS — Z95.3 STATUS POST AORTIC VALVE REPLACEMENT WITH BIOPROSTHETIC VALVE: ICD-10-CM

## 2019-09-30 LAB — EKG IMPRESSION: NORMAL

## 2019-09-30 PROCEDURE — G0423 INTENS CARDIAC REHAB NO EXER: HCPCS | Mod: 59 | Performed by: INTERNAL MEDICINE

## 2019-09-30 PROCEDURE — G0422 INTENS CARDIAC REHAB W/EXERC: HCPCS | Performed by: INTERNAL MEDICINE

## 2019-09-30 RX ORDER — WARFARIN SODIUM 4 MG/1
TABLET ORAL
Qty: 90 TAB | Refills: 3 | Status: SHIPPED | OUTPATIENT
Start: 2019-09-30 | End: 2020-10-26 | Stop reason: SDUPTHER

## 2019-09-30 NOTE — PROGRESS NOTES
Duane Parkinson attended: Healthy Mindset Workshop from 9:00 am - 10:00 am    The topic was: Stress Management  Patient received handouts regarding the specific information

## 2019-10-02 ENCOUNTER — NON-PROVIDER VISIT (OUTPATIENT)
Dept: CARDIOLOGY | Facility: MEDICAL CENTER | Age: 79
End: 2019-10-02
Payer: MEDICARE

## 2019-10-02 DIAGNOSIS — Z95.3 STATUS POST AORTIC VALVE REPLACEMENT WITH BIOPROSTHETIC VALVE: ICD-10-CM

## 2019-10-02 LAB — EKG IMPRESSION: NORMAL

## 2019-10-02 PROCEDURE — G0423 INTENS CARDIAC REHAB NO EXER: HCPCS | Mod: 59 | Performed by: INTERNAL MEDICINE

## 2019-10-02 PROCEDURE — G0422 INTENS CARDIAC REHAB W/EXERC: HCPCS | Performed by: INTERNAL MEDICINE

## 2019-10-02 RX ORDER — DIGOXIN 250 MCG
250 TABLET ORAL
Qty: 90 TAB | Refills: 3 | Status: SHIPPED | OUTPATIENT
Start: 2019-10-02 | End: 2020-09-14 | Stop reason: SDUPTHER

## 2019-10-02 NOTE — PROGRESS NOTES
Duane Parkinson attended: cooking school from 9 to 10 am.  Today  prepared Bread Pudding.    Patient received handouts and nutrition information regarding the specific recipes.

## 2019-10-03 ENCOUNTER — OFFICE VISIT (OUTPATIENT)
Dept: CARDIOLOGY | Facility: MEDICAL CENTER | Age: 79
End: 2019-10-03
Payer: MEDICARE

## 2019-10-03 VITALS
WEIGHT: 221.56 LBS | DIASTOLIC BLOOD PRESSURE: 64 MMHG | SYSTOLIC BLOOD PRESSURE: 114 MMHG | BODY MASS INDEX: 31.02 KG/M2 | OXYGEN SATURATION: 95 % | HEART RATE: 76 BPM | HEIGHT: 71 IN

## 2019-10-03 DIAGNOSIS — Z95.2 S/P TAVR (TRANSCATHETER AORTIC VALVE REPLACEMENT): ICD-10-CM

## 2019-10-03 PROCEDURE — 99214 OFFICE O/P EST MOD 30 MIN: CPT | Performed by: INTERNAL MEDICINE

## 2019-10-03 NOTE — PROGRESS NOTES
Cardiology Follow-up Consultation Note    Date of note:    10/3/2019    Primary Care Provider: Fransisco Graham M.D.    Name:             Duane Parkinson   YOB: 1940  MRN:               [id number]    CC:  follow-up post TAVR    Patient ID/HPI:   79-year-old male patient with history of chronic atrial fibrillation, hypertension, dyslipidemia, severe aortic stenosis status post transcatheter aortic valve replacement presented for 2 months follow-up.  He has no specific complaints.  His dyspnea on exertion, fatigue are improved but still persists.  Joined cardiac rehab and slowly improving his exercise capacity.  Denies chest pain, fainting episodes.      ROS  Positive for arthritis, joint pains, back pain.  All other systems reviewed and negative.    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)    • Clotting disorder (HCC)     reports nose bleeds   • Depression     Lost wife ,still gets tearful   • Diabetes (HCC)     diet controlled   • Encounter for long-term (current) use of other medications    • Gasping for breath    • Romansh measles    • Heart murmur    • Heart valve disease    • Hyperlipidemia    • Hypertension    • Insomnia    • Mumps    • Nasal drainage    • Pain     back & thumb   • Pyogenic arthritis, lower leg (HCC)     thumb, back   • Restless leg syndrome    • Sleep apnea     uses CPAP   • Snoring    • Tonsillitis    • Valvular heart disease          Past Surgical History:   Procedure Laterality Date   • GIBRAN N/A 7/29/2019    Procedure: ECHOCARDIOGRAM, TRANSESOPHAGEAL;  Surgeon: Leander Buckner M.D.;  Location: SURGERY [facility];  Service: Cardiac   • TRANSCATHETER AORTIC VALVE REPLACEMENT Bilateral 7/29/2019    Procedure: REPLACEMENT, AORTIC VALVE, TRANSCATHETER;  Surgeon: Leander Buckner M.D.;  Location: SURGERY [facility];  Service:  Cardiac   • FINGER ARTHROPLASTY Left 5/27/2016    Procedure: FINGER ARTHROPLASTY THUMB CARPOMETACARPAL EXCISIONAL, EXCISE TRAPEZIUM;  Surgeon: Raheel Salgado M.D.;  Location: SURGERY SAME DAY [facility];  Service:    • CARDIOVERSION      on 7/17/06, sinus rhythm until January 2007,  paroxysmal atrial fibrillation   • KNEE ARTHROPLASTY TOTAL     • LAMINOTOMY N/A     multiple lumbar spine   • OTHER ORTHOPEDIC SURGERY      lower back   • PB PERCUT IMPLNT NEUROELECT,EPIDURAL     • TOE ARTHROPLASTY     • TURP-VAPOR N/A          Current Outpatient Medications   Medication Sig Dispense Refill   • digoxin (LANOXIN) 250 MCG Tab Take 1 Tab by mouth every bedtime. 90 Tab 3   • warfarin (COUMADIN) 4 MG Tab Take one tablet by mouth one time daily or as directed by coumadin clinic 90 Tab 3   • omeprazole (PRILOSEC) 20 MG delayed-release capsule Take 1 Cap by mouth every day. 30 Cap 1   • aspirin (ASA) 81 MG Chew Tab chewable tablet Take 1 Tab by mouth every day. 90 Tab 3   • acetaminophen (TYLENOL) 500 MG Tab Take 500-1,000 mg by mouth every 6 hours as needed.     • Glucosamine HCl (GLUCOSAMINE PO) Take 1 Tab by mouth 2 Times a Day.     • gabapentin (NEURONTIN) 300 MG Cap Take 300 mg by mouth 3 times a day. 1 in the morning, 1 at noon, 2 at 4 pm, 3 at bedtime     • metoprolol SR (TOPROL XL) 50 MG TABLET SR 24 HR TAKE ONE TABLET BY MOUTH EVERY DAY 90 Tab 3   • pravastatin (PRAVACHOL) 80 MG tablet Take 1 Tab by mouth every day. 90 Tab 0   • cyclobenzaprine (FLEXERIL) 10 MG Tab Take 10 mg by mouth 3 times a day as needed for Muscle Spasms.     • B Complex Vitamins (VITAMIN B COMPLEX PO) Take 1 Tab by mouth every evening.     • Psyllium (METAMUCIL PO) Take  by mouth every morning.     • Cholecalciferol (VITAMIN D3) 2000 UNITS Tab Take 1 Tab by mouth every evening.     • multivitamin (THERAGRAN) TABS Take 1 Tab by mouth every bedtime.     • warfarin (COUMADIN) 6 MG Tab Take 1 Tab by mouth every day. (Patient not taking:  "Reported on 10/3/2019) 30 Tab 3     No current facility-administered medications for this visit.          Allergies   Allergen Reactions   • Feldene [Piroxicam] Itching   • Percodan [Oxycodone-Aspirin] Itching and Photosensitivity     \"I sunburn\"         Family History   Problem Relation Age of Onset   • Other Mother         unknown   • Heart Disease Mother    • Cancer Father         prostate, skin   • No Known Problems Brother    • Diabetes Brother    • Heart Disease Son    • Sleep Apnea Neg Hx          Social History     Socioeconomic History   • Marital status:      Spouse name: Not on file   • Number of children: Not on file   • Years of education: Not on file   • Highest education level: Not on file   Occupational History   • Not on file   Social Needs   • Financial resource strain: Not on file   • Food insecurity:     Worry: Not on file     Inability: Not on file   • Transportation needs:     Medical: Not on file     Non-medical: Not on file   Tobacco Use   • Smoking status: Former Smoker     Packs/day: 1.00     Years: 20.00     Pack years: 20.00     Types: Cigarettes     Last attempt to quit: 1972     Years since quittin.7   • Smokeless tobacco: Former User   Substance and Sexual Activity   • Alcohol use: No     Alcohol/week: 0.0 oz   • Drug use: No   • Sexual activity: Not Currently     Partners: Female   Lifestyle   • Physical activity:     Days per week: Not on file     Minutes per session: Not on file   • Stress: Not on file   Relationships   • Social connections:     Talks on phone: Not on file     Gets together: Not on file     Attends Protestant service: Not on file     Active member of club or organization: Not on file     Attends meetings of clubs or organizations: Not on file     Relationship status: Not on file   • Intimate partner violence:     Fear of current or ex partner: Not on file     Emotionally abused: Not on file     Physically abused: Not on file     Forced sexual " "activity: Not on file   Other Topics Concern   • Not on file   Social History Narrative   • Not on file         Physical Exam:  Ambulatory Vitals  /64 (BP Location: Left arm, Patient Position: Sitting, BP Cuff Size: Adult)   Pulse 76   Ht 1.803 m (5' 11\")   Wt 100.5 kg (221 lb 9 oz)   SpO2 95%    Oxygen Therapy:  Pulse Oximetry: 95 %  BP Readings from Last 4 Encounters:   10/03/19 114/64   09/12/19 (!) 101/33   09/06/19 102/53   08/29/19 118/59       Weight/BMI: Body mass index is 30.9 kg/m².  Wt Readings from Last 4 Encounters:   10/03/19 100.5 kg (221 lb 9 oz)   08/05/19 100.7 kg (222 lb)   08/02/19 100.7 kg (222 lb 0.1 oz)   07/29/19 101.3 kg (223 lb 5.2 oz)     General: Well appearing and in no apparent distress  Head: atrumatic  Eyes: No conjunctival pallor   ENT: normal external appearance of nose and ears  Neck: JVD absent, carotid bruits absent  Lungs: respiratory sounds  normal, additional breath sounds absent  Heart: Irregularly irregular rhythm   No palpable thrills on palpation, aortic flow murmur no rubs,   Lower extremity edema absent.   Pedal pulses normal  Abdomen: soft, non tender, non distended.  Extremities/MSK: no clubbing, no cyanosis  Neurological: normal orientation, Gait normal   Psychiatric: Appropriate affect, intact judgement and insight  Skin: Warm extremities        Lab Data Review:  Lab Results   Component Value Date/Time    CHOLSTRLTOT 123 06/05/2018 08:29 AM    CHOLSTRLTOT 110 06/20/2017 07:36 AM    LDL 53 06/05/2018 08:29 AM    LDL 53 06/20/2017 07:36 AM    HDL 32 (L) 06/05/2018 08:29 AM    HDL 35 (A) 06/20/2017 07:36 AM    TRIGLYCERIDE 189 (H) 06/05/2018 08:29 AM    TRIGLYCERIDE 109 06/20/2017 07:36 AM       Lab Results   Component Value Date/Time    SODIUM 137 07/30/2019 01:48 AM    POTASSIUM 4.4 07/30/2019 01:48 AM    CHLORIDE 107 07/30/2019 01:48 AM    CO2 24 07/30/2019 01:48 AM    GLUCOSE 108 (H) 07/30/2019 01:48 AM    BUN 19 07/30/2019 01:48 AM    CREATININE 0.80 " 07/30/2019 01:48 AM    BUNCREATRAT 15 06/05/2018 08:29 AM     Lab Results   Component Value Date/Time    ALKPHOSPHAT 65 07/30/2019 01:48 AM    ASTSGOT 35 07/30/2019 01:48 AM    ALTSGPT 34 07/30/2019 01:48 AM    TBILIRUBIN 0.6 07/30/2019 01:48 AM      Lab Results   Component Value Date/Time    WBC 18.0 (H) 07/30/2019 01:48 AM     TAVR 7/29/2019  1. Successful transcatheter aortic valve replacement (TAVR) with #29 Edward Antwan 3 valve, transfemoral approach under general anesthesia on 7/29/19    Cath 7/23/2019  Findings   Hemodynamics: Aorta: 131/85 mmHg  LV: 165/23 mmHg  RA: 14 mmHg  RV: 33/12 mmHg  PA: 34/22/26 mmHg  PCWP: 23 mmHg  PA Saturation: 81  Arterial saturation: 96   Cardiac output/index: 7.4 L/min /3.3 L/min/m^2 Benji,  3.8 L/min /1.7 L/min/m^2 Thermodilution  Aortic valve mean gradient: 25 mmHg  Aortic valve area: 1.48 mm^2 Benji, 0.85 mm^2 Thermodilution     Coronary Anatomy              Left Main: 10% proximal stenosis with mild distal tapering              LAD: 40% proximal and 50% mid vessel stenosis              LCx: 30% proximal stenosis, OM1 is small to medium sized with long 40% proximal stenosis, the OM2 is small to moderate in size with 40% proximal stenosis, OM3 is large with a 50% stenosis              RCA: Dominant, 30% proximal and mid stenosis.      Abdominal aortography: normal abdominal aorta and bilateral iliofemoral system  Aortic root angiography: Normal caliber root without significant AI    Impression and Plan:  1. severe aortic stenosis status post TAVR 29 mm S3 valve July 2019, currently NYHA class II stage C  2.  Permanent atrial fibrillation  3.  Hypertension  4.  Dyslipidemia    Patient is doing clinically but still has mild fatigue.  Advised to increase his activity.  Continue cardiac rehab.  Atrial fibrillation rate is controlled, continue aspirin, digoxin, metoprolol.  Continue warfarin for anticoagulation for atrial fibrillation  Continue pravastatin for hyperlipidemia.  1  year follow-up with echocardiogram.        Leander MCCONNELL  Interventional cardiologist  [facility] [address]  [address] [place] [id number]  Phone: [phone]  Fax: [phone]

## 2019-10-04 ENCOUNTER — NON-PROVIDER VISIT (OUTPATIENT)
Dept: CARDIOLOGY | Facility: MEDICAL CENTER | Age: 79
End: 2019-10-04
Payer: MEDICARE

## 2019-10-04 DIAGNOSIS — Z95.3 STATUS POST AORTIC VALVE REPLACEMENT WITH BIOPROSTHETIC VALVE: ICD-10-CM

## 2019-10-04 LAB — EKG IMPRESSION: NORMAL

## 2019-10-04 PROCEDURE — G0422 INTENS CARDIAC REHAB W/EXERC: HCPCS | Performed by: INTERNAL MEDICINE

## 2019-10-04 PROCEDURE — G0423 INTENS CARDIAC REHAB NO EXER: HCPCS | Mod: 59 | Performed by: INTERNAL MEDICINE

## 2019-10-07 ENCOUNTER — NON-PROVIDER VISIT (OUTPATIENT)
Dept: CARDIOLOGY | Facility: MEDICAL CENTER | Age: 79
End: 2019-10-07
Payer: MEDICARE

## 2019-10-07 DIAGNOSIS — Z95.3 STATUS POST AORTIC VALVE REPLACEMENT WITH BIOPROSTHETIC VALVE: ICD-10-CM

## 2019-10-07 LAB — EKG IMPRESSION: NORMAL

## 2019-10-07 PROCEDURE — G0423 INTENS CARDIAC REHAB NO EXER: HCPCS | Mod: 59 | Performed by: INTERNAL MEDICINE

## 2019-10-07 PROCEDURE — G0422 INTENS CARDIAC REHAB W/EXERC: HCPCS | Performed by: INTERNAL MEDICINE

## 2019-10-07 ASSESSMENT — PATIENT HEALTH QUESTIONNAIRE - PHQ9: SUM OF ALL RESPONSES TO PHQ QUESTIONS 1-9: 4

## 2019-10-07 NOTE — PROGRESS NOTES
Individualized Treatment Plan   Cardiac Rehab Individual Treatment Plan  Initial/Reassessment/Discharge Assessment/ ReAssessment/ Discharge: (P) 60 day 10/07/19 Session #     P) 75   MRN: [id number] Allergies: Feldene [piroxicam] and Percodan [oxycodone-aspirin]   Patient Name: Duane Parkinson : 1940 Risk Stratification: (P) High    Diagnoses:   1. Status post aortic valve replacement with bioprosthetic valve     Age: 79 y.o. Physician: Fransisco Graham M.D.    Date of Event: (P) 19 Specialist: (P) Sita   Risk Factors:  (P) Hypertension, Hyperlipidemia, Obesity, Stress, Sedentary Lifestyle, Age, Male > 45   Exercise Nutrition Other Core Components/ Risk Factors Psychosocial   Stages of change Stages of change Stages of change Stages of change   (P) Preparation (P) Preparation (P) Preparation (P) Preparation   Fitness Test Lipids Learning Barriers Psychosocial Plan   DASI: (P) (n/a) Available: (P) Yes Learning Barriers: (P) Vision Psychosocial Plan: (P) Yes   DIST: (P) (No Data) Date: (P) 18 Family Support Goals   Max HR: (P) (No Data) Total: (P) 123 mg/dL (P) Yes Assess presence or absence of depression using a valid screening: (P) Yes   Max BP: Peak Ex BP: (P) (No Data) Trig: (P) 189 mg/dL Lives: (P) Alone(. Has family support; children/grandchildren.) Use Stress Management: (P) Yes   RPE: (P) (No Data) HDL: (P) 32 mg/dL Other Risk Factors Plan Adverse Events: (P) No   SPO2: (P) (No Data)       MET: (P) (No Data) LDL: (P) 53 mg/dL Other Risk Factors/Plan: (P) Yes Unexpected Events: (P) No   EF= (P) 70 Diabetes Tobacco Use Intervention   Ambulatory Status Diabetes: (P) No(Hx of impaired glucose tollerance) Social History     Tobacco Use   Smoking Status Former Smoker   • Packs/day: 1.00   • Years: 20.00   • Pack years: 20.00   • Types: Cigarettes   • Last attempt to quit: 1972   • Years since quittin.7   Smokeless Tobacco Former User    Behavioral Health Consult: (P) No  "  Fall Risk Assessed: (P) Yes HbA1C: (P) 5.4 % Date: (P) 06/05/18 Goals Physician Referral: (P) No   Exercise Ambulatory Status Assist Devices: (P) None Monitors BS at Home: (P) No Educate / Review and have understanding of cardiac disease prevention: Identifies Stressors: (P) Yes   Exercise Plan Frequency: (P) (n/a) Random BS: (P) 108(7/30/19)  Drug Intervention: (P) No     Weight Management  Outcome Survey Tools   Goals Weight: (P) 101.2 kg (223 lb) Educate / Review and have understanding of cardiac disease prevention: (P) Yes FP QOL Overall Score: (P) 27.6(orientation data)   Home Exercise: (P) Yes Height: (P) 177.8 cm (5' 10\") Medication Compliance: (P) Yes PHQ-9: (P) 4(orientation data)   Mode: (P) Walk, Bike, Gym BMI (Calculated): (P) 32 Complete Tobacco Cessation: Education   Duration: (P) 5-10 minutes daily Goal weight: (P) 200lbs Complete Tobacco Cessation: (P) No(Former smoker: 1 pk/day, 20 yrs, Quit date 1/16/1972) MBSR Lectures/Videos: (P) Yes   Frequency: (P) 7 days active Waist: (P) 46 inch Intervention Psychosocial Education: (P) Coping Techniques, Positive Support System, S/S Depression, MBSR Lectures   Intervention Social History     Substance and Sexual Activity   Alcohol Use No   • Alcohol/week: 0.0 oz    Smoking Cessation Referral: (P) No     Intervention: (P) Yes Nutrition Plan Ind. Education / Counseling: (P) Yes    Exercise Prescription Nutrition Plan: (P) Yes Tobacco Adjunct: (P) No    Mode: (P) Treadmill, NuStep, UBE, Airdyne Nutrition Related Target Goals Healthy Heart Education: (P) Class Schedule Given, Medications Reviewed, Patient Education Binder Provided, Risk Factors Discussed, Videos Viewed per Fernie    Frequency: (P) 3 days/week LDL-C <100 if trig. > 200: (P) No Weekly Lectures/ Videos/ Interactive Cooking School: (P) Yes    Duration: (P) 30-45 minutes LDL-C < 70 for high risk patient:  LDL-C<70 for high risk patients: (P) Yes Education    Intensity: (P) 11-13 RPE  Healthy Heart " Education: (P) Class Schedule Given, Medications Reviewed, Patient Education Binder Provided, Risk Factors Discussed, Videos Viewed per Fernie    METS: (P) 1.0-3.0 Non HDL-C Should be < 130:  Non HDL-C Should be < 130: (P) Yes Hypertension Management   (Active Problem)    Progression: (P) ^ increments of 1-3 to THR/RPE as tolerated      THR: THR: (P) (113-123bpm) HbA1C < 7%: (P) Yes Resting BP: (P) 129/74    Angina with Exercise?   Angina with Exercise: (P) No   BMI < 25: (P) No Hypertension Education      Dietary Goal: (P) Rate Your Plate >=58     Resistance Training? Resistance Training: (P) Yes Rate your Plate: (P) Pre(40) Lectures/ Videos/ Cooking School: (P) Yes    Education Intervention Hypertension Goals    (P) Yes Dietician Consult/Class: (P) Pending Medication Adherence : (P) Yes    (P) Equipment Orientation, Exercise Safety, S/S to Report, RPE Scale, Warm Up / Cool Down, Physically Active Nurse/Patient Discussion: (P) Yes Home Self Monitoring : (P) Yes    Exercise “Target Goals” Diabetes Ed Referral: (P) No     Start Individual Exercise Rx (P) Yes Discuss Maintenance /Wt Loss: (P) Yes       Attend Cooking School: (P) Yes     BP < 140/90 or < 130/80 if DM or CKD (P) Yes Education       Nutrition Education: (P) Healthy Plant Based Eating, Sodium Reduction Cooking/ Lectures/ Cooking School/  RD 1:1     Aerobic activity 30 + min / day 5 days / wk (P) Yes             Exercise    Current : (P) While at [facility] Iggy Euceda completes two exercises, 1 for 20 minutes and another aerobic exercise for 10  minutes. Plus an initial weight routine. On the days Kinsey is not at [facility] he is recovering from [facility]. Kinsey has a really bad back problem that causes him pain. Kinsey has an implant device that has a remote that he can use for pain. Kinsey is hoping that the consistent Mon,Wed,Friday exercise will help with his back pain.   Goal: (P) Kinsey has a long term goal to improve balance and to be able to do more things instead of  "less and less each year.  Progress: Kinsey says he is feeling better with consistent exercise.  Kinsey feels motivated to increase his times and workloads while at [facility].     Nutrition     Current:   Last ITP Kinsey said he has not really learned to cook much since his wife did most of the cooking until she passed. Kinsey met with our dietician and they worked out a plan that includes canned no salt veggies, fresh fruit from [employer], and eating more oatmeal. Judy our dietician gave Kinsey some ideas of easy meals for one.   Goal: Kinsey says he feels that he is meeting his previous goal: \"I'd like to learn how to grocery shop for heart health.\"  Progress: Kinsey has went to [employer] and got some fresh fruits and canned veggies.      Hypertension     Current:Today blood pressure is WNL  Goal:  Medication compliance, low sodium diet, home BP monitoring.  Progress: Medication compliance.      Stress     Current : Currently Kinsey is feeling better. Kinsey realizes that he need to stay moving to feel better mentally. [facility] is the first time in a long time that Kinsey has been exercising regularly.  Goal: Stress management through the activities he enjoys.   Progress: Healthy Mindset lectures.      Other     Notes: Chronic AFib. Monitor 3 visits. Has internal pain stimulator for DDD. Neuropathy in LE bilat. Sleep apnea; wears CPAP.                  "

## 2019-10-07 NOTE — PROGRESS NOTES
Duane Parkinson attended: Exercise Workshop from 9:00 am - 10:00 am  The topic was: Band Routines    Patient received handouts regarding the specific exercise information

## 2019-10-08 ENCOUNTER — ANTICOAGULATION VISIT (OUTPATIENT)
Dept: VASCULAR LAB | Facility: MEDICAL CENTER | Age: 79
End: 2019-10-08
Attending: INTERNAL MEDICINE
Payer: MEDICARE

## 2019-10-08 VITALS — SYSTOLIC BLOOD PRESSURE: 112 MMHG | DIASTOLIC BLOOD PRESSURE: 73 MMHG | HEART RATE: 60 BPM

## 2019-10-08 DIAGNOSIS — Z95.3 STATUS POST TRANSCATHETER AORTIC VALVE REPLACEMENT (TAVR) USING BIOPROSTHESIS: ICD-10-CM

## 2019-10-08 LAB — INR PPP: 3.5 (ref 2–3.5)

## 2019-10-08 PROCEDURE — 99212 OFFICE O/P EST SF 10 MIN: CPT

## 2019-10-08 PROCEDURE — 85610 PROTHROMBIN TIME: CPT

## 2019-10-08 NOTE — PROGRESS NOTES
Anticoagulation Summary  As of 10/8/2019    INR goal:   2.0-3.0   TTR:   63.9 % (2.9 y)   INR used for dosing:   3.50! (10/8/2019)   Warfarin maintenance plan:   2 mg (4 mg x 0.5) every Tue, Thu, Sat; 4 mg (4 mg x 1) all other days   Weekly warfarin total:   22 mg   Plan last modified:   Sin Huitron, PharmD (10/8/2019)   Next INR check:   10/22/2019   Target end date:   Indefinite    Indications    Atrial fibrillation (HCC) [I48.91]  Status post transcatheter aortic valve replacement (TAVR) using bioprosthesis [Z95.3]             Anticoagulation Episode Summary     INR check location:       Preferred lab:       Send INR reminders to:       Comments:   Pt goes by Kinsey      Anticoagulation Care Providers     Provider Role Specialty Phone number    Vickey Rutherford M.D. Referring Cardiology [phone]    [facility] Responsible  [phone]        Anticoagulation Patient Findings      HPI:  Duane Parkinson seen in clinic today, on anticoagulation therapy with warfarin for Afib.   Changes to current medical/health status since last appt: none   Denies signs/symptoms of bleeding and/or thrombosis since the last appt.    Denies any interval changes to diet  Denies any interval changes to medications since last appt.   Denies any complications or cost restrictions with current therapy.   BP recorded in vitals.  Pt was taking more warfarin than requested.     A/P   INR  SUPRA-therapeutic.   Begin reduced regimen compared to the past 7 days of warfarin dosing.     Follow up appointment in 2 week(s).    Sin Huitron, PharmD

## 2019-10-09 ENCOUNTER — NON-PROVIDER VISIT (OUTPATIENT)
Dept: CARDIOLOGY | Facility: MEDICAL CENTER | Age: 79
End: 2019-10-09
Payer: MEDICARE

## 2019-10-09 DIAGNOSIS — Z95.3 STATUS POST AORTIC VALVE REPLACEMENT WITH BIOPROSTHETIC VALVE: ICD-10-CM

## 2019-10-09 LAB
EKG IMPRESSION: NORMAL
INR BLD: 3.5 (ref 0.9–1.2)

## 2019-10-09 PROCEDURE — G0422 INTENS CARDIAC REHAB W/EXERC: HCPCS | Performed by: INTERNAL MEDICINE

## 2019-10-09 PROCEDURE — G0423 INTENS CARDIAC REHAB NO EXER: HCPCS | Mod: 59 | Performed by: INTERNAL MEDICINE

## 2019-10-09 NOTE — PROGRESS NOTES
Duane Parkinson attended: cooking school from 9 to 10 am.  Today  prepared Yajaira Masala.    Patient received handouts and nutrition information regarding the specific recipes.

## 2019-10-11 ENCOUNTER — NON-PROVIDER VISIT (OUTPATIENT)
Dept: CARDIOLOGY | Facility: MEDICAL CENTER | Age: 79
End: 2019-10-11
Payer: MEDICARE

## 2019-10-11 DIAGNOSIS — Z95.3 STATUS POST AORTIC VALVE REPLACEMENT WITH BIOPROSTHETIC VALVE: ICD-10-CM

## 2019-10-11 LAB — EKG IMPRESSION: NORMAL

## 2019-10-11 PROCEDURE — G0423 INTENS CARDIAC REHAB NO EXER: HCPCS | Mod: 59 | Performed by: INTERNAL MEDICINE

## 2019-10-11 PROCEDURE — G0422 INTENS CARDIAC REHAB W/EXERC: HCPCS | Performed by: INTERNAL MEDICINE

## 2019-10-14 ENCOUNTER — NON-PROVIDER VISIT (OUTPATIENT)
Dept: CARDIOLOGY | Facility: MEDICAL CENTER | Age: 79
End: 2019-10-14
Payer: MEDICARE

## 2019-10-14 DIAGNOSIS — Z95.3 STATUS POST AORTIC VALVE REPLACEMENT WITH BIOPROSTHETIC VALVE: ICD-10-CM

## 2019-10-14 LAB — EKG IMPRESSION: NORMAL

## 2019-10-14 PROCEDURE — G0422 INTENS CARDIAC REHAB W/EXERC: HCPCS | Performed by: INTERNAL MEDICINE

## 2019-10-14 PROCEDURE — G0423 INTENS CARDIAC REHAB NO EXER: HCPCS | Mod: 59 | Performed by: INTERNAL MEDICINE

## 2019-10-14 NOTE — PROGRESS NOTES
Duane Parkinson attended the following workshop from 9 to 10 am.  Workshop Title: Nutrition Priorities.      Lecture was attended and patient questions addressed. The patient will continue workshops and nutrition education.

## 2019-10-16 ENCOUNTER — NON-PROVIDER VISIT (OUTPATIENT)
Dept: CARDIOLOGY | Facility: MEDICAL CENTER | Age: 79
End: 2019-10-16
Payer: MEDICARE

## 2019-10-16 DIAGNOSIS — Z95.3 STATUS POST AORTIC VALVE REPLACEMENT WITH BIOPROSTHETIC VALVE: ICD-10-CM

## 2019-10-16 LAB — EKG IMPRESSION: NORMAL

## 2019-10-16 PROCEDURE — G0422 INTENS CARDIAC REHAB W/EXERC: HCPCS | Performed by: INTERNAL MEDICINE

## 2019-10-16 PROCEDURE — G0423 INTENS CARDIAC REHAB NO EXER: HCPCS | Mod: 59 | Performed by: INTERNAL MEDICINE

## 2019-10-16 NOTE — PROGRESS NOTES
Duane Parkinson attended: cooking school from 9 to 10 am.  Today  prepared Oatmeal.    Patient received handouts and nutrition information regarding the specific recipes.

## 2019-10-18 ENCOUNTER — NON-PROVIDER VISIT (OUTPATIENT)
Dept: CARDIOLOGY | Facility: MEDICAL CENTER | Age: 79
End: 2019-10-18
Payer: MEDICARE

## 2019-10-18 DIAGNOSIS — Z95.3 STATUS POST AORTIC VALVE REPLACEMENT WITH BIOPROSTHETIC VALVE: ICD-10-CM

## 2019-10-18 LAB — EKG IMPRESSION: NORMAL

## 2019-10-18 PROCEDURE — G0422 INTENS CARDIAC REHAB W/EXERC: HCPCS | Performed by: INTERNAL MEDICINE

## 2019-10-18 PROCEDURE — G0423 INTENS CARDIAC REHAB NO EXER: HCPCS | Mod: 59 | Performed by: INTERNAL MEDICINE

## 2019-10-21 ENCOUNTER — NON-PROVIDER VISIT (OUTPATIENT)
Dept: CARDIOLOGY | Facility: MEDICAL CENTER | Age: 79
End: 2019-10-21
Payer: MEDICARE

## 2019-10-21 DIAGNOSIS — Z95.3 STATUS POST AORTIC VALVE REPLACEMENT WITH BIOPROSTHETIC VALVE: ICD-10-CM

## 2019-10-21 LAB — EKG IMPRESSION: NORMAL

## 2019-10-21 PROCEDURE — G0422 INTENS CARDIAC REHAB W/EXERC: HCPCS | Performed by: INTERNAL MEDICINE

## 2019-10-21 PROCEDURE — G0423 INTENS CARDIAC REHAB NO EXER: HCPCS | Mod: 59 | Performed by: INTERNAL MEDICINE

## 2019-10-21 ASSESSMENT — PATIENT HEALTH QUESTIONNAIRE - PHQ9: SUM OF ALL RESPONSES TO PHQ QUESTIONS 1-9: 4

## 2019-10-21 NOTE — PROGRESS NOTES
Duane Parkinson attended: Healthy Mindset Workshop from 9:00 am - 10:00 am  The topic was: Goal Setting  Patient received handouts regarding the specific information

## 2019-10-21 NOTE — PROGRESS NOTES
Individualized Treatment Plan   Cardiac Rehab Individual Treatment Plan  Initial/Reassessment/Discharge Assessment/ ReAssessment/ Discharge: 90 day 10/21/19 Session #     17   MRN: [id number] Allergies: Feldene [piroxicam] and Percodan [oxycodone-aspirin]   Patient Name: Duane Parkinson : 1940 Risk Stratification: High    Diagnoses:   1. Status post aortic valve replacement with bioprosthetic valve     Age: 79 y.o. Physician: Fransisco Graham M.D.    Date of Event: 19 Specialist: Sita   Risk Factors:  Hypertension, Hyperlipidemia, Obesity, Stress, Sedentary Lifestyle, Age, Male > 45   Exercise Nutrition Other Core Components/ Risk Factors Psychosocial   Stages of change Stages of change Stages of change Stages of change   Preparation Preparation Preparation Preparation   Fitness Test Lipids Learning Barriers Plan   DASI: (n/a) Available: Yes Learning Barriers: Vision Psychosocial Plan: Yes   DIST: (No Data) Date: 18 Family Support Intervention   Max HR: (No Data) Total: 123 mg/dL Yes Behavioral Health Consult: No   RPE: (No Data) Tri mg/dL Lives: Alone(. Has family support; children/grandchildren.) Physician Referral: No   SPO2: (No Data) HDL: 32 mg/dL Other Risk Factors Plan Identifies Stressors: Yes   MET: (No Data) LDL: 53 mg/dL Other Risk Factors/Plan: Yes Drug Intervention: No   EF= 70 Diabetes Tobacco Use Outcome Survey Tools   Ambulatory Status Diabetes: No(Hx of impaired glucose tollerance) Social History     Tobacco Use   Smoking Status Former Smoker   • Packs/day: 1.00   • Years: 20.00   • Pack years: 20.00   • Types: Cigarettes   • Last attempt to quit: 1972   • Years since quittin.7   Smokeless Tobacco Former User    FP QOL Overall Score: 27.6(orientation data)   Fall Risk Assessed: Yes HbA1C: 5.4 % Date: 18 Smoking Intervention PHQ-9: 4(orientation data)   Exercise Ambulatory Status Assist Devices: None Monitors BS at Home: No Smoking Cessation  "Referral: No     Intervention Frequency: (n/a) Random BS: 108(7/30/19) Ind. Education / Counseling: Yes Education   Intervention: Yes    MBSR Lectures/Videos: Yes   Exercise Prescription/ Exercise Plan       Mode: Treadmill, NuStep, UBE, Airdyne Weight Management Tobacco Adjunct: No Psychosocial Education: Coping Techniques, Positive Support System, S/S Depression, MBSR Lectures   Frequency: 3 days/week Weight: 101.2 kg (223 lb) Target Goal Target Goal   Duration: 30-45 minutes Height: 177.8 cm (5' 10\") Complete Tobacco Cessation: Assess presence or absence of depression using a valid screening: Yes   Intensity: 11-13 RPE BMI (Calculated): 32 Complete Tobacco Cessation: No(Former smoker: 1 pk/day, 20 yrs, Quit date 1/16/1972) Use Stress Management: Yes   METS: 1.0-3.0 Goal weight: 200lbs Intervention Adverse Events: No   Progression: ^ increments of 1-3 to THR/RPE as tolerated Waist: 46 inch Healthy Heart Education: Class Schedule Given, Medications Reviewed, Patient Education Binder Provided, Risk Factors Discussed, Videos Viewed per Fernie Unexpected Events: No      Goals     THR: THR: (113-123bpm) Social History     Substance and Sexual Activity   Alcohol Use No   • Alcohol/week: 0.0 oz    Educate / Review and have understanding of cardiac disease prevention:    Angina with Exercise? Angina with Exercise: No Nutrition Plan Educate / Review and have understanding of cardiac disease prevention: Yes      Nutrition Plan: Yes Medication Compliance: Yes    Resistance Training? Resistance Training: Yes Intervention Hypertension Intervention      Dietician Consult/Class: Pending     Goals Nurse/Patient Discussion: Yes Weekly Lectures/ Videos/ Interactive Cooking School: Yes    Home Exercise: Yes Diabetes Ed Referral: No Hypertension Management    Mode: Walk, Bike, Gym Discuss Maintenance /Wt Loss: Yes Resting BP: (P) 91/60    Duration: 5-10 minutes daily Attend Cooking School: Yes     Frequency: 7 days active Dietary " Goal: Rate Your Plate >=58    Education Education    Yes Nutrition Education: Healthy Plant Based Eating, Sodium Reduction Cooking/ Lectures/ Cooking School/  RD 1:1     Equipment Orientation, Exercise Safety, S/S to Report, RPE Scale, Warm Up / Cool Down, Physically Active Target Goal    Target Goal LDL-C < 100 if trig. > 200:  No    Start Individual Exercise Rx Yes LDL-C < 70 for high risk patient:  Yes    BP < 140/90 or < 130/80 if DM or CKD Yes Non HDL-C Should be < 130:  Yes    Aerobic activity 30 + min / day 5 days / wk Yes HbA1C < 7%: Yes      BMI < 25: No           Exercise     Current:  While at [facility] Iggy Euceda completes two exercises, 1 for 20 minutes and another aerobic exercise for 10  minutes. Plus an intermediate weight routine. On the days Kinsey is not at [facility] he is recovering from [facility]. Kinsey has a really bad back problem that causes him pain. Kinsey has an implant device that has a remote that he can use for pain. Kinsey is hoping that the consistent Mon,Wed,Friday exercise will help with his back pain.   Goal: Kinsey has a goalt o start including a home routine with loop bands.   Progress: ) Staff gave Kinsey some loop bands and a routine to start at home.     Nutrition     Current:  Last ITP Kinsey said he has not really learned to cook much since his wife did most of the cooking until she passed. Kinsey met with our dietician and they worked out a plan that includes canned no salt veggies, fresh fruit from [facility], and eating more oatmeal. Judy our dietician gave Kinsey some ideas of easy meals for one.   Goal:  Kinsey has a goal to continue to try and make healthier choices when making meals at home.   Progress: Kinsey has went to [facility] and got some fresh fruits and canned veggies.      Hypertension     Current: Current: Today blood pressure is WNL  Goal: Goal: Medication compliance, low sodium diet, home BP monitoring.  Progress: Progress: Medication compliance.      Stress     Current:  Currently Kinsey is  feeling better. Kinsey realizes that he need to stay moving to feel better mentally. ICR is the first time in a long time that Kinsey has been exercising regularly.  Goal:Stress management through the activities he enjoys.   Progress:Healthy Mindset lectures.      Other     Notes: Chronic AFib. Monitor 3 visits. Has internal pain stimulator for DDD. Neuropathy in LE bilat. Sleep apnea; wears CPAP.

## 2019-10-23 ENCOUNTER — TELEPHONE (OUTPATIENT)
Dept: CARDIOLOGY | Facility: MEDICAL CENTER | Age: 79
End: 2019-10-23

## 2019-10-23 NOTE — TELEPHONE ENCOUNTER
Received clearance request from [facility] Dentist for pt to proceed with dental work.     Pt is s/p TAVR 7/29/19. He may proceed with necessary dental work and hold warfarin for 5 days prior to crown and root nury starting 10/29/19 per JACKLYN Mcpherson APRN    Form faxed back to [phone]

## 2019-10-28 ENCOUNTER — APPOINTMENT (OUTPATIENT)
Dept: CARDIOLOGY | Facility: MEDICAL CENTER | Age: 79
End: 2019-10-28
Payer: MEDICARE

## 2019-10-30 ENCOUNTER — APPOINTMENT (OUTPATIENT)
Dept: CARDIOLOGY | Facility: MEDICAL CENTER | Age: 79
End: 2019-10-30
Payer: MEDICARE

## 2019-11-01 ENCOUNTER — APPOINTMENT (OUTPATIENT)
Dept: CARDIOLOGY | Facility: MEDICAL CENTER | Age: 79
End: 2019-11-01
Payer: MEDICARE

## 2019-11-04 ENCOUNTER — NON-PROVIDER VISIT (OUTPATIENT)
Dept: CARDIOLOGY | Facility: MEDICAL CENTER | Age: 79
End: 2019-11-04
Payer: MEDICARE

## 2019-11-04 DIAGNOSIS — Z95.3 STATUS POST AORTIC VALVE REPLACEMENT WITH BIOPROSTHETIC VALVE: ICD-10-CM

## 2019-11-04 LAB — EKG IMPRESSION: NORMAL

## 2019-11-04 PROCEDURE — G0423 INTENS CARDIAC REHAB NO EXER: HCPCS | Mod: 59 | Performed by: INTERNAL MEDICINE

## 2019-11-04 PROCEDURE — G0422 INTENS CARDIAC REHAB W/EXERC: HCPCS | Performed by: INTERNAL MEDICINE

## 2019-11-06 ENCOUNTER — NON-PROVIDER VISIT (OUTPATIENT)
Dept: CARDIOLOGY | Facility: MEDICAL CENTER | Age: 79
End: 2019-11-06
Payer: MEDICARE

## 2019-11-06 DIAGNOSIS — Z95.3 STATUS POST AORTIC VALVE REPLACEMENT WITH BIOPROSTHETIC VALVE: ICD-10-CM

## 2019-11-06 LAB — EKG IMPRESSION: NORMAL

## 2019-11-06 PROCEDURE — G0422 INTENS CARDIAC REHAB W/EXERC: HCPCS | Performed by: INTERNAL MEDICINE

## 2019-11-06 PROCEDURE — G0423 INTENS CARDIAC REHAB NO EXER: HCPCS | Mod: 59 | Performed by: INTERNAL MEDICINE

## 2019-11-08 ENCOUNTER — NON-PROVIDER VISIT (OUTPATIENT)
Dept: CARDIOLOGY | Facility: MEDICAL CENTER | Age: 79
End: 2019-11-08
Payer: MEDICARE

## 2019-11-08 DIAGNOSIS — Z95.3 STATUS POST AORTIC VALVE REPLACEMENT WITH BIOPROSTHETIC VALVE: ICD-10-CM

## 2019-11-08 LAB — EKG IMPRESSION: NORMAL

## 2019-11-08 PROCEDURE — G0423 INTENS CARDIAC REHAB NO EXER: HCPCS | Mod: 59 | Performed by: INTERNAL MEDICINE

## 2019-11-08 PROCEDURE — G0422 INTENS CARDIAC REHAB W/EXERC: HCPCS | Performed by: INTERNAL MEDICINE

## 2019-11-11 ENCOUNTER — NON-PROVIDER VISIT (OUTPATIENT)
Dept: CARDIOLOGY | Facility: MEDICAL CENTER | Age: 79
End: 2019-11-11
Payer: MEDICARE

## 2019-11-11 DIAGNOSIS — Z95.3 STATUS POST AORTIC VALVE REPLACEMENT WITH BIOPROSTHETIC VALVE: ICD-10-CM

## 2019-11-11 PROCEDURE — G0423 INTENS CARDIAC REHAB NO EXER: HCPCS | Mod: 59 | Performed by: INTERNAL MEDICINE

## 2019-11-11 PROCEDURE — G0422 INTENS CARDIAC REHAB W/EXERC: HCPCS | Performed by: INTERNAL MEDICINE

## 2019-11-11 NOTE — PROGRESS NOTES
Attended the Healthy Mindset lecture: Managing Moods and Relationships, from 9:00-10:00AM.   Patient received handouts on the material covered and all questions were answered.

## 2019-11-12 LAB — EKG IMPRESSION: NORMAL

## 2019-11-13 ENCOUNTER — NON-PROVIDER VISIT (OUTPATIENT)
Dept: CARDIOLOGY | Facility: MEDICAL CENTER | Age: 79
End: 2019-11-13
Payer: MEDICARE

## 2019-11-13 DIAGNOSIS — Z95.3 STATUS POST AORTIC VALVE REPLACEMENT WITH BIOPROSTHETIC VALVE: ICD-10-CM

## 2019-11-13 LAB — EKG IMPRESSION: NORMAL

## 2019-11-13 PROCEDURE — G0422 INTENS CARDIAC REHAB W/EXERC: HCPCS | Performed by: INTERNAL MEDICINE

## 2019-11-13 PROCEDURE — G0423 INTENS CARDIAC REHAB NO EXER: HCPCS | Mod: 59 | Performed by: INTERNAL MEDICINE

## 2019-11-13 NOTE — PROGRESS NOTES
Duane Parkinson attended: cooking school from 9 to 10 am.  Today  prepared Garbanzo Bean Soup.    Patient received handouts and nutrition information regarding the specific recipes.

## 2019-11-15 ENCOUNTER — NON-PROVIDER VISIT (OUTPATIENT)
Dept: CARDIOLOGY | Facility: MEDICAL CENTER | Age: 79
End: 2019-11-15
Payer: MEDICARE

## 2019-11-15 ENCOUNTER — TELEPHONE (OUTPATIENT)
Dept: VASCULAR LAB | Facility: MEDICAL CENTER | Age: 79
End: 2019-11-15

## 2019-11-15 DIAGNOSIS — Z95.3 STATUS POST AORTIC VALVE REPLACEMENT WITH BIOPROSTHETIC VALVE: ICD-10-CM

## 2019-11-15 LAB — EKG IMPRESSION: NORMAL

## 2019-11-15 PROCEDURE — G0423 INTENS CARDIAC REHAB NO EXER: HCPCS | Mod: 59 | Performed by: INTERNAL MEDICINE

## 2019-11-15 PROCEDURE — G0422 INTENS CARDIAC REHAB W/EXERC: HCPCS | Performed by: INTERNAL MEDICINE

## 2019-11-16 NOTE — TELEPHONE ENCOUNTER
Left message for pt to have INR checked, INR was due 10/22/19.    INR   Date Value Ref Range Status   10/08/2019 3.50  Final     Kell Cano, PharmD

## 2019-11-18 ENCOUNTER — NON-PROVIDER VISIT (OUTPATIENT)
Dept: CARDIOLOGY | Facility: MEDICAL CENTER | Age: 79
End: 2019-11-18

## 2019-11-18 ENCOUNTER — OFFICE VISIT (OUTPATIENT)
Dept: MEDICAL GROUP | Facility: MEDICAL CENTER | Age: 79
End: 2019-11-18
Payer: MEDICARE

## 2019-11-18 VITALS
DIASTOLIC BLOOD PRESSURE: 64 MMHG | OXYGEN SATURATION: 96 % | TEMPERATURE: 97.5 F | WEIGHT: 225.6 LBS | HEART RATE: 91 BPM | HEIGHT: 72 IN | SYSTOLIC BLOOD PRESSURE: 118 MMHG | BODY MASS INDEX: 30.56 KG/M2

## 2019-11-18 DIAGNOSIS — R73.02 IGT (IMPAIRED GLUCOSE TOLERANCE): ICD-10-CM

## 2019-11-18 DIAGNOSIS — Z23 NEED FOR VACCINATION: ICD-10-CM

## 2019-11-18 DIAGNOSIS — K86.2 PANCREATIC CYST: ICD-10-CM

## 2019-11-18 DIAGNOSIS — R13.19 ESOPHAGEAL DYSPHAGIA: ICD-10-CM

## 2019-11-18 DIAGNOSIS — K75.4 AUTOIMMUNE HEPATITIS (HCC): ICD-10-CM

## 2019-11-18 DIAGNOSIS — Z95.3 STATUS POST AORTIC VALVE REPLACEMENT WITH BIOPROSTHETIC VALVE: ICD-10-CM

## 2019-11-18 DIAGNOSIS — E78.5 DYSLIPIDEMIA: ICD-10-CM

## 2019-11-18 LAB
HBA1C MFR BLD: 5.3 % (ref 0–5.6)
INT CON NEG: NEGATIVE
INT CON POS: POSITIVE

## 2019-11-18 PROCEDURE — G0008 ADMIN INFLUENZA VIRUS VAC: HCPCS | Performed by: INTERNAL MEDICINE

## 2019-11-18 PROCEDURE — 83036 HEMOGLOBIN GLYCOSYLATED A1C: CPT | Performed by: INTERNAL MEDICINE

## 2019-11-18 PROCEDURE — 90662 IIV NO PRSV INCREASED AG IM: CPT | Performed by: INTERNAL MEDICINE

## 2019-11-18 PROCEDURE — 99214 OFFICE O/P EST MOD 30 MIN: CPT | Mod: 25 | Performed by: INTERNAL MEDICINE

## 2019-11-18 NOTE — PROGRESS NOTES
Duane Parkinson attended: Healthy Mindset Workshop from 9:00 am- 10:00 am    The topic was: Fueling a Healthy Body.    Patient received handouts regarding the specific information

## 2019-11-18 NOTE — PROGRESS NOTES
CC: Follow-up blood sugars, dyslipidemia, GI complaints.                                                                                                                                     HPI:   Duane presents today with the following.    1. IGT (impaired glucose tolerance)  A1c checked in office today found to be 5.3 and a normal range not on any current medications.    2. Dyslipidemia  History of dyslipidemia overdue for recheck of cholesterol.    3. Pancreatic cyst/. Esophageal dysphagia/ Autoimmune hepatitis (HCC)  He did have a pancreatic cyst cannot undergo MR.  He was referred to gastroenterology.  He canceled the appointment because he was not released from cardiology however my intent is for him to come up with a plan for when he was released by cardiology.  He does realize that now.  He is reporting less difficulty swallowing with pills but he is gone to crushing his pills.  He does have a history of autoimmune hepatitis as well due for follow-up with GI.      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 12/30/2011     Priority: High   • Essential hypertension 01/07/2015     Priority: Medium   • Atrial fibrillation (HCC) 12/30/2011     Priority: Medium   • Autoimmune hepatitis (HCC) 06/22/2018     Priority: Low   • Neuropathy (HCC) 12/22/2017     Priority: Low   • Localized primary osteoarthritis of carpometacarpal joint of left thumb 05/27/2016     Priority: Low   • Insomnia 05/26/2016     Priority: Low   • LORENA (obstructive sleep apnea) 04/21/2016     Priority: Low   • Dyslipidemia 01/07/2015     Priority: Low   • IGT (impaired glucose tolerance) 01/07/2015     Priority: Low   • Lumbar Disc Degeneration 12/30/2011     Priority: Low   • Osteoarthrosis involving lower leg 12/30/2011     Priority: Low       Current Outpatient Medications   Medication Sig Dispense Refill   • digoxin (LANOXIN) 250 MCG Tab Take 1 Tab  by mouth every bedtime. 90 Tab 3   • warfarin (COUMADIN) 4 MG Tab Take one tablet by mouth one time daily or as directed by coumadin clinic 90 Tab 3   • omeprazole (PRILOSEC) 20 MG delayed-release capsule Take 1 Cap by mouth every day. 30 Cap 1   • aspirin (ASA) 81 MG Chew Tab chewable tablet Take 1 Tab by mouth every day. 90 Tab 3   • warfarin (COUMADIN) 6 MG Tab Take 1 Tab by mouth every day. (Patient not taking: Reported on 10/3/2019) 30 Tab 3   • acetaminophen (TYLENOL) 500 MG Tab Take 500-1,000 mg by mouth every 6 hours as needed.     • gabapentin (NEURONTIN) 300 MG Cap Take 300 mg by mouth 3 times a day. 1 in the morning, 1 at noon, 2 at 4 pm, 3 at bedtime     • metoprolol SR (TOPROL XL) 50 MG TABLET SR 24 HR TAKE ONE TABLET BY MOUTH EVERY DAY 90 Tab 3   • pravastatin (PRAVACHOL) 80 MG tablet Take 1 Tab by mouth every day. 90 Tab 0   • cyclobenzaprine (FLEXERIL) 10 MG Tab Take 10 mg by mouth 3 times a day as needed for Muscle Spasms.     • B Complex Vitamins (VITAMIN B COMPLEX PO) Take 1 Tab by mouth every evening.     • Psyllium (METAMUCIL PO) Take  by mouth every morning.     • Cholecalciferol (VITAMIN D3) 2000 UNITS Tab Take 1 Tab by mouth every evening.     • multivitamin (THERAGRAN) TABS Take 1 Tab by mouth every bedtime.       No current facility-administered medications for this visit.          Allergies as of 11/18/2019 - Reviewed 10/03/2019   Allergen Reaction Noted   • Feldene [piroxicam] Itching 12/30/2011   • Percodan [oxycodone-aspirin] Itching and Photosensitivity 12/30/2011        ROS: Denies Chest pain, SOB, LE edema.    /64 (BP Location: Left arm, Patient Position: Sitting, BP Cuff Size: Adult)   Pulse 91   Temp 36.4 °C (97.5 °F) (Temporal)   Ht 1.829 m (6')   Wt 102.3 kg (225 lb 9.6 oz)   SpO2 96%   BMI 30.60 kg/m²     Physical Exam:  Gen:         Alert and oriented, No apparent distress.  Neck:        No Lymphadenopathy or Bruits.  Lungs:     Clear to auscultation  bilaterally  CV:          Regular rate and rhythm. No murmurs, rubs or gallops.               Ext:          No clubbing, cyanosis, edema.      Assessment and Plan.   79 y.o. male with the following issues.    1. IGT (impaired glucose tolerance)  Clinically doing well recheck in 10 months.  - POCT Hemoglobin A1C  - HEMOGLOBIN A1C; Future    2. Dyslipidemia  Check next lab draw.  - Comp Metabolic Panel; Future  - Lipid Profile; Future    3. Pancreatic cyst/. Esophageal dysphagia/ Autoimmune hepatitis (HCC)  Discussed the importance of follow-up with gastroenterology placed new referral but he will call back today to get back on the books to discuss further evaluation of pancreatic cyst as well as his dysphasia and autoimmune hepatitis.  - REFERRAL TO GASTROENTEROLOGY    6. Need for vaccination    - Influenza Vaccine, High Dose (65+ Only)

## 2019-11-20 ENCOUNTER — NON-PROVIDER VISIT (OUTPATIENT)
Dept: CARDIOLOGY | Facility: MEDICAL CENTER | Age: 79
End: 2019-11-20
Payer: MEDICARE

## 2019-11-20 ENCOUNTER — ANTICOAGULATION VISIT (OUTPATIENT)
Dept: VASCULAR LAB | Facility: MEDICAL CENTER | Age: 79
End: 2019-11-20
Attending: INTERNAL MEDICINE
Payer: MEDICARE

## 2019-11-20 VITALS — DIASTOLIC BLOOD PRESSURE: 73 MMHG | HEART RATE: 67 BPM | SYSTOLIC BLOOD PRESSURE: 141 MMHG

## 2019-11-20 DIAGNOSIS — Z95.3 STATUS POST TRANSCATHETER AORTIC VALVE REPLACEMENT (TAVR) USING BIOPROSTHESIS: ICD-10-CM

## 2019-11-20 DIAGNOSIS — Z95.3 STATUS POST AORTIC VALVE REPLACEMENT WITH BIOPROSTHETIC VALVE: ICD-10-CM

## 2019-11-20 LAB
EKG IMPRESSION: NORMAL
INR PPP: 2.1 (ref 2–3.5)

## 2019-11-20 PROCEDURE — G0422 INTENS CARDIAC REHAB W/EXERC: HCPCS | Performed by: INTERNAL MEDICINE

## 2019-11-20 PROCEDURE — 99211 OFF/OP EST MAY X REQ PHY/QHP: CPT

## 2019-11-20 PROCEDURE — 85610 PROTHROMBIN TIME: CPT

## 2019-11-20 PROCEDURE — G0423 INTENS CARDIAC REHAB NO EXER: HCPCS | Mod: 59 | Performed by: INTERNAL MEDICINE

## 2019-11-20 ASSESSMENT — PATIENT HEALTH QUESTIONNAIRE - PHQ9: SUM OF ALL RESPONSES TO PHQ QUESTIONS 1-9: 4

## 2019-11-20 NOTE — PROGRESS NOTES
Individualized Treatment Plan   Cardiac Rehab Individual Treatment Plan  Initial/Reassessment/Discharge Assessment/ ReAssessment/ Discharge: 120 Day 19 Session #     25   MRN: [id number] Allergies: Feldene [piroxicam] and Percodan [oxycodone-aspirin]   Patient Name: Duane Parkinson : 1940 Risk Stratification: High    Diagnoses:   1. Status post aortic valve replacement with bioprosthetic valve     Age: 79 y.o. Physician: Fransisco Graham M.D.    Date of Event: 19 Specialist: Sita   Risk Factors:  Hypertension, Hyperlipidemia, Obesity, Stress, Sedentary Lifestyle, Age, Male > 45   Exercise Nutrition Other Core Components/ Risk Factors Psychosocial   Stages of change Stages of change Stages of change Stages of change   Preparation Preparation Preparation Preparation   Fitness Test Lipids Learning Barriers Psychosocial Plan   DASI: (n/a) Available: Yes Learning Barriers: Vision Psychosocial Plan: Yes   DIST: (No Data) Date: 18 Family Support Goals   Max HR: (No Data) Total: 123 mg/dL Yes Assess presence or absence of depression using a valid screening: Yes   Max BP: Peak Ex BP: (No Data) Tri mg/dL Lives: Alone(. Has family support; children/grandchildren.) Use Stress Management: Yes   RPE: (No Data) HDL: 32 mg/dL Other Risk Factors Plan Adverse Events: No   SPO2: (No Data)       MET: (No Data) LDL: 53 mg/dL Other Risk Factors/Plan: Yes Unexpected Events: No   EF= 70 Diabetes Tobacco Use Intervention   Ambulatory Status Diabetes: No(Hx of impaired glucose tollerance) Social History     Tobacco Use   Smoking Status Former Smoker   • Packs/day: 1.00   • Years: 20.00   • Pack years: 20.00   • Types: Cigarettes   • Last attempt to quit: 1972   • Years since quittin.8   Smokeless Tobacco Former User    Behavioral Health Consult: No   Fall Risk Assessed: Yes HbA1C: 5.4 % Date: 18 Goals Physician Referral: No   Exercise Ambulatory Status Assist Devices: None  "Monitors BS at Home: No Educate / Review and have understanding of cardiac disease prevention: Identifies Stressors: Yes   Exercise Plan Frequency: (n/a) Random BS: 108(7/30/19)  Drug Intervention: No     Weight Management  Outcome Survey Tools   Goals Weight: 102.1 kg (225 lb) Educate / Review and have understanding of cardiac disease prevention: Yes FP QOL Overall Score: 27.6(orientation data)   Home Exercise: Yes Height: 177.8 cm (5' 10\") Medication Compliance: Yes PHQ-9: 4(orientation data)   Mode: Walk, Bike, Gym BMI (Calculated): 32.28 Complete Tobacco Cessation: Education   Duration: 5-10 minutes daily Goal weight: 200lbs Complete Tobacco Cessation: No(Former smoker: 1 pk/day, 20 yrs, Quit date 1/16/1972) MBSR Lectures/Videos: Yes   Frequency: 7 days active Waist: 46 inch Intervention Psychosocial Education: Coping Techniques, Positive Support System, S/S Depression, MBSR Lectures   Intervention Social History     Substance and Sexual Activity   Alcohol Use No   • Alcohol/week: 0.0 oz    Smoking Cessation Referral: No     Intervention: Yes Nutrition Plan Ind. Education / Counseling: Yes    Exercise Prescription Nutrition Plan: Yes Tobacco Adjunct: No    Mode: Treadmill, NuStep, UBE, Airdyne Nutrition Related Target Goals Healthy Heart Education: Class Schedule Given, Medications Reviewed, Patient Education Binder Provided, Risk Factors Discussed, Videos Viewed per Fernie    Frequency: 3 days/week LDL-C <100 if trig. > 200: No Weekly Lectures/ Videos/ Interactive Cooking School: Yes    Duration: 30-45 minutes LDL-C < 70 for high risk patient:  LDL-C<70 for high risk patients: Yes Education    Intensity: 11-13 RPE  Healthy Heart Education: Class Schedule Given, Medications Reviewed, Patient Education Binder Provided, Risk Factors Discussed, Videos Viewed per Pritikin    METS: 1.0-3.0 Non HDL-C Should be < 130:  Non HDL-C Should be < 130: Yes Hypertension Management   (Active Problem)    Progression: ^ " increments of 1-3 to THR/RPE as tolerated      THR: THR: (113-123bpm) HbA1C < 7%: Yes Resting BP: 125/75    Angina with Exercise?   Angina with Exercise: No   BMI < 25: No Hypertension Education      Dietary Goal: Rate Your Plate >=58     Resistance Training? Resistance Training: Yes Rate your Plate: Pre(40) Lectures/ Videos/ Cooking School: Yes    Education Intervention Hypertension Goals    Yes Dietician Consult/Class: Yes Medication Adherence : Yes    Equipment Orientation, Exercise Safety, S/S to Report, RPE Scale, Warm Up / Cool Down, Physically Active Nurse/Patient Discussion: Yes Home Self Monitoring : Yes    Exercise “Target Goals” Diabetes Ed Referral: No     Start Individual Exercise Rx Yes Discuss Maintenance /Wt Loss: Yes       Attend Cooking School: Yes     BP < 140/90 or < 130/80 if DM or CKD Yes Education       Nutrition Education: Healthy Plant Based Eating, Sodium Reduction Cooking/ Lectures/ Cooking School/  RD 1:1     Aerobic activity 30 + min / day 5 days / wk Yes             Exercise    Current : While at [facility] Iggy Euceda completes two exercises, 1 for 30 minutes, plus an intermediate weight routine. On the days Kinsey is not at [facility] he is recovering from [facility]. Kinsey has a really bad back problem that causes him pain. Kinsey has an implant device that has a remote that he can use for pain. Staff gave Kinsey some back stretches and light band routine with some bands so when he is feeling up to it, he can do some things at home to help his back.   Goal: Kinsey has a goal to get some days in at home, if his back allows it.  Progress: Staff gave Kinsey some loop bands and a routine to start at home.     Nutrition     Current: Kinsey has been more mindful of his eating and tries his best to limit re meat and sodium. Kinsey admittedly is not a cook and he was eating Hungry Man tv dinners before meeting with our dietician. Now Kinsey has switched to a healthier option for tv dinners.  Goal: Kinsey has a goal to  continue to try and make healthier choices when making meals at home.   Progress: Kinsey has been adding in some more fresh fruits and veggies. Kinsey is aware of what he eats a little more now since starting at [facility].     Hypertension     Current: Today blood pressure is WNL  Goal: Medication compliance, low sodium diet, home BP monitoring.  Progress: Medication compliance.      Stress     Current : Currently Kinsey is feeling better. Kinsey realizes that he need to stay moving to feel better mentally. [facility] is the first time in a long time that Kinsey has been exercising regularly.  Goal: Stress management through the activities he enjoys.   Progress: Healthy Mindset lectures.      Other     Notes: Chronic AFib. Monitor 3 visits. Has internal pain stimulator for DDD. Neuropathy in LE bilat. Sleep apnea; wears CPAP.

## 2019-11-20 NOTE — PROGRESS NOTES
Duane Parkinson attended: cooking school from 9 to 10 am.  Today  prepared Stove Top sweet potatoes.    Patient received handouts and nutrition information regarding the specific recipes.

## 2019-11-21 ENCOUNTER — HOSPITAL ENCOUNTER (OUTPATIENT)
Dept: LAB | Facility: MEDICAL CENTER | Age: 79
End: 2019-11-21
Attending: INTERNAL MEDICINE
Payer: MEDICARE

## 2019-11-21 LAB
ALBUMIN SERPL BCP-MCNC: 3.9 G/DL (ref 3.2–4.9)
ALBUMIN/GLOB SERPL: 1.1 G/DL
ALP SERPL-CCNC: 88 U/L (ref 30–99)
ALT SERPL-CCNC: 52 U/L (ref 2–50)
ANION GAP SERPL CALC-SCNC: 5 MMOL/L (ref 0–11.9)
AST SERPL-CCNC: 50 U/L (ref 12–45)
BASOPHILS # BLD AUTO: 0.6 % (ref 0–1.8)
BASOPHILS # BLD: 0.04 K/UL (ref 0–0.12)
BILIRUB SERPL-MCNC: 0.7 MG/DL (ref 0.1–1.5)
BUN SERPL-MCNC: 14 MG/DL (ref 8–22)
CALCIUM SERPL-MCNC: 9.2 MG/DL (ref 8.5–10.5)
CHLORIDE SERPL-SCNC: 106 MMOL/L (ref 96–112)
CO2 SERPL-SCNC: 27 MMOL/L (ref 20–33)
CREAT SERPL-MCNC: 0.83 MG/DL (ref 0.5–1.4)
EOSINOPHIL # BLD AUTO: 0.15 K/UL (ref 0–0.51)
EOSINOPHIL NFR BLD: 2.3 % (ref 0–6.9)
ERYTHROCYTE [DISTWIDTH] IN BLOOD BY AUTOMATED COUNT: 47.8 FL (ref 35.9–50)
GLOBULIN SER CALC-MCNC: 3.4 G/DL (ref 1.9–3.5)
GLUCOSE SERPL-MCNC: 154 MG/DL (ref 65–99)
HCT VFR BLD AUTO: 51.1 % (ref 42–52)
HGB BLD-MCNC: 16.4 G/DL (ref 14–18)
IMM GRANULOCYTES # BLD AUTO: 0.02 K/UL (ref 0–0.11)
IMM GRANULOCYTES NFR BLD AUTO: 0.3 % (ref 0–0.9)
INR BLD: 2.1 (ref 0.9–1.2)
INR PPP: 1.77 (ref 0.87–1.13)
LYMPHOCYTES # BLD AUTO: 1.84 K/UL (ref 1–4.8)
LYMPHOCYTES NFR BLD: 27.8 % (ref 22–41)
MCH RBC QN AUTO: 31.8 PG (ref 27–33)
MCHC RBC AUTO-ENTMCNC: 32.1 G/DL (ref 33.7–35.3)
MCV RBC AUTO: 99 FL (ref 81.4–97.8)
MONOCYTES # BLD AUTO: 0.42 K/UL (ref 0–0.85)
MONOCYTES NFR BLD AUTO: 6.4 % (ref 0–13.4)
NEUTROPHILS # BLD AUTO: 4.14 K/UL (ref 1.82–7.42)
NEUTROPHILS NFR BLD: 62.6 % (ref 44–72)
NRBC # BLD AUTO: 0 K/UL
NRBC BLD-RTO: 0 /100 WBC
PLATELET # BLD AUTO: 238 K/UL (ref 164–446)
PMV BLD AUTO: 10.3 FL (ref 9–12.9)
POTASSIUM SERPL-SCNC: 4.6 MMOL/L (ref 3.6–5.5)
PROT SERPL-MCNC: 7.3 G/DL (ref 6–8.2)
PROTHROMBIN TIME: 21.1 SEC (ref 12–14.6)
RBC # BLD AUTO: 5.16 M/UL (ref 4.7–6.1)
SODIUM SERPL-SCNC: 138 MMOL/L (ref 135–145)
WBC # BLD AUTO: 6.6 K/UL (ref 4.8–10.8)

## 2019-11-21 PROCEDURE — 36415 COLL VENOUS BLD VENIPUNCTURE: CPT

## 2019-11-21 PROCEDURE — 85610 PROTHROMBIN TIME: CPT

## 2019-11-21 PROCEDURE — 80053 COMPREHEN METABOLIC PANEL: CPT

## 2019-11-21 PROCEDURE — 85025 COMPLETE CBC W/AUTO DIFF WBC: CPT

## 2019-11-21 PROCEDURE — 82105 ALPHA-FETOPROTEIN SERUM: CPT

## 2019-11-21 NOTE — PROGRESS NOTES
Anticoagulation Summary  As of 2019    INR goal:   2.0-3.0   TTR:   63.9 % (3.1 y)   INR used for dosin.10 (2019)   Warfarin maintenance plan:   2 mg (4 mg x 0.5) every Tue, Thu, Sat; 4 mg (4 mg x 1) all other days   Weekly warfarin total:   22 mg   Plan last modified:   Sin Huitron, PharmD (10/8/2019)   Next INR check:   1/15/2020   Target end date:   Indefinite    Indications    Atrial fibrillation (HCC) [I48.91]  Status post transcatheter aortic valve replacement (TAVR) using bioprosthesis [Z95.3]             Anticoagulation Episode Summary     INR check location:       Preferred lab:       Send INR reminders to:       Comments:   Pt goes by Kinsey      Anticoagulation Care Providers     Provider Role Specialty Phone number    Vickey Rutherford M.D. Referring Cardiology [phone]    [facility] Responsible  [phone]        Anticoagulation Patient Findings          History of Present Illness: follow up appointment for chronic anticoagulation with the high risk medication, warfarin for Afib    Last INR was out of range, dosage adjusted    INR is therapeutic  Pt is to continue with current warfarin dosing regimen.    Follow up in 8 weeks, to reduce the risk of adverse events related to this high risk medication, warfarin.    Mirian Mensah, Pharmacy Intern  Pratima Ricks, Clinical Pharmacist

## 2019-11-21 NOTE — PROGRESS NOTES
Intensive Cardiac Rehabilitation (ICR) and Individual Treatment Plan (ITP) reviewed and signed.    Electronically Signed by:  Cecilio Mc M.D., [facility]  11/21/2019  9:03 AM

## 2019-11-22 ENCOUNTER — NON-PROVIDER VISIT (OUTPATIENT)
Dept: CARDIOLOGY | Facility: MEDICAL CENTER | Age: 79
End: 2019-11-22
Payer: MEDICARE

## 2019-11-22 DIAGNOSIS — Z95.3 STATUS POST AORTIC VALVE REPLACEMENT WITH BIOPROSTHETIC VALVE: ICD-10-CM

## 2019-11-22 LAB — EKG IMPRESSION: NORMAL

## 2019-11-22 PROCEDURE — G0423 INTENS CARDIAC REHAB NO EXER: HCPCS | Mod: 59 | Performed by: INTERNAL MEDICINE

## 2019-11-22 PROCEDURE — G0422 INTENS CARDIAC REHAB W/EXERC: HCPCS | Performed by: INTERNAL MEDICINE

## 2019-11-23 LAB — AFP-TM SERPL-MCNC: 3 NG/ML (ref 0–9)

## 2019-11-25 ENCOUNTER — NON-PROVIDER VISIT (OUTPATIENT)
Dept: CARDIOLOGY | Facility: MEDICAL CENTER | Age: 79
End: 2019-11-25
Payer: MEDICARE

## 2019-11-25 DIAGNOSIS — Z95.3 STATUS POST AORTIC VALVE REPLACEMENT WITH BIOPROSTHETIC VALVE: ICD-10-CM

## 2019-11-25 LAB — EKG IMPRESSION: NORMAL

## 2019-11-25 PROCEDURE — G0423 INTENS CARDIAC REHAB NO EXER: HCPCS | Mod: 59 | Performed by: INTERNAL MEDICINE

## 2019-11-25 PROCEDURE — G0422 INTENS CARDIAC REHAB W/EXERC: HCPCS | Performed by: INTERNAL MEDICINE

## 2019-11-25 NOTE — PROGRESS NOTES
Duane Parkinson attended: Exercise Workshop from 9 to 10 am.      The topic was: Improve Performance.    Patient received handouts regarding the specific exercise information

## 2019-11-27 ENCOUNTER — NON-PROVIDER VISIT (OUTPATIENT)
Dept: CARDIOLOGY | Facility: MEDICAL CENTER | Age: 79
End: 2019-11-27
Payer: MEDICARE

## 2019-11-27 DIAGNOSIS — Z95.3 STATUS POST AORTIC VALVE REPLACEMENT WITH BIOPROSTHETIC VALVE: ICD-10-CM

## 2019-11-27 LAB — EKG IMPRESSION: NORMAL

## 2019-11-27 PROCEDURE — G0422 INTENS CARDIAC REHAB W/EXERC: HCPCS | Performed by: INTERNAL MEDICINE

## 2019-11-27 PROCEDURE — G0423 INTENS CARDIAC REHAB NO EXER: HCPCS | Mod: 59 | Performed by: INTERNAL MEDICINE

## 2019-11-27 NOTE — PROGRESS NOTES
Duane Parkinson attended: cooking school from  [id number]  Today  prepared Hummus.    Patient received handouts and nutrition information regarding the specific recipes.

## 2019-12-02 ENCOUNTER — NON-PROVIDER VISIT (OUTPATIENT)
Dept: CARDIOLOGY | Facility: MEDICAL CENTER | Age: 79
End: 2019-12-02
Payer: MEDICARE

## 2019-12-02 DIAGNOSIS — Z95.3 STATUS POST AORTIC VALVE REPLACEMENT WITH BIOPROSTHETIC VALVE: ICD-10-CM

## 2019-12-02 LAB — EKG IMPRESSION: NORMAL

## 2019-12-02 PROCEDURE — G0423 INTENS CARDIAC REHAB NO EXER: HCPCS | Mod: 59 | Performed by: INTERNAL MEDICINE

## 2019-12-02 PROCEDURE — G0422 INTENS CARDIAC REHAB W/EXERC: HCPCS | Performed by: INTERNAL MEDICINE

## 2019-12-04 ENCOUNTER — NON-PROVIDER VISIT (OUTPATIENT)
Dept: CARDIOLOGY | Facility: MEDICAL CENTER | Age: 79
End: 2019-12-04
Payer: MEDICARE

## 2019-12-04 DIAGNOSIS — Z95.3 STATUS POST AORTIC VALVE REPLACEMENT WITH BIOPROSTHETIC VALVE: ICD-10-CM

## 2019-12-04 LAB — EKG IMPRESSION: NORMAL

## 2019-12-04 PROCEDURE — G0422 INTENS CARDIAC REHAB W/EXERC: HCPCS | Performed by: INTERNAL MEDICINE

## 2019-12-04 PROCEDURE — G0423 INTENS CARDIAC REHAB NO EXER: HCPCS | Mod: 59 | Performed by: INTERNAL MEDICINE

## 2019-12-04 NOTE — PROGRESS NOTES
Duane Parkinson attended: cooking school from 9 to 10 am.  Today  prepared Chocolate Mousse.    Patient received handouts and nutrition information regarding the specific recipes.

## 2019-12-09 ENCOUNTER — NON-PROVIDER VISIT (OUTPATIENT)
Dept: CARDIOLOGY | Facility: MEDICAL CENTER | Age: 79
End: 2019-12-09
Payer: MEDICARE

## 2019-12-09 DIAGNOSIS — Z95.3 STATUS POST AORTIC VALVE REPLACEMENT WITH BIOPROSTHETIC VALVE: ICD-10-CM

## 2019-12-09 LAB — EKG IMPRESSION: NORMAL

## 2019-12-09 PROCEDURE — G0422 INTENS CARDIAC REHAB W/EXERC: HCPCS | Performed by: INTERNAL MEDICINE

## 2019-12-09 PROCEDURE — G0423 INTENS CARDIAC REHAB NO EXER: HCPCS | Mod: 59 | Performed by: INTERNAL MEDICINE

## 2019-12-09 ASSESSMENT — PATIENT HEALTH QUESTIONNAIRE - PHQ9: SUM OF ALL RESPONSES TO PHQ QUESTIONS 1-9: 4

## 2019-12-09 NOTE — PROGRESS NOTES
Individualized Treatment Plan   Cardiac Rehab Individual Treatment Plan  Initial/Reassessment/Discharge Assessment/ ReAssessment/ Discharge: (P) 150 Day 19 Session #     P) 71   MRN: [id number] Allergies: Feldene [piroxicam] and Percodan [oxycodone-aspirin]   Patient Name: Duane Parkinson : 1940 Risk Stratification: (P) High    Diagnoses:   1. Status post aortic valve replacement with bioprosthetic valve     Age: 79 y.o. Physician: Fransisco Graham M.D.    Date of Event: (P) 19 Specialist: (P) Sita   Risk Factors:  (P) Hypertension, Hyperlipidemia, Obesity, Stress, Sedentary Lifestyle, Age, Male > 45   Exercise Nutrition Other Core Components/ Risk Factors Psychosocial   Stages of change Stages of change Stages of change Stages of change   (P) Preparation (P) Preparation (P) Preparation (P) Preparation   Fitness Test Lipids Learning Barriers Psychosocial Plan   DASI: (P) (n/a) Available: (P) Yes Learning Barriers: (P) Vision Psychosocial Plan: (P) Yes   DIST: (P) (No Data) Date: (P) 18 Family Support Goals   Max HR: (P) (No Data) Total: (P) 123 mg/dL (P) Yes Assess presence or absence of depression using a valid screening: (P) Yes   Max BP: Peak Ex BP: (P) (No Data) Trig: (P) 189 mg/dL Lives: (P) Alone(. Has family support; children/grandchildren.) Use Stress Management: (P) Yes   RPE: (P) (No Data) HDL: (P) 32 mg/dL Other Risk Factors Plan Adverse Events: (P) No   SPO2: (P) (No Data)       MET: (P) (No Data) LDL: (P) 53 mg/dL Other Risk Factors/Plan: (P) Yes Unexpected Events: (P) No   EF= (P) 70 Diabetes Tobacco Use Intervention   Ambulatory Status Diabetes: (P) No(Hx of impaired glucose tollerance) Social History     Tobacco Use   Smoking Status Former Smoker   • Packs/day: 1.00   • Years: 20.00   • Pack years: 20.00   • Types: Cigarettes   • Last attempt to quit: 1972   • Years since quittin.9   Smokeless Tobacco Former User    Behavioral Health Consult: (P) No  "  Fall Risk Assessed: (P) Yes HbA1C: (P) 5.4 % Date: (P) 06/05/18 Goals Physician Referral: (P) No   Exercise Ambulatory Status Assist Devices: (P) None Monitors BS at Home: (P) No Educate / Review and have understanding of cardiac disease prevention: Identifies Stressors: (P) Yes   Exercise Plan Frequency: (P) (n/a) Random BS: (P) 108(7/30/19)  Drug Intervention: (P) No     Weight Management  Outcome Survey Tools   Goals Weight: (P) 100.5 kg (221 lb 9.6 oz) Educate / Review and have understanding of cardiac disease prevention: (P) Yes FP QOL Overall Score: (P) 27.6(orientation data)   Home Exercise: (P) Yes Height: (P) 177.8 cm (5' 10\") Medication Compliance: (P) Yes PHQ-9: (P) 4(orientation data)   Mode: (P) Walk, Bike, Gym BMI (Calculated): (P) 31.8 Complete Tobacco Cessation: Education   Duration: (P) 5-10 minutes daily Goal weight: (P) 200lbs Complete Tobacco Cessation: (P) No(Former smoker: 1 pk/day, 20 yrs, Quit date 1/16/1972) MBSR Lectures/Videos: (P) Yes   Frequency: (P) 7 days active Waist: (P) 46 inch Intervention Psychosocial Education: (P) Coping Techniques, Positive Support System, S/S Depression, MBSR Lectures   Intervention Social History     Substance and Sexual Activity   Alcohol Use No   • Alcohol/week: 0.0 oz    Smoking Cessation Referral: (P) No     Intervention: (P) Yes Nutrition Plan Ind. Education / Counseling: (P) Yes    Exercise Prescription Nutrition Plan: (P) Yes Tobacco Adjunct: (P) No    Mode: (P) Treadmill, NuStep, UBE, Airdyne Nutrition Related Target Goals Healthy Heart Education: (P) Class Schedule Given, Medications Reviewed, Patient Education Binder Provided, Risk Factors Discussed, Videos Viewed per Fernie    Frequency: (P) 3 days/week LDL-C <100 if trig. > 200: (P) No Weekly Lectures/ Videos/ Interactive Cooking School: (P) Yes    Duration: (P) 30-45 minutes LDL-C < 70 for high risk patient:  LDL-C<70 for high risk patients: (P) Yes Education    Intensity: (P) 11-13 RPE  " Healthy Heart Education: (P) Class Schedule Given, Medications Reviewed, Patient Education Binder Provided, Risk Factors Discussed, Videos Viewed per Fernie    METS: (P) 1.0-3.0 Non HDL-C Should be < 130:  Non HDL-C Should be < 130: (P) Yes Hypertension Management   (Active Problem)    Progression: (P) ^ increments of 1-3 to THR/RPE as tolerated      THR: THR: (P) (113-123bpm) HbA1C < 7%: (P) Yes Resting BP: (P) 121/69    Angina with Exercise?   Angina with Exercise: (P) No   BMI < 25: (P) No Hypertension Education      Dietary Goal: (P) Rate Your Plate >=58     Resistance Training? Resistance Training: (P) Yes Rate your Plate: (P) Pre(40) Lectures/ Videos/ Cooking School: (P) Yes    Education Intervention Hypertension Goals    (P) Yes Dietician Consult/Class: (P) Yes Medication Adherence : (P) Yes    (P) Equipment Orientation, Exercise Safety, S/S to Report, RPE Scale, Warm Up / Cool Down, Physically Active Nurse/Patient Discussion: (P) Yes Home Self Monitoring : (P) Yes    Exercise “Target Goals” Diabetes Ed Referral: (P) No     Start Individual Exercise Rx (P) Yes Discuss Maintenance /Wt Loss: (P) Yes       Attend Cooking School: (P) Yes     BP < 140/90 or < 130/80 if DM or CKD (P) Yes Education       Nutrition Education: (P) Healthy Plant Based Eating, Sodium Reduction Cooking/ Lectures/ Cooking School/  RD 1:1     Aerobic activity 30 + min / day 5 days / wk (P) Yes          Exercise    Current : While at [facility] Iggy Euceda completes two exercises, 1 for 30 minutes, plus an intermediate weight routine. On the days Kinsey is not at [facility] he is recovering from [facility]. Kinsey has a really bad back problem that causes him pain. Kinsey has an implant device that has a remote that he can use for pain. Staff gave Kinsey some back stretches and light band routine with some bands so when he is feeling up to it, he can do some things at home to help his back.   Goal: Kinsey has a goal to get some days in at home, if his back allows  it.  Progress: Kinsey says he went to see his back doctor and they changed his back implant settings and now his back has been feeling much less painful. Kinsey has been motivated to do some stretches for his back while at home.     Nutrition     Current: Kinsey has been more mindful of his eating and tries his best to limit red meat and sodium. Kinsey admittedly is not a cook and he was eating Hungry Man tv dinners before meeting with our dietician. Now Kinsey has switched to a healthier option for tv dinners.  Goal: Kinsey says he has been reaching his goal of making healthier choices when making meals or eating.  Progress: Kinsey has been adding in some more fresh fruits and veggies. Kinsey is aware of what he eats a little more now since starting at [facility].     Hypertension     Current:  Today blood pressure is WNL  Goal:  Medication compliance, low sodium diet, home BP monitoring.  Progress: Medication compliance.      Stress     Current : Currently Kinsey is feeling better. Kinsey realizes that he need to stay moving to feel better mentally. [facility] is the first time in a long time that Kinsey has been exercising regularly.  Goal: Stress management through the activities he enjoys.   Progress:Kinsey is feeling much better since exercising and is even going to sign up for a gym membership after graduation.     Other     Notes: Kinsey is graduating in 5 sessions, staff will complete his exit interview then.

## 2019-12-09 NOTE — PROGRESS NOTES
Duane Parkinson attended: Healthy Mindset Workshop from 9 to 10 am.      The topic was: Healthy Mindset    Patient received handouts regarding the specific information

## 2019-12-10 ENCOUNTER — TELEPHONE (OUTPATIENT)
Dept: CARDIOLOGY | Facility: MEDICAL CENTER | Age: 79
End: 2019-12-10

## 2019-12-10 ENCOUNTER — ANTICOAGULATION MONITORING (OUTPATIENT)
Dept: VASCULAR LAB | Facility: MEDICAL CENTER | Age: 79
End: 2019-12-10

## 2019-12-10 DIAGNOSIS — Z95.3 STATUS POST TRANSCATHETER AORTIC VALVE REPLACEMENT (TAVR) USING BIOPROSTHESIS: ICD-10-CM

## 2019-12-10 NOTE — PROGRESS NOTES
[facility]    Pt has upcoming EGD.  He has AFib with a TAVR.  TAVR alone is not an indication for OAC.  Given no hx of TIA or stroke, will forgo bridging as discussed with the pt. He is aware there is risk of stroke while holding the warfarin.  Pt understands the risk and is willing to move forward with the procedure.    Hold warfarin 5 days prior to the procedure then resume warfarin after the procedure when cleared by his physician.     Sin Huitron, PharmD

## 2019-12-10 NOTE — TELEPHONE ENCOUNTER
Clearance request received from [facility] for EGD/EUS on 12/17/19.  Stated on cover sheet they originally faxed to wrong office and is requesting a stat response.    Last OV 10/3/19  Dx: Chronic Afib, s/p TAVR  On Coumadin/ASA  Last Echo 9/2/19 showing normal functioning TAVR AV and EF 60%    To [place] to advise on proceeding from a cardiac standpoint

## 2019-12-11 ENCOUNTER — NON-PROVIDER VISIT (OUTPATIENT)
Dept: CARDIOLOGY | Facility: MEDICAL CENTER | Age: 79
End: 2019-12-11
Payer: MEDICARE

## 2019-12-11 DIAGNOSIS — Z95.3 STATUS POST AORTIC VALVE REPLACEMENT WITH BIOPROSTHETIC VALVE: ICD-10-CM

## 2019-12-11 LAB — EKG IMPRESSION: NORMAL

## 2019-12-11 PROCEDURE — G0423 INTENS CARDIAC REHAB NO EXER: HCPCS | Mod: 59 | Performed by: INTERNAL MEDICINE

## 2019-12-11 PROCEDURE — G0422 INTENS CARDIAC REHAB W/EXERC: HCPCS | Performed by: INTERNAL MEDICINE

## 2019-12-11 NOTE — PROGRESS NOTES
Duane Parkinson attended: cooking school from 9 to 10 am.  Today  prepared Black Bean Burgers.    Patient received handouts and nutrition information regarding the specific recipes.

## 2019-12-11 NOTE — TELEPHONE ENCOUNTER
Message   Received: Yesterday   Message Contents   RADHA Michelle R.N.   Caller: Unspecified (Yesterday,  1:43 PM)             Should be ok to proceed.      Letter generated and faxed to [phone]      Formerly [facility] to [facility]

## 2019-12-13 ENCOUNTER — NON-PROVIDER VISIT (OUTPATIENT)
Dept: CARDIOLOGY | Facility: MEDICAL CENTER | Age: 79
End: 2019-12-13
Payer: MEDICARE

## 2019-12-13 DIAGNOSIS — Z95.3 STATUS POST AORTIC VALVE REPLACEMENT WITH BIOPROSTHETIC VALVE: ICD-10-CM

## 2019-12-13 LAB — EKG IMPRESSION: NORMAL

## 2019-12-13 PROCEDURE — G0422 INTENS CARDIAC REHAB W/EXERC: HCPCS | Performed by: INTERNAL MEDICINE

## 2019-12-13 PROCEDURE — G0423 INTENS CARDIAC REHAB NO EXER: HCPCS | Mod: 59 | Performed by: INTERNAL MEDICINE

## 2019-12-16 ENCOUNTER — NON-PROVIDER VISIT (OUTPATIENT)
Dept: CARDIOLOGY | Facility: MEDICAL CENTER | Age: 79
End: 2019-12-16
Payer: MEDICARE

## 2019-12-16 ENCOUNTER — HOSPITAL ENCOUNTER (OUTPATIENT)
Dept: LAB | Facility: MEDICAL CENTER | Age: 79
End: 2019-12-16
Attending: INTERNAL MEDICINE
Payer: MEDICARE

## 2019-12-16 DIAGNOSIS — Z95.3 STATUS POST AORTIC VALVE REPLACEMENT WITH TISSUE: Primary | ICD-10-CM

## 2019-12-16 LAB
EKG IMPRESSION: NORMAL
INR PPP: 1.12 (ref 0.87–1.13)
PROTHROMBIN TIME: 14.7 SEC (ref 12–14.6)

## 2019-12-16 PROCEDURE — G0423 INTENS CARDIAC REHAB NO EXER: HCPCS | Mod: 59 | Performed by: INTERNAL MEDICINE

## 2019-12-16 PROCEDURE — G0422 INTENS CARDIAC REHAB W/EXERC: HCPCS | Performed by: INTERNAL MEDICINE

## 2019-12-16 PROCEDURE — 85610 PROTHROMBIN TIME: CPT | Mod: GA

## 2019-12-16 PROCEDURE — 36415 COLL VENOUS BLD VENIPUNCTURE: CPT | Mod: GA

## 2019-12-17 DIAGNOSIS — Z79.01 CHRONIC ANTICOAGULATION: ICD-10-CM

## 2019-12-18 ENCOUNTER — NON-PROVIDER VISIT (OUTPATIENT)
Dept: CARDIOLOGY | Facility: MEDICAL CENTER | Age: 79
End: 2019-12-18
Payer: MEDICARE

## 2019-12-18 DIAGNOSIS — Z95.3 STATUS POST AORTIC VALVE REPLACEMENT WITH TISSUE: ICD-10-CM

## 2019-12-18 LAB — EKG IMPRESSION: NORMAL

## 2019-12-18 PROCEDURE — G0422 INTENS CARDIAC REHAB W/EXERC: HCPCS | Performed by: INTERNAL MEDICINE

## 2019-12-18 PROCEDURE — G0423 INTENS CARDIAC REHAB NO EXER: HCPCS | Mod: 59 | Performed by: INTERNAL MEDICINE

## 2019-12-18 NOTE — PROGRESS NOTES
"Patient attended: cooking school from  9:00 to 10:00AM  Today  prepared \"Marinara Sauce\".    Patient received handouts and nutrition information regarding the specific recipes.  "

## 2019-12-19 ENCOUNTER — OFFICE VISIT (OUTPATIENT)
Dept: NEUROLOGY | Facility: MEDICAL CENTER | Age: 79
End: 2019-12-19
Payer: MEDICARE

## 2019-12-19 VITALS
HEART RATE: 86 BPM | RESPIRATION RATE: 16 BRPM | WEIGHT: 223.6 LBS | BODY MASS INDEX: 30.28 KG/M2 | DIASTOLIC BLOOD PRESSURE: 58 MMHG | OXYGEN SATURATION: 97 % | SYSTOLIC BLOOD PRESSURE: 104 MMHG | TEMPERATURE: 97.6 F | HEIGHT: 72 IN

## 2019-12-19 DIAGNOSIS — R41.3 MEMORY LOSS: ICD-10-CM

## 2019-12-19 PROCEDURE — 99204 OFFICE O/P NEW MOD 45 MIN: CPT

## 2019-12-19 RX ORDER — CLINDAMYCIN HYDROCHLORIDE 300 MG/1
CAPSULE ORAL
COMMUNITY
Start: 2019-11-18 | End: 2020-04-07 | Stop reason: SDUPTHER

## 2019-12-19 NOTE — PROGRESS NOTES
CC: Referred by PCP for memory loss       HPI:  80 yo male with heart valve replacement last year who presents with short term memory. The short term memory had been ongoing for several years and worse since last year.  He lives by himself. Forgets appointments, to take his medications. Still drives.  His bills are preset and he still drives and he denies any driving difficulties.   Sometimes he would leave the stove on but not very often. He would go back and double check.  He had aortic valve replacement and has atrial fibrillation and takes warfarin daily.  He is snoring and has LORENA.  Sometimes does not sleep well but most of the time he is OK with his sleeping.  Quit drinking about one year ago and stopped due to cirrhosis.  Appetite is OK, no hallucinations or delusions.  Depression since his wife passed away 15 years ago and states he learned to live with it.  He lives by himself but has very involved his daughter and son.   while he was working and retired at 58 years old.  Dad passed away from old age and mom passed in early 60's from brain aneurysm.  He has one living brother and three passed away( one passed away at 99 yo, the other two dies of complications of DM and third was half sibling and  long time ago from MI)   He was prediabetic however this had improved, hypertension.He also takes gabapentin for peripheral neuropathy.                                                             ROS:   Constitutional: No fevers or chills.  Eyes: No blurry vision or eye pain.  ENT: No dysphagia or hearing loss.  Respiratory: No cough or shortness of breath.  Cardiovascular: No chest pain or palpitations.  GI: No nausea, vomiting, or diarrhea.  : No urinary incontinence or dysuria.  Musculoskeletal: No joint swelling or arthralgias.  Skin: No skin rashes.  Neuro: No headaches, dizziness, or tremors.  Endocrine: No heat or cold intolerance. No polydipsia or polyuria.  Psych: No depression or  anxiety.  Heme/Lymph: No easy bruising or swollen lymph nodes.      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)    • Clotting disorder (HCC)     reports nose bleeds   • Depression     Lost wife ,still gets tearful   • Diabetes (HCC)     diet controlled   • Encounter for long-term (current) use of other medications    • Gasping for breath    • Bengali measles    • Heart murmur    • Heart valve disease    • Hyperlipidemia    • Hypertension    • Insomnia    • Mumps    • Nasal drainage    • Pain     back & thumb   • Pyogenic arthritis, lower leg (HCC)     thumb, back   • Restless leg syndrome    • Sleep apnea     uses CPAP   • Snoring    • Tonsillitis    • Valvular heart disease        Past Surgical History:   Past Surgical History:   Procedure Laterality Date   • GIBRAN N/A 7/29/2019    Procedure: ECHOCARDIOGRAM, TRANSESOPHAGEAL;  Surgeon: Leander Buckner M.D.;  Location: SURGERY [facility];  Service: Cardiac   • TRANSCATHETER AORTIC VALVE REPLACEMENT Bilateral 7/29/2019    Procedure: REPLACEMENT, AORTIC VALVE, TRANSCATHETER;  Surgeon: Leander Buckner M.D.;  Location: SURGERY [facility];  Service: Cardiac   • FINGER ARTHROPLASTY Left 5/27/2016    Procedure: FINGER ARTHROPLASTY THUMB CARPOMETACARPAL EXCISIONAL, EXCISE TRAPEZIUM;  Surgeon: Raheel Salgado M.D.;  Location: SURGERY SAME DAY [facility];  Service:    • CARDIOVERSION      on 7/17/06, sinus rhythm until January 2007,  paroxysmal atrial fibrillation   • KNEE ARTHROPLASTY TOTAL     • LAMINOTOMY N/A     multiple lumbar spine   • OTHER ORTHOPEDIC SURGERY      lower back   • PB PERCUT IMPLNT NEUROELECT,EPIDURAL     • TOE ARTHROPLASTY     • TURP-VAPOR N/A        Social History:   Social History     Socioeconomic History   • Marital status:      Spouse name: Not on file   • Number of  "children: Not on file   • Years of education: Not on file   • Highest education level: Not on file   Occupational History   • Not on file   Social Needs   • Financial resource strain: Not on file   • Food insecurity:     Worry: Not on file     Inability: Not on file   • Transportation needs:     Medical: Not on file     Non-medical: Not on file   Tobacco Use   • Smoking status: Former Smoker     Packs/day: 1.00     Years: 20.00     Pack years: 20.00     Types: Cigarettes     Last attempt to quit: 1972     Years since quittin.9   • Smokeless tobacco: Former User   Substance and Sexual Activity   • Alcohol use: No     Alcohol/week: 0.0 oz   • Drug use: No   • Sexual activity: Not Currently     Partners: Female   Lifestyle   • Physical activity:     Days per week: Not on file     Minutes per session: Not on file   • Stress: Not on file   Relationships   • Social connections:     Talks on phone: Not on file     Gets together: Not on file     Attends Church service: Not on file     Active member of club or organization: Not on file     Attends meetings of clubs or organizations: Not on file     Relationship status: Not on file   • Intimate partner violence:     Fear of current or ex partner: Not on file     Emotionally abused: Not on file     Physically abused: Not on file     Forced sexual activity: Not on file   Other Topics Concern   • Not on file   Social History Narrative   • Not on file     Family History:  Family History   Problem Relation Age of Onset   • Other Mother         unknown   • Heart Disease Mother    • Cancer Father         prostate, skin   • No Known Problems Brother    • Diabetes Brother    • Heart Disease Son    • Sleep Apnea Neg Hx        Allergies:   Allergies   Allergen Reactions   • Feldene [Piroxicam] Itching   • Percodan [Oxycodone-Aspirin] Itching and Photosensitivity     \"I sunburn\"     Current Outpatient Medications on File Prior to Visit   Medication Sig Dispense Refill   • " clindamycin (CLEOCIN) 300 MG Cap      • digoxin (LANOXIN) 250 MCG Tab Take 1 Tab by mouth every bedtime. 90 Tab 3   • warfarin (COUMADIN) 4 MG Tab Take one tablet by mouth one time daily or as directed by coumadin clinic 90 Tab 3   • aspirin (ASA) 81 MG Chew Tab chewable tablet Take 1 Tab by mouth every day. 90 Tab 3   • warfarin (COUMADIN) 6 MG Tab Take 1 Tab by mouth every day. 30 Tab 3   • acetaminophen (TYLENOL) 500 MG Tab Take 500-1,000 mg by mouth every 6 hours as needed.     • gabapentin (NEURONTIN) 300 MG Cap Take 300 mg by mouth 3 times a day. 1 in the morning, 1 at noon, 2 at 4 pm, 3 at bedtime     • pravastatin (PRAVACHOL) 80 MG tablet Take 1 Tab by mouth every day. 90 Tab 0   • cyclobenzaprine (FLEXERIL) 10 MG Tab Take 10 mg by mouth 3 times a day as needed for Muscle Spasms.     • B Complex Vitamins (VITAMIN B COMPLEX PO) Take 1 Tab by mouth every evening.     • Psyllium (METAMUCIL PO) Take  by mouth every morning.     • Cholecalciferol (VITAMIN D3) 2000 UNITS Tab Take 1 Tab by mouth every evening.     • multivitamin (THERAGRAN) TABS Take 1 Tab by mouth every bedtime.     • omeprazole (PRILOSEC) 20 MG delayed-release capsule Take 1 Cap by mouth every day. 30 Cap 1   • metoprolol SR (TOPROL XL) 50 MG TABLET SR 24 HR TAKE ONE TABLET BY MOUTH EVERY DAY 90 Tab 3     No current facility-administered medications on file prior to visit.        Physical Exam:     Encounter Vitals  Standard Vitals  Vitals  Blood Pressure : 104/58  Temperature: 36.4 °C (97.6 °F)  Temp src: Temporal  Pulse: 86  Respiration: 16  Pulse Oximetry: 97 %  Height: 182.9 cm (6')  Weight: 101.4 kg (223 lb 9.6 oz)  Encounter Vitals  Temperature: 36.4 °C (97.6 °F)  Temp src: Temporal  Blood Pressure : 104/58  Pulse: 86  Respiration: 16  Pulse Oximetry: 97 %  Weight: 101.4 kg (223 lb 9.6 oz)  Height: 182.9 cm (6')  BMI (Calculated): 30.33  Constitutional: Well-developed, well-nourished, good hygiene. Appears stated age.  Cardiovascular: RRR,  with no murmurs, rubs or gallops. No carotid bruits.   Respiratory: Lungs CTA B/L, no W/R/R.   Abdomen: Soft Non-tender to Palpation. Non-distended.  Extremities: No peripheral edema, pedal pulses intact.   Skin: Warm, dry, intact. No rashes observed.  Eyes: Sclera anicteric   Funduscopic: Optic discs flat with no evidence of papilledema or pallor.   Neurologic:   Mental Status: Awake, alert, oriented x 2.  MOCA 23/30      Speech: Fluent with normal prosody.   Memory: Able to recall recent and remote events accurately.    Concentration: Attentive. Able to focus on history and follow multi-step commands.              Fund of Knowledge: Appropriate   Cranial Nerves:    CN II: PERRL. No afferent pupillary defect.    CN III, IV, VI: Good eye closure, EOMI.     CN V: Facial sensation intact and symmetric.     CN VII: No facial asymmetry.     CN VIII: Hearing intact.     CN IX and X: Palate elevates symmetrically. Normal gag reflex.    CN XI: Symmetric shoulder shrug.     CN XII: Tongue midline.    Sensory: Intact light touch, vibration and temperature.    Coordination: No evidence of past-pointing on finger to nose testing, no dysdiadochokinesia. Heel to shin intact.             DTR's: 2+ throughout without clonus.    Babinski: Toes downgoing bilaterally.   Romberg: Negative.   Movements: No tremors observed.   Musculoskeletal:    Strength: 5/5 in upper and lower extremities bilaterally.   Gait: Steady, narrow based.    Tone: Normal bulk and tone.   Joints: No swelling.     Labs:  Lab Results   Component Value Date/Time    SODIUM 138 11/21/2019 09:16 AM    POTASSIUM 4.6 11/21/2019 09:16 AM    CHLORIDE 106 11/21/2019 09:16 AM    CO2 27 11/21/2019 09:16 AM    GLUCOSE 154 (H) 11/21/2019 09:16 AM    BUN 14 11/21/2019 09:16 AM    CREATININE 0.83 11/21/2019 09:16 AM    BUNCREATRAT 15 06/05/2018 08:29 AM      Lab Results   Component Value Date/Time    WBC 6.6 11/21/2019 09:16 AM    RBC 5.16 11/21/2019 09:16 AM    HEMOGLOBIN  16.4 11/21/2019 09:16 AM    HEMATOCRIT 51.1 11/21/2019 09:16 AM    MCV 99.0 (H) 11/21/2019 09:16 AM    MCH 31.8 11/21/2019 09:16 AM    MCHC 32.1 (L) 11/21/2019 09:16 AM    MPV 10.3 11/21/2019 09:16 AM    NEUTSPOLYS 62.60 11/21/2019 09:16 AM    LYMPHOCYTES 27.80 11/21/2019 09:16 AM    MONOCYTES 6.40 11/21/2019 09:16 AM    EOSINOPHILS 2.30 11/21/2019 09:16 AM    BASOPHILS 0.60 11/21/2019 09:16 AM      Imaging:   No brain imaging     Assessment/Plan:  Cognitive dysfunction  Possible AD  CT head w/o contrast as he can not have MRI brain   Neuropsychological testing   B12, folate and TSH labs

## 2019-12-20 ENCOUNTER — NON-PROVIDER VISIT (OUTPATIENT)
Dept: CARDIOLOGY | Facility: MEDICAL CENTER | Age: 79
End: 2019-12-20
Payer: MEDICARE

## 2019-12-20 DIAGNOSIS — Z95.3 STATUS POST AORTIC VALVE REPLACEMENT WITH TISSUE: ICD-10-CM

## 2019-12-20 LAB — EKG IMPRESSION: NORMAL

## 2019-12-20 PROCEDURE — G0422 INTENS CARDIAC REHAB W/EXERC: HCPCS | Performed by: INTERNAL MEDICINE

## 2019-12-20 PROCEDURE — G0423 INTENS CARDIAC REHAB NO EXER: HCPCS | Mod: 59 | Performed by: INTERNAL MEDICINE

## 2019-12-20 ASSESSMENT — PATIENT HEALTH QUESTIONNAIRE - PHQ9: SUM OF ALL RESPONSES TO PHQ QUESTIONS 1-9: 0

## 2019-12-20 NOTE — PROGRESS NOTES
Individualized Treatment Plan   Cardiac Rehab Individual Treatment Plan  Initial/Reassessment/Discharge Assessment/ ReAssessment/ Discharge: (P) Discharge(Graduation) 19 Session #     (Y) 97   MRN: [id number] Allergies: Feldene [piroxicam] and Percodan [oxycodone-aspirin]   Patient Name: Duane Parkinson : 1940 Risk Stratification: (P) High    Diagnoses:   1. Status post aortic valve replacement with tissue     Age: 79 y.o. Physician: Fransisco Graham M.D.    Date of Event: (P) 19 Specialist: (P) Sita   Risk Factors:  (P) Hypertension, Hyperlipidemia, Obesity, Stress, Sedentary Lifestyle, Age, Male > 45   Exercise Nutrition Other Core Components/ Risk Factors Psychosocial   Stages of change Stages of change Stages of change Stages of change   (P) Action (P) Action (P) Action (P) Action   Fitness Test Lipids Learning Barriers Psychosocial Plan   DASI: (P) (n/a) Available: (P) Yes Learning Barriers: (P) Vision Psychosocial Plan: (P) Yes   DIST: (P) 1184 Date: (P) 18 Family Support Goals   Max HR: (P) 142 Total: (P) 123 mg/dL (P) Yes Assess presence or absence of depression using a valid screening: (P) Yes   Max BP: Peak Ex BP: (P) 108/72 Trig: (P) 189 mg/dL Lives: (P) Alone(. Has family support; children/grandchildren.) Use Stress Management: (P) Yes   RPE: (P) 12 HDL: (P) 32 mg/dL Other Risk Factors Plan Adverse Events: (P) No   SPO2: (P) 97 %       MET: (P) 2.69 LDL: (P) 53 mg/dL Other Risk Factors/Plan: (P) Yes Unexpected Events: (P) No   EF= (P) 70 Diabetes Tobacco Use Intervention   Ambulatory Status Diabetes: (P) No(Hx of impaired glucose tollerance) Social History     Tobacco Use   Smoking Status Former Smoker   • Packs/day: 1.00   • Years: 20.00   • Pack years: 20.00   • Types: Cigarettes   • Last attempt to quit: 1972   • Years since quittin.9   Smokeless Tobacco Former User    Behavioral Health Consult: (P) No   Fall Risk Assessed: (P) Yes HbA1C: (P) 5.3 %  "Date: (P) 11/18/19 Goals Physician Referral: (P) No   Exercise Ambulatory Status Assist Devices: (P) None Monitors BS at Home: (P) No Educate / Review and have understanding of cardiac disease prevention: Identifies Stressors: (P) Yes   Exercise Plan Frequency: (P) (n/a) Random BS: (P) 154(11/21/19)  Drug Intervention: (P) No     Weight Management  Outcome Survey Tools   Goals Weight: (P) 100.7 kg (222 lb) Educate / Review and have understanding of cardiac disease prevention: (P) Yes FP QOL Overall Score: (P) 28.08   Home Exercise: (P) Yes Height: (P) 177.8 cm (5' 10\") Medication Compliance: (P) Yes PHQ-9: (P) 0   Mode: (P) Walk, Bike, Gym BMI (Calculated): (P) 31.85 Complete Tobacco Cessation: Education   Duration: (P) 30min Goal weight: (P) 200lbs Complete Tobacco Cessation: (P) No(Former smoker: 1 pk/day, 20 yrs, Quit date 1/16/1972) MBSR Lectures/Videos: (P) Yes   Frequency: (P) 7 days active Waist: (P) 46 inch Intervention Psychosocial Education: (P) Coping Techniques, Positive Support System, S/S Depression, MBSR Lectures   Intervention Social History     Substance and Sexual Activity   Alcohol Use No   • Alcohol/week: 0.0 oz    Smoking Cessation Referral: (P) No     Intervention: (P) Yes Nutrition Plan Ind. Education / Counseling: (P) Yes    Exercise Prescription Nutrition Plan: (P) Yes Tobacco Adjunct: (P) No    Mode: (P) Treadmill, NuStep, UBE, Airdyne Nutrition Related Target Goals Healthy Heart Education: (P) Class Schedule Given, Medications Reviewed, Patient Education Binder Provided, Risk Factors Discussed, Videos Viewed per Fernie    Frequency: (P) 3 days/week LDL-C <100 if trig. > 200: (P) No Weekly Lectures/ Videos/ Interactive Cooking School: (P) Yes    Duration: (P) 30-45 minutes LDL-C < 70 for high risk patient:  LDL-C<70 for high risk patients: (P) Yes Education    Intensity: (P) 11-13 RPE  Healthy Heart Education: (P) Class Schedule Given, Medications Reviewed, Patient Education Binder " Provided, Risk Factors Discussed, Videos Viewed per Fernie    METS: (P) 1.0-3.0 Non HDL-C Should be < 130:  Non HDL-C Should be < 130: (P) Yes Hypertension Management   (Active Problem)    Progression: (P) ^ increments of 1-3 to THR/RPE as tolerated      THR: THR: (P) (113-123bpm) HbA1C < 7%: (P) Yes Resting BP: (P) 98/70(11/20/19)    Angina with Exercise?   Angina with Exercise: (P) No   BMI < 25: (P) No Hypertension Education      Dietary Goal: (P) Rate Your Plate >=58     Resistance Training? Resistance Training: (P) Yes Rate your Plate: (P) Pre(40) Lectures/ Videos/ Cooking School: (P) Yes    Education Intervention Hypertension Goals    (P) Yes Dietician Consult/Class: (P) Yes Medication Adherence : (P) Yes    (P) Equipment Orientation, Exercise Safety, S/S to Report, RPE Scale, Warm Up / Cool Down, Physically Active Nurse/Patient Discussion: (P) Yes Home Self Monitoring : (P) Yes    Exercise “Target Goals” Diabetes Ed Referral: (P) No     Start Individual Exercise Rx (P) Yes Discuss Maintenance /Wt Loss: (P) Yes       Attend Cooking School: (P) Yes     BP < 140/90 or < 130/80 if DM or CKD (P) Yes Education       Nutrition Education: (P) Healthy Plant Based Eating, Sodium Reduction Cooking/ Lectures/ Cooking School/  RD 1:1     Aerobic activity 30 + min / day 5 days / wk (P) Yes        Initial Assessment                             Right Peripheral Pulses:                  Left Peripheral Pulses:                                            Fall Assessment:                                          Exercise    Current : While at [facility] Iggy Euceda completes two exercises, 1 for 30 minutes, plus an intermediate weight routine. On the days Kinsey is not at [facility] he is recovering from [facility]. Kinsey has a really bad back problem that causes him pain. Kinsey has an implant device that has a remote that he can use for pain. Staff gave Kinsey some back stretches and light band routine with some bands so when he is feeling up to  "it, he can do some things at home to help his back.   Goal: Kinsey is joining a gym and plans on keeping his workout routine. Kinsey has a graduation goal to get on a bike everyother day for 30 minutes if his back allows it,  Progress: Kinsey feels that his endurance and strength has improved. Kinsey's walk test improved from 747 ft to 1,184 ft.     Nutrition     Current:  Kinsey has been more mindful of his eating and tries his best to limit red meat and sodium. Kinsey admittedly is not a cook and he was eating [employer] tv dinners before meeting with our dietician. Now Kinsey has switched to a healthier option for tv dinners.  Goal: Kinsey has a goal to continue to be mindful when grocery shopping for food.  Progress: Kinsey has been adding in some more fresh fruits and veggies. Kinsey is aware of what he eats a little more now since starting at [facility]. Darryl \"Rate Your Plate\" score went up from 40 to 55.     Hypertension     Current: Today blood pressure is WNL  Goal: Medication compliance, low sodium diet, home BP monitoring.  Progress:  Medication compliance.      Stress     Current :  Kinsey says he has no stress. His family lives in [place] and he is glad t be close to his grandkids. Kinsey feels pretty good since he is able to exercise.  Goal: Stress management through the activities he enjoys.   Progress: Kinsey is feeling much better since exercising and is even going to sign up for a gym membership after graduation. Kinsey also has his sights set on getting a puppy.     Other     Notes: Kinsey is really thankful for [facility] and will miss coming here.                 "

## 2019-12-20 NOTE — PROGRESS NOTES
Duane Parkinson attended the following workshop from 9 to 10 am.  Workshop Title: Fernie Leon.      Lecture was attended and patient questions addressed. The patient will continue workshops and nutrition education.

## 2019-12-26 ENCOUNTER — ANTICOAGULATION VISIT (OUTPATIENT)
Dept: VASCULAR LAB | Facility: MEDICAL CENTER | Age: 79
End: 2019-12-26
Attending: INTERNAL MEDICINE
Payer: MEDICARE

## 2019-12-26 VITALS — DIASTOLIC BLOOD PRESSURE: 62 MMHG | SYSTOLIC BLOOD PRESSURE: 108 MMHG | HEART RATE: 60 BPM

## 2019-12-26 DIAGNOSIS — Z95.3 STATUS POST TRANSCATHETER AORTIC VALVE REPLACEMENT (TAVR) USING BIOPROSTHESIS: ICD-10-CM

## 2019-12-26 LAB
INR BLD: 1.6 (ref 0.9–1.2)
INR PPP: 1.6 (ref 2–3.5)

## 2019-12-26 PROCEDURE — 85610 PROTHROMBIN TIME: CPT

## 2019-12-26 PROCEDURE — 99212 OFFICE O/P EST SF 10 MIN: CPT | Performed by: PHARMACIST

## 2019-12-26 NOTE — PROGRESS NOTES
Anticoagulation Summary  As of 2019    INR goal:   2.0-3.0   TTR:   61.9 % (3.2 y)   INR used for dosin.60! 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 Referring Cardiology [phone]    [facility] Anticoagulation Services Responsible  [phone]                Anticoagulation Patient Findings      HPI:  Duane Parkinson seen in clinic today, on anticoagulation therapy with warfarin for Afib  Changes to current medical/health status since last appt: had EDG, resumed warfarin a week ago  Denies signs/symptoms of bleeding and/or thrombosis since the last appt.    Denies any interval changes to diet  Denies any interval changes to medications since last appt.   Denies any complications or cost restrictions with current therapy.   BP recorded in vitals.       A/P   INR  is sub-therapeutic.   Will have pt take a boost dose of 4mg x1 and then resume his normal warfarin dosing.     Follow up appointment in 2 week(s).    Kell Cano, PharmD

## 2020-01-09 ENCOUNTER — ANTICOAGULATION VISIT (OUTPATIENT)
Dept: VASCULAR LAB | Facility: MEDICAL CENTER | Age: 80
End: 2020-01-09
Attending: INTERNAL MEDICINE
Payer: MEDICARE

## 2020-01-09 VITALS — HEART RATE: 66 BPM | DIASTOLIC BLOOD PRESSURE: 59 MMHG | SYSTOLIC BLOOD PRESSURE: 101 MMHG

## 2020-01-09 DIAGNOSIS — Z95.3 STATUS POST TRANSCATHETER AORTIC VALVE REPLACEMENT (TAVR) USING BIOPROSTHESIS: ICD-10-CM

## 2020-01-09 LAB — INR PPP: 1.8 (ref 2–3.5)

## 2020-01-09 PROCEDURE — 99212 OFFICE O/P EST SF 10 MIN: CPT

## 2020-01-09 PROCEDURE — 85610 PROTHROMBIN TIME: CPT

## 2020-01-09 NOTE — PROGRESS NOTES
Anticoagulation Summary  As of 2020    INR goal:   2.0-3.0   TTR:   61.2 % (3.2 y)   INR used for dosin.80! 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 Referring Cardiology [phone]    [facility] Anticoagulation Services Responsible  [phone]        Anticoagulation Patient Findings      HPI:  Duane Parkinson seen in clinic today, on anticoagulation therapy with warfarin for Afib.  Changes to current medical/health status since last appt: none  Denies signs/symptoms of bleeding and/or thrombosis since the last appt.    Denies any interval changes to diet  Denies any interval changes to medications since last appt.   Denies any complications or cost restrictions with current therapy.   BP recorded in vitals.  Confirmed dosing regimen.     A/P   INR  SUB-therapeutic.   Bolus today, begin increased warfarin regimen.     Follow up appointment in 2 week(s).    Sin Huitron, PharmD

## 2020-01-10 LAB — INR BLD: 1.8 (ref 0.9–1.2)

## 2020-01-23 ENCOUNTER — ANTICOAGULATION VISIT (OUTPATIENT)
Dept: VASCULAR LAB | Facility: MEDICAL CENTER | Age: 80
End: 2020-01-23
Attending: INTERNAL MEDICINE
Payer: MEDICARE

## 2020-01-23 DIAGNOSIS — Z95.3 STATUS POST TRANSCATHETER AORTIC VALVE REPLACEMENT (TAVR) USING BIOPROSTHESIS: ICD-10-CM

## 2020-01-23 DIAGNOSIS — I48.21 PERMANENT ATRIAL FIBRILLATION (HCC): ICD-10-CM

## 2020-01-23 LAB — INR PPP: 2 (ref 2–3.5)

## 2020-01-23 PROCEDURE — 99211 OFF/OP EST MAY X REQ PHY/QHP: CPT

## 2020-01-23 PROCEDURE — 85610 PROTHROMBIN TIME: CPT

## 2020-01-23 NOTE — PROGRESS NOTES
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 Referring Cardiology [phone]    [facility] Anticoagulation Services Responsible  [phone]        Anticoagulation Patient Findings    History of Present Illness: follow up appointment for chronic anticoagulation with the high risk medication, warfarin for AF/TAVR.    Last INR was out of range, dosage adjusted: Pt now at goal. However Pt was not taking as directed. Pt misunderstood dosing calendar and was only taking 22mg/week.  Will increase to 24mg/week     Continue current dosing regimen.     Follow up in 2 weeks, to reduce the risk of adverse events related to this high risk medication, warfarin.      Bert Mullins, Pharmacy Student      I have reviewed and concur with the above plan on .    Praitma Ricks, Clinical Pharmacist

## 2020-01-24 LAB — INR BLD: 2 (ref 0.9–1.2)

## 2020-02-06 ENCOUNTER — TELEPHONE (OUTPATIENT)
Dept: VASCULAR LAB | Facility: MEDICAL CENTER | Age: 80
End: 2020-02-06

## 2020-02-07 ENCOUNTER — ANTICOAGULATION VISIT (OUTPATIENT)
Dept: VASCULAR LAB | Facility: MEDICAL CENTER | Age: 80
End: 2020-02-07
Attending: INTERNAL MEDICINE
Payer: MEDICARE

## 2020-02-07 DIAGNOSIS — I48.21 PERMANENT ATRIAL FIBRILLATION (HCC): ICD-10-CM

## 2020-02-07 DIAGNOSIS — Z95.3 STATUS POST TRANSCATHETER AORTIC VALVE REPLACEMENT (TAVR) USING BIOPROSTHESIS: ICD-10-CM

## 2020-02-07 LAB
INR BLD: 3.3 (ref 0.9–1.2)
INR PPP: 3 (ref 2–3.5)

## 2020-02-07 PROCEDURE — 85610 PROTHROMBIN TIME: CPT

## 2020-02-07 PROCEDURE — 99211 OFF/OP EST MAY X REQ PHY/QHP: CPT

## 2020-02-07 NOTE — TELEPHONE ENCOUNTER
Pt NO SHOWED his appointment for anticoagulation services.  Routed to MA to reschedule  Pratima Ricks, Clinical Pharmacist, CDE, CACP

## 2020-02-07 NOTE — PROGRESS NOTES
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 Referring Cardiology [phone]    [facility] Anticoagulation Services Responsible  [phone]                Anticoagulation Patient Findings      HPI:  Duane Parkinson seen in clinic today, on anticoagulation therapy with warfarin for afib s/p TAVR  Changes to current medical/health status since last appt: none  Denies signs/symptoms of bleeding and/or thrombosis since the last appt.    Denies any interval changes to diet  Denies any interval changes to medications since last appt.   Denies any complications or cost restrictions with current therapy.   Verified current warfarin dosing schedule.   BP denied by patient       A/P   INR  therapeutic.   Pt is to continue with current warfarin dosing regimen.      Follow up appointment in 4 week(s).    Pratima Ricks

## 2020-02-14 ENCOUNTER — TELEPHONE (OUTPATIENT)
Dept: MEDICAL GROUP | Facility: MEDICAL CENTER | Age: 80
End: 2020-02-14

## 2020-02-14 NOTE — TELEPHONE ENCOUNTER
Received a fax from the pharmacy for Accu-Chek Catie Plus test strips to test once daily. Unable to find the chart. Please order. Thank you.

## 2020-03-03 ENCOUNTER — OFFICE VISIT (OUTPATIENT)
Dept: CARDIOLOGY | Facility: MEDICAL CENTER | Age: 80
End: 2020-03-03
Payer: MEDICARE

## 2020-03-03 ENCOUNTER — HOSPITAL ENCOUNTER (OUTPATIENT)
Dept: LAB | Facility: MEDICAL CENTER | Age: 80
End: 2020-03-03
Attending: INTERNAL MEDICINE
Payer: MEDICARE

## 2020-03-03 VITALS
SYSTOLIC BLOOD PRESSURE: 112 MMHG | HEIGHT: 72 IN | DIASTOLIC BLOOD PRESSURE: 72 MMHG | WEIGHT: 222.22 LBS | OXYGEN SATURATION: 97 % | HEART RATE: 77 BPM | RESPIRATION RATE: 14 BRPM | BODY MASS INDEX: 30.1 KG/M2

## 2020-03-03 DIAGNOSIS — I10 ESSENTIAL HYPERTENSION, BENIGN: ICD-10-CM

## 2020-03-03 DIAGNOSIS — I48.21 PERMANENT ATRIAL FIBRILLATION (HCC): ICD-10-CM

## 2020-03-03 DIAGNOSIS — Z95.3 STATUS POST TRANSCATHETER AORTIC VALVE REPLACEMENT (TAVR) USING BIOPROSTHESIS: ICD-10-CM

## 2020-03-03 LAB
ALBUMIN SERPL BCP-MCNC: 3.8 G/DL (ref 3.2–4.9)
ALBUMIN/GLOB SERPL: 1.1 G/DL
ALP SERPL-CCNC: 71 U/L (ref 30–99)
ALT SERPL-CCNC: 37 U/L (ref 2–50)
ANION GAP SERPL CALC-SCNC: 8 MMOL/L (ref 0–11.9)
AST SERPL-CCNC: 41 U/L (ref 12–45)
BASOPHILS # BLD AUTO: 0.8 % (ref 0–1.8)
BASOPHILS # BLD: 0.07 K/UL (ref 0–0.12)
BILIRUB SERPL-MCNC: 0.9 MG/DL (ref 0.1–1.5)
BUN SERPL-MCNC: 15 MG/DL (ref 8–22)
CALCIUM SERPL-MCNC: 9.9 MG/DL (ref 8.5–10.5)
CHLORIDE SERPL-SCNC: 104 MMOL/L (ref 96–112)
CO2 SERPL-SCNC: 27 MMOL/L (ref 20–33)
CREAT SERPL-MCNC: 0.83 MG/DL (ref 0.5–1.4)
EOSINOPHIL # BLD AUTO: 0.26 K/UL (ref 0–0.51)
EOSINOPHIL NFR BLD: 3.1 % (ref 0–6.9)
ERYTHROCYTE [DISTWIDTH] IN BLOOD BY AUTOMATED COUNT: 48.2 FL (ref 35.9–50)
GLOBULIN SER CALC-MCNC: 3.4 G/DL (ref 1.9–3.5)
GLUCOSE SERPL-MCNC: 64 MG/DL (ref 65–99)
HCT VFR BLD AUTO: 48.7 % (ref 42–52)
HGB BLD-MCNC: 16.1 G/DL (ref 14–18)
IMM GRANULOCYTES # BLD AUTO: 0.03 K/UL (ref 0–0.11)
IMM GRANULOCYTES NFR BLD AUTO: 0.4 % (ref 0–0.9)
INR PPP: 1.46 (ref 0.87–1.13)
LYMPHOCYTES # BLD AUTO: 2.61 K/UL (ref 1–4.8)
LYMPHOCYTES NFR BLD: 31.1 % (ref 22–41)
MCH RBC QN AUTO: 32.1 PG (ref 27–33)
MCHC RBC AUTO-ENTMCNC: 33.1 G/DL (ref 33.7–35.3)
MCV RBC AUTO: 97.2 FL (ref 81.4–97.8)
MONOCYTES # BLD AUTO: 0.97 K/UL (ref 0–0.85)
MONOCYTES NFR BLD AUTO: 11.6 % (ref 0–13.4)
NEUTROPHILS # BLD AUTO: 4.45 K/UL (ref 1.82–7.42)
NEUTROPHILS NFR BLD: 53 % (ref 44–72)
NRBC # BLD AUTO: 0 K/UL
NRBC BLD-RTO: 0 /100 WBC
PLATELET # BLD AUTO: 240 K/UL (ref 164–446)
PMV BLD AUTO: 10.1 FL (ref 9–12.9)
POTASSIUM SERPL-SCNC: 4.7 MMOL/L (ref 3.6–5.5)
PROT SERPL-MCNC: 7.2 G/DL (ref 6–8.2)
PROTHROMBIN TIME: 18.1 SEC (ref 12–14.6)
RBC # BLD AUTO: 5.01 M/UL (ref 4.7–6.1)
SODIUM SERPL-SCNC: 139 MMOL/L (ref 135–145)
WBC # BLD AUTO: 8.4 K/UL (ref 4.8–10.8)

## 2020-03-03 PROCEDURE — 99214 OFFICE O/P EST MOD 30 MIN: CPT | Performed by: INTERNAL MEDICINE

## 2020-03-03 PROCEDURE — 85610 PROTHROMBIN TIME: CPT

## 2020-03-03 PROCEDURE — 82105 ALPHA-FETOPROTEIN SERUM: CPT

## 2020-03-03 PROCEDURE — 36415 COLL VENOUS BLD VENIPUNCTURE: CPT

## 2020-03-03 PROCEDURE — 80053 COMPREHEN METABOLIC PANEL: CPT

## 2020-03-03 PROCEDURE — 85025 COMPLETE CBC W/AUTO DIFF WBC: CPT

## 2020-03-03 RX ORDER — LYSINE HCL 500 MG
500 TABLET ORAL DAILY
COMMUNITY
End: 2023-02-09

## 2020-03-03 RX ORDER — OMEGA-3 FATTY ACIDS CAP DELAYED RELEASE 1000 MG 1000 MG
1000 CAPSULE DELAYED RELEASE ORAL
COMMUNITY

## 2020-03-03 ASSESSMENT — FIBROSIS 4 INDEX: FIB4 SCORE: 2.39

## 2020-03-03 NOTE — PROGRESS NOTES
Cardiology Initial Consultation Note    Date of note:    3/3/2020    Primary Care Provider: Fransisco Graham M.D.  Referring Provider: No ref. provider found     Patient Name: Duane Parkinson   YOB: 1940  MRN:              [id number]    Chief Complaint: 6 months follow-up, hypertension, hyperlipidemia, status post TAVR, permanent atrial fibrillation    Duane Parkinson is a 80 y.o. male  patient presented today for regular follow-up.  He is having some issues with his CPAP machine, not using for last few days.  Other than that he feels really well.  Denies chest pains shortness of breath, dizziness.  He reports after his TAVR it took a while for him to improve, his energy levels are better currently.  Reports palpitations once in a while.    ROS  Joint pains, arthritis.  All other systems reviewed and negative.    All other systems reviewed and discussed using a comprehensive questionnaire and are negative.     Past medical history, family history, social history, allergies and labs are reviewed and updated as needed as documented below.    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)    • Clotting disorder (HCC)     reports nose bleeds   • Depression     Lost wife ,still gets tearful   • Diabetes (HCC)     diet controlled   • Encounter for long-term (current) use of other medications    • Gasping for breath    • Chinese measles    • Heart murmur    • Heart valve disease    • Hyperlipidemia    • Hypertension    • Insomnia    • Mumps    • Nasal drainage    • Pain     back & thumb   • Pyogenic arthritis, lower leg (HCC)     thumb, back   • Restless leg syndrome    • Sleep apnea     uses CPAP   • Snoring    • Tonsillitis    • Valvular heart disease          Past Surgical History:   Procedure Laterality Date   • GIBRAN N/A 7/29/2019    Procedure: ECHOCARDIOGRAM,  TRANSESOPHAGEAL;  Surgeon: Leander Buckner M.D.;  Location: SURGERY [facility];  Service: Cardiac   • TRANSCATHETER AORTIC VALVE REPLACEMENT Bilateral 7/29/2019    Procedure: REPLACEMENT, AORTIC VALVE, TRANSCATHETER;  Surgeon: Leander Buckner M.D.;  Location: SURGERY [facility];  Service: Cardiac   • FINGER ARTHROPLASTY Left 5/27/2016    Procedure: FINGER ARTHROPLASTY THUMB CARPOMETACARPAL EXCISIONAL, EXCISE TRAPEZIUM;  Surgeon: Raheel Salgado M.D.;  Location: SURGERY SAME DAY [facility];  Service:    • CARDIOVERSION      on 7/17/06, sinus rhythm until January 2007,  paroxysmal atrial fibrillation   • KNEE ARTHROPLASTY TOTAL     • LAMINOTOMY N/A     multiple lumbar spine   • OTHER ORTHOPEDIC SURGERY      lower back   • PB PERCUT IMPLNT NEUROELECT,EPIDURAL     • TOE ARTHROPLASTY     • TURP-VAPOR N/A          Current Outpatient Medications   Medication Sig Dispense Refill   • Coenzyme Q10 (COQ10 PO) Take  by mouth.     • Omega-3 Fatty Acids (FISH OIL) 1000 MG CAPSULE DELAYED RELEASE EC softgel capsule Take 2,000 mg by mouth every morning with breakfast.     • Glucosamine HCl (GLUCOSAMINE PO) Take  by mouth.     • lysine 500 MG Tab Take 500 mg by mouth every day.     • clindamycin (CLEOCIN) 300 MG Cap      • digoxin (LANOXIN) 250 MCG Tab Take 1 Tab by mouth every bedtime. 90 Tab 3   • warfarin (COUMADIN) 4 MG Tab Take one tablet by mouth one time daily or as directed by coumadin clinic 90 Tab 3   • aspirin (ASA) 81 MG Chew Tab chewable tablet Take 1 Tab by mouth every day. 90 Tab 3   • warfarin (COUMADIN) 6 MG Tab Take 1 Tab by mouth every day. 30 Tab 3   • acetaminophen (TYLENOL) 500 MG Tab Take 500-1,000 mg by mouth every 6 hours as needed.     • gabapentin (NEURONTIN) 300 MG Cap Take 300 mg by mouth 3 times a day. 1 in the morning, 1 at noon, 2 at 4 pm, 3 at bedtime     • metoprolol SR (TOPROL XL) 50 MG TABLET SR 24 HR TAKE ONE TABLET BY MOUTH EVERY DAY 90 Tab 3   • pravastatin  "(PRAVACHOL) 80 MG tablet Take 1 Tab by mouth every day. 90 Tab 0   • cyclobenzaprine (FLEXERIL) 10 MG Tab Take 10 mg by mouth 3 times a day as needed for Muscle Spasms.     • B Complex Vitamins (VITAMIN B COMPLEX PO) Take 1 Tab by mouth every evening.     • Psyllium (METAMUCIL PO) Take  by mouth every morning.     • Cholecalciferol (VITAMIN D3) 2000 UNITS Tab Take 1 Tab by mouth every evening.     • multivitamin (THERAGRAN) TABS Take 1 Tab by mouth every bedtime.     • omeprazole (PRILOSEC) 20 MG delayed-release capsule Take 1 Cap by mouth every day. (Patient not taking: Reported on 3/3/2020) 30 Cap 1     No current facility-administered medications for this visit.          Allergies   Allergen Reactions   • Feldene [Piroxicam] Itching   • Percodan [Oxycodone-Aspirin] Itching and Photosensitivity     \"I sunburn\"         Family History   Problem Relation Age of Onset   • Other Mother         unknown   • Heart Disease Mother    • Cancer Father         prostate, skin   • No Known Problems Brother    • Diabetes Brother    • Heart Disease Son    • Sleep Apnea Neg Hx          Social History     Socioeconomic History   • Marital status:      Spouse name: Not on file   • Number of children: Not on file   • Years of education: Not on file   • Highest education level: Not on file   Occupational History   • Not on file   Social Needs   • Financial resource strain: Not on file   • Food insecurity     Worry: Not on file     Inability: Not on file   • Transportation needs     Medical: Not on file     Non-medical: Not on file   Tobacco Use   • Smoking status: Former Smoker     Packs/day: 1.00     Years: 20.00     Pack years: 20.00     Types: Cigarettes     Last attempt to quit: 1972     Years since quittin.1   • Smokeless tobacco: Former User   Substance and Sexual Activity   • Alcohol use: No     Alcohol/week: 0.0 oz   • Drug use: No   • Sexual activity: Not Currently     Partners: Female   Lifestyle   • Physical " activity     Days per week: Not on file     Minutes per session: Not on file   • Stress: Not on file   Relationships   • Social connections     Talks on phone: Not on file     Gets together: Not on file     Attends Evangelical service: Not on file     Active member of club or organization: Not on file     Attends meetings of clubs or organizations: Not on file     Relationship status: Not on file   • Intimate partner violence     Fear of current or ex partner: Not on file     Emotionally abused: Not on file     Physically abused: Not on file     Forced sexual activity: Not on file   Other Topics Concern   • Not on file   Social History Narrative   • Not on file         Physical Exam:  Ambulatory Vitals  /72 (BP Location: Right arm, Patient Position: Sitting, BP Cuff Size: Adult)   Pulse 77   Resp 14   Ht 1.829 m (6')   Wt 100.8 kg (222 lb 3.6 oz)   SpO2 97%    Oxygen Therapy:  Pulse Oximetry: 97 %  BP Readings from Last 4 Encounters:   03/03/20 112/72   01/09/20 101/59   12/26/19 108/62   12/19/19 104/58       Weight/BMI: Body mass index is 30.14 kg/m².  Wt Readings from Last 4 Encounters:   03/03/20 100.8 kg (222 lb 3.6 oz)   12/19/19 101.4 kg (223 lb 9.6 oz)   11/18/19 102.3 kg (225 lb 9.6 oz)   10/03/19 100.5 kg (221 lb 9 oz)         General: Well appearing and in no apparent distress  Head: atrumatic  Eyes: No conjunctival pallor   ENT: normal external appearance of nose and ears  Neck: JVD absent, carotid bruits absent  Lungs: respiratory sounds  normal, additional breath sounds absent  Heart: Irregularly irregular rhythm   No palpable thrills on palpation, murmurs absent, no rubs,   Lower extremity edema absent.   Pedal pulses normal  Abdomen: soft, non tender, non distended.  Extremities/MSK: no clubbing, no cyanosis  Neurological: normal orientation, Gait normal   Psychiatric: Appropriate affect, intact judgement and insight  Skin: Warm extremities        Lab Data Review:  Lab Results   Component  Value Date/Time    CHOLSTRLTOT 123 06/05/2018 08:29 AM    CHOLSTRLTOT 110 06/20/2017 07:36 AM    LDL 53 06/05/2018 08:29 AM    LDL 53 06/20/2017 07:36 AM    HDL 32 (L) 06/05/2018 08:29 AM    HDL 35 (A) 06/20/2017 07:36 AM    TRIGLYCERIDE 189 (H) 06/05/2018 08:29 AM    TRIGLYCERIDE 109 06/20/2017 07:36 AM       Lab Results   Component Value Date/Time    SODIUM 138 11/21/2019 09:16 AM    POTASSIUM 4.6 11/21/2019 09:16 AM    CHLORIDE 106 11/21/2019 09:16 AM    CO2 27 11/21/2019 09:16 AM    GLUCOSE 154 (H) 11/21/2019 09:16 AM    BUN 14 11/21/2019 09:16 AM    CREATININE 0.83 11/21/2019 09:16 AM    BUNCREATRAT 15 06/05/2018 08:29 AM     Lab Results   Component Value Date/Time    ALKPHOSPHAT 88 11/21/2019 09:16 AM    ASTSGOT 50 (H) 11/21/2019 09:16 AM    ALTSGPT 52 (H) 11/21/2019 09:16 AM    TBILIRUBIN 0.7 11/21/2019 09:16 AM      Lab Results   Component Value Date/Time    WBC 8.4 03/03/2020 10:54 AM     TAVR 7/29/2019  1. Successful transcatheter aortic valve replacement (TAVR) with #29 Edward Antwan 3 valve, transfemoral approach under general anesthesia on 7/29/19     Cath 7/23/2019  Findings   Hemodynamics: Aorta: 131/85 mmHg  LV: 165/23 mmHg  RA: 14 mmHg  RV: 33/12 mmHg  PA: 34/22/26 mmHg  PCWP: 23 mmHg  PA Saturation: 81  Arterial saturation: 96   Cardiac output/index: 7.4 L/min /3.3 L/min/m^2 Benji,  3.8 L/min /1.7 L/min/m^2 Thermodilution  Aortic valve mean gradient: 25 mmHg  Aortic valve area: 1.48 mm^2 Benji, 0.85 mm^2 Thermodilution     Coronary Anatomy              Left Main: 10% proximal stenosis with mild distal tapering              LAD: 40% proximal and 50% mid vessel stenosis              LCx: 30% proximal stenosis, OM1 is small to medium sized with long 40% proximal stenosis, the OM2 is small to moderate in size with 40% proximal stenosis, OM3 is large with a 50% stenosis              RCA: Dominant, 30% proximal and mid stenosis.      Abdominal aortography: normal abdominal aorta and bilateral iliofemoral  system  Aortic root angiography: Normal caliber root without significant AI     Impression and Plan:  1. severe aortic stenosis status post TAVR 29 mm S3 valve July 2019, currently NYHA class II stage C  2.  Permanent atrial fibrillation  3.  Hypertension  4.  Dyslipidemia      Medical Decision Making:  She feels well.  No complaints today.  Continue aspirin.  Atrial fibrillation rates are controlled, continue digoxin, metoprolol.  Continue warfarin for anticoagulation.  Continue pravastatin for hyperlipidemia.    Return in about 6 months (around 9/3/2020).    This note was dictated using Dragon speech recognition software.    Leander MCCONNELL  Interventional cardiologist  [facility], [address]  [address] [address]  Phone: [phone]  Fax: [phone]

## 2020-03-05 LAB — AFP-TM SERPL-MCNC: 3 NG/ML (ref 0–9)

## 2020-03-06 ENCOUNTER — ANTICOAGULATION VISIT (OUTPATIENT)
Dept: VASCULAR LAB | Facility: MEDICAL CENTER | Age: 80
End: 2020-03-06
Attending: INTERNAL MEDICINE
Payer: MEDICARE

## 2020-03-06 VITALS — HEART RATE: 70 BPM | DIASTOLIC BLOOD PRESSURE: 62 MMHG | SYSTOLIC BLOOD PRESSURE: 125 MMHG

## 2020-03-06 DIAGNOSIS — Z95.3 STATUS POST TRANSCATHETER AORTIC VALVE REPLACEMENT (TAVR) USING BIOPROSTHESIS: ICD-10-CM

## 2020-03-06 LAB — INR PPP: 1.7 (ref 2–3.5)

## 2020-03-06 PROCEDURE — 99212 OFFICE O/P EST SF 10 MIN: CPT

## 2020-03-06 PROCEDURE — 85610 PROTHROMBIN TIME: CPT

## 2020-03-06 NOTE — PROGRESS NOTES
Anticoagulation Summary  As of 3/6/2020    INR goal:   2.0-3.0   TTR:   60.9 % (3.4 y)   INR used for dosin.70! (3/6/2020)   Warfarin maintenance plan:   2 mg (4 mg x 0.5) every Tue, Sat; 4 mg (4 mg x 1) all other days   Weekly warfarin total:   24 mg   Plan last modified:   Pratima Ricks (2020)   Next INR check:   3/20/2020   Target end date:   Indefinite    Indications    Atrial fibrillation (HCC) [I48.91]  Status post transcatheter aortic valve replacement (TAVR) using bioprosthesis [Z95.3]             Anticoagulation Episode Summary     INR check location:       Preferred lab:       Send INR reminders to:       Comments:   Pt goes by Kinsey      Anticoagulation Care Providers     Provider Role Specialty Phone number    Vickey Rutherford M.D. Referring Cardiology [phone]    [facility] Anticoagulation Services Responsible  [phone]        Anticoagulation Patient Findings  Patient Findings     Positives:   Missed doses          HPI:  Duane Parkinson seen in clinic today for follow up on anticoagulation therapy in the presence of Afib, S/p tavr.   Denies any changes to current medical/health status since last appointment.   Denies any medication or diet changes.   No current symptoms of bleeding or thrombosis reported.    A/P:   INR sub- therapeutic at 1.7. Patient does admit to missed dose but does not remember when. Pt is to bolus tonight with 6 mg then is to continue current regimen.   BP recorded in vitals.    Follow up appointment in 2 week(s).    Next Appointment: 2020    Sergio Pan, Pharm.D

## 2020-03-09 LAB — INR BLD: 1.7 (ref 0.9–1.2)

## 2020-03-11 ENCOUNTER — SLEEP CENTER VISIT (OUTPATIENT)
Dept: SLEEP MEDICINE | Facility: MEDICAL CENTER | Age: 80
End: 2020-03-11
Payer: MEDICARE

## 2020-03-11 VITALS
OXYGEN SATURATION: 97 % | HEIGHT: 72 IN | WEIGHT: 221 LBS | SYSTOLIC BLOOD PRESSURE: 128 MMHG | BODY MASS INDEX: 29.93 KG/M2 | HEART RATE: 69 BPM | DIASTOLIC BLOOD PRESSURE: 70 MMHG

## 2020-03-11 DIAGNOSIS — G47.39 TREATMENT-EMERGENT CENTRAL SLEEP APNEA: ICD-10-CM

## 2020-03-11 DIAGNOSIS — F32.A DEPRESSION, UNSPECIFIED DEPRESSION TYPE: ICD-10-CM

## 2020-03-11 DIAGNOSIS — G47.33 OSA (OBSTRUCTIVE SLEEP APNEA): ICD-10-CM

## 2020-03-11 PROCEDURE — 99214 OFFICE O/P EST MOD 30 MIN: CPT | Performed by: NURSE PRACTITIONER

## 2020-03-11 ASSESSMENT — FIBROSIS 4 INDEX: FIB4 SCORE: 2.3

## 2020-03-11 NOTE — PATIENT INSTRUCTIONS
1) Continue ASV @ EPAP 7/10, PS 4/15, max pressure [id number]  2) Clean mask and supplies weekly and change them as insurance allows - mask fitting with DME per patient choice  3) Vaccines: Up to date with Prevnar 13, Pneumovax 23, flu  4) Light conditioning encouraged  5) Continue smoking cessation   6) Overnight oximetry on current settings  7) Return in about 4 months (around 7/11/2020) for follow up with [facility], if not sooner, Compliance.

## 2020-03-11 NOTE — PROGRESS NOTES
CC:  Here for f/u sleep issues as listed below    HPI:   Duane presents today for follow up obstructive sleep apnea, central sleep apnea.  History of atrial fibrillation on Coumadin, aortic stenosis, grade III diastolic dysfunction, autoimmune hepatitis, HTN, insomnia, mitral valve disorder, TAVR.      HST from 2016 indicated an AHI of 22 and low oxygenation of 81%.  He completed a titration study for continued hypersomnolence completed November 2017 that was successful on ASV therapy.  He also has CPAP machine from prior use.   Currently he is being treated with ASV @ 7/10, PS 4/15, max pressure [id number].   Compliance download is not available, but admits to using it nightly for 7 hours.     He does tolerate pressure. He likes mask well.  He does not notice he wakes up refreshed and continues to be tired throughout the day. He slept better without the machine x3 days.  They deny morning H/A. He doesn't notice he sleep better overall. He rarely naps, but could easily doze. He feels fatigued without change since starting the machine. CNOX from 2018 showed adequate oxygenation. He moves about so he has better quality of sleep at night and does not nap.  He will continue to clean supplies weekly and change them as insurance allows.          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 12/30/2011     Priority: High   • Essential hypertension 01/07/2015     Priority: Medium   • Atrial fibrillation (HCC) 12/30/2011     Priority: Medium   • Autoimmune hepatitis (HCC) 06/22/2018     Priority: Low   • Neuropathy (HCC) 12/22/2017     Priority: Low   • Localized primary osteoarthritis of carpometacarpal joint of left thumb 05/27/2016     Priority: Low   • Insomnia 05/26/2016     Priority: Low   • Treatment-emergent central sleep apnea 04/21/2016     Priority: Low   • Dyslipidemia 01/07/2015     Priority: Low   • IGT (impaired glucose  tolerance) 01/07/2015     Priority: Low   • Lumbar Disc Degeneration 12/30/2011     Priority: Low   • Osteoarthrosis involving lower leg 12/30/2011     Priority: Low   • Depression 03/11/2020       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)    • Clotting disorder (HCC)     reports nose bleeds   • Depression     Lost wife ,still gets tearful   • Diabetes (HCC)     diet controlled   • Encounter for long-term (current) use of other medications    • Gasping for breath    • Italian measles    • Heart murmur    • Heart valve disease    • Hyperlipidemia    • Hypertension    • Insomnia    • Mumps    • Nasal drainage    • Pain     back & thumb   • Pyogenic arthritis, lower leg (HCC)     thumb, back   • Restless leg syndrome    • Sleep apnea     uses CPAP   • Snoring    • Tonsillitis    • Valvular heart disease        Past Surgical History:   Procedure Laterality Date   • GIBRAN N/A 7/29/2019    Procedure: ECHOCARDIOGRAM, TRANSESOPHAGEAL;  Surgeon: Leander Buckner M.D.;  Location: SURGERY [facility];  Service: Cardiac   • TRANSCATHETER AORTIC VALVE REPLACEMENT Bilateral 7/29/2019    Procedure: REPLACEMENT, AORTIC VALVE, TRANSCATHETER;  Surgeon: Leander Buckner M.D.;  Location: SURGERY [facility];  Service: Cardiac   • FINGER ARTHROPLASTY Left 5/27/2016    Procedure: FINGER ARTHROPLASTY THUMB CARPOMETACARPAL EXCISIONAL, EXCISE TRAPEZIUM;  Surgeon: Raheel Salgado M.D.;  Location: SURGERY SAME DAY [facility];  Service:    • CARDIOVERSION      on 7/17/06, sinus rhythm until January 2007,  paroxysmal atrial fibrillation   • KNEE ARTHROPLASTY TOTAL     • LAMINOTOMY N/A     multiple lumbar spine   • OTHER ORTHOPEDIC SURGERY      lower back   • PB PERCUT IMPLNT NEUROELECT,EPIDURAL     • TOE ARTHROPLASTY     • TURP-VAPOR N/A        Family History   Problem Relation Age of Onset   •  Other Mother         unknown   • Heart Disease Mother    • Cancer Father         prostate, skin   • No Known Problems Brother    • Diabetes Brother    • Heart Disease Son    • Sleep Apnea Neg Hx        Social History     Socioeconomic History   • Marital status:      Spouse name: Not on file   • Number of children: Not on file   • Years of education: Not on file   • Highest education level: Not on file   Occupational History   • Not on file   Social Needs   • Financial resource strain: Not on file   • Food insecurity     Worry: Not on file     Inability: Not on file   • Transportation needs     Medical: Not on file     Non-medical: Not on file   Tobacco Use   • Smoking status: Former Smoker     Packs/day: 1.00     Years: 20.00     Pack years: 20.00     Types: Cigarettes     Last attempt to quit: 1972     Years since quittin.1   • Smokeless tobacco: Former User   Substance and Sexual Activity   • Alcohol use: No     Alcohol/week: 0.0 oz   • Drug use: No   • Sexual activity: Not Currently     Partners: Female   Lifestyle   • Physical activity     Days per week: Not on file     Minutes per session: Not on file   • Stress: Not on file   Relationships   • Social connections     Talks on phone: Not on file     Gets together: Not on file     Attends Latter-day service: Not on file     Active member of club or organization: Not on file     Attends meetings of clubs or organizations: Not on file     Relationship status: Not on file   • Intimate partner violence     Fear of current or ex partner: Not on file     Emotionally abused: Not on file     Physically abused: Not on file     Forced sexual activity: Not on file   Other Topics Concern   • Not on file   Social History Narrative   • Not on file       Current Outpatient Medications   Medication Sig Dispense Refill   • Coenzyme Q10 (COQ10 PO) Take  by mouth.     • Omega-3 Fatty Acids (FISH OIL) 1000 MG CAPSULE DELAYED RELEASE EC softgel capsule Take 2,000 mg  by mouth every morning with breakfast.     • Glucosamine HCl (GLUCOSAMINE PO) Take  by mouth.     • lysine 500 MG Tab Take 500 mg by mouth every day.     • clindamycin (CLEOCIN) 300 MG Cap      • digoxin (LANOXIN) 250 MCG Tab Take 1 Tab by mouth every bedtime. 90 Tab 3   • warfarin (COUMADIN) 4 MG Tab Take one tablet by mouth one time daily or as directed by coumadin clinic 90 Tab 3   • aspirin (ASA) 81 MG Chew Tab chewable tablet Take 1 Tab by mouth every day. 90 Tab 3   • warfarin (COUMADIN) 6 MG Tab Take 1 Tab by mouth every day. 30 Tab 3   • acetaminophen (TYLENOL) 500 MG Tab Take 500-1,000 mg by mouth every 6 hours as needed.     • gabapentin (NEURONTIN) 300 MG Cap Take 300 mg by mouth 3 times a day. 1 in the morning, 1 at noon, 2 at 4 pm, 3 at bedtime     • metoprolol SR (TOPROL XL) 50 MG TABLET SR 24 HR TAKE ONE TABLET BY MOUTH EVERY DAY 90 Tab 3   • pravastatin (PRAVACHOL) 80 MG tablet Take 1 Tab by mouth every day. 90 Tab 0   • cyclobenzaprine (FLEXERIL) 10 MG Tab Take 10 mg by mouth 3 times a day as needed for Muscle Spasms.     • B Complex Vitamins (VITAMIN B COMPLEX PO) Take 1 Tab by mouth every evening.     • Psyllium (METAMUCIL PO) Take  by mouth every morning.     • Cholecalciferol (VITAMIN D3) 2000 UNITS Tab Take 1 Tab by mouth every evening.     • multivitamin (THERAGRAN) TABS Take 1 Tab by mouth every bedtime.     • omeprazole (PRILOSEC) 20 MG delayed-release capsule Take 1 Cap by mouth every day. (Patient not taking: Reported on 3/3/2020) 30 Cap 1     No current facility-administered medications for this visit.           Allergies: Feldene [piroxicam] and Percodan [oxycodone-aspirin]      ROS   Gen: Denies fever, chills, unintentional weight loss, fatigue  Resp:Denies Dyspnea  CV: Denies chest pain, chest tightness  Sleep:Denies morning headache, insomnia, snoring, gasping for air, apnea  Neuro: Denies frequent headaches, weakness, dizziness  See HPI.  All other systems reviewed and  negative        Vital signs for this encounter:  Vitals:    03/11/20 0959   Height: 1.829 m (6')   Weight: 100.2 kg (221 lb)   Weight % change since last entry.: 0 %   BP: 128/70   Pulse: 69   BMI (Calculated): 29.97                   Physical Exam:   Gen:         Alert and oriented, No apparent distress.   Neck:        No Lymphadenopathy.  Lungs:     Clear to auscultation bilaterally.    CV:          Regular rate and rhythm. No murmurs, rubs or gallops.   Abd:         Soft non tender, non distended.            Ext:          No clubbing, cyanosis, edema.    Assessment   1. LORENA (obstructive sleep apnea)  Overnight Oximetry   2. Treatment-emergent central sleep apnea  Overnight Oximetry   3. Depression, unspecified depression type     4. BMI 33.0-33.9,adult  OBESITY COUNSELING (No Charge): Patient identified as having weight management issue.  Appropriate orders and counseling given.       Patient is clinically stable and will proceed with following plan.  Face-to-face time greater than 50% of 25 minutes reviewing: Future plan.  Will check overnight oximetry on ASV machine to determine if hypoxia could be a factor of hypersomnolence utilizing the machine.  If oxygen is normal he would like to try his CPAP machine for about a week given that he does not sleep well with his ASV machine and has treatment-induced central apneas which may have resolved.  He finds he often fights with the machine in terms of his breathing pattern.  Hypersomnolence may also be related to history of depression with wife passing 15 years ago and when is it in a deep state of depression for the first 8 years, sedentary lifestyle, cardiac history, autoimmune disease, obesity.    PLAN:   Patient Instructions   1) Continue ASV @ EPAP 7/10, PS 4/15, max pressure [id number]  2) Clean mask and supplies weekly and change them as insurance allows - mask fitting with DME per patient choice  3) Vaccines: Up to date with Prevnar 13, Pneumovax 23, flu  4)  Light conditioning encouraged  5) Continue smoking cessation   6) Overnight oximetry on current settings  7) Return in about 4 months (around 7/11/2020) for follow up with [facility], if not sooner, Compliance.

## 2020-03-18 ENCOUNTER — APPOINTMENT (OUTPATIENT)
Dept: SLEEP MEDICINE | Facility: MEDICAL CENTER | Age: 80
End: 2020-03-18
Attending: NURSE PRACTITIONER
Payer: MEDICARE

## 2020-03-20 ENCOUNTER — APPOINTMENT (OUTPATIENT)
Dept: VASCULAR LAB | Facility: MEDICAL CENTER | Age: 80
End: 2020-03-20
Attending: INTERNAL MEDICINE
Payer: MEDICARE

## 2020-03-30 ENCOUNTER — ANTICOAGULATION VISIT (OUTPATIENT)
Dept: VASCULAR LAB | Facility: MEDICAL CENTER | Age: 80
End: 2020-03-30
Attending: INTERNAL MEDICINE
Payer: MEDICARE

## 2020-03-30 VITALS — HEART RATE: 118 BPM | DIASTOLIC BLOOD PRESSURE: 70 MMHG | SYSTOLIC BLOOD PRESSURE: 113 MMHG

## 2020-03-30 DIAGNOSIS — I48.91 ATRIAL FIBRILLATION, UNSPECIFIED TYPE (HCC): ICD-10-CM

## 2020-03-30 DIAGNOSIS — Z95.3 STATUS POST TRANSCATHETER AORTIC VALVE REPLACEMENT (TAVR) USING BIOPROSTHESIS: ICD-10-CM

## 2020-03-30 LAB
INR BLD: 2 (ref 0.9–1.2)
INR PPP: 2 (ref 2–3.5)

## 2020-03-30 PROCEDURE — 85610 PROTHROMBIN TIME: CPT

## 2020-03-30 PROCEDURE — 99211 OFF/OP EST MAY X REQ PHY/QHP: CPT | Performed by: NURSE PRACTITIONER

## 2020-03-30 NOTE — PROGRESS NOTES
Anticoagulation Summary  As of 3/30/2020    INR goal:   2.0-3.0   TTR:   59.7 % (3.4 y)   INR used for dosin.00 (3/30/2020)   Warfarin maintenance plan:   2 mg (4 mg x 0.5) every Tue, Sat; 4 mg (4 mg x 1) all other days   Weekly warfarin total:   24 mg   Plan last modified:   Pratima Ricks (2020)   Next INR check:      Target end date:   Indefinite    Indications    Atrial fibrillation (HCC) [I48.91]  Status post transcatheter aortic valve replacement (TAVR) using bioprosthesis [Z95.3]             Anticoagulation Episode Summary     INR check location:       Preferred lab:       Send INR reminders to:       Comments:   Pt goes by Kinsey      Anticoagulation Care Providers     Provider Role Specialty Phone number    Vickey Rutherford M.D. Referring Cardiology [phone]    [facility] Anticoagulation Services Responsible  [phone]        Anticoagulation Patient Findings      HPI:  Duane Parkinson seen in clinic today for follow up on anticoagulation therapy in the presence of AF, TAVR hx.   Denies any changes to current medical/health status since last appointment.   Denies any medication or diet changes.   Had a nose bleed this AM after blowing his nose. Stopped within a few minutes. No other symptoms of bleeding or thrombosis reported.    A/P:   INR therapeutic.   Continue current regimen.   BP recorded in vitals.    Follow up appointment in 2 week(s).    Next Appointment: 2020 at 8:30 am.    Britney DOE

## 2020-04-07 RX ORDER — CLINDAMYCIN HYDROCHLORIDE 300 MG/1
CAPSULE ORAL
OUTPATIENT
Start: 2020-04-07

## 2020-04-07 RX ORDER — CLINDAMYCIN HYDROCHLORIDE 300 MG/1
300 CAPSULE ORAL 2 TIMES DAILY
Qty: 180 CAP | Refills: 0 | Status: SHIPPED | OUTPATIENT
Start: 2020-04-07 | End: 2020-07-15

## 2020-04-07 NOTE — TELEPHONE ENCOUNTER
He said it's for his left knee that he has an infection in.   He said that the doctor who prescribed it isn't getting back to him and it's been 2 weeks. He said he's been out of it for about 2-3 weeks.   He said he's had no luck with the doctor seeing as no ones getting back to him.

## 2020-04-07 NOTE — TELEPHONE ENCOUNTER
Patient said that the doctor who prescribed this before isn't in office anymore according to the patient. He also said he's going to be on this medication for the rest of his life and is wondering if you can please prescribe it.

## 2020-04-13 ENCOUNTER — ANTICOAGULATION VISIT (OUTPATIENT)
Dept: VASCULAR LAB | Facility: MEDICAL CENTER | Age: 80
End: 2020-04-13
Attending: INTERNAL MEDICINE
Payer: MEDICARE

## 2020-04-13 ENCOUNTER — HOSPITAL ENCOUNTER (OUTPATIENT)
Dept: CARDIOLOGY | Facility: MEDICAL CENTER | Age: 80
End: 2020-04-13
Attending: INTERNAL MEDICINE
Payer: MEDICARE

## 2020-04-13 DIAGNOSIS — Z95.2 S/P TAVR (TRANSCATHETER AORTIC VALVE REPLACEMENT): ICD-10-CM

## 2020-04-13 DIAGNOSIS — Z95.3 STATUS POST TRANSCATHETER AORTIC VALVE REPLACEMENT (TAVR) USING BIOPROSTHESIS: ICD-10-CM

## 2020-04-13 LAB
INR BLD: 1.5 (ref 0.9–1.2)
INR PPP: 1.5 (ref 2–3.5)

## 2020-04-13 PROCEDURE — 99212 OFFICE O/P EST SF 10 MIN: CPT | Mod: 25

## 2020-04-13 PROCEDURE — 93306 TTE W/DOPPLER COMPLETE: CPT | Mod: 26 | Performed by: INTERNAL MEDICINE

## 2020-04-13 PROCEDURE — 85610 PROTHROMBIN TIME: CPT

## 2020-04-13 PROCEDURE — 93306 TTE W/DOPPLER COMPLETE: CPT

## 2020-04-13 NOTE — PROGRESS NOTES
Anticoagulation Summary  As of 2020    INR goal:   2.0-3.0   TTR:   59.1 % (3.5 y)   INR used for dosin.50! (2020)   Warfarin maintenance plan:   2 mg (4 mg x 0.5) every Sat; 4 mg (4 mg x 1) all other days   Weekly warfarin total:   26 mg   Plan last modified:   Alice Carlson PharmD (2020)   Next INR check:   2020   Target end date:   Indefinite    Indications    Atrial fibrillation (HCC) [I48.91]  Status post transcatheter aortic valve replacement (TAVR) using bioprosthesis [Z95.3]             Anticoagulation Episode Summary     INR check location:       Preferred lab:       Send INR reminders to:       Comments:   Pt goes by Kinsey      Anticoagulation Care Providers     Provider Role Specialty Phone number    Vickey Rutherford M.D. Referring Cardiology [phone]    [facility] Anticoagulation Services Responsible  [phone]        Anticoagulation Patient Findings    HPI:  Duane Parkinson seen in clinic today, on anticoagulation therapy with warfarin for Afib.  Changes to current medical/health status since last appt: none  Denies signs/symptoms of bleeding and/or thrombosis since the last appt.    Denies any interval changes to diet  Denies any interval changes to medications since last appt.   Denies any complications or cost restrictions with current therapy.   BP recorded in vitals.    Patient did miss a dose last week which is contributing to his low INR today.    A/P   INR is SUB-therapeutic.   Pt is to take a bolus dose of 6 mg today and then begin a slightly increased weekly regimen.    Follow up appointment in 1 week(s).    Alice Carlson, AbhishekD

## 2020-04-14 LAB
LV EJECT FRACT  99904: 60
LV EJECT FRACT MOD 2C 99903: 58.59
LV EJECT FRACT MOD 4C 99902: 63.14
LV EJECT FRACT MOD BP 99901: 59.76

## 2020-04-20 ENCOUNTER — ANTICOAGULATION VISIT (OUTPATIENT)
Dept: VASCULAR LAB | Facility: MEDICAL CENTER | Age: 80
End: 2020-04-20
Attending: INTERNAL MEDICINE
Payer: MEDICARE

## 2020-04-20 ENCOUNTER — HOME STUDY (OUTPATIENT)
Dept: SLEEP MEDICINE | Facility: MEDICAL CENTER | Age: 80
End: 2020-04-20
Attending: NURSE PRACTITIONER
Payer: MEDICARE

## 2020-04-20 DIAGNOSIS — G47.33 OSA (OBSTRUCTIVE SLEEP APNEA): ICD-10-CM

## 2020-04-20 DIAGNOSIS — Z95.3 STATUS POST TRANSCATHETER AORTIC VALVE REPLACEMENT (TAVR) USING BIOPROSTHESIS: ICD-10-CM

## 2020-04-20 DIAGNOSIS — I48.91 ATRIAL FIBRILLATION, UNSPECIFIED TYPE (HCC): ICD-10-CM

## 2020-04-20 DIAGNOSIS — G47.39 TREATMENT-EMERGENT CENTRAL SLEEP APNEA: ICD-10-CM

## 2020-04-20 LAB — INR PPP: 1.8 (ref 2–3.5)

## 2020-04-20 PROCEDURE — 99212 OFFICE O/P EST SF 10 MIN: CPT | Performed by: NURSE PRACTITIONER

## 2020-04-20 PROCEDURE — 94762 N-INVAS EAR/PLS OXIMTRY CONT: CPT | Performed by: INTERNAL MEDICINE

## 2020-04-20 PROCEDURE — 85610 PROTHROMBIN TIME: CPT

## 2020-04-20 NOTE — PROGRESS NOTES
Anticoagulation Summary  As of 2020    INR goal:   2.0-3.0   TTR:   58.7 % (3.5 y)   INR used for dosin.80! 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 Referring Cardiology [phone]    [facility] Anticoagulation Services Responsible  [phone]        Anticoagulation Patient Findings      HPI:  Duane Parkinson seen in clinic today for follow up on anticoagulation therapy in the presence of AF, AVR.   Denies any changes to current medical/health status since last appointment.   Denies any medication or diet changes.   No current symptoms of bleeding or thrombosis reported.    A/P:   INR subtherapeutic despite dose increase. CHADS 2 score ~2.  Will increase regimen a bit further as outlined on calendar.     Follow up appointment in 1 week(s).    Next Appointment: 2020 at 7:45 am.    Britney DOE

## 2020-04-23 NOTE — PROCEDURES
Interpretation:    This study was performed on Servo adaptive BiPAP ventilation with expiratory pressures of 7 to 10 cm of water and pressure support at 4 to 15 cm of water, and on room air.    7.5 hours of data are available for review and the information appears to be of reasonable quality for analysis.    The basal oxygen saturation is 93%.  Saturation is well-maintained throughout the night but climbs essentially to 100% between midnight and 1 AM and then falls to about 87% for 30 minutes between 1 and 2 AM.  Overall he spends about 38 minutes of the night with a saturation below 88%.    Assessment:  This study suggests preservation of arterial oxygen saturation on ASV and room air.  The relatively lower saturations between 1 and 2 AM require clinical correlation as the patient may not have been wearing the device at that time.

## 2020-04-24 ENCOUNTER — ANTICOAGULATION VISIT (OUTPATIENT)
Dept: VASCULAR LAB | Facility: MEDICAL CENTER | Age: 80
End: 2020-04-24
Attending: INTERNAL MEDICINE
Payer: MEDICARE

## 2020-04-24 DIAGNOSIS — Z95.3 STATUS POST TRANSCATHETER AORTIC VALVE REPLACEMENT (TAVR) USING BIOPROSTHESIS: ICD-10-CM

## 2020-04-24 DIAGNOSIS — I48.91 ATRIAL FIBRILLATION, UNSPECIFIED TYPE (HCC): ICD-10-CM

## 2020-04-24 LAB — INR PPP: 2.5 (ref 2–3.5)

## 2020-04-24 PROCEDURE — 85610 PROTHROMBIN TIME: CPT

## 2020-04-24 PROCEDURE — 99212 OFFICE O/P EST SF 10 MIN: CPT | Performed by: PHARMACIST

## 2020-04-24 NOTE — PROGRESS NOTES
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 Referring Cardiology [phone]    [facility] Anticoagulation Services Responsible  [phone]                Anticoagulation Patient Findings      HPI:  Duane Parkinson seen in clinic today, on anticoagulation therapy with warfarin for Afib  Changes to current medical/health status since last appt: pt took warfarin 6mg on Monday and then 2mg on Tuesday.   Denies signs/symptoms of bleeding and/or thrombosis since the last appt.    Denies any interval changes to diet  Denies any interval changes to medications since last appt.   Denies any complications or cost restrictions with current therapy.   Verified current warfarin dosing schedule.       A/P   INR  is-therapeutic.   Will have pt start a new weekly dose of 4mg daily for the next week.     Follow up appointment in 1 week(s).    Kell Cano, PharmD

## 2020-04-27 ENCOUNTER — APPOINTMENT (OUTPATIENT)
Dept: VASCULAR LAB | Facility: MEDICAL CENTER | Age: 80
End: 2020-04-27
Attending: INTERNAL MEDICINE
Payer: MEDICARE

## 2020-04-30 ENCOUNTER — ANTICOAGULATION VISIT (OUTPATIENT)
Dept: VASCULAR LAB | Facility: MEDICAL CENTER | Age: 80
End: 2020-04-30
Attending: INTERNAL MEDICINE
Payer: MEDICARE

## 2020-04-30 DIAGNOSIS — I48.91 ATRIAL FIBRILLATION, UNSPECIFIED TYPE (HCC): ICD-10-CM

## 2020-04-30 DIAGNOSIS — Z95.3 STATUS POST TRANSCATHETER AORTIC VALVE REPLACEMENT (TAVR) USING BIOPROSTHESIS: ICD-10-CM

## 2020-04-30 LAB — INR PPP: 2.6 (ref 2–3.5)

## 2020-04-30 PROCEDURE — 85610 PROTHROMBIN TIME: CPT

## 2020-04-30 PROCEDURE — 99211 OFF/OP EST MAY X REQ PHY/QHP: CPT | Performed by: NURSE PRACTITIONER

## 2020-04-30 NOTE — PROGRESS NOTES
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 Referring Cardiology [phone]    [facility] Anticoagulation Services Responsible  [phone]        Anticoagulation Patient Findings      HPI:  Duane Parkinson seen in clinic today for follow up on anticoagulation therapy in the presence of AF, TAVR.   Denies any changes to current medical/health status since last appointment.   Denies any medication or diet changes.   No current symptoms of bleeding or thrombosis reported.    A/P:   INR therapeutic.   Continue current regimen.     Follow up appointment in 2 week(s).    Next Appointment: Thursday, May 14, 2020 at 7:30 am.    Britney DOE

## 2020-05-07 ENCOUNTER — ANTICOAGULATION VISIT (OUTPATIENT)
Dept: VASCULAR LAB | Facility: MEDICAL CENTER | Age: 80
End: 2020-05-07
Attending: INTERNAL MEDICINE
Payer: MEDICARE

## 2020-05-07 DIAGNOSIS — I48.91 ATRIAL FIBRILLATION, UNSPECIFIED TYPE (HCC): ICD-10-CM

## 2020-05-07 DIAGNOSIS — Z95.3 STATUS POST TRANSCATHETER AORTIC VALVE REPLACEMENT (TAVR) USING BIOPROSTHESIS: ICD-10-CM

## 2020-05-07 LAB — INR PPP: 2.3 (ref 2–3.5)

## 2020-05-07 PROCEDURE — 99211 OFF/OP EST MAY X REQ PHY/QHP: CPT | Performed by: NURSE PRACTITIONER

## 2020-05-07 PROCEDURE — 85610 PROTHROMBIN TIME: CPT

## 2020-05-07 NOTE — PROGRESS NOTES
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 Referring Cardiology [phone]    [facility] Anticoagulation Services Responsible  [phone]        Anticoagulation Patient Findings      HPI:  Duane Parkinson seen in clinic today for follow up on anticoagulation therapy in the presence of AF, TAVR.   Denies any changes to current medical/health status since last appointment.   Denies any medication or diet changes.   Had a nose bleed this week which lasted a few minutes. No other current symptoms of bleeding or thrombosis reported.    A/P:   INR therapeutic.   Continue current regimen.     Follow up appointment in 2 week(s).    Next Appointment: Thursday, May 21, 2020 at 7:30 am.    Britney DOE

## 2020-05-14 ENCOUNTER — APPOINTMENT (OUTPATIENT)
Dept: VASCULAR LAB | Facility: MEDICAL CENTER | Age: 80
End: 2020-05-14
Attending: INTERNAL MEDICINE
Payer: MEDICARE

## 2020-05-21 ENCOUNTER — ANTICOAGULATION VISIT (OUTPATIENT)
Dept: VASCULAR LAB | Facility: MEDICAL CENTER | Age: 80
End: 2020-05-21
Attending: INTERNAL MEDICINE
Payer: MEDICARE

## 2020-05-21 DIAGNOSIS — Z95.3 STATUS POST TRANSCATHETER AORTIC VALVE REPLACEMENT (TAVR) USING BIOPROSTHESIS: ICD-10-CM

## 2020-05-21 DIAGNOSIS — I48.91 ATRIAL FIBRILLATION, UNSPECIFIED TYPE (HCC): ICD-10-CM

## 2020-05-21 LAB — INR PPP: 3.1 (ref 2–3.5)

## 2020-05-21 PROCEDURE — 85610 PROTHROMBIN TIME: CPT

## 2020-05-21 PROCEDURE — 99212 OFFICE O/P EST SF 10 MIN: CPT | Performed by: NURSE PRACTITIONER

## 2020-05-21 NOTE — PROGRESS NOTES
Anticoagulation Summary  As of 5/21/2020    INR goal:   2.0-3.0   TTR:   59.5 % (3.6 y)   INR used for dosing:   3.10! (5/21/2020)   Warfarin maintenance plan:   4 mg (4 mg x 1) every day   Weekly warfarin total:   28 mg   Plan last modified:   CLAYTON Jacob (4/20/2020)   Next INR check:   6/4/2020   Target end date:   Indefinite    Indications    Atrial fibrillation (HCC) [I48.91]  Status post transcatheter aortic valve replacement (TAVR) using bioprosthesis [Z95.3]             Anticoagulation Episode Summary     INR check location:       Preferred lab:       Send INR reminders to:       Comments:   Pt goes by Kinsey      Anticoagulation Care Providers     Provider Role Specialty Phone number    Vickey Rutherford M.D. Referring Cardiology [phone]    [facility] Anticoagulation Services Responsible  [phone]        Anticoagulation Patient Findings      HPI:  Duane Parkinson seen in clinic today for follow up on anticoagulation therapy in the presence of AF, TAVR.   Denies any changes to current medical/health status since last appointment.   Denies any medication or diet changes.   No current symptoms of bleeding or thrombosis reported.    A/P:   INR supratherapeutic.   Decrease one dose then continue current regimen.     Follow up appointment in 2 week(s).    Next Appointment: Thursday, June 4, 2020 at 7:30 am.    Britney DOE

## 2020-05-26 ENCOUNTER — APPOINTMENT (RX ONLY)
Dept: URBAN - METROPOLITAN AREA CLINIC 4 | Facility: CLINIC | Age: 80
Setting detail: DERMATOLOGY
End: 2020-05-26

## 2020-05-26 DIAGNOSIS — L82.1 OTHER SEBORRHEIC KERATOSIS: ICD-10-CM

## 2020-05-26 DIAGNOSIS — D18.0 HEMANGIOMA: ICD-10-CM

## 2020-05-26 DIAGNOSIS — L82.0 INFLAMED SEBORRHEIC KERATOSIS: ICD-10-CM

## 2020-05-26 DIAGNOSIS — L81.4 OTHER MELANIN HYPERPIGMENTATION: ICD-10-CM

## 2020-05-26 DIAGNOSIS — Z85.828 PERSONAL HISTORY OF OTHER MALIGNANT NEOPLASM OF SKIN: ICD-10-CM

## 2020-05-26 DIAGNOSIS — Z71.89 OTHER SPECIFIED COUNSELING: ICD-10-CM

## 2020-05-26 DIAGNOSIS — D22 MELANOCYTIC NEVI: ICD-10-CM

## 2020-05-26 PROBLEM — D18.01 HEMANGIOMA OF SKIN AND SUBCUTANEOUS TISSUE: Status: ACTIVE | Noted: 2020-05-26

## 2020-05-26 PROBLEM — D22.5 MELANOCYTIC NEVI OF TRUNK: Status: ACTIVE | Noted: 2020-05-26

## 2020-05-26 PROBLEM — D22.62 MELANOCYTIC NEVI OF LEFT UPPER LIMB, INCLUDING SHOULDER: Status: ACTIVE | Noted: 2020-05-26

## 2020-05-26 PROCEDURE — 17111 DESTRUCTION B9 LESIONS 15/>: CPT

## 2020-05-26 PROCEDURE — ? LIQUID NITROGEN

## 2020-05-26 PROCEDURE — ? COUNSELING

## 2020-05-26 PROCEDURE — 99213 OFFICE O/P EST LOW 20 MIN: CPT | Mod: 25

## 2020-05-26 ASSESSMENT — LOCATION ZONE DERM
LOCATION ZONE: TRUNK
LOCATION ZONE: NECK
LOCATION ZONE: FACE
LOCATION ZONE: SCALP
LOCATION ZONE: ARM

## 2020-05-26 ASSESSMENT — LOCATION SIMPLE DESCRIPTION DERM
LOCATION SIMPLE: NECK
LOCATION SIMPLE: LEFT FOREHEAD
LOCATION SIMPLE: ABDOMEN
LOCATION SIMPLE: LEFT UPPER BACK
LOCATION SIMPLE: LEFT ANTERIOR NECK
LOCATION SIMPLE: LEFT FOREARM
LOCATION SIMPLE: POSTERIOR SCALP
LOCATION SIMPLE: RIGHT FOREHEAD
LOCATION SIMPLE: RIGHT CLAVICULAR SKIN
LOCATION SIMPLE: LEFT SCALP
LOCATION SIMPLE: RIGHT ANTERIOR NECK
LOCATION SIMPLE: RIGHT UPPER BACK
LOCATION SIMPLE: RIGHT SCALP
LOCATION SIMPLE: SCALP
LOCATION SIMPLE: UPPER BACK
LOCATION SIMPLE: LEFT LOWER BACK
LOCATION SIMPLE: RIGHT LOWER BACK
LOCATION SIMPLE: RIGHT FOREARM
LOCATION SIMPLE: CHEST

## 2020-05-26 ASSESSMENT — LOCATION DETAILED DESCRIPTION DERM
LOCATION DETAILED: LEFT CLAVICULAR NECK
LOCATION DETAILED: RIGHT CENTRAL FRONTAL SCALP
LOCATION DETAILED: RIGHT SUPERIOR MEDIAL MIDBACK
LOCATION DETAILED: LEFT CENTRAL LATERAL NECK
LOCATION DETAILED: LEFT SUPERIOR MEDIAL FOREHEAD
LOCATION DETAILED: RIGHT LATERAL SUPERIOR CHEST
LOCATION DETAILED: LEFT LATERAL INFERIOR CHEST
LOCATION DETAILED: LEFT SUPERIOR LATERAL NECK
LOCATION DETAILED: RIGHT SUPERIOR FOREHEAD
LOCATION DETAILED: RIGHT CENTRAL PARIETAL SCALP
LOCATION DETAILED: LEFT INFERIOR MEDIAL MIDBACK
LOCATION DETAILED: RIGHT INFERIOR LATERAL UPPER BACK
LOCATION DETAILED: RIGHT CENTRAL LATERAL NECK
LOCATION DETAILED: RIGHT MEDIAL SUPERIOR CHEST
LOCATION DETAILED: LEFT SUPERIOR FOREHEAD
LOCATION DETAILED: LEFT LATERAL FOREHEAD
LOCATION DETAILED: LEFT SUPERIOR LATERAL MIDBACK
LOCATION DETAILED: RIGHT CLAVICULAR NECK
LOCATION DETAILED: RIGHT MID-UPPER BACK
LOCATION DETAILED: RIGHT PROXIMAL DORSAL FOREARM
LOCATION DETAILED: SUPERIOR THORACIC SPINE
LOCATION DETAILED: RIGHT PROXIMAL ULNAR DORSAL FOREARM
LOCATION DETAILED: RIGHT CLAVICULAR SKIN
LOCATION DETAILED: RIGHT SUPERIOR LATERAL NECK
LOCATION DETAILED: PERIUMBILICAL SKIN
LOCATION DETAILED: LEFT MEDIAL FRONTAL SCALP
LOCATION DETAILED: RIGHT DISTAL DORSAL FOREARM
LOCATION DETAILED: LEFT SUPERIOR PARIETAL SCALP
LOCATION DETAILED: RIGHT SUPERIOR LATERAL MIDBACK
LOCATION DETAILED: RIGHT MEDIAL INFERIOR CHEST
LOCATION DETAILED: RIGHT MEDIAL FRONTAL SCALP
LOCATION DETAILED: LEFT SUPERIOR FRONTAL SCALP
LOCATION DETAILED: POSTERIOR MID-PARIETAL SCALP
LOCATION DETAILED: EPIGASTRIC SKIN
LOCATION DETAILED: LEFT SUPERIOR UPPER BACK
LOCATION DETAILED: LEFT MEDIAL INFERIOR CHEST
LOCATION DETAILED: RIGHT INFERIOR UPPER BACK
LOCATION DETAILED: LEFT CENTRAL FRONTAL SCALP
LOCATION DETAILED: LEFT PROXIMAL DORSAL FOREARM
LOCATION DETAILED: LEFT INFERIOR ANTERIOR NECK
LOCATION DETAILED: RIGHT SUPERIOR LATERAL LOWER BACK
LOCATION DETAILED: RIGHT SUPERIOR UPPER BACK

## 2020-05-26 NOTE — PROCEDURE: LIQUID NITROGEN
Render Note In Bullet Format When Appropriate: No
Consent: The patient's consent was obtained including but not limited to risks of crusting, scabbing, blistering, scarring, darker or lighter pigmentary change, recurrence, incomplete removal and infection.
Detail Level: Detailed
Post-Care Instructions: I reviewed with the patient in detail post-care instructions. Patient is to wear sunprotection, and avoid picking at any of the treated lesions. Pt may apply Vaseline to crusted or scabbing areas.
Medical Necessity Information: It is in your best interest to select a reason for this procedure from the list below. All of these items fulfill various CMS LCD requirements except the new and changing color options.
Medical Necessity Clause: This procedure was medically necessary because the lesions that were treated were: inflamed and itchy and very bothersome

## 2020-05-28 NOTE — PROGRESS NOTES
Duane Parkinson attended: attended: Exercise Workshop from 9:00 am- 10:00am   The topic was: Biomechanics.    Patient received handouts regarding the specific exercise information   Postop Diagnosis: same

## 2020-06-04 ENCOUNTER — ANTICOAGULATION VISIT (OUTPATIENT)
Dept: VASCULAR LAB | Facility: MEDICAL CENTER | Age: 80
End: 2020-06-04
Attending: INTERNAL MEDICINE
Payer: MEDICARE

## 2020-06-04 DIAGNOSIS — I48.91 ATRIAL FIBRILLATION, UNSPECIFIED TYPE (HCC): ICD-10-CM

## 2020-06-04 DIAGNOSIS — Z95.3 STATUS POST TRANSCATHETER AORTIC VALVE REPLACEMENT (TAVR) USING BIOPROSTHESIS: ICD-10-CM

## 2020-06-04 LAB — INR PPP: 3.2 (ref 2–3.5)

## 2020-06-04 PROCEDURE — 85610 PROTHROMBIN TIME: CPT

## 2020-06-04 PROCEDURE — 99212 OFFICE O/P EST SF 10 MIN: CPT

## 2020-06-04 NOTE — PROGRESS NOTES
Anticoagulation Summary  As of 6/4/2020    INR goal:   2.0-3.0   TTR:   58.9 % (3.6 y)   INR used for dosing:   3.20! (6/4/2020)   Warfarin maintenance plan:   2 mg (4 mg x 0.5) every Thu; 4 mg (4 mg x 1) all other days   Weekly warfarin total:   26 mg   Plan last modified:   Pratima Ricks (6/4/2020)   Next INR check:   6/18/2020   Target end date:   Indefinite    Indications    Atrial fibrillation (HCC) [I48.91]  Status post transcatheter aortic valve replacement (TAVR) using bioprosthesis [Z95.3]             Anticoagulation Episode Summary     INR check location:       Preferred lab:       Send INR reminders to:       Comments:   Pt goes by Kinsey      Anticoagulation Care Providers     Provider Role Specialty Phone number    Vickey Rutherford M.D. Referring Cardiology [phone]    [facility] Anticoagulation Services Responsible  [phone]        Anticoagulation Patient Findings  Patient Findings     Negatives:   Signs/symptoms of thrombosis, Signs/symptoms of bleeding, Laboratory test error suspected, Change in health, Change in alcohol use, Change in activity, Upcoming invasive procedure, Emergency department visit, Upcoming dental procedure, Missed doses, Extra doses, Change in medications, Change in diet/appetite, Hospital admission, Bruising, Other complaints              History of Present Illness: follow up appointment for chronic anticoagulation with the high risk medication, warfarin for atrial fibrillation.    Pt remains supra therapeutic today, trending upward.  Will reduce weekly dose by 7%. Follow up in 2 weeks, to reduce the risk of adverse events related to this high risk medication, warfarin.    Pt declines vitals today    Pratima Ricks Clinical Pharmacist

## 2020-06-18 ENCOUNTER — HOSPITAL ENCOUNTER (OUTPATIENT)
Dept: LAB | Facility: MEDICAL CENTER | Age: 80
End: 2020-06-18
Attending: INTERNAL MEDICINE
Payer: MEDICARE

## 2020-06-18 ENCOUNTER — ANTICOAGULATION VISIT (OUTPATIENT)
Dept: VASCULAR LAB | Facility: MEDICAL CENTER | Age: 80
End: 2020-06-18
Attending: INTERNAL MEDICINE
Payer: MEDICARE

## 2020-06-18 DIAGNOSIS — I48.91 ATRIAL FIBRILLATION, UNSPECIFIED TYPE (HCC): ICD-10-CM

## 2020-06-18 DIAGNOSIS — Z95.3 STATUS POST TRANSCATHETER AORTIC VALVE REPLACEMENT (TAVR) USING BIOPROSTHESIS: ICD-10-CM

## 2020-06-18 LAB
ALBUMIN SERPL BCP-MCNC: 4.1 G/DL (ref 3.2–4.9)
ALBUMIN/GLOB SERPL: 1.2 G/DL
ALP SERPL-CCNC: 77 U/L (ref 30–99)
ALT SERPL-CCNC: 53 U/L (ref 2–50)
ANION GAP SERPL CALC-SCNC: 10 MMOL/L (ref 7–16)
AST SERPL-CCNC: 47 U/L (ref 12–45)
BASOPHILS # BLD AUTO: 0.5 % (ref 0–1.8)
BASOPHILS # BLD: 0.04 K/UL (ref 0–0.12)
BILIRUB SERPL-MCNC: 0.4 MG/DL (ref 0.1–1.5)
BUN SERPL-MCNC: 15 MG/DL (ref 8–22)
CALCIUM SERPL-MCNC: 9.6 MG/DL (ref 8.5–10.5)
CHLORIDE SERPL-SCNC: 104 MMOL/L (ref 96–112)
CO2 SERPL-SCNC: 27 MMOL/L (ref 20–33)
CREAT SERPL-MCNC: 0.7 MG/DL (ref 0.5–1.4)
EOSINOPHIL # BLD AUTO: 0.23 K/UL (ref 0–0.51)
EOSINOPHIL NFR BLD: 3 % (ref 0–6.9)
ERYTHROCYTE [DISTWIDTH] IN BLOOD BY AUTOMATED COUNT: 49.1 FL (ref 35.9–50)
GLOBULIN SER CALC-MCNC: 3.3 G/DL (ref 1.9–3.5)
GLUCOSE SERPL-MCNC: 73 MG/DL (ref 65–99)
HCT VFR BLD AUTO: 51.3 % (ref 42–52)
HGB BLD-MCNC: 16.6 G/DL (ref 14–18)
IMM GRANULOCYTES # BLD AUTO: 0.02 K/UL (ref 0–0.11)
IMM GRANULOCYTES NFR BLD AUTO: 0.3 % (ref 0–0.9)
INR PPP: 2.71 (ref 0.87–1.13)
INR PPP: 3.5 (ref 2–3.5)
LYMPHOCYTES # BLD AUTO: 2.23 K/UL (ref 1–4.8)
LYMPHOCYTES NFR BLD: 29.4 % (ref 22–41)
MCH RBC QN AUTO: 31.9 PG (ref 27–33)
MCHC RBC AUTO-ENTMCNC: 32.4 G/DL (ref 33.7–35.3)
MCV RBC AUTO: 98.7 FL (ref 81.4–97.8)
MONOCYTES # BLD AUTO: 0.9 K/UL (ref 0–0.85)
MONOCYTES NFR BLD AUTO: 11.9 % (ref 0–13.4)
NEUTROPHILS # BLD AUTO: 4.17 K/UL (ref 1.82–7.42)
NEUTROPHILS NFR BLD: 54.9 % (ref 44–72)
NRBC # BLD AUTO: 0 K/UL
NRBC BLD-RTO: 0 /100 WBC
PLATELET # BLD AUTO: 258 K/UL (ref 164–446)
PMV BLD AUTO: 9.7 FL (ref 9–12.9)
POTASSIUM SERPL-SCNC: 4.6 MMOL/L (ref 3.6–5.5)
PROT SERPL-MCNC: 7.4 G/DL (ref 6–8.2)
PROTHROMBIN TIME: 29.6 SEC (ref 12–14.6)
RBC # BLD AUTO: 5.2 M/UL (ref 4.7–6.1)
SODIUM SERPL-SCNC: 141 MMOL/L (ref 135–145)
WBC # BLD AUTO: 7.6 K/UL (ref 4.8–10.8)

## 2020-06-18 PROCEDURE — 85610 PROTHROMBIN TIME: CPT | Mod: 91

## 2020-06-18 PROCEDURE — 85025 COMPLETE CBC W/AUTO DIFF WBC: CPT

## 2020-06-18 PROCEDURE — 85610 PROTHROMBIN TIME: CPT

## 2020-06-18 PROCEDURE — 80053 COMPREHEN METABOLIC PANEL: CPT

## 2020-06-18 PROCEDURE — 36415 COLL VENOUS BLD VENIPUNCTURE: CPT

## 2020-06-18 PROCEDURE — 99212 OFFICE O/P EST SF 10 MIN: CPT | Performed by: NURSE PRACTITIONER

## 2020-06-18 NOTE — PROGRESS NOTES
Anticoagulation Summary  As of 6/18/2020    INR goal:   2.0-3.0   TTR:   58.3 % (3.6 y)   INR used for dosing:   3.50! 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 Referring Cardiology [phone]    [facility] Anticoagulation Services Responsible  [phone]        Anticoagulation Patient Findings      HPI:  Duane Parkinson seen in clinic today for follow up on anticoagulation therapy in the presence of AF, TAVR.   Denies any changes to current medical/health status since last appointment.   Denies any medication or diet changes.   No current symptoms of bleeding or thrombosis reported.    A/P:   INR supratherapeutic despite dose decreases the last couple of visits. Will decrease regimen.     Follow up appointment in 2 week(s).    Next Appointment: Thursday, July 2, 2020 at 7:45 am.    Britney DOE

## 2020-06-23 ENCOUNTER — HOSPITAL ENCOUNTER (OUTPATIENT)
Dept: RADIOLOGY | Facility: MEDICAL CENTER | Age: 80
End: 2020-06-23
Attending: INTERNAL MEDICINE
Payer: MEDICARE

## 2020-06-23 DIAGNOSIS — K74.60 HEPATIC CIRRHOSIS, UNSPECIFIED HEPATIC CIRRHOSIS TYPE, UNSPECIFIED WHETHER ASCITES PRESENT (HCC): ICD-10-CM

## 2020-06-23 PROCEDURE — 76700 US EXAM ABDOM COMPLETE: CPT

## 2020-07-02 ENCOUNTER — ANTICOAGULATION VISIT (OUTPATIENT)
Dept: VASCULAR LAB | Facility: MEDICAL CENTER | Age: 80
End: 2020-07-02
Attending: INTERNAL MEDICINE
Payer: MEDICARE

## 2020-07-02 DIAGNOSIS — I48.91 ATRIAL FIBRILLATION, UNSPECIFIED TYPE (HCC): ICD-10-CM

## 2020-07-02 DIAGNOSIS — Z95.3 STATUS POST TRANSCATHETER AORTIC VALVE REPLACEMENT (TAVR) USING BIOPROSTHESIS: ICD-10-CM

## 2020-07-02 LAB — INR PPP: 2 (ref 2–3.5)

## 2020-07-02 PROCEDURE — 99211 OFF/OP EST MAY X REQ PHY/QHP: CPT | Performed by: NURSE PRACTITIONER

## 2020-07-02 PROCEDURE — 85610 PROTHROMBIN TIME: CPT

## 2020-07-12 DIAGNOSIS — I10 ESSENTIAL HYPERTENSION: ICD-10-CM

## 2020-07-13 RX ORDER — METOPROLOL SUCCINATE 50 MG/1
TABLET, EXTENDED RELEASE ORAL
Qty: 90 TAB | Refills: 3 | Status: SHIPPED | OUTPATIENT
Start: 2020-07-13 | End: 2021-07-02

## 2020-07-15 RX ORDER — CLINDAMYCIN HYDROCHLORIDE 300 MG/1
CAPSULE ORAL
Qty: 180 CAP | Refills: 0 | Status: SHIPPED | OUTPATIENT
Start: 2020-07-15 | End: 2020-10-06 | Stop reason: SDUPTHER

## 2020-07-16 ENCOUNTER — ANTICOAGULATION VISIT (OUTPATIENT)
Dept: VASCULAR LAB | Facility: MEDICAL CENTER | Age: 80
End: 2020-07-16
Attending: INTERNAL MEDICINE
Payer: MEDICARE

## 2020-07-16 VITALS — DIASTOLIC BLOOD PRESSURE: 78 MMHG | HEART RATE: 62 BPM | SYSTOLIC BLOOD PRESSURE: 114 MMHG

## 2020-07-16 DIAGNOSIS — Z95.3 STATUS POST TRANSCATHETER AORTIC VALVE REPLACEMENT (TAVR) USING BIOPROSTHESIS: ICD-10-CM

## 2020-07-16 DIAGNOSIS — I48.91 ATRIAL FIBRILLATION, UNSPECIFIED TYPE (HCC): ICD-10-CM

## 2020-07-16 LAB
INR BLD: 2.3 (ref 0.9–1.2)
INR PPP: 2.3 (ref 2–3.5)

## 2020-07-16 PROCEDURE — 99211 OFF/OP EST MAY X REQ PHY/QHP: CPT

## 2020-07-16 PROCEDURE — 85610 PROTHROMBIN TIME: CPT

## 2020-07-16 NOTE — PROGRESS NOTES
Anticoagulation Summary  As of 2020    INR goal:   2.0-3.0   TTR:   58.8 % (3.7 y)   INR used for dosin.30 (2020)   Warfarin maintenance plan:   2 mg (4 mg x 0.5) every Tue, Thu; 4 mg (4 mg x 1) all other days   Weekly warfarin total:   24 mg   Plan last modified:   CLAYTON Jacob (2020)   Next INR check:   2020   Target end date:   Indefinite    Indications    Atrial fibrillation (HCC) [I48.91]  Status post transcatheter aortic valve replacement (TAVR) using bioprosthesis [Z95.3]             Anticoagulation Episode Summary     INR check location:       Preferred lab:       Send INR reminders to:       Comments:   Pt goes by Kinsey      Anticoagulation Care Providers     Provider Role Specialty Phone number    Vickey Rutherford M.D. Referring Cardiology [phone]    [facility] Anticoagulation Services Responsible  [phone]                Anticoagulation Patient Findings  Patient Findings     Negatives:   Signs/symptoms of thrombosis, Signs/symptoms of bleeding, Laboratory test error suspected, Change in health, Change in alcohol use, Change in activity, Upcoming invasive procedure, Emergency department visit, Upcoming dental procedure, Missed doses, Extra doses, Change in medications, Change in diet/appetite, Hospital admission, Bruising, Other complaints          HPI:  Duane Parkinson seen in clinic today, on anticoagulation therapy with warfarin for atrial fib  Changes to current medical/health status since last appt: none  Denies signs/symptoms of bleeding and/or thrombosis since the last appt.    Denies any interval changes to diet  Denies any interval changes to medications since last appt.   Denies any complications or cost restrictions with current therapy.   Verified current warfarin dosing schedule.   BP recorded in vitals.       A/P   INR  therapeutic.   Pt is to continue with current warfarin dosing regimen.    Follow up appointment in 3 week(s).    Sun ANGELES  Elaine Diaz

## 2020-07-20 ENCOUNTER — SLEEP CENTER VISIT (OUTPATIENT)
Dept: SLEEP MEDICINE | Facility: MEDICAL CENTER | Age: 80
End: 2020-07-20
Payer: MEDICARE

## 2020-07-20 VITALS
WEIGHT: 225 LBS | DIASTOLIC BLOOD PRESSURE: 74 MMHG | SYSTOLIC BLOOD PRESSURE: 138 MMHG | HEART RATE: 65 BPM | HEIGHT: 72 IN | BODY MASS INDEX: 30.48 KG/M2 | OXYGEN SATURATION: 93 %

## 2020-07-20 DIAGNOSIS — Z79.01 ANTICOAGULATED: ICD-10-CM

## 2020-07-20 DIAGNOSIS — G47.39 TREATMENT-EMERGENT CENTRAL SLEEP APNEA: ICD-10-CM

## 2020-07-20 DIAGNOSIS — G47.33 OSA (OBSTRUCTIVE SLEEP APNEA): ICD-10-CM

## 2020-07-20 DIAGNOSIS — I10 ESSENTIAL HYPERTENSION: ICD-10-CM

## 2020-07-20 DIAGNOSIS — I48.91 ATRIAL FIBRILLATION, UNSPECIFIED TYPE (HCC): ICD-10-CM

## 2020-07-20 PROCEDURE — 99213 OFFICE O/P EST LOW 20 MIN: CPT | Performed by: NURSE PRACTITIONER

## 2020-07-20 ASSESSMENT — FIBROSIS 4 INDEX: FIB4 SCORE: 2.05

## 2020-07-20 NOTE — PROGRESS NOTES
Chief Complaint   Patient presents with   • Follow-Up     LORENA-Last seen 03/11/2020   • Results     Overnight Oximetry- 04/20/2020       HPI:      Mr. Parkinson is a 79 y/o male patient who is in today for LORENA/CSA f/u. PMH includes atrial fibrillation on Coumadin, aortic stenosis, grade III diastolic dysfunction, autoimmune hepatitis, HTN, insomnia, mitral valve disorder, TAVR, depression, dyslipidemia, neuropathy, IGT.     HST from 2016 indicated an AHI of 22 and low oxygenation of 81%.  He completed a titration study for continued hypersomnolence completed November 2017 that was successful on ASV therapy.     CNOX from 2018 showed adequate oxygenation.    OPO on Servo adaptive BiPAP ventilation with expiratory pressures of 7 to 10 cm of water and pressure support at 4 to 15 cm of water, and on room air from 4/20/20 was reviewed which indicated the basal oxygen saturation is 93%.  Saturation is well-maintained throughout the night but climbs essentially to 100% between midnight and 1 AM and then falls to about 87% for 30 minutes between 1 and 2 AM.  Overall he spends about 38 minutes of the night with a saturation below 88%.    Compliance report from 6/20/20-7/19/20 was downloaded and reviewed with the patient which showed ASV EPAP at 7/10 cmH2O, PS 4/15 cmH2O, max pressure 25 cmH20, 93.3% compliance, 5 hrs 36 min use, AHI of 3.2. Large mask leak per day of 2 hrs 41 min is noted. He is tolerating the pressure and mask well but reports at times he wakes up with a dry mouth.  He has also noticed that the humidifier chamber runs out of water before the night is over.  He goes to bed 9 pm and wakes up at 5 am.  He sleeps well through the night.  He denies morning headache or snoring.  He does not recall if the pulse oximeter fell off on the night of the OPO. study.  He states that he does not feel lightheaded or dizzy anymore.  He has seen Dr. Gramajo, ENT, for his right nostril as he at times has mild trouble breathing  "through it who per patient informed him \"not to worry about it\".  He denies any new health problems or medications.      ROS:  Constitutional: Denies fevers, chills, sweats, fatigue, weight loss  Eyes: Denies glasses, vision loss, pain, drainage, double vision  Ears/Nose/Mouth/Throat: Denies rhinitis, nasal congestion, ear ache, difficulty hearing, sore throat, persistent hoarseness, decayed teeth/toothache  Cardiovascular: Denies chest pain, tightness, palpitations, swelling in feet/legs, fainting, difficulty breathing when laying down  Respiratory: Denies shortness of breath, cough, sputum, wheezing, painful breathing, coughing up blood  GI: Denies heartburn, difficulty swallowing, nausea, vomiting, abdominal pain, diarrhea, constiptation  : Denies frequent urination, painful urination  Integumentary: Denies rashes, lumps or color changes  Neurological: Denies frequent headaches, dizziness, weakness  Sleep: Denies daytime sleepiness, loud snoring, stops breathing during sleep, waking up gasping for air, insomnia, morning headaches      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)    • Clotting disorder (HCC)     reports nose bleeds   • Depression     Lost wife ,still gets tearful   • Diabetes (HCC)     diet controlled   • Encounter for long-term (current) use of other medications    • Gasping for breath    • [place] Konger measles    • Heart murmur    • Heart valve disease    • Hyperlipidemia    • Hypertension    • Insomnia    • Mumps    • Nasal drainage    • Pain     back & thumb   • Pyogenic arthritis, lower leg (HCC)     thumb, back   • Restless leg syndrome    • Sleep apnea     uses CPAP   • Snoring    • Tonsillitis    • Valvular heart disease        Past Surgical History:   Procedure Laterality Date   • GIBRAN N/A 7/29/2019    Procedure: ECHOCARDIOGRAM, TRANSESOPHAGEAL;  Surgeon: Leander" RADHA Buckner;  Location: SURGERY [facility];  Service: Cardiac   • TRANSCATHETER AORTIC VALVE REPLACEMENT Bilateral 2019    Procedure: REPLACEMENT, AORTIC VALVE, TRANSCATHETER;  Surgeon: Leander Buckner M.D.;  Location: SURGERY [facility];  Service: Cardiac   • FINGER ARTHROPLASTY Left 2016    Procedure: FINGER ARTHROPLASTY THUMB CARPOMETACARPAL EXCISIONAL, EXCISE TRAPEZIUM;  Surgeon: Raheel Salgado M.D.;  Location: SURGERY SAME DAY [facility];  Service:    • CARDIOVERSION      on 06, sinus rhythm until 2007,  paroxysmal atrial fibrillation   • KNEE ARTHROPLASTY TOTAL     • LAMINOTOMY N/A     multiple lumbar spine   • OTHER ORTHOPEDIC SURGERY      lower back   • PB PERCUT IMPLNT NEUROELECT,EPIDURAL     • TOE ARTHROPLASTY     • TURP-VAPOR N/A        Family History   Problem Relation Age of Onset   • Other Mother         unknown   • Heart Disease Mother    • Cancer Father         prostate, skin   • No Known Problems Brother    • Diabetes Brother    • Heart Disease Son    • Sleep Apnea Neg Hx        Social History     Socioeconomic History   • Marital status:      Spouse name: Not on file   • Number of children: Not on file   • Years of education: Not on file   • Highest education level: Not on file   Occupational History   • Not on file   Social Needs   • Financial resource strain: Not on file   • Food insecurity     Worry: Not on file     Inability: Not on file   • Transportation needs     Medical: Not on file     Non-medical: Not on file   Tobacco Use   • Smoking status: Former Smoker     Packs/day: 1.00     Years: 20.00     Pack years: 20.00     Types: Cigarettes     Last attempt to quit: 1972     Years since quittin.5   • Smokeless tobacco: Former User   Substance and Sexual Activity   • Alcohol use: No     Alcohol/week: 0.0 oz   • Drug use: No   • Sexual activity: Not Currently     Partners: Female   Lifestyle   • Physical activity     Days per  week: Not on file     Minutes per session: Not on file   • Stress: Not on file   Relationships   • Social connections     Talks on phone: Not on file     Gets together: Not on file     Attends Baptism service: Not on file     Active member of club or organization: Not on file     Attends meetings of clubs or organizations: Not on file     Relationship status: Not on file   • Intimate partner violence     Fear of current or ex partner: Not on file     Emotionally abused: Not on file     Physically abused: Not on file     Forced sexual activity: Not on file   Other Topics Concern   • Not on file   Social History Narrative   • Not on file       Allergies as of 07/20/2020 - Reviewed 07/20/2020   Allergen Reaction Noted   • Feldene [piroxicam] Itching 12/30/2011   • Percodan [oxycodone-aspirin] Itching and Photosensitivity 12/30/2011        Vitals:  Vitals:    07/20/20 0816   BP: 138/74   Pulse: 65   SpO2: 93%       Current medications as of today   Current Outpatient Medications   Medication Sig Dispense Refill   • clindamycin (CLEOCIN) 300 MG Cap TAKE ONE CAPSULE BY MOUTH TWICE A  Cap 0   • metoprolol SR (TOPROL XL) 50 MG TABLET SR 24 HR TAKE ONE TABLET BY MOUTH EVERY DAY 90 Tab 3   • Coenzyme Q10 (COQ10 PO) Take  by mouth.     • Omega-3 Fatty Acids (FISH OIL) 1000 MG CAPSULE DELAYED RELEASE EC softgel capsule Take 2,000 mg by mouth every morning with breakfast.     • Glucosamine HCl (GLUCOSAMINE PO) Take  by mouth.     • lysine 500 MG Tab Take 500 mg by mouth every day.     • digoxin (LANOXIN) 250 MCG Tab Take 1 Tab by mouth every bedtime. 90 Tab 3   • warfarin (COUMADIN) 4 MG Tab Take one tablet by mouth one time daily or as directed by coumadin clinic 90 Tab 3   • aspirin (ASA) 81 MG Chew Tab chewable tablet Take 1 Tab by mouth every day. 90 Tab 3   • warfarin (COUMADIN) 6 MG Tab Take 1 Tab by mouth every day. 30 Tab 3   • acetaminophen (TYLENOL) 500 MG Tab Take 500-1,000 mg by mouth every 6 hours as  needed.     • gabapentin (NEURONTIN) 300 MG Cap Take 300 mg by mouth 3 times a day. 1 in the morning, 1 at noon, 2 at 4 pm, 3 at bedtime     • pravastatin (PRAVACHOL) 80 MG tablet Take 1 Tab by mouth every day. 90 Tab 0   • B Complex Vitamins (VITAMIN B COMPLEX PO) Take 1 Tab by mouth every evening.     • Psyllium (METAMUCIL PO) Take  by mouth every morning.     • Cholecalciferol (VITAMIN D3) 2000 UNITS Tab Take 1 Tab by mouth every evening.     • multivitamin (THERAGRAN) TABS Take 1 Tab by mouth every bedtime.     • omeprazole (PRILOSEC) 20 MG delayed-release capsule Take 1 Cap by mouth every day. (Patient not taking: Reported on 3/3/2020) 30 Cap 1   • cyclobenzaprine (FLEXERIL) 10 MG Tab Take 10 mg by mouth 3 times a day as needed for Muscle Spasms.       No current facility-administered medications for this visit.          Physical Exam:   Appearance: Well developed, well nourished, no acute distress  Eyes: PERRL, EOM intact, sclera white, conjunctiva moist  Ears: no lesions or deformities  Hearing: grossly intact  Nose: no lesions or deformities  Respiratory effort: no intercostal retractions or use of accessory muscles  Extremities: no cyanosis or edema  Abdomen: soft   Gait and Station: normal  Digits and nails: no clubbing, cyanosis, petechiae or nodes.  Cranial nerves: grossly intact  Skin: no visible rashes, lesions or ulcers noted  Orientation: Oriented to time, person and place  Mood and affect: mood and affect appropriate, normal interaction with examiner  Judgement: Intact    Assessment:  1. LORENA (obstructive sleep apnea)  DME Mask and Supplies   2. Treatment-emergent central sleep apnea  DME Mask and Supplies   3. Essential hypertension     4. Atrial fibrillation, unspecified type (HCC)     5. Anticoagulated     6. BMI 30.0-30.9,adult           Plan    1. OPO on Servo adaptive BiPAP ventilation with expiratory pressures of 7 to 10 cm of water and pressure support at 4 to 15 cm of water, and on room air  from 4/20/20 was reviewed which indicated the basal oxygen saturation is 93%.  Saturation is well-maintained throughout the night but climbs essentially to 100% between midnight and 1 AM and then falls to about 87% for 30 minutes between 1 and 2 AM.  Overall he spends about 38 minutes of the night with a saturation below 88%.  Assessment:  This study suggests preservation of arterial oxygen saturation on ASV and room air.  The relatively lower saturations between 1 and 2 AM require clinical correlation as the patient may not have been wearing the device at that time.    Order repeat OPO on ASV at next office visit.     2. Compliance report from 6/20/20-7/19/20 was downloaded and reviewed with the patient which showed ASV EPAP at 7/10 cmH2O, PS 4/15 cmH2O, max pressure 25 cmH20, 93.3% compliance, 5 hrs 36 min use, AHI of 3.2. Continue ASV. Patient is compliant and benefiting from ASV therapy for management of LORENA/CSA.   3. DME order (CPAP & more) for mask (dreamwear FFM, nasal mask or MOC) and supplies was provided today. Continue to clean mask and supplies weekly, and change supplies per insurance guidelines.  Advised patient to consider a full facemask as he may be breathing with his mouth open which will contribute to dry mouth and the humidifier running out of water.  May also consider decreasing the humidification setting from 4 to 3.  4. Continue to stay active.   5. Follow up with the appropriate healthcare practitioners for all other medical problems and issues.  6. F/u in 6 months, sooner if needed.       JUDIT Benitez.      This dictation was created using voice recognition software. The accuracy of the dictation is limited to the abilities of the software. I expect there may be some errors of grammar and possibly content.

## 2020-08-06 ENCOUNTER — ANTICOAGULATION VISIT (OUTPATIENT)
Dept: VASCULAR LAB | Facility: MEDICAL CENTER | Age: 80
End: 2020-08-06
Attending: INTERNAL MEDICINE
Payer: MEDICARE

## 2020-08-06 VITALS — SYSTOLIC BLOOD PRESSURE: 106 MMHG | DIASTOLIC BLOOD PRESSURE: 54 MMHG | HEART RATE: 55 BPM

## 2020-08-06 DIAGNOSIS — Z95.3 STATUS POST TRANSCATHETER AORTIC VALVE REPLACEMENT (TAVR) USING BIOPROSTHESIS: ICD-10-CM

## 2020-08-06 LAB — INR PPP: 2.6 (ref 2–3.5)

## 2020-08-06 PROCEDURE — 85610 PROTHROMBIN TIME: CPT

## 2020-08-06 PROCEDURE — 99211 OFF/OP EST MAY X REQ PHY/QHP: CPT

## 2020-08-06 NOTE — PROGRESS NOTES
Anticoagulation Summary  As of 2020    INR goal:  2.0-3.0   TTR:  59.4 % (3.8 y)   INR used for dosin.60 (2020)   Warfarin maintenance plan:  2 mg (4 mg x 0.5) every Tue, Thu; 4 mg (4 mg x 1) all other days   Weekly warfarin total:  24 mg   Plan last modified:  CLAYTON Jacob (2020)   Next INR check:  9/3/2020   Target end date:  Indefinite    Indications    Atrial fibrillation (HCC) [I48.91]  Status post transcatheter aortic valve replacement (TAVR) using bioprosthesis [Z95.3]             Anticoagulation Episode Summary     INR check location:      Preferred lab:      Send INR reminders to:      Comments:  Pt goes by Kinsey      Anticoagulation Care Providers     Provider Role Specialty Phone number    Vickey Rutherford M.D. Referring Cardiology [phone]    [facility] Responsible  [phone]              Anticoagulation Patient Findings  Patient Findings     Negatives:  Signs/symptoms of thrombosis, Signs/symptoms of bleeding, Laboratory test error suspected, Change in health, Change in alcohol use, Change in activity, Upcoming invasive procedure, Emergency department visit, Upcoming dental procedure, Missed doses, Extra doses, Change in medications, Change in diet/appetite, Hospital admission, Bruising, Other complaints          HPI:  Duane Parkinson seen in clinic today, on anticoagulation therapy with warfarin for atrial fib, s/p TAVR  Changes to current medical/health status since last appt: none  Denies signs/symptoms of bleeding and/or thrombosis since the last appt.    Denies any interval changes to diet  Denies any interval changes to medications since last appt.   Denies any complications or cost restrictions with current therapy.   Verified current warfarin dosing schedule.   BP recorded in vitals.        A/P   INR  therapeutic.   Pt is to continue with current warfarin dosing regimen.    Follow up appointment in 4 week(s).    Sun Diaz,  PharmD

## 2020-08-07 LAB — INR BLD: 2.6 (ref 0.9–1.2)

## 2020-08-14 DIAGNOSIS — E78.2 MIXED HYPERLIPIDEMIA: ICD-10-CM

## 2020-08-17 RX ORDER — PRAVASTATIN SODIUM 80 MG/1
TABLET ORAL
Qty: 90 TAB | Refills: 3 | Status: SHIPPED | OUTPATIENT
Start: 2020-08-17 | End: 2021-07-08

## 2020-08-20 ENCOUNTER — OFFICE VISIT (OUTPATIENT)
Dept: MEDICAL GROUP | Facility: MEDICAL CENTER | Age: 80
End: 2020-08-20
Payer: MEDICARE

## 2020-08-20 VITALS
HEIGHT: 72 IN | WEIGHT: 226 LBS | BODY MASS INDEX: 30.61 KG/M2 | DIASTOLIC BLOOD PRESSURE: 72 MMHG | TEMPERATURE: 97.4 F | OXYGEN SATURATION: 96 % | SYSTOLIC BLOOD PRESSURE: 120 MMHG | HEART RATE: 66 BPM

## 2020-08-20 DIAGNOSIS — K75.4 AUTOIMMUNE HEPATITIS (HCC): ICD-10-CM

## 2020-08-20 DIAGNOSIS — F32.A DEPRESSION, UNSPECIFIED DEPRESSION TYPE: ICD-10-CM

## 2020-08-20 DIAGNOSIS — I48.0 PAROXYSMAL ATRIAL FIBRILLATION (HCC): ICD-10-CM

## 2020-08-20 DIAGNOSIS — E78.5 DYSLIPIDEMIA: ICD-10-CM

## 2020-08-20 DIAGNOSIS — Z79.2 PROPHYLACTIC ANTIBIOTIC: ICD-10-CM

## 2020-08-20 DIAGNOSIS — I10 ESSENTIAL HYPERTENSION: ICD-10-CM

## 2020-08-20 DIAGNOSIS — R73.02 IGT (IMPAIRED GLUCOSE TOLERANCE): ICD-10-CM

## 2020-08-20 DIAGNOSIS — G47.39 TREATMENT-EMERGENT CENTRAL SLEEP APNEA: ICD-10-CM

## 2020-08-20 DIAGNOSIS — Z00.00 MEDICARE ANNUAL WELLNESS VISIT, SUBSEQUENT: ICD-10-CM

## 2020-08-20 DIAGNOSIS — Z95.3 STATUS POST TRANSCATHETER AORTIC VALVE REPLACEMENT (TAVR) USING BIOPROSTHESIS: ICD-10-CM

## 2020-08-20 PROCEDURE — G0439 PPPS, SUBSEQ VISIT: HCPCS | Performed by: INTERNAL MEDICINE

## 2020-08-20 PROCEDURE — 99213 OFFICE O/P EST LOW 20 MIN: CPT | Mod: 25 | Performed by: INTERNAL MEDICINE

## 2020-08-20 ASSESSMENT — PATIENT HEALTH QUESTIONNAIRE - PHQ9: CLINICAL INTERPRETATION OF PHQ2 SCORE: 0

## 2020-08-20 ASSESSMENT — ACTIVITIES OF DAILY LIVING (ADL): BATHING_REQUIRES_ASSISTANCE: 0

## 2020-08-20 ASSESSMENT — FIBROSIS 4 INDEX: FIB4 SCORE: 2.05

## 2020-08-20 ASSESSMENT — ENCOUNTER SYMPTOMS: GENERAL WELL-BEING: GOOD

## 2020-08-20 NOTE — PROGRESS NOTES
CC: Arthritis of the knee, due for wellness examination.    HPI:   Duane presents today with the following.      1. Medicare annual wellness visit, subsequent  Screenings performed below advance directives already on file    2. Osteoarthrosis involving lower leg  Status post knee replacement on chronic antibiotics for previous infection.  No fevers or chills but reporting significant persistent knee pain.  He would like to get into an orthopedist to discuss.  Denies any recent falls or injury        Information for advance directives given to patient or instructed to bring in advance directives into to office to put in chart.      Depression Screening    Little interest or pleasure in doing things?  0 - not at all  Feeling down, depressed , or hopeless? 0 - not at all  Patient Health Questionnaire Score: 0     If depressive symptoms identified deferred to follow up visit unless specifically addressed in assessment and plan.    Interpretation of PHQ-9 Total Score   Score Severity   1-4 No Depression   5-9 Mild Depression   10-14 Moderate Depression   15-19 Moderately Severe Depression   20-27 Severe Depression    Screening for Cognitive Impairment    Three Minute Recall (river, dion, finger) 2/3    Braydon clock face with all 12 numbers and set the hands to show 10 past 11.  Yes 5/5  Cognitive concerns identified deferred for follow up unless specifically addressed in assessment and plan.    Fall Risk Assessment    Has the patient had two or more falls in the last year or any fall with injury in the last year?  No    Safety Assessment    Throw rugs on floor.  No  Handrails on all stairs.  Yes  Good lighting in all hallways.  Yes  Difficulty hearing.  Yes  Patient counseled about all safety risks that were identified.    Functional Assessment ADLs    Are there any barriers preventing you from cooking for yourself or meeting nutritional needs?  No.    Are there any barriers preventing you from driving safely or  obtaining transportation?  No.    Are there any barriers preventing you from using a telephone or calling for help?  No.    Are there any barriers preventing you from shopping?  No.    Are there any barriers preventing you from taking care of your own finances?  No.    Are there any barriers preventing you from managing your medications?  No.    Are there any barriers preventing you from showering, bathing or dressing yourself?  No.    Are you currently engaging in any exercise or physical activity?  No.     What is your perception of your health?  Good.      Health Maintenance Summary                Annual Wellness Visit Overdue 8/5/2020      Done 8/5/2019 SUBSEQUENT ANNUAL WELLNESS VISIT-INCLUDES PPPS ()     Patient has more history with this topic...    IMM ZOSTER VACCINES Postponed 9/13/2030 Originally 5/27/2014. Insurance/Financial     Done 4/1/2014 Imm Admin: Zoster Vaccine Live (ZVL) (Zostavax)    IMM DTaP/Tdap/Td Vaccine Postponed 8/12/2037 Originally 6/29/2019. Insurance/Financial     Done 6/29/2009 Imm Admin: Tdap Vaccine    IMM INFLUENZA Next Due 9/1/2020      Done 11/18/2019 Imm Admin: Influenza Vaccine Adult HD     Patient has more history with this topic...    COLONOSCOPY Next Due 1/7/2022      Not specified 1/7/2012           Patient Care Team:  Fransisco Graham M.D. as PCP - General (Internal Medicine)  Gus Irvin M.D. as Consulting Physician (Phys Med and Rehab)  Jarad Joseph M.D. (Inactive) as Consulting Physician (Gastroenterology)  CLAYTON Navarrete as Consulting Physician (Dermatology)  Alessio Cutler M.D. (Orthopaedics)  Neville Minor D.P.M. (Podiatry)  Vickey Rutherford M.D. as Consulting Physician (Cardiology)  [facility] as Consulting Physician  Neville James M.D. (Neurosurgery)  Fransisco Bearden PT as Physical Therapy (Physical Therapy)            Health Care Screening: Age-appropriate preventive services that Medicare covers were discussed  today and ordered as indicated and agreed upon by the patient, and as recommended by USPTF and ACIP.     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 12/30/2011     Priority: High   • Essential hypertension 01/07/2015     Priority: Medium   • Atrial fibrillation (HCC) 12/30/2011     Priority: Medium   • Autoimmune hepatitis (HCC) 06/22/2018     Priority: Low   • Neuropathy 12/22/2017     Priority: Low   • Localized primary osteoarthritis of carpometacarpal joint of left thumb 05/27/2016     Priority: Low   • Insomnia 05/26/2016     Priority: Low   • Treatment-emergent central sleep apnea 04/21/2016     Priority: Low   • Dyslipidemia 01/07/2015     Priority: Low   • IGT (impaired glucose tolerance) 01/07/2015     Priority: Low   • Lumbar Disc Degeneration 12/30/2011     Priority: Low   • Osteoarthrosis involving lower leg 12/30/2011     Priority: Low   • Prophylactic antibiotic 08/20/2020   • Depression 03/11/2020       Current Outpatient Medications   Medication Sig Dispense Refill   • pravastatin (PRAVACHOL) 80 MG tablet TAKE ONE TABLET BY MOUTH EVERY DAY 90 Tab 3   • clindamycin (CLEOCIN) 300 MG Cap TAKE ONE CAPSULE BY MOUTH TWICE A  Cap 0   • metoprolol SR (TOPROL XL) 50 MG TABLET SR 24 HR TAKE ONE TABLET BY MOUTH EVERY DAY 90 Tab 3   • Coenzyme Q10 (COQ10 PO) Take  by mouth.     • Omega-3 Fatty Acids (FISH OIL) 1000 MG CAPSULE DELAYED RELEASE EC softgel capsule Take 2,000 mg by mouth every morning with breakfast.     • Glucosamine HCl (GLUCOSAMINE PO) Take  by mouth.     • lysine 500 MG Tab Take 500 mg by mouth every day.     • digoxin (LANOXIN) 250 MCG Tab Take 1 Tab by mouth every bedtime. 90 Tab 3   • warfarin (COUMADIN) 4 MG Tab Take one tablet by mouth one time daily or as directed by coumadin clinic 90 Tab 3   • aspirin (ASA) 81 MG Chew Tab chewable tablet Take 1 Tab by mouth every day. 90 Tab 3   • warfarin  (COUMADIN) 6 MG Tab Take 1 Tab by mouth every day. 30 Tab 3   • acetaminophen (TYLENOL) 500 MG Tab Take 500-1,000 mg by mouth every 6 hours as needed.     • gabapentin (NEURONTIN) 300 MG Cap Take 300 mg by mouth 3 times a day. 1 in the morning, 1 at noon, 2 at 4 pm, 3 at bedtime     • Psyllium (METAMUCIL PO) Take  by mouth every morning.     • Cholecalciferol (VITAMIN D3) 2000 UNITS Tab Take 1 Tab by mouth every evening.     • multivitamin (THERAGRAN) TABS Take 1 Tab by mouth every bedtime.     • omeprazole (PRILOSEC) 20 MG delayed-release capsule Take 1 Cap by mouth every day. (Patient not taking: Reported on 3/3/2020) 30 Cap 1   • cyclobenzaprine (FLEXERIL) 10 MG Tab Take 10 mg by mouth 3 times a day as needed for Muscle Spasms.     • B Complex Vitamins (VITAMIN B COMPLEX PO) Take 1 Tab by mouth every evening.       No current facility-administered medications for this visit.        Family History   Problem Relation Age of Onset   • Other Mother         unknown   • Heart Disease Mother    • Cancer Father         prostate, skin   • No Known Problems Brother    • Diabetes Brother    • Heart Disease Son    • Sleep Apnea Neg Hx        Social History     Socioeconomic History   • Marital status:      Spouse name: Not on file   • Number of children: Not on file   • Years of education: Not on file   • Highest education level: Not on file   Occupational History   • Not on file   Social Needs   • Financial resource strain: Not on file   • Food insecurity     Worry: Not on file     Inability: Not on file   • Transportation needs     Medical: Not on file     Non-medical: Not on file   Tobacco Use   • Smoking status: Former Smoker     Packs/day: 1.00     Years: 20.00     Pack years: 20.00     Types: Cigarettes     Quit date: 1972     Years since quittin.6   • Smokeless tobacco: Former User   Substance and Sexual Activity   • Alcohol use: No     Alcohol/week: 0.0 oz   • Drug use: No   • Sexual activity: Not  Currently     Partners: Female   Lifestyle   • Physical activity     Days per week: Not on file     Minutes per session: Not on file   • Stress: Not on file   Relationships   • Social connections     Talks on phone: Not on file     Gets together: Not on file     Attends Zoroastrian service: Not on file     Active member of club or organization: Not on file     Attends meetings of clubs or organizations: Not on file     Relationship status: Not on file   • Intimate partner violence     Fear of current or ex partner: Not on file     Emotionally abused: Not on file     Physically abused: Not on file     Forced sexual activity: Not on file   Other Topics Concern   • Not on file   Social History Narrative   • Not on file       Past Surgical History:   Procedure Laterality Date   • GIBRAN N/A 7/29/2019    Procedure: ECHOCARDIOGRAM, TRANSESOPHAGEAL;  Surgeon: Leander Buckner M.D.;  Location: SURGERY [facility];  Service: Cardiac   • TRANSCATHETER AORTIC VALVE REPLACEMENT Bilateral 7/29/2019    Procedure: REPLACEMENT, AORTIC VALVE, TRANSCATHETER;  Surgeon: Leander Buckner M.D.;  Location: SURGERY [facility];  Service: Cardiac   • FINGER ARTHROPLASTY Left 5/27/2016    Procedure: FINGER ARTHROPLASTY THUMB CARPOMETACARPAL EXCISIONAL, EXCISE TRAPEZIUM;  Surgeon: Raheel Salgado M.D.;  Location: SURGERY SAME DAY [facility];  Service:    • CARDIOVERSION      on 7/17/06, sinus rhythm until January 2007,  paroxysmal atrial fibrillation   • KNEE ARTHROPLASTY TOTAL     • LAMINOTOMY N/A     multiple lumbar spine   • OTHER ORTHOPEDIC SURGERY      lower back   • PB PERCUT IMPLNT NEUROELECT,EPIDURAL     • TOE ARTHROPLASTY     • TURP-VAPOR N/A        Allergies as of 08/20/2020 - Reviewed 08/20/2020   Allergen Reaction Noted   • Feldene [piroxicam] Itching 12/30/2011   • Percodan [oxycodone-aspirin] Itching and Photosensitivity 12/30/2011        ROS: Denies Chest pain, SOB, LE edema.    /72 (BP Location: Left  arm, Patient Position: Sitting)   Pulse 66   Temp 36.3 °C (97.4 °F)   Ht 1.829 m (6')   Wt 102.5 kg (226 lb)   SpO2 96%   BMI 30.65 kg/m²     Physical Exam:  Gen:         Alert and oriented, No apparent distress.  Neck:        No Lymphadenopathy or Bruits.  Lungs:     Clear to auscultation bilaterally  CV:          Regular rate and rhythm. No murmurs, rubs or gallops.  Abd:         Soft non tender, non distended. Normal active bowel sounds.  No  Hepatosplenomegaly, No pulsatile masses.                   Ext:          No clubbing, cyanosis, edema.      Assessment and Plan.   80 y.o. male with the following issues.    1. Medicare annual wellness visit, subsequent  Discussed healthy lifestyle habits as well as screening regimens.  Discussion about safe lifestyle practices, seatbelts, sunscreen, dietary recommendations.    2. Osteoarthrosis involving lower leg  Placing referral to orthopedics  - REFERRAL TO ORTHOPEDICS    3. Paroxysmal atrial fibrillation (HCC)  Follow with cardiology stable rate control    4. Autoimmune hepatitis (HCC)  Denying abdominal complaints rechecking liver function    5. Depression, unspecified depression type  Clinically doing well no change to therapy    6. Essential hypertension  Currently well controlled, Discuss diet, exercise and salt restriction.  No change to medication therapy.    7. Dyslipidemia  Recheck cholesterol  - Comp Metabolic Panel; Future  - Lipid Profile; Future    8. Status post transcatheter aortic valve replacement (TAVR) using bioprosthesis  Doing well    9. Treatment-emergent central sleep apnea  Pain on CPAP.    10. IGT (impaired glucose tolerance)  Recheck A1c  - HEMOGLOBIN A1C; Future    11. Prophylactic antibiotic  No active infectious symptoms continue clindamycin        Referrals offered: Community-based lifestyle interventions to reduce health risks and promote self-management and wellness, fall prevention, nutrition, physical activity, tobacco-use  cessation, weight loss, and mental health services as per orders if indicated.    Discussion today about general wellness and lifestyle habits:    · Prevent falls and reduce trip hazards; Cautioned about securing or removing rugs.  · Have a working fire alarm and carbon monoxide detector;   · Engage in regular physical activity and social activities

## 2020-08-21 ENCOUNTER — TELEPHONE (OUTPATIENT)
Dept: CARDIOLOGY | Facility: MEDICAL CENTER | Age: 80
End: 2020-08-21

## 2020-08-21 NOTE — TELEPHONE ENCOUNTER
Spoke with pt and confirmed new appt date for follow up with AK. Pt will have follow up appt with AK on 8-24-20 at 3:00. Pt okay with time.

## 2020-08-24 ENCOUNTER — OFFICE VISIT (OUTPATIENT)
Dept: CARDIOLOGY | Facility: MEDICAL CENTER | Age: 80
End: 2020-08-24
Payer: MEDICARE

## 2020-08-24 VITALS
DIASTOLIC BLOOD PRESSURE: 70 MMHG | SYSTOLIC BLOOD PRESSURE: 116 MMHG | HEART RATE: 70 BPM | WEIGHT: 224 LBS | HEIGHT: 72 IN | OXYGEN SATURATION: 95 % | BODY MASS INDEX: 30.34 KG/M2

## 2020-08-24 DIAGNOSIS — I48.91 ATRIAL FIBRILLATION, UNSPECIFIED TYPE (HCC): ICD-10-CM

## 2020-08-24 PROCEDURE — 99214 OFFICE O/P EST MOD 30 MIN: CPT | Performed by: INTERNAL MEDICINE

## 2020-08-24 ASSESSMENT — FIBROSIS 4 INDEX: FIB4 SCORE: 2.05

## 2020-08-24 NOTE — PROGRESS NOTES
Cardiology Initial Consultation Note    Date of note:    08/24/2020   Primary Care Provider: Fransisco Graham M.D.  Referring Provider: No ref. provider found     Patient Name: Duane Parkinson   YOB: 1940  MRN:              [id number]    Chief Complaint: 1 year follow-up status post TAVR, hypertension, hyperlipidemia, atrial fibrillation  Duane Parkinson is a 80 y.o. male  patient presented today for regular follow-up.  He feels well.  Denies chest pain, shortness of breath, dizziness, fainting spells.  He had a mechanical fall several months ago but no injuries.  Complains of feeling tired all the time, using CPAP most of the time.  He is with his grandson today.    ROS  Joint pains, arthritis.  All other systems reviewed and negative.    All other systems reviewed and discussed using a comprehensive questionnaire and are negative.     Past medical history, family history, social history, allergies and labs are reviewed and updated as needed as documented below.    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)    • Clotting disorder (HCC)     reports nose bleeds   • Depression     Lost wife ,still gets tearful   • Diabetes (HCC)     diet controlled   • Encounter for long-term (current) use of other medications    • Gasping for breath    • Taj[place] measles    • Heart murmur    • Heart valve disease    • Hyperlipidemia    • Hypertension    • Insomnia    • Mumps    • Nasal drainage    • Pain     back & thumb   • Pyogenic arthritis, lower leg (HCC)     thumb, back   • Restless leg syndrome    • Sleep apnea     uses CPAP   • Snoring    • Tonsillitis    • Valvular heart disease          Past Surgical History:   Procedure Laterality Date   • GIBRAN N/A 7/29/2019    Procedure: ECHOCARDIOGRAM, TRANSESOPHAGEAL;  Surgeon: Leander Buckner M.D.;  Location: SURGERY [facility];  Service: Cardiac   • TRANSCATHETER AORTIC VALVE REPLACEMENT Bilateral 7/29/2019    Procedure: REPLACEMENT, AORTIC VALVE, TRANSCATHETER;  Surgeon: Leander Buckner M.D.;  Location: SURGERY [facility];  Service: Cardiac   • FINGER ARTHROPLASTY Left 5/27/2016    Procedure: FINGER ARTHROPLASTY THUMB CARPOMETACARPAL EXCISIONAL, EXCISE TRAPEZIUM;  Surgeon: Raheel Salgado M.D.;  Location: SURGERY SAME DAY [facility];  Service:    • CARDIOVERSION      on 7/17/06, sinus rhythm until January 2007,  paroxysmal atrial fibrillation   • KNEE ARTHROPLASTY TOTAL     • LAMINOTOMY N/A     multiple lumbar spine   • OTHER ORTHOPEDIC SURGERY      lower back   • PB PERCUT IMPLNT NEUROELECT,EPIDURAL     • TOE ARTHROPLASTY     • TURP-VAPOR N/A          Current Outpatient Medications   Medication Sig Dispense Refill   • Ascorbic Acid (VITAMIN C) 1000 MG Tab Take  by mouth.     • pravastatin (PRAVACHOL) 80 MG tablet TAKE ONE TABLET BY MOUTH EVERY DAY 90 Tab 3   • clindamycin (CLEOCIN) 300 MG Cap TAKE ONE CAPSULE BY MOUTH TWICE A  Cap 0   • metoprolol SR (TOPROL XL) 50 MG TABLET SR 24 HR TAKE ONE TABLET BY MOUTH EVERY DAY 90 Tab 3   • Coenzyme Q10 (COQ10 PO) Take 300 mg by mouth.     • Omega-3 Fatty Acids (FISH OIL) 1000 MG CAPSULE DELAYED RELEASE EC softgel capsule Take 2,000 mg by mouth every morning with breakfast.     • Glucosamine HCl (GLUCOSAMINE PO) Take  by mouth.     • lysine 500 MG Tab Take 500 mg by mouth every day.     • digoxin (LANOXIN) 250 MCG Tab Take 1 Tab by mouth every bedtime. 90 Tab 3   • warfarin (COUMADIN) 4 MG Tab Take one tablet by mouth one time daily or as directed by coumadin clinic 90 Tab 3   • aspirin (ASA) 81 MG Chew Tab chewable tablet Take 1 Tab by mouth every day. 90 Tab 3   • warfarin (COUMADIN) 6 MG Tab Take 1 Tab by mouth every day. 30 Tab 3   • acetaminophen (TYLENOL) 500 MG Tab Take 500-1,000 mg by mouth every 6 hours as needed.     • gabapentin (NEURONTIN) 300 MG Cap Take  "300 mg by mouth every day. 1 in the morning, 1 at noon, 2 at 4 pm, 3 at bedtime      • B Complex Vitamins (VITAMIN B COMPLEX PO) Take 1 Tab by mouth every evening.     • Psyllium (METAMUCIL PO) Take  by mouth every morning.     • Cholecalciferol (VITAMIN D3) 2000 UNITS Tab Take 1 Tab by mouth every evening.     • multivitamin (THERAGRAN) TABS Take 1 Tab by mouth every bedtime.       No current facility-administered medications for this visit.          Allergies   Allergen Reactions   • Feldene [Piroxicam] Itching   • Percodan [Oxycodone-Aspirin] Itching and Photosensitivity     \"I sunburn\"         Family History   Problem Relation Age of Onset   • Other Mother         unknown   • Heart Disease Mother    • Cancer Father         prostate, skin   • No Known Problems Brother    • Diabetes Brother    • Heart Disease Son    • Sleep Apnea Neg Hx          Social History     Socioeconomic History   • Marital status:      Spouse name: Not on file   • Number of children: Not on file   • Years of education: Not on file   • Highest education level: Not on file   Occupational History   • Not on file   Social Needs   • Financial resource strain: Not on file   • Food insecurity     Worry: Not on file     Inability: Not on file   • Transportation needs     Medical: Not on file     Non-medical: Not on file   Tobacco Use   • Smoking status: Former Smoker     Packs/day: 1.00     Years: 20.00     Pack years: 20.00     Types: Cigarettes     Quit date: 1972     Years since quittin.6   • Smokeless tobacco: Former User   Substance and Sexual Activity   • Alcohol use: No     Alcohol/week: 0.0 oz   • Drug use: No   • Sexual activity: Not Currently     Partners: Female   Lifestyle   • Physical activity     Days per week: Not on file     Minutes per session: Not on file   • Stress: Not on file   Relationships   • Social connections     Talks on phone: Not on file     Gets together: Not on file     Attends Hoahaoism service: Not " on file     Active member of club or organization: Not on file     Attends meetings of clubs or organizations: Not on file     Relationship status: Not on file   • Intimate partner violence     Fear of current or ex partner: Not on file     Emotionally abused: Not on file     Physically abused: Not on file     Forced sexual activity: Not on file   Other Topics Concern   • Not on file   Social History Narrative   • Not on file         Physical Exam:  Ambulatory Vitals  /70 (BP Location: Left arm, Patient Position: Sitting, BP Cuff Size: Adult)   Pulse 70   Ht 1.829 m (6')   Wt 101.6 kg (224 lb)   SpO2 95%    Oxygen Therapy:  Pulse Oximetry: 95 %  BP Readings from Last 4 Encounters:   08/24/20 116/70   08/20/20 120/72   08/06/20 106/54   07/20/20 138/74       Weight/BMI: Body mass index is 30.38 kg/m².  Wt Readings from Last 4 Encounters:   08/24/20 101.6 kg (224 lb)   08/20/20 102.5 kg (226 lb)   07/20/20 102.1 kg (225 lb)   03/11/20 100.2 kg (221 lb)         General: Well appearing and in no apparent distress  Head: atrumatic  Eyes: No conjunctival pallor   ENT: normal external appearance of nose and ears  Neck: JVD absent, carotid bruits absent  Lungs: respiratory sounds  normal, additional breath sounds absent  Heart: Irregularly irregular rhythm   No palpable thrills on palpation, murmurs absent, no rubs,   Lower extremity edema absent.   Pedal pulses normal  Abdomen: soft, non tender, non distended.  Extremities/MSK: no clubbing, no cyanosis   Neurological: normal orientation, Gait normal   Psychiatric: Appropriate affect, intact judgement and insight  Skin: Warm extremities        Lab Data Review:  Lab Results   Component Value Date/Time    CHOLSTRLTOT 123 06/05/2018 08:29 AM    CHOLSTRLTOT 110 06/20/2017 07:36 AM    LDL 53 06/05/2018 08:29 AM    LDL 53 06/20/2017 07:36 AM    HDL 32 (L) 06/05/2018 08:29 AM    HDL 35 (A) 06/20/2017 07:36 AM    TRIGLYCERIDE 189 (H) 06/05/2018 08:29 AM    TRIGLYCERIDE  109 06/20/2017 07:36 AM       Lab Results   Component Value Date/Time    SODIUM 141 06/18/2020 09:12 AM    POTASSIUM 4.6 06/18/2020 09:12 AM    CHLORIDE 104 06/18/2020 09:12 AM    CO2 27 06/18/2020 09:12 AM    GLUCOSE 73 06/18/2020 09:12 AM    BUN 15 06/18/2020 09:12 AM    CREATININE 0.70 06/18/2020 09:12 AM    BUNCREATRAT 15 06/05/2018 08:29 AM     Lab Results   Component Value Date/Time    ALKPHOSPHAT 77 06/18/2020 09:12 AM    ASTSGOT 47 (H) 06/18/2020 09:12 AM    ALTSGPT 53 (H) 06/18/2020 09:12 AM    TBILIRUBIN 0.4 06/18/2020 09:12 AM      Lab Results   Component Value Date/Time    WBC 7.6 06/18/2020 09:12 AM     TAVR 7/29/2019  1. Successful transcatheter aortic valve replacement (TAVR) with #29 Edward Antwan 3 valve, transfemoral approach under general anesthesia on 7/29/19     Cath 7/23/2019  Findings   Hemodynamics: Aorta: 131/85 mmHg  LV: 165/23 mmHg  RA: 14 mmHg  RV: 33/12 mmHg  PA: 34/22/26 mmHg  PCWP: 23 mmHg  PA Saturation: 81  Arterial saturation: 96   Cardiac output/index: 7.4 L/min /3.3 L/min/m^2 Benji,  3.8 L/min /1.7 L/min/m^2 Thermodilution  Aortic valve mean gradient: 25 mmHg  Aortic valve area: 1.48 mm^2 Benji, 0.85 mm^2 Thermodilution     Coronary Anatomy              Left Main: 10% proximal stenosis with mild distal tapering              LAD: 40% proximal and 50% mid vessel stenosis              LCx: 30% proximal stenosis, OM1 is small to medium sized with long 40% proximal stenosis, the OM2 is small to moderate in size with 40% proximal stenosis, OM3 is large with a 50% stenosis              RCA: Dominant, 30% proximal and mid stenosis.      Abdominal aortography: normal abdominal aorta and bilateral iliofemoral system  Aortic root angiography: Normal caliber root without significant AI     Impression and Plan:  1. severe aortic stenosis status post TAVR 29 mm S3 valve July 2019, currently NYHA class II stage C  2.  Permanent atrial fibrillation  3.  Hypertension  4.  Dyslipidemia  5.   Nonobstructive coronary artery disease    He is stable from cardiac standpoint.  Feels well.  Atrial fibrillation rates are controlled.  Blood pressure is controlled.  Most recent echocardiogram from April 2020 shows normal valve function.  He is scheduled to have lab draws, recommend getting digoxin level as well because he is on higher dose.  Continue taking aspirin, Coumadin.  Continue metoprolol, digoxin for A. Fib.  Continue pravastatin for hyperlipidemia, lipids under control  He will follow-up with Dr. Rutherford in about 6 months.      This note was dictated using Dragon speech recognition software.    Leander MCCONNELL  Interventional cardiologist  [facility], [address]  [address] [address]  Phone: [phone]  Fax: [phone]

## 2020-08-25 ENCOUNTER — HOSPITAL ENCOUNTER (OUTPATIENT)
Dept: LAB | Facility: MEDICAL CENTER | Age: 80
End: 2020-08-25
Attending: INTERNAL MEDICINE
Payer: MEDICARE

## 2020-08-25 DIAGNOSIS — I48.91 ATRIAL FIBRILLATION, UNSPECIFIED TYPE (HCC): ICD-10-CM

## 2020-08-25 DIAGNOSIS — E78.5 DYSLIPIDEMIA: ICD-10-CM

## 2020-08-25 DIAGNOSIS — R73.02 IGT (IMPAIRED GLUCOSE TOLERANCE): ICD-10-CM

## 2020-08-25 LAB
ALBUMIN SERPL BCP-MCNC: 4 G/DL (ref 3.2–4.9)
ALBUMIN/GLOB SERPL: 1.3 G/DL
ALP SERPL-CCNC: 76 U/L (ref 30–99)
ALT SERPL-CCNC: 64 U/L (ref 2–50)
ANION GAP SERPL CALC-SCNC: 11 MMOL/L (ref 7–16)
AST SERPL-CCNC: 54 U/L (ref 12–45)
BILIRUB SERPL-MCNC: 0.5 MG/DL (ref 0.1–1.5)
BUN SERPL-MCNC: 15 MG/DL (ref 8–22)
CALCIUM SERPL-MCNC: 9.1 MG/DL (ref 8.5–10.5)
CHLORIDE SERPL-SCNC: 106 MMOL/L (ref 96–112)
CHOLEST SERPL-MCNC: 123 MG/DL (ref 100–199)
CO2 SERPL-SCNC: 24 MMOL/L (ref 20–33)
CREAT SERPL-MCNC: 0.78 MG/DL (ref 0.5–1.4)
DIGOXIN SERPL-MCNC: 0.8 NG/ML (ref 0.8–2)
EST. AVERAGE GLUCOSE BLD GHB EST-MCNC: 111 MG/DL
FASTING STATUS PATIENT QL REPORTED: NORMAL
GLOBULIN SER CALC-MCNC: 3 G/DL (ref 1.9–3.5)
GLUCOSE SERPL-MCNC: 94 MG/DL (ref 65–99)
HBA1C MFR BLD: 5.5 % (ref 0–5.6)
HDLC SERPL-MCNC: 37 MG/DL
LDLC SERPL CALC-MCNC: 62 MG/DL
POTASSIUM SERPL-SCNC: 4.7 MMOL/L (ref 3.6–5.5)
PROT SERPL-MCNC: 7 G/DL (ref 6–8.2)
SODIUM SERPL-SCNC: 141 MMOL/L (ref 135–145)
TRIGL SERPL-MCNC: 120 MG/DL (ref 0–149)

## 2020-08-25 PROCEDURE — 83036 HEMOGLOBIN GLYCOSYLATED A1C: CPT | Mod: GA

## 2020-08-25 PROCEDURE — 80162 ASSAY OF DIGOXIN TOTAL: CPT

## 2020-08-25 PROCEDURE — 80061 LIPID PANEL: CPT

## 2020-08-25 PROCEDURE — 80053 COMPREHEN METABOLIC PANEL: CPT

## 2020-08-25 PROCEDURE — 36415 COLL VENOUS BLD VENIPUNCTURE: CPT

## 2020-09-03 ENCOUNTER — ANTICOAGULATION VISIT (OUTPATIENT)
Dept: VASCULAR LAB | Facility: MEDICAL CENTER | Age: 80
End: 2020-09-03
Attending: INTERNAL MEDICINE
Payer: MEDICARE

## 2020-09-03 DIAGNOSIS — I48.0 PAROXYSMAL ATRIAL FIBRILLATION (HCC): ICD-10-CM

## 2020-09-03 DIAGNOSIS — Z95.3 STATUS POST TRANSCATHETER AORTIC VALVE REPLACEMENT (TAVR) USING BIOPROSTHESIS: ICD-10-CM

## 2020-09-03 LAB
INR BLD: 2.7 (ref 0.9–1.2)
INR PPP: 2.7 (ref 2–3.5)

## 2020-09-03 PROCEDURE — 85610 PROTHROMBIN TIME: CPT

## 2020-09-03 PROCEDURE — 99211 OFF/OP EST MAY X REQ PHY/QHP: CPT | Performed by: PHARMACIST

## 2020-09-03 NOTE — PROGRESS NOTES
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 Referring Cardiology [phone]    [facility] Anticoagulation [facility] Responsible  [phone]                Anticoagulation Patient Findings      HPI:  Duane Parkinson seen in clinic today, on anticoagulation therapy with warfarin for Afib  Changes to current medical/health status since last appt: denies  Denies signs/symptoms of bleeding and/or thrombosis since the last appt.    Denies any interval changes to diet  Denies any interval changes to medications since last appt.   Denies any complications or cost restrictions with current therapy.   Verified current warfarin dosing schedule.   BP declined      A/P   INR  is-therapeutic.   Will continue with the same warfarin dosing.     Follow up appointment in 5 week(s).    Kell Cano, PharmD

## 2020-09-14 RX ORDER — DIGOXIN 250 MCG
250 TABLET ORAL
Qty: 90 TAB | Refills: 3 | Status: SHIPPED | OUTPATIENT
Start: 2020-09-14 | End: 2021-09-07 | Stop reason: SDUPTHER

## 2020-10-06 RX ORDER — CLINDAMYCIN HYDROCHLORIDE 300 MG/1
CAPSULE ORAL
Qty: 180 CAP | Refills: 0 | Status: SHIPPED | OUTPATIENT
Start: 2020-10-06 | End: 2021-04-13

## 2020-10-08 ENCOUNTER — ANTICOAGULATION VISIT (OUTPATIENT)
Dept: VASCULAR LAB | Facility: MEDICAL CENTER | Age: 80
End: 2020-10-08
Attending: INTERNAL MEDICINE
Payer: MEDICARE

## 2020-10-08 DIAGNOSIS — Z95.3 STATUS POST TRANSCATHETER AORTIC VALVE REPLACEMENT (TAVR) USING BIOPROSTHESIS: ICD-10-CM

## 2020-10-08 LAB — INR PPP: 2 (ref 2–3.5)

## 2020-10-08 PROCEDURE — 99211 OFF/OP EST MAY X REQ PHY/QHP: CPT

## 2020-10-08 PROCEDURE — 85610 PROTHROMBIN TIME: CPT

## 2020-10-08 NOTE — PROGRESS NOTES
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 Referring Cardiology [phone]    [facility] Anticoagulation [facility] Responsible  [phone]        Anticoagulation Patient Findings      HPI:  Duane Parkinson seen in clinic today, on anticoagulation therapy with warfarin for Afib.   Changes to current medical/health status since last appt: none  Denies signs/symptoms of bleeding and/or thrombosis since the last appt.    Denies any interval changes to diet  Denies any interval changes to medications since last appt.   Denies any complications or cost restrictions with current therapy.   Declines vitals.   Confirmed dosing regimen.     A/P   INR  therapeutic.   Pt is to continue with current warfarin dosing regimen.     Follow up appointment in 6 week(s).    Sin Huitron, PharmD

## 2020-10-09 LAB — INR BLD: 2 (ref 0.9–1.2)

## 2020-10-26 DIAGNOSIS — Z79.01 CHRONIC ANTICOAGULATION: ICD-10-CM

## 2020-10-26 RX ORDER — WARFARIN SODIUM 4 MG/1
TABLET ORAL
Qty: 90 TAB | Refills: 1 | Status: SHIPPED | OUTPATIENT
Start: 2020-10-26 | End: 2021-04-20

## 2020-11-19 ENCOUNTER — ANTICOAGULATION VISIT (OUTPATIENT)
Dept: VASCULAR LAB | Facility: MEDICAL CENTER | Age: 80
End: 2020-11-19
Attending: INTERNAL MEDICINE
Payer: MEDICARE

## 2020-11-19 DIAGNOSIS — Z95.3 STATUS POST TRANSCATHETER AORTIC VALVE REPLACEMENT (TAVR) USING BIOPROSTHESIS: ICD-10-CM

## 2020-11-19 LAB
INR BLD: 2.3 (ref 0.9–1.2)
INR PPP: 2.3 (ref 2–3.5)

## 2020-11-19 PROCEDURE — 85610 PROTHROMBIN TIME: CPT

## 2020-11-19 PROCEDURE — 99211 OFF/OP EST MAY X REQ PHY/QHP: CPT

## 2020-11-19 NOTE — PROGRESS NOTES
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 Referring Cardiology [phone]    [facility] Responsible  [phone]        Anticoagulation Patient Findings      HPI:  Duane Parkinson seen in clinic today, on anticoagulation therapy with warfarin for Afib.   Changes to current medical/health status since last appt: none  Denies signs/symptoms of bleeding and/or thrombosis since the last appt.    Denies any interval changes to diet  Denies any interval changes to medications since last appt.   Denies any complications or cost restrictions with current therapy.   Pt declines vitals due to Covid transmission concerns.    Confirmed dosing regimen.     A/P   INR  therapeutic.   Pt is to continue with current warfarin dosing regimen.     Follow up appointment in 7 week(s).    Sin Huitron, AbhishekD

## 2020-11-30 ENCOUNTER — APPOINTMENT (RX ONLY)
Dept: URBAN - METROPOLITAN AREA CLINIC 4 | Facility: CLINIC | Age: 80
Setting detail: DERMATOLOGY
End: 2020-11-30

## 2020-11-30 DIAGNOSIS — D18.0 HEMANGIOMA: ICD-10-CM

## 2020-11-30 DIAGNOSIS — D22 MELANOCYTIC NEVI: ICD-10-CM

## 2020-11-30 DIAGNOSIS — Z71.89 OTHER SPECIFIED COUNSELING: ICD-10-CM

## 2020-11-30 DIAGNOSIS — L82.0 INFLAMED SEBORRHEIC KERATOSIS: ICD-10-CM

## 2020-11-30 DIAGNOSIS — L82.1 OTHER SEBORRHEIC KERATOSIS: ICD-10-CM

## 2020-11-30 DIAGNOSIS — L57.0 ACTINIC KERATOSIS: ICD-10-CM

## 2020-11-30 DIAGNOSIS — D485 NEOPLASM OF UNCERTAIN BEHAVIOR OF SKIN: ICD-10-CM

## 2020-11-30 DIAGNOSIS — Z85.828 PERSONAL HISTORY OF OTHER MALIGNANT NEOPLASM OF SKIN: ICD-10-CM

## 2020-11-30 DIAGNOSIS — L81.4 OTHER MELANIN HYPERPIGMENTATION: ICD-10-CM

## 2020-11-30 PROBLEM — D22.9 MELANOCYTIC NEVI, UNSPECIFIED: Status: ACTIVE | Noted: 2020-11-30

## 2020-11-30 PROBLEM — D18.01 HEMANGIOMA OF SKIN AND SUBCUTANEOUS TISSUE: Status: ACTIVE | Noted: 2020-11-30

## 2020-11-30 PROBLEM — D48.5 NEOPLASM OF UNCERTAIN BEHAVIOR OF SKIN: Status: ACTIVE | Noted: 2020-11-30

## 2020-11-30 PROCEDURE — ? BIOPSY BY SHAVE METHOD

## 2020-11-30 PROCEDURE — 17000 DESTRUCT PREMALG LESION: CPT | Mod: 59

## 2020-11-30 PROCEDURE — ? BIOPSY BY SHAVE METHOD AND DESTRUCTION

## 2020-11-30 PROCEDURE — 99214 OFFICE O/P EST MOD 30 MIN: CPT | Mod: 25

## 2020-11-30 PROCEDURE — ? COUNSELING

## 2020-11-30 PROCEDURE — 11102 TANGNTL BX SKIN SINGLE LES: CPT

## 2020-11-30 PROCEDURE — 17003 DESTRUCT PREMALG LES 2-14: CPT

## 2020-11-30 PROCEDURE — ? LIQUID NITROGEN

## 2020-11-30 PROCEDURE — ? LIQUID NITROGEN (COSMETIC)

## 2020-11-30 PROCEDURE — 11103 TANGNTL BX SKIN EA SEP/ADDL: CPT

## 2020-11-30 ASSESSMENT — LOCATION SIMPLE DESCRIPTION DERM
LOCATION SIMPLE: RIGHT FOREHEAD
LOCATION SIMPLE: NECK
LOCATION SIMPLE: LEFT ANTERIOR NECK
LOCATION SIMPLE: LEFT CHEEK
LOCATION SIMPLE: RIGHT ANTERIOR NECK
LOCATION SIMPLE: NOSE
LOCATION SIMPLE: LEFT FOREHEAD

## 2020-11-30 ASSESSMENT — LOCATION DETAILED DESCRIPTION DERM
LOCATION DETAILED: RIGHT SUPERIOR LATERAL FOREHEAD
LOCATION DETAILED: RIGHT SUPERIOR ANTERIOR NECK
LOCATION DETAILED: LEFT INFERIOR CENTRAL MALAR CHEEK
LOCATION DETAILED: LEFT INFERIOR LATERAL FOREHEAD
LOCATION DETAILED: LEFT INFERIOR ANTERIOR NECK
LOCATION DETAILED: RIGHT INFERIOR LATERAL NECK
LOCATION DETAILED: RIGHT CLAVICULAR NECK
LOCATION DETAILED: RIGHT CENTRAL LATERAL NECK
LOCATION DETAILED: RIGHT LATERAL FOREHEAD
LOCATION DETAILED: NASAL DORSUM

## 2020-11-30 ASSESSMENT — LOCATION ZONE DERM
LOCATION ZONE: NOSE
LOCATION ZONE: FACE
LOCATION ZONE: NECK

## 2020-11-30 NOTE — HPI: UPPER BODY SKIN CHECK
How Severe Are Your Spot(S)?: moderate
What Is The Reason For Today's Visit?: Upper Body Skin Exam
Additional History: FBE.

## 2020-11-30 NOTE — PROCEDURE: BIOPSY BY SHAVE METHOD AND DESTRUCTION
Detail Level: Detailed
Biopsy Type: H and E
Bill As?: Biopsy by Shave Method
Size Of Lesion In Cm (Optional): 0
Size Of Lesion After Curettage: 0.9
Anesthesia Type: 1% lidocaine with epinephrine
Anesthesia Volume In Cc: 2
Hemostasis: Aluminum Chloride and Electrocautery
Destruction Type: electrodesiccation
Number Of Curettages: 3
Wound Care: Vaseline
Lab: [id number]
Lab Facility: 
Render Path Notes In Note?: No
Consent: Written consent was obtained and risks were reviewed including but not limited to scarring, infection, bleeding, scabbing, incomplete removal, nerve damage and allergy to anesthesia.
Post-Care Instructions: I reviewed with the patient in detail post-care instructions. Patient is to keep the biopsy site dry overnight, and then apply bacitracin twice daily until healed. Patient may apply hydrogen peroxide soaks to remove any crusting.
Notification Instructions: Patient will be notified of biopsy results. However, patient instructed to call the office if not contacted within 2 weeks.
Billing Type: Third-Party Bill

## 2020-12-08 ENCOUNTER — APPOINTMENT (RX ONLY)
Dept: URBAN - METROPOLITAN AREA CLINIC 4 | Facility: CLINIC | Age: 80
Setting detail: DERMATOLOGY
End: 2020-12-08

## 2020-12-11 DIAGNOSIS — Z79.01 CHRONIC ANTICOAGULATION: ICD-10-CM

## 2020-12-17 ENCOUNTER — TELEMEDICINE (OUTPATIENT)
Dept: MEDICAL GROUP | Facility: PHYSICIAN GROUP | Age: 80
End: 2020-12-17
Payer: MEDICARE

## 2020-12-17 VITALS
HEART RATE: 75 BPM | SYSTOLIC BLOOD PRESSURE: 120 MMHG | WEIGHT: 220 LBS | HEIGHT: 72 IN | BODY MASS INDEX: 29.8 KG/M2 | OXYGEN SATURATION: 95 % | DIASTOLIC BLOOD PRESSURE: 62 MMHG

## 2020-12-17 DIAGNOSIS — I10 ESSENTIAL HYPERTENSION: ICD-10-CM

## 2020-12-17 DIAGNOSIS — I48.91 ATRIAL FIBRILLATION, UNSPECIFIED TYPE (HCC): ICD-10-CM

## 2020-12-17 DIAGNOSIS — C44.90 SKIN CANCER: ICD-10-CM

## 2020-12-17 PROCEDURE — 99214 OFFICE O/P EST MOD 30 MIN: CPT | Performed by: FAMILY MEDICINE

## 2020-12-17 ASSESSMENT — FIBROSIS 4 INDEX: FIB4 SCORE: 2.15

## 2020-12-17 NOTE — PROGRESS NOTES
"Virtual Visit: Established Patient   This visit was conducted via Zoom using secure and encrypted videoconferencing technology. The patient was in a private location in the state of [place].    The patient's identity was confirmed and verbal consent was obtained for this virtual visit.    Subjective:   CC:   Chief Complaint   Patient presents with   • Establish Care     upcoming sx       Duane Parkinson is a 80 y.o. male presenting for evaluation and management of:    #skin cancer  This is a new condition.    Symptom onset: diagnosed with skin cancer to the bride of his nose  Current symptoms: diagnosed about 2 weeks ago  Since onset symptoms are: Unchanged  Modifying factors: due for an excision 1/5/21  Treatments tried: none but follows up with dermatology   Associated symptoms: Denies any     #Afib  This is a chronic issue  Follows up with cardiology  On Warfarin currently and goes to the anticoagulation clinic  INR has been stable.     #HTN  Stable. Monitoring BP at home. Currently taking metoprolol 50mg daily as directed. Also taking baby aspirin. Denies lightheadedness, vision changes, headache, palpitations or leg swelling.      ROS   Denies any recent fevers or chills. No nausea or vomiting. No chest pains or shortness of breath.     Allergies   Allergen Reactions   • Feldene [Piroxicam] Itching   • Percodan [Oxycodone-Aspirin] Itching and Photosensitivity     \"I sunburn\"       Current medicines (including changes today)  Current Outpatient Medications   Medication Sig Dispense Refill   • warfarin (COUMADIN) 4 MG Tab Take one-half to one tablet by mouth one time daily or as directed by coumadin clinic 90 Tab 1   • clindamycin (CLEOCIN) 300 MG Cap TAKE ONE CAPSULE BY MOUTH TWICE A  Cap 0   • digoxin (LANOXIN) 250 MCG Tab Take 1 Tab by mouth every bedtime. 90 Tab 3   • Ascorbic Acid (VITAMIN C) 1000 MG Tab Take  by mouth.     • pravastatin (PRAVACHOL) 80 MG tablet TAKE ONE TABLET BY MOUTH EVERY DAY 90 " Tab 3   • metoprolol SR (TOPROL XL) 50 MG TABLET SR 24 HR TAKE ONE TABLET BY MOUTH EVERY DAY 90 Tab 3   • Coenzyme Q10 (COQ10 PO) Take 300 mg by mouth.     • Omega-3 Fatty Acids (FISH OIL) 1000 MG CAPSULE DELAYED RELEASE EC softgel capsule Take 2,000 mg by mouth every morning with breakfast.     • Glucosamine HCl (GLUCOSAMINE PO) Take  by mouth.     • lysine 500 MG Tab Take 500 mg by mouth every day.     • aspirin (ASA) 81 MG Chew Tab chewable tablet Take 1 Tab by mouth every day. 90 Tab 3   • acetaminophen (TYLENOL) 500 MG Tab Take 500-1,000 mg by mouth every 6 hours as needed.     • gabapentin (NEURONTIN) 300 MG Cap Take 300 mg by mouth every day. 1 in the morning, 1 at noon, 2 at 4 pm, 3 at bedtime      • B Complex Vitamins (VITAMIN B COMPLEX PO) Take 1 Tab by mouth every evening.     • Psyllium (METAMUCIL PO) Take  by mouth every morning.     • Cholecalciferol (VITAMIN D3) 2000 UNITS Tab Take 1 Tab by mouth every evening.     • multivitamin (THERAGRAN) TABS Take 1 Tab by mouth every bedtime.       No current facility-administered medications for this visit.        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 12/30/2011     Priority: High   • Essential hypertension 01/07/2015     Priority: Medium   • Atrial fibrillation (HCC) 12/30/2011     Priority: Medium   • Autoimmune hepatitis (HCC) 06/22/2018     Priority: Low   • Neuropathy 12/22/2017     Priority: Low   • Localized primary osteoarthritis of carpometacarpal joint of left thumb 05/27/2016     Priority: Low   • Insomnia 05/26/2016     Priority: Low   • Treatment-emergent central sleep apnea 04/21/2016     Priority: Low   • Dyslipidemia 01/07/2015     Priority: Low   • IGT (impaired glucose tolerance) 01/07/2015     Priority: Low   • Lumbar Disc Degeneration 12/30/2011     Priority: Low   • Osteoarthrosis involving lower leg 12/30/2011     Priority: Low   • Skin cancer  12/17/2020   • Prophylactic antibiotic 08/20/2020   • Depression 03/11/2020       Family History   Problem Relation Age of Onset   • Other Mother         unknown   • Heart Disease Mother    • Cancer Father         prostate, skin   • No Known Problems Brother    • Diabetes Brother    • Heart Disease Son    • Sleep Apnea Neg Hx        He  has a past medical history of Aortic stenosis, Atrial fibrillation (HCC), Back pain, Cataract, 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  He  has a past surgical history that includes cardioversion; knee arthroplasty total; other orthopedic surgery; laminotomy (N/A); toe arthroplasty; turp-vapor (N/A); pr percut implnt neuroelect,epidural; finger arthroplasty (Left, 5/27/2016); jacquelyn (N/A, 7/29/2019); and transcatheter aortic valve replacement (Bilateral, 7/29/2019).       Objective:   /62 (BP Location: Left arm, Patient Position: Sitting) Comment: pt reported  Pulse 75 Comment: pt reported  Ht 1.829 m (6') Comment: pt reported  Wt 99.8 kg (220 lb) Comment: pt reported  SpO2 95% Comment: pt reported  BMI 29.84 kg/m²     Physical Exam:  Constitutional: Alert, no distress, well-groomed.  Skin: No rashes in visible areas.  Eye: Round. Conjunctiva clear, lids normal. No icterus.   ENMT: Lips pink without lesions, good dentition, moist mucous membranes. Phonation normal.  Neck: No masses, no thyromegaly. Moves freely without pain.  Respiratory: Unlabored respiratory effort, no cough or audible wheeze  Psych: Alert and oriented x3, normal affect and mood.       Assessment and Plan:   The following treatment plan  was discussed:     1. Skin cancer  This is a new problem.  The patient was recently diagnosed with skin cancer localized to the bridge of his nose.  He is due for excision January 2021.  -Await dermatology's further recommendations    2. Atrial fibrillation, unspecified type (HCC)  This is a chronic, stable condition.  Follows up with cardiology and is on anticoagulation with warfarin.  INR has been monitored by vascular medicine without any problems at this time.    3. Essential hypertension  This is a chronic, stable condition.  Well-controlled with metoprolol 50 mg daily.  No refills are requested today.      Follow-up: Return in about 6 months (around 6/17/2021).

## 2021-01-05 ENCOUNTER — APPOINTMENT (RX ONLY)
Dept: URBAN - METROPOLITAN AREA CLINIC 36 | Facility: CLINIC | Age: 81
Setting detail: DERMATOLOGY
End: 2021-01-05

## 2021-01-05 PROBLEM — C44.311 BASAL CELL CARCINOMA OF SKIN OF NOSE: Status: ACTIVE | Noted: 2021-01-05

## 2021-01-05 PROCEDURE — 17312 MOHS ADDL STAGE: CPT

## 2021-01-05 PROCEDURE — 17311 MOHS 1 STAGE H/N/HF/G: CPT

## 2021-01-05 PROCEDURE — ? MOHS SURGERY

## 2021-01-05 PROCEDURE — 14060 TIS TRNFR E/N/E/L 10 SQ CM/<: CPT

## 2021-01-05 NOTE — PROCEDURE: MOHS SURGERY
Mohs Case Number: [id number]
Previous Accession (Optional): [id number]
Biopsy Photograph Reviewed: Yes
Referring Physician (Optional): major
Consent Type: Consent 1 (Standard)
Eye Shield Used: No
Surgeon Performing Repair (Optional): Leonardo
Initial Size Of Lesion: 0.3
Number Of Stages: 2
Primary Defect Length In Cm (Final Defect Size - Required For Flaps/Grafts): 1.4
Primary Defect Width In Cm (Final Defect Size - Required For Flaps/Grafts): 1.2
Repair Type: Flap
Oculoplastic Surgeon (A): Johnson
Oculoplastic Surgeon Procedure Text (A): After obtaining clear surgical margins the patient was sent to oculoplastics for surgical repair.  The patient understands they will receive post-surgical care and follow-up from the referring physician's office.
Otolaryngologist Procedure Text (A): After obtaining clear surgical margins the patient was sent to otolaryngology for surgical repair.  The patient understands they will receive post-surgical care and follow-up from the referring physician's office.
Plastic Surgeon Procedure Text (A): After obtaining clear surgical margins the patient was sent to plastics for surgical repair.  The patient understands they will receive post-surgical care and follow-up from the referring physician's office.
Mid-Level Procedure Text (A): After obtaining clear surgical margins the patient was sent to a mid-level provider for surgical repair.  The patient understands they will receive post-surgical care and follow-up from the mid-level provider.
Provider Procedure Text (A): After obtaining clear surgical margins the defect was repaired by another provider.
Asc Procedure Text (A): After obtaining clear surgical margins the patient was sent to an ASC for surgical repair.  The patient understands they will receive post-surgical care and follow-up from the ASC physician.
Suturegard Retention Suture: 2-0 Nylon
Retention Suture Bite Size: 3 mm
Length To Time In Minutes Device Was In Place: 10
Number Of Hemigard Strips Per Side: 1
Simple / Intermediate / Complex Repair - Final Wound Length In Cm: 0
Undermining Type: Entire Wound
Debridement Text: The wound edges were debrided prior to proceeding with the closure to facilitate wound healing.
Helical Rim Text: The closure involved the helical rim.
Vermilion Border Text: The closure involved the vermilion border.
Nostril Rim Text: The closure involved the nostril rim.
Retention Suture Text: Retention sutures were placed to support the closure and prevent dehiscence.
Flap Type: Burow's Advancement Flap
Secondary Defect Length In Cm (Required For Flaps): 1.6
Secondary Defect Width In Cm (Required For Flaps): 1.5
Area H Indication Text: Tumors in this location are included in Area H (eyelids, eyebrows, nose, lips, chin, ear, pre-auricular, post-auricular, temple, genitalia, hands, feet, ankles and areola).  Tissue conservation is critical in these anatomic locations.
Area M Indication Text: Tumors in this location are included in Area M (cheek, forehead, scalp, neck, jawline and pretibial skin).  Mohs surgery is indicated for tumors in these anatomic locations.
Area L Indication Text: Tumors in this location are included in Area L (trunk and extremities).  Mohs surgery is indicated for larger tumors, or tumors with aggressive histologic features, in these anatomic locations.
Surgical Defect Length In Cm (Optional): 1.0
Surgical Defect Width In Cm (Optional): 0.8
Special Stains Stage 1 - Results: Base On Clearance Noted Above
Stage 2: Additional Anesthesia Type: 1% lidocaine with 1:100,000 epinephrine and 408mcg clindamycin/ml and a 1:10 solution of 8.4% sodium bicarbonate
Stage 4: Additional Anesthesia Type: 1% lidocaine with epinephrine
Include Tumor Staging In Mohs Note?: Please Select the Appropriate Response
Staging Info: By selecting yes to the question above you will include information on AJCC 8 tumor staging in your Mohs note. Information on tumor staging will be automatically added for SCCs on the head and neck. AJCC 8 includes tumor size, tumor depth, perineural involvement and bone invasion.
Tumor Depth: Less than 6mm from granular layer and no invasion beyond the subcutaneous fat
Medical Necessity Statement: Based on my medical judgement, Mohs surgery is the most appropriate treatment for this cancer compared to other treatments.
Alternatives Discussed Intro (Do Not Add Period): I discussed alternative treatments to Mohs surgery and specifically discussed the risks and benefits of
Consent 1/Introductory Paragraph: The rationale for Mohs was explained to the patient and consent was obtained. The risks, benefits and alternatives to therapy were discussed in detail. Specifically, the risks of infection, scarring, bleeding, prolonged wound healing, incomplete removal, allergy to anesthesia, nerve injury and recurrence were addressed. Prior to the procedure, the treatment site was clearly identified and confirmed by the patient. All components of Universal Protocol/PAUSE Rule completed.
Consent 2/Introductory Paragraph: Mohs surgery was explained to the patient and consent was obtained. The risks, benefits and alternatives to therapy were discussed in detail. Specifically, the risks of infection, scarring, bleeding, prolonged wound healing, incomplete removal, allergy to anesthesia, nerve injury and recurrence were addressed. Prior to the procedure, the treatment site was clearly identified and confirmed by the patient. All components of Universal Protocol/PAUSE Rule completed.
Consent 3/Introductory Paragraph: I gave the patient a chance to ask questions they had about the procedure.  Following this I explained the Mohs procedure and consent was obtained. The risks, benefits and alternatives to therapy were discussed in detail. Specifically, the risks of infection, scarring, bleeding, prolonged wound healing, incomplete removal, allergy to anesthesia, nerve injury and recurrence were addressed. Prior to the procedure, the treatment site was clearly identified and confirmed by the patient. All components of Universal Protocol/PAUSE Rule completed.
Consent (Temporal Branch)/Introductory Paragraph: The rationale for Mohs was explained to the patient and consent was obtained. The risks, benefits and alternatives to therapy were discussed in detail. Specifically, the risks of damage to the temporal branch of the facial nerve, infection, scarring, bleeding, prolonged wound healing, incomplete removal, allergy to anesthesia, and recurrence were addressed. Prior to the procedure, the treatment site was clearly identified and confirmed by the patient. All components of Universal Protocol/PAUSE Rule completed.
Consent (Marginal Mandibular)/Introductory Paragraph: The rationale for Mohs was explained to the patient and consent was obtained. The risks, benefits and alternatives to therapy were discussed in detail. Specifically, the risks of damage to the marginal mandibular branch of the facial nerve, infection, scarring, bleeding, prolonged wound healing, incomplete removal, allergy to anesthesia, and recurrence were addressed. Prior to the procedure, the treatment site was clearly identified and confirmed by the patient. All components of Universal Protocol/PAUSE Rule completed.
Consent (Spinal Accessory)/Introductory Paragraph: The rationale for Mohs was explained to the patient and consent was obtained. The risks, benefits and alternatives to therapy were discussed in detail. Specifically, the risks of damage to the spinal accessory nerve, infection, scarring, bleeding, prolonged wound healing, incomplete removal, allergy to anesthesia, and recurrence were addressed. Prior to the procedure, the treatment site was clearly identified and confirmed by the patient. All components of Universal Protocol/PAUSE Rule completed.
Consent (Near Eyelid Margin)/Introductory Paragraph: The rationale for Mohs was explained to the patient and consent was obtained. The risks, benefits and alternatives to therapy were discussed in detail. Specifically, the risks of ectropion or eyelid deformity, infection, scarring, bleeding, prolonged wound healing, incomplete removal, allergy to anesthesia, nerve injury and recurrence were addressed. Prior to the procedure, the treatment site was clearly identified and confirmed by the patient. All components of Universal Protocol/PAUSE Rule completed.
Consent (Ear)/Introductory Paragraph: The rationale for Mohs was explained to the patient and consent was obtained. The risks, benefits and alternatives to therapy were discussed in detail. Specifically, the risks of ear deformity, infection, scarring, bleeding, prolonged wound healing, incomplete removal, allergy to anesthesia, nerve injury and recurrence were addressed. Prior to the procedure, the treatment site was clearly identified and confirmed by the patient. All components of Universal Protocol/PAUSE Rule completed.
Consent (Nose)/Introductory Paragraph: The rationale for Mohs was explained to the patient and consent was obtained. The risks, benefits and alternatives to therapy were discussed in detail. Specifically, the risks of nasal deformity, changes in the flow of air through the nose, infection, scarring, bleeding, prolonged wound healing, incomplete removal, allergy to anesthesia, nerve injury and recurrence were addressed. Prior to the procedure, the treatment site was clearly identified and confirmed by the patient. All components of Universal Protocol/PAUSE Rule completed.
Consent (Lip)/Introductory Paragraph: The rationale for Mohs was explained to the patient and consent was obtained. The risks, benefits and alternatives to therapy were discussed in detail. Specifically, the risks of lip deformity, changes in the oral aperture, infection, scarring, bleeding, prolonged wound healing, incomplete removal, allergy to anesthesia, nerve injury and recurrence were addressed. Prior to the procedure, the treatment site was clearly identified and confirmed by the patient. All components of Universal Protocol/PAUSE Rule completed.
Consent (Scalp)/Introductory Paragraph: The rationale for Mohs was explained to the patient and consent was obtained. The risks, benefits and alternatives to therapy were discussed in detail. Specifically, the risks of changes in hair growth pattern secondary to repair, infection, scarring, bleeding, prolonged wound healing, incomplete removal, allergy to anesthesia, nerve injury and recurrence were addressed. Prior to the procedure, the treatment site was clearly identified and confirmed by the patient. All components of Universal Protocol/PAUSE Rule completed.
Detail Level: Detailed
Postop Diagnosis: same
Anesthesia Type: 0.5% lidocaine with 1:200,000 epinephrine and a 1:10 solution of 8.4% sodium bicarbonate and 408mcg clindamycin/ml
Anesthesia Volume In Cc: 6
Hemostasis: Electrocautery
Estimated Blood Loss (Cc): less than 5 cc
Repair Anesthesia Method: local infiltration
Brow Lift Text: A midfrontal incision was made medially to the defect to allow access to the tissues just superior to the left eyebrow. Following careful dissection inferiorly in a supraperiosteal plane to the level of the left eyebrow, several 3-0 monocryl sutures were used to resuspend the eyebrow orbicularis oculi muscular unit to the superior frontal bone periosteum. This resulted in an appropriate reapproximation of static eyebrow symmetry and correction of the left brow ptosis.
Deep Sutures: 5-0 Vicryl
Epidermal Sutures: 5-0 Ethilon
Epidermal Closure: running cuticular
Suturegard Intro: Intraoperative tissue expansion was performed, utilizing the SUTUREGARD device, in order to reduce wound tension.
Suturegard Body: The suture ends were repeatedly re-tightened and re-clamped to achieve the desired tissue expansion.
Hemigard Intro: Due to skin fragility and wound tension, it was decided to use HEMIGARD adhesive retention suture devices to permit a linear closure. The skin was cleaned and dried for a 6cm distance away from the wound. Excessive hair, if present, was removed to allow for adhesion.
Hemigard Postcare Instructions: The HEMIGARD strips are to remain completely dry for at least 5-7 days.
Donor Site Anesthesia Type: same as repair anesthesia
Graft Basting Suture (Optional): 5-0 Fast Absorbing Gut
Graft Donor Site Epidermal Sutures (Optional): 5-0 Ethibond
Epidermal Closure Graft Donor Site (Optional): simple interrupted
Graft Donor Site Bandage (Optional-Leave Blank If You Don't Want In Note): Aquaphor and telefa placed on wound. Pressure dressing applied to donor site
Closure 2 Information: This tab is for additional flaps and grafts, including complex repair and grafts and complex repair and flaps. You can also specify a different location for the additional defect, if the location is the same you do not need to select a new one. We will insert the automated text for the repair you select below just as we do for solitary flaps and grafts. Please note that at this time if you select a location with a different insurance zone you will need to override the ICD10 and CPT if appropriate.
Closure 3 Information: This tab is for additional flaps and grafts above and beyond our usual structured repairs.  Please note if you enter information here it will not currently bill and you will need to add the billing information manually.
Wound Care: Aquaphor
Dressing: dry sterile dressing
Wound Care (No Sutures): Petrolatum
Suture Removal: 7 days
Unna Boot Text: An Unna boot was placed to help immobilize the limb and facilitate more rapid healing.
Home Suture Removal Text: Patient was provided instructions on removing sutures and will remove their sutures at home.  If they have any questions or difficulties they will call the office.
Post-Care Instructions: I reviewed with the patient in detail post-care instructions. Patient is not to engage in any heavy lifting, exercise, or swimming for the next 14 days. Should the patient develop any fevers, chills, bleeding, severe pain patient will contact the office immediately.
Pain Refusal Text: I offered to prescribe pain medication but the patient refused to take this medication.
Mauc Instructions: By selecting yes to the question below the MAUC number will be added into the note.  This will be calculated automatically based on the diagnosis chosen, the size entered, the body zone selected (H,M,L) and the specific indications you chose. You will also have the option to override the Mohs AUC if you disagree with the automatically calculated number and this option is found in the Case Summary tab.
Where Do You Want The Question To Include Opioid Counseling Located?: Case Summary Tab
Eye Protection Verbiage: Before proceeding with the stage, a plastic scleral shield was inserted. The globe was anesthetized with a few drops of 1% lidocaine with 1:100,000 epinephrine. Then, an appropriate sized scleral shield was chosen and coated with lacrilube ointment. The shield was gently inserted and left in place for the duration of each stage. After the stage was completed, the shield was gently removed.
Mohs Method Verbiage: An incision at a 45 degree angle following the standard Mohs approach was done and the specimen was harvested as a microscopic controlled layer.
Surgeon/Pathologist Verbiage (Will Incorporate Name Of Surgeon From Intro If Not Blank): operated in two distinct and integrated capacities as the surgeon and pathologist.
Mohs Histo Method Verbiage: Each section was then chromacoded and processed in the Mohs lab using the Mohs protocol and submitted for frozen section.
Subsequent Stages Histo Method Verbiage: Using a similar technique to that described above, a thin layer of tissue was removed from all areas where tumor was visible on the previous stage.  The tissue was again oriented, mapped, dyed, and processed as above.
Mohs Rapid Report Verbiage: The area of clinically evident tumor was marked with skin marking ink and appropriately hatched.  The initial incision was made following the Mohs approach through the skin.  The specimen was taken to the lab, divided into the necessary number of pieces, chromacoded and processed according to the Mohs protocol.  This was repeated in successive stages until a tumor free defect was achieved.
Complex Repair Preamble Text (Leave Blank If You Do Not Want): Extensive wide undermining was performed at least 2 cm in all directions.
Intermediate Repair Preamble Text (Leave Blank If You Do Not Want): Undermining was performed with blunt dissection.
M-Plasty Complex Repair Preamble Text (Leave Blank If You Do Not Want): Extensive wide undermining was performed.
Non-Graft Cartilage Fenestration Text: The cartilage was fenestrated with a 2mm punch biopsy to help facilitate healing.
Graft Cartilage Fenestration Text: The cartilage was fenestrated with a 2mm punch biopsy to help facilitate graft survival and healing.
Secondary Intention Text (Leave Blank If You Do Not Want): The defect will heal with secondary intention.
No Repair - Repaired With Adjacent Surgical Defect Text (Leave Blank If You Do Not Want): After obtaining clear surgical margins the defect was repaired concurrently with another surgical defect which was in close approximation.
Advancement Flap (Single) Text: The defect edges were debeveled with a #15 scalpel blade.  Given the location of the defect and the proximity to free margins a single advancement flap was deemed most appropriate.  Using a sterile surgical marker, an appropriate advancement flap was drawn incorporating the defect and placing the expected incisions within the relaxed skin tension lines where possible.    The area thus outlined was incised deep to adipose tissue with a #15 scalpel blade.  The skin margins were undermined to an appropriate distance in all directions utilizing iris scissors.
Advancement Flap (Double) Text: The defect edges were debeveled with a #15 scalpel blade.  Given the location of the defect and the proximity to free margins a double advancement flap was deemed most appropriate.  Using a sterile surgical marker, the appropriate advancement flaps were drawn incorporating the defect and placing the expected incisions within the relaxed skin tension lines where possible.    The area thus outlined was incised deep to adipose tissue with a #15 scalpel blade.  The skin margins were undermined to an appropriate distance in all directions utilizing iris scissors.
Burow's Advancement Flap Text: The defect edges were debeveled with a #15 scalpel blade.  Given the location of the defect and the proximity to free margins a Burow's advancement flap was deemed most appropriate.  Using a sterile surgical marker, the appropriate advancement flap was drawn incorporating the defect and placing the expected incisions within the relaxed skin tension lines where possible.    The area thus outlined was incised deep to adipose tissue with a #15 scalpel blade.  The skin margins were undermined to an appropriate distance in all directions utilizing iris scissors.
Chonodrocutaneous Helical Advancement Flap Text: The defect edges were debeveled with a #15 scalpel blade.  Given the location of the defect and the proximity to free margins a chondrocutaneous helical advancement flap was deemed most appropriate.  Using a sterile surgical marker, the appropriate advancement flap was drawn incorporating the defect and placing the expected incisions within the relaxed skin tension lines where possible.    The area thus outlined was incised deep to adipose tissue with a #15 scalpel blade.  The skin margins were undermined to an appropriate distance in all directions utilizing iris scissors.
Crescentic Advancement Flap Text: The defect edges were debeveled with a #15 scalpel blade.  Given the location of the defect and the proximity to free margins a crescentic advancement flap was deemed most appropriate.  Using a sterile surgical marker, the appropriate advancement flap was drawn incorporating the defect and placing the expected incisions within the relaxed skin tension lines where possible.    The area thus outlined was incised deep to adipose tissue with a #15 scalpel blade.  The skin margins were undermined to an appropriate distance in all directions utilizing iris scissors.
A-T Advancement Flap Text: The defect edges were debeveled with a #15 scalpel blade.  Given the location of the defect, shape of the defect and the proximity to free margins an A-T advancement flap was deemed most appropriate.  Using a sterile surgical marker, an appropriate advancement flap was drawn incorporating the defect and placing the expected incisions within the relaxed skin tension lines where possible.    The area thus outlined was incised deep to adipose tissue with a #15 scalpel blade.  The skin margins were undermined to an appropriate distance in all directions utilizing iris scissors.
O-T Advancement Flap Text: The defect edges were debeveled with a #15 scalpel blade.  Given the location of the defect, shape of the defect and the proximity to free margins an O-T advancement flap was deemed most appropriate.  Using a sterile surgical marker, an appropriate advancement flap was drawn incorporating the defect and placing the expected incisions within the relaxed skin tension lines where possible.    The area thus outlined was incised deep to adipose tissue with a #15 scalpel blade.  The skin margins were undermined to an appropriate distance in all directions utilizing iris scissors.
O-L Flap Text: The defect edges were debeveled with a #15 scalpel blade.  Given the location of the defect, shape of the defect and the proximity to free margins an O-L flap was deemed most appropriate.  Using a sterile surgical marker, an appropriate advancement flap was drawn incorporating the defect and placing the expected incisions within the relaxed skin tension lines where possible.    The area thus outlined was incised deep to adipose tissue with a #15 scalpel blade.  The skin margins were undermined to an appropriate distance in all directions utilizing iris scissors.
O-Z Flap Text: The defect edges were debeveled with a #15 scalpel blade.  Given the location of the defect, shape of the defect and the proximity to free margins an O-Z flap was deemed most appropriate.  Using a sterile surgical marker, an appropriate transposition flap was drawn incorporating the defect and placing the expected incisions within the relaxed skin tension lines where possible. The area thus outlined was incised deep to adipose tissue with a #15 scalpel blade.  The skin margins were undermined to an appropriate distance in all directions utilizing iris scissors.
Double O-Z Flap Text: The defect edges were debeveled with a #15 scalpel blade.  Given the location of the defect, shape of the defect and the proximity to free margins a Double O-Z flap was deemed most appropriate.  Using a sterile surgical marker, an appropriate transposition flap was drawn incorporating the defect and placing the expected incisions within the relaxed skin tension lines where possible. The area thus outlined was incised deep to adipose tissue with a #15 scalpel blade.  The skin margins were undermined to an appropriate distance in all directions utilizing iris scissors.
V-Y Flap Text: The defect edges were debeveled with a #15 scalpel blade.  Given the location of the defect, shape of the defect and the proximity to free margins a V-Y flap was deemed most appropriate.  Using a sterile surgical marker, an appropriate advancement flap was drawn incorporating the defect and placing the expected incisions within the relaxed skin tension lines where possible.    The area thus outlined was incised deep to adipose tissue with a #15 scalpel blade.  The skin margins were undermined to an appropriate distance in all directions utilizing iris scissors.
Advancement-Rotation Flap Text: The defect edges were debeveled with a #15 scalpel blade.  Given the location of the defect, shape of the defect and the proximity to free margins an advancement-rotation flap was deemed most appropriate.  Using a sterile surgical marker, an appropriate flap was drawn incorporating the defect and placing the expected incisions within the relaxed skin tension lines where possible. The area thus outlined was incised deep to adipose tissue with a #15 scalpel blade.  The skin margins were undermined to an appropriate distance in all directions utilizing iris scissors.
Mercedes Flap Text: The defect edges were debeveled with a #15 scalpel blade.  Given the location of the defect, shape of the defect and the proximity to free margins a Mercedes flap was deemed most appropriate.  Using a sterile surgical marker, an appropriate advancement flap was drawn incorporating the defect and placing the expected incisions within the relaxed skin tension lines where possible. The area thus outlined was incised deep to adipose tissue with a #15 scalpel blade.  The skin margins were undermined to an appropriate distance in all directions utilizing iris scissors.
Modified Advancement Flap Text: The defect edges were debeveled with a #15 scalpel blade.  Given the location of the defect, shape of the defect and the proximity to free margins a modified advancement flap was deemed most appropriate.  Using a sterile surgical marker, an appropriate advancement flap was drawn incorporating the defect and placing the expected incisions within the relaxed skin tension lines where possible.    The area thus outlined was incised deep to adipose tissue with a #15 scalpel blade.  The skin margins were undermined to an appropriate distance in all directions utilizing iris scissors.
Mucosal Advancement Flap Text: Given the location of the defect, shape of the defect and the proximity to free margins a mucosal advancement flap was deemed most appropriate. Incisions were made with a 15 blade scalpel in the appropriate fashion along the cutaneous vermilion border and the mucosal lip. The remaining actinically damaged mucosal tissue was excised.  The mucosal advancement flap was then elevated to the gingival sulcus with care taken to preserve the neurovascular structures and advanced into the primary defect. Care was taken to ensure that precise realignment of the vermilion border was achieved.
Peng Advancement Flap Text: The defect edges were debeveled with a #15 scalpel blade.  Given the location of the defect, shape of the defect and the proximity to free margins a Peng advancement flap was deemed most appropriate.  Using a sterile surgical marker, an appropriate advancement flap was drawn incorporating the defect and placing the expected incisions within the relaxed skin tension lines where possible. The area thus outlined was incised deep to adipose tissue with a #15 scalpel blade.  The skin margins were undermined to an appropriate distance in all directions utilizing iris scissors.
Hatchet Flap Text: The defect edges were debeveled with a #15 scalpel blade.  Given the location of the defect, shape of the defect and the proximity to free margins a hatchet flap based from the glabella was deemed most appropriate.  Using a sterile surgical marker, an appropriate glabellar hatchet flap was drawn incorporating the defect and placing the expected incisions within the relaxed skin tension lines where possible.    The area thus outlined was incised deep to adipose tissue with a #15 scalpel blade.  The skin margins were undermined to an appropriate distance in all directions utilizing iris scissors.
Rotation Flap Text: The defect edges were debeveled with a #15 scalpel blade.  Given the location of the defect, shape of the defect and the proximity to free margins a rotation flap was deemed most appropriate.  Using a sterile surgical marker, an appropriate rotation flap was drawn incorporating the defect and placing the expected incisions within the relaxed skin tension lines where possible.    The area thus outlined was incised deep to adipose tissue with a #15 scalpel blade.  The skin margins were undermined to an appropriate distance in all directions utilizing iris scissors.
Spiral Flap Text: The defect edges were debeveled with a #15 scalpel blade.  Given the location of the defect, shape of the defect and the proximity to free margins a spiral flap was deemed most appropriate.  Using a sterile surgical marker, an appropriate rotation flap was drawn incorporating the defect and placing the expected incisions within the relaxed skin tension lines where possible. The area thus outlined was incised deep to adipose tissue with a #15 scalpel blade.  The skin margins were undermined to an appropriate distance in all directions utilizing iris scissors.
Star Wedge Flap Text: The defect edges were debeveled with a #15 scalpel blade.  Given the location of the defect, shape of the defect and the proximity to free margins a star wedge flap was deemed most appropriate.  Using a sterile surgical marker, an appropriate rotation flap was drawn incorporating the defect and placing the expected incisions within the relaxed skin tension lines where possible. The area thus outlined was incised deep to adipose tissue with a #15 scalpel blade.  The skin margins were undermined to an appropriate distance in all directions utilizing iris scissors.
Transposition Flap Text: The defect edges were debeveled with a #15 scalpel blade.  Given the location of the defect and the proximity to free margins a transposition flap was deemed most appropriate.  Using a sterile surgical marker, an appropriate transposition flap was drawn incorporating the defect.    The area thus outlined was incised deep to adipose tissue with a #15 scalpel blade.  The skin margins were undermined to an appropriate distance in all directions utilizing iris scissors.
Muscle Hinge Flap Text: The defect edges were debeveled with a #15 scalpel blade.  Given the size, depth and location of the defect and the proximity to free margins a muscle hinge flap was deemed most appropriate.  Using a sterile surgical marker, an appropriate hinge flap was drawn incorporating the defect. The area thus outlined was incised with a #15 scalpel blade.  The skin margins were undermined to an appropriate distance in all directions utilizing iris scissors.
Nasal Turnover Hinge Flap Text: The defect edges were debeveled with a #15 scalpel blade.  Given the size, depth, location of the defect and the defect being full thickness a nasal turnover hinge flap was deemed most appropriate.  Using a sterile surgical marker, an appropriate hinge flap was drawn incorporating the defect. The area thus outlined was incised with a #15 scalpel blade. The flap was designed to recreate the nasal mucosal lining and the alar rim. The skin margins were undermined to an appropriate distance in all directions utilizing iris scissors.
Nasalis-Muscle-Based Myocutaneous Island Pedicle Flap Text: Using a #15 blade, an incision was made around the donor flap to the level of the nasalis muscle. Wide lateral undermining was then performed in both the subcutaneous plane above the nasalis muscle, and in a submuscular plane just above periosteum. This allowed the formation of a free nasalis muscle axial pedicle (based on the angular artery) which was still attached to the actual cutaneous flap, increasing its mobility and vascular viability. Hemostasis was obtained with pinpoint electrocoagulation. The flap was mobilized into position and the pivotal anchor points positioned and stabilized with buried interrupted sutures. Subcutaneous and dermal tissues were closed in a multilayered fashion with sutures. Tissue redundancies were excised, and the epidermal edges were apposed without significant tension and sutured with sutures.
Orbicularis Oris Muscle Flap Text: The defect edges were debeveled with a #15 scalpel blade.  Given that the defect affected the competency of the oral sphincter an obicularis oris muscle flap was deemed most appropriate to restore this competency and normal muscle function.  Using a sterile surgical marker, an appropriate flap was drawn incorporating the defect. The area thus outlined was incised with a #15 scalpel blade.
Melolabial Transposition Flap Text: The defect edges were debeveled with a #15 scalpel blade.  Given the location of the defect and the proximity to free margins a melolabial flap was deemed most appropriate.  Using a sterile surgical marker, an appropriate melolabial transposition flap was drawn incorporating the defect.    The area thus outlined was incised deep to adipose tissue with a #15 scalpel blade.  The skin margins were undermined to an appropriate distance in all directions utilizing iris scissors.
Rhombic Flap Text: The defect edges were debeveled with a #15 scalpel blade.  Given the location of the defect and the proximity to free margins a rhombic flap was deemed most appropriate.  Using a sterile surgical marker, an appropriate rhombic flap was drawn incorporating the defect.    The area thus outlined was incised deep to adipose tissue with a #15 scalpel blade.  The skin margins were undermined to an appropriate distance in all directions utilizing iris scissors.
Rhomboid Transposition Flap Text: The defect edges were debeveled with a #15 scalpel blade.  Given the location of the defect and the proximity to free margins a rhomboid transposition flap was deemed most appropriate.  Using a sterile surgical marker, an appropriate rhomboid flap was drawn incorporating the defect.    The area thus outlined was incised deep to adipose tissue with a #15 scalpel blade.  The skin margins were undermined to an appropriate distance in all directions utilizing iris scissors.
Bi-Rhombic Flap Text: The defect edges were debeveled with a #15 scalpel blade.  Given the location of the defect and the proximity to free margins a bi-rhombic flap was deemed most appropriate.  Using a sterile surgical marker, an appropriate rhombic flap was drawn incorporating the defect. The area thus outlined was incised deep to adipose tissue with a #15 scalpel blade.  The skin margins were undermined to an appropriate distance in all directions utilizing iris scissors.
Helical Rim Advancement Flap Text: The defect edges were debeveled with a #15 blade scalpel.  Given the location of the defect and the proximity to free margins (helical rim) a double helical rim advancement flap was deemed most appropriate.  Using a sterile surgical marker, the appropriate advancement flaps were drawn incorporating the defect and placing the expected incisions between the helical rim and antihelix where possible.  The area thus outlined was incised through and through with a #15 scalpel blade.  With a skin hook and iris scissors, the flaps were gently and sharply undermined and freed up.
Bilateral Helical Rim Advancement Flap Text: The defect edges were debeveled with a #15 blade scalpel.  Given the location of the defect and the proximity to free margins (helical rim) a bilateral helical rim advancement flap was deemed most appropriate.  Using a sterile surgical marker, the appropriate advancement flaps were drawn incorporating the defect and placing the expected incisions between the helical rim and antihelix where possible.  The area thus outlined was incised through and through with a #15 scalpel blade.  With a skin hook and iris scissors, the flaps were gently and sharply undermined and freed up.
Ear Star Wedge Flap Text: The defect edges were debeveled with a #15 blade scalpel.  Given the location of the defect and the proximity to free margins (helical rim) an ear star wedge flap was deemed most appropriate.  Using a sterile surgical marker, the appropriate flap was drawn incorporating the defect and placing the expected incisions between the helical rim and antihelix where possible.  The area thus outlined was incised through and through with a #15 scalpel blade.
Banner Transposition Flap Text: The defect edges were debeveled with a #15 scalpel blade.  Given the location of the defect and the proximity to free margins a Banner transposition flap was deemed most appropriate.  Using a sterile surgical marker, an appropriate flap drawn around the defect. The area thus outlined was incised deep to adipose tissue with a #15 scalpel blade.  The skin margins were undermined to an appropriate distance in all directions utilizing iris scissors.
Bilobed Flap Text: The defect edges were debeveled with a #15 scalpel blade.  Given the location of the defect and the proximity to free margins a bilobe flap was deemed most appropriate.  Using a sterile surgical marker, an appropriate bilobe flap drawn around the defect.    The area thus outlined was incised deep to adipose tissue with a #15 scalpel blade.  The skin margins were undermined to an appropriate distance in all directions utilizing iris scissors.
Bilobed Transposition Flap Text: The defect edges were debeveled with a #15 scalpel blade.  Given the location of the defect and the proximity to free margins a bilobed transposition flap was deemed most appropriate.  Using a sterile surgical marker, an appropriate bilobe flap drawn around the defect.    The area thus outlined was incised deep to adipose tissue with a #15 scalpel blade.  The skin margins were undermined to an appropriate distance in all directions utilizing iris scissors.
Trilobed Flap Text: The defect edges were debeveled with a #15 scalpel blade.  Given the location of the defect and the proximity to free margins a trilobed flap was deemed most appropriate.  Using a sterile surgical marker, an appropriate trilobed flap drawn around the defect.    The area thus outlined was incised deep to adipose tissue with a #15 scalpel blade.  The skin margins were undermined to an appropriate distance in all directions utilizing iris scissors.
Dorsal Nasal Flap Text: The defect edges were debeveled with a #15 scalpel blade.  Given the location of the defect and the proximity to free margins a dorsal nasal flap,based upon the glabellar folds, was deemed most appropriate.  Using a sterile surgical marker, an appropriate dorsal nasal flap was drawn around the defect.    The area thus outlined was incised deep to adipose tissue with a #15 scalpel blade.  The skin margins were undermined to an appropriate distance in all directions utilizing iris scissors.
Island Pedicle Flap Text: The defect edges were debeveled with a #15 scalpel blade.  Given the location of the defect, shape of the defect and the proximity to free margins an island pedicle advancement flap was deemed most appropriate.  Using a sterile surgical marker, an appropriate advancement flap was drawn incorporating the defect, outlining the appropriate donor tissue and placing the expected incisions within the relaxed skin tension lines where possible.    The area thus outlined was incised deep to adipose tissue with a #15 scalpel blade.  The skin margins were undermined to an appropriate distance in all directions around the primary defect and laterally outward around the island pedicle utilizing iris scissors.  There was minimal undermining beneath the pedicle flap.
Island Pedicle Flap With Canthal Suspension Text: The defect edges were debeveled with a #15 scalpel blade.  Given the location of the defect, shape of the defect and the proximity to free margins an island pedicle advancement flap was deemed most appropriate.  Using a sterile surgical marker, an appropriate advancement flap was drawn incorporating the defect, outlining the appropriate donor tissue and placing the expected incisions within the relaxed skin tension lines where possible. The area thus outlined was incised deep to adipose tissue with a #15 scalpel blade.  The skin margins were undermined to an appropriate distance in all directions around the primary defect and laterally outward around the island pedicle utilizing iris scissors.  There was minimal undermining beneath the pedicle flap. A suspension suture was placed in the canthal tendon to prevent tension and prevent ectropion.
Alar Island Pedicle Flap Text: The defect edges were debeveled with a #15 scalpel blade.  Given the location of the defect, shape of the defect and the proximity to the alar rim an island pedicle advancement flap was deemed most appropriate.  Using a sterile surgical marker, an appropriate advancement flap was drawn incorporating the defect, outlining the appropriate donor tissue and placing the expected incisions within the nasal ala running parallel to the alar rim. The area thus outlined was incised with a #15 scalpel blade.  The skin margins were undermined minimally to an appropriate distance in all directions around the primary defect and laterally outward around the island pedicle utilizing iris scissors.  There was minimal undermining beneath the pedicle flap.
Double Island Pedicle Flap Text: The defect edges were debeveled with a #15 scalpel blade.  Given the location of the defect, shape of the defect and the proximity to free margins a double island pedicle advancement flap was deemed most appropriate.  Using a sterile surgical marker, an appropriate advancement flap was drawn incorporating the defect, outlining the appropriate donor tissue and placing the expected incisions within the relaxed skin tension lines where possible.    The area thus outlined was incised deep to adipose tissue with a #15 scalpel blade.  The skin margins were undermined to an appropriate distance in all directions around the primary defect and laterally outward around the island pedicle utilizing iris scissors.  There was minimal undermining beneath the pedicle flap.
Island Pedicle Flap-Requiring Vessel Identification Text: The defect edges were debeveled with a #15 scalpel blade.  Given the location of the defect, shape of the defect and the proximity to free margins an island pedicle advancement flap was deemed most appropriate.  Using a sterile surgical marker, an appropriate advancement flap was drawn, based on the axial vessel mentioned above, incorporating the defect, outlining the appropriate donor tissue and placing the expected incisions within the relaxed skin tension lines where possible.    The area thus outlined was incised deep to adipose tissue with a #15 scalpel blade.  The skin margins were undermined to an appropriate distance in all directions around the primary defect and laterally outward around the island pedicle utilizing iris scissors.  There was minimal undermining beneath the pedicle flap.
Keystone Flap Text: The defect edges were debeveled with a #15 scalpel blade.  Given the location of the defect, shape of the defect a keystone flap was deemed most appropriate.  Using a sterile surgical marker, an appropriate keystone flap was drawn incorporating the defect, outlining the appropriate donor tissue and placing the expected incisions within the relaxed skin tension lines where possible. The area thus outlined was incised deep to adipose tissue with a #15 scalpel blade.  The skin margins were undermined to an appropriate distance in all directions around the primary defect and laterally outward around the flap utilizing iris scissors.
O-T Plasty Text: The defect edges were debeveled with a #15 scalpel blade.  Given the location of the defect, shape of the defect and the proximity to free margins an O-T plasty was deemed most appropriate.  Using a sterile surgical marker, an appropriate O-T plasty was drawn incorporating the defect and placing the expected incisions within the relaxed skin tension lines where possible.    The area thus outlined was incised deep to adipose tissue with a #15 scalpel blade.  The skin margins were undermined to an appropriate distance in all directions utilizing iris scissors.
O-Z Plasty Text: The defect edges were debeveled with a #15 scalpel blade.  Given the location of the defect, shape of the defect and the proximity to free margins an O-Z plasty (double transposition flap) was deemed most appropriate.  Using a sterile surgical marker, the appropriate transposition flaps were drawn incorporating the defect and placing the expected incisions within the relaxed skin tension lines where possible.    The area thus outlined was incised deep to adipose tissue with a #15 scalpel blade.  The skin margins were undermined to an appropriate distance in all directions utilizing iris scissors.  Hemostasis was achieved with electrocautery.  The flaps were then transposed into place, one clockwise and the other counterclockwise, and anchored with interrupted buried subcutaneous sutures.
Double O-Z Plasty Text: The defect edges were debeveled with a #15 scalpel blade.  Given the location of the defect, shape of the defect and the proximity to free margins a Double O-Z plasty (double transposition flap) was deemed most appropriate.  Using a sterile surgical marker, the appropriate transposition flaps were drawn incorporating the defect and placing the expected incisions within the relaxed skin tension lines where possible. The area thus outlined was incised deep to adipose tissue with a #15 scalpel blade.  The skin margins were undermined to an appropriate distance in all directions utilizing iris scissors.  Hemostasis was achieved with electrocautery.  The flaps were then transposed into place, one clockwise and the other counterclockwise, and anchored with interrupted buried subcutaneous sutures.
V-Y Plasty Text: The defect edges were debeveled with a #15 scalpel blade.  Given the location of the defect, shape of the defect and the proximity to free margins an V-Y advancement flap was deemed most appropriate.  Using a sterile surgical marker, an appropriate advancement flap was drawn incorporating the defect and placing the expected incisions within the relaxed skin tension lines where possible.    The area thus outlined was incised deep to adipose tissue with a #15 scalpel blade.  The skin margins were undermined to an appropriate distance in all directions utilizing iris scissors.
H Plasty Text: Given the location of the defect, shape of the defect and the proximity to free margins a H-plasty was deemed most appropriate for repair.  Using a sterile surgical marker, the appropriate advancement arms of the H-plasty were drawn incorporating the defect and placing the expected incisions within the relaxed skin tension lines where possible. The area thus outlined was incised deep to adipose tissue with a #15 scalpel blade. The skin margins were undermined to an appropriate distance in all directions utilizing iris scissors.  The opposing advancement arms were then advanced into place in opposite direction and anchored with interrupted buried subcutaneous sutures.
W Plasty Text: The lesion was extirpated to the level of the fat with a #15 scalpel blade.  Given the location of the defect, shape of the defect and the proximity to free margins a W-plasty was deemed most appropriate for repair.  Using a sterile surgical marker, the appropriate transposition arms of the W-plasty were drawn incorporating the defect and placing the expected incisions within the relaxed skin tension lines where possible.    The area thus outlined was incised deep to adipose tissue with a #15 scalpel blade.  The skin margins were undermined to an appropriate distance in all directions utilizing iris scissors.  The opposing transposition arms were then transposed into place in opposite direction and anchored with interrupted buried subcutaneous sutures.
Z Plasty Text: The lesion was extirpated to the level of the fat with a #15 scalpel blade.  Given the location of the defect, shape of the defect and the proximity to free margins a Z-plasty was deemed most appropriate for repair.  Using a sterile surgical marker, the appropriate transposition arms of the Z-plasty were drawn incorporating the defect and placing the expected incisions within the relaxed skin tension lines where possible.    The area thus outlined was incised deep to adipose tissue with a #15 scalpel blade.  The skin margins were undermined to an appropriate distance in all directions utilizing iris scissors.  The opposing transposition arms were then transposed into place in opposite direction and anchored with interrupted buried subcutaneous sutures.
Zygomaticofacial Flap Text: Given the location of the defect, shape of the defect and the proximity to free margins a zygomaticofacial flap was deemed most appropriate for repair.  Using a sterile surgical marker, the appropriate flap was drawn incorporating the defect and placing the expected incisions within the relaxed skin tension lines where possible. The area thus outlined was incised deep to adipose tissue with a #15 scalpel blade with preservation of a vascular pedicle.  The skin margins were undermined to an appropriate distance in all directions utilizing iris scissors.  The flap was then placed into the defect and anchored with interrupted buried subcutaneous sutures.
Cheek Interpolation Flap Text: A decision was made to reconstruct the defect utilizing an interpolation axial flap and a staged reconstruction.  A telfa template was made of the defect.  This telfa template was then used to outline the Cheek Interpolation flap.  The donor area for the pedicle flap was then injected with anesthesia.  The flap was excised through the skin and subcutaneous tissue down to the layer of the underlying musculature.  The interpolation flap was carefully excised within this deep plane to maintain its blood supply.  The edges of the donor site were undermined.   The donor site was closed in a primary fashion.  The pedicle was then rotated into position and sutured.  Once the tube was sutured into place, adequate blood supply was confirmed with blanching and refill.  The pedicle was then wrapped with xeroform gauze and dressed appropriately with a telfa and gauze bandage to ensure continued blood supply and protect the attached pedicle.
Cheek-To-Nose Interpolation Flap Text: A decision was made to reconstruct the defect utilizing an interpolation axial flap and a staged reconstruction.  A telfa template was made of the defect.  This telfa template was then used to outline the Cheek-To-Nose Interpolation flap.  The donor area for the pedicle flap was then injected with anesthesia.  The flap was excised through the skin and subcutaneous tissue down to the layer of the underlying musculature.  The interpolation flap was carefully excised within this deep plane to maintain its blood supply.  The edges of the donor site were undermined.   The donor site was closed in a primary fashion.  The pedicle was then rotated into position and sutured.  Once the tube was sutured into place, adequate blood supply was confirmed with blanching and refill.  The pedicle was then wrapped with xeroform gauze and dressed appropriately with a telfa and gauze bandage to ensure continued blood supply and protect the attached pedicle.
Interpolation Flap Text: A decision was made to reconstruct the defect utilizing an interpolation axial flap and a staged reconstruction.  A telfa template was made of the defect.  This telfa template was then used to outline the interpolation flap.  The donor area for the pedicle flap was then injected with anesthesia.  The flap was excised through the skin and subcutaneous tissue down to the layer of the underlying musculature.  The interpolation flap was carefully excised within this deep plane to maintain its blood supply.  The edges of the donor site were undermined.   The donor site was closed in a primary fashion.  The pedicle was then rotated into position and sutured.  Once the tube was sutured into place, adequate blood supply was confirmed with blanching and refill.  The pedicle was then wrapped with xeroform gauze and dressed appropriately with a telfa and gauze bandage to ensure continued blood supply and protect the attached pedicle.
Melolabial Interpolation Flap Text: A decision was made to reconstruct the defect utilizing an interpolation axial flap and a staged reconstruction.  A telfa template was made of the defect.  This telfa template was then used to outline the melolabial interpolation flap.  The donor area for the pedicle flap was then injected with anesthesia.  The flap was excised through the skin and subcutaneous tissue down to the layer of the underlying musculature.  The pedicle flap was carefully excised within this deep plane to maintain its blood supply.  The edges of the donor site were undermined.   The donor site was closed in a primary fashion.  The pedicle was then rotated into position and sutured.  Once the tube was sutured into place, adequate blood supply was confirmed with blanching and refill.  The pedicle was then wrapped with xeroform gauze and dressed appropriately with a telfa and gauze bandage to ensure continued blood supply and protect the attached pedicle.
Mastoid Interpolation Flap Text: A decision was made to reconstruct the defect utilizing an interpolation axial flap and a staged reconstruction.  A telfa template was made of the defect.  This telfa template was then used to outline the mastoid interpolation flap.  The donor area for the pedicle flap was then injected with anesthesia.  The flap was excised through the skin and subcutaneous tissue down to the layer of the underlying musculature.  The pedicle flap was carefully excised within this deep plane to maintain its blood supply.  The edges of the donor site were undermined.   The donor site was closed in a primary fashion.  The pedicle was then rotated into position and sutured.  Once the tube was sutured into place, adequate blood supply was confirmed with blanching and refill.  The pedicle was then wrapped with xeroform gauze and dressed appropriately with a telfa and gauze bandage to ensure continued blood supply and protect the attached pedicle.
Posterior Auricular Interpolation Flap Text: A decision was made to reconstruct the defect utilizing an interpolation axial flap and a staged reconstruction.  A telfa template was made of the defect.  This telfa template was then used to outline the posterior auricular interpolation flap.  The donor area for the pedicle flap was then injected with anesthesia.  The flap was excised through the skin and subcutaneous tissue down to the layer of the underlying musculature.  The pedicle flap was carefully excised within this deep plane to maintain its blood supply.  The edges of the donor site were undermined.   The donor site was closed in a primary fashion.  The pedicle was then rotated into position and sutured.  Once the tube was sutured into place, adequate blood supply was confirmed with blanching and refill.  The pedicle was then wrapped with xeroform gauze and dressed appropriately with a telfa and gauze bandage to ensure continued blood supply and protect the attached pedicle.
Paramedian Forehead Flap Text: A decision was made to reconstruct the defect utilizing an interpolation axial flap and a staged reconstruction.  A telfa template was made of the defect.  This telfa template was then used to outline the paramedian forehead pedicle flap.  The donor area for the pedicle flap was then injected with anesthesia.  The flap was excised through the skin and subcutaneous tissue down to the layer of the underlying musculature.  The pedicle flap was carefully excised within this deep plane to maintain its blood supply.  The edges of the donor site were undermined.   The donor site was closed in a primary fashion.  The pedicle was then rotated into position and sutured.  Once the tube was sutured into place, adequate blood supply was confirmed with blanching and refill.  The pedicle was then wrapped with xeroform gauze and dressed appropriately with a telfa and gauze bandage to ensure continued blood supply and protect the attached pedicle.
Cheiloplasty (Less Than 50%) Text: A decision was made to reconstruct the defect with a  cheiloplasty.  The defect was undermined extensively.  Additional obicularis oris muscle was excised with a 15 blade scalpel.  The defect was converted into a full thickness wedge, of less than 50% of the vertical height of the lip, to facilite a better cosmetic result.  Small vessels were then tied off with 5-0 monocyrl. The obicularis oris, superficial fascia, adipose and dermis were then reapproximated.  After the deeper layers were approximated the epidermis was reapproximated with particular care given to realign the vermilion border.
Cheiloplasty (Complex) Text: A decision was made to reconstruct the defect with a  cheiloplasty.  The defect was undermined extensively.  Additional obicularis oris muscle was excised with a 15 blade scalpel.  The defect was converted into a full thickness wedge to facilite a better cosmetic result.  Small vessels were then tied off with 5-0 monocyrl. The obicularis oris, superficial fascia, adipose and dermis were then reapproximated.  After the deeper layers were approximated the epidermis was reapproximated with particular care given to realign the vermilion border.
Ear Wedge Repair Text: A wedge excision was completed by carrying down an excision through the full thickness of the ear and cartilage with an inward facing Burow's triangle. The wound was then closed in a layered fashion.
Full Thickness Lip Wedge Repair (Flap) Text: Given the location of the defect and the proximity to free margins a full thickness wedge repair was deemed most appropriate.  Using a sterile surgical marker, the appropriate repair was drawn incorporating the defect and placing the expected incisions perpendicular to the vermilion border.  The vermilion border was also meticulously outlined to ensure appropriate reapproximation during the repair.  The area thus outlined was incised through and through with a #15 scalpel blade.  The muscularis and dermis were reaproximated with deep sutures following hemostasis. Care was taken to realign the vermilion border before proceeding with the superficial closure.  Once the vermilion was realigned the superfical and mucosal closure was finished.
Ftsg Text: The defect edges were debeveled with a #15 scalpel blade.  Given the location of the defect, shape of the defect and the proximity to free margins a full thickness skin graft was deemed most appropriate.  Using a sterile surgical marker, the primary defect shape was transferred to the donor site. The area thus outlined was incised deep to adipose tissue with a #15 scalpel blade.  The harvested graft was then trimmed of adipose tissue until only dermis and epidermis was left.  The skin margins of the secondary defect were undermined to an appropriate distance in all directions utilizing iris scissors.  The secondary defect was closed with interrupted buried subcutaneous sutures.  The skin edges were then re-apposed with running  sutures.  The skin graft was then placed in the primary defect and oriented appropriately.
Split-Thickness Skin Graft Text: The defect edges were debeveled with a #15 scalpel blade.  Given the location of the defect, shape of the defect and the proximity to free margins a split thickness skin graft was deemed most appropriate.  Using a sterile surgical marker, the primary defect shape was transferred to the donor site. The split thickness graft was then harvested.  The skin graft was then placed in the primary defect and oriented appropriately.
Burow's Graft Text: The defect edges were debeveled with a #15 scalpel blade.  Given the location of the defect, shape of the defect, the proximity to free margins and the presence of a standing cone deformity a Burow's skin graft was deemed most appropriate. The standing cone was removed and this tissue was then trimmed to the shape of the primary defect. The adipose tissue was also removed until only dermis and epidermis were left.  The skin margins of the secondary defect were undermined to an appropriate distance in all directions utilizing iris scissors.  The secondary defect was closed with interrupted buried subcutaneous sutures.  The skin edges were then re-apposed with running  sutures.  The skin graft was then placed in the primary defect and oriented appropriately.
Cartilage Graft Text: The defect edges were debeveled with a #15 scalpel blade.  Given the location of the defect, shape of the defect, the fact the defect involved a full thickness cartilage defect a cartilage graft was deemed most appropriate.  An appropriate donor site was identified, cleansed, and anesthetized. The cartilage graft was then harvested and transferred to the recipient site, oriented appropriately and then sutured into place.  The secondary defect was then repaired using a primary closure.
Composite Graft Text: The defect edges were debeveled with a #15 scalpel blade.  Given the location of the defect, shape of the defect, the proximity to free margins and the fact the defect was full thickness a composite graft was deemed most appropriate.  The defect was outline and then transferred to the donor site.  A full thickness graft was then excised from the donor site. The graft was then placed in the primary defect, oriented appropriately and then sutured into place.  The secondary defect was then repaired using a primary closure.
Epidermal Autograft Text: The defect edges were debeveled with a #15 scalpel blade.  Given the location of the defect, shape of the defect and the proximity to free margins an epidermal autograft was deemed most appropriate.  Using a sterile surgical marker, the primary defect shape was transferred to the donor site. The epidermal graft was then harvested.  The skin graft was then placed in the primary defect and oriented appropriately.
Dermal Autograft Text: The defect edges were debeveled with a #15 scalpel blade.  Given the location of the defect, shape of the defect and the proximity to free margins a dermal autograft was deemed most appropriate.  Using a sterile surgical marker, the primary defect shape was transferred to the donor site. The area thus outlined was incised deep to adipose tissue with a #15 scalpel blade.  The harvested graft was then trimmed of adipose and epidermal tissue until only dermis was left.  The skin graft was then placed in the primary defect and oriented appropriately.
Skin Substitute Text: The defect edges were debeveled with a #15 scalpel blade.  Given the location of the defect, shape of the defect and the proximity to free margins a skin substitute graft was deemed most appropriate.  The graft material was trimmed to fit the size of the defect. The graft was then placed in the primary defect and oriented appropriately.
Tissue Cultured Epidermal Autograft Text: The defect edges were debeveled with a #15 scalpel blade.  Given the location of the defect, shape of the defect and the proximity to free margins a tissue cultured epidermal autograft was deemed most appropriate.  The graft was then trimmed to fit the size of the defect.  The graft was then placed in the primary defect and oriented appropriately.
Xenograft Text: The defect edges were debeveled with a #15 scalpel blade.  Given the location of the defect, shape of the defect and the proximity to free margins a xenograft was deemed most appropriate.  The graft was then trimmed to fit the size of the defect.  The graft was then placed in the primary defect and oriented appropriately.
Purse String (Simple) Text: Given the location of the defect and the characteristics of the surrounding skin a purse string closure was deemed most appropriate.  Undermining was performed circumfirentially around the surgical defect.  A purse string suture was then placed and tightened.
Purse String (Intermediate) Text: Given the location of the defect and the characteristics of the surrounding skin a purse string intermediate closure was deemed most appropriate.  Undermining was performed circumfirentially around the surgical defect.  A purse string suture was then placed and tightened.
Partial Purse String (Simple) Text: Given the location of the defect and the characteristics of the surrounding skin a simple purse string closure was deemed most appropriate.  Undermining was performed circumfirentially around the surgical defect.  A purse string suture was then placed and tightened. Wound tension only allowed a partial closure of the circular defect.
Partial Purse String (Intermediate) Text: Given the location of the defect and the characteristics of the surrounding skin an intermediate purse string closure was deemed most appropriate.  Undermining was performed circumfirentially around the surgical defect.  A purse string suture was then placed and tightened. Wound tension only allowed a partial closure of the circular defect.
Localized Dermabrasion With Wire Brush Text: The patient was draped in routine manner.  Localized dermabrasion using 3 x 17 mm wire brush was performed in routine manner to papillary dermis. This spot dermabrasion is being performed to complete skin cancer reconstruction. It also will eliminate the other sun damaged precancerous cells that are known to be part of the regional effect of a lifetime's worth of sun exposure. This localized dermabrasion is therapeutic and should not be considered cosmetic in any regard.
Tarsorrhaphy Text: A tarsorrhaphy was performed using Frost sutures.
Complex Repair And Flap Additional Text (Will Appearing After The Standard Complex Repair Text): The complex repair was not sufficient to completely close the primary defect. The remaining additional defect was repaired with the flap mentioned below.
Complex Repair And Graft Additional Text (Will Appearing After The Standard Complex Repair Text): The complex repair was not sufficient to completely close the primary defect. The remaining additional defect was repaired with the graft mentioned below.
Unique Flap 1 Name: Myocutaneous Island pedicle Flap
Unique Flap 2 Name: Peng Flap
Unique Flap 3 Name: Mercedes Flap
Unique Flap 4 Name: Banner Flap
Unique Flap 5 Name: tunneled myocutaneous flap
Unique Flap 1 Text: A decision was made to reconstruct the defect utilizing a myocutaneous Island pedicle Flap based on the levator labii superioris muscle.  A telfa template was made of the defect.  This telfa template was then used to outline the myocutaneous flap, based along the meilolabial fold.  The donor area for the pedicle flap was then injected with anesthesia.  The flap was excised through the skin and subcutaneous tissue down to the layer of the underlying musculature.  The myocutaneous flap was carefully excised within this deep plane to maintain its blood supply. Based on the muscle. The edges of the donor site were undermined.   The donor site was closed in a primary fashion to the point of transposition.  The pedicle was then transposed into position and sutured.  Once the flap was sutured into place, adequate blood supply was confirmed with blanching and refill.
Unique Flap 2 Text: A decision was made to reconstruct the defect utilizing a Peng Flap (Bilateral Advancement Rotation Flap). Given the location of the defect and the proximity to free margins, this flap was deemed most appropriate.  Using a sterile surgical marker, the appropriate rotation flaps were drawn incorporating the defect and placing the expected incisions within the relaxed skin tension lines where possible.    The area thus outlined was incised deep to adipose tissue with a #15 scalpel blade.  The skin margins were undermined to an appropriate distance in all directions utilizing iris scissors.
Unique Flap 3 Text: The defect edges were debeveled with a #15 scalpel blade.  Given the location of the defect, shape of the defect and the proximity to free margins a Mercedes (double advancement flap) was deemed most appropriate.  Using a sterile surgical marker, the appropriate transposition flaps were drawn incorporating the defect and placing the expected incisions within the relaxed skin tension lines where possible.    The area thus outlined was incised deep to adipose tissue with a #15 scalpel blade.  The skin margins were undermined to an appropriate distance in all directions utilizing iris scissors.  Hemostasis was achieved with electrocautery.  The flaps were then advanced into the defect and anchored with interrupted buried subcutaneous sutures.
Unique Flap 4 Text: The defect edges were debeveled with a #15 scalpel blade.  Given the location of the defect and the proximity to free margins a Banner transposition flap was deemed most appropriate.  Using a sterile surgical marker, an appropriate Banner transposition flap was drawn incorporating the defect.    The area thus outlined was incised deep to adipose tissue with a #15 scalpel blade.  The skin margins were undermined to an appropriate distance in all directions utilizing iris scissors.
Unique Flap 5 Text: A decision was made to reconstruct the defect utilizing a tunneled myocutaneous Island pedicle Flap based on the anterior auricularis muscle.  A telfa template was made of the defect.  This telfa template was then used to outline the myocutaneous flap, based along the preauricular fold.  The donor area for the pedicle flap was then injected with anesthesia.  The flap was excised through the skin and subcutaneous tissue down to the layer of the underlying musculature.  The myocutaneous flap was carefully excised within this deep plane to maintain its blood supply based on the muscle. The edges of the donor site were undermined.   The donor site was closed in a primary fashion to the point of transposition.  The pedicle was then transposed through a tunnel into position and sutured.  Once the flap was sutured into place, adequate blood supply was confirmed with blanching and refill.
Manual Repair Warning Statement: We plan on removing the manually selected variable below in favor of our much easier automatic structured text blocks found in the previous tab. We decided to do this to help make the flow better and give you the full power of structured data. Manual selection is never going to be ideal in our platform and I would encourage you to avoid using manual selection from this point on, especially since I will be sunsetting this feature. It is important that you do one of two things with the customized text below. First, you can save all of the text in a word file so you can have it for future reference. Second, transfer the text to the appropriate area in the Library tab. Lastly, if there is a flap or graft type which we do not have you need to let us know right away so I can add it in before the variable is hidden. No need to panic, we plan to give you roughly 6 months to make the change.
Same Histology In Subsequent Stages Text: The pattern and morphology of the tumor is as described in the first stage.
No Residual Tumor Seen Histology Text: There were no malignant cells seen in the sections examined.
Inflammation Suggestive Of Cancer Camouflage Histology Text: There was a dense lymphocytic infiltrate which prevented adequate histologic evaluation of adjacent structures.
Bcc Histology Text: There were numerous aggregates of basaloid cells.
Bcc Infiltrative Histology Text: There were numerous aggregates of basaloid cells demonstrating an infiltrative pattern.
Mart-1 - Positive Histology Text: MART-1 staining demonstrates areas of higher density and clustering of melanocytes with Pagetoid spread upwards within the epidermis. The surgical margins are positive for tumor cells.
Mart-1 - Negative Histology Text: MART-1 staining demonstrates a normal density and pattern of melanocytes along the dermal-epidermal junction. The surgical margins are negative for tumor cells.
Information: Selecting Yes will display possible errors in your note based on the variables you have selected. This validation is only offered as a suggestion for you. PLEASE NOTE THAT THE VALIDATION TEXT WILL BE REMOVED WHEN YOU FINALIZE YOUR NOTE. IF YOU WANT TO FAX A PRELIMINARY NOTE YOU WILL NEED TO TOGGLE THIS TO 'NO' IF YOU DO NOT WANT IT IN YOUR FAXED NOTE.

## 2021-01-11 DIAGNOSIS — Z23 NEED FOR VACCINATION: ICD-10-CM

## 2021-01-12 ENCOUNTER — APPOINTMENT (RX ONLY)
Dept: URBAN - METROPOLITAN AREA CLINIC 36 | Facility: CLINIC | Age: 81
Setting detail: DERMATOLOGY
End: 2021-01-12

## 2021-01-12 DIAGNOSIS — Z48.02 ENCOUNTER FOR REMOVAL OF SUTURES: ICD-10-CM

## 2021-01-12 PROCEDURE — 99024 POSTOP FOLLOW-UP VISIT: CPT

## 2021-01-12 PROCEDURE — ? SUTURE REMOVAL (GLOBAL PERIOD)

## 2021-01-12 ASSESSMENT — LOCATION DETAILED DESCRIPTION DERM: LOCATION DETAILED: NASAL DORSUM

## 2021-01-12 ASSESSMENT — LOCATION ZONE DERM: LOCATION ZONE: NOSE

## 2021-01-12 ASSESSMENT — LOCATION SIMPLE DESCRIPTION DERM: LOCATION SIMPLE: NOSE

## 2021-01-12 NOTE — PROCEDURE: SUTURE REMOVAL (GLOBAL PERIOD)
Detail Level: Detailed
Add 41811 Cpt? (Important Note: In 2017 The Use Of 29697 Is Being Tracked By Cms To Determine Future Global Period Reimbursement For Global Periods): yes

## 2021-01-18 ENCOUNTER — ANTICOAGULATION VISIT (OUTPATIENT)
Dept: VASCULAR LAB | Facility: MEDICAL CENTER | Age: 81
End: 2021-01-18
Attending: INTERNAL MEDICINE
Payer: MEDICARE

## 2021-01-18 DIAGNOSIS — I48.91 ATRIAL FIBRILLATION, UNSPECIFIED TYPE (HCC): ICD-10-CM

## 2021-01-18 DIAGNOSIS — Z95.3 STATUS POST TRANSCATHETER AORTIC VALVE REPLACEMENT (TAVR) USING BIOPROSTHESIS: ICD-10-CM

## 2021-01-18 LAB
INR BLD: 1.5 (ref 0.9–1.2)
INR PPP: 1.5 (ref 2–3.5)

## 2021-01-18 PROCEDURE — 99212 OFFICE O/P EST SF 10 MIN: CPT

## 2021-01-18 PROCEDURE — 85610 PROTHROMBIN TIME: CPT

## 2021-01-18 NOTE — PROGRESS NOTES
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 Referring Cardiology [phone]    [facility] Antico[facility] Services Responsible  [phone]                Anticoagulation Patient Findings      HPI:  Duane Parkinson seen in clinic today, on anticoagulation therapy with warfarin for afib, TAVR  Changes to current medical/health status since last appt: No  Denies signs/symptoms of bleeding and/or thrombosis since the last appt.    Denies any interval changes to diet  Denies any interval changes to medications since last appt.   Denies any complications or cost restrictions with current therapy.   Verified current warfarin dosing schedule.   Pt declined vitals  Medications reconciled  Pt on antiplatelet therapy Aspirin 81mg for TAVR and must be reviewed again with Dr. Rutherford at next appointment.      A/P   INR  is now sub-therapeutic.   Unclear as to why INR subtherapeutic today. Will bolus with 6mg today then resume current regimen as patient is low risk for stroke (CHADSVASC 3) and patient has been stable on this regimen for a while.    Follow up appointment in 3 week(s).    Sanam Rowe, AbhishekD

## 2021-01-25 ENCOUNTER — TELEPHONE (OUTPATIENT)
Dept: PHYSICAL THERAPY | Facility: REHABILITATION | Age: 81
End: 2021-01-25

## 2021-01-25 NOTE — OP THERAPY DISCHARGE SUMMARY
Outpatient Physical Therapy  DISCHARGE SUMMARY NOTE      [facility] [address].  [address]  [place] [address]  Phone:  [phone]  Fax:  [phone]    Date of Visit: 01/25/2021    Patient: Kinsey Parkinson  YOB: 1940  MRN: [id number]     Referring Provider: No referring provider defined for this encounter.   Referring Diagnosis No admission diagnoses are documented for this encounter.       Your patient is being discharged from Physical Therapy with the following comments:   · Other    Recommendations:  D/C from PT. There has been a lapse in time greater than prescription coverage allows.    Katlyn Johnson, PT, DPT    Date: 1/25/2021

## 2021-01-26 ENCOUNTER — TELEPHONE (OUTPATIENT)
Dept: CARDIOLOGY | Facility: MEDICAL CENTER | Age: 81
End: 2021-01-26

## 2021-01-26 NOTE — TELEPHONE ENCOUNTER
AK      Pt called asking for a refill of clindamycin sent to [facility]'s pharmacy on [address] in [place]. Please call Pt when prescription is sent over at [phone].

## 2021-01-26 NOTE — TELEPHONE ENCOUNTER
"Tried to call phone number in message and also listed home phone but \"call cannot be completed as dialed.\" LVM on mobile phone number to call PCP for this refill.   "

## 2021-01-27 NOTE — TELEPHONE ENCOUNTER
Pt called back stating his PCP quit and he doesn't have a new one yet which is why he is asking if AK can call in a prescription for Clindamycin. Please call Pt back at [phone].

## 2021-01-27 NOTE — TELEPHONE ENCOUNTER
Contacted pt to discuss. He says that a previous orthopedic doctor told him that he needs to take clindamycin for the rest of his life to protect his knee replacement due to a previous infection.     Reviewed chart, previous PCP who has since left [facility], Dr. Graham, last prescribed clindamycin. Advised that pt contact the primary care group for refill. Pt is scheduled with see Margie DOE next month. He will ask for his medical assistant and request refill. Pt appreciated also reviewed all of his upcoming appts.

## 2021-01-28 RX ORDER — CLINDAMYCIN HYDROCHLORIDE 300 MG/1
CAPSULE ORAL
Qty: 180 CAP | Refills: 0 | OUTPATIENT
Start: 2021-01-28

## 2021-01-28 NOTE — TELEPHONE ENCOUNTER
Received request via: Pharmacy    Was the patient seen in the last year in this department? Yes    Does the patient have an active prescription (recently filled or refills available) for medication(s) requested? No     Requested Prescriptions     Pending Prescriptions Disp Refills   • clindamycin (CLEOCIN) 300 MG Cap 180 Cap 0     Sig: TAKE ONE CAPSULE BY MOUTH TWICE A DAY

## 2021-02-08 ENCOUNTER — ANTICOAGULATION VISIT (OUTPATIENT)
Dept: VASCULAR LAB | Facility: MEDICAL CENTER | Age: 81
End: 2021-02-08
Attending: INTERNAL MEDICINE
Payer: MEDICARE

## 2021-02-08 VITALS — DIASTOLIC BLOOD PRESSURE: 77 MMHG | SYSTOLIC BLOOD PRESSURE: 136 MMHG | HEART RATE: 65 BPM

## 2021-02-08 DIAGNOSIS — Z95.3 STATUS POST TRANSCATHETER AORTIC VALVE REPLACEMENT (TAVR) USING BIOPROSTHESIS: ICD-10-CM

## 2021-02-08 DIAGNOSIS — I48.91 ATRIAL FIBRILLATION, UNSPECIFIED TYPE (HCC): ICD-10-CM

## 2021-02-08 LAB
INR BLD: 2.3 (ref 0.9–1.2)
INR PPP: 2.3 (ref 2–3.5)

## 2021-02-08 PROCEDURE — 99211 OFF/OP EST MAY X REQ PHY/QHP: CPT

## 2021-02-08 PROCEDURE — 85610 PROTHROMBIN TIME: CPT

## 2021-02-08 NOTE — PROGRESS NOTES
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 Referring Cardiology [phone]    [facility] Anticoagulation Services Responsible  [phone]                Anticoagulation Patient Findings      HPI:  Duane Parkinson seen in clinic today, on anticoagulation therapy with warfarin for afib, s/p TAVR  Changes to current medical/health status since last appt: No  Denies signs/symptoms of bleeding and/or thrombosis since the last appt.    Denies any interval changes to diet  Denies any interval changes to medications since last appt.   Denies any complications or cost restrictions with current therapy.   Verified current warfarin dosing schedule.   BP recorded in vitals.  Medications reconciled  Pt on antiplatelet therapy Aspirin 81mg for TAVR and must be reviewed again with Dr. Rutherford at next appointment.    A/P   INR  is now therapeutic.   Continue current regimen.    Follow up appointment in 4 week(s).    Sanam Rowe, PharmD

## 2021-02-10 ENCOUNTER — OFFICE VISIT (OUTPATIENT)
Dept: SLEEP MEDICINE | Facility: MEDICAL CENTER | Age: 81
End: 2021-02-10
Payer: MEDICARE

## 2021-02-10 VITALS
SYSTOLIC BLOOD PRESSURE: 118 MMHG | WEIGHT: 232.8 LBS | BODY MASS INDEX: 31.53 KG/M2 | DIASTOLIC BLOOD PRESSURE: 60 MMHG | RESPIRATION RATE: 16 BRPM | OXYGEN SATURATION: 97 % | HEIGHT: 72 IN | HEART RATE: 75 BPM

## 2021-02-10 DIAGNOSIS — I48.91 ATRIAL FIBRILLATION, UNSPECIFIED TYPE (HCC): ICD-10-CM

## 2021-02-10 DIAGNOSIS — G47.31 COMPLEX SLEEP APNEA SYNDROME: ICD-10-CM

## 2021-02-10 DIAGNOSIS — I10 ESSENTIAL HYPERTENSION: ICD-10-CM

## 2021-02-10 PROCEDURE — 99213 OFFICE O/P EST LOW 20 MIN: CPT | Performed by: INTERNAL MEDICINE

## 2021-02-10 ASSESSMENT — FIBROSIS 4 INDEX: FIB4 SCORE: 2.17

## 2021-02-10 NOTE — PROGRESS NOTES
Chief Complaint   Patient presents with   • Follow-Up      last seen 7/20/20 by Lauren Reed for LORENA Treatment-emergent central sleep apnea,Essential hypertension,Atrial fibrillation, unspecified type       HPI: This patient is an 81 y.o. male who returns for complex sleep apnea.  Last visit was almost a year ago. HST from 2016 indicated an AHI of 22 and low oxygenation of 81%, consistent with moderate LORENA.  He completed a titration study for continued hypersomnolence completed November 2017 that was successful on ASV therapy. Currently he is being treated with ASV @ 7/10, PS 4/15, max pressure [id number].  He forgot his CPAP compliance chip.  He feels he sleeps better off ASV therapy and admits he sometimes skips using his ASV.  Weight has been stable.  Has been replacing CPAP supplies through [employer].      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)    • Clotting disorder (HCC)     reports nose bleeds   • Depression     Lost wife ,still gets tearful   • Diabetes (HCC)     diet controlled   • Encounter for long-term (current) use of other medications    • Gasping for breath    • Chinese measles    • Heart murmur    • Heart valve disease    • Hyperlipidemia    • Hypertension    • Insomnia    • Mumps    • Nasal drainage    • Pain     back & thumb   • Pyogenic arthritis, lower leg (HCC)     thumb, back   • Restless leg syndrome    • Sleep apnea     uses CPAP   • Snoring    • Tonsillitis    • Valvular heart disease        Social History     Socioeconomic History   • Marital status:      Spouse name: Not on file   • Number of children: Not on file   • Years of education: Not on file   • Highest education level: Not on file   Occupational History   • Not on file   Tobacco Use   • Smoking status: Former Smoker     Packs/day: 1.00     Years: 20.00     Pack years: 20.00     Types: Cigarettes      Quit date: 1972     Years since quittin.1   • Smokeless tobacco: Former User   Substance and Sexual Activity   • Alcohol use: No     Alcohol/week: 0.0 oz   • Drug use: No   • Sexual activity: Not Currently     Partners: Female   Other Topics Concern   • Not on file   Social History Narrative   • Not on file     Social Determinants of Health     Financial Resource Strain:    • Difficulty of Paying Living Expenses:    Food Insecurity:    • Worried About Running Out of Food in the Last Year:    • Ran Out of Food in the Last Year:    Transportation Needs:    • Lack of Transportation (Medical):    • Lack of Transportation (Non-Medical):    Physical Activity:    • Days of Exercise per Week:    • Minutes of Exercise per Session:    Stress:    • Feeling of Stress :    Social Connections:    • Frequency of Communication with Friends and Family:    • Frequency of Social Gatherings with Friends and Family:    • Attends Shinto Services:    • Active Member of Clubs or Organizations:    • Attends Club or Organization Meetings:    • Marital Status:    Intimate Partner Violence:    • Fear of Current or Ex-Partner:    • Emotionally Abused:    • Physically Abused:    • Sexually Abused:        Family History   Problem Relation Age of Onset   • Other Mother         unknown   • Heart Disease Mother    • Cancer Father         prostate, skin   • No Known Problems Brother    • Diabetes Brother    • Heart Disease Son    • Sleep Apnea Neg Hx        Current Outpatient Medications on File Prior to Visit   Medication Sig Dispense Refill   • warfarin (COUMADIN) 4 MG Tab Take one-half to one tablet by mouth one time daily or as directed by coumadin clinic 90 Tab 1   • clindamycin (CLEOCIN) 300 MG Cap TAKE ONE CAPSULE BY MOUTH TWICE A  Cap 0   • digoxin (LANOXIN) 250 MCG Tab Take 1 Tab by mouth every bedtime. 90 Tab 3   • Ascorbic Acid (VITAMIN C) 1000 MG Tab Take  by mouth.     • pravastatin (PRAVACHOL) 80 MG tablet TAKE  ONE TABLET BY MOUTH EVERY DAY 90 Tab 3   • metoprolol SR (TOPROL XL) 50 MG TABLET SR 24 HR TAKE ONE TABLET BY MOUTH EVERY DAY 90 Tab 3   • Coenzyme Q10 (COQ10 PO) Take 300 mg by mouth.     • Omega-3 Fatty Acids (FISH OIL) 1000 MG CAPSULE DELAYED RELEASE EC softgel capsule Take 2,000 mg by mouth every morning with breakfast.     • Glucosamine HCl (GLUCOSAMINE PO) Take  by mouth.     • lysine 500 MG Tab Take 500 mg by mouth every day.     • aspirin (ASA) 81 MG Chew Tab chewable tablet Take 1 Tab by mouth every day. 90 Tab 3   • acetaminophen (TYLENOL) 500 MG Tab Take 500-1,000 mg by mouth every 6 hours as needed.     • gabapentin (NEURONTIN) 300 MG Cap Take 300 mg by mouth every day. 1 in the morning, 1 at noon, 2 at 4 pm, 3 at bedtime      • B Complex Vitamins (VITAMIN B COMPLEX PO) Take 1 Tab by mouth every evening.     • Psyllium (METAMUCIL PO) Take  by mouth every morning.     • Cholecalciferol (VITAMIN D3) 2000 UNITS Tab Take 1 Tab by mouth every evening.     • multivitamin (THERAGRAN) TABS Take 1 Tab by mouth every bedtime.       No current facility-administered medications on file prior to visit.       Allergies: Feldene [piroxicam] and Percodan [oxycodone-aspirin]    ROS:   Constitutional: Denies fevers, chills, night sweats, fatigue or weight loss  Eyes: Denies vision loss, pain, drainage, double vision  Ears, Nose, Throat: Denies earache, difficulty hearing, tinnitus, nasal congestion, hoarseness  Cardiovascular: Denies chest pain, tightness, palpitations, orthopnea or edema  Respiratory: Denies shortness of breath, cough, wheezing, hemoptysis  Sleep: Denies daytime sleepiness, snoring, apneas, insomnia, morning headaches  GI: Denies heartburn, dysphagia, nausea, abdominal pain, diarrhea or constipation  : Denies frequent urination, hematuria, discharge or painful urination  Musculoskeletal: Denies back pain, painful joints, sore muscles  Neurological: Denies weakness or headaches  Skin: No rashes    BP  118/60 (BP Location: Right arm, Patient Position: Sitting, BP Cuff Size: Adult)   Pulse 75   Resp 16   Ht 1.829 m (6')   Wt 106 kg (232 lb 12.8 oz)   SpO2 97%     Physical Exam:  Appearance: Well-nourished, well-developed, in no acute distress  HEENT: Normocephalic, atraumatic, white sclera, PERRLA  Neck: No adenopathy or masses  Respiratory: no intercostal retractions or accessory muscle use  Lungs auscultation: Clear to auscultation bilaterally  Cardiovascular: Regular rate rhythm. No murmurs, rubs or gallops.  No LE edema  Abdomen: soft, nondistended  Gait: Normal  Digits: No clubbing, cyanosis  Motor: No focal deficits  Orientation: Oriented to time, person and place    Diagnosis:  1. Complex sleep apnea syndrome  Polysomnography, 4 or More   2. Essential hypertension     3. Atrial fibrillation, unspecified type (HCC)         Plan:  The patient has a history of complex sleep apnea, on ASV EPAP: 10/7 cm of water, pressure support: 15/4 cm of water.  Compliance download is unavailable.  He has been skipping ASV use and questions whether he continues to need ASV.  We will update polysomnography as a split-night study for evaluation of sleep apnea and for accurate calibration of ASV pressures  if needed.  Return in about 1 year (around 2/10/2022) for after sleep study.

## 2021-02-17 ENCOUNTER — IMMUNIZATION (OUTPATIENT)
Dept: FAMILY PLANNING/WOMEN'S HEALTH CLINIC | Facility: IMMUNIZATION CENTER | Age: 81
End: 2021-02-17
Attending: INTERNAL MEDICINE
Payer: MEDICARE

## 2021-02-17 DIAGNOSIS — Z23 NEED FOR VACCINATION: ICD-10-CM

## 2021-02-17 DIAGNOSIS — Z23 ENCOUNTER FOR VACCINATION: Primary | ICD-10-CM

## 2021-02-17 PROCEDURE — 0001A PFIZER SARS-COV-2 VACCINE: CPT | Performed by: INTERNAL MEDICINE

## 2021-02-17 PROCEDURE — 91300 PFIZER SARS-COV-2 VACCINE: CPT | Performed by: INTERNAL MEDICINE

## 2021-02-18 ENCOUNTER — APPOINTMENT (RX ONLY)
Dept: URBAN - METROPOLITAN AREA CLINIC 4 | Facility: CLINIC | Age: 81
Setting detail: DERMATOLOGY
End: 2021-02-18

## 2021-02-18 DIAGNOSIS — T81.89 OTHER COMPLICATIONS OF PROCEDURES, NOT ELSEWHERE CLASSIFIED: ICD-10-CM

## 2021-02-18 DIAGNOSIS — L82.0 INFLAMED SEBORRHEIC KERATOSIS: ICD-10-CM

## 2021-02-18 PROBLEM — T81.89XA OTHER COMPLICATIONS OF PROCEDURES, NOT ELSEWHERE CLASSIFIED, INITIAL ENCOUNTER: Status: ACTIVE | Noted: 2021-02-18

## 2021-02-18 PROBLEM — D48.5 NEOPLASM OF UNCERTAIN BEHAVIOR OF SKIN: Status: ACTIVE | Noted: 2021-02-18

## 2021-02-18 PROCEDURE — ? BIOPSY BY SHAVE METHOD

## 2021-02-18 PROCEDURE — 11102 TANGNTL BX SKIN SINGLE LES: CPT | Mod: 79

## 2021-02-18 PROCEDURE — ? COUNSELING

## 2021-02-18 ASSESSMENT — LOCATION DETAILED DESCRIPTION DERM: LOCATION DETAILED: RIGHT SUPERIOR MEDIAL UPPER BACK

## 2021-02-18 ASSESSMENT — LOCATION SIMPLE DESCRIPTION DERM: LOCATION SIMPLE: RIGHT UPPER BACK

## 2021-02-18 ASSESSMENT — LOCATION ZONE DERM: LOCATION ZONE: TRUNK

## 2021-02-18 NOTE — PROCEDURE: MIPS QUALITY
Detail Level: Detailed
Quality 431: Preventive Care And Screening: Unhealthy Alcohol Use - Screening: Patient screened for unhealthy alcohol use using a single question and scores less than 2 times per year
Quality 226: Preventive Care And Screening: Tobacco Use: Screening And Cessation Intervention: Patient screened for tobacco use and is an ex/non-smoker
Quality 130: Documentation Of Current Medications In The Medical Record: Current Medications Documented
Quality 111:Pneumonia Vaccination Status For Older Adults: Pneumococcal Vaccination Previously Received

## 2021-02-24 ENCOUNTER — HOSPITAL ENCOUNTER (OUTPATIENT)
Dept: LAB | Facility: MEDICAL CENTER | Age: 81
End: 2021-02-24
Attending: INTERNAL MEDICINE
Payer: MEDICARE

## 2021-02-24 ENCOUNTER — SLEEP STUDY (OUTPATIENT)
Dept: SLEEP MEDICINE | Facility: MEDICAL CENTER | Age: 81
End: 2021-02-24
Attending: INTERNAL MEDICINE
Payer: MEDICARE

## 2021-02-24 ENCOUNTER — OFFICE VISIT (OUTPATIENT)
Dept: CARDIOLOGY | Facility: MEDICAL CENTER | Age: 81
End: 2021-02-24
Payer: MEDICARE

## 2021-02-24 VITALS
RESPIRATION RATE: 18 BRPM | SYSTOLIC BLOOD PRESSURE: 124 MMHG | WEIGHT: 233 LBS | HEART RATE: 99 BPM | HEIGHT: 72 IN | BODY MASS INDEX: 31.56 KG/M2 | OXYGEN SATURATION: 97 % | DIASTOLIC BLOOD PRESSURE: 62 MMHG

## 2021-02-24 DIAGNOSIS — Z79.899 HIGH RISK MEDICATION USE: ICD-10-CM

## 2021-02-24 DIAGNOSIS — I48.91 ATRIAL FIBRILLATION, UNSPECIFIED TYPE (HCC): ICD-10-CM

## 2021-02-24 DIAGNOSIS — L08.9 FOREIGN BODY OF KNEE WITH INFECTION, LEFT, SUBSEQUENT ENCOUNTER: ICD-10-CM

## 2021-02-24 DIAGNOSIS — E78.5 DYSLIPIDEMIA: ICD-10-CM

## 2021-02-24 DIAGNOSIS — G47.31 COMPLEX SLEEP APNEA SYNDROME: ICD-10-CM

## 2021-02-24 DIAGNOSIS — I35.0 NONRHEUMATIC AORTIC VALVE STENOSIS: ICD-10-CM

## 2021-02-24 DIAGNOSIS — Z95.3 STATUS POST TRANSCATHETER AORTIC VALVE REPLACEMENT (TAVR) USING BIOPROSTHESIS: ICD-10-CM

## 2021-02-24 DIAGNOSIS — S80.252D FOREIGN BODY OF KNEE WITH INFECTION, LEFT, SUBSEQUENT ENCOUNTER: ICD-10-CM

## 2021-02-24 DIAGNOSIS — I10 ESSENTIAL HYPERTENSION: ICD-10-CM

## 2021-02-24 LAB
ALBUMIN SERPL BCP-MCNC: 3.8 G/DL (ref 3.2–4.9)
ALBUMIN/GLOB SERPL: 1 G/DL
ALP SERPL-CCNC: 82 U/L (ref 30–99)
ALT SERPL-CCNC: 56 U/L (ref 2–50)
ANION GAP SERPL CALC-SCNC: 9 MMOL/L (ref 7–16)
AST SERPL-CCNC: 48 U/L (ref 12–45)
BASOPHILS # BLD AUTO: 0.6 % (ref 0–1.8)
BASOPHILS # BLD: 0.06 K/UL (ref 0–0.12)
BILIRUB SERPL-MCNC: 0.6 MG/DL (ref 0.1–1.5)
BUN SERPL-MCNC: 15 MG/DL (ref 8–22)
CALCIUM SERPL-MCNC: 9.4 MG/DL (ref 8.5–10.5)
CHLORIDE SERPL-SCNC: 104 MMOL/L (ref 96–112)
CHOLEST SERPL-MCNC: 115 MG/DL (ref 100–199)
CO2 SERPL-SCNC: 25 MMOL/L (ref 20–33)
CREAT SERPL-MCNC: 0.73 MG/DL (ref 0.5–1.4)
DIGOXIN SERPL-MCNC: 1 NG/ML (ref 0.8–2)
EOSINOPHIL # BLD AUTO: 0.21 K/UL (ref 0–0.51)
EOSINOPHIL NFR BLD: 2.2 % (ref 0–6.9)
ERYTHROCYTE [DISTWIDTH] IN BLOOD BY AUTOMATED COUNT: 47.8 FL (ref 35.9–50)
GLOBULIN SER CALC-MCNC: 3.7 G/DL (ref 1.9–3.5)
GLUCOSE SERPL-MCNC: 96 MG/DL (ref 65–99)
HCT VFR BLD AUTO: 50.2 % (ref 42–52)
HDLC SERPL-MCNC: 32 MG/DL
HGB BLD-MCNC: 16.7 G/DL (ref 14–18)
IMM GRANULOCYTES # BLD AUTO: 0.06 K/UL (ref 0–0.11)
IMM GRANULOCYTES NFR BLD AUTO: 0.6 % (ref 0–0.9)
LDLC SERPL CALC-MCNC: 33 MG/DL
LYMPHOCYTES # BLD AUTO: 2.47 K/UL (ref 1–4.8)
LYMPHOCYTES NFR BLD: 26.1 % (ref 22–41)
MCH RBC QN AUTO: 32.2 PG (ref 27–33)
MCHC RBC AUTO-ENTMCNC: 33.3 G/DL (ref 33.7–35.3)
MCV RBC AUTO: 96.7 FL (ref 81.4–97.8)
MONOCYTES # BLD AUTO: 1.06 K/UL (ref 0–0.85)
MONOCYTES NFR BLD AUTO: 11.2 % (ref 0–13.4)
NEUTROPHILS # BLD AUTO: 5.62 K/UL (ref 1.82–7.42)
NEUTROPHILS NFR BLD: 59.3 % (ref 44–72)
NRBC # BLD AUTO: 0 K/UL
NRBC BLD-RTO: 0 /100 WBC
POTASSIUM SERPL-SCNC: 4.6 MMOL/L (ref 3.6–5.5)
PROT SERPL-MCNC: 7.5 G/DL (ref 6–8.2)
RBC # BLD AUTO: 5.19 M/UL (ref 4.7–6.1)
SODIUM SERPL-SCNC: 138 MMOL/L (ref 135–145)
TRIGL SERPL-MCNC: 248 MG/DL (ref 0–149)
WBC # BLD AUTO: 9.5 K/UL (ref 4.8–10.8)

## 2021-02-24 PROCEDURE — 80162 ASSAY OF DIGOXIN TOTAL: CPT

## 2021-02-24 PROCEDURE — 80053 COMPREHEN METABOLIC PANEL: CPT

## 2021-02-24 PROCEDURE — 80061 LIPID PANEL: CPT

## 2021-02-24 PROCEDURE — 85041 AUTOMATED RBC COUNT: CPT

## 2021-02-24 PROCEDURE — 85018 HEMOGLOBIN: CPT

## 2021-02-24 PROCEDURE — 99214 OFFICE O/P EST MOD 30 MIN: CPT | Performed by: INTERNAL MEDICINE

## 2021-02-24 PROCEDURE — 36415 COLL VENOUS BLD VENIPUNCTURE: CPT

## 2021-02-24 PROCEDURE — 85048 AUTOMATED LEUKOCYTE COUNT: CPT

## 2021-02-24 PROCEDURE — 85014 HEMATOCRIT: CPT

## 2021-02-24 ASSESSMENT — ENCOUNTER SYMPTOMS
STRIDOR: 0
HEMOPTYSIS: 0
PALPITATIONS: 0
CLAUDICATION: 0
SORE THROAT: 0
DIZZINESS: 0
ORTHOPNEA: 0
SPUTUM PRODUCTION: 0
CHILLS: 0
CONSTITUTIONAL NEGATIVE: 1
WHEEZING: 0
MUSCULOSKELETAL NEGATIVE: 1
EYES NEGATIVE: 1
BRUISES/BLEEDS EASILY: 0
GASTROINTESTINAL NEGATIVE: 1
NEUROLOGICAL NEGATIVE: 1
LOSS OF CONSCIOUSNESS: 0
RESPIRATORY NEGATIVE: 1
PND: 0
COUGH: 0
FEVER: 0
WEAKNESS: 0
CARDIOVASCULAR NEGATIVE: 1
SHORTNESS OF BREATH: 0

## 2021-02-24 ASSESSMENT — FIBROSIS 4 INDEX: FIB4 SCORE: 2.17

## 2021-02-24 NOTE — PROGRESS NOTES
Chief Complaint   Patient presents with   • Atrial Fibrillation   ITP Reviewed    Subjective:   Kinsey Parkinson is a 81 y.o. male who presents today as a follow-up for his aortic stenosis status post TAVR with paroxysmal atrial fibrillation hypertension hyperlipidemia.  Since he was last seen he is concerned about issues around his knee and that he will be able to get a more clindamycin.  He has not seen ID in some time.  He also is complaining of fatigue and shortness of breath.  He is got moderate sleep apnea being treated for this.    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)    • Clotting disorder (HCC)     reports nose bleeds   • Depression     Lost wife ,still gets tearful   • Diabetes (HCC)     diet controlled   • Encounter for long-term (current) use of other medications    • Gasping for breath    • Greek measles    • Heart murmur    • Heart valve disease    • Hyperlipidemia    • Hypertension    • Insomnia    • Mumps    • Nasal drainage    • Pain     back & thumb   • Pyogenic arthritis, lower leg (HCC)     thumb, back   • Restless leg syndrome    • Sleep apnea     uses CPAP   • Snoring    • Tonsillitis    • Valvular heart disease      Past Surgical History:   Procedure Laterality Date   • GIBRAN N/A 7/29/2019    Procedure: ECHOCARDIOGRAM, TRANSESOPHAGEAL;  Surgeon: Leander Buckner M.D.;  Location: SURGERY [facility];  Service: Cardiac   • TRANSCATHETER AORTIC VALVE REPLACEMENT Bilateral 7/29/2019    Procedure: REPLACEMENT, AORTIC VALVE, TRANSCATHETER;  Surgeon: Leander Buckner M.D.;  Location: SURGERY [facility];  Service: Cardiac   • FINGER ARTHROPLASTY Left 5/27/2016    Procedure: FINGER ARTHROPLASTY THUMB CARPOMETACARPAL EXCISIONAL, EXCISE TRAPEZIUM;  Surgeon: Raheel Salgado M.D.;  Location: SURGERY SAME DAY [facility];  Service:    • CARDIOVERSION       on 06, sinus rhythm until 2007,  paroxysmal atrial fibrillation   • KNEE ARTHROPLASTY TOTAL     • LAMINOTOMY N/A     multiple lumbar spine   • OTHER ORTHOPEDIC SURGERY      lower back   • PB PERCUT IMPLNT NEUROELECT,EPIDURAL     • TOE ARTHROPLASTY     • TURP-VAPOR N/A      Family History   Problem Relation Age of Onset   • Other Mother         unknown   • Heart Disease Mother    • Cancer Father         prostate, skin   • No Known Problems Brother    • Diabetes Brother    • Heart Disease Son    • Sleep Apnea Neg Hx      Social History     Socioeconomic History   • Marital status:      Spouse name: Not on file   • Number of children: Not on file   • Years of education: Not on file   • Highest education level: Not on file   Occupational History   • Not on file   Tobacco Use   • Smoking status: Former Smoker     Packs/day: 1.00     Years: 20.00     Pack years: 20.00     Types: Cigarettes     Quit date: 1972     Years since quittin.1   • Smokeless tobacco: Former User   Substance and Sexual Activity   • Alcohol use: No     Alcohol/week: 0.0 oz   • Drug use: No   • Sexual activity: Not Currently     Partners: Female   Other Topics Concern   • Not on file   Social History Narrative   • Not on file     Social Determinants of Health     Financial Resource Strain:    • Difficulty of Paying Living Expenses:    Food Insecurity:    • Worried About Running Out of Food in the Last Year:    • Ran Out of Food in the Last Year:    Transportation Needs:    • Lack of Transportation (Medical):    • Lack of Transportation (Non-Medical):    Physical Activity:    • Days of Exercise per Week:    • Minutes of Exercise per Session:    Stress:    • Feeling of Stress :    Social Connections:    • Frequency of Communication with Friends and Family:    • Frequency of Social Gatherings with Friends and Family:    • Attends [employer] Services:    • Active Member of Clubs or Organizations:    • Attends Club or  "Organization Meetings:    • Marital Status:    Intimate Partner Violence:    • Fear of Current or Ex-Partner:    • Emotionally Abused:    • Physically Abused:    • Sexually Abused:      Allergies   Allergen Reactions   • Percodan [Oxycodone-Aspirin] Itching and Photosensitivity     \"I sunburn\"     Outpatient Encounter Medications as of 2/24/2021   Medication Sig Dispense Refill   • warfarin (COUMADIN) 4 MG Tab Take one-half to one tablet by mouth one time daily or as directed by coumadin clinic 90 Tab 1   • digoxin (LANOXIN) 250 MCG Tab Take 1 Tab by mouth every bedtime. 90 Tab 3   • Ascorbic Acid (VITAMIN C) 1000 MG Tab Take  by mouth.     • pravastatin (PRAVACHOL) 80 MG tablet TAKE ONE TABLET BY MOUTH EVERY DAY 90 Tab 3   • metoprolol SR (TOPROL XL) 50 MG TABLET SR 24 HR TAKE ONE TABLET BY MOUTH EVERY DAY 90 Tab 3   • Coenzyme Q10 (COQ10 PO) Take 300 mg by mouth.     • Omega-3 Fatty Acids (FISH OIL) 1000 MG CAPSULE DELAYED RELEASE EC softgel capsule Take 2,000 mg by mouth every morning with breakfast.     • lysine 500 MG Tab Take 500 mg by mouth every day.     • aspirin (ASA) 81 MG Chew Tab chewable tablet Take 1 Tab by mouth every day. 90 Tab 3   • acetaminophen (TYLENOL) 500 MG Tab Take 500-1,000 mg by mouth every 6 hours as needed.     • gabapentin (NEURONTIN) 300 MG Cap Take 300 mg by mouth every day. 1 in the morning, 1 at noon, 2 at 4 pm, 3 at bedtime      • B Complex Vitamins (VITAMIN B COMPLEX PO) Take 1 Tab by mouth every evening.     • Psyllium (METAMUCIL PO) Take  by mouth every morning.     • Cholecalciferol (VITAMIN D3) 2000 UNITS Tab Take 1 Tab by mouth every evening.     • multivitamin (THERAGRAN) TABS Take 1 Tab by mouth every bedtime.     • clindamycin (CLEOCIN) 300 MG Cap TAKE ONE CAPSULE BY MOUTH TWICE A DAY (Patient not taking: Reported on 2/24/2021) 180 Cap 0   • [DISCONTINUED] Glucosamine HCl (GLUCOSAMINE PO) Take  by mouth.       No facility-administered encounter medications on file as of " 2/24/2021.     Review of Systems   Constitutional: Negative.  Negative for chills, fever and malaise/fatigue.   HENT: Negative.  Negative for sore throat.    Eyes: Negative.    Respiratory: Negative.  Negative for cough, hemoptysis, sputum production, shortness of breath, wheezing and stridor.    Cardiovascular: Negative.  Negative for chest pain, palpitations, orthopnea, claudication, leg swelling and PND.   Gastrointestinal: Negative.    Genitourinary: Negative.    Musculoskeletal: Negative.    Skin: Negative.    Neurological: Negative.  Negative for dizziness, loss of consciousness and weakness.   Endo/Heme/Allergies: Negative.  Does not bruise/bleed easily.   All other systems reviewed and are negative.       Objective:   /62 (BP Location: Left arm, Patient Position: Sitting, BP Cuff Size: Adult)   Pulse 99   Resp 18   Ht 1.829 m (6')   Wt 106 kg (233 lb)   SpO2 97%   BMI 31.60 kg/m²     Physical Exam   Constitutional: He appears well-developed and well-nourished. No distress.   HENT:   Head: Normocephalic and atraumatic.   Right Ear: External ear normal.   Left Ear: External ear normal.   Nose: Nose normal.   Mouth/Throat: No oropharyngeal exudate.   Eyes: Pupils are equal, round, and reactive to light. Conjunctivae and EOM are normal. Right eye exhibits no discharge. Left eye exhibits no discharge. No scleral icterus.   Neck: No JVD present.   Cardiovascular: Normal rate, regular rhythm and intact distal pulses. Exam reveals no gallop and no friction rub.   No murmur heard.  Pulmonary/Chest: Effort normal. No stridor. No respiratory distress. He has no wheezes. He has no rales. He exhibits no tenderness.   Abdominal: Soft. He exhibits no distension. There is no guarding.   Musculoskeletal:         General: No tenderness, deformity or edema. Normal range of motion.      Cervical back: Neck supple.   Neurological: He is alert. He has normal reflexes. He displays normal reflexes. No cranial nerve  deficit. He exhibits normal muscle tone. Coordination normal.   Skin: Skin is warm and dry. No rash noted. He is not diaphoretic. No erythema. No pallor.   Psychiatric: He has a normal mood and affect. His behavior is normal. Judgment and thought content normal.   Nursing note and vitals reviewed.      Assessment:     1. Essential hypertension     2. Dyslipidemia  Comp Metabolic Panel    CBC W/ DIFF W/O PLATELETS    Lipid Profile    DIGOXIN   3. Atrial fibrillation, unspecified type (HCC)  CBC W/ DIFF W/O PLATELETS    Lipid Profile    DIGOXIN   4. 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 Nonrheumatic aortic valve stenosis  Comp Metabolic Panel    CBC W/ DIFF W/O PLATELETS    Lipid Profile    DIGOXIN   6. Foreign body of knee with infection, left, subsequent encounter  REFERRAL TO INFECTIOUS DISEASE    Comp Metabolic Panel   7. High risk medication use  DIGOXIN       Medical Decision Making:  Today's Assessment / Status / Plan:     81-year-old male with paroxysmal atrial fibrillation hypertension hyperlipidemia and aortic stenosis status post TAVR.  He is doing well from the standpoint.  I like to check a number of labs.  I placed referral back to infectious disease for his clindamycin as well as to follow-up his infected left knee.  I will see him back in 6 months.

## 2021-03-08 ENCOUNTER — ANTICOAGULATION VISIT (OUTPATIENT)
Dept: VASCULAR LAB | Facility: MEDICAL CENTER | Age: 81
End: 2021-03-08
Attending: INTERNAL MEDICINE
Payer: MEDICARE

## 2021-03-08 VITALS — SYSTOLIC BLOOD PRESSURE: 128 MMHG | HEART RATE: 73 BPM | DIASTOLIC BLOOD PRESSURE: 94 MMHG

## 2021-03-08 DIAGNOSIS — Z95.3 STATUS POST TRANSCATHETER AORTIC VALVE REPLACEMENT (TAVR) USING BIOPROSTHESIS: ICD-10-CM

## 2021-03-08 DIAGNOSIS — I48.91 ATRIAL FIBRILLATION, UNSPECIFIED TYPE (HCC): ICD-10-CM

## 2021-03-08 LAB
INR BLD: 2.3 (ref 0.9–1.2)
INR PPP: 2.3 (ref 2–3.5)

## 2021-03-08 PROCEDURE — 99211 OFF/OP EST MAY X REQ PHY/QHP: CPT

## 2021-03-08 PROCEDURE — 85610 PROTHROMBIN TIME: CPT

## 2021-03-08 NOTE — PROGRESS NOTES
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 Referring Cardiology [phone]    [facility] Anticoagulation Services Responsible  [phone]                Anticoagulation Patient Findings      HPI:  Duane Parkinson seen in clinic today, on anticoagulation therapy with warfarin for afib, TAVR  Changes to current medical/health status since last appt: No  Denies signs/symptoms of bleeding and/or thrombosis since the last appt.    Denies any interval changes to diet  Denies any interval changes to medications since last appt.   Denies any complications or cost restrictions with current therapy.   Verified current warfarin dosing schedule.   BP recorded in vitals.  Medications reconciled  Pt on antiplatelet therapy Aspirin 81mg for TAVR and must be reviewed again with Dr. Rutherford at next appointment.      A/P   INR  remains therapeutic.   Continue current regimen.    Follow up appointment in 5 week(s).    Sanam Rowe, PharmD

## 2021-03-10 ENCOUNTER — IMMUNIZATION (OUTPATIENT)
Dept: FAMILY PLANNING/WOMEN'S HEALTH CLINIC | Facility: IMMUNIZATION CENTER | Age: 81
End: 2021-03-10
Attending: INTERNAL MEDICINE
Payer: MEDICARE

## 2021-03-10 DIAGNOSIS — Z23 ENCOUNTER FOR VACCINATION: Primary | ICD-10-CM

## 2021-03-10 PROCEDURE — 91300 PFIZER SARS-COV-2 VACCINE: CPT | Performed by: INTERNAL MEDICINE

## 2021-03-10 PROCEDURE — 0002A PFIZER SARS-COV-2 VACCINE: CPT | Performed by: INTERNAL MEDICINE

## 2021-03-12 PROCEDURE — 95810 POLYSOM 6/> YRS 4/> PARAM: CPT | Performed by: INTERNAL MEDICINE

## 2021-03-12 NOTE — PROCEDURES
Comments:  The patient underwent a diagnostic polysomnogram using the standard montage for measurement of parameters of sleep, respiratory events, movement abnormalities, and heart rate and rhythm.   A microphone was used to monitor snoring.  Interpretation:  Study start time was 08:50:23 PM. Diagnostic recording time was 8h 47.5m with a total sleep time of 7h 32.0m resulting in a sleep efficiency of 85.69%%.   Sleep latency from the start of the study was 08 minutes and the latency from sleep to REM was 75 minutes.  In total,51 arousals were scored for an arousal index of 6.8.  Respiratory:  There were a total of 24 apneas consisting of 24 obstructive apneas, 0 mixed apneas, and 0 central apneas. A total of 50 hypopneas were scored.  The apnea index was 3.19 per hour and the hypopnea index was 6.64 per hour resulting in an overall AHI of 9.82.  AHI during rem was 29.5 and AHI while supine was 0.00.  Oximetry:  There was a mean oxygen saturation of 92.0% with a minimum oxygen saturation of 85.0%. Time spent with oxygen saturations below 89% was 11.8 minutes.  Cardiac:  The highest heart rate seen while awake was 91 BPM while the highest heart rate during sleep was 84 BPM with an average sleeping heart rate of 64 BPM.  Limb Movements:  There were a total of 869 PLMs during sleep, of which 26 were PLMS arousals. This resulted in a PLMS index of 115.4 and a PLMS arousal index of 3.5.    Sleep efficiency was 85%.  Sleep architecture showed reduced stage III sleep.  Sleep latency was normal at 8 minutes.  PLM index of 114/h with normal arousal index.  No arrhythmias noted.    Once asleep occasional snoring was heard associated with obstructive apneas and hypopneas.  Overall AHI was 9.8/h.  There were mild desaturations noted to a zachery of 85% for 16% of the night.      Split-night criteria was not met.    Interpretation:  Mild obstructive sleep apnea, AHI: 9.8/h associated with mild  desaturations.    Recommendations:  Treatment options for mild LORENA include CPAP, oral appliance, possiblely UPPP.

## 2021-04-13 ENCOUNTER — ANTICOAGULATION VISIT (OUTPATIENT)
Dept: VASCULAR LAB | Facility: MEDICAL CENTER | Age: 81
End: 2021-04-13
Attending: INTERNAL MEDICINE
Payer: MEDICARE

## 2021-04-13 DIAGNOSIS — Z95.3 STATUS POST TRANSCATHETER AORTIC VALVE REPLACEMENT (TAVR) USING BIOPROSTHESIS: ICD-10-CM

## 2021-04-13 LAB — INR PPP: 2.7 (ref 2–3.5)

## 2021-04-13 PROCEDURE — 85610 PROTHROMBIN TIME: CPT

## 2021-04-13 PROCEDURE — 99211 OFF/OP EST MAY X REQ PHY/QHP: CPT

## 2021-04-13 NOTE — PROGRESS NOTES
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 Referring Cardiology [phone]    [facility] Anticoagulation Services Responsible  [phone]        Anticoagulation Patient Findings      HPI:     Duane Parkinson is in the Anticoagulation Clinic today for a INR check on their anticoagulation therapy.     The reason for today's visit is to prevent morbidity and mortality from a blood clot and/or stroke and to reduce the risk of bleeding while on a anticoagulant.     PCP:  Dyllan Robles M.D.  [address]  [facility] [id number] [phone]    3 vitals included with today's appt-unless patient declined:  (BP, HR, weight, ht, RR)   There were no vitals filed for this visit.    Assessment:     • INR  therapeutic.   • Confirmed warfarin dosing regimen: Yes  • Interval Changes with foods rich in vitamin K: No  • Interval Changes in ETOH or cranberries:   No  • Interval Changes in smoking status:  No  • S/S of bleeding or bruising:  No  • Signs/symptoms  thrombosis since the last appt:  No  • Any upcoming procedures that require stopping warfarin and/or using bridge therapy: None  • Interval Changes in medication:  No   • Is pt on antiplatelet therapy:  yes for a valve replacement.  • Is pt on NSAID: None      Current Outpatient Medications:   •  warfarin, Take one-half to one tablet by mouth one time daily or as directed by coumadin clinic  •  digoxin, 250 mcg, Oral, QHS  •  Vitamin C, Take  by mouth.  •  pravastatin, TAKE ONE TABLET BY MOUTH EVERY DAY  •  metoprolol SR, TAKE ONE TABLET BY MOUTH EVERY DAY  •  Coenzyme Q10 (COQ10 PO), Take 300 mg by mouth.  •  fish oil, 2,000 mg, Oral, QDAY with Breakfast  •  lysine, 500 mg, Oral, DAILY  •  aspirin, 81 mg, Oral, DAILY  •  acetaminophen, 500-1,000 mg, Oral, Q6HRS PRN  •  gabapentin, 300 mg, Oral, DAILY  •  B Complex Vitamins (VITAMIN B COMPLEX PO), 1 tablet, Oral, Q EVENING  •  Psyllium (METAMUCIL PO), Take  by mouth every morning.  •  Vitamin D3, 1 tablet, Oral, Q EVENING  •  multivitamin, 1 tablet, Oral, QHS      Plan:     • Continue the same warfarin dose, as noted above.       Follow-up:     • Our protocol suggests we test in 4 weeks.    • This decision was made using shared decision making with the pt and benefits vs risks were discussed to reduce Covid exposure.        Additional information discussed with patient:     • Pt educated to contact our clinic with any changes in medications or s/s of bleeding or thrombosis.  • Education was provided today regarding tips to reduce their bleed risk and dietary constraints while on a anticoagulant.    National recommendations regarding anticoagulation therapy:     The CHEST guidelines recommends frequent INR monitoring at regular intervals (a few days up to a max of 12 weeks) to ensure patients are on the proper dose of warfarin, and patients are not having any complications from therapy.  INRs can dramatically change over a short time period due to diet, medications, and medical conditions.       Sean Guzman, PharmD, MS, BCACP, C  [facility]  Phone [phone] fax [phone]    This note was created using voice recognition  software (Dragon). The accuracy of the dictation is limited by the abilities of the software. I have reviewed the note prior to signing, however some errors in grammar and context are still possible. If you have any questions related to this note please do not hesitate to contact our office.

## 2021-04-20 DIAGNOSIS — Z79.01 CHRONIC ANTICOAGULATION: ICD-10-CM

## 2021-04-20 RX ORDER — WARFARIN SODIUM 4 MG/1
TABLET ORAL
Qty: 90 TABLET | Refills: 1 | Status: SHIPPED | OUTPATIENT
Start: 2021-04-20 | End: 2021-11-22 | Stop reason: SDUPTHER

## 2021-05-11 ENCOUNTER — ANTICOAGULATION VISIT (OUTPATIENT)
Dept: VASCULAR LAB | Facility: MEDICAL CENTER | Age: 81
End: 2021-05-11
Attending: INTERNAL MEDICINE
Payer: MEDICARE

## 2021-05-11 VITALS — DIASTOLIC BLOOD PRESSURE: 77 MMHG | OXYGEN SATURATION: 99 % | SYSTOLIC BLOOD PRESSURE: 120 MMHG | HEART RATE: 66 BPM

## 2021-05-11 DIAGNOSIS — Z95.3 STATUS POST TRANSCATHETER AORTIC VALVE REPLACEMENT (TAVR) USING BIOPROSTHESIS: ICD-10-CM

## 2021-05-11 LAB
INR BLD: 2.4 (ref 0.9–1.2)
INR PPP: 2.4 (ref 2–3.5)

## 2021-05-11 PROCEDURE — 99211 OFF/OP EST MAY X REQ PHY/QHP: CPT

## 2021-05-11 PROCEDURE — 85610 PROTHROMBIN TIME: CPT

## 2021-05-11 NOTE — PROGRESS NOTES
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 Referring Cardiology [phone]    [facility] Anticoagulation Services Responsible  [phone]        Anticoagulation Patient Findings      HPI:  Duane Parkinson seen in clinic today, on anticoagulation therapy with warfarin for AFib.   Pt on antiplatelet therapy Aspirin 81mg for TAVR.  Changes to current medical/health status since last appt: none  Denies signs/symptoms of bleeding and/or thrombosis since the last appt.    Denies any interval changes to diet  Denies any interval changes to medications since last appt.   Denies any complications or cost restrictions with current therapy.   BP recorded in vitals.  Confirmed dosing regimen.     A/P   INR  therapeutic.   Pt is to continue with current warfarin dosing regimen.     Follow up appointment in 6 week(s).    Sin Huitron, AbhishekD

## 2021-06-17 ENCOUNTER — OFFICE VISIT (OUTPATIENT)
Dept: MEDICAL GROUP | Facility: PHYSICIAN GROUP | Age: 81
End: 2021-06-17
Payer: MEDICARE

## 2021-06-17 VITALS
HEIGHT: 72 IN | OXYGEN SATURATION: 98 % | WEIGHT: 220 LBS | SYSTOLIC BLOOD PRESSURE: 130 MMHG | BODY MASS INDEX: 29.8 KG/M2 | RESPIRATION RATE: 16 BRPM | HEART RATE: 89 BPM | TEMPERATURE: 97.2 F | DIASTOLIC BLOOD PRESSURE: 79 MMHG

## 2021-06-17 DIAGNOSIS — G62.9 NEUROPATHY: ICD-10-CM

## 2021-06-17 DIAGNOSIS — M51.37 DEGENERATION OF LUMBAR OR LUMBOSACRAL INTERVERTEBRAL DISC: ICD-10-CM

## 2021-06-17 PROCEDURE — 99214 OFFICE O/P EST MOD 30 MIN: CPT | Performed by: FAMILY MEDICINE

## 2021-06-17 RX ORDER — CYCLOBENZAPRINE HCL 10 MG
10 TABLET ORAL 3 TIMES DAILY PRN
Qty: 30 TABLET | Refills: 0 | Status: SHIPPED
Start: 2021-06-17 | End: 2021-09-07

## 2021-06-17 ASSESSMENT — PATIENT HEALTH QUESTIONNAIRE - PHQ9
SUM OF ALL RESPONSES TO PHQ9 QUESTIONS 1 AND 2: 0
1. LITTLE INTEREST OR PLEASURE IN DOING THINGS: NOT AT ALL
2. FEELING DOWN, DEPRESSED, IRRITABLE, OR HOPELESS: NOT AT ALL

## 2021-06-17 ASSESSMENT — FIBROSIS 4 INDEX: FIB4 SCORE: 2.06

## 2021-06-17 NOTE — ASSESSMENT & PLAN NOTE
Chronic issue  C/o lumbar back pain today  He does have a hx of trauma to his back  He sees a chiropractor

## 2021-06-17 NOTE — ASSESSMENT & PLAN NOTE
Chronic issue  The patient was put on gabapentin in the past which helps. He is taking 2700mg daily of this.

## 2021-06-17 NOTE — PROGRESS NOTES
Pt calling asking to speak to a nurse. Says she was dx with covid. She was feeling better but now has started to get worse again. Fevers. Wants to know what she should do?  Is this normal? Subjective:     Chief Complaint   Patient presents with   • Follow-Up     Neuropathy       HPI:   Duane presents today to discuss the following.    Lumbar Disc Degeneration  Chronic issue  C/o lumbar back pain today  He does have a hx of trauma to his back  He sees a chiropractor     Neuropathy (HCC)  Chronic issue  The patient was put on gabapentin in the past which helps. He is taking 2700mg daily of this.        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)    • Clotting disorder (HCC)     reports nose bleeds   • Depression     Lost wife ,still gets tearful   • Diabetes (HCC)     diet controlled   • Encounter for long-term (current) use of other medications    • Gasping for breath    • Urdu measles    • Heart murmur    • Heart valve disease    • Hyperlipidemia    • Hypertension    • Insomnia    • Mumps    • Nasal drainage    • Pain     back & thumb   • Pyogenic arthritis, lower leg (HCC)     thumb, back   • Restless leg syndrome    • Sleep apnea     uses CPAP   • Snoring    • Tonsillitis    • Valvular heart disease        Current Outpatient Medications Ordered in Epic   Medication Sig Dispense Refill   • cyclobenzaprine (FLEXERIL) 10 mg Tab Take 1 tablet by mouth 3 times a day as needed. 30 tablet 0   • warfarin (COUMADIN) 4 MG Tab TAKE ONE-HALF TO ONE TABLET BY MOUTH ONCE DAILY OR AS DIRECTED BY COUMADIN CLINIC 90 tablet 1   • digoxin (LANOXIN) 250 MCG Tab Take 1 Tab by mouth every bedtime. 90 Tab 3   • Ascorbic Acid (VITAMIN C) 1000 MG Tab Take  by mouth.     • pravastatin (PRAVACHOL) 80 MG tablet TAKE ONE TABLET BY MOUTH EVERY DAY 90 Tab 3   • metoprolol SR (TOPROL XL) 50 MG TABLET SR 24 HR TAKE ONE TABLET BY MOUTH EVERY DAY 90 Tab 3   • Coenzyme Q10 (COQ10 PO) Take 300 mg by mouth.     • Omega-3 Fatty Acids (FISH OIL) 1000 MG CAPSULE DELAYED RELEASE EC softgel capsule Take 2,000 mg  by mouth every morning with breakfast.     • lysine 500 MG Tab Take 500 mg by mouth every day.     • aspirin (ASA) 81 MG Chew Tab chewable tablet Take 1 Tab by mouth every day. 90 Tab 3   • acetaminophen (TYLENOL) 500 MG Tab Take 500-1,000 mg by mouth every 6 hours as needed.     • B Complex Vitamins (VITAMIN B COMPLEX PO) Take 1 Tab by mouth every evening.     • Psyllium (METAMUCIL PO) Take  by mouth every morning.     • Cholecalciferol (VITAMIN D3) 2000 UNITS Tab Take 1 Tab by mouth every evening.     • multivitamin (THERAGRAN) TABS Take 1 Tab by mouth every bedtime.     • gabapentin (NEURONTIN) 300 MG Cap Take 300 mg by mouth every day. 1 in the morning, 1 at noon, 2 at 4 pm, 3 at bedtime  (Patient not taking: Reported on 6/17/2021)       No current [facility]-ordered facility-administered medications on file.       Allergies:  Percodan [oxycodone-aspirin]    Health Maintenance: Completed    ROS:  Gen: no fevers/chills, no changes in weight  Eyes: no changes in vision  Pulm: no sob, no cough  CV: no chest pain, no palpitations  GI: no nausea/vomiting, no diarrhea      Objective:     Exam:  /79 (BP Location: Left arm, Patient Position: Sitting, BP Cuff Size: Adult)   Pulse 89   Temp 36.2 °C (97.2 °F) (Temporal)   Resp 16   Ht 1.829 m (6')   Wt 99.8 kg (220 lb)   SpO2 98%   BMI 29.84 kg/m²  Body mass index is 29.84 kg/m².        Constitutional: Alert, no distress, well-groomed.  Skin: Warm, dry, good turgor, no rashes in visible areas.  Eye: Equal, round and reactive, conjunctiva clear, lids normal.  ENMT: Lips without lesions, good dentition, moist mucous membranes.  Neck: Trachea midline, no masses, no thyromegaly.  Respiratory: Unlabored respiratory effort, no cough.  MSK: Normal gait, moves all extremities.  Neuro: Grossly non-focal.   Psych: Alert and oriented x3, normal affect and mood.        Assessment & Plan:     81 y.o. male with the following -     1. Lumbar Disc Degeneration  Chronic, worsening  condition.  The patient is having a flare of lumbar back pain.  I do recommend that he visits his chiropractor which does wonders for him.  I will complement his therapy with a trial of Flexeril which he may take at night to help him sleep better as well.  - cyclobenzaprine (FLEXERIL) 10 mg Tab; Take 1 tablet by mouth 3 times a day as needed.  Dispense: 30 tablet; Refill: 0    2. Neuropathy  Chronic, worsening condition.  Continues to experience neuropathy.  He is taking gabapentin.  I recommend he goes up to 3600 mg daily as needed.      Return in about 3 months (around 9/17/2021).    Please note that this dictation was created using voice recognition software. I have made every reasonable attempt to correct obvious errors, but I expect that there are errors of grammar and possibly content that I did not discover before finalizing the note.

## 2021-06-22 ENCOUNTER — ANTICOAGULATION VISIT (OUTPATIENT)
Dept: VASCULAR LAB | Facility: MEDICAL CENTER | Age: 81
End: 2021-06-22
Attending: INTERNAL MEDICINE
Payer: MEDICARE

## 2021-06-22 DIAGNOSIS — I48.91 ATRIAL FIBRILLATION, UNSPECIFIED TYPE (HCC): ICD-10-CM

## 2021-06-22 DIAGNOSIS — Z95.3 STATUS POST TRANSCATHETER AORTIC VALVE REPLACEMENT (TAVR) USING BIOPROSTHESIS: ICD-10-CM

## 2021-06-22 LAB — INR PPP: 2.2 (ref 2–3.5)

## 2021-06-22 PROCEDURE — 99211 OFF/OP EST MAY X REQ PHY/QHP: CPT

## 2021-06-22 PROCEDURE — 85610 PROTHROMBIN TIME: CPT

## 2021-06-22 NOTE — PROGRESS NOTES
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 Referring Cardiology [phone]    [facility] Anticoagulation Services Responsible  [phone]        Anticoagulation Patient Findings      HPI:  Duane Parkinson seen in clinic today, on anticoagulation therapy with warfarin for AF and TAVR.  Changes to current medical/health status since last appt: none  Denies signs/symptoms of bleeding and/or thrombosis since the last appt.    Denies any interval changes to diet  Denies any interval changes to medications since last appt.   Denies any complications or cost restrictions with current therapy.   BP declined today.  Confirmed current dosing regimen.     Patient is on antiplatelet therapy with ASA 81mg QD and should be reviewed again at next cards visit.       A/P   INR is therapeutic today at 2.2.  Patient instructed to continue with the current warfarin dosing regimen, and asked to follow up again in 7 weeks.       Next appt: Tuesday, Aug 10, 2021  7:30am    Sampson WeissD

## 2021-07-01 DIAGNOSIS — I10 ESSENTIAL HYPERTENSION: ICD-10-CM

## 2021-07-02 RX ORDER — METOPROLOL SUCCINATE 50 MG/1
TABLET, EXTENDED RELEASE ORAL
Qty: 90 TABLET | Refills: 3 | Status: SHIPPED | OUTPATIENT
Start: 2021-07-02 | End: 2021-09-10 | Stop reason: SDUPTHER

## 2021-07-06 ENCOUNTER — APPOINTMENT (RX ONLY)
Dept: URBAN - METROPOLITAN AREA CLINIC 4 | Facility: CLINIC | Age: 81
Setting detail: DERMATOLOGY
End: 2021-07-06

## 2021-07-06 DIAGNOSIS — D18.0 HEMANGIOMA: ICD-10-CM

## 2021-07-06 DIAGNOSIS — L57.0 ACTINIC KERATOSIS: ICD-10-CM

## 2021-07-06 DIAGNOSIS — L82.1 OTHER SEBORRHEIC KERATOSIS: ICD-10-CM

## 2021-07-06 DIAGNOSIS — L21.8 OTHER SEBORRHEIC DERMATITIS: ICD-10-CM

## 2021-07-06 DIAGNOSIS — L81.4 OTHER MELANIN HYPERPIGMENTATION: ICD-10-CM

## 2021-07-06 DIAGNOSIS — Z71.89 OTHER SPECIFIED COUNSELING: ICD-10-CM

## 2021-07-06 DIAGNOSIS — D22 MELANOCYTIC NEVI: ICD-10-CM

## 2021-07-06 PROBLEM — D18.01 HEMANGIOMA OF SKIN AND SUBCUTANEOUS TISSUE: Status: ACTIVE | Noted: 2021-07-06

## 2021-07-06 PROBLEM — D22.9 MELANOCYTIC NEVI, UNSPECIFIED: Status: ACTIVE | Noted: 2021-07-06

## 2021-07-06 PROBLEM — D48.5 NEOPLASM OF UNCERTAIN BEHAVIOR OF SKIN: Status: ACTIVE | Noted: 2021-07-06

## 2021-07-06 PROCEDURE — 99213 OFFICE O/P EST LOW 20 MIN: CPT | Mod: 25

## 2021-07-06 PROCEDURE — ? LIQUID NITROGEN

## 2021-07-06 PROCEDURE — ? SUNSCREEN RECOMMENDATIONS

## 2021-07-06 PROCEDURE — ? COUNSELING

## 2021-07-06 PROCEDURE — ? BIOPSY BY SHAVE METHOD AND DESTRUCTION

## 2021-07-06 PROCEDURE — 17000 DESTRUCT PREMALG LESION: CPT | Mod: 59

## 2021-07-06 PROCEDURE — 17003 DESTRUCT PREMALG LES 2-14: CPT

## 2021-07-06 PROCEDURE — 11102 TANGNTL BX SKIN SINGLE LES: CPT

## 2021-07-06 ASSESSMENT — LOCATION SIMPLE DESCRIPTION DERM
LOCATION SIMPLE: LEFT TEMPLE
LOCATION SIMPLE: RIGHT SCALP
LOCATION SIMPLE: RIGHT FOREARM
LOCATION SIMPLE: LEFT HAND
LOCATION SIMPLE: LEFT SCALP
LOCATION SIMPLE: RIGHT CHEEK
LOCATION SIMPLE: SCALP
LOCATION SIMPLE: LEFT CHEEK

## 2021-07-06 ASSESSMENT — LOCATION DETAILED DESCRIPTION DERM
LOCATION DETAILED: LEFT SUPERIOR CENTRAL MALAR CHEEK
LOCATION DETAILED: RIGHT DISTAL DORSAL FOREARM
LOCATION DETAILED: 2ND WEB SPACE LEFT HAND
LOCATION DETAILED: LEFT INFERIOR POSTAURICULAR SKIN
LOCATION DETAILED: RIGHT SUPERIOR CENTRAL MALAR CHEEK
LOCATION DETAILED: LEFT CENTRAL TEMPLE
LOCATION DETAILED: RIGHT CENTRAL FRONTAL SCALP
LOCATION DETAILED: LEFT CENTRAL FRONTAL SCALP

## 2021-07-06 ASSESSMENT — LOCATION ZONE DERM
LOCATION ZONE: ARM
LOCATION ZONE: FACE
LOCATION ZONE: SCALP
LOCATION ZONE: HAND

## 2021-07-06 NOTE — PROCEDURE: LIQUID NITROGEN
Render Post-Care Instructions In Note?: no
Consent: The patient's consent was obtained including but not limited to risks of crusting, scabbing, blistering, scarring, darker or lighter pigmentary change, recurrence, incomplete removal and infection.
Detail Level: Simple
Number Of Freeze-Thaw Cycles: 2 freeze-thaw cycles
Post-Care Instructions: I reviewed with the patient in detail post-care instructions. Patient is to wear sunprotection, and avoid picking at any of the treated lesions. Pt may apply Vaseline to crusted or scabbing areas.
Duration Of Freeze Thaw-Cycle (Seconds): 2

## 2021-07-08 DIAGNOSIS — E78.2 MIXED HYPERLIPIDEMIA: ICD-10-CM

## 2021-07-08 RX ORDER — PRAVASTATIN SODIUM 80 MG/1
TABLET ORAL
Qty: 90 TABLET | Refills: 3 | Status: SHIPPED | OUTPATIENT
Start: 2021-07-08 | End: 2021-09-10 | Stop reason: SDUPTHER

## 2021-08-10 ENCOUNTER — ANTICOAGULATION VISIT (OUTPATIENT)
Dept: VASCULAR LAB | Facility: MEDICAL CENTER | Age: 81
End: 2021-08-10
Attending: INTERNAL MEDICINE
Payer: MEDICARE

## 2021-08-10 VITALS — HEART RATE: 64 BPM | DIASTOLIC BLOOD PRESSURE: 71 MMHG | SYSTOLIC BLOOD PRESSURE: 107 MMHG

## 2021-08-10 DIAGNOSIS — Z95.3 STATUS POST TRANSCATHETER AORTIC VALVE REPLACEMENT (TAVR) USING BIOPROSTHESIS: ICD-10-CM

## 2021-08-10 LAB — INR PPP: 3.3 (ref 2–3.5)

## 2021-08-10 PROCEDURE — 85610 PROTHROMBIN TIME: CPT

## 2021-08-10 PROCEDURE — 99212 OFFICE O/P EST SF 10 MIN: CPT

## 2021-08-10 NOTE — PROGRESS NOTES
Anticoagulation Summary  As of 8/10/2021    INR goal:  2.0-3.0   TTR:  64.3 % (4.8 y)   INR used for dosing:  3.30 (8/10/2021)   Warfarin maintenance plan:  2 mg (4 mg x 0.5) every Tue, Thu, Sat; 4 mg (4 mg x 1) all other days   Weekly warfarin total:  22 mg   Plan last modified:  Con Yanez, PharmD (8/10/2021)   Next INR check:  8/24/2021   Target end date:  Indefinite    Indications    Atrial fibrillation (HCC) [I48.91]  Status post transcatheter aortic valve replacement (TAVR) using bioprosthesis [Z95.3]             Anticoagulation Episode Summary     INR check location:      Preferred lab:      Send INR reminders to:      Comments:  Pt goes by Kinsey      Anticoagulation Care Providers     Provider Role Specialty Phone number    Vickey Rutherford M.D. Referring Cardiology [phone]    [facility] Anticoagulation [facility] Responsible  [phone]                Refer to Patient Findings for HPI:  Patient Findings     Positives:  Change in diet/appetite (Eating only one meal a day, down from usual three)    Negatives:  Signs/symptoms of thrombosis, Signs/symptoms of bleeding, Laboratory test error suspected, Change in health, Change in alcohol use, Change in activity, Upcoming invasive procedure, Emergency department visit, Upcoming dental procedure, Missed doses, Extra doses, Change in medications, Hospital admission, Bruising, Other complaints            Vitals:    08/10/21 0739   BP: 107/71   Pulse: 64         Verified current warfarin dosing schedule.    Medications reconciled yes  Pt is on antiplatelet therapy Aspirin 81mg and should be reviewed again next cards visit.      A/P   INR  supra-therapeutic.     Warfarin dosing recommendation: Hold warfarin today then reduce weekly dose by ~8%.    Pt educated to contact our clinic with any changes in medications or s/s of bleeding or thrombosis. Pt is aware to seek immediate medical attention for falls, head injury or deep cuts.    Follow up appointment in  2 week(s).    Con Ynaez, PharmD   Seen with Abhishek DensonD

## 2021-08-11 LAB — INR BLD: 3.3 (ref 0.9–1.2)

## 2021-08-18 ENCOUNTER — APPOINTMENT (RX ONLY)
Dept: URBAN - METROPOLITAN AREA CLINIC 4 | Facility: CLINIC | Age: 81
Setting detail: DERMATOLOGY
End: 2021-08-18

## 2021-08-18 DIAGNOSIS — B07.8 OTHER VIRAL WARTS: ICD-10-CM

## 2021-08-18 DIAGNOSIS — L82.0 INFLAMED SEBORRHEIC KERATOSIS: ICD-10-CM

## 2021-08-18 DIAGNOSIS — D485 NEOPLASM OF UNCERTAIN BEHAVIOR OF SKIN: ICD-10-CM

## 2021-08-18 PROBLEM — D48.5 NEOPLASM OF UNCERTAIN BEHAVIOR OF SKIN: Status: ACTIVE | Noted: 2021-08-18

## 2021-08-18 PROCEDURE — ? LIQUID NITROGEN (COSMETIC)

## 2021-08-18 PROCEDURE — ? ADDITIONAL NOTES

## 2021-08-18 PROCEDURE — ? BIOPSY BY SHAVE METHOD

## 2021-08-18 PROCEDURE — ? COUNSELING

## 2021-08-18 PROCEDURE — 99212 OFFICE O/P EST SF 10 MIN: CPT | Mod: 25

## 2021-08-18 PROCEDURE — 11102 TANGNTL BX SKIN SINGLE LES: CPT

## 2021-08-18 ASSESSMENT — LOCATION SIMPLE DESCRIPTION DERM
LOCATION SIMPLE: LEFT SCALP
LOCATION SIMPLE: SCALP
LOCATION SIMPLE: RIGHT CHEEK

## 2021-08-18 ASSESSMENT — LOCATION ZONE DERM
LOCATION ZONE: FACE
LOCATION ZONE: SCALP

## 2021-08-18 ASSESSMENT — LOCATION DETAILED DESCRIPTION DERM
LOCATION DETAILED: RIGHT MEDIAL MALAR CHEEK
LOCATION DETAILED: LEFT CENTRAL PARIETAL SCALP
LOCATION DETAILED: LEFT MEDIAL FRONTAL SCALP

## 2021-08-24 ENCOUNTER — ANTICOAGULATION VISIT (OUTPATIENT)
Dept: VASCULAR LAB | Facility: MEDICAL CENTER | Age: 81
End: 2021-08-24
Attending: INTERNAL MEDICINE
Payer: MEDICARE

## 2021-08-24 VITALS — HEART RATE: 54 BPM | SYSTOLIC BLOOD PRESSURE: 120 MMHG | DIASTOLIC BLOOD PRESSURE: 65 MMHG

## 2021-08-24 DIAGNOSIS — Z95.3 STATUS POST TRANSCATHETER AORTIC VALVE REPLACEMENT (TAVR) USING BIOPROSTHESIS: ICD-10-CM

## 2021-08-24 LAB
INR BLD: 2.6 (ref 0.9–1.2)
INR PPP: 2.6 (ref 2–3.5)

## 2021-08-24 PROCEDURE — 85610 PROTHROMBIN TIME: CPT

## 2021-08-24 PROCEDURE — 99211 OFF/OP EST MAY X REQ PHY/QHP: CPT

## 2021-08-24 NOTE — PROGRESS NOTES
Anticoagulation Summary  As of 2021    INR goal:  2.0-3.0   TTR:  64.3 % (4.8 y)   INR used for dosin.60 (2021)   Warfarin maintenance plan:  2 mg (4 mg x 0.5) every Tue, Thu, Sat; 4 mg (4 mg x 1) all other days   Weekly warfarin total:  22 mg   Plan last modified:  Con Yanez, PharmD (8/10/2021)   Next INR check:  2021   Target end date:  Indefinite    Indications    Atrial fibrillation (HCC) [I48.91]  Status post transcatheter aortic valve replacement (TAVR) using bioprosthesis [Z95.3]             Anticoagulation Episode Summary     INR check location:      Preferred lab:      Send INR reminders to:      Comments:  Pt goes by Kinsey      Anticoagulation Care Providers     Provider Role Specialty Phone number    Vickey Rutherford M.D. Referring Cardiology [phone]    [facility] Responsible  [phone]                    Refer to Patient Findings for HPI:  Patient Findings     Positives:  Change in medications    Negatives:  Signs/symptoms of thrombosis, Signs/symptoms of bleeding, Laboratory test error suspected, Change in health, Change in alcohol use, Change in activity, Upcoming invasive procedure, Emergency department visit, Upcoming dental procedure, Missed doses, Extra doses, Change in diet/appetite, Hospital admission, Bruising, Other complaints    Comments:  Pt states he stopped taking clindamycin.          Vitals:    21 0733   BP: 120/65   BP Location: Left arm   Patient Position: Sitting   BP Cuff Size: Adult   Pulse: (!) 54       Verified current warfarin dosing schedule.      Medications reconciled   Pt is on ASA 81 mg once daily as antiplatelet therapy for hx of TAVR on 19.    A/P   INR  therapeutic.     Warfarin dosing recommendation: Instructed pt to continue on with current regimen.    Pt educated to contact our clinic with any changes in medications or s/s of bleeding or thrombosis. Pt is aware to seek immediate medical attention for  falls, head injury or deep cuts.    Follow up appointment in 2 week(s).    Clifford Anand PharmD

## 2021-09-07 ENCOUNTER — ANTICOAGULATION VISIT (OUTPATIENT)
Dept: VASCULAR LAB | Facility: MEDICAL CENTER | Age: 81
End: 2021-09-07
Attending: INTERNAL MEDICINE
Payer: MEDICARE

## 2021-09-07 DIAGNOSIS — Z95.3 STATUS POST TRANSCATHETER AORTIC VALVE REPLACEMENT (TAVR) USING BIOPROSTHESIS: ICD-10-CM

## 2021-09-07 LAB
INR BLD: 2 (ref 0.9–1.2)
INR PPP: 2 (ref 2–3.5)

## 2021-09-07 PROCEDURE — 99211 OFF/OP EST MAY X REQ PHY/QHP: CPT

## 2021-09-07 PROCEDURE — 85610 PROTHROMBIN TIME: CPT

## 2021-09-07 RX ORDER — DIGOXIN 250 MCG
250 TABLET ORAL
Qty: 90 TABLET | Refills: 0 | Status: SHIPPED | OUTPATIENT
Start: 2021-09-07 | End: 2021-09-10 | Stop reason: SDUPTHER

## 2021-09-07 NOTE — PROGRESS NOTES
Anticoagulation Summary  As of 2021    INR goal:  2.0-3.0   TTR:  64.5 % (4.9 y)   INR used for dosin.00 (2021)   Warfarin maintenance plan:  2 mg (4 mg x 0.5) every Tue, Thu, Sat; 4 mg (4 mg x 1) all other days   Weekly warfarin total:  22 mg   Plan last modified:  Con Yanez PharmD (8/10/2021)   Next INR check:  2021   Target end date:  Indefinite    Indications    Atrial fibrillation (HCC) [I48.91]  Status post transcatheter aortic valve replacement (TAVR) using bioprosthesis [Z95.3]             Anticoagulation Episode Summary     INR check location:      Preferred lab:      Send INR reminders to:      Comments:  Pt goes by Kinsey      Anticoagulation Care Providers     Provider Role Specialty Phone number    Vickey Rutherford M.D. Referring Cardiovascular Disease (Cardiology) [phone]    [facility] Anticoagulation Services Responsible  [phone]            Refer to Patient Findings for HPI:  Patient Findings     Negatives:  Signs/symptoms of thrombosis, Signs/symptoms of bleeding, Laboratory test error suspected, Change in health, Change in alcohol use, Change in activity, Upcoming invasive procedure, Emergency department visit, Upcoming dental procedure, Missed doses, Extra doses, Change in medications, Change in diet/appetite, Hospital admission, Bruising, Other complaints          There were no vitals filed for this visit.  pt declined vitals    Verified current warfarin dosing schedule.    Medications reconciled   Pt is on ASA 81 mg once daily as antiplatelet therapy for hx of TAVR on 19.      A/P   INR  therapeutic.     Warfarin dosing recommendation: Pt is to continue with current warfarin dosing regimen.    Pt educated to contact our clinic with any changes in medications or s/s of bleeding or thrombosis. Pt is aware to seek immediate medical attention for falls, head injury or deep cuts.    Follow up appointment in 3 week(s).    Sun Diaz, PharmD

## 2021-09-08 ENCOUNTER — RX ONLY (OUTPATIENT)
Age: 81
Setting detail: RX ONLY
End: 2021-09-08

## 2021-09-08 RX ORDER — CLINDAMYCIN HYDROCHLORIDE 300 MG/1
ONE CAPSULE ORAL
Qty: 6 | Refills: 0 | Status: ERX | COMMUNITY
Start: 2021-09-07

## 2021-09-10 ENCOUNTER — HOSPITAL ENCOUNTER (OUTPATIENT)
Dept: LAB | Facility: MEDICAL CENTER | Age: 81
End: 2021-09-10
Attending: NURSE PRACTITIONER
Payer: MEDICARE

## 2021-09-10 ENCOUNTER — OFFICE VISIT (OUTPATIENT)
Dept: CARDIOLOGY | Facility: MEDICAL CENTER | Age: 81
End: 2021-09-10
Payer: MEDICARE

## 2021-09-10 VITALS
HEIGHT: 72 IN | WEIGHT: 226 LBS | DIASTOLIC BLOOD PRESSURE: 76 MMHG | OXYGEN SATURATION: 94 % | HEART RATE: 79 BPM | BODY MASS INDEX: 30.61 KG/M2 | SYSTOLIC BLOOD PRESSURE: 102 MMHG

## 2021-09-10 DIAGNOSIS — E78.2 MIXED HYPERLIPIDEMIA: ICD-10-CM

## 2021-09-10 DIAGNOSIS — I48.0 PAROXYSMAL ATRIAL FIBRILLATION (HCC): ICD-10-CM

## 2021-09-10 DIAGNOSIS — Z95.3 STATUS POST TRANSCATHETER AORTIC VALVE REPLACEMENT (TAVR) USING BIOPROSTHESIS: ICD-10-CM

## 2021-09-10 DIAGNOSIS — G47.39 TREATMENT-EMERGENT CENTRAL SLEEP APNEA: ICD-10-CM

## 2021-09-10 DIAGNOSIS — Z95.2 S/P TAVR (TRANSCATHETER AORTIC VALVE REPLACEMENT): ICD-10-CM

## 2021-09-10 DIAGNOSIS — E78.5 DYSLIPIDEMIA: ICD-10-CM

## 2021-09-10 DIAGNOSIS — I10 ESSENTIAL HYPERTENSION: ICD-10-CM

## 2021-09-10 DIAGNOSIS — Z79.899 HIGH RISK MEDICATION USE: ICD-10-CM

## 2021-09-10 DIAGNOSIS — K75.4 AUTOIMMUNE HEPATITIS (HCC): ICD-10-CM

## 2021-09-10 DIAGNOSIS — I48.21 PERMANENT ATRIAL FIBRILLATION (HCC): ICD-10-CM

## 2021-09-10 LAB
DIGOXIN SERPL-MCNC: 0.8 NG/ML (ref 0.8–2)
EST. AVERAGE GLUCOSE BLD GHB EST-MCNC: 111 MG/DL
HBA1C MFR BLD: 5.5 % (ref 4–5.6)

## 2021-09-10 PROCEDURE — 83036 HEMOGLOBIN GLYCOSYLATED A1C: CPT | Mod: GA

## 2021-09-10 PROCEDURE — 36415 COLL VENOUS BLD VENIPUNCTURE: CPT

## 2021-09-10 PROCEDURE — 80162 ASSAY OF DIGOXIN TOTAL: CPT

## 2021-09-10 PROCEDURE — 99214 OFFICE O/P EST MOD 30 MIN: CPT | Performed by: NURSE PRACTITIONER

## 2021-09-10 RX ORDER — PRAVASTATIN SODIUM 80 MG/1
TABLET ORAL
Qty: 90 TABLET | Refills: 3 | Status: SHIPPED | OUTPATIENT
Start: 2021-09-10 | End: 2022-03-07 | Stop reason: SDUPTHER

## 2021-09-10 RX ORDER — DIGOXIN 250 MCG
250 TABLET ORAL
Qty: 90 TABLET | Refills: 0 | Status: SHIPPED | OUTPATIENT
Start: 2021-09-10 | End: 2022-02-25

## 2021-09-10 RX ORDER — METOPROLOL SUCCINATE 50 MG/1
50 TABLET, EXTENDED RELEASE ORAL
Qty: 90 TABLET | Refills: 3 | Status: SHIPPED | OUTPATIENT
Start: 2021-09-10 | End: 2022-03-07 | Stop reason: SDUPTHER

## 2021-09-10 RX ORDER — ICOSAPENT ETHYL 500 MG/1
CAPSULE ORAL
Qty: 240 CAPSULE | Status: CANCELLED | OUTPATIENT
Start: 2021-09-10

## 2021-09-10 RX ORDER — ASPIRIN 81 MG/1
81 TABLET, CHEWABLE ORAL DAILY
Qty: 90 TABLET | Refills: 3 | Status: SHIPPED | OUTPATIENT
Start: 2021-09-10 | End: 2023-02-23 | Stop reason: SDUPTHER

## 2021-09-10 ASSESSMENT — FIBROSIS 4 INDEX: FIB4 SCORE: 2.06

## 2021-09-10 NOTE — PROGRESS NOTES
Chief Complaint   Patient presents with   • HTN (Controlled)   • Atrial Fibrillation   • Dyslipidemia       Subjective     Kinsey Parkinson is a 81 y.o. male who presents today aortic stenosis s/p TAVR, proximal A. fib, hypertension, hyperlipidemia follow-up.  Patient was last seen by Dr. Rutherford on 2/24/2021.  Since patient was last seen he is also been followed by pulmonary for sleep study and was found to have mild sleep apnea    In addition to above patient has chronic back pain with neuropathy, diet-controlled diabetes.    Today, Patient feels well, denies chest pain, shortness of breath, palpitations, dizziness/lightheadedness, orthopnea, PND or Edema. Based on physical examination findings, patient is euvolemic. No JVD, lungs are clear to auscultation, no pitting edema in bilateral lower extremities, no ascites.    We discussed the importance of risk factor optimization for his persistent A. fib.  He will follow-up with sleep medicine and his primary care regarding his LORENA and A1c.  We reviewed his lipid panel per below we will follow up in 6 months.  Patient to continue medications as prescribed.    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)    • Clotting disorder (HCC)     reports nose bleeds   • Depression     Lost wife ,still gets tearful   • Diabetes (HCC)     diet controlled   • Encounter for long-term (current) use of other medications    • Gasping for breath    • Serbian measles    • Heart murmur    • Heart valve disease    • Hyperlipidemia    • Hypertension    • Insomnia    • Mumps    • Nasal drainage    • Pain     back & thumb   • Pyogenic arthritis, lower leg (HCC)     thumb, back   • Restless leg syndrome    • Sleep apnea     uses CPAP   • Snoring    • Tonsillitis    • Valvular heart disease      Past Surgical History:   Procedure Laterality Date   • GIBRAN N/A 7/29/2019     Procedure: ECHOCARDIOGRAM, TRANSESOPHAGEAL;  Surgeon: Leander Buckner M.D.;  Location: SURGERY [facility];  Service: Cardiac   • TRANSCATHETER AORTIC VALVE REPLACEMENT Bilateral 2019    Procedure: REPLACEMENT, AORTIC VALVE, TRANSCATHETER;  Surgeon: Leander Buckner M.D.;  Location: SURGERY [facility];  Service: Cardiac   • FINGER ARTHROPLASTY Left 2016    Procedure: FINGER ARTHROPLASTY THUMB CARPOMETACARPAL EXCISIONAL, EXCISE TRAPEZIUM;  Surgeon: Raheel Salgado M.D.;  Location: SURGERY SAME DAY [facility];  Service:    • CARDIOVERSION      on 06, sinus rhythm until 2007,  paroxysmal atrial fibrillation   • KNEE ARTHROPLASTY TOTAL     • LAMINOTOMY N/A     multiple lumbar spine   • OTHER ORTHOPEDIC SURGERY      lower back   • PB PERCUT IMPLNT NEUROELECT,EPIDURAL     • TOE ARTHROPLASTY     • TURP-VAPOR N/A      Family History   Problem Relation Age of Onset   • Other Mother         unknown   • Heart Disease Mother    • Cancer Father         prostate, skin   • No Known Problems Brother    • Diabetes Brother    • Heart Disease Son    • Sleep Apnea Neg Hx      Social History     Socioeconomic History   • Marital status:      Spouse name: Not on file   • Number of children: Not on file   • Years of education: Not on file   • Highest education level: Not on file   Occupational History   • Not on file   Tobacco Use   • Smoking status: Former Smoker     Packs/day: 1.00     Years: 20.00     Pack years: 20.00     Types: Cigarettes     Quit date: 1972     Years since quittin.7   • Smokeless tobacco: Former User   Vaping Use   • Vaping Use: Never used   Substance and Sexual Activity   • Alcohol use: No     Alcohol/week: 0.0 oz   • Drug use: No   • Sexual activity: Not Currently     Partners: Female   Other Topics Concern   • Not on file   Social History Narrative   • Not on file     Social Determinants of Health     Financial Resource Strain:    • Difficulty of  "Paying Living Expenses:    Food Insecurity:    • Worried About Running Out of Food in the Last Year:    • Ran Out of Food in the Last Year:    Transportation Needs:    • Lack of Transportation (Medical):    • Lack of Transportation (Non-Medical):    Physical Activity:    • Days of Exercise per Week:    • Minutes of Exercise per Session:    Stress:    • Feeling of Stress :    Social Connections:    • Frequency of Communication with Friends and Family:    • Frequency of Social Gatherings with Friends and Family:    • Attends [employer] Services:    • Active Member of Clubs or Organizations:    • Attends Club or Organization Meetings:    • Marital Status:    Intimate Partner Violence:    • Fear of Current or Ex-Partner:    • Emotionally Abused:    • Physically Abused:    • Sexually Abused:      Allergies   Allergen Reactions   • Percodan [Oxycodone-Aspirin] Itching and Photosensitivity     \"I sunburn\"     Outpatient Encounter Medications as of 9/10/2021   Medication Sig Dispense Refill   • aspirin (ASA) 81 MG Chew Tab chewable tablet Chew 1 Tablet every day. 90 Tablet 3   • digoxin (LANOXIN) 250 MCG Tab Take 1 Tablet by mouth at bedtime. 90 Tablet 0   • metoprolol SR (TOPROL XL) 50 MG TABLET SR 24 HR Take 1 Tablet by mouth every day. 90 Tablet 3   • pravastatin (PRAVACHOL) 80 MG tablet TAKE 1 TABLET BY MOUTH DAILY 90 Tablet 3   • GLUCOSAMINE HCL PO Take 1 Tablet by mouth 2 times a day.     • warfarin (COUMADIN) 4 MG Tab TAKE ONE-HALF TO ONE TABLET BY MOUTH ONCE DAILY OR AS DIRECTED BY COUMADIN CLINIC 90 tablet 1   • Coenzyme Q10 (COQ10 PO) Take 300 mg by mouth.     • Omega-3 Fatty Acids (FISH OIL) 1000 MG CAPSULE DELAYED RELEASE EC softgel capsule Take 2,000 mg by mouth every morning with breakfast.     • lysine 500 MG Tab Take 500 mg by mouth every day.     • acetaminophen (TYLENOL) 500 MG Tab Take 500-1,000 mg by mouth every 6 hours as needed.     • gabapentin (NEURONTIN) 300 MG Cap Take 300 mg by mouth every day. 1 " in the morning, 1 at noon, 2 at 4 pm, 3 at bedtime      • B Complex Vitamins (VITAMIN B COMPLEX PO) Take 1 Tab by mouth every evening.     • Psyllium (METAMUCIL PO) Take  by mouth every morning.     • Cholecalciferol (VITAMIN D3) 2000 UNITS Tab Take 1 Tab by mouth every evening.     • multivitamin (THERAGRAN) TABS Take 1 Tab by mouth every bedtime.     • [DISCONTINUED] digoxin (LANOXIN) 250 MCG Tab Take 1 Tablet by mouth at bedtime. 90 Tablet 0   • [DISCONTINUED] pravastatin (PRAVACHOL) 80 MG tablet TAKE 1 TABLET BY MOUTH DAILY 90 tablet 3   • [DISCONTINUED] metoprolol SR (TOPROL XL) 50 MG TABLET SR 24 HR TAKE ONE TABLET BY MOUTH EVERY DAY 90 tablet 3   • Ascorbic Acid (VITAMIN C) 1000 MG Tab Take  by mouth.     • [DISCONTINUED] aspirin (ASA) 81 MG Chew Tab chewable tablet Take 1 Tab by mouth every day. 90 Tab 3     No facility-administered encounter medications on file as of 9/10/2021.     Review of Systems   Constitutional: Negative for fever, malaise/fatigue and weight loss.   Eyes: Negative for blurred vision.   Respiratory: Negative for cough and shortness of breath.    Cardiovascular: Negative for chest pain, palpitations, orthopnea, claudication, leg swelling and PND.   Gastrointestinal: Negative for abdominal pain, blood in stool, nausea and vomiting.   Genitourinary: Negative for dysuria, frequency and hematuria.   Musculoskeletal: Negative for falls and myalgias.   Neurological: Negative for dizziness, tingling and loss of consciousness.   Endo/Heme/Allergies: Does not bruise/bleed easily.              Objective     /76 (BP Location: Right arm, Patient Position: Sitting, BP Cuff Size: Adult)   Pulse 79   Ht 1.829 m (6')   Wt 103 kg (226 lb)   SpO2 94%   BMI 30.65 kg/m²     Physical Exam  Vitals reviewed.   Constitutional:       Appearance: He is well-developed.   HENT:      Head: Normocephalic and atraumatic.   Eyes:      Pupils: Pupils are equal, round, and reactive to light.   Neck:       Vascular: No JVD.   Cardiovascular:      Rate and Rhythm: Normal rate. Rhythm irregular.      Heart sounds: Normal heart sounds. No murmur heard.   No friction rub. No gallop.    Pulmonary:      Effort: Pulmonary effort is normal. No respiratory distress.      Breath sounds: Normal breath sounds.   Abdominal:      General: Bowel sounds are normal. There is no distension.      Palpations: Abdomen is soft.   Musculoskeletal:      Right lower leg: No edema.      Left lower leg: No edema.   Skin:     General: Skin is warm and dry.      Findings: No erythema.   Neurological:      General: No focal deficit present.      Mental Status: He is alert and oriented to person, place, and time. Mental status is at baseline.   Psychiatric:         Mood and Affect: Mood normal.         Behavior: Behavior normal.            Lab Results   Component Value Date/Time    CHOLSTRLTOT 115 02/24/2021 12:25 PM    LDL 33 02/24/2021 12:25 PM    HDL 32 (A) 02/24/2021 12:25 PM    TRIGLYCERIDE 248 (H) 02/24/2021 12:25 PM       Lab Results   Component Value Date/Time    SODIUM 138 02/24/2021 12:25 PM    POTASSIUM 4.6 02/24/2021 12:25 PM    CHLORIDE 104 02/24/2021 12:25 PM    CO2 25 02/24/2021 12:25 PM    GLUCOSE 96 02/24/2021 12:25 PM    BUN 15 02/24/2021 12:25 PM    CREATININE 0.73 02/24/2021 12:25 PM    BUNCREATRAT 15 06/05/2018 08:29 AM     Lab Results   Component Value Date/Time    ALKPHOSPHAT 82 02/24/2021 12:25 PM    ASTSGOT 48 (H) 02/24/2021 12:25 PM    ALTSGPT 56 (H) 02/24/2021 12:25 PM    TBILIRUBIN 0.6 02/24/2021 12:25 PM      CONCLUSIONS  Normal LVEF.  Normal functioning TAVR valve.    Assessment & Plan     1. Status post transcatheter aortic valve replacement (TAVR) using bioprosthesis  HEMOGLOBIN A1C (Glycohemoglobin GHB Total/A1C with MBG Estimate)    Lipid Profile    DIGOXIN   2. Treatment-emergent central sleep apnea  HEMOGLOBIN A1C (Glycohemoglobin GHB Total/A1C with MBG Estimate)    Lipid Profile    DIGOXIN   3. Essential  hypertension  HEMOGLOBIN A1C (Glycohemoglobin GHB Total/A1C with MBG Estimate)    Lipid Profile    DIGOXIN    metoprolol SR (TOPROL XL) 50 MG TABLET SR 24 HR   4. Dyslipidemia  HEMOGLOBIN A1C (Glycohemoglobin GHB Total/A1C with MBG Estimate)    Lipid Profile    DIGOXIN   5. Paroxysmal atrial fibrillation (HCC)  HEMOGLOBIN A1C (Glycohemoglobin GHB Total/A1C with MBG Estimate)    Lipid Profile    DIGOXIN   6. Autoimmune hepatitis (HCC)  HEMOGLOBIN A1C (Glycohemoglobin GHB Total/A1C with MBG Estimate)    Lipid Profile    DIGOXIN   7. Mixed hyperlipidemia  DIGOXIN    pravastatin (PRAVACHOL) 80 MG tablet       Medical Decision Making: Today's Assessment/Status/Plan:        Aortic stenosis s/p TAVR  -Device functioning well.  Patient denies symptoms.    Perminent Atrial Fibrillation (PAF)  -Irregular heart rate noted on exam.  - Asymptomatic, rate controlled.   - On OAC with Coumadin, continue.  - Continue metoprolol 50 mg daily  -Continue digoxin 250 mcg daily.  Check digoxin levels before next follow-up appointment.     Hyperlipidemia; hypertriglyceridemia  -LDL at goal  -Continue pravastatin 80 mg daily  -Discussed initial diet and lifestyle changes as well as consult trolling diabetes, thyroid, EtOH.  Consider Vascepa if no improvement.  -Lipid panel in 6 months    Hypertension  -Today in office blood pressure is well controlled  -Encouraged to continue home BP monitoring/log.  -Medication recommendations per above.    LORENA  -Has declined CPAP with sleep medicine  -We discussed importance of treating nocturnal hypoxia due to causing increased risk for cardiac disease; patient verbalizes understanding.  -Per sleep medicine    T2DM  -Check A1c.  Per PCP    High risk medication use; chronic anticoagulation  -This includes digoxin, Coumadin  -Will continue to closely monitor for side effects of patient's high risk medication(s) including liver, renal function and electrolytes  -Close monitoring discussed with patient.  Lab  work ordered.    FU in clinic in 6 months. Sooner if needed.    Patient verbalizes understanding and agrees with the plan of care.     Collaborating MD: Dr. Jairon MD    PLEASE NOTE: This Note was created using voice recognition Software. I have made every reasonable attempt to correct obvious errors, but I expect that there are errors of grammar and possibly content that I did not discover before finalizing the note

## 2021-09-10 NOTE — PATIENT INSTRUCTIONS
Continue medications as prescribed    CHECK BLOOD WORK and talk to Dr. Robles about A1C results    Check lipids in 6 months6

## 2021-09-13 ENCOUNTER — OFFICE VISIT (OUTPATIENT)
Dept: MEDICAL GROUP | Facility: PHYSICIAN GROUP | Age: 81
End: 2021-09-13
Payer: MEDICARE

## 2021-09-13 VITALS
SYSTOLIC BLOOD PRESSURE: 120 MMHG | HEIGHT: 72 IN | TEMPERATURE: 97 F | HEART RATE: 65 BPM | DIASTOLIC BLOOD PRESSURE: 70 MMHG | BODY MASS INDEX: 29.93 KG/M2 | OXYGEN SATURATION: 94 % | WEIGHT: 221 LBS

## 2021-09-13 DIAGNOSIS — I10 ESSENTIAL HYPERTENSION: ICD-10-CM

## 2021-09-13 DIAGNOSIS — M51.37 DEGENERATION OF LUMBAR OR LUMBOSACRAL INTERVERTEBRAL DISC: ICD-10-CM

## 2021-09-13 DIAGNOSIS — Z00.00 MEDICARE ANNUAL WELLNESS VISIT, SUBSEQUENT: Primary | ICD-10-CM

## 2021-09-13 DIAGNOSIS — G47.39 TREATMENT-EMERGENT CENTRAL SLEEP APNEA: ICD-10-CM

## 2021-09-13 DIAGNOSIS — E78.5 DYSLIPIDEMIA: ICD-10-CM

## 2021-09-13 DIAGNOSIS — I48.0 PAROXYSMAL ATRIAL FIBRILLATION (HCC): ICD-10-CM

## 2021-09-13 DIAGNOSIS — Z95.3 STATUS POST TRANSCATHETER AORTIC VALVE REPLACEMENT (TAVR) USING BIOPROSTHESIS: ICD-10-CM

## 2021-09-13 PROBLEM — K75.4 AUTOIMMUNE HEPATITIS (HCC): Status: RESOLVED | Noted: 2018-06-22 | Resolved: 2021-09-13

## 2021-09-13 PROBLEM — Z79.2 PROPHYLACTIC ANTIBIOTIC: Status: RESOLVED | Noted: 2020-08-20 | Resolved: 2021-09-13

## 2021-09-13 PROBLEM — Z79.899 HIGH RISK MEDICATION USE: Status: RESOLVED | Noted: 2018-07-27 | Resolved: 2021-09-13

## 2021-09-13 PROBLEM — F32.A DEPRESSION: Status: RESOLVED | Noted: 2020-03-11 | Resolved: 2021-09-13

## 2021-09-13 PROBLEM — I35.0 NONRHEUMATIC AORTIC VALVE STENOSIS: Status: RESOLVED | Noted: 2021-02-24 | Resolved: 2021-09-13

## 2021-09-13 PROCEDURE — G0439 PPPS, SUBSEQ VISIT: HCPCS | Performed by: FAMILY MEDICINE

## 2021-09-13 ASSESSMENT — PATIENT HEALTH QUESTIONNAIRE - PHQ9: CLINICAL INTERPRETATION OF PHQ2 SCORE: 0

## 2021-09-13 ASSESSMENT — ENCOUNTER SYMPTOMS: GENERAL WELL-BEING: GOOD

## 2021-09-13 ASSESSMENT — FIBROSIS 4 INDEX: FIB4 SCORE: 2.06

## 2021-09-13 ASSESSMENT — ACTIVITIES OF DAILY LIVING (ADL): BATHING_REQUIRES_ASSISTANCE: 0

## 2021-09-13 NOTE — PROGRESS NOTES
Chief Complaint   Patient presents with   • Medicare Annual Wellness       HPI:  Kinsey is a 81 y.o. here for Medicare Annual Wellness Visit        Patient Active Problem List    Diagnosis Date Noted   • Skin cancer 12/17/2020   • Neuropathy 12/22/2017   • Localized primary osteoarthritis of carpometacarpal joint of left thumb 05/27/2016   • Treatment-emergent central sleep apnea 04/21/2016   • Essential hypertension 01/07/2015   • Dyslipidemia 01/07/2015   • IGT (impaired glucose tolerance) 01/07/2015   • Status post transcatheter aortic valve replacement (TAVR) using bioprosthesis 12/30/2011   • Atrial fibrillation (HCC) 12/30/2011   • Lumbar Disc Degeneration 12/30/2011   • Osteoarthrosis involving lower leg 12/30/2011       Current Outpatient Medications   Medication Sig Dispense Refill   • aspirin (ASA) 81 MG Chew Tab chewable tablet Chew 1 Tablet every day. 90 Tablet 3   • digoxin (LANOXIN) 250 MCG Tab Take 1 Tablet by mouth at bedtime. 90 Tablet 0   • metoprolol SR (TOPROL XL) 50 MG TABLET SR 24 HR Take 1 Tablet by mouth every day. 90 Tablet 3   • pravastatin (PRAVACHOL) 80 MG tablet TAKE 1 TABLET BY MOUTH DAILY 90 Tablet 3   • GLUCOSAMINE HCL PO Take 1 Tablet by mouth 2 times a day.     • warfarin (COUMADIN) 4 MG Tab TAKE ONE-HALF TO ONE TABLET BY MOUTH ONCE DAILY OR AS DIRECTED BY COUMADIN CLINIC 90 tablet 1   • Ascorbic Acid (VITAMIN C) 1000 MG Tab Take  by mouth.     • Coenzyme Q10 (COQ10 PO) Take 300 mg by mouth.     • Omega-3 Fatty Acids (FISH OIL) 1000 MG CAPSULE DELAYED RELEASE EC softgel capsule Take 2,000 mg by mouth every morning with breakfast.     • lysine 500 MG Tab Take 500 mg by mouth every day.     • acetaminophen (TYLENOL) 500 MG Tab Take 500-1,000 mg by mouth every 6 hours as needed.     • gabapentin (NEURONTIN) 300 MG Cap Take 300 mg by mouth every day. 1 in the morning, 1 at noon, 2 at 4 pm, 3 at bedtime      • B Complex Vitamins (VITAMIN B COMPLEX PO) Take 1 Tab by mouth every evening.      • Psyllium (METAMUCIL PO) Take  by mouth every morning.     • Cholecalciferol (VITAMIN D3) 2000 UNITS Tab Take 1 Tab by mouth every evening.     • multivitamin (THERAGRAN) TABS Take 1 Tab by mouth every bedtime.       No current facility-administered medications for this visit.        Patient is taking medications as noted in medication list.  Current supplements as per medication list.     Allergies: Percodan [oxycodone-aspirin]    Current social contact/activities:yes daughter and grand kids vis    Is patient current with immunizations? Yes.    He  reports that he quit smoking about 49 years ago. His smoking use included cigarettes. He has a 20.00 pack-year smoking history. He has quit using smokeless tobacco. He reports that he does not drink alcohol and does not use drugs.  Counseling given: Not Answered      DPA/Advanced directive: Patient has Advanced Directive on file.     ROS:    Gait: Uses no assistive device   Ostomy: No   Other tubes: No   Amputations: No   Chronic oxygen use No   Last eye exam coming up in january   Wears hearing aids: Yes   : Denies any urinary leakage during the last 6 months      Screening:    Depression Screening  Little interest or pleasure in doing things?  0 - not at all  Feeling down, depressed, or hopeless? 0 - not at all  Trouble falling or staying asleep, or sleeping too much?     Feeling tired or having little energy?     Poor appetite or overeating?     Feeling bad about yourself - or that you are a failure or have let yourself or your family down?    Trouble concentrating on things, such as reading the newspaper or watching television?    Moving or speaking so slowly that other people could have noticed.  Or the opposite - being so fidgety or restless that you have been moving around a lot more than usual?     Thoughts that you would be better off dead, or of hurting yourself?     Patient Health Questionnaire Score:      If depressive symptoms identified deferred to  follow up visit unless specifically addressed in assessment and plan.    Interpretation of PHQ-9 Total Score   Score Severity   1-4 No Depression   5-9 Mild Depression   10-14 Moderate Depression   15-19 Moderately Severe Depression   20-27 Severe Depression      Screening for Cognitive Impairment  Three Minute Recall (captain, sophie, kiki)  1/3    Draw clock face with all 12 numbers and set the hands to show 5 past 8.      yes  If cognitive concerns identified, deferred for follow up unless specifically addressed in assessment and plan.    Fall Risk Assessment  Has the patient had two or more falls in the last year or any fall with injury in the last year?  No  If fall risk identified, deferred for follow up unless specifically addressed in assessment and plan.    Safety Assessment  Throw rugs on floor.  No  Handrails on all stairs.  Yes  Good lighting in all hallways.  Yes  Difficulty hearing.  Yes  Patient counseled about all safety risks that were identified.    Functional Assessment ADLs  Are there any barriers preventing you from cooking for yourself or meeting nutritional needs?  No.    Are there any barriers preventing you from driving safely or obtaining transportation?  No.    Are there any barriers preventing you from using a telephone or calling for help?  No.    Are there any barriers preventing you from shopping?  No.    Are there any barriers preventing you from taking care of your own finances?  No.    Are there any barriers preventing you from managing your medications?    No.    Are there any barriers preventing you from showering, bathing or dressing yourself?  No.    Are you currently engaging in any exercise or physical activity?  No.     What is your perception of your health?  Good.    Health Maintenance Summary                IMM INFLUENZA Overdue 9/1/2021      Done 10/27/2020 Imm Admin: Influenza Vaccine Adult HD     Patient has more history with this topic...    IMM ZOSTER VACCINES  Postponed 2030 Originally 2014. Insurance/Financial     Done 2014 Imm Admin: Zoster Vaccine Live (ZVL) (Zostavax) - HISTORICAL DATA    IMM DTaP/Tdap/Td Vaccine Postponed 2037 Originally 2019. Insurance/Financial     Done 2009 Imm Admin: Tdap Vaccine    COLORECTAL CANCER SCREENING Next Due 2022     Annual Wellness Visit Next Due 2022      Done 2021 Visit Dx: Medicare annual wellness visit, subsequent     Patient has more history with this topic...          Patient Care Team:  Dyllan Robles M.D. as PCP - General (Family Medicine)  Gus Irvin M.D. as Consulting Physician (Phys Med and Rehab)  Jarad Joseph M.D. (Inactive) as Consulting Physician (Gastroenterology)  CLAYTON Navarrete as Consulting Physician (Dermatology)  Alessio Cutler M.D. (Orthopedic Surgery)  Neville Minor D.P.M. (Podiatry)  Vickey Rutherford M.D. as Consulting Physician (Cardiovascular Disease (Cardiology))  [facility] as Consulting Physician  Neville James M.D. (Neurosurgery)  Fransisco Bearden, PT (Inactive) as Physical Therapist (Physical Therapy)    Social History     Tobacco Use   • Smoking status: Former Smoker     Packs/day: 1.00     Years: 20.00     Pack years: 20.00     Types: Cigarettes     Quit date: 1972     Years since quittin.6   • Smokeless tobacco: Former User   Vaping Use   • Vaping Use: Never used   Substance Use Topics   • Alcohol use: No     Alcohol/week: 0.0 oz   • Drug use: No     Family History   Problem Relation Age of Onset   • Other Mother         unknown   • Heart Disease Mother    • Cancer Father         prostate, skin   • No Known Problems Brother    • Diabetes Brother    • Heart Disease Son    • Sleep Apnea Neg Hx      He  has a past medical history of Aortic stenosis, Atrial fibrillation (HCC), Back pain, Cataract, Cirrhosis of liver without ascites (HCC), Clotting disorder (HCC), Depression, Diabetes (HCC), Encounter  for long-term (current) use of other medications, Gasping for breath, Welsh measles, Heart murmur, Heart valve disease, Hyperlipidemia, Hypertension, Insomnia, Mumps, Nasal drainage, Pain, Pyogenic arthritis, lower leg (HCC), Restless leg syndrome, Sleep apnea, Snoring, Tonsillitis, and Valvular heart disease.   Past Surgical History:   Procedure Laterality Date   • GIBRAN N/A 7/29/2019    Procedure: ECHOCARDIOGRAM, TRANSESOPHAGEAL;  Surgeon: Leander Buckner M.D.;  Location: SURGERY [facility];  Service: Cardiac   • TRANSCATHETER AORTIC VALVE REPLACEMENT Bilateral 7/29/2019    Procedure: REPLACEMENT, AORTIC VALVE, TRANSCATHETER;  Surgeon: Leander Buckner M.D.;  Location: SURGERY [facility];  Service: Cardiac   • FINGER ARTHROPLASTY Left 5/27/2016    Procedure: FINGER ARTHROPLASTY THUMB CARPOMETACARPAL EXCISIONAL, EXCISE TRAPEZIUM;  Surgeon: Raheel Salgado M.D.;  Location: SURGERY [facility] DAY [facility];  Service:    • CARDIOVERSION      on 7/17/06, sinus rhythm until January 2007,  paroxysmal atrial fibrillation   • KNEE ARTHROPLASTY TOTAL     • LAMINOTOMY N/A     multiple lumbar spine   • OTHER ORTHOPEDIC SURGERY      lower back   • PB PERCUT IMPLNT NEUROELECT,EPIDURAL     • TOE ARTHROPLASTY     • TURP-VAPOR N/A          Exam:   /70 (BP Location: Right arm, Patient Position: Sitting, BP Cuff Size: Adult)   Pulse 65   Temp 36.1 °C (97 °F) (Temporal)   Ht 1.829 m (6')   Wt 100 kg (221 lb)   SpO2 94%  Body mass index is 29.97 kg/m².    Hearing good.    Dentition good  Alert, oriented in no acute distress.  Eye contact is good, speech goal directed, affect calm      Assessment and Plan. The following treatment and monitoring plan is recommended:      1. Medicare annual wellness visit, subsequent  Completed today no services recommended at this time    2. Paroxysmal atrial fibrillation (HCC)  Chronic, stable condition.  Be managed by the Coumadin clinic.  He also follows up with  cardiology.  INR within goal.    3. Status post transcatheter aortic valve replacement (TAVR) using bioprosthesis  Has a history of aortic stenosis.  Status post replacement.  Following up with cardiology.  Denies any acute issues today.    4. Dyslipidemia  Chronic, stable condition.  On statin therapy and following up with cardiology for this.    5. Essential hypertension  Chronic, stable condition.  Good blood pressure control with metoprolol.      6. Lumbar Disc Degeneration  7. Osteoarthrosis involving lower leg  Chronic, stable condition.  Well-controlled not limiting his ADLs.    8. Treatment-emergent ceCntral sleep apnea  Chronic, stable condition.  I recommend the continued use of CPAP and follow-up with pulmonology.      Services suggested: No services needed at this time  Health Care Screening recommendations as per orders if indicated.  Referrals offered: PT/OT/Nutrition counseling/Behavioral Health/Smoking cessation as per orders if indicated.    Discussion today about general wellness and lifestyle habits:    · Prevent falls and reduce trip hazards; Cautioned about securing or removing rugs.  · Have a working fire alarm and carbon monoxide detector;   · Engage in regular physical activity and social activities     Follow-up: No follow-ups on file.

## 2021-09-15 ENCOUNTER — APPOINTMENT (RX ONLY)
Dept: URBAN - METROPOLITAN AREA CLINIC 36 | Facility: CLINIC | Age: 81
Setting detail: DERMATOLOGY
End: 2021-09-15

## 2021-09-15 PROBLEM — C44.91 BASAL CELL CARCINOMA OF SKIN, UNSPECIFIED: Status: ACTIVE | Noted: 2021-09-15

## 2021-09-15 PROCEDURE — 11900 INJECT SKIN LESIONS </W 7: CPT

## 2021-09-15 PROCEDURE — ? INJECTION

## 2021-09-15 NOTE — PROCEDURE: INJECTION
Bill J-Code: yes
Post-Care Instructions: I reviewed with the patient in detail post-care instructions. Patient understands to keep the injection sites clean and call the clinic if there is any redness, swelling or pain.
units
Total Volume Injected In Cc (Will Not Affected Billing): .3
Dose Administered (Numbers Only - Mg, G, Mcg, Units, Cc): 0
Treatment Number: 1
Units: mg
Procedure Information: Please note that the numeric value listed in the Medication (1) and associated J-code units and Medication (2) and associated J-code units variables are j-code amounts and do not represent either the concentration or the total amount of the medications injected.  I strongly recommend selecting no to the Render J-code information in note question. This will allow your note to be more clear. If you are billing j-codes with your injection codes you need to document the total amount of the medication injected. This amount should match the j-code units. For example, if you are injecting Triamcinolone 40mg as an intramuscular injection you would select 40 for the dose field and mg for the units. This would allow you to document  with 4 units (40mg = 10mg x 4). The total volume is not used to calculate j-codes only the amount of the medication administered.
Consent: The risks of the medication was reviewed with the patient.
Medication (1) And Associated J-Code Units: Bleomycin, 15 units
Dose Administered (Numbers Only - Mg, G, Mcg, Units, Cc): 0.3
Detail Level: None
Hide Second Medication?: No
Route: IL

## 2021-09-18 ASSESSMENT — ENCOUNTER SYMPTOMS
WEIGHT LOSS: 0
FEVER: 0
DIZZINESS: 0
COUGH: 0
ORTHOPNEA: 0
MYALGIAS: 0
FALLS: 0
VOMITING: 0
BRUISES/BLEEDS EASILY: 0
PALPITATIONS: 0
ABDOMINAL PAIN: 0
BLOOD IN STOOL: 0
CLAUDICATION: 0
PND: 0
BLURRED VISION: 0
TINGLING: 0
NAUSEA: 0
LOSS OF CONSCIOUSNESS: 0
SHORTNESS OF BREATH: 0

## 2021-09-28 ENCOUNTER — ANTICOAGULATION VISIT (OUTPATIENT)
Dept: VASCULAR LAB | Facility: MEDICAL CENTER | Age: 81
End: 2021-09-28
Attending: INTERNAL MEDICINE
Payer: MEDICARE

## 2021-09-28 VITALS
SYSTOLIC BLOOD PRESSURE: 115 MMHG | WEIGHT: 221 LBS | BODY MASS INDEX: 29.93 KG/M2 | HEART RATE: 60 BPM | DIASTOLIC BLOOD PRESSURE: 75 MMHG | HEIGHT: 72 IN

## 2021-09-28 DIAGNOSIS — Z95.3 STATUS POST TRANSCATHETER AORTIC VALVE REPLACEMENT (TAVR) USING BIOPROSTHESIS: ICD-10-CM

## 2021-09-28 LAB
INR BLD: 2.1 (ref 0.9–1.2)
INR PPP: 2.1 (ref 2–3.5)

## 2021-09-28 PROCEDURE — 85610 PROTHROMBIN TIME: CPT

## 2021-09-28 PROCEDURE — 99211 OFF/OP EST MAY X REQ PHY/QHP: CPT

## 2021-09-28 ASSESSMENT — FIBROSIS 4 INDEX: FIB4 SCORE: 2.06

## 2021-09-28 NOTE — PROGRESS NOTES
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 Referring Cardiovascular Disease (Cardiology) [phone]    [facility]ticoagulation [facility] Responsible  [phone]        Anticoagulation Patient Findings  Patient Findings     Negatives:  Signs/symptoms of thrombosis, Signs/symptoms of bleeding, Laboratory test error suspected, Change in health, Change in alcohol use, Change in activity, Upcoming invasive procedure, Emergency department visit, Upcoming dental procedure, Missed doses, Extra doses, Change in medications, Change in diet/appetite, Hospital admission, Bruising, Other complaints          HPI:     Duane Parkinson is in the Anticoagulation Clinic today.     The reason for today's visit is to prevent morbidity and mortality from a blood clot and/or stroke and to reduce the risk of bleeding while on a anticoagulant.     PCP:  Dyllan Robles M.D.  [address]  [place] [id number] [phone]    Most Recent labs and immunizations:    Lab Results   Component Value Date/Time    HBA1C 5.5 09/10/2021 08:56 AM          Lab Results   Component Value Date/Time    CHOLSTRLTOT 115 2021 12:25 PM    LDL 33 2021 12:25 PM    HDL  32 (A) 02/24/2021 12:25 PM    TRIGLYCERIDE 248 (H) 02/24/2021 12:25 PM       Lab Results   Component Value Date/Time    SODIUM 138 02/24/2021 12:25 PM    POTASSIUM 4.6 02/24/2021 12:25 PM    CHLORIDE 104 02/24/2021 12:25 PM    CO2 25 02/24/2021 12:25 PM    GLUCOSE 96 02/24/2021 12:25 PM    BUN 15 02/24/2021 12:25 PM    CREATININE 0.73 02/24/2021 12:25 PM    BUNCREATRAT 15 06/05/2018 08:29 AM     Lab Results   Component Value Date/Time    ALKPHOSPHAT 82 02/24/2021 12:25 PM    ASTSGOT 48 (H) 02/24/2021 12:25 PM    ALTSGPT 56 (H) 02/24/2021 12:25 PM    TBILIRUBIN 0.6 02/24/2021 12:25 PM    INR 2.10 09/28/2021 12:00 AM    ALBUMIN 3.8 02/24/2021 12:25 PM      Lab Results   Component Value Date/Time    WBC 9.5 02/24/2021 12:25 PM    RBC 5.19 02/24/2021 12:25 PM    HEMOGLOBIN 16.7 02/24/2021 12:25 PM    HEMATOCRIT 50.2 02/24/2021 12:25 PM    PLATELETCT 258 06/18/2020 09:12 AM      Lab Results   Component Value Date/Time    MALBCRT see below 06/20/2017 07:37 AM    MICROALBUR <0.7 06/20/2017 07:37 AM       3 vitals included with today's appt-unless patient declined:  (BP, weight, ht, RR)   Vitals:    09/28/21 0914   BP: 115/75   Pulse: 60   Weight: 100 kg (221 lb)   Height: 1.829 m (6')         Assessment:     • INR  therapeutic.   • Is pt on antiplatelet therapy: no  • Is pt on NSAID: no    Current Outpatient Medications:   •  aspirin, 81 mg, Oral, DAILY  •  digoxin, 250 mcg, Oral, QHS  •  metoprolol SR, 50 mg, Oral, QDAY  •  pravastatin, TAKE 1 TABLET BY MOUTH DAILY  •  GLUCOSAMINE HCL PO, 1 Tablet, Oral, BID  •  warfarin, TAKE ONE-HALF TO ONE TABLET BY MOUTH ONCE DAILY OR AS DIRECTED BY COUMADIN CLINIC  •  Vitamin C, Take  by mouth.  •  Coenzyme Q10 (COQ10 PO), Take 300 mg by mouth.  •  fish oil, 2,000 mg, Oral, QDAY with Breakfast  •  lysine, 500 mg, Oral, DAILY  •  acetaminophen, 500-1,000 mg, Oral, Q6HRS PRN  •  gabapentin, 300 mg, Oral, DAILY  •  B Complex Vitamins (VITAMIN B COMPLEX PO), 1 Tablet, Oral, Q EVENING  •   Psyllium (METAMUCIL PO), Take  by mouth every morning.  •  Vitamin D3, 1 Tablet, Oral, Q EVENING  •  multivitamin, 1 Tablet, Oral, QHS    Plan:     • Continue the same warfarin dose, as noted above.       Follow-up:     • Test in 4 week(s).    • This decision was made using shared decision making with the pt and benefits vs risks were discussed.      Additional information discussed with patient:     • Pt educated to contact our clinic with any changes in medications or s/s of bleeding or thrombosis.  • Education was provided today regarding tips to reduce their bleed risk and dietary constraints while on a anticoagulant.    National recommendations regarding anticoagulation therapy:     The CHEST guidelines recommends frequent monitoring at regular intervals for any patient on a anticoagulant, to ensure the patient is on the proper dose, and to monitor that they are not having any complications from therapy.       Sean Guzman, PharmD, MS, BCACP, [facility]  Phone [phone] fax [phone]    This note was created using voice recognition software (Dragon). The accuracy of the dictation is limited by the abilities of the software. I have reviewed the note prior to signing, however some errors in grammar and context are still possible. If you have any questions related to this note please do not hesitate to contact our office.

## 2021-09-28 NOTE — PATIENT INSTRUCTIONS
[facility]  Phone: [phone],  Fax: [phone]  On call: [phone]

## 2021-09-29 ENCOUNTER — DOCUMENTATION (OUTPATIENT)
Dept: VASCULAR LAB | Facility: MEDICAL CENTER | Age: 81
End: 2021-09-29

## 2021-09-29 NOTE — PROGRESS NOTES
Received clearance request from [facility] for yet to be scheduled medial branch block.  Patient voices understanding of instructions below.  Advised that he contact our office once procedure scheduled.  Eagle Escobar, PharmD, BCACP      Per the 2017 ACC Expert Consensus Decision Pathway for Periprocedural Management of Anticoagulation  - it would be recommended to hold warfarin five days prior to procedure and restart ASAP per operating physician discretion.

## 2021-10-06 ENCOUNTER — APPOINTMENT (RX ONLY)
Dept: URBAN - METROPOLITAN AREA CLINIC 36 | Facility: CLINIC | Age: 81
Setting detail: DERMATOLOGY
End: 2021-10-06

## 2021-10-06 PROBLEM — C44.91 BASAL CELL CARCINOMA OF SKIN, UNSPECIFIED: Status: ACTIVE | Noted: 2021-10-06

## 2021-10-06 PROCEDURE — ? INJECTION

## 2021-10-06 PROCEDURE — 11900 INJECT SKIN LESIONS </W 7: CPT

## 2021-10-06 NOTE — PROCEDURE: INJECTION
Dose Administered (Numbers Only - Mg, G, Mcg, Units, Cc): 0
Detail Level: None
Route: IL
Post-Care Instructions: I reviewed with the patient in detail post-care instructions. Patient understands to keep the injection sites clean and call the clinic if there is any redness, swelling or pain.
Dose Administered (Numbers Only - Mg, G, Mcg, Units, Cc): 0.25
Medication (1) And Associated J-Code Units: Bleomycin, 15 units
Consent: The risks of the medication was reviewed with the patient.
Procedure Information: Please note that the numeric value listed in the Medication (1) and associated J-code units and Medication (2) and associated J-code units variables are j-code amounts and do not represent either the concentration or the total amount of the medications injected.  I strongly recommend selecting no to the Render J-code information in note question. This will allow your note to be more clear. If you are billing j-codes with your injection codes you need to document the total amount of the medication injected. This amount should match the j-code units. For example, if you are injecting Triamcinolone 40mg as an intramuscular injection you would select 40 for the dose field and mg for the units. This would allow you to document  with 4 units (40mg = 10mg x 4). The total volume is not used to calculate j-codes only the amount of the medication administered.
Hide Second Medication?: No
Units: mg
Treatment Number: 2
units
Bill J-Code: yes
Total Volume Injected In Cc (Will Not Affected Billing): .25

## 2021-10-14 ENCOUNTER — ANTICOAGULATION VISIT (OUTPATIENT)
Dept: VASCULAR LAB | Facility: MEDICAL CENTER | Age: 81
End: 2021-10-14
Attending: INTERNAL MEDICINE
Payer: MEDICARE

## 2021-10-14 DIAGNOSIS — Z95.3 STATUS POST TRANSCATHETER AORTIC VALVE REPLACEMENT (TAVR) USING BIOPROSTHESIS: ICD-10-CM

## 2021-10-14 LAB
INR BLD: 1.1 (ref 0.9–1.2)
INR PPP: 1.1 (ref 2–3.5)

## 2021-10-14 PROCEDURE — 85610 PROTHROMBIN TIME: CPT

## 2021-10-14 PROCEDURE — 99212 OFFICE O/P EST SF 10 MIN: CPT

## 2021-10-14 NOTE — PROGRESS NOTES
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 Referring Cardiovascular Disease (Cardiology) [phone]    [facility] Anticoagulation Services Responsible  [phone]        Anticoagulation Patient Findings  Patient Findings     Positives:  Missed doses    Negatives:  Signs/symptoms of thrombosis, Signs/symptoms of bleeding, Laboratory test error suspected, Change in health, Change in alcohol use, Change in activity, Upcoming invasive procedure, Emergency department visit, Upcoming dental procedure, Extra doses, Change in medications, Change in diet/appetite, Hospital admission, Bruising, Other complaints              History of Present Illness: follow up appointment for chronic anticoagulation with the high risk medication, warfarin for AF. 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    Pt is scheduled for ablation on his back in am.  Pt has been HOLDING warfarin x6 days.  Pt did NOT notify the clinic of this scheduled procedure.  Pt to resume warfarin post procedure.  Follow up in 1 weeks, to reduce the risk of adverse events related to this high  risk medication, warfarin.    Pratima Ricks, Clinical Pharmacist

## 2021-10-22 ENCOUNTER — ANTICOAGULATION VISIT (OUTPATIENT)
Dept: VASCULAR LAB | Facility: MEDICAL CENTER | Age: 81
End: 2021-10-22
Attending: INTERNAL MEDICINE
Payer: MEDICARE

## 2021-10-22 VITALS — SYSTOLIC BLOOD PRESSURE: 111 MMHG | HEART RATE: 82 BPM | DIASTOLIC BLOOD PRESSURE: 75 MMHG

## 2021-10-22 DIAGNOSIS — I48.91 ATRIAL FIBRILLATION, UNSPECIFIED TYPE (HCC): ICD-10-CM

## 2021-10-22 DIAGNOSIS — Z95.3 STATUS POST TRANSCATHETER AORTIC VALVE REPLACEMENT (TAVR) USING BIOPROSTHESIS: ICD-10-CM

## 2021-10-22 LAB
INR BLD: 1.4 (ref 0.9–1.2)
INR PPP: 1.4 (ref 2–3.5)

## 2021-10-22 PROCEDURE — 85610 PROTHROMBIN TIME: CPT

## 2021-10-22 PROCEDURE — 99212 OFFICE O/P EST SF 10 MIN: CPT

## 2021-10-22 NOTE — PROGRESS NOTES
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 Referring Cardiovascular Disease (Cardiology) [phone]    [facility] Anticoagulation Services Responsible  [phone]           Refer to Patient Findings for HPI:  Patient Findings     Negatives:  Signs/symptoms of thrombosis, Signs/symptoms of bleeding, Laboratory test error suspected, Change in health, Change in alcohol use, Change in activity, Upcoming invasive procedure, Emergency department visit, Upcoming dental procedure, Missed doses, Extra doses, Change in medications, Change in diet/appetite, Hospital admission, Bruising, Other complaints        Vitals:    10/22/21 0854   BP: 111/75   Pulse: 82         Patient seen in clinic today for follow up on anticoagulation therapy in the presence of AF and hx of TAVR  Verified current warfarin dosing schedule.  Patient denies any missed doses of warfarin.    Medications reconciled   Pt is on ASA 81mg as antiplatelet therapy for TAVR and must be reviewed again on pt on indefinitely.      A/P   INR is SUB-therapeutic today at 1.4.     Warfarin dosing recommendation: Patient instructed to bolus with 8mg TONIGHT, as a one time boost dose, then to resume his current dosing regimen.   Patient will retest again  in 1 week.     Pt educated to contact our clinic with any changes in medications or s/s of bleeding or thrombosis. Pt is aware to seek immediate medical attention for falls, head injury or deep cuts.    Follow up appointment in 1 week(s).    Next appt: Thurs, Oct 28  @ 8:30am    Sampson Kendall PharmD

## 2021-11-11 ENCOUNTER — TELEPHONE (OUTPATIENT)
Dept: VASCULAR LAB | Facility: MEDICAL CENTER | Age: 81
End: 2021-11-11

## 2021-11-12 ENCOUNTER — ANTICOAGULATION VISIT (OUTPATIENT)
Dept: VASCULAR LAB | Facility: MEDICAL CENTER | Age: 81
End: 2021-11-12
Attending: INTERNAL MEDICINE
Payer: MEDICARE

## 2021-11-12 DIAGNOSIS — Z95.3 STATUS POST TRANSCATHETER AORTIC VALVE REPLACEMENT (TAVR) USING BIOPROSTHESIS: ICD-10-CM

## 2021-11-12 LAB
INR BLD: 1.5 (ref 0.9–1.2)
INR PPP: 1.5 (ref 2–3.5)

## 2021-11-12 PROCEDURE — 85610 PROTHROMBIN TIME: CPT

## 2021-11-12 PROCEDURE — 99212 OFFICE O/P EST SF 10 MIN: CPT

## 2021-11-12 NOTE — PROGRESS NOTES
Anticoagulation Summary  As of 2021    INR goal:  2.0-3.0   TTR:  63.5 % (5 y)   INR used for dosin.50 (2021)   Warfarin maintenance plan:  2 mg (4 mg x 0.5) every Tue, Thu; 4 mg (4 mg x 1) all other days   Weekly warfarin total:  24 mg   Plan last modified:  Con Yanez, PharmD (2021)   Next INR check:  2021   Target end date:  Indefinite    Indications    Atrial fibrillation (HCC) [I48.91]  Status post transcatheter aortic valve replacement (TAVR) using bioprosthesis [Z95.3]             Anticoagulation Episode Summary     INR check location:      Preferred lab:      Send INR reminders to:      Comments:  Pt goes by Kinsey      Anticoagulation Care Providers     Provider Role Specialty Phone number    Vickey Rutherford M.D. Referring Cardiovascular Disease (Cardiology) [phone]    [facility] An[facility] Responsible  [phone]                Refer to Patient Findings for HPI:  Patient Findings     Negatives:  Signs/symptoms of thrombosis, Signs/symptoms of bleeding, Laboratory test error suspected, Change in health, Change in alcohol use, Change in activity, Upcoming invasive procedure, Emergency department visit, Upcoming dental procedure, Missed doses, Extra doses, Change in medications, Change in diet/appetite, Hospital admission, Bruising, Other complaints          There were no vitals filed for this visit.  pt declined vitals    Verified current warfarin dosing schedule.    Medications reconciled  Pt is on ASA 81mg as antiplatelet therapy for TAVR and must be reviewed again on pt on indefinitely.    A/P   INR sub-therapeutic.     Warfarin dosing recommendation: Will have patient bolus with 6mg of warfarin tonight  then start an 9.1% increased regimen.    Pt educated to contact our clinic with any changes in medications or s/s of bleeding or thrombosis. Pt is aware to seek immediate medical attention for falls, head injury or deep cuts.    Follow up  appointment in 1 week(s).    Con Yanez, AbhishekD

## 2021-11-19 ENCOUNTER — ANTICOAGULATION VISIT (OUTPATIENT)
Dept: VASCULAR LAB | Facility: MEDICAL CENTER | Age: 81
End: 2021-11-19
Attending: INTERNAL MEDICINE
Payer: MEDICARE

## 2021-11-19 DIAGNOSIS — Z95.3 STATUS POST TRANSCATHETER AORTIC VALVE REPLACEMENT (TAVR) USING BIOPROSTHESIS: ICD-10-CM

## 2021-11-19 LAB
INR BLD: 1.3 (ref 0.9–1.2)
INR PPP: 1.3 (ref 2–3.5)

## 2021-11-19 PROCEDURE — 99212 OFFICE O/P EST SF 10 MIN: CPT

## 2021-11-19 PROCEDURE — 85610 PROTHROMBIN TIME: CPT

## 2021-11-19 NOTE — PROGRESS NOTES
Anticoagulation Summary  As of 2021    INR goal:  2.0-3.0   TTR:  63.2 % (5.1 y)   INR used for dosin.30 (2021)   Warfarin maintenance plan:  2 mg (4 mg x 0.5) every Tue, Thu; 4 mg (4 mg x 1) all other days   Weekly warfarin total:  24 mg   Plan last modified:  Con Yanez, PharmD (2021)   Next INR check:  2021   Target end date:  Indefinite    Indications    Atrial fibrillation (HCC) [I48.91]  Status post transcatheter aortic valve replacement (TAVR) using bioprosthesis [Z95.3]             Anticoagulation Episode Summary     INR check location:      Preferred lab:      Send INR reminders to:      Comments:  Pt goes by Kinsey      Anticoagulation Care Providers     Provider Role Specialty Phone number    Vickey Rutherford M.D. Referring Cardiovascular Disease (Cardiology) [phone]    [facility] Anticoagulation Services Responsible  [phone]                Refer to Patient Findings for HPI:  Patient Findings     Positives:  Missed doses    Negatives:  Signs/symptoms of thrombosis, Signs/symptoms of bleeding, Laboratory test error suspected, Change in health, Change in alcohol use, Change in activity, Upcoming invasive procedure, Emergency department visit, Upcoming dental procedure, Extra doses, Change in medications, Change in diet/appetite, Hospital admission, Bruising, Other complaints          There were no vitals filed for this visit.   pt declined vitals    Verified current warfarin dosing schedule.    Medications reconciled   Pt is on ASA 81mg as antiplatelet therapy for TAVR and must be reviewed again on pt on indefinitely.      A/P   INR  SUB-therapeutic and trending down as pt missed dose    Warfarin dosing recommendation: Pt to BOLUS 8mg TONIGHT, 6mg TOMORROW, then resume regimen with close follow up in 3 days    Pt educated to contact our clinic with any changes in medications or s/s of bleeding or thrombosis. Pt is aware to seek immediate medical attention for  falls, head injury or deep cuts.    Follow up appointment in 3 days    Edwin Starr, AbhishekD

## 2021-11-20 NOTE — PROGRESS NOTES
CC: Follow-up for LORENA    HPI:  Duane Parkinson is a 81 y.o.male  kindly referred by Dyllan Robles M.D. and last seen for LORENA and treatment emergent central apnea.    His home sleep study performed in 2016 showed an AHI of 22 zachery saturation 81%.  During his titration study in November 2017 he had treatment emergent central apnea and was treated with ASV.  And last seen the patient indicated that he was sleeping better off of ASV and sometimes skipped it altogether.  His compliance download was not available.  An updated polysomnogram was performed on 2/24/2021 which showed mild LORENA with an AHI of 9.8 and a zachery saturation of 16%.  There are no further sleep messages or notes following the sleep study.    He is currently using his device only sporadically. In the last 2 weeks has used it more for 6 hours plus.. He reportedly has difficulty remembering to get distilled water.    Significant comorbidities modifying factors include former smoker, dyslipidemia, essential hypertension, treatment emergent central apnea, impaired glucose tolerance, status post transcatheter aortic valve replacement using bio prosthesis, atrial fibrillation, up-to-date with SARS-CoV-2 vaccination aside from the booster, up-to-date with pneumococcal vaccination 2011, need for Shingrix vaccination, and chronic anticoagulation.    Patient Active Problem List    Diagnosis Date Noted   • LORENA (obstructive sleep apnea) 11/22/2021   • Secondary hypercoagulable state (HCC) 11/22/2021   • Skin cancer 12/17/2020   • Neuropathy 12/22/2017   • Localized primary osteoarthritis of carpometacarpal joint of left thumb 05/27/2016   • Treatment-emergent central sleep apnea 04/21/2016   • Essential hypertension 01/07/2015   • Dyslipidemia 01/07/2015   • IGT (impaired glucose tolerance) 01/07/2015   • Status post transcatheter aortic valve replacement (TAVR) using bioprosthesis 12/30/2011   • Atrial fibrillation (HCC) 12/30/2011   • Lumbar Disc  Degeneration 12/30/2011   • Osteoarthrosis involving lower leg 12/30/2011       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)    • Clotting disorder (HCC)     reports nose bleeds   • Depression     Lost wife ,still gets tearful   • Diabetes (HCC)     diet controlled   • Encounter for long-term (current) use of other medications    • Gasping for breath    • Amharic measles    • Heart murmur    • Heart valve disease    • Hyperlipidemia    • Hypertension    • Insomnia    • Mumps    • Nasal drainage    • Pain     back & thumb   • Pyogenic arthritis, lower leg (HCC)     thumb, back   • Restless leg syndrome    • Sleep apnea     uses CPAP   • Snoring    • Tonsillitis    • Valvular heart disease         Past Surgical History:   Procedure Laterality Date   • GIBRAN N/A 7/29/2019    Procedure: ECHOCARDIOGRAM, TRANSESOPHAGEAL;  Surgeon: Leander Buckner M.D.;  Location: SURGERY [facility];  Service: Cardiac   • TRANSCATHETER AORTIC VALVE REPLACEMENT Bilateral 7/29/2019    Procedure: REPLACEMENT, AORTIC VALVE, TRANSCATHETER;  Surgeon: Leander Buckner M.D.;  Location: SURGERY [facility];  Service: Cardiac   • FINGER ARTHROPLASTY Left 5/27/2016    Procedure: FINGER ARTHROPLASTY THUMB CARPOMETACARPAL EXCISIONAL, EXCISE TRAPEZIUM;  Surgeon: Raheel Salgado M.D.;  Location: SURGERY SAME DAY [facility];  Service:    • CARDIOVERSION      on 7/17/06, sinus rhythm until January 2007,  paroxysmal atrial fibrillation   • KNEE ARTHROPLASTY TOTAL     • LAMINOTOMY N/A     multiple lumbar spine   • OTHER ORTHOPEDIC SURGERY      lower back   • PB PERCUT IMPLNT NEUROELECT,EPIDURAL     • TOE ARTHROPLASTY     • TURP-VAPOR N/A        Family History   Problem Relation Age of Onset   • Other Mother         unknown   • Heart Disease Mother    • Cancer Father         prostate, skin   • No Known Problems  Brother    • Diabetes Brother    • Heart Disease Son    • Sleep Apnea Neg Hx        Social History     Socioeconomic History   • Marital status:      Spouse name: Not on file   • Number of children: Not on file   • Years of education: Not on file   • Highest education level: Not on file   Occupational History   • Not on file   Tobacco Use   • Smoking status: Former Smoker     Packs/day: 1.00     Years: 20.00     Pack years: 20.00     Types: Cigarettes     Quit date: 1972     Years since quittin.8   • Smokeless tobacco: Former User   Vaping Use   • Vaping Use: Never used   Substance and Sexual Activity   • Alcohol use: No     Alcohol/week: 0.0 oz   • Drug use: No   • Sexual activity: Not Currently     Partners: Female   Other Topics Concern   • Not on file   Social History Narrative   • Not on file     Social Determinants of Health     Financial Resource Strain:    • Difficulty of Paying Living Expenses: Not on file   Food Insecurity:    • Worried About Running Out of Food in the Last Year: Not on file   • Ran Out of Food in the Last Year: Not on file   Transportation Needs:    • Lack of Transportation (Medical): Not on file   • Lack of Transportation (Non-Medical): Not on file   Physical Activity:    • Days of Exercise per Week: Not on file   • Minutes of Exercise per Session: Not on file   Stress:    • Feeling of Stress : Not on file   Social Connections:    • Frequency of Communication with Friends and Family: Not on file   • Frequency of Social Gatherings with Friends and Family: Not on file   • Attends [employer] Services: Not on file   • Active Member of Clubs or Organizations: Not on file   • Attends Club or Organization Meetings: Not on file   • Marital Status: Not on file   Intimate Partner Violence:    • Fear of Current or Ex-Partner: Not on file   • Emotionally Abused: Not on file   • Physically Abused: Not on file   • Sexually Abused: Not on file   Housing Stability:    • Unable to Pay for  "Housing in the Last Year: Not on file   • Number of Places Lived in the Last Year: Not on file   • Unstable Housing in the Last Year: Not on file       Current Outpatient Medications   Medication Sig Dispense Refill   • aspirin (ASA) 81 MG Chew Tab chewable tablet Chew 1 Tablet every day. 90 Tablet 3   • digoxin (LANOXIN) 250 MCG Tab Take 1 Tablet by mouth at bedtime. 90 Tablet 0   • metoprolol SR (TOPROL XL) 50 MG TABLET SR 24 HR Take 1 Tablet by mouth every day. 90 Tablet 3   • pravastatin (PRAVACHOL) 80 MG tablet TAKE 1 TABLET BY MOUTH DAILY 90 Tablet 3   • GLUCOSAMINE HCL PO Take 1 Tablet by mouth 2 times a day.     • warfarin (COUMADIN) 4 MG Tab TAKE ONE-HALF TO ONE TABLET BY MOUTH ONCE DAILY OR AS DIRECTED BY COUMADIN CLINIC 90 tablet 1   • Ascorbic Acid (VITAMIN C) 1000 MG Tab Take  by mouth.     • Coenzyme Q10 (COQ10 PO) Take 300 mg by mouth.     • Omega-3 Fatty Acids (FISH OIL) 1000 MG CAPSULE DELAYED RELEASE EC softgel capsule Take 2,000 mg by mouth every morning with breakfast.     • lysine 500 MG Tab Take 500 mg by mouth every day.     • acetaminophen (TYLENOL) 500 MG Tab Take 500-1,000 mg by mouth every 6 hours as needed.     • gabapentin (NEURONTIN) 300 MG Cap Take 300 mg by mouth every day. 1 in the morning, 1 at noon, 2 at 4 pm, 3 at bedtime      • B Complex Vitamins (VITAMIN B COMPLEX PO) Take 1 Tab by mouth every evening.     • Psyllium (METAMUCIL PO) Take  by mouth every morning.     • Cholecalciferol (VITAMIN D3) 2000 UNITS Tab Take 1 Tab by mouth every evening.     • multivitamin (THERAGRAN) TABS Take 1 Tab by mouth every bedtime.       No current facility-administered medications for this visit.    \"CURRENT RX\"    ALLERGIES: Percodan [oxycodone-aspirin]    ROS    Constitutional: Denies fever, chills, sweats,  weight loss, fatigue  Cardiovascular: Denies chest pain, tightness, palpitations, swelling in legs/feet, fainting, difficulty breathing when lying down but gets better when sitting up. "   Respiratory: Denies shortness of breath, cough, sputum, wheezing, painful breathing, coughing up blood.   Sleep: per HPI  Gastrointestinal: Denies  difficulty swallowing, nausea, abdominal pain, diarrhea, constipation, heartburn.  Musculoskeletal: Denies painful joints, sore muscles, back pain.        PHYSICAL EXAM  Obese    /64 (BP Location: Left arm, Patient Position: Sitting, BP Cuff Size: Adult)   Pulse 85   Resp 16   Ht 1.829 m (6')   Wt 101 kg (223 lb)   SpO2 95%   BMI 30.24 kg/m²       Appearance: Well-nourished, well-developed, no acute distress  Eyes:  PERRLA, EOMI; glasses  ENMT: masked  Neck: Supple, trachea midline, no masses  Respiratory effort:  No intercostal retractions or use of accessory muscles  Lung auscultation:  No wheezes rhonchi rubs or rales  Cardiac: No murmurs, rubs, or gallops; regular rhythm, normal rate; no edema  Abdomen:  No tenderness, no organomegaly. Obese.  Musculoskeletal:  Grossly normal; gait and station normal; digits and nails normal  Skin:  No rashes, petechiae, cyanosis  Neurologic: without focal signs; oriented to person, time, place, and purpose; judgement intact  Psychiatric:  No depression, anxiety, agitation      Medical Decision Making       The medical record was reviewed in its entirety including the referral notes, records from primary care, consultants notes, hospital records, lab, imaging, microbiology, immunology, and immunizations.  Care gaps identified and reviewed with the patient.    Diagnostic and titration nocturnal polysomnogram's, home sleep apnea tests, continuous nocturnal oximetry results, multiple sleep latency tests, and compliance reports reviewed.  1. LORENA (obstructive sleep apnea)  - DME Mask and Supplies      PLAN:   Has been advised to continue the current PAP, clean equipment frequently, and get new mask and supplies as allowed by insurance and DME. Advised about potential problems including nasal obstruction/stuffiness, mask leak  or discomfort , frequent awakenings, chill or dryness of the upper airway, noise, inconvenience, and lack of benefit. Recommend an earlier appointment, if significant treatment barriers develop.    The risks of untreated sleep apnea were discussed with the patient at length. Patients with LORENA are at increased risk of cardiovascular disease including coronary artery disease, systemic arterial hypertension, pulmonary arterial hypertension, cardiac arrythmias, and stroke. LORENA patients have an increased risk of motor vehicle accidents, type 2 diabetes, chronic kidney disease, and non-alcoholic liver disease. The patient was advised to avoid driving a motor vehicle when drowsy.    Positive airway pressure will favorably impact many of the adverse conditions and effects provoked by LORENA.    Have advised the patient to follow up with the appropriate healthcare practitioners for all other medical problems and issues.    Mask wearing, handwashing, and social distancing protocols reviewed and encouraged.    Return in about 1 year (around 11/22/2022).      Total time 30 minutes

## 2021-11-22 ENCOUNTER — ANTICOAGULATION VISIT (OUTPATIENT)
Dept: VASCULAR LAB | Facility: MEDICAL CENTER | Age: 81
End: 2021-11-22
Attending: INTERNAL MEDICINE
Payer: MEDICARE

## 2021-11-22 ENCOUNTER — OFFICE VISIT (OUTPATIENT)
Dept: SLEEP MEDICINE | Facility: MEDICAL CENTER | Age: 81
End: 2021-11-22
Payer: MEDICARE

## 2021-11-22 VITALS
RESPIRATION RATE: 16 BRPM | SYSTOLIC BLOOD PRESSURE: 110 MMHG | WEIGHT: 223 LBS | HEART RATE: 85 BPM | BODY MASS INDEX: 30.2 KG/M2 | DIASTOLIC BLOOD PRESSURE: 64 MMHG | OXYGEN SATURATION: 95 % | HEIGHT: 72 IN

## 2021-11-22 DIAGNOSIS — Z79.01 CHRONIC ANTICOAGULATION: ICD-10-CM

## 2021-11-22 DIAGNOSIS — G47.33 OSA (OBSTRUCTIVE SLEEP APNEA): ICD-10-CM

## 2021-11-22 DIAGNOSIS — I48.0 PAROXYSMAL ATRIAL FIBRILLATION (HCC): ICD-10-CM

## 2021-11-22 DIAGNOSIS — D68.69 SECONDARY HYPERCOAGULABLE STATE (HCC): ICD-10-CM

## 2021-11-22 DIAGNOSIS — I48.11 LONGSTANDING PERSISTENT ATRIAL FIBRILLATION (HCC): ICD-10-CM

## 2021-11-22 DIAGNOSIS — Z95.3 STATUS POST TRANSCATHETER AORTIC VALVE REPLACEMENT (TAVR) USING BIOPROSTHESIS: ICD-10-CM

## 2021-11-22 LAB
INR BLD: 1.6 (ref 0.9–1.2)
INR PPP: 1.6 (ref 2–3.5)

## 2021-11-22 PROCEDURE — 99214 OFFICE O/P EST MOD 30 MIN: CPT | Performed by: INTERNAL MEDICINE

## 2021-11-22 PROCEDURE — 85610 PROTHROMBIN TIME: CPT

## 2021-11-22 PROCEDURE — 99212 OFFICE O/P EST SF 10 MIN: CPT | Performed by: NURSE PRACTITIONER

## 2021-11-22 RX ORDER — WARFARIN SODIUM 4 MG/1
TABLET ORAL
Qty: 120 TABLET | Refills: 1 | Status: SHIPPED
Start: 2021-11-22 | End: 2021-11-22

## 2021-11-22 RX ORDER — WARFARIN SODIUM 4 MG/1
TABLET ORAL
Qty: 120 TABLET | Refills: 1 | Status: SHIPPED | OUTPATIENT
Start: 2021-11-22 | End: 2022-03-22

## 2021-11-22 ASSESSMENT — FIBROSIS 4 INDEX: FIB4 SCORE: 2.06

## 2021-11-22 NOTE — PROGRESS NOTES
Anticoagulation Summary  As of 2021    INR goal:  2.0-3.0   TTR:  63.1 % (5.1 y)   INR used for dosin.60 (2021)   Warfarin maintenance plan:  4 mg (4 mg x 1) every day   Weekly warfarin total:  28 mg   Plan last modified:  CLAYTON Jacob (2021)   Next INR check:     Target end date:  Indefinite    Indications    Status post transcatheter aortic valve replacement (TAVR) using bioprosthesis [Z95.3]  Atrial fibrillation (HCC) [I48.91]  Secondary hypercoagulable state (HCC) [D68.69]             Anticoagulation Episode Summary     INR check location:      Preferred lab:      Send INR reminders to:      Comments:  Pt goes by Kinesy      Anticoagulation Care Providers     Provider Role Specialty Phone number    Vickey Rutherford M.D. Referring Cardiovascular Disease (Cardiology) [phone]    [facility] Responsible  [phone]                Refer to Patient Findings for HPI:      There were no vitals filed for this visit.   pt declined vitals    Verified current warfarin dosing schedule.    Medications reconciled   Pt is on asa 81 MG as antiplatelet therapy for TVR and must be reviewed again on - taking indefinitely.    Atrial fibrillation controlled on metoprolol.  Secondary hypercoagulable state due to Atrial Fibrillation/Flutter on warfarin.      A/P   INR  sub-therapeutic. INR Trended up from 1.3 on Friday with loading doses.     Warfarin dosing recommendation: take 6 mg tonight then began 4 mg daily.    Pt educated to contact our clinic with any changes in medications or s/s of bleeding or thrombosis. Pt is aware to seek immediate medical attention for falls, head injury or deep cuts.    Follow up appointment in 1 week(s).    CLAYTON Jacob

## 2021-11-29 ENCOUNTER — ANTICOAGULATION VISIT (OUTPATIENT)
Dept: VASCULAR LAB | Facility: MEDICAL CENTER | Age: 81
End: 2021-11-29
Attending: INTERNAL MEDICINE
Payer: MEDICARE

## 2021-11-29 DIAGNOSIS — Z95.3 STATUS POST TRANSCATHETER AORTIC VALVE REPLACEMENT (TAVR) USING BIOPROSTHESIS: ICD-10-CM

## 2021-11-29 DIAGNOSIS — D68.69 SECONDARY HYPERCOAGULABLE STATE (HCC): ICD-10-CM

## 2021-11-29 DIAGNOSIS — I48.11 LONGSTANDING PERSISTENT ATRIAL FIBRILLATION (HCC): ICD-10-CM

## 2021-11-29 DIAGNOSIS — I48.0 PAROXYSMAL ATRIAL FIBRILLATION (HCC): ICD-10-CM

## 2021-11-29 LAB
INR BLD: 2 (ref 0.9–1.2)
INR PPP: 2 (ref 2–3.5)

## 2021-11-29 PROCEDURE — 99211 OFF/OP EST MAY X REQ PHY/QHP: CPT | Performed by: NURSE PRACTITIONER

## 2021-11-29 PROCEDURE — 85610 PROTHROMBIN TIME: CPT

## 2021-11-29 NOTE — PROGRESS NOTES
Anticoagulation Summary  As of 2021    INR goal:  2.0-3.0   TTR:  62.9 % (5.1 y)   INR used for dosin.00 (2021)   Warfarin maintenance plan:  4 mg (4 mg x 1) every day   Weekly warfarin total:  28 mg   Plan last modified:  CLAYTON Jacob (2021)   Next INR check:  2021   Target end date:  Indefinite    Indications    Status post transcatheter aortic valve replacement (TAVR) using bioprosthesis [Z95.3]  Atrial fibrillation (HCC) [I48.91]  Secondary hypercoagulable state (HCC) [D68.69]             Anticoagulation Episode Summary     INR check location:      Preferred lab:      Send INR reminders to:      Comments:  Pt goes by Kinsey      Anticoagulation Care Providers     Provider Role Specialty Phone number    Vickey Rutherford M.D. Referring Cardiovascular Disease (Cardiology) [phone]    [facility] Responsible  [phone]                Refer to Patient Findings for HPI:  Patient Findings     Positives:  Missed doses    Negatives:  Signs/symptoms of thrombosis, Signs/symptoms of bleeding, Laboratory test error suspected, Change in health, Change in alcohol use, Change in activity, Upcoming invasive procedure, Emergency department visit, Upcoming dental procedure, Extra doses, Change in medications, Change in diet/appetite, Hospital admission, Bruising, Other complaints    Comments:  He ran out of tablets last week. Missed a dose. He has his refill now.            Verified current warfarin dosing schedule.    Medications reconciled   Pt is on asa 81 MG as antiplatelet therapy for TVR and must be reviewed again on - taking indefinitely.      A/P   INR  -therapeutic.     Warfarin dosing recommendation: Continue 4 mg daily.    Pt educated to contact our clinic with any changes in medications or s/s of bleeding or thrombosis. Pt is aware to seek immediate medical attention for falls, head injury or deep cuts.    Follow up appointment in 1 week(s).    Britney EDGAR  CLAYTON Ortiz     Addendum:    Received a call back from patient reporting he will be having back surgery on 12/8/21. Patient reports his surgeon wants him to hold warfarin for five days before his surgery. Patient does not need to bridged given his [id number] score of 3 and history of bioprosthetic TAVR. Will have patient hold warfarin for five days pre-procedure and check his INR as planned on 12/7/21 on day before surgery.     Con Yanez, AbhishekD

## 2021-12-07 ENCOUNTER — ANTICOAGULATION VISIT (OUTPATIENT)
Dept: VASCULAR LAB | Facility: MEDICAL CENTER | Age: 81
End: 2021-12-07
Attending: INTERNAL MEDICINE
Payer: MEDICARE

## 2021-12-07 DIAGNOSIS — D68.69 SECONDARY HYPERCOAGULABLE STATE (HCC): ICD-10-CM

## 2021-12-07 DIAGNOSIS — Z95.3 STATUS POST TRANSCATHETER AORTIC VALVE REPLACEMENT (TAVR) USING BIOPROSTHESIS: ICD-10-CM

## 2021-12-07 LAB — INR PPP: 1.2 (ref 2–3.5)

## 2021-12-07 PROCEDURE — 85610 PROTHROMBIN TIME: CPT

## 2021-12-07 PROCEDURE — 99212 OFFICE O/P EST SF 10 MIN: CPT

## 2021-12-07 NOTE — PROGRESS NOTES
Anticoagulation Summary  As of 2021    INR goal:  2.0-3.0   TTR:  62.6 % (5.1 y)   INR used for dosin.20 (2021)   Warfarin maintenance plan:  4 mg (4 mg x 1) every day   Weekly warfarin total:  28 mg   Plan last modified:  CLAYTON Jacob (2021)   Next INR check:  2021   Target end date:  Indefinite    Indications    Status post transcatheter aortic valve replacement (TAVR) using bioprosthesis [Z95.3]  Atrial fibrillation (HCC) [I48.91]  Secondary hypercoagulable state (HCC) [D68.69]             Anticoagulation Episode Summary     INR check location:      Preferred lab:      Send INR reminders to:      Comments:  Pt goes by Kinsey      Anticoagulation Care Providers     Provider Role Specialty Phone number    Vickey Rutherford M.D. Referring Cardiovascular Disease (Cardiology) [phone]    [facility] Responsible  [phone]                Refer to Patient Findings for HPI:  Patient Findings     Positives:  Upcoming invasive procedure (ablation on ), Missed doses    Negatives:  Signs/symptoms of thrombosis, Signs/symptoms of bleeding, Laboratory test error suspected, Change in health, Change in alcohol use, Change in activity, Emergency department visit, Upcoming dental procedure, Extra doses, Change in medications, Change in diet/appetite, Hospital admission, Bruising, Other complaints          There were no vitals filed for this visit.   pt declined vitals    Verified current warfarin dosing schedule.    Medications reconciled   Pt is on ASA 81 mg as antiplatelet therapy for TAVR     A/P   INR  sub-therapeutic.     Warfarin dosing recommendation: Continue holding for ablation, then post op, bolus with 6 mg x 2 days then continue regimen    Pt educated to contact our clinic with any changes in medications or s/s of bleeding or thrombosis. Pt is aware to seek immediate medical attention for falls, head injury or deep cuts.    Follow up appointment in 6  Day(s).    Sun Diaz, PharmD

## 2021-12-08 LAB — INR BLD: 1.2 (ref 0.9–1.2)

## 2021-12-09 DIAGNOSIS — Z79.01 CHRONIC ANTICOAGULATION: ICD-10-CM

## 2021-12-13 ENCOUNTER — ANTICOAGULATION VISIT (OUTPATIENT)
Dept: VASCULAR LAB | Facility: MEDICAL CENTER | Age: 81
End: 2021-12-13
Attending: INTERNAL MEDICINE
Payer: MEDICARE

## 2021-12-13 DIAGNOSIS — D68.69 SECONDARY HYPERCOAGULABLE STATE (HCC): ICD-10-CM

## 2021-12-13 DIAGNOSIS — Z95.3 STATUS POST TRANSCATHETER AORTIC VALVE REPLACEMENT (TAVR) USING BIOPROSTHESIS: ICD-10-CM

## 2021-12-13 LAB
INR BLD: 1.6 (ref 0.9–1.2)
INR PPP: 1.6 (ref 2–3.5)

## 2021-12-13 PROCEDURE — 99212 OFFICE O/P EST SF 10 MIN: CPT | Performed by: STUDENT IN AN ORGANIZED HEALTH CARE EDUCATION/TRAINING PROGRAM

## 2021-12-13 PROCEDURE — 85610 PROTHROMBIN TIME: CPT

## 2021-12-13 NOTE — PROGRESS NOTES
Anticoagulation Summary  As of 2021    INR goal:  2.0-3.0   TTR:  62.4 % (5.1 y)   INR used for dosin.60 (2021)   Warfarin maintenance plan:  4 mg (4 mg x 1) every day   Weekly warfarin total:  28 mg   Plan last modified:  CLAYTON Jacob (2021)   Next INR check:  2021   Target end date:  Indefinite    Indications    Status post transcatheter aortic valve replacement (TAVR) using bioprosthesis [Z95.3]  Atrial fibrillation (HCC) [I48.91]  Secondary hypercoagulable state (HCC) [D68.69]             Anticoagulation Episode Summary     INR check location:      Preferred lab:      Send INR reminders to:      Comments:  Pt goes by Kinsey      Anticoagulation Care Providers     Provider Role Specialty Phone number    Vickey Rutherford M.D. Referring Cardiovascular Disease (Cardiology) [phone]    [facility] Responsible  [phone]                Refer to Patient Findings for HPI:  Patient Findings     Positives:  Missed doses (Pt just had spinal nerve abalation last week and was holding warfarin prior to the procedure.)    Negatives:  Signs/symptoms of thrombosis, Signs/symptoms of bleeding, Laboratory test error suspected, Change in health, Change in alcohol use, Change in activity, Upcoming invasive procedure, Emergency department visit, Upcoming dental procedure, Extra doses, Change in medications, Change in diet/appetite, Hospital admission, Bruising, Other complaints          There were no vitals filed for this visit.  pt declined vitals    Verified current warfarin dosing schedule.    Medications reconciled   Pt is on asa 81 MG as antiplatelet therapy for TVR and must be reviewed again on - taking indefinitely.      A/P   INR  sub-therapeutic.     Warfarin dosing recommendation: Pt to bolus with 6mg today then resume regular regimen and follow up in 3 days. Less aggressive bolus given recent spinal surgery and no history of blood clots. Patient does not  need to bridged given his [id number] score of 3 and history of bioprosthetic TAVR.    Pt educated to contact our clinic with any changes in medications or s/s of bleeding or thrombosis. Pt is aware to seek immediate medical attention for falls, head injury or deep cuts.    Follow up appointment in 3 days.    James Paz, AbhishekD

## 2021-12-16 ENCOUNTER — TELEPHONE (OUTPATIENT)
Dept: CARDIOLOGY | Facility: MEDICAL CENTER | Age: 81
End: 2021-12-16

## 2021-12-16 ENCOUNTER — ANTICOAGULATION VISIT (OUTPATIENT)
Dept: VASCULAR LAB | Facility: MEDICAL CENTER | Age: 81
End: 2021-12-16
Attending: INTERNAL MEDICINE
Payer: MEDICARE

## 2021-12-16 VITALS — HEART RATE: 72 BPM | SYSTOLIC BLOOD PRESSURE: 127 MMHG | DIASTOLIC BLOOD PRESSURE: 77 MMHG

## 2021-12-16 DIAGNOSIS — Z95.3 STATUS POST TRANSCATHETER AORTIC VALVE REPLACEMENT (TAVR) USING BIOPROSTHESIS: ICD-10-CM

## 2021-12-16 DIAGNOSIS — D68.69 SECONDARY HYPERCOAGULABLE STATE (HCC): ICD-10-CM

## 2021-12-16 LAB — INR PPP: 1.8 (ref 2–3.5)

## 2021-12-16 PROCEDURE — 85610 PROTHROMBIN TIME: CPT

## 2021-12-16 PROCEDURE — 99212 OFFICE O/P EST SF 10 MIN: CPT

## 2021-12-16 NOTE — PROGRESS NOTES
Anticoagulation Summary  As of 2021    INR goal:  2.0-3.0   TTR:  62.3 % (5.1 y)   INR used for dosin.80 (2021)   Warfarin maintenance plan:  6 mg (4 mg x 1.5) every Mon, Fri; 4 mg (4 mg x 1) all other days   Weekly warfarin total:  32 mg   Plan last modified:  Sin Huitron PharmD (2021)   Next INR check:  2021   Target end date:  Indefinite    Indications    Status post transcatheter aortic valve replacement (TAVR) using bioprosthesis [Z95.3]  Atrial fibrillation (HCC) [I48.91]  Secondary hypercoagulable state (HCC) [D68.69]             Anticoagulation Episode Summary     INR check location:      Preferred lab:      Send INR reminders to:      Comments:  Pt goes by Kinsey      Anticoagulation Care Providers     Provider Role Specialty Phone number    Vickey Rutherford M.D. Referring Cardiovascular Disease (Cardiology) [phone]    [facility] Responsible  [phone]                Refer to Patient Findings for HPI:      Vitals:    21 0815   BP: 127/77   Pulse: 72       Verified current warfarin dosing schedule.    Medications reconciled   Pt is on 81 mg ASA as antiplatelet therapy for TAVR and must be reviewed again on Next cardiology appointment.     Message sent to cardiology to investigate LOT for ASA      A/P   INR  SUB-therapeutic.     Warfarin dosing recommendation: Begin increased warfarin regimen.     Pt educated to contact our clinic with any changes in medications or s/s of bleeding or thrombosis. Pt is aware to seek immediate medical attention for falls, head injury or deep cuts.    Follow up appointment in 5 days.     Sin Huitron, AbhishekD

## 2021-12-16 NOTE — TELEPHONE ENCOUNTER
----- Message from TARA TeresaPBruceRBruceNBruce sent at 12/16/2021 11:34 AM PST -----  Needs follow-up with Dr. Rutherford in March.  Thank you

## 2021-12-17 LAB — INR BLD: 1.8 (ref 0.9–1.2)

## 2021-12-21 ENCOUNTER — ANTICOAGULATION VISIT (OUTPATIENT)
Dept: VASCULAR LAB | Facility: MEDICAL CENTER | Age: 81
End: 2021-12-21
Attending: INTERNAL MEDICINE
Payer: MEDICARE

## 2021-12-21 DIAGNOSIS — D68.69 SECONDARY HYPERCOAGULABLE STATE (HCC): ICD-10-CM

## 2021-12-21 DIAGNOSIS — Z95.3 STATUS POST TRANSCATHETER AORTIC VALVE REPLACEMENT (TAVR) USING BIOPROSTHESIS: ICD-10-CM

## 2021-12-21 LAB
INR BLD: 1.6 (ref 0.9–1.2)
INR PPP: 1.6 (ref 2–3.5)

## 2021-12-21 PROCEDURE — 99212 OFFICE O/P EST SF 10 MIN: CPT

## 2021-12-21 PROCEDURE — 85610 PROTHROMBIN TIME: CPT

## 2021-12-21 NOTE — PROGRESS NOTES
Anticoagulation Summary  As of 2021    INR goal:  2.0-3.0   TTR:  62.1 % (5.1 y)   INR used for dosin.60 (2021)   Warfarin maintenance plan:  4 mg (4 mg x 1) every Mon, Wed, Fri; 6 mg (4 mg x 1.5) all other days   Weekly warfarin total:  36 mg   Plan last modified:  Edwin Starr, PharmD (2021)   Next INR check:     Target end date:  Indefinite    Indications    Status post transcatheter aortic valve replacement (TAVR) using bioprosthesis [Z95.3]  Atrial fibrillation (HCC) [I48.91]  Secondary hypercoagulable state (HCC) [D68.69]             Anticoagulation Episode Summary     INR check location:      Preferred lab:      Send INR reminders to:      Comments:  Pt goes by Kinsey      Anticoagulation Care Providers     Provider Role Specialty Phone number    Vickey Rutherford M.D. Referring Cardiovascular Disease (Cardiology) [phone]    [facility] Responsible  [phone]                Refer to Patient Findings for HPI:  Patient Findings     Negatives:  Signs/symptoms of thrombosis, Signs/symptoms of bleeding, Laboratory test error suspected, Change in health, Change in alcohol use, Change in activity, Upcoming invasive procedure, Emergency department visit, Upcoming dental procedure, Missed doses, Extra doses, Change in medications, Change in diet/appetite, Hospital admission, Bruising, Other complaints          There were no vitals filed for this visit.   pt declined vitals    Verified current warfarin dosing schedule.    Medications reconciled   Pt is on 81 mg ASA as antiplatelet therapy for TAVR and must be reviewed again on Next cardiology appointment.       A/P   INR  SUB-therapeutic.     Warfarin dosing recommendation: Instructed pt to BOLUS 1x to 8mg, then increase dose as noted above to 4 mg Mon,Wed,Fri; 6mg ROW      Pt educated to contact our clinic with any changes in medications or s/s of bleeding or thrombosis. Pt is aware to seek immediate medical  attention for falls, head injury or deep cuts.    Follow up appointment in 1 week(s).    Edwin Starr, PharmD

## 2021-12-28 ENCOUNTER — ANTICOAGULATION VISIT (OUTPATIENT)
Dept: VASCULAR LAB | Facility: MEDICAL CENTER | Age: 81
End: 2021-12-28
Attending: INTERNAL MEDICINE
Payer: MEDICARE

## 2021-12-28 DIAGNOSIS — D68.69 SECONDARY HYPERCOAGULABLE STATE (HCC): ICD-10-CM

## 2021-12-28 DIAGNOSIS — Z95.3 STATUS POST TRANSCATHETER AORTIC VALVE REPLACEMENT (TAVR) USING BIOPROSTHESIS: ICD-10-CM

## 2021-12-28 LAB
INR BLD: 3.8 (ref 0.9–1.2)
INR PPP: 3.8 (ref 2–3.5)

## 2021-12-28 PROCEDURE — 85610 PROTHROMBIN TIME: CPT

## 2021-12-28 PROCEDURE — 99212 OFFICE O/P EST SF 10 MIN: CPT

## 2021-12-28 NOTE — PROGRESS NOTES
Anticoagulation Summary  As of 12/28/2021    INR goal:  2.0-3.0   TTR:  62.1 % (5.2 y)   INR used for dosing:  3.80 (12/28/2021)   Warfarin maintenance plan:  4 mg (4 mg x 1) every Mon, Wed, Fri; 6 mg (4 mg x 1.5) all other days   Weekly warfarin total:  36 mg   Plan last modified:  Edwin Starr, PharmD (12/21/2021)   Next INR check:  1/4/2022   Target end date:  Indefinite    Indications    Status post transcatheter aortic valve replacement (TAVR) using bioprosthesis [Z95.3]  Atrial fibrillation (HCC) [I48.91]  Secondary hypercoagulable state (HCC) [D68.69]             Anticoagulation Episode Summary     INR check location:      Preferred lab:      Send INR reminders to:      Comments:  Pt goes by Kinsey      Anticoagulation Care Providers     Provider Role Specialty Phone number    Vickey Rutherford M.D. Referring Cardiovascular Disease (Cardiology) [phone]    [facility] Anticoagulation Services Responsible  [phone]                Refer to Patient Findings for HPI:  Patient Findings     Negatives:  Signs/symptoms of thrombosis, Signs/symptoms of bleeding, Laboratory test error suspected, Change in health, Change in alcohol use, Change in activity, Upcoming invasive procedure, Emergency department visit, Upcoming dental procedure, Missed doses, Extra doses, Change in medications, Change in diet/appetite, Hospital admission, Bruising, Other complaints          There were no vitals filed for this visit.  pt declined vitals    Verified current warfarin dosing schedule.    Medications reconciled   Pt is not on antiplatelet therapy    A/P   INR  is supra-therapeutic.     Warfarin dosing recommendation: Will have patient take a reduced dose of 2mg tonight 12/28 then resume regimen given history of subtherapeutic INR's on this regimen.     Pt educated to contact our clinic with any changes in medications or s/s of bleeding or thrombosis. Pt is aware to seek immediate medical attention for falls, head injury or  deep cuts.    Follow up appointment in 1 week(s).    Con Yanez, AbhishekD

## 2022-01-04 ENCOUNTER — ANTICOAGULATION VISIT (OUTPATIENT)
Dept: VASCULAR LAB | Facility: MEDICAL CENTER | Age: 82
End: 2022-01-04
Attending: INTERNAL MEDICINE
Payer: MEDICARE

## 2022-01-04 VITALS — DIASTOLIC BLOOD PRESSURE: 69 MMHG | HEART RATE: 77 BPM | SYSTOLIC BLOOD PRESSURE: 112 MMHG

## 2022-01-04 DIAGNOSIS — D68.69 SECONDARY HYPERCOAGULABLE STATE (HCC): ICD-10-CM

## 2022-01-04 DIAGNOSIS — I48.91 ATRIAL FIBRILLATION, UNSPECIFIED TYPE (HCC): ICD-10-CM

## 2022-01-04 DIAGNOSIS — Z95.3 STATUS POST TRANSCATHETER AORTIC VALVE REPLACEMENT (TAVR) USING BIOPROSTHESIS: ICD-10-CM

## 2022-01-04 LAB
INR BLD: 2.8 (ref 0.9–1.2)
INR PPP: 2.8 (ref 2–3.5)

## 2022-01-04 PROCEDURE — 85610 PROTHROMBIN TIME: CPT

## 2022-01-04 PROCEDURE — 99211 OFF/OP EST MAY X REQ PHY/QHP: CPT

## 2022-01-04 NOTE — PROGRESS NOTES
Anticoagulation Summary  As of 2022    INR goal:  2.0-3.0   TTR:  61.9 % (5.2 y)   INR used for dosin.80 (2022)   Warfarin maintenance plan:  6 mg (4 mg x 1.5) every Sun, Tue, Thu; 4 mg (4 mg x 1) all other days   Weekly warfarin total:  34 mg   Plan last modified:  Carli Kendall (2022)   Next INR check:  2022   Target end date:  Indefinite    Indications    Status post transcatheter aortic valve replacement (TAVR) using bioprosthesis [Z95.3]  Atrial fibrillation (HCC) [I48.91]  Secondary hypercoagulable state (HCC) [D68.69]             Anticoagulation Episode Summary     INR check location:      Preferred lab:      Send INR reminders to:      Comments:  Pt goes by Kinsey      Anticoagulation Care Providers     Provider Role Specialty Phone number    Vickey Rutherford M.D. Referring Cardiovascular Disease (Cardiology) [phone]    [facility] Anticoagulation Services Responsible  [phone]        Refer to Patient Findings for HPI:    Vitals:    22 0902   BP: 112/69   Pulse: 77     Patient seen in clinic today for follow up on anticoagulation therapy with warfarin (a high risk medication) for hx of AF and s/p TAVR  Verified current warfarin dosing schedule.  Patient denies any missed doses of warfarin.    Medications reconciled   Pt is not on antiplatelet therapy      A/P   INR is therapeutic today at 2.8.     Warfarin dosing recommendation: Continue with slightly reduced regimen to prevent getting too high. Patient will begin taking 6mg on Sun, Tues, Thurs and 4mg ROW.     Pt educated to contact our clinic with any changes in medications or s/s of bleeding or thrombosis. Pt is aware to seek immediate medical attention for falls, head injury or deep cuts.    Follow up appointment in 1 week(s).    Next appt:  @ 8:15am     Sampson Kendall  PharmD

## 2022-01-05 ENCOUNTER — APPOINTMENT (RX ONLY)
Dept: URBAN - METROPOLITAN AREA CLINIC 36 | Facility: CLINIC | Age: 82
Setting detail: DERMATOLOGY
End: 2022-01-05

## 2022-01-11 ENCOUNTER — ANTICOAGULATION VISIT (OUTPATIENT)
Dept: VASCULAR LAB | Facility: MEDICAL CENTER | Age: 82
End: 2022-01-11
Attending: INTERNAL MEDICINE
Payer: MEDICARE

## 2022-01-11 DIAGNOSIS — D68.69 SECONDARY HYPERCOAGULABLE STATE (HCC): ICD-10-CM

## 2022-01-11 DIAGNOSIS — Z95.3 STATUS POST TRANSCATHETER AORTIC VALVE REPLACEMENT (TAVR) USING BIOPROSTHESIS: ICD-10-CM

## 2022-01-11 LAB
INR BLD: 3.1 (ref 0.9–1.2)
INR PPP: 3.1 (ref 2–3.5)

## 2022-01-11 PROCEDURE — 99212 OFFICE O/P EST SF 10 MIN: CPT

## 2022-01-11 PROCEDURE — 85610 PROTHROMBIN TIME: CPT

## 2022-01-11 NOTE — PROGRESS NOTES
OP Anticoagulation Service Note    Date: 1/11/2022    Anticoagulation Summary  As of 1/11/2022    INR goal:  2.0-3.0   TTR:  61.9 % (5.2 y)   INR used for dosing:  3.10 (1/11/2022)   Warfarin maintenance plan:  6 mg (4 mg x 1.5) every Sun, Tue, Thu; 4 mg (4 mg x 1) all other days   Weekly warfarin total:  34 mg   Plan last modified:  Carli Kendall (1/4/2022)   Next INR check:  1/25/2022   Target end date:  Indefinite    Indications    Status post transcatheter aortic valve replacement (TAVR) using bioprosthesis [Z95.3]  Atrial fibrillation (HCC) [I48.91]  Secondary hypercoagulable state (HCC) [D68.69]             Anticoagulation Episode Summary     INR check location:      Preferred lab:      Send INR reminders to:      Comments:  Pt goes by Kinsey      Anticoagulation Care Providers     Provider Role Specialty Phone number    Vickey Rutherford M.D. Referring Cardiovascular Disease (Cardiology) [phone]    [facility] Anticoagulation Services Responsible  [phone]        Anticoagulation Patient Findings  Patient Findings     Negatives:  Signs/symptoms of thrombosis, Signs/symptoms of bleeding, Laboratory test error suspected, Change in health, Change in alcohol use, Change in activity, Upcoming invasive procedure, Emergency department visit, Upcoming dental procedure, Missed doses, Extra doses, Change in medications, Change in diet/appetite, Hospital admission, Bruising, Other complaints          HPI:     Duane Parkinson is in the Anticoagulation Clinic today.     The reason for today's visit is to prevent morbidity and mortality from a blood clot and/or stroke and to reduce the risk of bleeding while on a anticoagulant.     PCP:  Dyllan Robles M.D.  [address]  [place] [address] [phone]    Lab Results   Component Value Date/Time    HBA1C 5.5 09/10/2021 08:56 AM      Lab Results   Component Value Date/Time    CHOLSTRLTOT 115 02/24/2021 12:25 PM    LDL 33 02/24/2021  12:25 PM    HDL 32 (A) 02/24/2021 12:25 PM    TRIGLYCERIDE 248 (H) 02/24/2021 12:25 PM       Lab Results   Component Value Date/Time    GLUCOSE 96 02/24/2021 12:25 PM    BUN 15 02/24/2021 12:25 PM    CREATININE 0.73 02/24/2021 12:25 PM     Lab Results   Component Value Date/Time    ALKPHOSPHAT 82 02/24/2021 12:25 PM    ASTSGOT 48 (H) 02/24/2021 12:25 PM    ALTSGPT 56 (H) 02/24/2021 12:25 PM    TBILIRUBIN 0.6 02/24/2021 12:25 PM    INR 3.10 01/11/2022 12:00 AM    ALBUMIN 3.8 02/24/2021 12:25 PM      Lab Results   Component Value Date/Time    RBC 5.19 02/24/2021 12:25 PM    HEMOGLOBIN 16.7 02/24/2021 12:25 PM    HEMATOCRIT 50.2 02/24/2021 12:25 PM    PLATELETCT 258 06/18/2020 09:12 AM      Lab Results   Component Value Date/Time    MALBCRT see below 06/20/2017 07:37 AM    MICROALBUR <0.7 06/20/2017 07:37 AM       Current Outpatient Medications:   •  warfarin, TAKE 1-1.5 TABLETS BY MOUTH ONCE DAILY OR AS DIRECTED BY COUMADIN CLINIC  •  aspirin, 81 mg, Oral, DAILY  •  digoxin, 250 mcg, Oral, QHS  •  metoprolol SR, 50 mg, Oral, QDAY  •  pravastatin, TAKE 1 TABLET BY MOUTH DAILY  •  GLUCOSAMINE HCL PO, 1 Tablet, Oral, BID  •  Vitamin C, Take  by mouth.  •  Coenzyme Q10 (COQ10 PO), Take 300 mg by mouth.  •  fish oil, 2,000 mg, Oral, QDAY with Breakfast  •  lysine, 500 mg, Oral, DAILY  •  acetaminophen, 500-1,000 mg, Oral, Q6HRS PRN  •  gabapentin, 300 mg, Oral, DAILY  •  B Complex Vitamins (VITAMIN B COMPLEX PO), 1 Tablet, Oral, Q EVENING  •  Psyllium (METAMUCIL PO), Take  by mouth every morning.  •  Vitamin D3, 1 Tablet, Oral, Q EVENING  •  multivitamin, 1 Tablet, Oral, QHS    Assessment:     • INR  supra-therapeutic.   • Is pt on antiplatelet therapy: yes  • Is pt on NSAID: prn, but rare    3 vitals included with today's appt-unless patient declined:  (BP, weight, ht, RR)   There were no vitals filed for this visit.    Plan:     • 4mg today then continue the same warfarin dose, as noted above.   •     Follow-up:      • Test in 2 week(s).    • This decision was made using shared decision making with the pt and benefits vs risks were discussed.      Additional information discussed with patient:     • Pt educated to contact our clinic with any changes in medications or s/s of bleeding or thrombosis.  • Education was provided today regarding tips to reduce their bleed risk and dietary constraints while on a anticoagulant.    National recommendations regarding anticoagulation therapy:     The CHEST guidelines recommends frequent monitoring at regular intervals for any patient on a anticoagulant, to ensure the patient is on the proper dose, and to monitor that they are not having any complications from therapy.       Sean Guzman, PharmD, MS, BCACP, [facility]  Phone [phone] fax [phone]    This note was created using voice recognition software (Dragon). The accuracy of the dictation is limited by the abilities of the software. I have reviewed the note prior to signing, however some errors in grammar and context are still possible. If you have any questions related to this note please do not hesitate to contact our office.

## 2022-01-25 ENCOUNTER — ANTICOAGULATION VISIT (OUTPATIENT)
Dept: VASCULAR LAB | Facility: MEDICAL CENTER | Age: 82
End: 2022-01-25
Attending: INTERNAL MEDICINE
Payer: MEDICARE

## 2022-01-25 VITALS — HEART RATE: 73 BPM | SYSTOLIC BLOOD PRESSURE: 112 MMHG | DIASTOLIC BLOOD PRESSURE: 65 MMHG

## 2022-01-25 DIAGNOSIS — D68.69 SECONDARY HYPERCOAGULABLE STATE (HCC): ICD-10-CM

## 2022-01-25 DIAGNOSIS — Z95.3 STATUS POST TRANSCATHETER AORTIC VALVE REPLACEMENT (TAVR) USING BIOPROSTHESIS: ICD-10-CM

## 2022-01-25 LAB
INR BLD: 2.3 (ref 0.9–1.2)
INR PPP: 2.3 (ref 2–3.5)

## 2022-01-25 PROCEDURE — 99211 OFF/OP EST MAY X REQ PHY/QHP: CPT

## 2022-01-25 PROCEDURE — 85610 PROTHROMBIN TIME: CPT

## 2022-01-25 NOTE — PROGRESS NOTES
Anticoagulation Summary  As of 2022    INR goal:  2.0-3.0   TTR:  62.1 % (5.2 y)   INR used for dosin.30 (2022)   Warfarin maintenance plan:  6 mg (4 mg x 1.5) every Sun, Tue, Thu; 4 mg (4 mg x 1) all other days   Weekly warfarin total:  34 mg   Plan last modified:  Carli Kendall (2022)   Next INR check:  2022   Target end date:  Indefinite    Indications    Status post transcatheter aortic valve replacement (TAVR) using bioprosthesis [Z95.3]  Atrial fibrillation (HCC) [I48.91]  Secondary hypercoagulable state (HCC) [D68.69]             Anticoagulation Episode Summary     INR check location:      Preferred lab:      Send INR reminders to:      Comments:  Pt goes by Kinsey      Anticoagulation Care Providers     Provider Role Specialty Phone number    Vickey Rutherford M.D. 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    Medications reconciled   Pt is on ASA as antiplatelet therapy for s/p TAVR and must be reviewed again on taking indefinitely per cards.      A/P   INR  therapeutic.     Warfarin dosing recommendation: Pt is to continue with current warfarin dosing regimen.      Pt educated to contact our clinic with any changes in medications or s/s of bleeding or  thrombosis. Pt is aware to seek immediate medical attention for falls, head injury or deep cuts.    Follow up appointment in 4 week(s).    Edwin Starr, AbhishekD

## 2022-02-22 ENCOUNTER — ANTICOAGULATION VISIT (OUTPATIENT)
Dept: VASCULAR LAB | Facility: MEDICAL CENTER | Age: 82
End: 2022-02-22
Attending: INTERNAL MEDICINE
Payer: MEDICARE

## 2022-02-22 DIAGNOSIS — D68.69 SECONDARY HYPERCOAGULABLE STATE (HCC): ICD-10-CM

## 2022-02-22 DIAGNOSIS — Z95.3 STATUS POST TRANSCATHETER AORTIC VALVE REPLACEMENT (TAVR) USING BIOPROSTHESIS: ICD-10-CM

## 2022-02-22 LAB
INR BLD: 3.3 (ref 0.9–1.2)
INR PPP: 3.3 (ref 2–3.5)

## 2022-02-22 PROCEDURE — 85610 PROTHROMBIN TIME: CPT

## 2022-02-22 PROCEDURE — 99212 OFFICE O/P EST SF 10 MIN: CPT

## 2022-02-22 NOTE — PROGRESS NOTES
OP Anticoagulation Service Note    Date: 2/22/2022    Anticoagulation Summary  As of 2/22/2022    INR goal:  2.0-3.0   TTR:  62.2 % (5.3 y)   INR used for dosing:  3.30 (2/22/2022)   Warfarin maintenance plan:  4 mg (4 mg x 1) every day   Weekly warfarin total:  28 mg   Plan last modified:  Sean Guzman, PharmD (2/22/2022)   Next INR check:  3/7/2022   Target end date:  Indefinite    Indications    Status post transcatheter aortic valve replacement (TAVR) using bioprosthesis [Z95.3]  Atrial fibrillation (HCC) [I48.91]  Secondary hypercoagulable state (HCC) [D68.69]             Anticoagulation Episode Summary     INR check location:      Preferred lab:      Send INR reminders to:      Comments:  Pt goes by Kinsey      Anticoagulation Care Providers     Provider Role Specialty Phone number    Vickey Rutherford M.D. Referring Cardiovascular Disease (Cardiology) [phone]    [facility] Anticoagulation Services Responsible  [phone]        Anticoagulation Patient Findings  Patient Findings     Positives:  Change in diet/appetite (less food in general )    Negatives:  Signs/symptoms of thrombosis, Signs/symptoms of bleeding, Laboratory test error suspected, Change in health, Change in alcohol use, Change in activity, Upcoming invasive procedure, Emergency department visit, Upcoming dental procedure, Missed doses, Extra doses, Change in medications, Hospital admission, Bruising, Other complaints          HPI:     Duane Parkinson is in the Anticoagulation Clinic today.     The reason for today's visit is to prevent morbidity and mortality from a blood clot and/or stroke and to reduce the risk of bleeding while on a anticoagulant.     PCP:  Dyllan Robles M.D.  [address]  [place] [address]  [phone]    Lab Results   Component Value Date/Time    HBA1C 5.5 09/10/2021 08:56 AM      Lab Results   Component Value Date/Time    CHOLSTRLTOT 115 02/24/2021 12:25 PM    LDL 33 02/24/2021  12:25 PM    HDL 32 (A) 02/24/2021 12:25 PM    TRIGLYCERIDE 248 (H) 02/24/2021 12:25 PM       Lab Results   Component Value Date/Time    GLUCOSE 96 02/24/2021 12:25 PM    BUN 15 02/24/2021 12:25 PM    CREATININE 0.73 02/24/2021 12:25 PM     Lab Results   Component Value Date/Time    ALKPHOSPHAT 82 02/24/2021 12:25 PM    ASTSGOT 48 (H) 02/24/2021 12:25 PM    ALTSGPT 56 (H) 02/24/2021 12:25 PM    TBILIRUBIN 0.6 02/24/2021 12:25 PM    INR 3.30 02/22/2022 12:00 AM    ALBUMIN 3.8 02/24/2021 12:25 PM      Lab Results   Component Value Date/Time    RBC 5.19 02/24/2021 12:25 PM    HEMOGLOBIN 16.7 02/24/2021 12:25 PM    HEMATOCRIT 50.2 02/24/2021 12:25 PM    PLATELETCT 258 06/18/2020 09:12 AM      Lab Results   Component Value Date/Time    MALBCRT see below 06/20/2017 07:37 AM    MICROALBUR <0.7 06/20/2017 07:37 AM       Current Outpatient Medications:   •  warfarin, TAKE 1-1.5 TABLETS BY MOUTH ONCE DAILY OR AS DIRECTED BY COUMADIN CLINIC  •  aspirin, 81 mg, Oral, DAILY  •  digoxin, 250 mcg, Oral, QHS  •  metoprolol SR, 50 mg, Oral, QDAY  •  pravastatin, TAKE 1 TABLET BY MOUTH DAILY  •  GLUCOSAMINE HCL PO, 1 Tablet, Oral, BID  •  Vitamin C, Take  by mouth.  •  Coenzyme Q10 (COQ10 PO), Take 300 mg by mouth.  •  fish oil, 2,000 mg, Oral, QDAY with Breakfast  •  lysine, 500 mg, Oral, DAILY  •  acetaminophen, 500-1,000 mg, Oral, Q6HRS PRN  •  gabapentin, 300 mg, Oral, DAILY  •  B Complex Vitamins (VITAMIN B COMPLEX PO), 1 Tablet, Oral, Q EVENING  •  Psyllium (METAMUCIL PO), Take  by mouth every morning.  •  Vitamin D3, 1 Tablet, Oral, Q EVENING  •  multivitamin, 1 Tablet, Oral, QHS    Assessment:     • INR  supra-therapeutic.   • Is pt on antiplatelet therapy: yes for a valve, and this should be reviewed again on 3/7 with cardiologist .   • Is pt on NSAID: no    3 vitals included with today's appt-unless patient declined:  (BP, weight, ht, RR)   There were no vitals filed for this visit.    Plan:     • 2mg today then decrease  weekly warfarin dose as noted above.       Follow-up:     • Test in 1 week(s).    • This decision was made using shared decision making with the pt and benefits vs risks were discussed.      Additional information discussed with patient:     • Pt educated to contact our clinic with any changes in medications or s/s of bleeding or thrombosis.  • Education was provided today regarding tips to reduce their bleed risk and dietary constraints while on a anticoagulant.    National recommendations regarding anticoagulation therapy:     The CHEST guidelines recommends frequent monitoring at regular intervals for any patient on a anticoagulant, to ensure the patient is on the proper dose, and to monitor that they are not having any complications from therapy.       Sean Guzman, PharmD, MS, BCACP, C  [facility]  Phone [phone] fax [phone]    This note was created using voice recognition software (Dragon). The accuracy of the dictation is limited by the abilities of the software. I have reviewed the note prior to signing, however some errors in grammar and context are still possible. If you have any questions related to this note please do not hesitate to contact our office.

## 2022-02-24 DIAGNOSIS — I48.0 PAROXYSMAL ATRIAL FIBRILLATION (HCC): Primary | ICD-10-CM

## 2022-02-25 RX ORDER — DIGOXIN 250 MCG
250 TABLET ORAL
Qty: 90 TABLET | Refills: 2 | Status: SHIPPED | OUTPATIENT
Start: 2022-02-25 | End: 2022-11-02 | Stop reason: SDUPTHER

## 2022-03-07 ENCOUNTER — OFFICE VISIT (OUTPATIENT)
Dept: CARDIOLOGY | Facility: MEDICAL CENTER | Age: 82
End: 2022-03-07
Payer: MEDICARE

## 2022-03-07 ENCOUNTER — HOSPITAL ENCOUNTER (OUTPATIENT)
Dept: LAB | Facility: MEDICAL CENTER | Age: 82
End: 2022-03-07
Attending: NURSE PRACTITIONER
Payer: MEDICARE

## 2022-03-07 ENCOUNTER — ANTICOAGULATION VISIT (OUTPATIENT)
Dept: VASCULAR LAB | Facility: MEDICAL CENTER | Age: 82
End: 2022-03-07
Attending: INTERNAL MEDICINE
Payer: MEDICARE

## 2022-03-07 ENCOUNTER — HOSPITAL ENCOUNTER (OUTPATIENT)
Facility: MEDICAL CENTER | Age: 82
End: 2022-03-07
Attending: INTERNAL MEDICINE
Payer: MEDICARE

## 2022-03-07 VITALS
OXYGEN SATURATION: 98 % | BODY MASS INDEX: 30.35 KG/M2 | SYSTOLIC BLOOD PRESSURE: 110 MMHG | HEART RATE: 60 BPM | WEIGHT: 229 LBS | RESPIRATION RATE: 16 BRPM | HEIGHT: 73 IN | DIASTOLIC BLOOD PRESSURE: 70 MMHG

## 2022-03-07 DIAGNOSIS — G47.33 OSA (OBSTRUCTIVE SLEEP APNEA): ICD-10-CM

## 2022-03-07 DIAGNOSIS — E78.5 DYSLIPIDEMIA: ICD-10-CM

## 2022-03-07 DIAGNOSIS — K75.4 AUTOIMMUNE HEPATITIS (HCC): ICD-10-CM

## 2022-03-07 DIAGNOSIS — I48.0 PAROXYSMAL ATRIAL FIBRILLATION (HCC): ICD-10-CM

## 2022-03-07 DIAGNOSIS — E78.2 MIXED HYPERLIPIDEMIA: ICD-10-CM

## 2022-03-07 DIAGNOSIS — I10 ESSENTIAL HYPERTENSION: ICD-10-CM

## 2022-03-07 DIAGNOSIS — D68.69 SECONDARY HYPERCOAGULABLE STATE (HCC): ICD-10-CM

## 2022-03-07 DIAGNOSIS — Z95.3 STATUS POST TRANSCATHETER AORTIC VALVE REPLACEMENT (TAVR) USING BIOPROSTHESIS: ICD-10-CM

## 2022-03-07 DIAGNOSIS — I48.11 LONGSTANDING PERSISTENT ATRIAL FIBRILLATION (HCC): ICD-10-CM

## 2022-03-07 DIAGNOSIS — G47.39 TREATMENT-EMERGENT CENTRAL SLEEP APNEA: ICD-10-CM

## 2022-03-07 LAB
ALBUMIN SERPL BCP-MCNC: 4.1 G/DL (ref 3.2–4.9)
ALBUMIN/GLOB SERPL: 1.2 G/DL
ALP SERPL-CCNC: 111 U/L (ref 30–99)
ALT SERPL-CCNC: 61 U/L (ref 2–50)
ANION GAP SERPL CALC-SCNC: 11 MMOL/L (ref 7–16)
AST SERPL-CCNC: 50 U/L (ref 12–45)
BASOPHILS # BLD AUTO: 0.8 % (ref 0–1.8)
BASOPHILS # BLD: 0.06 K/UL (ref 0–0.12)
BILIRUB SERPL-MCNC: 0.8 MG/DL (ref 0.1–1.5)
BUN SERPL-MCNC: 13 MG/DL (ref 8–22)
CALCIUM SERPL-MCNC: 9.4 MG/DL (ref 8.5–10.5)
CHLORIDE SERPL-SCNC: 104 MMOL/L (ref 96–112)
CHOLEST SERPL-MCNC: 130 MG/DL (ref 100–199)
CO2 SERPL-SCNC: 25 MMOL/L (ref 20–33)
CREAT SERPL-MCNC: 0.61 MG/DL (ref 0.5–1.4)
EOSINOPHIL # BLD AUTO: 0.23 K/UL (ref 0–0.51)
EOSINOPHIL NFR BLD: 3.1 % (ref 0–6.9)
ERYTHROCYTE [DISTWIDTH] IN BLOOD BY AUTOMATED COUNT: 48.4 FL (ref 35.9–50)
FASTING STATUS PATIENT QL REPORTED: NORMAL
FASTING STATUS PATIENT QL REPORTED: NORMAL
GLOBULIN SER CALC-MCNC: 3.5 G/DL (ref 1.9–3.5)
GLUCOSE SERPL-MCNC: 83 MG/DL (ref 65–99)
HCT VFR BLD AUTO: 48.4 % (ref 42–52)
HDLC SERPL-MCNC: 40 MG/DL
HGB BLD-MCNC: 16.1 G/DL (ref 14–18)
IMM GRANULOCYTES # BLD AUTO: 0.04 K/UL (ref 0–0.11)
IMM GRANULOCYTES NFR BLD AUTO: 0.5 % (ref 0–0.9)
INR BLD: 2.8 (ref 0.9–1.2)
INR PPP: 2.8 (ref 2–3.5)
LDLC SERPL CALC-MCNC: 60 MG/DL
LYMPHOCYTES # BLD AUTO: 2.12 K/UL (ref 1–4.8)
LYMPHOCYTES NFR BLD: 28.3 % (ref 22–41)
MCH RBC QN AUTO: 31.6 PG (ref 27–33)
MCHC RBC AUTO-ENTMCNC: 33.3 G/DL (ref 33.7–35.3)
MCV RBC AUTO: 95.1 FL (ref 81.4–97.8)
MONOCYTES # BLD AUTO: 0.73 K/UL (ref 0–0.85)
MONOCYTES NFR BLD AUTO: 9.7 % (ref 0–13.4)
NEUTROPHILS # BLD AUTO: 4.31 K/UL (ref 1.82–7.42)
NEUTROPHILS NFR BLD: 57.6 % (ref 44–72)
NRBC # BLD AUTO: 0 K/UL
NRBC BLD-RTO: 0 /100 WBC
POTASSIUM SERPL-SCNC: 4.8 MMOL/L (ref 3.6–5.5)
PROT SERPL-MCNC: 7.6 G/DL (ref 6–8.2)
RBC # BLD AUTO: 5.09 M/UL (ref 4.7–6.1)
SODIUM SERPL-SCNC: 140 MMOL/L (ref 135–145)
TRIGL SERPL-MCNC: 149 MG/DL (ref 0–149)
WBC # BLD AUTO: 7.5 K/UL (ref 4.8–10.8)

## 2022-03-07 PROCEDURE — 80053 COMPREHEN METABOLIC PANEL: CPT

## 2022-03-07 PROCEDURE — 85014 HEMATOCRIT: CPT

## 2022-03-07 PROCEDURE — 99214 OFFICE O/P EST MOD 30 MIN: CPT | Performed by: INTERNAL MEDICINE

## 2022-03-07 PROCEDURE — 85041 AUTOMATED RBC COUNT: CPT

## 2022-03-07 PROCEDURE — 99211 OFF/OP EST MAY X REQ PHY/QHP: CPT | Performed by: NURSE PRACTITIONER

## 2022-03-07 PROCEDURE — 85018 HEMOGLOBIN: CPT

## 2022-03-07 PROCEDURE — 85048 AUTOMATED LEUKOCYTE COUNT: CPT

## 2022-03-07 PROCEDURE — 85610 PROTHROMBIN TIME: CPT

## 2022-03-07 PROCEDURE — 36415 COLL VENOUS BLD VENIPUNCTURE: CPT

## 2022-03-07 PROCEDURE — 80061 LIPID PANEL: CPT

## 2022-03-07 RX ORDER — PRAVASTATIN SODIUM 80 MG/1
TABLET ORAL
Qty: 90 TABLET | Refills: 3 | Status: SHIPPED | OUTPATIENT
Start: 2022-03-07 | End: 2022-09-07 | Stop reason: SDUPTHER

## 2022-03-07 RX ORDER — METOPROLOL SUCCINATE 50 MG/1
50 TABLET, EXTENDED RELEASE ORAL
Qty: 90 TABLET | Refills: 3 | Status: SHIPPED | OUTPATIENT
Start: 2022-03-07 | End: 2022-09-07 | Stop reason: SDUPTHER

## 2022-03-07 ASSESSMENT — ENCOUNTER SYMPTOMS
CONSTITUTIONAL NEGATIVE: 1
CHILLS: 0
PND: 0
BRUISES/BLEEDS EASILY: 0
COUGH: 0
WEAKNESS: 0
CLAUDICATION: 0
HEMOPTYSIS: 0
EYES NEGATIVE: 1
LOSS OF CONSCIOUSNESS: 0
GASTROINTESTINAL NEGATIVE: 1
RESPIRATORY NEGATIVE: 1
MUSCULOSKELETAL NEGATIVE: 1
SHORTNESS OF BREATH: 0
CARDIOVASCULAR NEGATIVE: 1
NEUROLOGICAL NEGATIVE: 1
SORE THROAT: 0
FEVER: 0
PALPITATIONS: 0
STRIDOR: 0
SPUTUM PRODUCTION: 0
WHEEZING: 0
ORTHOPNEA: 0
DIZZINESS: 0

## 2022-03-07 ASSESSMENT — FIBROSIS 4 INDEX: FIB4 SCORE: 2.09

## 2022-03-07 NOTE — PROGRESS NOTES
Anticoagulation Summary  As of 3/7/2022    INR goal:  2.0-3.0   TTR:  62.1 % (5.4 y)   INR used for dosin.80 (3/7/2022)   Warfarin maintenance plan:  4 mg (4 mg x 1) every day   Weekly warfarin total:  28 mg   Plan last modified:  Sean Guzman, PharmD (2022)   Next INR check:  3/28/2022   Target end date:  Indefinite    Indications    Status post transcatheter aortic valve replacement (TAVR) using bioprosthesis [Z95.3]  Atrial fibrillation (HCC) [I48.91]  Secondary hypercoagulable state (HCC) [D68.69]             Anticoagulation Episode Summary     INR check location:      Preferred lab:      Send INR reminders to:      Comments:  Pt goes by Kinsey      Anticoagulation Care Providers     Provider Role Specialty Phone number    Vickey Rutherford M.D. Referring Cardiovascular Disease (Cardiology) [phone]    [facility] Antico[facility] Services Responsible  [phone]                Refer to Patient Findings for HPI:  Patient Findings     Negatives:  Signs/symptoms of thrombosis, Signs/symptoms of bleeding, Laboratory test error suspected, Change in health, Change in alcohol use, Change in activity, Upcoming invasive procedure, Emergency department visit, Upcoming dental procedure, Missed doses, Extra doses, Change in medications, Change in diet/appetite, Hospital admission, Bruising, Other complaints          There were no vitals filed for this visit.   pt declined vitals    Verified current warfarin dosing schedule.    Medications reconciled   Pt is on ASA 81 mg as antiplatelet therapy for TAVR and must be reviewed again on with cards.  Atrial fibrillation controlled on metoprolol.  Secondary hypercoagulable state due to Atrial Fibrillation/Flutter on warfarin.      A/P   INR  -therapeutic.     Warfarin dosing recommendation: Continue current regimen.    Pt educated to contact our clinic with any changes in medications or s/s of bleeding or thrombosis. Pt is aware to seek immediate medical attention  for falls, head injury or deep cuts.    Follow up appointment in 3 week(s).    MARKELL Jacob.

## 2022-03-07 NOTE — PROGRESS NOTES
Chief Complaint   Patient presents with   • HTN (Controlled)   • Aortic Stenosis   • Atrial Fibrillation   ITP Reviewed    Subjective:   Kinsey Parkinson is a 81 y.o. male who presents today as a follow-up for his aortic stenosis status post TAVR with paroxysmal atrial fibrillation hypertension hyperlipidemia.    Since he was last seen he has been doing well with no chest pain or shortness of breath.  He is functionally active.  His blood pressures been controlled.  His last echocardiogram showed an interval placement of a TAVR with no issues.  He is otherwise been well.    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)    • Clotting disorder (HCC)     reports nose bleeds   • Depression     Lost wife ,still gets tearful   • Diabetes (HCC)     diet controlled   • Encounter for long-term (current) use of other medications    • Gasping for breath    • [place] measles    • Heart murmur    • Heart valve disease    • Hyperlipidemia    • Hypertension    • Insomnia    • Mumps    • Nasal drainage    • Pain     back & thumb   • Pyogenic arthritis, lower leg (HCC)     thumb, back   • Restless leg syndrome    • Sleep apnea     uses CPAP   • Snoring    • Tonsillitis    • Valvular heart disease      Past Surgical History:   Procedure Laterality Date   • GIBRAN N/A 7/29/2019    Procedure: ECHOCARDIOGRAM, TRANSESOPHAGEAL;  Surgeon: Leander Buckner M.D.;  Location: SURGERY [facility];  Service: Cardiac   • TRANSCATHETER AORTIC VALVE REPLACEMENT Bilateral 7/29/2019    Procedure: REPLACEMENT, AORTIC VALVE, TRANSCATHETER;  Surgeon: Leander Buckner M.D.;  Location: SURGERY [facility];  Service: Cardiac   • FINGER ARTHROPLASTY Left 5/27/2016    Procedure: FINGER ARTHROPLASTY THUMB CARPOMETACARPAL EXCISIONAL, EXCISE TRAPEZIUM;  Surgeon: Raheel Salgado M.D.;  Location: SURGERY SAME DAY [facility]  "ORS;  Service:    • CARDIOVERSION      on 06, sinus rhythm until 2007,  paroxysmal atrial fibrillation   • KNEE ARTHROPLASTY TOTAL     • LAMINOTOMY N/A     multiple lumbar spine   • OTHER ORTHOPEDIC SURGERY      lower back   • GA PERCUT IMPLNT NEUROELECT,EPIDURAL     • TOE ARTHROPLASTY     • TURP-VAPOR N/A      Family History   Problem Relation Age of Onset   • Other Mother         unknown   • Heart Disease Mother    • Cancer Father         prostate, skin   • No Known Problems Brother    • Diabetes Brother    • Heart Disease Son    • Sleep Apnea Neg Hx      Social History     Socioeconomic History   • Marital status:      Spouse name: Not on file   • Number of children: Not on file   • Years of education: Not on file   • Highest education level: Not on file   Occupational History   • Not on file   Tobacco Use   • Smoking status: Former Smoker     Packs/day: 1.00     Years: 20.00     Pack years: 20.00     Types: Cigarettes     Quit date: 1972     Years since quittin.1   • Smokeless tobacco: Former User   Vaping Use   • Vaping Use: Never used   Substance and Sexual Activity   • Alcohol use: No     Alcohol/week: 0.0 oz   • Drug use: No   • Sexual activity: Not Currently     Partners: Female   Other Topics Concern   • Not on file   Social History Narrative   • Not on file     Social Determinants of Health     Financial Resource Strain: Not on file   Food Insecurity: Not on file   Transportation Needs: Not on file   Physical Activity: Not on file   Stress: Not on file   Social Connections: Not on file   Intimate Partner Violence: Not on file   Housing Stability: Not on file     Allergies   Allergen Reactions   • Percodan [Oxycodone-Aspirin] Itching and Photosensitivity     \"I sunburn\"     Outpatient Encounter Medications as of 3/7/2022   Medication Sig Dispense Refill   • metoprolol SR (TOPROL XL) 50 MG TABLET SR 24 HR Take 1 Tablet by mouth every day. 90 Tablet 3   • pravastatin (PRAVACHOL) " 80 MG tablet TAKE 1 TABLET BY MOUTH DAILY 90 Tablet 3   • digoxin (LANOXIN) 250 MCG Tab TAKE 1 TABLET BY MOUTH AT BEDTIME 90 Tablet 2   • warfarin (COUMADIN) 4 MG Tab TAKE 1-1.5 TABLETS BY MOUTH ONCE DAILY OR AS DIRECTED BY COUMADIN CLINIC 120 Tablet 1   • aspirin (ASA) 81 MG Chew Tab chewable tablet Chew 1 Tablet every day. 90 Tablet 3   • GLUCOSAMINE HCL PO Take 1 Tablet by mouth 2 times a day.     • Ascorbic Acid (VITAMIN C) 1000 MG Tab Take  by mouth.     • Coenzyme Q10 (COQ10 PO) Take 300 mg by mouth.     • Omega-3 Fatty Acids (FISH OIL) 1000 MG CAPSULE DELAYED RELEASE EC softgel capsule Take 2,000 mg by mouth every morning with breakfast.     • lysine 500 MG Tab Take 500 mg by mouth every day.     • acetaminophen (TYLENOL) 500 MG Tab Take 500-1,000 mg by mouth every 6 hours as needed.     • gabapentin (NEURONTIN) 300 MG Cap Take 300 mg by mouth every day. 1 in the morning, 1 at noon, 2 at 4 pm, 3 at bedtime      • B Complex Vitamins (VITAMIN B COMPLEX PO) Take 1 Tab by mouth every evening.     • Psyllium (METAMUCIL PO) Take  by mouth every morning.     • Cholecalciferol (VITAMIN D3) 2000 UNITS Tab Take 1 Tab by mouth every evening.     • multivitamin (THERAGRAN) TABS Take 1 Tab by mouth every bedtime.     • [DISCONTINUED] metoprolol SR (TOPROL XL) 50 MG TABLET SR 24 HR Take 1 Tablet by mouth every day. 90 Tablet 3   • [DISCONTINUED] pravastatin (PRAVACHOL) 80 MG tablet TAKE 1 TABLET BY MOUTH DAILY 90 Tablet 3     No facility-administered encounter medications on file as of 3/7/2022.     Review of Systems   Constitutional: Negative.  Negative for chills, fever and malaise/fatigue.   HENT: Negative.  Negative for sore throat.    Eyes: Negative.    Respiratory: Negative.  Negative for cough, hemoptysis, sputum production, shortness of breath, wheezing and stridor.    Cardiovascular: Negative.  Negative for chest pain, palpitations, orthopnea, claudication, leg swelling and PND.   Gastrointestinal: Negative.   "  Genitourinary: Negative.    Musculoskeletal: Negative.    Skin: Negative.    Neurological: Negative.  Negative for dizziness, loss of consciousness and weakness.   Endo/Heme/Allergies: Negative.  Does not bruise/bleed easily.   All other systems reviewed and are negative.       Objective:   /70 (BP Location: Left arm, Patient Position: Sitting, BP Cuff Size: Adult)   Pulse 60   Resp 16   Ht 1.854 m (6' 1\")   Wt 104 kg (229 lb)   SpO2 98%   BMI 30.21 kg/m²     Physical Exam  Vitals and nursing note reviewed.   Constitutional:       General: He is not in acute distress.     Appearance: He is well-developed. He is not diaphoretic.   HENT:      Head: Normocephalic and atraumatic.      Right Ear: External ear normal.      Left Ear: External ear normal.      Nose: Nose normal.      Mouth/Throat:      Pharynx: No oropharyngeal exudate.   Eyes:      General: No scleral icterus.        Right eye: No discharge.         Left eye: No discharge.      Conjunctiva/sclera: Conjunctivae normal.      Pupils: Pupils are equal, round, and reactive to light.   Neck:      Vascular: No JVD.   Cardiovascular:      Rate and Rhythm: Normal rate and regular rhythm.      Heart sounds: No murmur heard.    No friction rub. No gallop.   Pulmonary:      Effort: Pulmonary effort is normal. No respiratory distress.      Breath sounds: No stridor. No wheezing or rales.   Chest:      Chest wall: No tenderness.   Abdominal:      General: There is no distension.      Palpations: Abdomen is soft.      Tenderness: There is no guarding.   Musculoskeletal:         General: No tenderness or deformity. Normal range of motion.      Cervical back: Neck supple.   Skin:     General: Skin is warm and dry.      Coloration: Skin is not pale.      Findings: No erythema or rash.   Neurological:      Mental Status: He is alert.      Cranial Nerves: No cranial nerve deficit.      Motor: No abnormal muscle tone.      Coordination: Coordination normal.      " Deep Tendon Reflexes: Reflexes are normal and symmetric. Reflexes normal.   Psychiatric:         Behavior: Behavior normal.         Thought Content: Thought content normal.         Judgment: Judgment normal.         Assessment:     1. Status post transcatheter aortic valve replacement (TAVR) using bioprosthesis     2. Paroxysmal atrial fibrillation (HCC)  CBC W/ DIFF W/O PLATELETS    Comp Metabolic Panel   3. Essential hypertension  metoprolol SR (TOPROL XL) 50 MG TABLET SR 24 HR    CBC W/ DIFF W/O PLATELETS   4. LORENA (obstructive sleep apnea)  CBC W/ DIFF W/O PLATELETS    Comp Metabolic Panel   5. Secondary hypercoagulable state (HCC)  CBC W/ DIFF W/O PLATELETS    Comp Metabolic Panel   6. Dyslipidemia  CBC W/ DIFF W/O PLATELETS   7. Mixed hyperlipidemia  pravastatin (PRAVACHOL) 80 MG tablet       Medical Decision Making:  Today's Assessment / Status / Plan:     81-year-old male with paroxysmal atrial fibrillation hypertension hyperlipidemia and aortic stenosis status post TAVR.  He continues to do well.  Is getting labs today for his primary.  We will add CBC and CMP.  I refilled his pravastatin metoprolol.  I will see him back in 6 months.

## 2022-03-22 DIAGNOSIS — Z79.01 CHRONIC ANTICOAGULATION: ICD-10-CM

## 2022-03-22 RX ORDER — WARFARIN SODIUM 4 MG/1
TABLET ORAL
Qty: 90 TABLET | Refills: 1 | Status: SHIPPED | OUTPATIENT
Start: 2022-03-22 | End: 2022-10-20

## 2022-03-23 DIAGNOSIS — Z79.01 CHRONIC ANTICOAGULATION: ICD-10-CM

## 2022-03-23 RX ORDER — WARFARIN SODIUM 4 MG/1
TABLET ORAL
Qty: 120 TABLET | Refills: 1 | OUTPATIENT
Start: 2022-03-23

## 2022-03-29 ENCOUNTER — ANTICOAGULATION VISIT (OUTPATIENT)
Dept: VASCULAR LAB | Facility: MEDICAL CENTER | Age: 82
End: 2022-03-29
Attending: INTERNAL MEDICINE
Payer: MEDICARE

## 2022-03-29 DIAGNOSIS — Z95.3 STATUS POST TRANSCATHETER AORTIC VALVE REPLACEMENT (TAVR) USING BIOPROSTHESIS: ICD-10-CM

## 2022-03-29 DIAGNOSIS — D68.69 SECONDARY HYPERCOAGULABLE STATE (HCC): ICD-10-CM

## 2022-03-29 LAB
INR BLD: 2.5 (ref 0.9–1.2)
INR PPP: 2.5 (ref 2–3.5)

## 2022-03-29 PROCEDURE — 99211 OFF/OP EST MAY X REQ PHY/QHP: CPT

## 2022-03-29 PROCEDURE — 85610 PROTHROMBIN TIME: CPT

## 2022-03-29 NOTE — PROGRESS NOTES
Anticoagulation Summary  As of 3/29/2022    INR goal:  2.0-3.0   TTR:  62.5 % (5.4 y)   INR used for dosin.50 (3/29/2022)   Warfarin maintenance plan:  4 mg (4 mg x 1) every day   Weekly warfarin total:  28 mg   Plan last modified:  Sean Guzman, PharmD (2022)   Next INR check:  2022   Target end date:  Indefinite    Indications    Status post transcatheter aortic valve replacement (TAVR) using bioprosthesis [Z95.3]  Atrial fibrillation (HCC) [I48.91]  Secondary hypercoagulable state (HCC) [D68.69]             Anticoagulation Episode Summary     INR check location:      Preferred lab:      Send INR reminders to:      Comments:  Pt goes by Kinsey      Anticoagulation Care Providers     Provider Role Specialty Phone number    Vickey Rutherford M.D. Referring Cardiovascular Disease (Cardiology) [phone]    [facility] Responsible  [phone]                Refer to Patient Findings for HPI:  Patient Findings     Negatives:  Signs/symptoms of thrombosis, Signs/symptoms of bleeding, Laboratory test error suspected, Change in health, Change in alcohol use, Change in activity, Upcoming invasive procedure, Emergency department visit, Upcoming dental procedure, Missed doses, Extra doses, Change in medications, Change in diet/appetite, Hospital admission, Bruising, Other complaints          There were no vitals filed for this visit.  pt declined vitals    Verified current warfarin dosing schedule.    Medications reconciled  Pt is on ASA 81 mg as antiplatelet therapy for TAVR and must be reviewed with cards.      A/P   INR  is-therapeutic.     Warfarin dosing recommendation: Pt is to continue with current warfarin dosing regimen.    Pt educated to contact our clinic with any changes in medications or s/s of bleeding or thrombosis. Pt is aware to seek immediate medical attention for falls, head injury or deep cuts.    Follow up appointment in 4 week(s).    Con Yanez,  PharmD

## 2022-04-08 ENCOUNTER — APPOINTMENT (RX ONLY)
Dept: URBAN - METROPOLITAN AREA CLINIC 4 | Facility: CLINIC | Age: 82
Setting detail: DERMATOLOGY
End: 2022-04-08

## 2022-04-08 DIAGNOSIS — L82.1 OTHER SEBORRHEIC KERATOSIS: ICD-10-CM

## 2022-04-08 DIAGNOSIS — D22 MELANOCYTIC NEVI: ICD-10-CM

## 2022-04-08 DIAGNOSIS — D18.0 HEMANGIOMA: ICD-10-CM

## 2022-04-08 DIAGNOSIS — L81.4 OTHER MELANIN HYPERPIGMENTATION: ICD-10-CM

## 2022-04-08 PROBLEM — D22.61 MELANOCYTIC NEVI OF RIGHT UPPER LIMB, INCLUDING SHOULDER: Status: ACTIVE | Noted: 2022-04-08

## 2022-04-08 PROBLEM — D18.01 HEMANGIOMA OF SKIN AND SUBCUTANEOUS TISSUE: Status: ACTIVE | Noted: 2022-04-08

## 2022-04-08 PROBLEM — D22.71 MELANOCYTIC NEVI OF RIGHT LOWER LIMB, INCLUDING HIP: Status: ACTIVE | Noted: 2022-04-08

## 2022-04-08 PROBLEM — D22.72 MELANOCYTIC NEVI OF LEFT LOWER LIMB, INCLUDING HIP: Status: ACTIVE | Noted: 2022-04-08

## 2022-04-08 PROBLEM — D22.5 MELANOCYTIC NEVI OF TRUNK: Status: ACTIVE | Noted: 2022-04-08

## 2022-04-08 PROBLEM — D22.62 MELANOCYTIC NEVI OF LEFT UPPER LIMB, INCLUDING SHOULDER: Status: ACTIVE | Noted: 2022-04-08

## 2022-04-08 PROCEDURE — ? LIQUID NITROGEN

## 2022-04-08 PROCEDURE — 99213 OFFICE O/P EST LOW 20 MIN: CPT

## 2022-04-08 PROCEDURE — ? COUNSELING

## 2022-04-08 ASSESSMENT — LOCATION DETAILED DESCRIPTION DERM
LOCATION DETAILED: LEFT VENTRAL DISTAL FOREARM
LOCATION DETAILED: LEFT RADIAL DORSAL HAND
LOCATION DETAILED: RIGHT CENTRAL PARIETAL SCALP
LOCATION DETAILED: POSTERIOR MID-PARIETAL SCALP
LOCATION DETAILED: RIGHT PROXIMAL CALF
LOCATION DETAILED: LEFT POPLITEAL SKIN
LOCATION DETAILED: RIGHT SUPERIOR FRONTAL SCALP
LOCATION DETAILED: PERIUMBILICAL SKIN
LOCATION DETAILED: LEFT SCAPHA
LOCATION DETAILED: LEFT VENTRAL DISTAL FOREARM
LOCATION DETAILED: LEFT MEDIAL FOREHEAD
LOCATION DETAILED: LEFT CENTRAL PARIETAL SCALP
LOCATION DETAILED: RIGHT MEDIAL UPPER BACK
LOCATION DETAILED: LEFT DISTAL POSTERIOR THIGH
LOCATION DETAILED: LEFT SUPERIOR OCCIPITAL SCALP
LOCATION DETAILED: RIGHT DISTAL POSTERIOR THIGH
LOCATION DETAILED: LEFT INFERIOR LATERAL FOREHEAD
LOCATION DETAILED: RIGHT SUPERIOR PARIETAL SCALP
LOCATION DETAILED: RIGHT INFERIOR MEDIAL MIDBACK
LOCATION DETAILED: LEFT SUPERIOR PARIETAL SCALP
LOCATION DETAILED: RIGHT VENTRAL DISTAL FOREARM
LOCATION DETAILED: XIPHOID
LOCATION DETAILED: RIGHT INFERIOR UPPER BACK
LOCATION DETAILED: RIGHT POPLITEAL SKIN
LOCATION DETAILED: LEFT ANTERIOR DISTAL UPPER ARM
LOCATION DETAILED: RIGHT SUPERIOR HELIX
LOCATION DETAILED: RIGHT MEDIAL INFERIOR CHEST
LOCATION DETAILED: LEFT CENTRAL FRONTAL SCALP
LOCATION DETAILED: RIGHT VENTRAL PROXIMAL FOREARM
LOCATION DETAILED: RIGHT ANTERIOR DISTAL UPPER ARM
LOCATION DETAILED: LEFT MEDIAL FRONTAL SCALP
LOCATION DETAILED: LEFT INFERIOR PARIETAL SCALP
LOCATION DETAILED: LEFT SUPERIOR MEDIAL MIDBACK
LOCATION DETAILED: LEFT INFERIOR CENTRAL MALAR CHEEK
LOCATION DETAILED: EPIGASTRIC SKIN
LOCATION DETAILED: RIGHT RADIAL DORSAL HAND
LOCATION DETAILED: RIGHT INFERIOR PARIETAL SCALP
LOCATION DETAILED: LEFT PROXIMAL CALF
LOCATION DETAILED: LEFT SUPERIOR FRONTAL SCALP
LOCATION DETAILED: RIGHT ANTERIOR PROXIMAL UPPER ARM
LOCATION DETAILED: LEFT ANTECUBITAL SKIN

## 2022-04-08 ASSESSMENT — LOCATION ZONE DERM
LOCATION ZONE: EAR
LOCATION ZONE: ARM
LOCATION ZONE: ARM
LOCATION ZONE: HAND
LOCATION ZONE: LEG
LOCATION ZONE: TRUNK
LOCATION ZONE: FACE
LOCATION ZONE: SCALP

## 2022-04-08 ASSESSMENT — LOCATION SIMPLE DESCRIPTION DERM
LOCATION SIMPLE: LEFT POPLITEAL SKIN
LOCATION SIMPLE: LEFT EAR
LOCATION SIMPLE: RIGHT UPPER BACK
LOCATION SIMPLE: LEFT LOWER BACK
LOCATION SIMPLE: RIGHT UPPER ARM
LOCATION SIMPLE: LEFT FOREARM
LOCATION SIMPLE: POSTERIOR SCALP
LOCATION SIMPLE: RIGHT LOWER BACK
LOCATION SIMPLE: RIGHT FOREARM
LOCATION SIMPLE: LEFT CALF
LOCATION SIMPLE: LEFT FOREARM
LOCATION SIMPLE: LEFT FOREHEAD
LOCATION SIMPLE: RIGHT POPLITEAL SKIN
LOCATION SIMPLE: LEFT UPPER ARM
LOCATION SIMPLE: SCALP
LOCATION SIMPLE: RIGHT EAR
LOCATION SIMPLE: ABDOMEN
LOCATION SIMPLE: LEFT HAND
LOCATION SIMPLE: RIGHT CALF
LOCATION SIMPLE: LEFT ELBOW
LOCATION SIMPLE: LEFT POSTERIOR THIGH
LOCATION SIMPLE: LEFT SCALP
LOCATION SIMPLE: LEFT OCCIPITAL SCALP
LOCATION SIMPLE: RIGHT HAND
LOCATION SIMPLE: LEFT CHEEK
LOCATION SIMPLE: RIGHT POSTERIOR THIGH
LOCATION SIMPLE: CHEST

## 2022-04-08 NOTE — PROCEDURE: LIQUID NITROGEN
Add 52 Modifier (Optional): no
Medical Necessity Clause: This procedure was medically necessary because the lesions that were treated were:  If lesion does not resolve, bx is needed.
Duration Of Freeze Thaw-Cycle (Seconds): 0
Detail Level: Detailed
Consent: The patient's consent was obtained including but not limited to risks of crusting, scabbing, blistering, scarring, darker or lighter pigmentary change, recurrence, incomplete removal and infection.
Medical Necessity Information: It is in your best interest to select a reason for this procedure from the list below. All of these items fulfill various CMS LCD requirements except the new and changing color options.
Spray Paint Text: The liquid nitrogen was applied to the skin utilizing a spray paint frosting technique.
Show Spray Paint Technique Variable?: Yes
Post-Care Instructions: I reviewed with the patient in detail post-care instructions. Patient is to wear sunprotection, and avoid picking at any of the treated lesions. Pt may apply Vaseline to crusted or scabbing areas.

## 2022-04-26 ENCOUNTER — ANTICOAGULATION VISIT (OUTPATIENT)
Dept: VASCULAR LAB | Facility: MEDICAL CENTER | Age: 82
End: 2022-04-26
Attending: INTERNAL MEDICINE
Payer: MEDICARE

## 2022-04-26 DIAGNOSIS — D68.69 SECONDARY HYPERCOAGULABLE STATE (HCC): ICD-10-CM

## 2022-04-26 DIAGNOSIS — Z95.3 STATUS POST TRANSCATHETER AORTIC VALVE REPLACEMENT (TAVR) USING BIOPROSTHESIS: ICD-10-CM

## 2022-04-26 LAB
INR BLD: 2.2 (ref 0.9–1.2)
INR PPP: 2.2 (ref 2–3.5)

## 2022-04-26 PROCEDURE — 99211 OFF/OP EST MAY X REQ PHY/QHP: CPT

## 2022-04-26 PROCEDURE — 85610 PROTHROMBIN TIME: CPT

## 2022-04-26 NOTE — PROGRESS NOTES
Anticoagulation Summary  As of 2022    INR goal:  2.0-3.0   TTR:  63.0 % (5.5 y)   INR used for dosin.20 (2022)   Warfarin maintenance plan:  4 mg (4 mg x 1) every day   Weekly warfarin total:  28 mg   Plan last modified:  Sean Guzman, PharmD (2022)   Next INR check:  2022   Target end date:  Indefinite    Indications    Status post transcatheter aortic valve replacement (TAVR) using bioprosthesis [Z95.3]  Atrial fibrillation (HCC) [I48.91]  Secondary hypercoagulable state (HCC) [D68.69]             Anticoagulation Episode Summary     INR check location:      Preferred lab:      Send INR reminders to:      Comments:  Pt goes by Kinsey      Anticoagulation Care Providers     Provider Role Specialty Phone number    Vickey Rutherford M.D. Referring Cardiovascular Disease (Cardiology) [phone]    [facility] Responsible  [phone]                Refer to Patient Findings for HPI:  Patient Findings     Negatives:  Signs/symptoms of thrombosis, Signs/symptoms of bleeding, Laboratory test error suspected, Change in health, Change in alcohol use, Change in activity, Upcoming invasive procedure, Emergency department visit, Upcoming dental procedure, Missed doses, Extra doses, Change in medications, Change in diet/appetite, Hospital admission, Bruising, Other complaints          There were no vitals filed for this visit.   pt declined vitals    Verified current warfarin dosing schedule.    Medications reconciled   Pt is on ASA 81 mg daily as antiplatelet therapy for s/p TAVR indefinitely per cardiology      A/P   INR  therapeutic.     Warfarin dosing recommendation: Pt is to continue with current warfarin dosing regimen.    Pt educated to contact our clinic with any changes in medications or s/s of bleeding or thrombosis. Pt is aware to seek immediate medical attention for falls, head injury or deep cuts.    Follow up appointment in 6 week(s).    Sun Molina  PharmD

## 2022-05-02 ENCOUNTER — OFFICE VISIT (OUTPATIENT)
Dept: MEDICAL GROUP | Facility: PHYSICIAN GROUP | Age: 82
End: 2022-05-02
Payer: MEDICARE

## 2022-05-02 VITALS
TEMPERATURE: 97.4 F | HEART RATE: 61 BPM | HEIGHT: 73 IN | DIASTOLIC BLOOD PRESSURE: 62 MMHG | SYSTOLIC BLOOD PRESSURE: 110 MMHG | BODY MASS INDEX: 29.82 KG/M2 | WEIGHT: 225 LBS | OXYGEN SATURATION: 95 %

## 2022-05-02 DIAGNOSIS — I48.0 PAROXYSMAL ATRIAL FIBRILLATION (HCC): ICD-10-CM

## 2022-05-02 DIAGNOSIS — H90.3 SENSORINEURAL HEARING LOSS (SNHL) OF BOTH EARS: ICD-10-CM

## 2022-05-02 DIAGNOSIS — G47.33 OSA (OBSTRUCTIVE SLEEP APNEA): ICD-10-CM

## 2022-05-02 DIAGNOSIS — Z00.00 MEDICARE ANNUAL WELLNESS VISIT, SUBSEQUENT: Primary | ICD-10-CM

## 2022-05-02 PROCEDURE — 99213 OFFICE O/P EST LOW 20 MIN: CPT | Performed by: FAMILY MEDICINE

## 2022-05-02 ASSESSMENT — FIBROSIS 4 INDEX: FIB4 SCORE: 2.08

## 2022-05-02 ASSESSMENT — ACTIVITIES OF DAILY LIVING (ADL): BATHING_REQUIRES_ASSISTANCE: 0

## 2022-05-02 ASSESSMENT — PATIENT HEALTH QUESTIONNAIRE - PHQ9: CLINICAL INTERPRETATION OF PHQ2 SCORE: 0

## 2022-05-02 ASSESSMENT — ENCOUNTER SYMPTOMS: GENERAL WELL-BEING: GOOD

## 2022-05-02 NOTE — PROGRESS NOTES
Chief Complaint   Patient presents with   • Annual Wellness Visit         HPI:  Kinsey is a 82 y.o. here for Medicare Annual Wellness Visit    yes    Patient Active Problem List    Diagnosis Date Noted   • LORENA (obstructive sleep apnea) 11/22/2021   • Secondary hypercoagulable state (HCC) 11/22/2021   • Skin cancer 12/17/2020   • Neuropathy 12/22/2017   • Localized primary osteoarthritis of carpometacarpal joint of left thumb 05/27/2016   • Treatment-emergent central sleep apnea 04/21/2016   • Essential hypertension 01/07/2015   • Dyslipidemia 01/07/2015   • IGT (impaired glucose tolerance) 01/07/2015   • Status post transcatheter aortic valve replacement (TAVR) using bioprosthesis 12/30/2011   • Atrial fibrillation (HCC) 12/30/2011   • Lumbar Disc Degeneration 12/30/2011   • Osteoarthrosis involving lower leg 12/30/2011       Current Outpatient Medications   Medication Sig Dispense Refill   • warfarin (COUMADIN) 4 MG Tab Take one tablet daily as directed by [facility] 90 Tablet 1   • metoprolol SR (TOPROL XL) 50 MG TABLET SR 24 HR Take 1 Tablet by mouth every day. 90 Tablet 3   • pravastatin (PRAVACHOL) 80 MG tablet TAKE 1 TABLET BY MOUTH DAILY 90 Tablet 3   • digoxin (LANOXIN) 250 MCG Tab TAKE 1 TABLET BY MOUTH AT BEDTIME 90 Tablet 2   • aspirin (ASA) 81 MG Chew Tab chewable tablet Chew 1 Tablet every day. 90 Tablet 3   • GLUCOSAMINE HCL PO Take 1 Tablet by mouth 2 times a day.     • Ascorbic Acid (VITAMIN C) 1000 MG Tab Take  by mouth.     • Coenzyme Q10 (COQ10 PO) Take 300 mg by mouth.     • Omega-3 Fatty Acids (FISH OIL) 1000 MG CAPSULE DELAYED RELEASE EC softgel capsule Take 2,000 mg by mouth every morning with breakfast.     • lysine 500 MG Tab Take 500 mg by mouth every day.     • acetaminophen (TYLENOL) 500 MG Tab Take 500-1,000 mg by mouth every 6 hours as needed.     • gabapentin (NEURONTIN) 300 MG Cap Take 300 mg by mouth every day. 1 in the morning, 1 at noon, 2 at 4 pm, 3 at bedtime       • B Complex Vitamins (VITAMIN B COMPLEX PO) Take 1 Tab by mouth every evening.     • Cholecalciferol (VITAMIN D3) 2000 UNITS Tab Take 1 Tab by mouth every evening.     • multivitamin (THERAGRAN) TABS Take 1 Tab by mouth every bedtime.       No current facility-administered medications for this visit.        Patient is taking medications as noted in medication list.  Current supplements as per medication list.     Allergies: Percodan [oxycodone-aspirin]    Current social contact/activities: Yes Visits with family    Is patient current with immunizations? Yes.    He  reports that he quit smoking about 50 years ago. His smoking use included cigarettes. He has a 20.00 pack-year smoking history. He has quit using smokeless tobacco. He reports that he does not drink alcohol and does not use drugs.  Counseling given: Not Answered      DPA/Advanced directive: Patient has Advanced Directive on file.     ROS:    Gait: Uses no assistive device   Ostomy: No   Other tubes: yes Arotic Heart alve  Amputations: No   Chronic oxygen use No   Last eye exam Jan/2022   Wears hearing aids: No   : Denies any urinary leakage during the last 6 months      Screening:      Depression Screening  Little interest or pleasure in doing things?  0 - not at all  Feeling down, depressed, or hopeless? 0 - not at all  Patient Health Questionnaire Score: 0    If depressive symptoms identified deferred to follow up visit unless specifically addressed in assessment and plan.    Interpretation of PHQ-9 Total Score   Score Severity   1-4 No Depression   5-9 Mild Depression   10-14 Moderate Depression   15-19 Moderately Severe Depression   20-27 Severe Depression    Screening for Cognitive Impairment  Three Minute Recall (daughter, goyo fabiola)  2/3 [place]  Braydon clock face with all 12 numbers and set the hands to show 10 past 11.  Yes    If cognitive concerns identified, deferred for follow up unless specifically addressed in assessment and  plan.    Fall Risk Assessment  Has the patient had two or more falls in the last year or any fall with injury in the last year?  No  If fall risk identified, deferred for follow up unless specifically addressed in assessment and plan.    Safety Assessment  Throw rugs on floor.  No  Handrails on all stairs.  Yes  Good lighting in all hallways.  Yes  Difficulty hearing.  Yes  Patient counseled about all safety risks that were identified.    Functional Assessment ADLs  Are there any barriers preventing you from cooking for yourself or meeting nutritional needs?  No.    Are there any barriers preventing you from driving safely or obtaining transportation?  No.    Are there any barriers preventing you from using a telephone or calling for help?  No.    Are there any barriers preventing you from shopping?  No.    Are there any barriers preventing you from taking care of your own finances?  No.    Are there any barriers preventing you from managing your medications?  No.    Are there any barriers preventing you from showering, bathing or dressing yourself?  No.    Are you currently engaging in any exercise or physical activity?  No.     What is your perception of your health?  Good.    Health Maintenance Summary          Overdue - COVID-19 Vaccine (3 - Booster for Pfizer series) Overdue since 8/10/2021    03/10/2021  Imm Admin: Pfizer SARS-CoV-2 Vaccine    02/17/2021  Imm Admin: Pfizer SARS-CoV-2 Vaccine          Overdue - COLORECTAL CANCER SCREENING (COLONOSCOPY - Every 10 Years) Overdue since 1/7/2022 01/07/2012  COLONOSCOPY (Reason not specified)          Postponed - IMM ZOSTER VACCINES (2 of 3) Postponed until 9/13/2030 04/01/2014  Imm Admin: Zoster Vaccine Live (ZVL) (Zostavax) - HISTORICAL DATA          Postponed - IMM DTaP/Tdap/Td Vaccine (2 - Td or Tdap) Postponed until 8/12/2037 06/29/2009  Imm Admin: Tdap Vaccine          Annual Wellness Visit (Every 366 Days) Next due on 5/3/2023    05/02/2022  Visit  Dx: Medicare annual wellness visit, subsequent    05/02/2022  Level of Service: ANNUAL WELLNESS VISIT-INCLUDES PPPS SUBSEQUE*    09/13/2021  Visit Dx: Medicare annual wellness visit, subsequent    09/13/2021  Level of Service: ANNUAL WELLNESS VISIT-INCLUDES PPPS SUBSEQUE*    08/20/2020  Visit Dx: Medicare annual wellness visit, subsequent    Only the first 5 history entries have been loaded, but more history exists.          IMM PNEUMOCOCCAL VACCINE: 65+ Years (Series Information) Completed    12/22/2016  Imm Admin: Pneumococcal Conjugate Vaccine (Prevnar/PCV-13)    11/04/2011  Imm Admin: Pneumococcal polysaccharide vaccine (PPSV-23)          IMM INFLUENZA (Series Information) Completed    10/06/2021  Imm Admin: Influenza Vaccine Adult HD    10/27/2020  Imm Admin: Influenza Vaccine Adult HD    11/18/2019  Imm Admin: Influenza Vaccine Adult HD    09/24/2018  Imm Admin: Influenza Vaccine Adult HD    11/30/2017  Imm Admin: Influenza Vaccine Adult HD    Only the first 5 history entries have been loaded, but more history exists.          IMM HEP B VACCINE (Series Information) Aged Out    No completion history exists for this topic.          IMM MENINGOCOCCAL VACCINE (MCV4) (Series Information) Aged Out    No completion history exists for this topic.                Patient Care Team:  Dyllan Robles M.D. as PCP - General (Family Medicine)  Gus Irvni M.D. as Consulting Physician (Phys Med and Rehab)  Jarad Joseph M.D. (Inactive) as Consulting Physician (Gastroenterology)  CLAYTON Navarrete as Consulting Physician (Dermatology)  Alessio Cutler M.D. (Orthopedic Surgery)  Neville Minor D.P.M. (Podiatry)  Vickey Rutherford M.D. as Consulting Physician (Cardiovascular Disease (Cardiology))  [facility] as Consulting Physician  Neville James M.D. (Neurosurgery)  Fransisco Bearden, PT (Inactive) as Physical Therapist (Physical Therapy)    Social History     Tobacco Use   • Smoking  status: Former Smoker     Packs/day: 1.00     Years: 20.00     Pack years: 20.00     Types: Cigarettes     Quit date: 1972     Years since quittin.3   • Smokeless tobacco: Former User   Vaping Use   • Vaping Use: Never used   Substance Use Topics   • Alcohol use: No     Alcohol/week: 0.0 oz   • Drug use: No     Family History   Problem Relation Age of Onset   • Other Mother         unknown   • Heart Disease Mother    • Cancer Father         prostate, skin   • No Known Problems Brother    • Diabetes Brother    • Heart Disease Son    • Sleep Apnea Neg Hx      He  has a past medical history of Aortic stenosis, Atrial fibrillation (HCC), Back pain, Cataract, 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   Past Surgical History:   Procedure Laterality Date   • GIBRAN N/A 2019    Procedure: ECHOCARDIOGRAM, TRANSESOPHAGEAL;  Surgeon: Leander Buckner M.D.;  Location: SURGERY [facility];  Service: Cardiac   • TRANSCATHETER AORTIC VALVE REPLACEMENT Bilateral 2019    Procedure: REPLACEMENT, AORTIC VALVE, TRANSCATHETER;  Surgeon: Leander Buckner M.D.;  Location: SURGERY [facility];  Service: Cardiac   • FINGER ARTHROPLASTY Left 2016    Procedure: FINGER ARTHROPLASTY THUMB CARPOMETACARPAL EXCISIONAL, EXCISE TRAPEZIUM;  Surgeon: Raheel Salgado M.D.;  Location: SURGERY SAME DAY [facility];  Service:    • CARDIOVERSION      on 06, sinus rhythm until 2007,  paroxysmal atrial fibrillation   • KNEE ARTHROPLASTY TOTAL     • LAMINOTOMY N/A     multiple lumbar spine   • OTHER ORTHOPEDIC SURGERY      lower back  "  • MT PERCUT IMPLNT NEUROELECT,EPIDURAL     • TOE ARTHROPLASTY     • TURP-VAPOR N/A            Exam:     /62 (BP Location: Left arm, Patient Position: Sitting, BP Cuff Size: Adult)   Pulse 61   Temp 36.3 °C (97.4 °F) (Temporal)   Ht 1.854 m (6' 1\")   Wt 102 kg (225 lb)   SpO2 95%  Body mass index is 29.69 kg/m².    Hearing excellent.    Dentition good  Alert, oriented in no acute distress.  Eye contact is good, speech goal directed, affect calm      Assessment and Plan. The following treatment and monitoring plan is recommended:    1. Medicare annual wellness visit, subsequent     2. LORENA (obstructive sleep apnea)     3. Paroxysmal atrial fibrillation (HCC)       Continue to follow-up with sleep medicine and cardiology.    Services suggested: No services needed at this time  Health Care Screening recommendations as per orders if indicated.  Referrals offered: PT/OT/Nutrition counseling/Behavioral Health/Smoking cessation as per orders if indicated.    Discussion today about general wellness and lifestyle habits:    · Prevent falls and reduce trip hazards; Cautioned about securing or removing rugs.  · Have a working fire alarm and carbon monoxide detector;   · Engage in regular physical activity and social activities       Follow-up: Return in about 6 months (around 11/2/2022).  "

## 2022-06-07 ENCOUNTER — ANTICOAGULATION VISIT (OUTPATIENT)
Dept: VASCULAR LAB | Facility: MEDICAL CENTER | Age: 82
End: 2022-06-07
Attending: INTERNAL MEDICINE
Payer: MEDICARE

## 2022-06-07 VITALS
DIASTOLIC BLOOD PRESSURE: 55 MMHG | SYSTOLIC BLOOD PRESSURE: 105 MMHG | HEART RATE: 50 BPM | HEIGHT: 73 IN | WEIGHT: 225 LBS | BODY MASS INDEX: 29.82 KG/M2

## 2022-06-07 DIAGNOSIS — Z95.3 STATUS POST TRANSCATHETER AORTIC VALVE REPLACEMENT (TAVR) USING BIOPROSTHESIS: ICD-10-CM

## 2022-06-07 DIAGNOSIS — D68.69 SECONDARY HYPERCOAGULABLE STATE (HCC): ICD-10-CM

## 2022-06-07 LAB
INR BLD: 2.8 (ref 0.9–1.2)
INR PPP: 2.8 (ref 2–3.5)

## 2022-06-07 PROCEDURE — 85610 PROTHROMBIN TIME: CPT

## 2022-06-07 PROCEDURE — 99211 OFF/OP EST MAY X REQ PHY/QHP: CPT

## 2022-06-07 ASSESSMENT — FIBROSIS 4 INDEX: FIB4 SCORE: 2.08

## 2022-06-07 NOTE — PROGRESS NOTES
OP Anticoagulation Service Note    Date: 2022    Anticoagulation Summary  As of 2022    INR goal:  2.0-3.0   TTR:  63.6 % (5.6 y)   INR used for dosin.80 (2022)   Warfarin maintenance plan:  4 mg (4 mg x 1) every day   Weekly warfarin total:  28 mg   Plan last modified:  Sean Guzman, PharmD (2022)   Next INR check:  2022   Target end date:  Indefinite    Indications    Status post transcatheter aortic valve replacement (TAVR) using bioprosthesis [Z95.3]  Atrial fibrillation (HCC) [I48.91]  Secondary hypercoagulable state (HCC) [D68.69]             Anticoagulation Episode Summary     INR check location:      Preferred lab:      Send INR reminders to:      Comments:  Pt goes by Dyke  ASA daily      Anticoagulation Care Providers     Provider Role Specialty Phone number    Vickey Rutherford M.D. Referring Cardiovascular Disease (Cardiology) [phone]    [facility] Responsible  [phone]        Anticoagulation Patient Findings  Patient Findings     Negatives:  Signs/symptoms of thrombosis, Signs/symptoms of bleeding, Laboratory test error suspected, Change in health, Change in alcohol use, Change in activity, Upcoming invasive procedure, Emergency department visit, Upcoming dental procedure, Missed doses, Extra doses, Change in medications, Change in diet/appetite, Hospital admission, Bruising, Other complaints          HPI:     Duane Kinsey Iggy is in the Anticoagulation Clinic today.     The reason for today's visit is to prevent morbidity and mortality from a blood clot and/or stroke and to reduce the risk of bleeding while on a anticoagulant.     PCP:  Dyllan Robles M.D.  [address]  [place] [address]  [phone]    Lab Results   Component Value Date/Time    HBA1C 5.5 09/10/2021 08:56 AM      Lab Results   Component Value Date/Time    CHOLSTRLTOT 130 2022 09:07 AM    LDL 60 2022 09:07 AM    HDL 40 2022  "09:07 AM    TRIGLYCERIDE 149 03/07/2022 09:07 AM       Lab Results   Component Value Date/Time    GLUCOSE 83 03/07/2022 09:19 AM    BUN 13 03/07/2022 09:19 AM    CREATININE 0.61 03/07/2022 09:19 AM     Lab Results   Component Value Date/Time    ALKPHOSPHAT 111 (H) 03/07/2022 09:19 AM    ASTSGOT 50 (H) 03/07/2022 09:19 AM    ALTSGPT 61 (H) 03/07/2022 09:19 AM    TBILIRUBIN 0.8 03/07/2022 09:19 AM    INR 2.80 06/07/2022 12:00 AM    ALBUMIN 4.1 03/07/2022 09:19 AM      Lab Results   Component Value Date/Time    RBC 5.09 03/07/2022 09:19 AM    HEMOGLOBIN 16.1 03/07/2022 09:19 AM    HEMATOCRIT 48.4 03/07/2022 09:19 AM    PLATELETCT 258 06/18/2020 09:12 AM      Lab Results   Component Value Date/Time    MALBCRT see below 06/20/2017 07:37 AM    MICROALBUR <0.7 06/20/2017 07:37 AM       Current Outpatient Medications:   •  warfarin, Take one tablet daily as directed by [facility]  •  metoprolol SR, 50 mg, Oral, QDAY  •  pravastatin, TAKE 1 TABLET BY MOUTH DAILY  •  digoxin, 250 mcg, Oral, QHS  •  aspirin, 81 mg, Oral, DAILY  •  GLUCOSAMINE HCL PO, 1 Tablet, Oral, BID  •  Vitamin C, Take  by mouth.  •  Coenzyme Q10 (COQ10 PO), Take 300 mg by mouth.  •  fish oil, 2,000 mg, Oral, QDAY with Breakfast  •  lysine, 500 mg, Oral, DAILY  •  acetaminophen, 500-1,000 mg, Oral, Q6HRS PRN  •  gabapentin, 300 mg, Oral, DAILY  •  B Complex Vitamins (VITAMIN B COMPLEX PO), 1 Tablet, Oral, Q EVENING  •  Vitamin D3, 1 Tablet, Oral, Q EVENING  •  multivitamin, 1 Tablet, Oral, QHS    Assessment:     INR  therapeutic.     Is pt on antiplatelet therapy: yes, per cards     Is pt on NSAID: no    3 vitals included with today's appt-unless patient declined:  (BP, weight, ht, RR)   Vitals:    06/07/22 0737   BP: 105/55   Pulse: (!) 50   Weight: 102 kg (225 lb)   Height: 1.854 m (6' 1\")       Plan:     • Continue the same warfarin dose, as noted above.       Follow-up:     • Test in 6 week(s).    • This decision was made using " shared decision making with the pt and benefits vs risks were discussed.      Additional information discussed with patient:     • Pt educated to contact our clinic with any changes in medications or s/s of bleeding or thrombosis.  • Education was provided today regarding tips to reduce their bleed risk and dietary constraints while on a anticoagulant.    National recommendations regarding anticoagulation therapy:     The CHEST guidelines recommends frequent monitoring at regular intervals for any patient on a anticoagulant, to ensure the patient is on the proper dose, and to monitor that they are not having any complications from therapy.       Sean Guzman, PharmD, MS, BCACP, [facility]  Phone [phone] fax [phone]    This note was created using voice recognition software (Dragon). The accuracy of the dictation is limited by the abilities of the software. I have reviewed the note prior to signing, however some errors in grammar and context are still possible. If you have any questions related to this note please do not hesitate to contact our office.

## 2022-07-19 ENCOUNTER — ANTICOAGULATION VISIT (OUTPATIENT)
Dept: VASCULAR LAB | Facility: MEDICAL CENTER | Age: 82
End: 2022-07-19
Attending: INTERNAL MEDICINE
Payer: MEDICARE

## 2022-07-19 VITALS
HEART RATE: 73 BPM | WEIGHT: 225 LBS | SYSTOLIC BLOOD PRESSURE: 123 MMHG | HEIGHT: 73 IN | DIASTOLIC BLOOD PRESSURE: 57 MMHG | BODY MASS INDEX: 29.82 KG/M2

## 2022-07-19 DIAGNOSIS — Z95.3 STATUS POST TRANSCATHETER AORTIC VALVE REPLACEMENT (TAVR) USING BIOPROSTHESIS: ICD-10-CM

## 2022-07-19 DIAGNOSIS — D68.69 SECONDARY HYPERCOAGULABLE STATE (HCC): ICD-10-CM

## 2022-07-19 LAB
INR BLD: 2.9 (ref 0.9–1.2)
INR PPP: 2.9 (ref 2–3.5)

## 2022-07-19 PROCEDURE — 85610 PROTHROMBIN TIME: CPT

## 2022-07-19 PROCEDURE — 99211 OFF/OP EST MAY X REQ PHY/QHP: CPT

## 2022-09-02 ENCOUNTER — APPOINTMENT (OUTPATIENT)
Dept: RADIOLOGY | Facility: MEDICAL CENTER | Age: 82
End: 2022-09-02
Attending: EMERGENCY MEDICINE
Payer: MEDICARE

## 2022-09-02 ENCOUNTER — HOSPITAL ENCOUNTER (EMERGENCY)
Facility: MEDICAL CENTER | Age: 82
End: 2022-09-02
Attending: EMERGENCY MEDICINE
Payer: MEDICARE

## 2022-09-02 VITALS
HEIGHT: 72 IN | TEMPERATURE: 97 F | RESPIRATION RATE: 18 BRPM | BODY MASS INDEX: 30.58 KG/M2 | DIASTOLIC BLOOD PRESSURE: 69 MMHG | SYSTOLIC BLOOD PRESSURE: 119 MMHG | WEIGHT: 225.75 LBS | OXYGEN SATURATION: 96 % | HEART RATE: 62 BPM

## 2022-09-02 DIAGNOSIS — S80.12XA TRAUMATIC ECCHYMOSIS OF MULTIPLE SITES OF LEFT LOWER EXTREMITY, INITIAL ENCOUNTER: ICD-10-CM

## 2022-09-02 LAB
ALBUMIN SERPL BCP-MCNC: 3.8 G/DL (ref 3.2–4.9)
ALBUMIN/GLOB SERPL: 1.1 G/DL
ALP SERPL-CCNC: 98 U/L (ref 30–99)
ALT SERPL-CCNC: 56 U/L (ref 2–50)
ANION GAP SERPL CALC-SCNC: 10 MMOL/L (ref 7–16)
AST SERPL-CCNC: 62 U/L (ref 12–45)
BASOPHILS # BLD AUTO: 0.6 % (ref 0–1.8)
BASOPHILS # BLD: 0.05 K/UL (ref 0–0.12)
BILIRUB SERPL-MCNC: 0.7 MG/DL (ref 0.1–1.5)
BUN SERPL-MCNC: 15 MG/DL (ref 8–22)
CALCIUM SERPL-MCNC: 9.2 MG/DL (ref 8.5–10.5)
CHLORIDE SERPL-SCNC: 107 MMOL/L (ref 96–112)
CO2 SERPL-SCNC: 20 MMOL/L (ref 20–33)
CREAT SERPL-MCNC: 0.7 MG/DL (ref 0.5–1.4)
CRP SERPL HS-MCNC: 0.43 MG/DL (ref 0–0.75)
EOSINOPHIL # BLD AUTO: 0.32 K/UL (ref 0–0.51)
EOSINOPHIL NFR BLD: 3.6 % (ref 0–6.9)
ERYTHROCYTE [DISTWIDTH] IN BLOOD BY AUTOMATED COUNT: 47.5 FL (ref 35.9–50)
GFR SERPLBLD CREATININE-BSD FMLA CKD-EPI: 90 ML/MIN/1.73 M 2
GLOBULIN SER CALC-MCNC: 3.4 G/DL (ref 1.9–3.5)
GLUCOSE SERPL-MCNC: 111 MG/DL (ref 65–99)
HCT VFR BLD AUTO: 44.8 % (ref 42–52)
HGB BLD-MCNC: 15 G/DL (ref 14–18)
IMM GRANULOCYTES # BLD AUTO: 0.04 K/UL (ref 0–0.11)
IMM GRANULOCYTES NFR BLD AUTO: 0.5 % (ref 0–0.9)
INR PPP: 2.53 (ref 0.87–1.13)
LYMPHOCYTES # BLD AUTO: 2.01 K/UL (ref 1–4.8)
LYMPHOCYTES NFR BLD: 22.7 % (ref 22–41)
MCH RBC QN AUTO: 31.5 PG (ref 27–33)
MCHC RBC AUTO-ENTMCNC: 33.5 G/DL (ref 33.7–35.3)
MCV RBC AUTO: 94.1 FL (ref 81.4–97.8)
MONOCYTES # BLD AUTO: 0.96 K/UL (ref 0–0.85)
MONOCYTES NFR BLD AUTO: 10.8 % (ref 0–13.4)
NEUTROPHILS # BLD AUTO: 5.47 K/UL (ref 1.82–7.42)
NEUTROPHILS NFR BLD: 61.8 % (ref 44–72)
NRBC # BLD AUTO: 0 K/UL
NRBC BLD-RTO: 0 /100 WBC
PLATELET # BLD AUTO: 297 K/UL (ref 164–446)
PMV BLD AUTO: 9.5 FL (ref 9–12.9)
POTASSIUM SERPL-SCNC: 4.6 MMOL/L (ref 3.6–5.5)
PROT SERPL-MCNC: 7.2 G/DL (ref 6–8.2)
PROTHROMBIN TIME: 26.4 SEC (ref 12–14.6)
RBC # BLD AUTO: 4.76 M/UL (ref 4.7–6.1)
SODIUM SERPL-SCNC: 137 MMOL/L (ref 135–145)
WBC # BLD AUTO: 8.9 K/UL (ref 4.8–10.8)

## 2022-09-02 PROCEDURE — 86140 C-REACTIVE PROTEIN: CPT

## 2022-09-02 PROCEDURE — 85025 COMPLETE CBC W/AUTO DIFF WBC: CPT

## 2022-09-02 PROCEDURE — 36415 COLL VENOUS BLD VENIPUNCTURE: CPT

## 2022-09-02 PROCEDURE — 93971 EXTREMITY STUDY: CPT | Mod: LT

## 2022-09-02 PROCEDURE — 73630 X-RAY EXAM OF FOOT: CPT | Mod: LT

## 2022-09-02 PROCEDURE — 73564 X-RAY EXAM KNEE 4 OR MORE: CPT | Mod: LT

## 2022-09-02 PROCEDURE — 73610 X-RAY EXAM OF ANKLE: CPT | Mod: LT

## 2022-09-02 PROCEDURE — 700117 HCHG RX CONTRAST REV CODE 255: Performed by: EMERGENCY MEDICINE

## 2022-09-02 PROCEDURE — 93971 EXTREMITY STUDY: CPT | Mod: 26,LT | Performed by: INTERNAL MEDICINE

## 2022-09-02 PROCEDURE — 73701 CT LOWER EXTREMITY W/DYE: CPT | Mod: LT

## 2022-09-02 PROCEDURE — 85610 PROTHROMBIN TIME: CPT

## 2022-09-02 PROCEDURE — 73590 X-RAY EXAM OF LOWER LEG: CPT | Mod: LT

## 2022-09-02 PROCEDURE — 99284 EMERGENCY DEPT VISIT MOD MDM: CPT

## 2022-09-02 PROCEDURE — 80053 COMPREHEN METABOLIC PANEL: CPT

## 2022-09-02 RX ADMIN — IOHEXOL 100 ML: 350 INJECTION, SOLUTION INTRAVENOUS at 14:30

## 2022-09-02 NOTE — ED TRIAGE NOTES
Pt ambulated with use of personal cane to triage with   Chief Complaint   Patient presents with    Leg Pain     Bruising to left leg, pt on coumadin,  bruising from knee to foot, swelling noted.  Fall on to leg a wk ago, first noticed bruising and swelling to left foot last night.  Pt reports that he held his coumadin last night.       Pt Informed regarding triage process and verbalized understanding to inform triage tech or RN for any changes in condition. Placed in lobby.

## 2022-09-02 NOTE — ED NOTES
Patient discharged per order. Oral and written discharge instructions reviewed. All belongings accounted for and taken with patient. Questions answered, and patient agrees with discharge plan. Patient escorted to lobby. Patient encouraged to follow up with orthopedics as needed.

## 2022-09-02 NOTE — ED PROVIDER NOTES
ED Physician Note    Chief Concern:   Left leg injury    HPI:  Duane Parkinson is a very pleasant 82-year-old gentleman who presents to the emergency department for evaluation of a left leg injury.  He reports a fall last week, he is uncertain of the specific day.  Today is Friday, so the fall was at least over 5 or 6 days ago.  He states he fell landing on his left leg, and felt as though his legs were somewhat tangled.  He has not noticed any persistent pain, with exception of some pain localized just below the patella on the left knee.  Pain has not been severe, and he has been able to ambulate.  Last night when he went to take shower he noticed some significant swelling and bruising to the left lower extremity.  He is currently on Coumadin, due to TAVR, and atrial fibrillation.  He states he did not notice the swelling or bruising after the fall.  He has not had any new or worsening pain, states that he may have some decreased sensation while I was performing my exam, however did not notice this before today.  He is not had any associated fevers, no nausea, no vomiting.  He reports no associated chest pain, no shortness of breath.  Due to significant bruising, he did skip his most recent dose of Coumadin, but had been taking that medication without any skipped doses up until that point.  He is unable to identify any reliably exacerbating or alleviating factors.    Review of Systems:  See HPI for pertinent positives and negatives. All other systems negative.    Past Medical History:   has a past medical history of Aortic stenosis, Atrial fibrillation (HCC), Back pain, Cataract, Cirrhosis of liver without ascites (HCC), Clotting disorder (HCC), Depression, Diabetes (HCC), Encounter for long-term (current) use of other medications, Gasping for breath, French measles, Heart murmur, Heart valve disease, Hyperlipidemia, Hypertension, Insomnia, Mumps, Nasal drainage, Pain, Pyogenic arthritis, lower leg (HCC),  "Restless leg syndrome, Sleep apnea, Snoring, Tonsillitis, and Valvular heart disease.    Social History:  Social History     Tobacco Use    Smoking status: Former     Packs/day: 1.00     Years: 20.00     Pack years: 20.00     Types: Cigarettes     Quit date: 1972     Years since quittin.6    Smokeless tobacco: Former   Vaping Use    Vaping Use: Never used   Substance and Sexual Activity    Alcohol use: No     Alcohol/week: 0.0 oz    Drug use: No    Sexual activity: Not Currently     Partners: Female       Surgical History:   has a past surgical history that includes cardioversion; knee arthroplasty total; other orthopedic surgery; laminotomy (N/A); toe arthroplasty; turp-vapor (N/A); percut implnt neuroelect,epidural; finger arthroplasty (Left, 2016); jacquelyn (N/A, 2019); and transcatheter aortic valve replacement (Bilateral, 2019).    Current Medications:  Home Medications       Reviewed by Cintia Ozuna R.N. (Registered Nurse) on 22 at 0850  Med List Status: Partial     Medication Last Dose Status   acetaminophen (TYLENOL) 500 MG Tab  Active   Ascorbic Acid (VITAMIN C) 1000 MG Tab  Active   aspirin (ASA) 81 MG Chew Tab chewable tablet  Active   B Complex Vitamins (VITAMIN B COMPLEX PO)  Active   Cholecalciferol (VITAMIN D3) 2000 UNITS Tab  Active   Coenzyme Q10 (COQ10 PO)  Active   digoxin (LANOXIN) 250 MCG Tab  Active   gabapentin (NEURONTIN) 300 MG Cap  Active   GLUCOSAMINE HCL PO  Active   lysine 500 MG Tab  Active   metoprolol SR (TOPROL XL) 50 MG TABLET SR 24 HR  Active   multivitamin (THERAGRAN) TABS  Active   Omega-3 Fatty Acids (FISH OIL) 1000 MG CAPSULE DELAYED RELEASE EC softgel capsule  Active   pravastatin (PRAVACHOL) 80 MG tablet  Active   warfarin (COUMADIN) 4 MG Tab  Active                    Allergies:  Allergies   Allergen Reactions    Percodan [Oxycodone-Aspirin] Itching and Photosensitivity     \"I sunburn\"       Physical Exam:  Vital Signs: /69   Pulse 62   " Temp 36.1 °C (97 °F) (Temporal)   Resp 18   Ht 1.829 m (6')   Wt 102 kg (225 lb 12 oz)   SpO2 96%   BMI 30.62 kg/m²   Constitutional: Alert, no acute distress  HENT: Normocephalic, mask in place  Eyes: Pupils equal and reactive, normal conjunctiva  Neck: Supple, normal range of motion, no stridor  Cardiovascular: Extremities are warm and well perfused, no murmur appreciated, normal cardiac auscultation  Pulmonary: No respiratory distress, normal work of breathing, no accessory muscule usage, breath sounds clear and equal bilaterally  Abdomen: Soft, non-distended, non-tender to palpation  Skin: Left lower extremity with significant ecchymosis, as well as unilateral edema.  1+ tibial edema.  He has areas of dark bruising, as well as areas of yellowed skin coloration suggestive of older bruises.  Musculoskeletal: Right lower extremity is normal-appearing, left lower extremity with edema as noted in skin exam, he does have some swelling just distal left knee with mild abrasion, no signs of cellulitis or abscess, he does have some tenderness to palpation of the medial aspect of the left ankle, as well as some tenderness to palpation of the foot, this seems to be localized to the soft tissues, I am unable to localize any point bony tenderness.  He does have a 1+ palpable DP pulse on the left lower extremity.  Neurologic: Alert, oriented, normal speech, normal motor function  Psychiatric: Normal and appropriate mood and affect    Medical records reviewed for continuity of care.  Mr. Parkinson was seen in orthopedic clinic 7/6/2022 by Dr. Cutler, orthopedic surgeon.  He has a history of bilateral total knee replacements performed at least 10 years ago.  He was seen for evaluation of left knee pain.  Supportive care recommended.  He is on Coumadin, indications including status post TAVR using bioprosthesis, atrial fibrillation, and secondary hypercoagulable state.  INR goal is 2.0-3.0.  INR was 2.9 on 7/19/2022.  No changes  recommended.    Labs:  Labs Reviewed   CBC WITH DIFFERENTIAL - Abnormal; Notable for the following components:       Result Value    MCHC 33.5 (*)     Monos (Absolute) 0.96 (*)     All other components within normal limits    Narrative:     Indicate which anticoagulants the patient is on:->COUMADIN   COMP METABOLIC PANEL - Abnormal; Notable for the following components:    Glucose 111 (*)     AST(SGOT) 62 (*)     ALT(SGPT) 56 (*)     All other components within normal limits    Narrative:     Indicate which anticoagulants the patient is on:->COUMADIN   PROTHROMBIN TIME - Abnormal; Notable for the following components:    PT 26.4 (*)     INR 2.53 (*)     All other components within normal limits    Narrative:     Indicate which anticoagulants the patient is on:->COUMADIN   ESTIMATED GFR    Narrative:     Indicate which anticoagulants the patient is on:->COUMADIN   CRP QUANTITIVE (NON-CARDIAC)       Radiology:  CT-EXTREMITY, LOWER WITH LEFT   Final Result      1.  Soft tissue swelling of the leg and partially visualized foot which may be related to edema and/or cellulitis and superficial fasciitis.   2.  No soft tissue gas or drainable abscess.   3.  Extensive calcified plaque within the calf arteries.   4.  Knee arthroplasty with artifact limiting evaluation in this region.      US-EXTREMITY VENOUS LOWER UNILAT LEFT   Final Result      DX-FOOT-COMPLETE 3+ LEFT   Final Result         1.  No radiographic evidence of acute injury.      DX-ANKLE 3+ VIEWS LEFT   Final Result      1.  No radiographic evidence of acute traumatic injury except for mild soft tissue swelling..      2.  Vascular calcifications suspicious for diabetes mellitus.      DX-TIBIA AND FIBULA LEFT   Final Result      Soft tissue swelling without acute fracture or dislocation.      DX-KNEE COMPLETE 4+ LEFT   Final Result      1.  Soft tissue swelling without acute osseous abnormality.   2.  Total knee arthroplasty appears intact.           ED  Medications Administered:  Medications   iohexol (OMNIPAQUE) 350 mg/mL (IV) (100 mL Intravenous Given 9/2/22 1430)       Differential diagnosis:  Traumatic ecchymosis, occult fracture, less likely infectious etiology, soft tissue injury, hematoma, elevated INR    MDM:  Mr. Parkinson presents to the emergency department for evaluation of ecchymosis and swelling to the left lower extremity. He believes this is related to a fall last week (uncertain of the specific day), though he just noticed the bruising in the last 24 hours. Some of the bruising is yellowed, consistent with evolving hematoma. His vital signs are reassuring with no fever, no tachcycardia, and no hypotension. He has no pain out of proportion to exam, no crepitus, doubt infectious etiology. He does have some soft tissue tenderness to palpation overlying the proximal fibula, but is easily able to bear weight with minimal pain.    On laboratory evaluation, he has a normal white blood count and normal differential, again less concerning for infectious etiology. INR is 2.53, therapeutic. He has no electrolyte abnormalities. CRP is within normal limits. LRINEC score is zero.     Plain film of the left ankle, tib-fib, foot, and knee are negative for acute abnormality. Left knee arthroplasty appears intact. Left lower extremity ultrasound is negative for evidence of DVT. Out of concern for occult fracture, or intramuscular hematoma with active bleeding, I obtained a CT of the lower extremity. This demonstrated soft tissue swelling, but no soft tissue gas or fluid collection.     Laboratory evaluation and imaging are reassuring. At this time, I suspect this is evolving edema and ecchymosis from his fall. I do not believe he requires any further emergent diagnostics nor imaging at this time. As long as his symptoms improve, he may follow up with his primary care physician for recheck at the opening of business hours. Return precautions were discussed with the  patient, and provided in written form with the patient's discharge instructions.     Disposition:  Discharge home in stable condition    Final Impression:  1. Traumatic ecchymosis of multiple sites of left lower extremity, initial encounter

## 2022-09-02 NOTE — ED NOTES
Pt ambulated to room from [facility] w/ cane. Changed into gown. Connected to monitor. Agree with triage note. Chart up for ERP. Call light in reach.

## 2022-09-02 NOTE — DISCHARGE INSTRUCTIONS
Please follow-up with [facility], call at the opening of business hours to let them know that you had a fall, have some associated bruising, and were evaluated in the emergency department.  Please let them know that you need emergency department follow-up visit.  Return to the emergency department if you develop any new or worsening symptoms including worsening pain, difficulty walking, worsening swelling, worsening bruising, or if you have any further concerns.

## 2022-09-07 ENCOUNTER — OFFICE VISIT (OUTPATIENT)
Dept: CARDIOLOGY | Facility: MEDICAL CENTER | Age: 82
End: 2022-09-07
Payer: MEDICARE

## 2022-09-07 VITALS
WEIGHT: 224.6 LBS | SYSTOLIC BLOOD PRESSURE: 104 MMHG | OXYGEN SATURATION: 97 % | DIASTOLIC BLOOD PRESSURE: 64 MMHG | HEART RATE: 65 BPM | BODY MASS INDEX: 29.77 KG/M2 | HEIGHT: 73 IN | RESPIRATION RATE: 13 BRPM

## 2022-09-07 DIAGNOSIS — E78.2 MIXED HYPERLIPIDEMIA: ICD-10-CM

## 2022-09-07 DIAGNOSIS — D68.69 SECONDARY HYPERCOAGULABLE STATE (HCC): ICD-10-CM

## 2022-09-07 DIAGNOSIS — I48.0 PAROXYSMAL ATRIAL FIBRILLATION (HCC): ICD-10-CM

## 2022-09-07 DIAGNOSIS — G47.33 OSA (OBSTRUCTIVE SLEEP APNEA): ICD-10-CM

## 2022-09-07 DIAGNOSIS — I10 ESSENTIAL HYPERTENSION: ICD-10-CM

## 2022-09-07 DIAGNOSIS — E78.5 DYSLIPIDEMIA: ICD-10-CM

## 2022-09-07 DIAGNOSIS — Z95.3 STATUS POST TRANSCATHETER AORTIC VALVE REPLACEMENT (TAVR) USING BIOPROSTHESIS: ICD-10-CM

## 2022-09-07 PROCEDURE — 99214 OFFICE O/P EST MOD 30 MIN: CPT | Performed by: INTERNAL MEDICINE

## 2022-09-07 RX ORDER — METOPROLOL SUCCINATE 50 MG/1
50 TABLET, EXTENDED RELEASE ORAL
Qty: 90 TABLET | Refills: 3 | Status: SHIPPED | OUTPATIENT
Start: 2022-09-07 | End: 2023-02-23 | Stop reason: SDUPTHER

## 2022-09-07 RX ORDER — PRAVASTATIN SODIUM 80 MG/1
TABLET ORAL
Qty: 90 TABLET | Refills: 3 | Status: SHIPPED | OUTPATIENT
Start: 2022-09-07 | End: 2023-02-23 | Stop reason: SDUPTHER

## 2022-09-07 ASSESSMENT — ENCOUNTER SYMPTOMS
CONSTITUTIONAL NEGATIVE: 1
BRUISES/BLEEDS EASILY: 0
ORTHOPNEA: 0
DIZZINESS: 0
WEAKNESS: 0
SHORTNESS OF BREATH: 0
HEMOPTYSIS: 0
CARDIOVASCULAR NEGATIVE: 1
CHILLS: 0
COUGH: 0
LOSS OF CONSCIOUSNESS: 0
PALPITATIONS: 0
RESPIRATORY NEGATIVE: 1
PND: 0
CLAUDICATION: 0
MUSCULOSKELETAL NEGATIVE: 1
GASTROINTESTINAL NEGATIVE: 1
NEUROLOGICAL NEGATIVE: 1
WHEEZING: 0
EYES NEGATIVE: 1
STRIDOR: 0
SORE THROAT: 0
SPUTUM PRODUCTION: 0
FEVER: 0

## 2022-09-07 ASSESSMENT — FIBROSIS 4 INDEX: FIB4 SCORE: 2.34

## 2022-09-07 NOTE — PROGRESS NOTES
Chief Complaint   Patient presents with    Atrial Fibrillation    Hypertension    Dyslipidemia   ITP Reviewed    Subjective:   Kinsey Parkinson is a 81 y.o. male who presents today as a follow-up for his aortic stenosis status post TAVR with paroxysmal atrial fibrillation hypertension hyperlipidemia.      Since he was last seen he fell.  He tripped on something in his garage.  His blood pressures been controlled.  Otherwise he has had no chest pain or shortness of breath.  His rate is controlled.  He is compliant with his oral anticoagulation.    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)     Clotting disorder (HCC)     reports nose bleeds    Depression     Lost wife ,still gets tearful    Diabetes (HCC)     diet controlled    Encounter for long-term (current) use of other medications     Gasping for breath     [place]itan measles     Heart murmur     Heart valve disease     Hyperlipidemia     Hypertension     Insomnia     Mumps     Nasal drainage     Pain     back & thumb    Pyogenic arthritis, lower leg (HCC)     thumb, back    Restless leg syndrome     Sleep apnea     uses CPAP    Snoring     Tonsillitis     Valvular heart disease      Past Surgical History:   Procedure Laterality Date    GIBRAN N/A 7/29/2019    Procedure: ECHOCARDIOGRAM, TRANSESOPHAGEAL;  Surgeon: Leander Buckner M.D.;  Location: SURGERY [facility];  Service: Cardiac    TRANSCATHETER AORTIC VALVE REPLACEMENT Bilateral 7/29/2019    Procedure: REPLACEMENT, AORTIC VALVE, TRANSCATHETER;  Surgeon: Leander Buckner M.D.;  Location: SURGERY [facility];  Service: Cardiac    FINGER ARTHROPLASTY Left 5/27/2016    Procedure: FINGER ARTHROPLASTY THUMB CARPOMETACARPAL EXCISIONAL, EXCISE TRAPEZIUM;  Surgeon: Raheel Salgado M.D.;  Location: SURGERY SAME DAY [facility];  Service:     CARDIOVERSION      on 7/17/06, sinus  "rhythm until 2007,  paroxysmal atrial fibrillation    KNEE ARTHROPLASTY TOTAL      LAMINOTOMY N/A     multiple lumbar spine    OTHER ORTHOPEDIC SURGERY      lower back    TN PERCUT IMPLNT NEUROELECT,EPIDURAL      TOE ARTHROPLASTY      TURP-VAPOR N/A      Family History   Problem Relation Age of Onset    Other Mother         unknown    Heart Disease Mother     Cancer Father         prostate, skin    No Known Problems Brother     Diabetes Brother     Heart Disease Son     Sleep Apnea Neg Hx      Social History     Socioeconomic History    Marital status:      Spouse name: Not on file    Number of children: Not on file    Years of education: Not on file    Highest education level: Not on file   Occupational History    Not on file   Tobacco Use    Smoking status: Former     Packs/day: 1.00     Years: 20.00     Pack years: 20.00     Types: Cigarettes     Quit date: 1972     Years since quittin.6    Smokeless tobacco: Former   Vaping Use    Vaping Use: Never used   Substance and Sexual Activity    Alcohol use: No     Alcohol/week: 0.0 oz    Drug use: No    Sexual activity: Not Currently     Partners: Female   Other Topics Concern    Not on file   Social History Narrative    Not on file     Social Determinants of Health     Financial Resource Strain: Not on file   Food Insecurity: Not on file   Transportation Needs: Not on file   Physical Activity: Not on file   Stress: Not on file   Social Connections: Not on file   Intimate Partner Violence: Not on file   Housing Stability: Not on file     Allergies   Allergen Reactions    Percodan [Oxycodone-Aspirin] Itching and Photosensitivity     \"I sunburn\"     Outpatient Encounter Medications as of 2022   Medication Sig Dispense Refill    pravastatin (PRAVACHOL) 80 MG tablet TAKE 1 TABLET BY MOUTH DAILY 90 Tablet 3    metoprolol SR (TOPROL XL) 50 MG TABLET SR 24 HR Take 1 Tablet by mouth every day. 90 Tablet 3    warfarin (COUMADIN) 4 MG Tab Take one " tablet daily as directed by [facility] 90 Tablet 1    digoxin (LANOXIN) 250 MCG Tab TAKE 1 TABLET BY MOUTH AT BEDTIME 90 Tablet 2    aspirin (ASA) 81 MG Chew Tab chewable tablet Chew 1 Tablet every day. 90 Tablet 3    GLUCOSAMINE HCL PO Take 1 Tablet by mouth 2 times a day.      Ascorbic Acid (VITAMIN C) 1000 MG Tab Take  by mouth.      Coenzyme Q10 (COQ10 PO) Take 300 mg by mouth.      Omega-3 Fatty Acids (FISH OIL) 1000 MG CAPSULE DELAYED RELEASE EC softgel capsule Take 2,000 mg by mouth every morning with breakfast.      lysine 500 MG Tab Take 500 mg by mouth every day.      acetaminophen (TYLENOL) 500 MG Tab Take 500-1,000 mg by mouth every 6 hours as needed.      gabapentin (NEURONTIN) 300 MG Cap Take 300 mg by mouth every day. 1 in the morning, 1 at noon, 2 at 4 pm, 3 at bedtime       B Complex Vitamins (VITAMIN B COMPLEX PO) Take 1 Tab by mouth every evening.      Cholecalciferol (VITAMIN D3) 2000 UNITS Tab Take 1 Tab by mouth every evening.      multivitamin (THERAGRAN) TABS Take 1 Tab by mouth every bedtime.      [DISCONTINUED] metoprolol SR (TOPROL XL) 50 MG TABLET SR 24 HR Take 1 Tablet by mouth every day. 90 Tablet 3    [DISCONTINUED] pravastatin (PRAVACHOL) 80 MG tablet TAKE 1 TABLET BY MOUTH DAILY 90 Tablet 3     No facility-administered encounter medications on file as of 9/7/2022.     Review of Systems   Constitutional: Negative.  Negative for chills, fever and malaise/fatigue.   HENT: Negative.  Negative for sore throat.    Eyes: Negative.    Respiratory: Negative.  Negative for cough, hemoptysis, sputum production, shortness of breath, wheezing and stridor.    Cardiovascular: Negative.  Negative for chest pain, palpitations, orthopnea, claudication, leg swelling and PND.   Gastrointestinal: Negative.    Genitourinary: Negative.    Musculoskeletal: Negative.    Skin: Negative.    Neurological: Negative.  Negative for dizziness, loss of consciousness and weakness.  "  Endo/Heme/Allergies: Negative.  Does not bruise/bleed easily.   All other systems reviewed and are negative.     Objective:   /64 (BP Location: Left arm, Patient Position: Sitting, BP Cuff Size: Adult)   Pulse 65   Resp 13   Ht 1.854 m (6' 1\")   Wt 102 kg (224 lb 9.6 oz)   SpO2 97%   BMI 29.63 kg/m²     Physical Exam  Vitals and nursing note reviewed.   Constitutional:       General: He is not in acute distress.     Appearance: He is well-developed. He is not diaphoretic.   HENT:      Head: Normocephalic and atraumatic.      Right Ear: External ear normal.      Left Ear: External ear normal.      Nose: Nose normal.      Mouth/Throat:      Pharynx: No oropharyngeal exudate.   Eyes:      General: No scleral icterus.        Right eye: No discharge.         Left eye: No discharge.      Conjunctiva/sclera: Conjunctivae normal.      Pupils: Pupils are equal, round, and reactive to light.   Neck:      Vascular: No JVD.   Cardiovascular:      Rate and Rhythm: Normal rate and regular rhythm.      Heart sounds: No murmur heard.    No friction rub. No gallop.   Pulmonary:      Effort: Pulmonary effort is normal. No respiratory distress.      Breath sounds: No stridor. No wheezing or rales.   Chest:      Chest wall: No tenderness.   Abdominal:      General: There is no distension.      Palpations: Abdomen is soft.      Tenderness: There is no guarding.   Musculoskeletal:         General: No tenderness or deformity. Normal range of motion.      Cervical back: Neck supple.   Skin:     General: Skin is warm and dry.      Coloration: Skin is not pale.      Findings: No erythema or rash.   Neurological:      Mental Status: He is alert.      Cranial Nerves: No cranial nerve deficit.      Motor: No abnormal muscle tone.      Coordination: Coordination normal.      Deep Tendon Reflexes: Reflexes are normal and symmetric. Reflexes normal.   Psychiatric:         Behavior: Behavior normal.         Thought Content: Thought " content normal.         Judgment: Judgment normal.       Assessment:     1. Status post transcatheter aortic valve replacement (TAVR) using bioprosthesis        2. Paroxysmal atrial fibrillation (HCC)        3. Essential hypertension  metoprolol SR (TOPROL XL) 50 MG TABLET SR 24 HR      4. LORENA (obstructive sleep apnea)        5. Secondary hypercoagulable state (HCC)        6. Dyslipidemia        7. Mixed hyperlipidemia  pravastatin (PRAVACHOL) 80 MG tablet          Medical Decision Making:  Today's Assessment / Status / Plan:     81-year-old male with paroxysmal atrial fibrillation hypertension hyperlipidemia and aortic stenosis status post TAVR.  I refilled his medications.  I am happy with how he is doing.  Even though he fell at home we will continue him on the same medications.  We will see him back in 6 months.

## 2022-09-13 RX ORDER — CLINDAMYCIN HYDROCHLORIDE 300 MG/1
CAPSULE ORAL
COMMUNITY
Start: 2022-07-20 | End: 2022-11-08

## 2022-09-19 ENCOUNTER — ANTICOAGULATION VISIT (OUTPATIENT)
Dept: VASCULAR LAB | Facility: MEDICAL CENTER | Age: 82
End: 2022-09-19
Attending: INTERNAL MEDICINE
Payer: MEDICARE

## 2022-09-19 DIAGNOSIS — D68.69 SECONDARY HYPERCOAGULABLE STATE (HCC): ICD-10-CM

## 2022-09-19 DIAGNOSIS — Z95.3 STATUS POST TRANSCATHETER AORTIC VALVE REPLACEMENT (TAVR) USING BIOPROSTHESIS: ICD-10-CM

## 2022-09-19 DIAGNOSIS — I48.0 PAROXYSMAL ATRIAL FIBRILLATION (HCC): ICD-10-CM

## 2022-09-19 LAB — INR PPP: 3.6 (ref 2–3.5)

## 2022-09-19 PROCEDURE — 99212 OFFICE O/P EST SF 10 MIN: CPT

## 2022-09-19 PROCEDURE — 85610 PROTHROMBIN TIME: CPT

## 2022-09-19 NOTE — PROGRESS NOTES
Anticoagulation Summary  As of 9/19/2022      INR goal:  2.0-3.0   TTR:  64.9 % (5.9 y)   INR used for dosing:  3.60 (9/19/2022)   Warfarin maintenance plan:  4 mg (4 mg x 1) every day   Weekly warfarin total:  28 mg   Plan last modified:  Sean Guzman, PharmD (2/22/2022)   Next INR check:  9/26/2022   Target end date:  Indefinite    Indications    Status post transcatheter aortic valve replacement (TAVR) using bioprosthesis [Z95.3]  Atrial fibrillation (HCC) [I48.91]  Secondary hypercoagulable state (HCC) [D68.69]                 Anticoagulation Episode Summary       INR check location:      Preferred lab:      Send INR reminders to:      Comments:  Pt goes by Dyke  ASA daily          Anticoagulation Care Providers       Provider Role Specialty Phone number    Vickey Rutherford M.D. Referring Cardiovascular Disease (Cardiology) [phone]    [facility] Responsible  [phone]                  Refer to Patient Findings for HPI:  Patient Findings       Positives:  Hospital admission (Recent ER admission 1 week ago s/p fall. Only bruising.)    Negatives:  Signs/symptoms of thrombosis, Signs/symptoms of bleeding, Laboratory test error suspected, Change in health, Change in alcohol use, Change in activity, Upcoming invasive procedure, Emergency department visit, Upcoming dental procedure, Missed doses, Extra doses, Change in medications, Change in diet/appetite, Bruising, Other complaints            There were no vitals filed for this visit.  pt declined vitals    Verified current warfarin dosing schedule.    Medications reconciled   Patient is on long-standing daily aspirin.       A/P   INR supra-therapeutic at 3.6.     Warfarin dosing recommendation: Hold dose tonight (14.3% weekly decrease). Continue 4 mg every day starting tomorrow. Follow-up in 1 week to see if pt remains supra-therapeutic.     Pt educated to contact our clinic with any changes in medications or s/s of bleeding or  thrombosis. Pt is aware to seek immediate medical attention for falls, head injury or deep cuts.    Follow up appointment in 1 week(s).    Claudia Santoro, AbhishekD

## 2022-09-21 LAB — INR BLD: 3.6 (ref 0.9–1.2)

## 2022-09-26 ENCOUNTER — ANTICOAGULATION VISIT (OUTPATIENT)
Dept: VASCULAR LAB | Facility: MEDICAL CENTER | Age: 82
End: 2022-09-26
Attending: NURSE PRACTITIONER
Payer: MEDICARE

## 2022-09-26 DIAGNOSIS — Z95.3 STATUS POST TRANSCATHETER AORTIC VALVE REPLACEMENT (TAVR) USING BIOPROSTHESIS: ICD-10-CM

## 2022-09-26 DIAGNOSIS — D68.69 SECONDARY HYPERCOAGULABLE STATE (HCC): ICD-10-CM

## 2022-09-26 DIAGNOSIS — I48.0 PAROXYSMAL ATRIAL FIBRILLATION (HCC): ICD-10-CM

## 2022-09-26 LAB
INR BLD: 3.4 (ref 0.9–1.2)
INR PPP: 3.4 (ref 2–3.5)

## 2022-09-26 PROCEDURE — 99212 OFFICE O/P EST SF 10 MIN: CPT | Performed by: NURSE PRACTITIONER

## 2022-09-26 PROCEDURE — 85610 PROTHROMBIN TIME: CPT

## 2022-09-26 NOTE — PROGRESS NOTES
Anticoagulation Summary  As of 9/26/2022      INR goal:  2.0-3.0   TTR:  64.7 % (5.9 y)   INR used for dosing:  3.40 (9/26/2022)   Warfarin maintenance plan:  4 mg (4 mg x 1) every day   Weekly warfarin total:  28 mg   Plan last modified:  Sean Guzman, PharmD (2/22/2022)   Next INR check:  10/3/2022   Target end date:  Indefinite    Indications    Status post transcatheter aortic valve replacement (TAVR) using bioprosthesis [Z95.3]  Atrial fibrillation (HCC) [I48.91]  Secondary hypercoagulable state (HCC) [D68.69]                 Anticoagulation Episode Summary       INR check location:      Preferred lab:      Send INR reminders to:      Comments:  Pt goes by Kinsey renee          Anticoagulation Care Providers       Provider Role Specialty Phone number    Vickey Rutherford M.D. Referring Cardiovascular Disease (Cardiology) [phone]    [facility] Responsible  [phone]                  Refer to Patient Findings for HPI:  Patient Findings       Negatives:  Signs/symptoms of thrombosis, Signs/symptoms of bleeding, Laboratory test error suspected, Change in health, Change in alcohol use, Change in activity, Upcoming invasive procedure, Emergency department visit, Upcoming dental procedure, Missed doses, Extra doses, Change in medications, Change in diet/appetite, Hospital admission, Bruising, Other complaints            There were no vitals filed for this visit.   pt declined vitals    Verified current warfarin dosing schedule.    Medications reconciled   On ASA 81 mg daily per cardiology.      A/P   INR  supra-therapeutic despite a dose hold. Will reduce his regimen further.    Warfarin dosing recommendation: HOLD tonight then began reduced regimen.    Pt educated to contact our clinic with any changes in medications or s/s of bleeding or thrombosis. Pt is aware to seek immediate medical attention for falls, head injury or deep cuts.    Follow up appointment in 1  week(s).    CLAYTON Jacob

## 2022-10-03 ENCOUNTER — ANTICOAGULATION VISIT (OUTPATIENT)
Dept: VASCULAR LAB | Facility: MEDICAL CENTER | Age: 82
End: 2022-10-03
Attending: NURSE PRACTITIONER
Payer: MEDICARE

## 2022-10-03 DIAGNOSIS — Z95.3 STATUS POST TRANSCATHETER AORTIC VALVE REPLACEMENT (TAVR) USING BIOPROSTHESIS: ICD-10-CM

## 2022-10-03 DIAGNOSIS — I48.0 PAROXYSMAL ATRIAL FIBRILLATION (HCC): ICD-10-CM

## 2022-10-03 DIAGNOSIS — D68.69 SECONDARY HYPERCOAGULABLE STATE (HCC): ICD-10-CM

## 2022-10-03 LAB — INR PPP: 3 (ref 2–3.5)

## 2022-10-03 PROCEDURE — 99212 OFFICE O/P EST SF 10 MIN: CPT

## 2022-10-03 PROCEDURE — 85610 PROTHROMBIN TIME: CPT

## 2022-10-03 NOTE — PROGRESS NOTES
Anticoagulation Summary  As of 10/3/2022      INR goal:  2.0-3.0   TTR:  64.6 % (5.9 y)   INR used for dosing:  3.00 (10/3/2022)   Warfarin maintenance plan:  2 mg (4 mg x 0.5) every Tue, Fri; 4 mg (4 mg x 1) all other days   Weekly warfarin total:  24 mg   Plan last modified:  Abhishek CorbinD (10/3/2022)   Next INR check:  10/17/2022   Target end date:  Indefinite    Indications    Status post transcatheter aortic valve replacement (TAVR) using bioprosthesis [Z95.3]  Atrial fibrillation (HCC) [I48.91]  Secondary hypercoagulable state (HCC) [D68.69]                 Anticoagulation Episode Summary       INR check location:      Preferred lab:      Send INR reminders to:      Comments:  Pt goes by Kinsey renee          Anticoagulation Care Providers       Provider Role Specialty Phone number    Vickey Rutherford M.D. Referring Cardiovascular Disease (Cardiology) [phone]    [facility] Anticoagulation Services Responsible  [phone]                  Refer to Patient Findings for HPI:       pt declined vitals    Verified current warfarin dosing schedule.    Medications reconciled   TAVR 7/29/19, on ASA 81 mg - Dr Mendoza requests pt stays on ASA + warfarin      A/P   INR  therapeutic.     Warfarin dosing recommendation: Begin reduced regimen in setting of his TWD from past 7 days.     Pt educated to contact our clinic with any changes in medications or s/s of bleeding or thrombosis. Pt is aware to seek immediate medical attention for falls, head injury or deep cuts.    Follow up appointment in 2 week(s).    Sin Huitron, PharmD

## 2022-10-03 NOTE — PROGRESS NOTES
Monitor summary    Afib  coup -/.08/-   Oral Minoxidil Pregnancy And Lactation Text: This medication should only be used when clearly needed if you are pregnant, attempting to become pregnant or breast feeding.

## 2022-10-05 LAB — INR BLD: 3 (ref 0.9–1.2)

## 2022-10-17 ENCOUNTER — ANTICOAGULATION VISIT (OUTPATIENT)
Dept: VASCULAR LAB | Facility: MEDICAL CENTER | Age: 82
End: 2022-10-17
Attending: NURSE PRACTITIONER
Payer: MEDICARE

## 2022-10-17 DIAGNOSIS — I48.0 PAROXYSMAL ATRIAL FIBRILLATION (HCC): ICD-10-CM

## 2022-10-17 DIAGNOSIS — Z95.3 STATUS POST TRANSCATHETER AORTIC VALVE REPLACEMENT (TAVR) USING BIOPROSTHESIS: ICD-10-CM

## 2022-10-17 DIAGNOSIS — D68.69 SECONDARY HYPERCOAGULABLE STATE (HCC): ICD-10-CM

## 2022-10-17 LAB — INR PPP: 2.5 (ref 2–3.5)

## 2022-10-17 PROCEDURE — 85610 PROTHROMBIN TIME: CPT

## 2022-10-17 PROCEDURE — 99211 OFF/OP EST MAY X REQ PHY/QHP: CPT

## 2022-10-17 NOTE — PROGRESS NOTES
Anticoagulation Summary  As of 10/17/2022      INR goal:  2.0-3.0   TTR:  64.8 % (6 y)   INR used for dosin.50 (10/17/2022)   Warfarin maintenance plan:  2 mg (4 mg x 0.5) every Tue, Fri; 4 mg (4 mg x 1) all other days   Weekly warfarin total:  24 mg   Plan last modified:  Sin Huitron, PharmD (10/3/2022)   Next INR check:  10/31/2022   Target end date:  Indefinite    Indications    Status post transcatheter aortic valve replacement (TAVR) using bioprosthesis [Z95.3]  Atrial fibrillation (HCC) [I48.91]  Secondary hypercoagulable state (HCC) [D68.69]                 Anticoagulation Episode Summary       INR check location:      Preferred lab:      Send INR reminders to:      Comments:  Pt goes by Kinsey renee          Anticoagulation Care Providers       Provider Role Specialty Phone number    Vickey Rutherford M.D. Referring Cardiovascular Disease (Cardiology) [phone]    [facility] Anticoagulation Services Responsible  [phone]                  Refer to Patient Findings for HPI:  Patient Findings       Negatives:  Signs/symptoms of thrombosis, Signs/symptoms of bleeding, Laboratory test error suspected, Change in health, Change in alcohol use, Change in activity, Upcoming invasive procedure, Emergency department visit, Upcoming dental procedure, Missed doses, Extra doses, Change in medications, Change in diet/appetite, Hospital admission, Bruising, Other complaints            There were no vitals filed for this visit.  pt declined vitals    Verified current warfarin dosing schedule.    Medications reconciled   TAVR 19, on ASA 81 mg - Dr Mendoza requests pt stays on ASA + warfarin      A/P   INR is therapeutic at 2.5.     Warfarin dosing recommendation: Continue current regimen.    Pt educated to contact our clinic with any changes in medications or s/s of bleeding or thrombosis. Pt is aware to seek immediate medical attention for falls, head injury or deep cuts.    Follow up appointment in 2  week(s).    Claudia Santoro, PharmD

## 2022-10-18 LAB — INR BLD: 2.5 (ref 0.9–1.2)

## 2022-10-31 ENCOUNTER — ANTICOAGULATION VISIT (OUTPATIENT)
Dept: VASCULAR LAB | Facility: MEDICAL CENTER | Age: 82
End: 2022-10-31
Attending: NURSE PRACTITIONER
Payer: MEDICARE

## 2022-10-31 DIAGNOSIS — Z95.3 STATUS POST TRANSCATHETER AORTIC VALVE REPLACEMENT (TAVR) USING BIOPROSTHESIS: ICD-10-CM

## 2022-10-31 DIAGNOSIS — D68.69 SECONDARY HYPERCOAGULABLE STATE (HCC): ICD-10-CM

## 2022-10-31 DIAGNOSIS — I48.0 PAROXYSMAL ATRIAL FIBRILLATION (HCC): ICD-10-CM

## 2022-10-31 LAB — INR PPP: 2.5 (ref 2–3.5)

## 2022-10-31 PROCEDURE — 85610 PROTHROMBIN TIME: CPT

## 2022-10-31 PROCEDURE — 99211 OFF/OP EST MAY X REQ PHY/QHP: CPT

## 2022-10-31 NOTE — PROGRESS NOTES
Anticoagulation Summary  As of 10/31/2022      INR goal:  2.0-3.0   TTR:  65.0 % (6 y)   INR used for dosin.50 (10/31/2022)   Warfarin maintenance plan:  2 mg (4 mg x 0.5) every Tue, Fri; 4 mg (4 mg x 1) all other days   Weekly warfarin total:  24 mg   Plan last modified:  Sin Huitron, PharmD (10/3/2022)   Next INR check:  2022   Target end date:  Indefinite    Indications    Status post transcatheter aortic valve replacement (TAVR) using bioprosthesis [Z95.3]  Atrial fibrillation (HCC) [I48.91]  Secondary hypercoagulable state (HCC) [D68.69]                 Anticoagulation Episode Summary       INR check location:      Preferred lab:      Send INR reminders to:      Comments:  Pt goes by Kinsey renee          Anticoagulation Care Providers       Provider Role Specialty Phone number    Vickey Rutherford M.D. Referring Cardiovascular Disease (Cardiology) [phone]    [facility] Anticoagulation Services Responsible  [phone]          Refer to Patient Findings for HPI:  Patient Findings       Negatives:  Signs/symptoms of thrombosis, Signs/symptoms of bleeding, Laboratory test error suspected, Change in health, Change in alcohol use, Change in activity, Upcoming invasive procedure, Emergency department visit, Upcoming dental procedure, Missed doses, Extra doses, Change in medications, Change in diet/appetite, Hospital admission, Bruising, Other complaints            There were no vitals filed for this visit.  pt declined vitals    Verified current warfarin dosing schedule.    Medications reconciled   TAVR 19, on ASA 81 mg - Dr Mendoza requests pt stays on ASA + warfarin      A/P   INR is therapeutic at 2.5.     Warfarin dosing recommendation: Continue current dosing regimen.    Pt educated to contact our clinic with any changes in medications or s/s of bleeding or thrombosis. Pt is aware to seek immediate medical attention for falls, head injury or deep cuts.    Follow up appointment in 4  week(s).    Claudia Santoro, PharmD

## 2022-11-01 LAB — INR BLD: 2.5 (ref 0.9–1.2)

## 2022-11-02 DIAGNOSIS — I48.0 PAROXYSMAL ATRIAL FIBRILLATION (HCC): ICD-10-CM

## 2022-11-03 RX ORDER — DIGOXIN 250 MCG
250 TABLET ORAL
Qty: 90 TABLET | Refills: 2 | Status: SHIPPED | OUTPATIENT
Start: 2022-11-03 | End: 2023-02-23 | Stop reason: SDUPTHER

## 2022-11-08 ENCOUNTER — PATIENT MESSAGE (OUTPATIENT)
Dept: HEALTH INFORMATION MANAGEMENT | Facility: OTHER | Age: 82
End: 2022-11-08

## 2022-11-08 ENCOUNTER — OFFICE VISIT (OUTPATIENT)
Dept: MEDICAL GROUP | Facility: PHYSICIAN GROUP | Age: 82
End: 2022-11-08
Payer: MEDICARE

## 2022-11-08 VITALS
SYSTOLIC BLOOD PRESSURE: 114 MMHG | WEIGHT: 223 LBS | DIASTOLIC BLOOD PRESSURE: 60 MMHG | BODY MASS INDEX: 29.55 KG/M2 | OXYGEN SATURATION: 93 % | HEIGHT: 73 IN | TEMPERATURE: 97.7 F | HEART RATE: 67 BPM

## 2022-11-08 DIAGNOSIS — G89.29 CHRONIC PAIN OF LEFT KNEE: ICD-10-CM

## 2022-11-08 DIAGNOSIS — M25.562 CHRONIC PAIN OF LEFT KNEE: ICD-10-CM

## 2022-11-08 DIAGNOSIS — L98.9 SKIN LESIONS: ICD-10-CM

## 2022-11-08 PROCEDURE — 99214 OFFICE O/P EST MOD 30 MIN: CPT | Performed by: FAMILY MEDICINE

## 2022-11-08 ASSESSMENT — FIBROSIS 4 INDEX: FIB4 SCORE: 2.34

## 2022-11-08 NOTE — PROGRESS NOTES
Subjective:     Chief Complaint   Patient presents with    Referral Needed     Derm       HPI:   Duane presents today to discuss the following.    Skin lesions  Chronic issue  The patient has AKs an SKs throughout his body.  He needs a new dermatologist     He does have a hx of skin cancer    Chronic pain of left knee  Chronic issue  Has had left knee pain for some time  S/p replacement many years ago  Has done PT on this knee a couple of years ago    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)     Clotting disorder (HCC)     reports nose bleeds    Depression     Lost wife ,still gets tearful    Diabetes (HCC)     diet controlled    Encounter for long-term (current) use of other medications     Gasping for breath     Bahamian measles     Heart murmur     Heart valve disease     Hyperlipidemia     Hypertension     Insomnia     Mumps     Nasal drainage     Pain     back & thumb    Pyogenic arthritis, lower leg (HCC)     thumb, back    Restless leg syndrome     Sleep apnea     uses CPAP    Snoring     Tonsillitis     Valvular heart disease        Current Outpatient Medications Ordered in Epic   Medication Sig Dispense Refill    digoxin (LANOXIN) 250 MCG Tab Take 1 Tablet by mouth at bedtime. 90 Tablet 2    warfarin (COUMADIN) 4 MG Tab TAKE 1/2 to 1 TABLET BY MOUTH DAILY AS DIRECTED BY [facility] 100 Tablet 1    pravastatin (PRAVACHOL) 80 MG tablet TAKE 1 TABLET BY MOUTH DAILY 90 Tablet 3    metoprolol SR (TOPROL XL) 50 MG TABLET SR 24 HR Take 1 Tablet by mouth every day. 90 Tablet 3    aspirin (ASA) 81 MG Chew Tab chewable tablet Chew 1 Tablet every day. 90 Tablet 3    GLUCOSAMINE HCL PO Take 1 Tablet by mouth 2 times a day.      Ascorbic Acid (VITAMIN C) 1000 MG Tab Take  by mouth.      Coenzyme Q10 (COQ10 PO) Take 300 mg by mouth.      Omega-3 Fatty Acids (FISH OIL) 1000 MG  "CAPSULE DELAYED RELEASE EC softgel capsule Take 2 Capsules by mouth every morning with breakfast.      lysine 500 MG Tab Take 1 Tablet by mouth every day.      acetaminophen (TYLENOL) 500 MG Tab Take 1-2 Tablets by mouth every 6 hours as needed.      gabapentin (NEURONTIN) 300 MG Cap Take 1 Capsule by mouth every day. 1 in the morning, 1 at noon, 2 at 4 pm, 3 at bedtime      B Complex Vitamins (VITAMIN B COMPLEX PO) Take 1 Tab by mouth every evening.      Cholecalciferol (VITAMIN D3) 2000 UNITS Tab Take 1 Tab by mouth every evening.      multivitamin (THERAGRAN) TABS Take 1 Tablet by mouth at bedtime.       No current Epic-ordered facility-administered medications on file.       Allergies:  Percodan [oxycodone-aspirin]    Health Maintenance: Completed    ROS:  Gen: no fevers/chills, no changes in weight  Eyes: no changes in vision  Pulm: no sob, no cough  CV: no chest pain, no palpitations  GI: no nausea/vomiting, no diarrhea      Objective:     Exam:  /60 (BP Location: Left arm, Patient Position: Sitting, BP Cuff Size: Adult)   Pulse 67   Temp 36.5 °C (97.7 °F) (Temporal)   Ht 1.854 m (6' 1\")   Wt 101 kg (223 lb)   SpO2 93%   BMI 29.42 kg/m²  Body mass index is 29.42 kg/m².      Constitutional: Alert, no distress, well-groomed.  Skin: Warm, dry, good turgor, no rashes in visible areas.  Eye: Equal, round and reactive, conjunctiva clear, lids normal.  ENMT: Lips without lesions, good dentition, moist mucous membranes.  Neck: Trachea midline, no masses, no thyromegaly.  Respiratory: Unlabored respiratory effort, no cough.  MSK: Normal gait, moves all extremities.  Neuro: Grossly non-focal.   Psych: Alert and oriented x3, normal affect and mood.        Assessment & Plan:     82 y.o. male with the following -     1. Skin lesions  Chronic, stable condition.  The patient has multiple skin lesions most consistent with AK's and SKs.  He would like to establish with [facility].  Referral in place.  - " Referral to Dermatology    2. Chronic pain of left knee  Chronic, persistent condition.  I recommend that the patient establishes with sports medicine to further evaluate his chronic knee pain.  He mostly complains of knee Pain as he does have a history of total knee replacement to that knee.  X-rays from September 22 reviewed.  Kindly appreciate recommendations from sports medicine.  - Referral to Sports Medicine      Return in about 3 months (around 2/8/2023).          Please note that this dictation was created using voice recognition software. I have made every reasonable attempt to correct obvious errors, but I expect that there are errors of grammar and possibly content that I did not discover before finalizing the note.

## 2022-11-08 NOTE — ASSESSMENT & PLAN NOTE
Chronic issue  The patient has AKs an SKs throughout his body.  He needs a new dermatologist     He does have a hx of skin cancer

## 2022-11-08 NOTE — ASSESSMENT & PLAN NOTE
Chronic issue  Has had left knee pain for some time  S/p replacement many years ago  Has done PT on this knee a couple of years ago

## 2022-11-28 ENCOUNTER — ANTICOAGULATION VISIT (OUTPATIENT)
Dept: VASCULAR LAB | Facility: MEDICAL CENTER | Age: 82
End: 2022-11-28
Attending: NURSE PRACTITIONER
Payer: MEDICARE

## 2022-11-28 VITALS — HEART RATE: 82 BPM | DIASTOLIC BLOOD PRESSURE: 76 MMHG | SYSTOLIC BLOOD PRESSURE: 119 MMHG

## 2022-11-28 DIAGNOSIS — D68.69 SECONDARY HYPERCOAGULABLE STATE (HCC): ICD-10-CM

## 2022-11-28 DIAGNOSIS — Z95.3 STATUS POST TRANSCATHETER AORTIC VALVE REPLACEMENT (TAVR) USING BIOPROSTHESIS: ICD-10-CM

## 2022-11-28 LAB
INR BLD: 2.2 (ref 0.9–1.2)
INR PPP: 2.2 (ref 2–3.5)

## 2022-11-28 PROCEDURE — 85610 PROTHROMBIN TIME: CPT

## 2022-11-28 PROCEDURE — 99211 OFF/OP EST MAY X REQ PHY/QHP: CPT

## 2022-12-08 ENCOUNTER — APPOINTMENT (RX ONLY)
Dept: URBAN - METROPOLITAN AREA CLINIC 4 | Facility: CLINIC | Age: 82
Setting detail: DERMATOLOGY
End: 2022-12-08

## 2022-12-08 DIAGNOSIS — D22 MELANOCYTIC NEVI: ICD-10-CM

## 2022-12-08 DIAGNOSIS — L81.4 OTHER MELANIN HYPERPIGMENTATION: ICD-10-CM

## 2022-12-08 DIAGNOSIS — L82.1 OTHER SEBORRHEIC KERATOSIS: ICD-10-CM

## 2022-12-08 DIAGNOSIS — D18.0 HEMANGIOMA: ICD-10-CM

## 2022-12-08 PROBLEM — D48.5 NEOPLASM OF UNCERTAIN BEHAVIOR OF SKIN: Status: ACTIVE | Noted: 2022-12-08

## 2022-12-08 PROBLEM — D22.62 MELANOCYTIC NEVI OF LEFT UPPER LIMB, INCLUDING SHOULDER: Status: ACTIVE | Noted: 2022-12-08

## 2022-12-08 PROBLEM — D23.112 OTHER BENIGN NEOPLASM OF SKIN OF RIGHT LOWER EYELID, INCLUDING CANTHUS: Status: ACTIVE | Noted: 2022-12-08

## 2022-12-08 PROBLEM — D22.5 MELANOCYTIC NEVI OF TRUNK: Status: ACTIVE | Noted: 2022-12-08

## 2022-12-08 PROBLEM — D22.61 MELANOCYTIC NEVI OF RIGHT UPPER LIMB, INCLUDING SHOULDER: Status: ACTIVE | Noted: 2022-12-08

## 2022-12-08 PROBLEM — D18.01 HEMANGIOMA OF SKIN AND SUBCUTANEOUS TISSUE: Status: ACTIVE | Noted: 2022-12-08

## 2022-12-08 PROCEDURE — 11102 TANGNTL BX SKIN SINGLE LES: CPT

## 2022-12-08 PROCEDURE — ? BIOPSY BY SHAVE METHOD

## 2022-12-08 PROCEDURE — 99213 OFFICE O/P EST LOW 20 MIN: CPT | Mod: 25

## 2022-12-08 PROCEDURE — ? LIQUID NITROGEN

## 2022-12-08 PROCEDURE — ? COUNSELING

## 2022-12-08 ASSESSMENT — LOCATION SIMPLE DESCRIPTION DERM
LOCATION SIMPLE: RIGHT FOREARM
LOCATION SIMPLE: RIGHT ANTERIOR NECK
LOCATION SIMPLE: ABDOMEN
LOCATION SIMPLE: POSTERIOR NECK
LOCATION SIMPLE: RIGHT CHEEK
LOCATION SIMPLE: LEFT TEMPLE
LOCATION SIMPLE: LEFT ANTERIOR NECK
LOCATION SIMPLE: CHEST
LOCATION SIMPLE: LEFT FOREARM
LOCATION SIMPLE: RIGHT TEMPLE
LOCATION SIMPLE: RIGHT HAND
LOCATION SIMPLE: LEFT FOREHEAD
LOCATION SIMPLE: RIGHT OCCIPITAL SCALP
LOCATION SIMPLE: LEFT CLAVICULAR SKIN
LOCATION SIMPLE: RIGHT LOWER BACK
LOCATION SIMPLE: LEFT UPPER ARM
LOCATION SIMPLE: LEFT HAND
LOCATION SIMPLE: RIGHT SCALP
LOCATION SIMPLE: LEFT ELBOW
LOCATION SIMPLE: LEFT SCALP
LOCATION SIMPLE: SCALP
LOCATION SIMPLE: ANTERIOR SCALP
LOCATION SIMPLE: POSTERIOR SCALP
LOCATION SIMPLE: RIGHT UPPER ARM
LOCATION SIMPLE: RIGHT FOREHEAD
LOCATION SIMPLE: LOWER BACK
LOCATION SIMPLE: LEFT UPPER BACK
LOCATION SIMPLE: GLABELLA

## 2022-12-08 ASSESSMENT — LOCATION DETAILED DESCRIPTION DERM
LOCATION DETAILED: LEFT RADIAL DORSAL HAND
LOCATION DETAILED: RIGHT INFERIOR LATERAL FOREHEAD
LOCATION DETAILED: RIGHT CENTRAL PARIETAL SCALP
LOCATION DETAILED: LEFT ANTECUBITAL SKIN
LOCATION DETAILED: SUPERIOR LUMBAR SPINE
LOCATION DETAILED: LEFT LATERAL INFERIOR CHEST
LOCATION DETAILED: RIGHT SUPERIOR FOREHEAD
LOCATION DETAILED: LEFT MEDIAL INFERIOR CHEST
LOCATION DETAILED: RIGHT ANTERIOR DISTAL UPPER ARM
LOCATION DETAILED: LEFT CENTRAL PARIETAL SCALP
LOCATION DETAILED: RIGHT MEDIAL TRAPEZIAL NECK
LOCATION DETAILED: LEFT INFERIOR ANTERIOR NECK
LOCATION DETAILED: RIGHT VENTRAL PROXIMAL FOREARM
LOCATION DETAILED: RIGHT SUPERIOR FRONTAL SCALP
LOCATION DETAILED: RIGHT CENTRAL MALAR CHEEK
LOCATION DETAILED: MID-FRONTAL SCALP
LOCATION DETAILED: LEFT MEDIAL SUPERIOR CHEST
LOCATION DETAILED: LEFT ANTERIOR DISTAL UPPER ARM
LOCATION DETAILED: RIGHT SUPERIOR PARIETAL SCALP
LOCATION DETAILED: RIGHT SUPERIOR MEDIAL MIDBACK
LOCATION DETAILED: LEFT CENTRAL FRONTAL SCALP
LOCATION DETAILED: RIGHT MEDIAL SUPERIOR CHEST
LOCATION DETAILED: RIGHT FOREHEAD
LOCATION DETAILED: RIGHT LATERAL FOREHEAD
LOCATION DETAILED: RIGHT CENTRAL FRONTAL SCALP
LOCATION DETAILED: RIGHT RADIAL DORSAL HAND
LOCATION DETAILED: RIGHT LATERAL SUPERIOR CHEST
LOCATION DETAILED: LEFT FOREHEAD
LOCATION DETAILED: LEFT CENTRAL TEMPLE
LOCATION DETAILED: LEFT SUPERIOR PARIETAL SCALP
LOCATION DETAILED: LEFT SUPERIOR FRONTAL SCALP
LOCATION DETAILED: RIGHT POSTERIOR PARIETAL SCALP
LOCATION DETAILED: MID POSTERIOR NECK
LOCATION DETAILED: RIGHT INFERIOR PARIETAL SCALP
LOCATION DETAILED: LEFT SUPERIOR MEDIAL FOREHEAD
LOCATION DETAILED: LEFT VENTRAL DISTAL FOREARM
LOCATION DETAILED: RIGHT SUPERIOR OCCIPITAL SCALP
LOCATION DETAILED: LEFT VENTRAL PROXIMAL FOREARM
LOCATION DETAILED: LEFT POSTERIOR PARIETAL SCALP
LOCATION DETAILED: RIGHT CLAVICULAR NECK
LOCATION DETAILED: EPIGASTRIC SKIN
LOCATION DETAILED: RIGHT DISTAL DORSAL FOREARM
LOCATION DETAILED: RIGHT LATERAL TEMPLE
LOCATION DETAILED: GLABELLA
LOCATION DETAILED: LEFT CLAVICULAR NECK
LOCATION DETAILED: LEFT CLAVICULAR SKIN
LOCATION DETAILED: LEFT INFERIOR UPPER BACK

## 2022-12-08 ASSESSMENT — LOCATION ZONE DERM
LOCATION ZONE: FACE
LOCATION ZONE: NECK
LOCATION ZONE: TRUNK
LOCATION ZONE: HAND
LOCATION ZONE: ARM
LOCATION ZONE: SCALP

## 2022-12-08 NOTE — PROCEDURE: LIQUID NITROGEN
Spray Paint Technique: No
Consent: The patient's consent was obtained including but not limited to risks of crusting, scabbing, blistering, scarring, darker or lighter pigmentary change, recurrence, incomplete removal and infection.
Spray Paint Text: The liquid nitrogen was applied to the skin utilizing a spray paint frosting technique.
Duration Of Freeze Thaw-Cycle (Seconds): 0
Show Spray Paint Technique Variable?: Yes
Detail Level: Detailed
Medical Necessity Information: It is in your best interest to select a reason for this procedure from the list below. All of these items fulfill various CMS LCD requirements except the new and changing color options.
Post-Care Instructions: I reviewed with the patient in detail post-care instructions. Patient is to wear sunprotection, and avoid picking at any of the treated lesions. Pt may apply Vaseline to crusted or scabbing areas.
Medical Necessity Clause: This procedure was medically necessary because the lesions that were treated were:  If lesion does not resolve, bx is needed.

## 2023-01-16 ENCOUNTER — ANTICOAGULATION VISIT (OUTPATIENT)
Dept: VASCULAR LAB | Facility: MEDICAL CENTER | Age: 83
End: 2023-01-16
Attending: FAMILY MEDICINE
Payer: MEDICARE

## 2023-01-16 DIAGNOSIS — I48.0 PAROXYSMAL ATRIAL FIBRILLATION (HCC): ICD-10-CM

## 2023-01-16 DIAGNOSIS — D68.69 SECONDARY HYPERCOAGULABLE STATE (HCC): ICD-10-CM

## 2023-01-16 DIAGNOSIS — Z95.3 STATUS POST TRANSCATHETER AORTIC VALVE REPLACEMENT (TAVR) USING BIOPROSTHESIS: ICD-10-CM

## 2023-01-16 LAB — INR PPP: 1.1 (ref 2–3.5)

## 2023-01-16 PROCEDURE — 85610 PROTHROMBIN TIME: CPT

## 2023-01-16 PROCEDURE — 99212 OFFICE O/P EST SF 10 MIN: CPT

## 2023-01-16 NOTE — PROGRESS NOTES
Anticoagulation Summary  As of 1/16/2023      INR goal:  2.0-3.0   TTR:  65.5 % (6.1 y)   INR used for dosing:     Warfarin maintenance plan:  2 mg (4 mg x 0.5) every Tue, Fri; 4 mg (4 mg x 1) all other days   Weekly warfarin total:  24 mg   Plan last modified:  Sin Huitron, PharmD (10/3/2022)   Next INR check:  1/23/2023   Target end date:  Indefinite    Indications    Status post transcatheter aortic valve replacement (TAVR) using bioprosthesis [Z95.3]  Atrial fibrillation (HCC) [I48.91]  Secondary hypercoagulable state (HCC) [D68.69]                 Anticoagulation Episode Summary       INR check location:      Preferred lab:      Send INR reminders to:      Comments:  Pt goes by Kinsey renee          Anticoagulation Care Providers       Provider Role Specialty Phone number    Vickey Rutherford M.D. Referring Cardiovascular Disease (Cardiology) [phone]    [facility] Anticoagulation Services Responsible  [phone]             Refer to Patient Findings for HPI:  Patient Findings       Positives:  Change in health (COVID- didn't take any warfarin for 3 weeks), Missed doses (Has been off warfarin for 3+ weeks now.), Bruising (Stopped taking warfarin due to skin discoloration during COVID infection.)    Negatives:  Signs/symptoms of thrombosis, Signs/symptoms of bleeding, Laboratory test error suspected, Change in alcohol use, Change in activity, Upcoming invasive procedure, Emergency department visit, Upcoming dental procedure, Extra doses, Change in medications, Change in diet/appetite, Hospital admission, Other complaints          There were no vitals filed for this visit.  pt declined vitals    Verified current warfarin dosing schedule.    Medications reconciled   TAVR 7/29/19, on ASA 81 mg - Dr Mendoza requests pt stays on ASA + warfarin    A/P   INR is sub-therapeutic at 1.1 - has been off warfarin for 3+ weeks.     Warfarin dosing recommendation: Bolus today with 8 mg and tomorrow with 4 mg, and  then resume home regimen.     Pt educated to contact our clinic with any changes in medications or s/s of bleeding or thrombosis. Pt is aware to seek immediate medical attention for falls, head injury or deep cuts.    Follow up appointment in 1 week(s).    Claudia Santoro, PharmD

## 2023-01-19 LAB — INR BLD: 1.1 (ref 0.9–1.2)

## 2023-01-23 ENCOUNTER — ANTICOAGULATION VISIT (OUTPATIENT)
Dept: VASCULAR LAB | Facility: MEDICAL CENTER | Age: 83
End: 2023-01-23
Attending: FAMILY MEDICINE
Payer: MEDICARE

## 2023-01-23 VITALS — HEART RATE: 55 BPM | DIASTOLIC BLOOD PRESSURE: 60 MMHG | SYSTOLIC BLOOD PRESSURE: 133 MMHG

## 2023-01-23 DIAGNOSIS — I48.0 PAROXYSMAL ATRIAL FIBRILLATION (HCC): ICD-10-CM

## 2023-01-23 DIAGNOSIS — Z95.3 STATUS POST TRANSCATHETER AORTIC VALVE REPLACEMENT (TAVR) USING BIOPROSTHESIS: ICD-10-CM

## 2023-01-23 DIAGNOSIS — D68.69 SECONDARY HYPERCOAGULABLE STATE (HCC): ICD-10-CM

## 2023-01-23 LAB
INR BLD: 1.8 (ref 0.9–1.2)
INR PPP: 1.8 (ref 2–3.5)

## 2023-01-23 PROCEDURE — 85610 PROTHROMBIN TIME: CPT

## 2023-01-23 PROCEDURE — 99212 OFFICE O/P EST SF 10 MIN: CPT | Performed by: NURSE PRACTITIONER

## 2023-01-23 NOTE — PROGRESS NOTES
Anticoagulation Summary  As of 2023      INR goal:  2.0-3.0   TTR:  64.2 % (6.3 y)   INR used for dosin.80 (2023)   Warfarin maintenance plan:  2 mg (4 mg x 0.5) every Tue, Fri; 4 mg (4 mg x 1) all other days   Weekly warfarin total:  24 mg   Plan last modified:  Sin Huitron, PharmD (10/3/2022)   Next INR check:  2023   Target end date:  Indefinite    Indications    Status post transcatheter aortic valve replacement (TAVR) using bioprosthesis [Z95.3]  Atrial fibrillation (HCC) [I48.91]  Secondary hypercoagulable state (HCC) [D68.69]                 Anticoagulation Episode Summary       INR check location:      Preferred lab:      Send INR reminders to:      Comments:  Pt goes by Kinsey renee          Anticoagulation Care Providers       Provider Role Specialty Phone number    Vickey Rutherford M.D. Referring Cardiovascular Disease (Cardiology) [phone]    [facility] Anticoagulation Services Responsible  [phone]                  Refer to Patient Findings for HPI:  Patient Findings       Positives:  Change in diet/appetite    Negatives:  Signs/symptoms of thrombosis, Signs/symptoms of bleeding, Laboratory test error suspected, Change in health, Change in alcohol use, Change in activity, Upcoming invasive procedure, Emergency department visit, Upcoming dental procedure, Missed doses, Extra doses, Change in medications, Hospital admission, Bruising, Other complaints    Comments:  Appetite improving but still decreased since having covid recently.            Vitals:    23 1004   BP: 133/60   Pulse: (!) 55     Verified current warfarin dosing schedule.    Medications reconciled   Pt is on asa 81 mg as antiplatelet therapy for TAVR and must be reviewed again on next visit with cardiology.      A/P   INR  sub-therapeutic. INR trended up from 1.1 with two loading doses.    Warfarin dosing recommendation: Tomorrow take 4 mg then resume your previous regimen.    Pt educated to contact our  clinic with any changes in medications or s/s of bleeding or thrombosis. Pt is aware to seek immediate medical attention for falls, head injury or deep cuts.    Follow up appointment in 1 week(s).    MARKELL Jacob.

## 2023-01-30 ENCOUNTER — ANTICOAGULATION VISIT (OUTPATIENT)
Dept: VASCULAR LAB | Facility: MEDICAL CENTER | Age: 83
End: 2023-01-30
Attending: NURSE PRACTITIONER
Payer: MEDICARE

## 2023-01-30 VITALS — DIASTOLIC BLOOD PRESSURE: 60 MMHG | SYSTOLIC BLOOD PRESSURE: 104 MMHG | HEART RATE: 58 BPM

## 2023-01-30 DIAGNOSIS — D68.69 SECONDARY HYPERCOAGULABLE STATE (HCC): ICD-10-CM

## 2023-01-30 DIAGNOSIS — I48.0 PAROXYSMAL ATRIAL FIBRILLATION (HCC): ICD-10-CM

## 2023-01-30 DIAGNOSIS — Z95.3 STATUS POST TRANSCATHETER AORTIC VALVE REPLACEMENT (TAVR) USING BIOPROSTHESIS: ICD-10-CM

## 2023-01-30 LAB — INR PPP: 1.9 (ref 2–3.5)

## 2023-01-30 PROCEDURE — 85610 PROTHROMBIN TIME: CPT

## 2023-01-30 PROCEDURE — 99212 OFFICE O/P EST SF 10 MIN: CPT | Performed by: NURSE PRACTITIONER

## 2023-01-30 NOTE — PROGRESS NOTES
Anticoagulation Summary  As of 2023      INR goal:  2.0-3.0   TTR:  64.1 % (6.3 y)   INR used for dosin.90 (2023)   Warfarin maintenance plan:  2 mg (4 mg x 0.5) every Tue; 4 mg (4 mg x 1) all other days   Weekly warfarin total:  26 mg   Plan last modified:  CLAYTON Jacob (2023)   Next INR check:  2023   Target end date:  Indefinite    Indications    Status post transcatheter aortic valve replacement (TAVR) using bioprosthesis [Z95.3]  Atrial fibrillation (HCC) [I48.91]  Secondary hypercoagulable state (HCC) [D68.69]                 Anticoagulation Episode Summary       INR check location:      Preferred lab:      Send INR reminders to:      Comments:  Pt goes by Kinsey renee          Anticoagulation Care Providers       Provider Role Specialty Phone number    Vickey Rutherford M.D. Referring Cardiovascular Disease (Cardiology) [phone]    [facility] Anticoagulation Services Responsible  [phone]               Refer to Patient Findings for HPI:  Patient Findings       Negatives:  Signs/symptoms of thrombosis, Signs/symptoms of bleeding, Laboratory test error suspected, Change in health, Change in alcohol use, Change in activity, Upcoming invasive procedure, Emergency department visit, Upcoming dental procedure, Missed doses, Extra doses, Change in medications, Change in diet/appetite, Hospital admission, Bruising, Other complaints            Vitals:    23 0937   BP: 104/60   Pulse: (!) 58     Verified current warfarin dosing schedule.    Medications reconciled   Pt is on asa 81 mg as antiplatelet therapy for TAVR and must be reviewed again on next visit with cardiology.      A/P   INR  slightly sub-therapeutic. INR gradually trending up. Will increase his regimen slightly more this week.     Warfarin dosing recommendation: Take 4 mg daily.    Pt educated to contact our clinic with any changes in medications or s/s of bleeding or thrombosis. Pt is aware to seek  immediate medical attention for falls, head injury or deep cuts.    Follow up appointment in 1 week(s).    MARKELL Jacob.

## 2023-01-31 LAB — INR BLD: 1.9 (ref 0.9–1.2)

## 2023-02-06 ENCOUNTER — ANTICOAGULATION VISIT (OUTPATIENT)
Dept: VASCULAR LAB | Facility: MEDICAL CENTER | Age: 83
End: 2023-02-06
Attending: NURSE PRACTITIONER
Payer: MEDICARE

## 2023-02-06 DIAGNOSIS — Z95.3 STATUS POST TRANSCATHETER AORTIC VALVE REPLACEMENT (TAVR) USING BIOPROSTHESIS: ICD-10-CM

## 2023-02-06 DIAGNOSIS — D68.69 SECONDARY HYPERCOAGULABLE STATE (HCC): ICD-10-CM

## 2023-02-06 DIAGNOSIS — I48.0 PAROXYSMAL ATRIAL FIBRILLATION (HCC): ICD-10-CM

## 2023-02-06 LAB
INR BLD: 1.9 (ref 0.9–1.2)
INR PPP: 1.9 (ref 2–3.5)

## 2023-02-06 PROCEDURE — 85610 PROTHROMBIN TIME: CPT

## 2023-02-06 PROCEDURE — 99212 OFFICE O/P EST SF 10 MIN: CPT | Performed by: NURSE PRACTITIONER

## 2023-02-06 NOTE — PROGRESS NOTES
Anticoagulation Summary  As of 2023      INR goal:  2.0-3.0   TTR:  63.8 % (6.3 y)   INR used for dosin.90 (2023)   Warfarin maintenance plan:  2 mg (4 mg x 0.5) every Tue; 4 mg (4 mg x 1) all other days   Weekly warfarin total:  26 mg   Plan last modified:  CLAYTON Jacob (2023)   Next INR check:  2023   Target end date:  Indefinite    Indications    Status post transcatheter aortic valve replacement (TAVR) using bioprosthesis [Z95.3]  Atrial fibrillation (HCC) [I48.91]  Secondary hypercoagulable state (HCC) [D68.69]                 Anticoagulation Episode Summary       INR check location:      Preferred lab:      Send INR reminders to:      Comments:  Pt goes by Kinsey renee          Anticoagulation Care Providers       Provider Role Specialty Phone number    Vickey Rutherford M.D. Referring Cardiovascular Disease (Cardiology) [phone]    [facility] Anticoagulation Services Responsible  [phone]                  Refer to Patient Findings for HPI:      There were no vitals filed for this visit.    Verified current warfarin dosing schedule.    Medications reconciled   Pt is on asa 81 mg as antiplatelet therapy for TAVR and must be reviewed again on next visit with cardiology.      A/P   INR  slightly sub-therapeutic. INR didn't increase despite slight dose increase. Will have patient take a loading dose tonight.    Warfarin dosing recommendation: Tonight take 6 mg then resume 4 mg daily.    Pt educated to contact our clinic with any changes in medications or s/s of bleeding or thrombosis. Pt is aware to seek immediate medical attention for falls, head injury or deep cuts.    Follow up appointment in 1 week(s).    CLAYTON Jacob

## 2023-02-09 ENCOUNTER — APPOINTMENT (OUTPATIENT)
Dept: RADIOLOGY | Facility: MEDICAL CENTER | Age: 83
DRG: 872 | End: 2023-02-09
Attending: EMERGENCY MEDICINE
Payer: MEDICARE

## 2023-02-09 ENCOUNTER — HOSPITAL ENCOUNTER (INPATIENT)
Facility: MEDICAL CENTER | Age: 83
LOS: 3 days | DRG: 872 | End: 2023-02-12
Attending: EMERGENCY MEDICINE | Admitting: INTERNAL MEDICINE
Payer: MEDICARE

## 2023-02-09 DIAGNOSIS — N39.0 ACUTE UTI: ICD-10-CM

## 2023-02-09 DIAGNOSIS — A41.9 SEPSIS, DUE TO UNSPECIFIED ORGANISM, UNSPECIFIED WHETHER ACUTE ORGAN DYSFUNCTION PRESENT (HCC): ICD-10-CM

## 2023-02-09 DIAGNOSIS — S09.90XA CLOSED HEAD INJURY, INITIAL ENCOUNTER: ICD-10-CM

## 2023-02-09 PROBLEM — K66.8 CYSTIC LESION OF ABDOMINAL VISCERA: Status: ACTIVE | Noted: 2023-02-09

## 2023-02-09 PROBLEM — N30.00 ACUTE CYSTITIS: Status: ACTIVE | Noted: 2023-02-09

## 2023-02-09 LAB
ALBUMIN SERPL BCP-MCNC: 4.4 G/DL (ref 3.2–4.9)
ALBUMIN/GLOB SERPL: 1.1 G/DL
ALP SERPL-CCNC: 113 U/L (ref 30–99)
ALT SERPL-CCNC: 46 U/L (ref 2–50)
ANION GAP SERPL CALC-SCNC: 12 MMOL/L (ref 7–16)
APPEARANCE UR: ABNORMAL
APTT PPP: 39.3 SEC (ref 24.7–36)
AST SERPL-CCNC: 52 U/L (ref 12–45)
BACTERIA #/AREA URNS HPF: ABNORMAL /HPF
BASOPHILS # BLD AUTO: 0.3 % (ref 0–1.8)
BASOPHILS # BLD: 0.07 K/UL (ref 0–0.12)
BILIRUB SERPL-MCNC: 1.6 MG/DL (ref 0.1–1.5)
BILIRUB UR QL STRIP.AUTO: NEGATIVE
BUN SERPL-MCNC: 11 MG/DL (ref 8–22)
CALCIUM ALBUM COR SERPL-MCNC: 9.2 MG/DL (ref 8.5–10.5)
CALCIUM SERPL-MCNC: 9.5 MG/DL (ref 8.5–10.5)
CHLORIDE SERPL-SCNC: 101 MMOL/L (ref 96–112)
CO2 SERPL-SCNC: 23 MMOL/L (ref 20–33)
COLOR UR: ABNORMAL
CREAT SERPL-MCNC: 0.78 MG/DL (ref 0.5–1.4)
DIGOXIN SERPL-MCNC: 0.7 NG/ML (ref 0.8–2)
EKG IMPRESSION: NORMAL
EOSINOPHIL # BLD AUTO: 0 K/UL (ref 0–0.51)
EOSINOPHIL NFR BLD: 0 % (ref 0–6.9)
EPI CELLS #/AREA URNS HPF: NEGATIVE /HPF
ERYTHROCYTE [DISTWIDTH] IN BLOOD BY AUTOMATED COUNT: 47.2 FL (ref 35.9–50)
GFR SERPLBLD CREATININE-BSD FMLA CKD-EPI: 87 ML/MIN/1.73 M 2
GLOBULIN SER CALC-MCNC: 4 G/DL (ref 1.9–3.5)
GLUCOSE SERPL-MCNC: 120 MG/DL (ref 65–99)
GLUCOSE UR STRIP.AUTO-MCNC: NEGATIVE MG/DL
HCT VFR BLD AUTO: 47.5 % (ref 42–52)
HGB BLD-MCNC: 16.1 G/DL (ref 14–18)
HYALINE CASTS #/AREA URNS LPF: ABNORMAL /LPF
IMM GRANULOCYTES # BLD AUTO: 0.3 K/UL (ref 0–0.11)
IMM GRANULOCYTES NFR BLD AUTO: 1.2 % (ref 0–0.9)
INR PPP: 2.15 (ref 0.87–1.13)
KETONES UR STRIP.AUTO-MCNC: 40 MG/DL
LACTATE SERPL-SCNC: 2 MMOL/L (ref 0.5–2)
LACTATE SERPL-SCNC: 2.4 MMOL/L (ref 0.5–2)
LEUKOCYTE ESTERASE UR QL STRIP.AUTO: ABNORMAL
LIPASE SERPL-CCNC: 9 U/L (ref 11–82)
LYMPHOCYTES # BLD AUTO: 1.48 K/UL (ref 1–4.8)
LYMPHOCYTES NFR BLD: 5.7 % (ref 22–41)
MCH RBC QN AUTO: 31.6 PG (ref 27–33)
MCHC RBC AUTO-ENTMCNC: 33.9 G/DL (ref 33.7–35.3)
MCV RBC AUTO: 93.1 FL (ref 81.4–97.8)
MICRO URNS: ABNORMAL
MONOCYTES # BLD AUTO: 2.01 K/UL (ref 0–0.85)
MONOCYTES NFR BLD AUTO: 7.8 % (ref 0–13.4)
NEUTROPHILS # BLD AUTO: 21.93 K/UL (ref 1.82–7.42)
NEUTROPHILS NFR BLD: 85 % (ref 44–72)
NITRITE UR QL STRIP.AUTO: POSITIVE
NRBC # BLD AUTO: 0 K/UL
NRBC BLD-RTO: 0 /100 WBC
PH UR STRIP.AUTO: 6.5 [PH] (ref 5–8)
PLATELET # BLD AUTO: 250 K/UL (ref 164–446)
PMV BLD AUTO: 10 FL (ref 9–12.9)
POTASSIUM SERPL-SCNC: 4.5 MMOL/L (ref 3.6–5.5)
PROT SERPL-MCNC: 8.4 G/DL (ref 6–8.2)
PROT UR QL STRIP: 100 MG/DL
PROTHROMBIN TIME: 23.4 SEC (ref 12–14.6)
RBC # BLD AUTO: 5.1 M/UL (ref 4.7–6.1)
RBC # URNS HPF: >150 /HPF
RBC UR QL AUTO: ABNORMAL
SODIUM SERPL-SCNC: 136 MMOL/L (ref 135–145)
SP GR UR STRIP.AUTO: 1.02
UROBILINOGEN UR STRIP.AUTO-MCNC: 1 MG/DL
WBC # BLD AUTO: 25.8 K/UL (ref 4.8–10.8)
WBC #/AREA URNS HPF: ABNORMAL /HPF

## 2023-02-09 PROCEDURE — 700105 HCHG RX REV CODE 258: Performed by: INTERNAL MEDICINE

## 2023-02-09 PROCEDURE — 85730 THROMBOPLASTIN TIME PARTIAL: CPT

## 2023-02-09 PROCEDURE — 99285 EMERGENCY DEPT VISIT HI MDM: CPT

## 2023-02-09 PROCEDURE — 83605 ASSAY OF LACTIC ACID: CPT

## 2023-02-09 PROCEDURE — 81001 URINALYSIS AUTO W/SCOPE: CPT

## 2023-02-09 PROCEDURE — 36415 COLL VENOUS BLD VENIPUNCTURE: CPT

## 2023-02-09 PROCEDURE — 85025 COMPLETE CBC W/AUTO DIFF WBC: CPT

## 2023-02-09 PROCEDURE — A9270 NON-COVERED ITEM OR SERVICE: HCPCS | Performed by: INTERNAL MEDICINE

## 2023-02-09 PROCEDURE — 72131 CT LUMBAR SPINE W/O DYE: CPT

## 2023-02-09 PROCEDURE — 71045 X-RAY EXAM CHEST 1 VIEW: CPT

## 2023-02-09 PROCEDURE — 96374 THER/PROPH/DIAG INJ IV PUSH: CPT

## 2023-02-09 PROCEDURE — 99223 1ST HOSP IP/OBS HIGH 75: CPT | Mod: AI | Performed by: INTERNAL MEDICINE

## 2023-02-09 PROCEDURE — 87086 URINE CULTURE/COLONY COUNT: CPT

## 2023-02-09 PROCEDURE — 87186 SC STD MICRODIL/AGAR DIL: CPT

## 2023-02-09 PROCEDURE — 700111 HCHG RX REV CODE 636 W/ 250 OVERRIDE (IP): Performed by: EMERGENCY MEDICINE

## 2023-02-09 PROCEDURE — 83690 ASSAY OF LIPASE: CPT

## 2023-02-09 PROCEDURE — 700105 HCHG RX REV CODE 258: Performed by: EMERGENCY MEDICINE

## 2023-02-09 PROCEDURE — 87040 BLOOD CULTURE FOR BACTERIA: CPT | Mod: 91

## 2023-02-09 PROCEDURE — 70450 CT HEAD/BRAIN W/O DYE: CPT

## 2023-02-09 PROCEDURE — 770020 HCHG ROOM/CARE - TELE (206)

## 2023-02-09 PROCEDURE — 80162 ASSAY OF DIGOXIN TOTAL: CPT

## 2023-02-09 PROCEDURE — 80053 COMPREHEN METABOLIC PANEL: CPT

## 2023-02-09 PROCEDURE — 87077 CULTURE AEROBIC IDENTIFY: CPT

## 2023-02-09 PROCEDURE — 93005 ELECTROCARDIOGRAM TRACING: CPT | Performed by: EMERGENCY MEDICINE

## 2023-02-09 PROCEDURE — 85610 PROTHROMBIN TIME: CPT

## 2023-02-09 PROCEDURE — 700102 HCHG RX REV CODE 250 W/ 637 OVERRIDE(OP): Performed by: INTERNAL MEDICINE

## 2023-02-09 PROCEDURE — 96375 TX/PRO/DX INJ NEW DRUG ADDON: CPT

## 2023-02-09 PROCEDURE — 72125 CT NECK SPINE W/O DYE: CPT

## 2023-02-09 PROCEDURE — 72128 CT CHEST SPINE W/O DYE: CPT

## 2023-02-09 RX ORDER — LABETALOL HYDROCHLORIDE 5 MG/ML
10 INJECTION, SOLUTION INTRAVENOUS EVERY 4 HOURS PRN
Status: DISCONTINUED | OUTPATIENT
Start: 2023-02-09 | End: 2023-02-12 | Stop reason: HOSPADM

## 2023-02-09 RX ORDER — BISACODYL 10 MG
10 SUPPOSITORY, RECTAL RECTAL
Status: DISCONTINUED | OUTPATIENT
Start: 2023-02-09 | End: 2023-02-12 | Stop reason: HOSPADM

## 2023-02-09 RX ORDER — ONDANSETRON 2 MG/ML
4 INJECTION INTRAMUSCULAR; INTRAVENOUS ONCE
Status: COMPLETED | OUTPATIENT
Start: 2023-02-09 | End: 2023-02-09

## 2023-02-09 RX ORDER — WARFARIN SODIUM 4 MG/1
4 TABLET ORAL DAILY
Status: DISCONTINUED | OUTPATIENT
Start: 2023-02-09 | End: 2023-02-11

## 2023-02-09 RX ORDER — ASPIRIN 81 MG/1
81 TABLET, CHEWABLE ORAL DAILY
Status: DISCONTINUED | OUTPATIENT
Start: 2023-02-09 | End: 2023-02-12 | Stop reason: HOSPADM

## 2023-02-09 RX ORDER — ONDANSETRON 4 MG/1
4 TABLET, ORALLY DISINTEGRATING ORAL EVERY 4 HOURS PRN
Status: DISCONTINUED | OUTPATIENT
Start: 2023-02-09 | End: 2023-02-12 | Stop reason: HOSPADM

## 2023-02-09 RX ORDER — POLYETHYLENE GLYCOL 3350 17 G/17G
1 POWDER, FOR SOLUTION ORAL
Status: DISCONTINUED | OUTPATIENT
Start: 2023-02-09 | End: 2023-02-12 | Stop reason: HOSPADM

## 2023-02-09 RX ORDER — AMOXICILLIN 250 MG
2 CAPSULE ORAL 2 TIMES DAILY
Status: DISCONTINUED | OUTPATIENT
Start: 2023-02-09 | End: 2023-02-12 | Stop reason: HOSPADM

## 2023-02-09 RX ORDER — GUAIFENESIN/DEXTROMETHORPHAN 100-10MG/5
10 SYRUP ORAL EVERY 6 HOURS PRN
Status: DISCONTINUED | OUTPATIENT
Start: 2023-02-09 | End: 2023-02-12 | Stop reason: HOSPADM

## 2023-02-09 RX ORDER — METOPROLOL SUCCINATE 50 MG/1
50 TABLET, EXTENDED RELEASE ORAL
Status: DISCONTINUED | OUTPATIENT
Start: 2023-02-09 | End: 2023-02-12 | Stop reason: HOSPADM

## 2023-02-09 RX ORDER — CEFTRIAXONE 2 G/1
2000 INJECTION, POWDER, FOR SOLUTION INTRAMUSCULAR; INTRAVENOUS ONCE
Status: COMPLETED | OUTPATIENT
Start: 2023-02-09 | End: 2023-02-09

## 2023-02-09 RX ORDER — MORPHINE SULFATE 4 MG/ML
2 INJECTION INTRAVENOUS ONCE
Status: COMPLETED | OUTPATIENT
Start: 2023-02-09 | End: 2023-02-09

## 2023-02-09 RX ORDER — ACETAMINOPHEN 325 MG/1
650 TABLET ORAL EVERY 6 HOURS PRN
Status: DISCONTINUED | OUTPATIENT
Start: 2023-02-09 | End: 2023-02-12 | Stop reason: HOSPADM

## 2023-02-09 RX ORDER — SODIUM CHLORIDE 9 MG/ML
INJECTION, SOLUTION INTRAVENOUS CONTINUOUS
Status: ACTIVE | OUTPATIENT
Start: 2023-02-09 | End: 2023-02-10

## 2023-02-09 RX ORDER — SODIUM CHLORIDE, SODIUM LACTATE, POTASSIUM CHLORIDE, AND CALCIUM CHLORIDE .6; .31; .03; .02 G/100ML; G/100ML; G/100ML; G/100ML
1000 INJECTION, SOLUTION INTRAVENOUS ONCE
Status: COMPLETED | OUTPATIENT
Start: 2023-02-09 | End: 2023-02-09

## 2023-02-09 RX ORDER — DIGOXIN 125 MCG
250 TABLET ORAL
Status: DISCONTINUED | OUTPATIENT
Start: 2023-02-09 | End: 2023-02-12 | Stop reason: HOSPADM

## 2023-02-09 RX ORDER — GABAPENTIN 300 MG/1
900 CAPSULE ORAL 4 TIMES DAILY
Status: DISCONTINUED | OUTPATIENT
Start: 2023-02-09 | End: 2023-02-12 | Stop reason: HOSPADM

## 2023-02-09 RX ORDER — ONDANSETRON 2 MG/ML
4 INJECTION INTRAMUSCULAR; INTRAVENOUS EVERY 4 HOURS PRN
Status: DISCONTINUED | OUTPATIENT
Start: 2023-02-09 | End: 2023-02-12 | Stop reason: HOSPADM

## 2023-02-09 RX ADMIN — CEFTRIAXONE SODIUM 2000 MG: 2 INJECTION, POWDER, FOR SOLUTION INTRAMUSCULAR; INTRAVENOUS at 16:19

## 2023-02-09 RX ADMIN — METOPROLOL SUCCINATE 50 MG: 50 TABLET, EXTENDED RELEASE ORAL at 17:50

## 2023-02-09 RX ADMIN — MORPHINE SULFATE 2 MG: 4 INJECTION INTRAVENOUS at 16:41

## 2023-02-09 RX ADMIN — SENNOSIDES AND DOCUSATE SODIUM 2 TABLET: 50; 8.6 TABLET ORAL at 19:50

## 2023-02-09 RX ADMIN — WARFARIN SODIUM 4 MG: 4 TABLET ORAL at 20:21

## 2023-02-09 RX ADMIN — DIGOXIN 250 MCG: 0.12 TABLET ORAL at 20:21

## 2023-02-09 RX ADMIN — GABAPENTIN 900 MG: 300 CAPSULE ORAL at 17:50

## 2023-02-09 RX ADMIN — SODIUM CHLORIDE, POTASSIUM CHLORIDE, SODIUM LACTATE AND CALCIUM CHLORIDE 1000 ML: 600; 310; 30; 20 INJECTION, SOLUTION INTRAVENOUS at 15:45

## 2023-02-09 RX ADMIN — ONDANSETRON 4 MG: 2 INJECTION INTRAMUSCULAR; INTRAVENOUS at 16:41

## 2023-02-09 RX ADMIN — ASPIRIN 81 MG: 81 TABLET, CHEWABLE ORAL at 17:50

## 2023-02-09 RX ADMIN — SODIUM CHLORIDE: 9 INJECTION, SOLUTION INTRAVENOUS at 19:53

## 2023-02-09 RX ADMIN — GABAPENTIN 900 MG: 300 CAPSULE ORAL at 20:21

## 2023-02-09 ASSESSMENT — COGNITIVE AND FUNCTIONAL STATUS - GENERAL
TOILETING: A LITTLE
SUGGESTED CMS G CODE MODIFIER DAILY ACTIVITY: CI
CLIMB 3 TO 5 STEPS WITH RAILING: A LOT
STANDING UP FROM CHAIR USING ARMS: A LOT
WALKING IN HOSPITAL ROOM: A LOT
MOBILITY SCORE: 17
SUGGESTED CMS G CODE MODIFIER MOBILITY: CK
DAILY ACTIVITIY SCORE: 23
MOVING FROM LYING ON BACK TO SITTING ON SIDE OF FLAT BED: A LITTLE

## 2023-02-09 ASSESSMENT — LIFESTYLE VARIABLES
CONSUMPTION TOTAL: NEGATIVE
HOW MANY TIMES IN THE PAST YEAR HAVE YOU HAD 5 OR MORE DRINKS IN A DAY: 0
HAVE PEOPLE ANNOYED YOU BY CRITICIZING YOUR DRINKING: NO
TOTAL SCORE: 0
ON A TYPICAL DAY WHEN YOU DRINK ALCOHOL HOW MANY DRINKS DO YOU HAVE: 0
EVER FELT BAD OR GUILTY ABOUT YOUR DRINKING: NO
HAVE YOU EVER FELT YOU SHOULD CUT DOWN ON YOUR DRINKING: NO
EVER HAD A DRINK FIRST THING IN THE MORNING TO STEADY YOUR NERVES TO GET RID OF A HANGOVER: NO
ALCOHOL_USE: NO
AVERAGE NUMBER OF DAYS PER WEEK YOU HAVE A DRINK CONTAINING ALCOHOL: 0
SUBSTANCE_ABUSE: 0
TOTAL SCORE: 0
TOTAL SCORE: 0

## 2023-02-09 ASSESSMENT — ENCOUNTER SYMPTOMS
PALPITATIONS: 0
BLURRED VISION: 0
DIARRHEA: 0
SHORTNESS OF BREATH: 0
ABDOMINAL PAIN: 0
DOUBLE VISION: 0
HALLUCINATIONS: 0
BACK PAIN: 0
NAUSEA: 0
FALLS: 1
SEIZURES: 0
FEVER: 0
COUGH: 0
LOSS OF CONSCIOUSNESS: 0
VOMITING: 0
SORE THROAT: 0
CHILLS: 1

## 2023-02-09 ASSESSMENT — CHA2DS2 SCORE
VASCULAR DISEASE: NO
DIABETES: NO
PRIOR STROKE OR TIA OR THROMBOEMBOLISM: NO
SEX: MALE
CHF OR LEFT VENTRICULAR DYSFUNCTION: NO
CHA2DS2 VASC SCORE: 3
AGE 65 TO 74: NO
AGE 75 OR GREATER: YES
HYPERTENSION: YES

## 2023-02-09 ASSESSMENT — PATIENT HEALTH QUESTIONNAIRE - PHQ9
1. LITTLE INTEREST OR PLEASURE IN DOING THINGS: NOT AT ALL
SUM OF ALL RESPONSES TO PHQ9 QUESTIONS 1 AND 2: 0
2. FEELING DOWN, DEPRESSED, IRRITABLE, OR HOPELESS: NOT AT ALL

## 2023-02-09 ASSESSMENT — PAIN DESCRIPTION - PAIN TYPE: TYPE: ACUTE PAIN

## 2023-02-09 ASSESSMENT — FIBROSIS 4 INDEX
FIB4 SCORE: 2.61
FIB4 SCORE: 2.37

## 2023-02-09 NOTE — ED PROVIDER NOTES
ER Provider Note    Scribed for Piotr Huitron M.d. by Franco Gil. 2/9/2023  3:18 PM    Primary Care Provider: Dyllan Robles M.D.    CHIEF COMPLAINT  Chief Complaint   Patient presents with    T-5000 FALL     PT was on toilet and was unable to stand up due to intensity of chronic back pain + weakness, PT intentionally propelled himself off of toilet onto floor with intent to crawl to phone to seek help. PT did strike head, denies LOC. + Blood thinners    Tachycardia     PT found to be in a. Fib RVR upon EMS arrival, PT reports has known hx for a. Fib. PT does report increased weakness for several weeks.        HPI/ROS  LIMITATION TO HISTORY   Select: : None  OUTSIDE HISTORIAN(S):  EMS      Duane Parkinson is a 83 y.o. male who presents to the ED via EMS complaining of fall occurring prior to arrival. Patient states he was sitting on toilet. He states he has history of chronic back pain and has been experiencing increase fatigue over the past couple of weeks. Patient was unable to stand up from the toilet and intentionally propelled himself off the toilet and onto the floor. Patient notes he hit his head on the wall as he was crawling. Patient was ambulatory on scene. He denies any loss of consciousness., nausea, vomiting, cough, or sore throat. Patient is taking Coumadin for heart valve replacement. He has history of A-fib w/ RVR. Patient reports taking Digoxen and denies any recent medication changes    PAST MEDICAL HISTORY  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)     Clotting disorder (HCC)     reports nose bleeds    Depression     Lost wife ,still gets tearful    Diabetes (HCC)     diet controlled    Encounter for long-term (current) use of other medications     Gasping for breath     [place] measles     Heart murmur     Heart valve disease     Hyperlipidemia      Hypertension     Insomnia     Mumps     Nasal drainage     Pain     back & thumb    Pyogenic arthritis, lower leg (HCC)     thumb, back    Restless leg syndrome     Sleep apnea     uses CPAP    Snoring     Tonsillitis     Valvular heart disease        SURGICAL HISTORY  Past Surgical History:   Procedure Laterality Date    GIBRAN N/A 7/29/2019    Procedure: ECHOCARDIOGRAM, TRANSESOPHAGEAL;  Surgeon: Leander Buckner M.D.;  Location: SURGERY [facility];  Service: Cardiac    TRANSCATHETER AORTIC VALVE REPLACEMENT Bilateral 7/29/2019    Procedure: REPLACEMENT, AORTIC VALVE, TRANSCATHETER;  Surgeon: Leander Buckner M.D.;  Location: SURGERY [facility];  Service: Cardiac    FINGER ARTHROPLASTY Left 5/27/2016    Procedure: FINGER ARTHROPLASTY THUMB CARPOMETACARPAL EXCISIONAL, EXCISE TRAPEZIUM;  Surgeon: Raheel Salgado M.D.;  Location: SURGERY SAME DAY [facility];  Service:     CARDIOVERSION      on 7/17/06, sinus rhythm until January 2007,  paroxysmal atrial fibrillation    KNEE ARTHROPLASTY TOTAL      LAMINOTOMY N/A     multiple lumbar spine    OTHER ORTHOPEDIC SURGERY      lower back    DE PERCUT IMPLNT NEUROELECT,EPIDURAL      TOE ARTHROPLASTY      TURP-VAPOR N/A        FAMILY HISTORY  Family History   Problem Relation Age of Onset    Other Mother         unknown    Heart Disease Mother     Cancer Father         prostate, skin    No Known Problems Brother     Diabetes Brother     Heart Disease Son     Sleep Apnea Neg Hx        SOCIAL HISTORY   reports that he quit smoking about 51 years ago. His smoking use included cigarettes. He has a 20.00 pack-year smoking history. He has quit using smokeless tobacco. He reports that he does not drink alcohol and does not use drugs.    CURRENT MEDICATIONS  Current Discharge Medication List        CONTINUE these medications which have NOT CHANGED    Details   LYSINE PO Take 1 Tablet by mouth every day.      digoxin (LANOXIN) 250 MCG Tab Take 1 Tablet by mouth  "at bedtime.  Qty: 90 Tablet, Refills: 2    Associated Diagnoses: Paroxysmal atrial fibrillation (HCC)      warfarin (COUMADIN) 4 MG Tab TAKE 1/2 to 1 TABLET BY MOUTH DAILY AS DIRECTED BY [facility]  Qty: 100 Tablet, Refills: 1    Comments: This prescription is transmitted by a pharmacist under the authority of a collaborative practice agreement.  Associated Diagnoses: Chronic anticoagulation      pravastatin (PRAVACHOL) 80 MG tablet TAKE 1 TABLET BY MOUTH DAILY  Qty: 90 Tablet, Refills: 3    Associated Diagnoses: Mixed hyperlipidemia      metoprolol SR (TOPROL XL) 50 MG TABLET SR 24 HR Take 1 Tablet by mouth every day.  Qty: 90 Tablet, Refills: 3    Associated Diagnoses: Essential hypertension      aspirin (ASA) 81 MG Chew Tab chewable tablet Chew 1 Tablet every day.  Qty: 90 Tablet, Refills: 3      GLUCOSAMINE HCL PO Take 1 Tablet by mouth 2 times a day.      Ascorbic Acid (VITAMIN C PO) Take 1 Tablet by mouth every day.      Coenzyme Q10 (COQ10 PO) Take 1 Tablet by mouth every day.      Omega-3 Fatty Acids (FISH OIL) 1000 MG CAPSULE DELAYED RELEASE EC softgel capsule Take 2 Capsules by mouth every morning with breakfast.      acetaminophen (TYLENOL) 500 MG Tab Take 500-1,000 mg by mouth every 6 hours as needed. Indications: Pain      gabapentin (NEURONTIN) 300 MG Cap Take 900 mg by mouth 4 times a day.      B Complex Vitamins (VITAMIN B COMPLEX PO) Take 1 Tab by mouth every evening.      Cholecalciferol (VITAMIN D3 PO) Take 1 Tab by mouth every evening.      multivitamin (THERAGRAN) TABS Take 1 Tablet by mouth at bedtime.             ALLERGIES  Percodan [oxycodone-aspirin]    PHYSICAL EXAM  BP (!) 200/92   Pulse (!) 142   Resp (!) 10   Ht 1.854 m (6' 1\")   Wt 92.5 kg (204 lb)   SpO2 90%   BMI 26.91 kg/m²   Constitutional: Alert in no apparent distress.  HENT: 1.5 cm laceration above L eye brow. Bilateral external ears normal, Nose normal. Uvula midline.   Eyes: Pupils are equal and " reactive, Conjunctiva normal, Non-icteric.   Neck: TTP to entire spine  Lymphatic: No lymphadenopathy noted.   Cardiovascular: Regular rate and rhythm, no murmurs.   Thorax & Lungs: Normal breath sounds, No respiratory distress, No wheezing, No chest tenderness.   Abdomen: Bowel sounds normal, Soft, No tenderness, No peritoneal signs, No masses, No pulsatile masses.   Skin: Warm, Dry, No erythema, No rash.   Back: TTP to entire spine  Extremities: Intact distal pulses, No edema, No tenderness, No cyanosis.  Musculoskeletal: Good range of motion in all major joints. No tenderness to palpation or major deformities noted.   Neurologic: Alert , Normal motor function, Normal sensory function, No focal deficits noted.   Psychiatric: Affect normal, Judgment normal, Mood normal.        DIAGNOSTIC STUDIES    Labs:   Labs Reviewed   URINALYSIS - Abnormal; Notable for the following components:       Result Value    Character Turbid (*)     Ketones 40 (*)     Protein 100 (*)     Nitrite Positive (*)     Leukocyte Esterase Large (*)     Occult Blood Large (*)     All other components within normal limits   CBC WITH DIFFERENTIAL - Abnormal; Notable for the following components:    WBC 25.8 (*)     Neutrophils-Polys 85.00 (*)     Lymphocytes 5.70 (*)     Immature Granulocytes 1.20 (*)     Neutrophils (Absolute) 21.93 (*)     Monos (Absolute) 2.01 (*)     Immature Granulocytes (abs) 0.30 (*)     All other components within normal limits    Narrative:     Indicate which anticoagulants the patient is on:->UNKNOWN   PROTHROMBIN TIME - Abnormal; Notable for the following components:    PT 23.4 (*)     INR 2.15 (*)     All other components within normal limits    Narrative:     Indicate which anticoagulants the patient is on:->UNKNOWN   APTT - Abnormal; Notable for the following components:    APTT 39.3 (*)     All other components within normal limits    Narrative:     Indicate which anticoagulants the patient is on:->UNKNOWN   COMP  "METABOLIC PANEL - Abnormal; Notable for the following components:    Glucose 120 (*)     AST(SGOT) 52 (*)     Alkaline Phosphatase 113 (*)     Total Bilirubin 1.6 (*)     Total Protein 8.4 (*)     Globulin 4.0 (*)     All other components within normal limits    Narrative:     Indicate which anticoagulants the patient is on:->UNKNOWN   DIGOXIN - Abnormal; Notable for the following components:    Digoxin 0.7 (*)     All other components within normal limits    Narrative:     Indicate which anticoagulants the patient is on:->UNKNOWN   LACTIC ACID - Abnormal; Notable for the following components:    Lactic Acid 2.4 (*)     All other components within normal limits    Narrative:     Indicate which anticoagulants the patient is on:->UNKNOWN   LIPASE - Abnormal; Notable for the following components:    Lipase 9 (*)     All other components within normal limits    Narrative:     Indicate which anticoagulants the patient is on:->UNKNOWN   URINE MICROSCOPIC (W/UA) - Abnormal; Notable for the following components:    WBC Packed (*)     RBC >150 (*)     Bacteria Many (*)     Hyaline Cast 11-20 (*)     All other components within normal limits   LACTIC ACID   CORRECTED CALCIUM    Narrative:     Indicate which anticoagulants the patient is on:->UNKNOWN   ESTIMATED GFR    Narrative:     Indicate which anticoagulants the patient is on:->UNKNOWN   BLOOD CULTURE    Narrative:     1 of 2 for Blood Culture x 2 sites order. Per Hospital  Policy: Only change Specimen Src: to \"Line\" if specified by  physician order.   BLOOD CULTURE    Narrative:     2 of 2 blood culture x2  Sites order. Per Hospital Policy:  Only change Specimen Src: to \"Line\" if specified by physician  order.   URINE CULTURE-EXISTING-LESS THAN 48 HOURS    Narrative:     Indication for culture:->Patient WITHOUT an indwelling Melgar  catheter in place with new onset of Dysuria, Frequency,  Urgency, and/or Suprapubic pain        EKG:   I have independently interpreted this " EKG  Results for orders placed or performed during the hospital encounter of 23   EKG   Result Value Ref Range    Report       [facility] Emergency Dept.    Test Date:  2023  Pt Name:    ALICIA SHAH                 Department: ER  MRN:        [id number]                      Room:        20  Gender:     Male                         Technician: [id number]  :        [id number]                   Requested By:ER TRIAGE PROTOCOL  Order #:    [id number]                    Reading MD:    Measurements  Intervals                                Axis  Rate:       134                          P:  ID:                                      QRS:        74  QRSD:       149                          T:          -51  QT:         336  QTc:        502    Interpretive Statements  Atrial fibrillation  Paired ventricular premature complexes  Right bundle branch block  Anteroseptal infarct, age indeterminate  Artifact in lead(s) I,II,aVR,V1,V2,V3,V4,V5,V6  Compared to ECG 2019 12:52:03  Ventricular premature complex(es) now present  Right bundle-branch block now present  Myocardial infarct finding now presen t  Early repolarization no longer present           Radiology:   The attending emergency physician has independently interpreted the diagnostic imaging associated with this visit and am waiting the final reading from the radiologist.   Preliminary interpretation is a follows: no ich  Radiologist interpretation:   DX-CHEST-PORTABLE (1 VIEW)   Final Result      Stable cardiomegaly.      CT-TSPINE W/O PLUS RECONS   Final Result      1.  No acute fracture or dislocation.   2.  Multilevel degenerative changes including advanced facet degeneration.      CT-LSPINE W/O PLUS RECONS   Final Result      1.  No acute fracture or traumatic listhesis in the lumbar spine.   2.  Cholelithiasis.   3.  Mildly enlarged periportal and mesenteric lymph nodes, statistically reactive rather than neoplastic. One of the nodes is  stable since prior.   4.  Nonobstructing left nephrolithiasis.   5.  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 Recommend follow-up with contrast-enhanced abdominal MRI, pancreatic protocol.      CT-CSPINE WITHOUT PLUS RECONS   Final Result      No acute fracture or traumatic listhesis in the cervical spine.      CT-HEAD W/O   Final Result      1. No CT evidence of acute infarct, hemorrhage or mass.   2. Moderate global parenchymal atrophy. Chronic small vessel ischemic changes.           COURSE & MEDICAL DECISION MAKING     ED Observation Status? No; patient does not meet criteria for ED Observation    INITIAL ASSESSMENT, COURSE AND PLAN  Care Narrative: 83-year-old presents to the emergency department with chief complaint of fall.    3:20 PM - Patient was evaluated at bedside. Ordered imaging and labs to evaluate. Patient will be treated with morphine 2 mg, Zofran 4 mg, Rocephin 2 g, and LR Patient and/or family verbalizes understanding to the plan of care. Ddx as below.    Given close head injury and [place] head CT rule positive, CT head ordered for patient with no evidence of ICH    Patient is anticoagulated  Patient has neck and back pain, and tenderness therefore CT scans ordered to rule out fracture    Patient has no abdominal tenderness palpation  Patient ambulates with steady gait without assistance upon discharge home.     #fatigue, tachycardia  - Sepsis  Lactate is 2.4  WBC is 25.8      4:19 PM - Patient was reevaluated at bedside. Patient HR is 115 and hypertensive. Discussed lab and radiology results with the patient and/or family.  The plan for hospitalization was discussed. Patient and/or family was given the opportunity to ask any questions. Patient and/or family verbalizes understanding and agreement to this plan of care.       4:36 PM - Paged Hospitalist    4:40 PM I discussed the patient's case and the  above findings with Dr. Santoro (Hospitalist) who agrees to evaluate the patient for hospitalization.     I believe the patient sepsis to be driving his tachycardia therefore slow fluid was ordered by me.  Initially it was unclear what was driving patient's tachycardia therefore no antibiotics were ordered and fluids were started slowly.  However after urinalysis resulted his symptoms became more consistent with sepsis in the setting of UTI.    The total critical care time on this patient is 40 minutes, resuscitating patient, speaking with admitting physician, and deciphering test results. This 40 minutes is exclusive of separately billable procedures.     HYDRATION: Based on the patient's presentation of Tachycardia the patient was given IV fluids. IV Hydration was used because oral hydration was not adequate alone. Upon recheck following hydration, the patient was improved.    The total critical care time on this patient is 40 minutes, resuscitating patient, speaking with admitting physician, and deciphering test results. This 40 minutes is exclusive of separately billable procedures.     DISPOSITION AND DISCUSSIONS  I have discussed management of the patient with the following physicians and DEREK's:  Dr. Santoro (Hospitalist)    Discussion of management with other [facility] or appropriate source(s): None     Escalation of care considered, and ultimately not performed: IV fluids.      DISPOSITION:  Patient will be hospitalized by Dr. Santoro in critical condition.     FINAL DIANGOSIS  1. Closed head injury, initial encounter    2. Acute UTI    3. Sepsis, due to unspecified organism, unspecified whether acute organ dysfunction present (HCC)      The total critical care time on this patient is 40 minutes     Franco VASQUEZ (Teena), am scribing for, and in the presence of, Piotr Huitron M.D..    Electronically signed by: Franco Lund), 2/9/2023    IPiotr M.D. personally performed the services described in this  documentation, as scribed by Franco Gil in my presence, and it is both accurate and complete.      The note accurately reflects work and decisions made by me.  Piotr Huitron M.D.  2/9/2023  9:55 PM

## 2023-02-09 NOTE — ED TRIAGE NOTES
"Chief Complaint   Patient presents with    T-5000 FALL     PT was on toilet and was unable to stand up due to intensity of chronic back pain + weakness, PT intentionally propelled himself off of toilet onto floor with intent to crawl to phone to seek help. PT did strike head, denies LOC. + Blood thinners    Tachycardia     PT found to be in a. Fib RVR upon EMS arrival, PT reports has known hx for a. Fib. PT does report increased weakness for several weeks.        BIB EMS to [facility], pt on monitor, provided call bell and in gown, labs drawn and sent.    Medications given en route:    300mL NS    BP (!) 200/92   Pulse (!) 142   Resp (!) 10   Ht 1.854 m (6' 1\")   Wt 92.5 kg (204 lb)   SpO2 90%   BMI 26.91 kg/m²     "

## 2023-02-10 LAB
ALBUMIN SERPL BCP-MCNC: 3.3 G/DL (ref 3.2–4.9)
ALBUMIN/GLOB SERPL: 1 G/DL
ALP SERPL-CCNC: 82 U/L (ref 30–99)
ALT SERPL-CCNC: 34 U/L (ref 2–50)
ANION GAP SERPL CALC-SCNC: 8 MMOL/L (ref 7–16)
AST SERPL-CCNC: 54 U/L (ref 12–45)
BASOPHILS # BLD AUTO: 0.3 % (ref 0–1.8)
BASOPHILS # BLD: 0.07 K/UL (ref 0–0.12)
BILIRUB SERPL-MCNC: 1 MG/DL (ref 0.1–1.5)
BUN SERPL-MCNC: 12 MG/DL (ref 8–22)
CALCIUM ALBUM COR SERPL-MCNC: 9 MG/DL (ref 8.5–10.5)
CALCIUM SERPL-MCNC: 8.4 MG/DL (ref 8.5–10.5)
CHLORIDE SERPL-SCNC: 103 MMOL/L (ref 96–112)
CO2 SERPL-SCNC: 22 MMOL/L (ref 20–33)
CREAT SERPL-MCNC: 0.68 MG/DL (ref 0.5–1.4)
EOSINOPHIL # BLD AUTO: 0 K/UL (ref 0–0.51)
EOSINOPHIL NFR BLD: 0 % (ref 0–6.9)
ERYTHROCYTE [DISTWIDTH] IN BLOOD BY AUTOMATED COUNT: 48.3 FL (ref 35.9–50)
GFR SERPLBLD CREATININE-BSD FMLA CKD-EPI: 91 ML/MIN/1.73 M 2
GLOBULIN SER CALC-MCNC: 3.2 G/DL (ref 1.9–3.5)
GLUCOSE SERPL-MCNC: 109 MG/DL (ref 65–99)
HCT VFR BLD AUTO: 39.8 % (ref 42–52)
HGB BLD-MCNC: 13.1 G/DL (ref 14–18)
IMM GRANULOCYTES # BLD AUTO: 0.2 K/UL (ref 0–0.11)
IMM GRANULOCYTES NFR BLD AUTO: 0.9 % (ref 0–0.9)
INR PPP: 2.68 (ref 0.87–1.13)
LYMPHOCYTES # BLD AUTO: 1.68 K/UL (ref 1–4.8)
LYMPHOCYTES NFR BLD: 7.8 % (ref 22–41)
MAGNESIUM SERPL-MCNC: 1.8 MG/DL (ref 1.5–2.5)
MCH RBC QN AUTO: 30.5 PG (ref 27–33)
MCHC RBC AUTO-ENTMCNC: 32.9 G/DL (ref 33.7–35.3)
MCV RBC AUTO: 92.8 FL (ref 81.4–97.8)
MONOCYTES # BLD AUTO: 1.48 K/UL (ref 0–0.85)
MONOCYTES NFR BLD AUTO: 6.9 % (ref 0–13.4)
NEUTROPHILS # BLD AUTO: 17.98 K/UL (ref 1.82–7.42)
NEUTROPHILS NFR BLD: 84.1 % (ref 44–72)
NRBC # BLD AUTO: 0 K/UL
NRBC BLD-RTO: 0 /100 WBC
PHOSPHATE SERPL-MCNC: 1.8 MG/DL (ref 2.5–4.5)
PLATELET # BLD AUTO: 198 K/UL (ref 164–446)
PMV BLD AUTO: 10 FL (ref 9–12.9)
POTASSIUM SERPL-SCNC: 3.9 MMOL/L (ref 3.6–5.5)
PROT SERPL-MCNC: 6.5 G/DL (ref 6–8.2)
PROTHROMBIN TIME: 27.7 SEC (ref 12–14.6)
RBC # BLD AUTO: 4.29 M/UL (ref 4.7–6.1)
SODIUM SERPL-SCNC: 133 MMOL/L (ref 135–145)
WBC # BLD AUTO: 21.4 K/UL (ref 4.8–10.8)

## 2023-02-10 PROCEDURE — 99232 SBSQ HOSP IP/OBS MODERATE 35: CPT | Performed by: STUDENT IN AN ORGANIZED HEALTH CARE EDUCATION/TRAINING PROGRAM

## 2023-02-10 PROCEDURE — 85025 COMPLETE CBC W/AUTO DIFF WBC: CPT

## 2023-02-10 PROCEDURE — 700111 HCHG RX REV CODE 636 W/ 250 OVERRIDE (IP): Performed by: STUDENT IN AN ORGANIZED HEALTH CARE EDUCATION/TRAINING PROGRAM

## 2023-02-10 PROCEDURE — 700105 HCHG RX REV CODE 258: Performed by: INTERNAL MEDICINE

## 2023-02-10 PROCEDURE — A9270 NON-COVERED ITEM OR SERVICE: HCPCS | Performed by: INTERNAL MEDICINE

## 2023-02-10 PROCEDURE — 83735 ASSAY OF MAGNESIUM: CPT

## 2023-02-10 PROCEDURE — 85610 PROTHROMBIN TIME: CPT

## 2023-02-10 PROCEDURE — 770020 HCHG ROOM/CARE - TELE (206)

## 2023-02-10 PROCEDURE — 84100 ASSAY OF PHOSPHORUS: CPT

## 2023-02-10 PROCEDURE — 700105 HCHG RX REV CODE 258: Performed by: STUDENT IN AN ORGANIZED HEALTH CARE EDUCATION/TRAINING PROGRAM

## 2023-02-10 PROCEDURE — 51798 US URINE CAPACITY MEASURE: CPT

## 2023-02-10 PROCEDURE — 94660 CPAP INITIATION&MGMT: CPT

## 2023-02-10 PROCEDURE — 700102 HCHG RX REV CODE 250 W/ 637 OVERRIDE(OP): Performed by: INTERNAL MEDICINE

## 2023-02-10 PROCEDURE — 80053 COMPREHEN METABOLIC PANEL: CPT

## 2023-02-10 PROCEDURE — 36415 COLL VENOUS BLD VENIPUNCTURE: CPT

## 2023-02-10 RX ORDER — SODIUM CHLORIDE, SODIUM LACTATE, POTASSIUM CHLORIDE, CALCIUM CHLORIDE 600; 310; 30; 20 MG/100ML; MG/100ML; MG/100ML; MG/100ML
INJECTION, SOLUTION INTRAVENOUS CONTINUOUS
Status: DISCONTINUED | OUTPATIENT
Start: 2023-02-10 | End: 2023-02-12 | Stop reason: HOSPADM

## 2023-02-10 RX ADMIN — GABAPENTIN 900 MG: 300 CAPSULE ORAL at 09:41

## 2023-02-10 RX ADMIN — SENNOSIDES AND DOCUSATE SODIUM 2 TABLET: 50; 8.6 TABLET ORAL at 17:46

## 2023-02-10 RX ADMIN — DIGOXIN 250 MCG: 0.12 TABLET ORAL at 20:42

## 2023-02-10 RX ADMIN — METOPROLOL SUCCINATE 50 MG: 50 TABLET, EXTENDED RELEASE ORAL at 17:46

## 2023-02-10 RX ADMIN — CEFTRIAXONE SODIUM 2000 MG: 10 INJECTION, POWDER, FOR SOLUTION INTRAVENOUS at 17:46

## 2023-02-10 RX ADMIN — ACETAMINOPHEN 650 MG: 325 TABLET, FILM COATED ORAL at 12:33

## 2023-02-10 RX ADMIN — GABAPENTIN 900 MG: 300 CAPSULE ORAL at 12:33

## 2023-02-10 RX ADMIN — GABAPENTIN 900 MG: 300 CAPSULE ORAL at 20:42

## 2023-02-10 RX ADMIN — WARFARIN SODIUM 4 MG: 4 TABLET ORAL at 17:48

## 2023-02-10 RX ADMIN — SODIUM CHLORIDE: 9 INJECTION, SOLUTION INTRAVENOUS at 04:14

## 2023-02-10 RX ADMIN — SODIUM CHLORIDE, POTASSIUM CHLORIDE, SODIUM LACTATE AND CALCIUM CHLORIDE: 600; 310; 30; 20 INJECTION, SOLUTION INTRAVENOUS at 15:46

## 2023-02-10 RX ADMIN — GABAPENTIN 900 MG: 300 CAPSULE ORAL at 17:46

## 2023-02-10 RX ADMIN — SENNOSIDES AND DOCUSATE SODIUM 2 TABLET: 50; 8.6 TABLET ORAL at 04:14

## 2023-02-10 RX ADMIN — ASPIRIN 81 MG: 81 TABLET, CHEWABLE ORAL at 04:14

## 2023-02-10 ASSESSMENT — ENCOUNTER SYMPTOMS
WEAKNESS: 1
ABDOMINAL PAIN: 0
PALPITATIONS: 0
CHILLS: 1
FOCAL WEAKNESS: 0
SHORTNESS OF BREATH: 0
FEVER: 0
NAUSEA: 0
VOMITING: 0

## 2023-02-10 ASSESSMENT — PAIN DESCRIPTION - PAIN TYPE: TYPE: ACUTE PAIN

## 2023-02-10 ASSESSMENT — FIBROSIS 4 INDEX: FIB4 SCORE: 3.98

## 2023-02-10 NOTE — CARE PLAN
Problem: Knowledge Deficit - Standard  Goal: Patient and family/care givers will demonstrate understanding of plan of care, disease process/condition, diagnostic tests and medications  Outcome: Progressing     Problem: Fall Risk  Goal: Patient will remain free from falls  Outcome: Progressing     Problem: Skin Integrity  Goal: Skin integrity is maintained or improved  Outcome: Progressing     Problem: Pain - Standard  Goal: Alleviation of pain or a reduction in pain to the patient’s comfort goal  Outcome: Progressing     Problem: Cardiac - Atrial Fibrillation  Goal: Patient will achieve & maintain adequate cardiac output and rate control  Outcome: Progressing  Goal: Complications related to anticoagulation for unconverted atrial fibrillation will be avoided or minimized  Outcome: Progressing     Problem: Mobility  Goal: Patient's ability to tolerate increased activity will improve  Outcome: Progressing     Problem: Self Care  Goal: Patient will have the ability to perform ADLs independently or with assistance (bathe, groom, dress, toilet and feed)  Outcome: Progressing     Problem: Knowledge Deficit - Atrial Fibrillation  Goal: Patient and family/care givers will demonstrate understanding of atrial fibrillation condition and follow-up  Outcome: Progressing   The patient is Stable - Low risk of patient condition declining or worsening         Progress made toward(s) clinical / shift goals:  Pt did not complain of pain overnight.    Patient is not progressing towards the following goals:  Pt is still in A-Fib

## 2023-02-10 NOTE — PROGRESS NOTES
Inpatient Anticoagulation Service Note    Date: 2/9/2023  Reason for Anticoagulation: Atrial Fibrillation   [id number] VASc Score: 3  HAS-BLED Score: 2     Hemoglobin Value: 16.1  Hematocrit Value: 47.5  Lab Platelet Value: 250  Target INR: 2.0 to 3.0    INR from last 7 days       Date/Time INR Value    02/09/23 1520 2.15          Dose from last 7 days       Date/Time Dose (mg)    02/09/23 2349 4          Average Dose (mg):  (Home regimen = 4 mg daily)  Significant Interactions: Antibiotics  Bridge Therapy: No         Reversal Agent Administered: Not Applicable  Comments: Therapy with warfarin continued from home for history of atrial fibrillation. Baseline INR therapeutic. Home dosing regimen outlined above.     Plan:  Will continue home dosing with 4 mg tonight. Pharmacy will continue to follow INR trend for dosing adjustments as appropriate.    Education Material Provided?: No (Chronic warfarin therapy)    Pharmacist suggested discharge dosing: TBD, likely continue home dosing of 4 mg daily     Feliz Rothman PharmD, BCPS

## 2023-02-10 NOTE — PROGRESS NOTES
4 Eyes Skin Assessment Completed by ABHI Buck and DERICK Leyva.    Head Abrasion/Scratch on top of head  Ears WDL  Nose WDL  Mouth WDL  Neck WDL  Breast/Chest Redness  Shoulder Blades Redness  Spine WDL  (R) Arm/Elbow/Hand Bruising, abrasion, and elbow is a little swollen from fall  (L) Arm/Elbow/Hand Bruising and Abrasion  Abdomen Redness and Scar  Groin Redness  Scrotum/Coccyx/Buttocks Redness  (R) Leg WDL  (L) Leg WDL  (R) Heel/Foot/Toe WDL  (L) Heel/Foot/Toe WDL          Devices In Places Tele Box, Pulse Ox, and Condom Cath      Interventions In Place Pillows and Heels Loaded W/Pillows    Possible Skin Injury No    Pictures Uploaded Into Epic N/A  Wound Consult Placed N/A  RN Wound Prevention Protocol Ordered No

## 2023-02-10 NOTE — H&P
Hospital Medicine History & Physical Note    Date of Service  2/9/2023    Primary Care Physician  Dyllan Robles M.D.    Code Status  Full Code    Chief Complaint  Chief Complaint   Patient presents with    T-5000 FALL     PT was on toilet and was unable to stand up due to intensity of chronic back pain + weakness, PT intentionally propelled himself off of toilet onto floor with intent to crawl to phone to seek help. PT did strike head, denies LOC. + Blood thinners    Tachycardia     PT found to be in a. Fib RVR upon EMS arrival, PT reports has known hx for a. Fib. PT does report increased weakness for several weeks.        History of Presenting Illness  Duane Parkinson is a 83 y.o. male who presented 2/9/2023 with fall.  History is significant for A-fib on Coumadin, TAVR, essential hypertension, sleep apnea.  Patient presents today after experiencing a fall and patient subsequently called EMS.  Upon presentation to the ED.  Patient denies loss of consciousness.  Patient endorses dysuria, increased frequency of urination.  Patient states that he has had decreased energy and endorses fatigue over the last week.  Patient sustained a mechanical fall without loss of consciousness today.  Pan scan was negative for acute process of the head, cervical, thoracic spine.  CXR with no evidence of consolidation.  Laboratory evaluation was significant for a neutrophilic leukocytosis with WBC of 25, lactic acid 2.4.  Patient received 1 L of IV fluids given history of TAVR, history of diastolic dysfunction.  Cultures were obtained and patient was started on IV antibiotics.  Discussed case with ER provider and patient will be admitted for further work-up and monitoring.    I discussed the plan of care with patient, family, and ER provider .    Review of Systems  Review of Systems   Constitutional:  Positive for chills and malaise/fatigue. Negative for fever.   HENT:  Negative for congestion and sore throat.    Eyes:   Negative for blurred vision and double vision.   Respiratory:  Negative for cough and shortness of breath.    Cardiovascular:  Negative for chest pain and palpitations.   Gastrointestinal:  Negative for abdominal pain, diarrhea, nausea and vomiting.   Genitourinary:  Positive for dysuria and frequency.   Musculoskeletal:  Positive for falls. Negative for back pain.   Skin:  Negative for rash.   Neurological:  Negative for seizures and loss of consciousness.   Psychiatric/Behavioral:  Negative for hallucinations and substance abuse.      Past Medical History   has a past medical history of Aortic stenosis, Atrial fibrillation (HCC), Back pain, Cataract, Cirrhosis of liver without ascites (HCC), Clotting disorder (HCC), Depression, Diabetes (HCC), Encounter for long-term (current) use of other medications, Gasping for breath, Italian measles, Heart murmur, Heart valve disease, Hyperlipidemia, Hypertension, Insomnia, Mumps, Nasal drainage, Pain, Pyogenic arthritis, lower leg (HCC), Restless leg syndrome, Sleep apnea, Snoring, Tonsillitis, and Valvular heart disease.    Surgical History   has a past surgical history that includes cardioversion; knee arthroplasty total; other orthopedic surgery; laminotomy (N/A); toe arthroplasty; turp-vapor (N/A); pr percut implnt neuroelect,epidural; finger arthroplasty (Left, 5/27/2016); jacquelyn (N/A, 7/29/2019); and transcatheter aortic valve replacement (Bilateral, 7/29/2019).     Family History  family history includes Cancer in his father; Diabetes in his brother; Heart Disease in his mother and son; No Known Problems in his brother; Other in his mother.   Family history reviewed with patient. There is no family history that is pertinent to the chief complaint.     Social History   reports that he quit smoking about 51 years ago. His smoking use included cigarettes. He has a 20.00 pack-year smoking history. He has quit using smokeless tobacco. He reports that he does not drink alcohol  "and does not use drugs.    Allergies  Allergies   Allergen Reactions    Percodan [Oxycodone-Aspirin] Itching and Photosensitivity     \"I sunburn\"       Medications  Prior to Admission Medications   Prescriptions Last Dose Informant Patient Reported? Taking?   Ascorbic Acid (VITAMIN C) 1000 MG Tab   Yes No   Sig: Take  by mouth.   B Complex Vitamins (VITAMIN B COMPLEX PO)  Patient Yes No   Sig: Take 1 Tab by mouth every evening.   Cholecalciferol (VITAMIN D3) 2000 UNITS Tab  Patient Yes No   Sig: Take 1 Tab by mouth every evening.   Coenzyme Q10 (COQ10 PO)   Yes No   Sig: Take 300 mg by mouth.   GLUCOSAMINE HCL PO   Yes No   Sig: Take 1 Tablet by mouth 2 times a day.   Omega-3 Fatty Acids (FISH OIL) 1000 MG CAPSULE DELAYED RELEASE EC softgel capsule   Yes No   Sig: Take 2 Capsules by mouth every morning with breakfast.   acetaminophen (TYLENOL) 500 MG Tab   Yes No   Sig: Take 1-2 Tablets by mouth every 6 hours as needed.   aspirin (ASA) 81 MG Chew Tab chewable tablet   No No   Sig: Chew 1 Tablet every day.   digoxin (LANOXIN) 250 MCG Tab   No No   Sig: Take 1 Tablet by mouth at bedtime.   gabapentin (NEURONTIN) 300 MG Cap  Patient Yes No   Sig: Take 1 Capsule by mouth every day. 1 in the morning, 1 at noon, 2 at 4 pm, 3 at bedtime   lysine 500 MG Tab   Yes No   Sig: Take 1 Tablet by mouth every day.   metoprolol SR (TOPROL XL) 50 MG TABLET SR 24 HR   No No   Sig: Take 1 Tablet by mouth every day.   multivitamin (THERAGRAN) TABS  Patient Yes No   Sig: Take 1 Tablet by mouth at bedtime.   pravastatin (PRAVACHOL) 80 MG tablet   No No   Sig: TAKE 1 TABLET BY MOUTH DAILY   warfarin (COUMADIN) 4 MG Tab   No No   Sig: TAKE 1/2 to 1 TABLET BY MOUTH DAILY AS DIRECTED BY [facility]      Facility-Administered Medications: None       Physical Exam  Temp:  [36.7 °C (98 °F)-37.4 °C (99.4 °F)] 36.7 °C (98 °F)  Pulse:  [105-142] 105  Resp:  [10-68] 13  BP: (170-200)/(74-92) 170/74  SpO2:  [90 %-95 %] 95 %  Blood " Pressure : (!) 170/74   Temperature: 37.4 °C (99.4 °F)   Pulse: (!) 128   Respiration: (!) 68   Pulse Oximetry: 92 %       Physical Exam  Vitals and nursing note reviewed.   Constitutional:       General: He is not in acute distress.     Appearance: He is ill-appearing. He is not toxic-appearing.   HENT:      Right Ear: External ear normal.      Left Ear: External ear normal.      Nose: Nose normal. No congestion.      Mouth/Throat:      Mouth: Mucous membranes are moist.      Pharynx: No oropharyngeal exudate.   Eyes:      General: No scleral icterus.     Pupils: Pupils are equal, round, and reactive to light.   Cardiovascular:      Rate and Rhythm: Tachycardia present. Rhythm irregular.      Heart sounds: No murmur heard.  Pulmonary:      Breath sounds: No wheezing.   Abdominal:      Tenderness: There is no abdominal tenderness. There is no guarding or rebound.   Musculoskeletal:         General: No swelling or deformity.   Skin:     Findings: No bruising.   Neurological:      General: No focal deficit present.      Mental Status: He is alert and oriented to person, place, and time.      Motor: No weakness.   Psychiatric:         Mood and Affect: Mood normal.         Behavior: Behavior normal.       Laboratory:  Recent Labs     02/09/23  1520   WBC 25.8*   RBC 5.10   HEMOGLOBIN 16.1   HEMATOCRIT 47.5   MCV 93.1   MCH 31.6   MCHC 33.9   RDW 47.2   PLATELETCT 250   MPV 10.0     Recent Labs     02/09/23  1520   SODIUM 136   POTASSIUM 4.5   CHLORIDE 101   CO2 23   GLUCOSE 120*   BUN 11   CREATININE 0.78   CALCIUM 9.5     Recent Labs     02/09/23  1520   ALTSGPT 46   ASTSGOT 52*   ALKPHOSPHAT 113*   TBILIRUBIN 1.6*   LIPASE 9*   GLUCOSE 120*     Recent Labs     02/09/23  1520   APTT 39.3*   INR 2.15*     No results for input(s): NTPROBNP in the last 72 hours.      No results for input(s): TROPONINT in the last 72 hours.    Imaging:  DX-CHEST-PORTABLE (1 VIEW)   Final Result      Stable cardiomegaly.      CT-TSPINE W/O  PLUS RECONS   Final Result      1.  No acute fracture or dislocation.   2.  Multilevel degenerative changes including advanced facet degeneration.      CT-LSPINE W/O PLUS RECONS   Final Result      1.  No acute fracture or traumatic listhesis in the lumbar spine.   2.  Cholelithiasis.   3.  Mildly enlarged periportal and mesenteric lymph nodes, statistically reactive rather than neoplastic. One of the nodes is stable since prior.   4.  Nonobstructing left nephrolithiasis.   5.  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 Recommend follow-up with contrast-enhanced abdominal MRI, pancreatic protocol.      CT-CSPINE WITHOUT PLUS RECONS   Final Result      No acute fracture or traumatic listhesis in the cervical spine.      CT-HEAD W/O   Final Result      1. No CT evidence of acute infarct, hemorrhage or mass.   2. Moderate global parenchymal atrophy. Chronic small vessel ischemic changes.          X-Ray:  I have personally reviewed the images and compared with prior images.  EKG:  I have personally reviewed the images and compared with prior images.    Assessment/Plan:  Justification for Admission Status  I anticipate this patient will require at least two midnights for appropriate medical management, necessitating inpatient admission because sepsis due to UTI requiring IV antibiotcs    Patient will need a  bed on EMERGENCY service .  The need is secondary to sepsis due to UTI.    * Sepsis (HCC)- (present on admission)  Assessment & Plan  This is Sepsis Present on admission  SIRS criteria identified on my evaluation include: Tachycardia, with heart rate greater than 90 BPM and Leukocytosis, with WBC greater than 12,000  Source is Urinary  Sepsis protocol initiated  Fluid resuscitation ordered per protocol  Crystalloid Fluid Administration: Patient has advanced or end-stage heart failure (with documentation of NYHA class III or  symptoms with minimal exertion, Or NYHA class IV or symptoms at rest or with activity), for this/these reason(s) it is unsafe for this patient to receive 30 mL/kg of fluid  Partial Fluid Dose Given: 1L, patient to be reassessed shortly after completion of partial fluid bolus to ensure adequacy of resuscitation  IV antibiotics as appropriate for source of sepsis  Reassessment: I have reassessed the patient's hemodynamic status    Follow urine culture and blood culture  IV Rocephin    Acute cystitis  Assessment & Plan  As demonstrated on urinalysis  Follow cultures  IV rocephin    Atrial fibrillation (HCC)- (present on admission)  Assessment & Plan  RVR on presentation secondary to sepsis  Digoxin, Toprol XL  Pharmacy to dose coumadin    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    LORENA (obstructive sleep apnea)- (present on admission)  Assessment & Plan  CPAP    Essential hypertension- (present on admission)  Assessment & Plan  lisinopril        VTE prophylaxis: SCDs/TEDs and therapeutic anticoagulation with coumadin

## 2023-02-10 NOTE — PROGRESS NOTES
Inpatient Anticoagulation Service Note for 2/10/2023      Reason for Anticoagulation: Atrial Fibrillation   [id number] VASc Score: 3  HAS-BLED Score: 2    Hemoglobin Value: (!) 13.1  Hematocrit Value: (!) 39.8  Lab Platelet Value: 198  Target INR: 2.0 to 3.0    INR from last 7 days       Date/Time INR Value    02/10/23 0303 2.68    02/09/23 1520 2.15          Dose from last 7 days       Date/Time Dose (mg)    02/10/23 1356 4    02/09/23 2349 4          Average Dose (mg):  (Home dose: warfarin 4 mg daily)  Significant Interactions: Antibiotics  Bridge Therapy: No     Reversal Agent Administered: Not Applicable    Comments: INR up to 2.68. Pt is on home warfarin dosing. Pt is also on ceftriaxone which has a DDI with warfarin. Will trend the INR for now.    Education Material Provided?: No (Chronic warfarin therapy)    Pharmacist suggested discharge dosing: warfarin 4 mg daily     Aris Dempsey, AbhishekD

## 2023-02-10 NOTE — PROGRESS NOTES
[facility] Medicine Daily Progress Note    Date of Service  2/10/2023    Chief Complaint  Duane Parkinson is a 83 y.o. male admitted 2/9/2023 with sepsis    Hospital Course  Duane Parkinson is a 83 y.o. male who presented 2/9/2023 with fall.  History is significant for A-fib on Coumadin, TAVR, essential hypertension, sleep apnea.  Patient presents today after experiencing a fall and patient subsequently called EMS.  Upon presentation to the ED.  Patient denies loss of consciousness.  Patient endorses dysuria, increased frequency of urination.  Patient states that he has had decreased energy and endorses fatigue over the last week.  Patient sustained a mechanical fall without loss of consciousness today.  Pan scan was negative for acute process of the head, cervical, thoracic spine.  CXR with no evidence of consolidation.  Laboratory evaluation was significant for a neutrophilic leukocytosis with WBC of 25, lactic acid 2.4.  Patient received 1 L of IV fluids given history of TAVR, history of diastolic dysfunction.  Cultures were obtained and patient was started on IV antibiotics.  Discussed case with ER provider and patient will be admitted for further work-up and monitoring.    Interval Problem Update  Patient's white count is downtrending.  Urine is still dark we will continue IV fluids.  Urine culture growing lactose fermenting gram-negative rods.  Continue Rocephin.  Patient reports he started to feel better.  PT/OT pending.    I have discussed this patient's plan of care and discharge plan at IDT rounds today with Case Management, Nursing, Nursing leadership, and other members of the IDT team.    Consultants/Specialty      Code Status  Full Code    Disposition  Patient is not medically cleared for discharge.   Anticipate discharge to to home with organized home healthcare and close outpatient follow-up.  I have placed the appropriate orders for post-discharge needs.    Review of Systems  Review of Systems    Constitutional:  Positive for chills. Negative for fever.   Respiratory:  Negative for shortness of breath.    Cardiovascular:  Negative for chest pain and palpitations.   Gastrointestinal:  Negative for abdominal pain, nausea and vomiting.   Neurological:  Positive for weakness. Negative for focal weakness.   All other systems reviewed and are negative.     Physical Exam  Temp:  [36.7 °C (98 °F)-37.1 °C (98.8 °F)] 37 °C (98.6 °F)  Pulse:  [] 86  Resp:  [13-68] 17  BP: ()/(55-83) 101/55  SpO2:  [92 %-95 %] 92 %    Physical Exam  Vitals reviewed.   Constitutional:       General: He is not in acute distress.     Appearance: He is ill-appearing.   HENT:      Head: Normocephalic and atraumatic.   Eyes:      General: No scleral icterus.        Right eye: No discharge.         Left eye: No discharge.   Cardiovascular:      Rate and Rhythm: Normal rate and regular rhythm.      Heart sounds: Normal heart sounds.   Pulmonary:      Effort: No respiratory distress.      Breath sounds: No wheezing or rales.   Abdominal:      General: Abdomen is flat. There is no distension.      Palpations: Abdomen is soft.      Tenderness: There is no abdominal tenderness.   Musculoskeletal:      Right lower leg: No edema.      Left lower leg: No edema.   Skin:     General: Skin is warm and dry.      Coloration: Skin is not jaundiced.   Neurological:      General: No focal deficit present.      Mental Status: He is alert.      Cranial Nerves: No cranial nerve deficit.   Psychiatric:         Mood and Affect: Mood normal.         Judgment: Judgment normal.       Fluids    Intake/Output Summary (Last 24 hours) at 2/10/2023 1520  Last data filed at 2/10/2023 0433  Gross per 24 hour   Intake --   Output 200 ml   Net -200 ml       Laboratory  Recent Labs     02/09/23  1520 02/10/23  0303   WBC 25.8* 21.4*   RBC 5.10 4.29*   HEMOGLOBIN 16.1 13.1*   HEMATOCRIT 47.5 39.8*   MCV 93.1 92.8   MCH 31.6 30.5   MCHC 33.9 32.9*   RDW 47.2 48.3    PLATELETCT 250 198   MPV 10.0 10.0     Recent Labs     02/09/23  1520 02/10/23  0303   SODIUM 136 133*   POTASSIUM 4.5 3.9   CHLORIDE 101 103   CO2 23 22   GLUCOSE 120* 109*   BUN 11 12   CREATININE 0.78 0.68   CALCIUM 9.5 8.4*     Recent Labs     02/09/23  1520 02/10/23  0303   APTT 39.3*  --    INR 2.15* 2.68*               Imaging  DX-CHEST-PORTABLE (1 VIEW)   Final Result      Stable cardiomegaly.      CT-TSPINE W/O PLUS RECONS   Final Result      1.  No acute fracture or dislocation.   2.  Multilevel degenerative changes including advanced facet degeneration.      CT-LSPINE W/O PLUS RECONS   Final Result      1.  No acute fracture or traumatic listhesis in the lumbar spine.   2.  Cholelithiasis.   3.  Mildly enlarged periportal and mesenteric lymph nodes, statistically reactive rather than neoplastic. One of the nodes is stable since prior.   4.  Nonobstructing left nephrolithiasis.   5.  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 Recommend follow-up with contrast-enhanced abdominal MRI, pancreatic protocol.      CT-CSPINE WITHOUT PLUS RECONS   Final Result      No acute fracture or traumatic listhesis in the cervical spine.      CT-HEAD W/O   Final Result      1. No CT evidence of acute infarct, hemorrhage or mass.   2. Moderate global parenchymal atrophy. Chronic small vessel ischemic changes.           Assessment/Plan  * Sepsis (HCC)- (present on admission)  Assessment & Plan  This is Sepsis Present on admission  SIRS criteria identified on my evaluation include: Tachycardia, with heart rate greater than 90 BPM and Leukocytosis, with WBC greater than 12,000  Source is Urinary  Sepsis protocol initiated  Fluid resuscitation ordered per protocol  Crystalloid Fluid Administration: Patient has advanced or end-stage heart failure (with documentation of NYHA class III or symptoms with minimal exertion, Or NYHA class IV  or symptoms at rest or with activity), for this/these reason(s) it is unsafe for this patient to receive 30 mL/kg of fluid  Partial Fluid Dose Given: 1L, patient to be reassessed shortly after completion of partial fluid bolus to ensure adequacy of resuscitation  IV antibiotics as appropriate for source of sepsis  Reassessment: I have reassessed the patient's hemodynamic status    Follow urine culture and blood culture  IV Rocephin  BC NTD, UCX gram neg rods    Cystic lesion of abdominal viscera  Assessment & Plan  As demonstrated on CT  Recommend follow-up with contrast-enhanced abdominal MRI, pancreatic protocol.    Acute cystitis  Assessment & Plan  Positive urinalysis.  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 In review of yesterday's note (2/9/2023), there are no changes except as documented above.

## 2023-02-10 NOTE — DISCHARGE PLANNING
Case Management Discharge Planning    Admission Date: 2/9/2023  GMLOS: 3.5  ALOS: 1    6-Clicks ADL Score: 23  6-Clicks Mobility Score: 17  PT and/or OT Eval ordered: Yes  Post-acute Referrals Ordered: No  Post-acute Choice Obtained: No  Has referral(s) been sent to post-acute provider:  NA      Anticipated Discharge Dispo: Discharge Disposition: Discharged to home/self care (01)    DME Needed: No    Action(s) Taken: Pt pending medical clearance. Pt is on room air, 6 clicks are 17, however, likely to improve. No case management needs identified at this time.     Escalations Completed: None    Medically Clear: No    Next Steps: f/u with medical team and pt to discuss dc needs and barriers.    Barriers to Discharge: Medical clearance

## 2023-02-10 NOTE — ASSESSMENT & PLAN NOTE
This is Sepsis Present on admission  SIRS criteria identified on my evaluation include: Tachycardia, with heart rate greater than 90 BPM and Leukocytosis, with WBC greater than 12,000  Source is Urinary  Sepsis protocol initiated  Fluid resuscitation ordered per protocol  Crystalloid Fluid Administration: Patient has advanced or end-stage heart failure (with documentation of NYHA class III or symptoms with minimal exertion, Or NYHA class IV or symptoms at rest or with activity), for this/these reason(s) it is unsafe for this patient to receive 30 mL/kg of fluid  Partial Fluid Dose Given: 1L, patient to be reassessed shortly after completion of partial fluid bolus to ensure adequacy of resuscitation  IV antibiotics as appropriate for source of sepsis  Reassessment: I have reassessed the patient's hemodynamic status    Follow urine culture and blood culture  IV Rocephin  BC NTD, UCX gram neg rods

## 2023-02-10 NOTE — CARE PLAN
Problem: Knowledge Deficit - Standard  Goal: Patient and family/care givers will demonstrate understanding of plan of care, disease process/condition, diagnostic tests and medications  Outcome: Progressing     Problem: Fall Risk  Goal: Patient will remain free from falls  Outcome: Progressing     Problem: Skin Integrity  Goal: Skin integrity is maintained or improved  Outcome: Progressing     Problem: Pain - Standard  Goal: Alleviation of pain or a reduction in pain to the patient’s comfort goal  Outcome: Progressing     Problem: Cardiac - Atrial Fibrillation  Goal: Patient will achieve & maintain adequate cardiac output and rate control  Outcome: Progressing  Goal: Complications related to anticoagulation for unconverted atrial fibrillation will be avoided or minimized  Outcome: Progressing     Problem: Mobility  Goal: Patient's ability to tolerate increased activity will improve  Outcome: Progressing     Problem: Self Care  Goal: Patient will have the ability to perform ADLs independently or with assistance (bathe, groom, dress, toilet and feed)  Outcome: Progressing     Problem: Knowledge Deficit - Atrial Fibrillation  Goal: Patient and family/care givers will demonstrate understanding of atrial fibrillation condition and follow-up  Outcome: Progressing   The patient is Stable - Low risk of patient condition declining or worsening    Shift Goals  Clinical Goals: mobility, safety, abx treatment  Patient Goals: rest, mobility  Family Goals: felicai    Progress made toward(s) clinical / shift goals:  safety, mobility, pain control    Patient is not progressing towards the following goals:

## 2023-02-10 NOTE — ASSESSMENT & PLAN NOTE
Positive urinalysis.  Urine culture growing gram-negative rods  Cultures growing E. coli sensitivities returned  IV rocephin

## 2023-02-10 NOTE — PROGRESS NOTES
This RN messaged NADER York. Pt has had Normal Saline running at 75 ml/hr. Pt's urine is super dark it almost looks brown.Pt only had about 200 ml out put. Bladder scan only showed 93.    0500- Per NADER York she increased fluids to 100ml/hr.

## 2023-02-10 NOTE — DIETARY
"Nutrition services: Day 1 of admit.  Duane Parkinson is a 83 y.o. male with admitting DX of Sepsis.     Pt seen for poor PO/wt loss per admit screen. RD attempted to interview pt, pt had trouble recalling information as he was feeling dizzy from medications. Pt's grandson present and reports pt with estimated wt loss of ~ 20# over the month of December due to having COVID, UBW ~ 230#. Pt states he got down to 200# but now gaining it back. Pt's grandson reports pt was eating 2-3 meals/day PTA but it was mostly processed foods/packaged foods as pt does not cook for himself. Grandson reports they are planning to bring him meals from now on to help his nutrition.       Assessment:  Height: 185.4 cm (6' 1\")  Weight: 97.7 kg (215 lb 6.2 oz) - per bedscale on on 2/9  Body mass index is 28.42 kg/m²., BMI classification: Overweight.   Diet/Intake: Regular.     Evaluation:   Per nutrition focused physical exam: pt with slight hollowing at temple, slightly angled shoulders, some fat but not ample to upper arms, protruding clavicle, slightly decreased muscle to calf region and around patella. Pt's grandson reports he has seen a noticeable change with pt's recent wt loss, states his face looks thinner.   Per chart review, wt was 101 kg/ 223 lbs on 11/8/2022, pt with 3.6% wt loss over past three months, not severe but worth noting.     Malnutrition Risk: Pt meets criteria for moderate acute disease related malnutrition related to current illness of Sepsis as evidenced by mild fat loss and mild muscle loss noted on nutrition focused physical exam.     Recommendations/Plan:  Will add Boost Plus with all meals to help optimize intake as requested by pt.   Encourage PO and document intake as % taken in ADL's to provide interdisciplinary communication across all shifts.   Monitor weight.  Nutrition rep will continue to see patient for ongoing meal and snack preferences.     RD following.         "

## 2023-02-11 PROBLEM — R53.1 GENERALIZED WEAKNESS: Status: ACTIVE | Noted: 2023-02-11

## 2023-02-11 LAB
ANION GAP SERPL CALC-SCNC: 7 MMOL/L (ref 7–16)
BACTERIA UR CULT: ABNORMAL
BACTERIA UR CULT: ABNORMAL
BUN SERPL-MCNC: 15 MG/DL (ref 8–22)
CALCIUM SERPL-MCNC: 8.6 MG/DL (ref 8.5–10.5)
CHLORIDE SERPL-SCNC: 104 MMOL/L (ref 96–112)
CO2 SERPL-SCNC: 25 MMOL/L (ref 20–33)
CREAT SERPL-MCNC: 0.66 MG/DL (ref 0.5–1.4)
ERYTHROCYTE [DISTWIDTH] IN BLOOD BY AUTOMATED COUNT: 46.7 FL (ref 35.9–50)
GFR SERPLBLD CREATININE-BSD FMLA CKD-EPI: 92 ML/MIN/1.73 M 2
GLUCOSE SERPL-MCNC: 101 MG/DL (ref 65–99)
HCT VFR BLD AUTO: 40.1 % (ref 42–52)
HGB BLD-MCNC: 13.3 G/DL (ref 14–18)
INR PPP: 3.18 (ref 0.87–1.13)
MCH RBC QN AUTO: 30.9 PG (ref 27–33)
MCHC RBC AUTO-ENTMCNC: 33.2 G/DL (ref 33.7–35.3)
MCV RBC AUTO: 93 FL (ref 81.4–97.8)
PLATELET # BLD AUTO: 165 K/UL (ref 164–446)
PMV BLD AUTO: 9.9 FL (ref 9–12.9)
POTASSIUM SERPL-SCNC: 3.8 MMOL/L (ref 3.6–5.5)
PROTHROMBIN TIME: 31.5 SEC (ref 12–14.6)
RBC # BLD AUTO: 4.31 M/UL (ref 4.7–6.1)
SIGNIFICANT IND 70042: ABNORMAL
SITE SITE: ABNORMAL
SODIUM SERPL-SCNC: 136 MMOL/L (ref 135–145)
SOURCE SOURCE: ABNORMAL
WBC # BLD AUTO: 12.9 K/UL (ref 4.8–10.8)

## 2023-02-11 PROCEDURE — 85610 PROTHROMBIN TIME: CPT

## 2023-02-11 PROCEDURE — 770020 HCHG ROOM/CARE - TELE (206)

## 2023-02-11 PROCEDURE — 97162 PT EVAL MOD COMPLEX 30 MIN: CPT

## 2023-02-11 PROCEDURE — A9270 NON-COVERED ITEM OR SERVICE: HCPCS | Performed by: STUDENT IN AN ORGANIZED HEALTH CARE EDUCATION/TRAINING PROGRAM

## 2023-02-11 PROCEDURE — 97166 OT EVAL MOD COMPLEX 45 MIN: CPT

## 2023-02-11 PROCEDURE — A9270 NON-COVERED ITEM OR SERVICE: HCPCS | Performed by: INTERNAL MEDICINE

## 2023-02-11 PROCEDURE — 94660 CPAP INITIATION&MGMT: CPT

## 2023-02-11 PROCEDURE — 36415 COLL VENOUS BLD VENIPUNCTURE: CPT

## 2023-02-11 PROCEDURE — 700105 HCHG RX REV CODE 258: Performed by: STUDENT IN AN ORGANIZED HEALTH CARE EDUCATION/TRAINING PROGRAM

## 2023-02-11 PROCEDURE — 80048 BASIC METABOLIC PNL TOTAL CA: CPT

## 2023-02-11 PROCEDURE — 85027 COMPLETE CBC AUTOMATED: CPT

## 2023-02-11 PROCEDURE — 700102 HCHG RX REV CODE 250 W/ 637 OVERRIDE(OP): Performed by: STUDENT IN AN ORGANIZED HEALTH CARE EDUCATION/TRAINING PROGRAM

## 2023-02-11 PROCEDURE — 700111 HCHG RX REV CODE 636 W/ 250 OVERRIDE (IP): Performed by: STUDENT IN AN ORGANIZED HEALTH CARE EDUCATION/TRAINING PROGRAM

## 2023-02-11 PROCEDURE — 99232 SBSQ HOSP IP/OBS MODERATE 35: CPT | Performed by: STUDENT IN AN ORGANIZED HEALTH CARE EDUCATION/TRAINING PROGRAM

## 2023-02-11 PROCEDURE — 700102 HCHG RX REV CODE 250 W/ 637 OVERRIDE(OP): Performed by: INTERNAL MEDICINE

## 2023-02-11 RX ORDER — WARFARIN SODIUM 2 MG/1
2 TABLET ORAL
Status: COMPLETED | OUTPATIENT
Start: 2023-02-11 | End: 2023-02-11

## 2023-02-11 RX ADMIN — SENNOSIDES AND DOCUSATE SODIUM 2 TABLET: 50; 8.6 TABLET ORAL at 04:53

## 2023-02-11 RX ADMIN — GABAPENTIN 900 MG: 300 CAPSULE ORAL at 20:54

## 2023-02-11 RX ADMIN — SENNOSIDES AND DOCUSATE SODIUM 2 TABLET: 50; 8.6 TABLET ORAL at 17:13

## 2023-02-11 RX ADMIN — GABAPENTIN 900 MG: 300 CAPSULE ORAL at 13:14

## 2023-02-11 RX ADMIN — SODIUM CHLORIDE, POTASSIUM CHLORIDE, SODIUM LACTATE AND CALCIUM CHLORIDE: 600; 310; 30; 20 INJECTION, SOLUTION INTRAVENOUS at 08:59

## 2023-02-11 RX ADMIN — CEFTRIAXONE SODIUM 2000 MG: 10 INJECTION, POWDER, FOR SOLUTION INTRAVENOUS at 17:14

## 2023-02-11 RX ADMIN — ACETAMINOPHEN 650 MG: 325 TABLET, FILM COATED ORAL at 05:01

## 2023-02-11 RX ADMIN — GABAPENTIN 900 MG: 300 CAPSULE ORAL at 17:14

## 2023-02-11 RX ADMIN — POLYETHYLENE GLYCOL 3350 1 PACKET: 17 POWDER, FOR SOLUTION ORAL at 17:13

## 2023-02-11 RX ADMIN — METOPROLOL SUCCINATE 50 MG: 50 TABLET, EXTENDED RELEASE ORAL at 17:13

## 2023-02-11 RX ADMIN — ASPIRIN 81 MG: 81 TABLET, CHEWABLE ORAL at 04:53

## 2023-02-11 RX ADMIN — DIGOXIN 250 MCG: 0.12 TABLET ORAL at 20:54

## 2023-02-11 RX ADMIN — SODIUM CHLORIDE, POTASSIUM CHLORIDE, SODIUM LACTATE AND CALCIUM CHLORIDE: 600; 310; 30; 20 INJECTION, SOLUTION INTRAVENOUS at 17:18

## 2023-02-11 RX ADMIN — WARFARIN SODIUM 2 MG: 4 TABLET ORAL at 17:14

## 2023-02-11 RX ADMIN — GABAPENTIN 900 MG: 300 CAPSULE ORAL at 09:00

## 2023-02-11 RX ADMIN — ACETAMINOPHEN 650 MG: 325 TABLET, FILM COATED ORAL at 01:04

## 2023-02-11 ASSESSMENT — COGNITIVE AND FUNCTIONAL STATUS - GENERAL
SUGGESTED CMS G CODE MODIFIER MOBILITY: CJ
WALKING IN HOSPITAL ROOM: A LITTLE
DRESSING REGULAR LOWER BODY CLOTHING: A LITTLE
CLIMB 3 TO 5 STEPS WITH RAILING: A LITTLE
DAILY ACTIVITIY SCORE: 23
MOBILITY SCORE: 22
SUGGESTED CMS G CODE MODIFIER DAILY ACTIVITY: CI

## 2023-02-11 ASSESSMENT — ENCOUNTER SYMPTOMS
FOCAL WEAKNESS: 0
FEVER: 0
WEAKNESS: 1
CHILLS: 0
NAUSEA: 0
PALPITATIONS: 0
VOMITING: 0
SHORTNESS OF BREATH: 0
ABDOMINAL PAIN: 0

## 2023-02-11 ASSESSMENT — FIBROSIS 4 INDEX: FIB4 SCORE: 4.77

## 2023-02-11 ASSESSMENT — GAIT ASSESSMENTS
DISTANCE (FEET): 100
DEVIATION: SHUFFLED GAIT;BRADYKINETIC
GAIT LEVEL OF ASSIST: SUPERVISED
ASSISTIVE DEVICE: FRONT WHEEL WALKER

## 2023-02-11 ASSESSMENT — ACTIVITIES OF DAILY LIVING (ADL): TOILETING: INDEPENDENT

## 2023-02-11 ASSESSMENT — PAIN DESCRIPTION - PAIN TYPE: TYPE: CHRONIC PAIN

## 2023-02-11 NOTE — THERAPY
Occupational Therapy   Initial Evaluation     Patient Name: Duane Parkinson  Age:  83 y.o., Sex:  male  Medical Record #: [id number]  Today's Date: 2/11/2023    Precautions: Fall Risk    Assessment    Patient is 83 y.o. male admitted for treatment of urosepsis. Pt presents to OT eval able to demonstrate self-care ADLs and functional household distances with and without FWW. Pt reports supportive neighbors and local adult children able to assist as needed. Pt open to home health OT to ensure safety of ADLs and make home-specific equipment recommendations, anticipate need for a shower chair.     Plan    Occupational Therapy Initial Treatment Plan   Duration: Evaluation only    DC Equipment Recommendations: Tub / Shower Seat  Discharge Recommendations: Recommend home health for continued occupational therapy services      Objective       02/11/23 1358   Prior Living Situation   Prior Services None   Housing / Facility 1 Story House   Bathroom Set up Walk In Shower   Equipment Owned Front-Wheel Walker;Wheelchair;Crutches   Lives with - Patient's Self Care Capacity Unable To Determine At This Time   Comments lives alone, supportive neighbor, local son and daughter   Prior Level of ADL Function   Self Feeding Independent   Grooming / Hygiene Independent   Bathing Independent   Dressing Independent   Toileting Independent   Prior Level of IADL Function   Medication Management Independent   Laundry Independent   Kitchen Mobility Independent   Finances Independent   Home Management Independent   Shopping Independent   Prior Level Of Mobility Independent Without Device in Community;Independent Without Device in Home   Precautions   Precautions Fall Risk   Pain 0 - 10 Group   Therapist Pain Assessment Post Activity Pain Same as Prior to Activity;Nurse Notified;0   Cognition    Level of Consciousness Alert   Active ROM Upper Body   Active ROM Upper Body  WDL   Upper Body Muscle Tone   Upper Body Muscle Tone  WDL   Coordination  Upper Body   Coordination WDL   Balance Assessment   Sitting Balance (Static) Good   Sitting Balance (Dynamic) Fair +   Standing Balance (Static) Fair   Standing Balance (Dynamic) Fair   Weight Shift Sitting Good   Weight Shift Standing Fair   Bed Mobility    Supine to Sit Supervised   Sit to Supine Supervised   ADL Assessment   Eating Supervision   Grooming Supervision   Upper Body Dressing Supervision   Lower Body Dressing Minimal Assist   Toileting Supervision   How much help from another person does the patient currently need...   Putting on and taking off regular lower body clothing? 3   Bathing (including washing, rinsing, and drying)? 4   Toileting, which includes using a toilet, bedpan, or urinal? 4   Putting on and taking off regular upper body clothing? 4   Taking care of personal grooming such as brushing teeth? 4   Eating meals? 4   6 Clicks Daily Activity Score 23   Functional Mobility   Sit to Stand Supervised   Bed, Chair, Wheelchair Transfer Supervised   Toilet Transfers Supervised   Transfer Method Stand Step   Mobility with and without FWW   Activity Tolerance   Comments functional for ADLs   Education Group   Education Provided Activities of Daily Living;Role of Occupational Therapist;Transfers   Role of Occupational Therapist Patient Response Patient;Acceptance;Explanation;Verbal Demonstration   Transfers Patient Response Patient;Acceptance;Explanation;Verbal Demonstration;Action Demonstration   ADL Patient Response Patient;Acceptance;Explanation;Verbal Demonstration;Action Demonstration   Occupational Therapy Initial Treatment Plan    Duration Evaluation only   Problem List   Problem List None   Anticipated Discharge Equipment and Recommendations   DC Equipment Recommendations Tub / Shower Seat   Discharge Recommendations Recommend home health for continued occupational therapy services

## 2023-02-11 NOTE — THERAPY
Physical Therapy   Initial Evaluation     Patient Name: Duane Parkinson  Age:  83 y.o., Sex:  male  Medical Record #: [id number]  Today's Date: 2/11/2023     Precautions  Precautions: Fall Risk    Assessment  Pt presents with impaired activity tolerance and cognition associated with chief complaint of fall in setting of afib, TAVR and sleep apnea. Pt denies other falls or concerns for home; able to sit<>stand, walk and perform stairs with stand by assist; did better with fWW but intermittently trying to walk without; greatest home safety concern is cognition however functionally capable of dc home with home health PT when medically appropriate to do so.     Plan    Physical Therapy Initial Treatment Plan   Treatment Plan : Bed Mobility, Equipment, Gait Training, Manual Therapy, Neuro Re-Education / Balance, Self Care / Home Evaluation, Stair Training, Therapeutic Exercise, Therapeutic Activities  Treatment Frequency: 3 Times per Week  Duration: Until Therapy Goals Met    DC Equipment Recommendations: Front-Wheel Walker  Discharge Recommendations: Recommend home health for continued physical therapy services       Abridged Subjective/Objective     02/11/23 1415   Prior Living Situation   Prior Services None   Housing / Facility 1 Story House   Steps Into Home 2   Bathroom Set up Walk In Shower   Equipment Owned Front-Wheel Walker;Wheelchair;Crutches   Lives with - Patient's Self Care Capacity Alone and Able to Care For Self   Comments has family nearby including dtr; neighbor visiting here during evaluation and can assist intermittently/visit; denies prior falls;   Prior Level of Functional Mobility   Bed Mobility Independent   Transfer Status Independent   Ambulation Independent   Distance Ambulation (Feet)   (to tolerance)   Assistive Devices Used None   Cognition    Cognition / Consciousness X   Level of Consciousness Alert   Safety Awareness Impaired   Comments pleasant and cooperative; wearing hearing aides; some  off topic comments   Strength Lower Body   Lower Body Strength  WDL   Sensation Lower Body   Lower Extremity Sensation   X   Comments my feet are 'mushy' and tingling/numbness, oscillates   Balance Assessment   Sitting Balance (Static) Good   Sitting Balance (Dynamic) Fair +   Standing Balance (Static) Fair   Standing Balance (Dynamic) Fair   Weight Shift Sitting Good   Weight Shift Standing Fair   Comments B UE support in sitting/standing; intermittently can remove hands from walker and walk short distance without   Bed Mobility    Supine to Sit Modified Independent   Sit to Supine   (Nt, on toilet wiht RN staff)   Gait Analysis   Gait Level Of Assist Supervised   Assistive Device Front Wheel Walker   Distance (Feet) 100   # of Times Distance was Traveled 1   Deviation Shuffled Gait;Bradykinetic   # of Stairs Climbed 3   Level of Assist with Stairs Standby Assist  (VC for sequencing)   Weight Bearing Status full   Vision Deficits Impacting Mobility denies   Comments distance limited by therapist/pt, walked last 25ft wihtout device but needed sba   Functional Mobility   Sit to Stand Supervised   Bed, Chair, Wheelchair Transfer Supervised   Short Term Goals    Short Term Goal # 1 Pt will ambulate x 150ft without device and supervision within 6 visits to ensure indepnednet mobiltiy at home.   Education Group   Role of Physical Therapist Patient Response Patient;Acceptance;Explanation;Demonstration;Verbal Demonstration;Action Demonstration

## 2023-02-11 NOTE — PROGRESS NOTES
Inpatient Anticoagulation Service Note for 2/11/2023      Reason for Anticoagulation: Atrial Fibrillation   [id number] VASc Score: 3  HAS-BLED Score: 2    Hemoglobin Value: (!) 13.3  Hematocrit Value: (!) 40.1  Lab Platelet Value: 165  Target INR: 2.0 to 3.0    INR from last 7 days       Date/Time INR Value    02/11/23 0051 3.18    02/10/23 0303 2.68    02/09/23 1520 2.15          Dose from last 7 days       Date/Time Dose (mg)    02/11/23 1351 2    02/10/23 1356 4    02/09/23 2349 4          Average Dose (mg):  (Home dose: warfarin 4 mg daily)  Significant Interactions: Antibiotics  Bridge Therapy: No    Reversal Agent Administered: Not Applicable    Comments: INR still increasing, now at 3.18. Increased INR likely due to DDI between warfarin and antibiotics. Will give warfarin 2 mg today and check the INR tomorrow.    Education Material Provided?: No (Chronic warfarin therapy)    Pharmacist suggested discharge dosing: likely home dose of warfarin 4 mg daily     Aris Dempsey, PharmD

## 2023-02-11 NOTE — PROGRESS NOTES
[facility] Medicine Daily Progress Note    Date of Service  2/11/2023    Chief Complaint  Duane Parkinson is a 83 y.o. male admitted 2/9/2023 with sepsis    Hospital Course  Duane Parkinson is a 83 y.o. male who presented 2/9/2023 with fall.  History is significant for A-fib on Coumadin, TAVR, essential hypertension, sleep apnea.  Patient presents today after experiencing a fall and patient subsequently called EMS.  Upon presentation to the ED.  Patient denies loss of consciousness.  Patient endorses dysuria, increased frequency of urination.  Patient states that he has had decreased energy and endorses fatigue over the last week.  Patient sustained a mechanical fall without loss of consciousness today.  Pan scan was negative for acute process of the head, cervical, thoracic spine.  CXR with no evidence of consolidation.  Laboratory evaluation was significant for a neutrophilic leukocytosis with WBC of 25, lactic acid 2.4.  Patient received 1 L of IV fluids given history of TAVR, history of diastolic dysfunction.  Cultures were obtained and patient was started on IV antibiotics.  Discussed case with ER provider and patient will be admitted for further work-up and monitoring.    Interval Problem Update  Patient's white count is downtrending.  Urine is still dark we will continue IV fluids.  Urine culture growing lactose fermenting gram-negative rods.  Continue Rocephin.  Patient reports he started to feel better.  PT/OT pending.  2/11: Urine growing E. coli sensitivities returned.  Patient reports generalized weakness.  PT/OT still pending.  Patient reports feeling overall better but still has difficulty ambulating.    I have discussed this patient's plan of care and discharge plan at IDT rounds today with Case Management, Nursing, Nursing leadership, and other members of the IDT team.    Consultants/Specialty      Code Status  Full Code    Disposition  Patient is not medically cleared for discharge.   Anticipate  discharge to to home with organized home healthcare and close outpatient follow-up.  I have placed the appropriate orders for post-discharge needs.    Review of Systems  Review of Systems   Constitutional:  Positive for malaise/fatigue. Negative for chills and fever.   Respiratory:  Negative for shortness of breath.    Cardiovascular:  Negative for chest pain and palpitations.   Gastrointestinal:  Negative for abdominal pain, nausea and vomiting.   Neurological:  Positive for weakness. Negative for focal weakness.   All other systems reviewed and are negative.     Physical Exam  Temp:  [36.5 °C (97.7 °F)-36.8 °C (98.2 °F)] 36.5 °C (97.7 °F)  Pulse:  [84-91] 84  Resp:  [16-18] 18  BP: (108-115)/(59-68) 109/65  SpO2:  [94 %-100 %] 98 %    Physical Exam  Vitals reviewed.   Constitutional:       General: He is not in acute distress.     Appearance: He is ill-appearing.   HENT:      Head: Normocephalic and atraumatic.   Eyes:      General: No scleral icterus.        Right eye: No discharge.         Left eye: No discharge.   Cardiovascular:      Rate and Rhythm: Normal rate and regular rhythm.      Heart sounds: Normal heart sounds.   Pulmonary:      Effort: No respiratory distress.      Breath sounds: No wheezing or rales.   Abdominal:      General: Abdomen is flat. There is no distension.      Palpations: Abdomen is soft.      Tenderness: There is no abdominal tenderness.   Musculoskeletal:      Right lower leg: No edema.      Left lower leg: No edema.   Skin:     General: Skin is warm and dry.      Coloration: Skin is not jaundiced.   Neurological:      General: No focal deficit present.      Mental Status: He is alert.      Cranial Nerves: No cranial nerve deficit.   Psychiatric:         Mood and Affect: Mood normal.         Judgment: Judgment normal.       Fluids  No intake or output data in the 24 hours ending 02/11/23 1316      Laboratory  Recent Labs     02/09/23  1520 02/10/23  0303 02/11/23  0051   WBC 25.8*  21.4* 12.9*   RBC 5.10 4.29* 4.31*   HEMOGLOBIN 16.1 13.1* 13.3*   HEMATOCRIT 47.5 39.8* 40.1*   MCV 93.1 92.8 93.0   MCH 31.6 30.5 30.9   MCHC 33.9 32.9* 33.2*   RDW 47.2 48.3 46.7   PLATELETCT 250 198 165   MPV 10.0 10.0 9.9       Recent Labs     02/09/23  1520 02/10/23  0303 02/11/23  0051   SODIUM 136 133* 136   POTASSIUM 4.5 3.9 3.8   CHLORIDE 101 103 104   CO2 23 22 25   GLUCOSE 120* 109* 101*   BUN 11 12 15   CREATININE 0.78 0.68 0.66   CALCIUM 9.5 8.4* 8.6       Recent Labs     02/09/23  1520 02/10/23  0303 02/11/23  0051   APTT 39.3*  --   --    INR 2.15* 2.68* 3.18*                 Imaging  DX-CHEST-PORTABLE (1 VIEW)   Final Result      Stable cardiomegaly.      CT-TSPINE W/O PLUS RECONS   Final Result      1.  No acute fracture or dislocation.   2.  Multilevel degenerative changes including advanced facet degeneration.      CT-LSPINE W/O PLUS RECONS   Final Result      1.  No acute fracture or traumatic listhesis in the lumbar spine.   2.  Cholelithiasis.   3.  Mildly enlarged periportal and mesenteric lymph nodes, statistically reactive rather than neoplastic. One of the nodes is stable since prior.   4.  Nonobstructing left nephrolithiasis.   5.  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 Recommend follow-up with contrast-enhanced abdominal MRI, pancreatic protocol.      CT-CSPINE WITHOUT PLUS RECONS   Final Result      No acute fracture or traumatic listhesis in the cervical spine.      CT-HEAD W/O   Final Result      1. No CT evidence of acute infarct, hemorrhage or mass.   2. Moderate global parenchymal atrophy. Chronic small vessel ischemic changes.             Assessment/Plan  * Sepsis (HCC)- (present on admission)  Assessment & Plan  This is Sepsis Present on admission  SIRS criteria identified on my evaluation include: Tachycardia, with heart rate greater than 90 BPM and Leukocytosis, with WBC greater  than 12,000  Source is Urinary  Sepsis protocol initiated  Fluid resuscitation ordered per protocol  Crystalloid Fluid Administration: Patient has advanced or end-stage heart failure (with documentation of NYHA class III or symptoms with minimal exertion, Or NYHA class IV or symptoms at rest or with activity), for this/these reason(s) it is unsafe for this patient to receive 30 mL/kg of fluid  Partial Fluid Dose Given: 1L, patient to be reassessed shortly after completion of partial fluid bolus to ensure adequacy of resuscitation  IV antibiotics as appropriate for source of sepsis  Reassessment: I have reassessed the patient's hemodynamic status    Follow urine culture and blood culture  IV Rocephin  BC NTD, UCX gram neg rods    Generalized weakness  Assessment & Plan  Patient reports generalized weakness and difficulty getting around at home.  PT/OT ordered    Cystic lesion of abdominal viscera  Assessment & Plan  As demonstrated on CT  Recommend follow-up with contrast-enhanced abdominal MRI, pancreatic protocol.    Acute cystitis  Assessment & Plan  Positive urinalysis.  Urine culture growing gram-negative rods  Cultures growing E. coli sensitivities returned  IV rocephin    LORENA (obstructive sleep apnea)- (present on admission)  Assessment & Plan  CPAP    Essential hypertension- (present on admission)  Assessment & Plan  lisinopril    Atrial fibrillation (HCC)- (present on admission)  Assessment & Plan  RVR on presentation secondary to sepsis  Digoxin, Toprol XL  Pharmacy to dose coumadin    Status post transcatheter aortic valve replacement (TAVR) using bioprosthesis- (present on admission)  Assessment & Plan  stable         VTE prophylaxis: therapeutic anticoagulation with warfarin    I have performed a physical exam and reviewed and updated ROS and Plan today (2/11/2023). In review of yesterday's note (2/10/2023), there are no changes except as documented above.

## 2023-02-12 VITALS
SYSTOLIC BLOOD PRESSURE: 129 MMHG | WEIGHT: 230.38 LBS | RESPIRATION RATE: 17 BRPM | BODY MASS INDEX: 30.53 KG/M2 | OXYGEN SATURATION: 95 % | TEMPERATURE: 98.1 F | DIASTOLIC BLOOD PRESSURE: 83 MMHG | HEIGHT: 73 IN | HEART RATE: 89 BPM

## 2023-02-12 LAB
ANION GAP SERPL CALC-SCNC: 8 MMOL/L (ref 7–16)
BUN SERPL-MCNC: 13 MG/DL (ref 8–22)
CALCIUM SERPL-MCNC: 8.7 MG/DL (ref 8.5–10.5)
CHLORIDE SERPL-SCNC: 102 MMOL/L (ref 96–112)
CO2 SERPL-SCNC: 25 MMOL/L (ref 20–33)
CREAT SERPL-MCNC: 0.53 MG/DL (ref 0.5–1.4)
ERYTHROCYTE [DISTWIDTH] IN BLOOD BY AUTOMATED COUNT: 46.7 FL (ref 35.9–50)
GFR SERPLBLD CREATININE-BSD FMLA CKD-EPI: 98 ML/MIN/1.73 M 2
GLUCOSE SERPL-MCNC: 75 MG/DL (ref 65–99)
HCT VFR BLD AUTO: 39.7 % (ref 42–52)
HGB BLD-MCNC: 13 G/DL (ref 14–18)
INR PPP: 2.41 (ref 0.87–1.13)
MCH RBC QN AUTO: 30.7 PG (ref 27–33)
MCHC RBC AUTO-ENTMCNC: 32.7 G/DL (ref 33.7–35.3)
MCV RBC AUTO: 93.6 FL (ref 81.4–97.8)
PLATELET # BLD AUTO: 228 K/UL (ref 164–446)
PMV BLD AUTO: 10.9 FL (ref 9–12.9)
POTASSIUM SERPL-SCNC: 3.9 MMOL/L (ref 3.6–5.5)
PROTHROMBIN TIME: 25.5 SEC (ref 12–14.6)
RBC # BLD AUTO: 4.24 M/UL (ref 4.7–6.1)
SODIUM SERPL-SCNC: 135 MMOL/L (ref 135–145)
WBC # BLD AUTO: 9.4 K/UL (ref 4.8–10.8)

## 2023-02-12 PROCEDURE — 80048 BASIC METABOLIC PNL TOTAL CA: CPT

## 2023-02-12 PROCEDURE — 85610 PROTHROMBIN TIME: CPT

## 2023-02-12 PROCEDURE — 700105 HCHG RX REV CODE 258: Performed by: STUDENT IN AN ORGANIZED HEALTH CARE EDUCATION/TRAINING PROGRAM

## 2023-02-12 PROCEDURE — 700102 HCHG RX REV CODE 250 W/ 637 OVERRIDE(OP): Performed by: STUDENT IN AN ORGANIZED HEALTH CARE EDUCATION/TRAINING PROGRAM

## 2023-02-12 PROCEDURE — 36415 COLL VENOUS BLD VENIPUNCTURE: CPT

## 2023-02-12 PROCEDURE — A9270 NON-COVERED ITEM OR SERVICE: HCPCS | Performed by: INTERNAL MEDICINE

## 2023-02-12 PROCEDURE — 85027 COMPLETE CBC AUTOMATED: CPT

## 2023-02-12 PROCEDURE — 99239 HOSP IP/OBS DSCHRG MGMT >30: CPT | Performed by: STUDENT IN AN ORGANIZED HEALTH CARE EDUCATION/TRAINING PROGRAM

## 2023-02-12 PROCEDURE — A9270 NON-COVERED ITEM OR SERVICE: HCPCS | Performed by: STUDENT IN AN ORGANIZED HEALTH CARE EDUCATION/TRAINING PROGRAM

## 2023-02-12 PROCEDURE — 700102 HCHG RX REV CODE 250 W/ 637 OVERRIDE(OP): Performed by: INTERNAL MEDICINE

## 2023-02-12 RX ORDER — PHENAZOPYRIDINE HYDROCHLORIDE 200 MG/1
200 TABLET, FILM COATED ORAL 3 TIMES DAILY
Qty: 6 TABLET | Refills: 0 | Status: SHIPPED | OUTPATIENT
Start: 2023-02-12 | End: 2023-02-14

## 2023-02-12 RX ORDER — PHENAZOPYRIDINE HYDROCHLORIDE 200 MG/1
200 TABLET, FILM COATED ORAL ONCE
Status: COMPLETED | OUTPATIENT
Start: 2023-02-12 | End: 2023-02-12

## 2023-02-12 RX ORDER — CEPHALEXIN 250 MG/1
250 CAPSULE ORAL 4 TIMES DAILY
Qty: 16 CAPSULE | Refills: 0 | Status: ACTIVE | OUTPATIENT
Start: 2023-02-13 | End: 2023-02-17

## 2023-02-12 RX ADMIN — SODIUM CHLORIDE, POTASSIUM CHLORIDE, SODIUM LACTATE AND CALCIUM CHLORIDE 1000 ML: 600; 310; 30; 20 INJECTION, SOLUTION INTRAVENOUS at 01:46

## 2023-02-12 RX ADMIN — SENNOSIDES AND DOCUSATE SODIUM 2 TABLET: 50; 8.6 TABLET ORAL at 05:32

## 2023-02-12 RX ADMIN — GABAPENTIN 900 MG: 300 CAPSULE ORAL at 09:00

## 2023-02-12 RX ADMIN — PHENAZOPYRIDINE 200 MG: 200 TABLET ORAL at 13:23

## 2023-02-12 RX ADMIN — ASPIRIN 81 MG: 81 TABLET, CHEWABLE ORAL at 05:31

## 2023-02-12 RX ADMIN — GABAPENTIN 900 MG: 300 CAPSULE ORAL at 12:24

## 2023-02-12 ASSESSMENT — PAIN DESCRIPTION - PAIN TYPE: TYPE: CHRONIC PAIN

## 2023-02-12 NOTE — DISCHARGE PLANNING
Case Management Discharge Planning    Admission Date: 2/9/2023  GMLOS: 3.5  ALOS: 3    6-Clicks ADL Score: 23  6-Clicks Mobility Score: 22      Anticipated Discharge Dispo: Discharge Disposition: Discharged to home/self care (01)    DME Needed: Yes    DME Ordered: Yes    Action(s) Taken: Choice obtained, Referral(s) sent, and DME Face to Face     Escalations Completed: None    Medically Clear: Yes    Next Steps: RNCM obtained choice for [facility] and faxed to [facility]. Walker was pulled from traction. Order for [facility] cancelled. Walker delivered to bedside.     Barriers to Discharge: None    Is the patient up for discharge tomorrow: Patient is discharging today.       [phone] Home health choice faxed to [facility].

## 2023-02-12 NOTE — PROGRESS NOTES
0700 - Report received from [facility] at patient's bedside. Patient on BSC. Telemetry monitor intact et functioning. Call light and belongings within reach, safety measures intact, white board updated.     0730 - Patient on BSC, condom cath came off, patient with XXL BM. Assisted with cleaning self up. Denies needs post clean up.     0900 - Patient sitting up in bed, denies needs at this time.     1100 - Patient will DC when daughter arrives.     1130 - All discharge instructions reviewed with patient and patient's daughter. Patient's daughter reported [facility], relayed to physician and contacted meds to beds to watch for scripts. IV removed with immediate hemostasis. Belongings packed to be sent with patient.     1230 - RN called meds to beds, apparently [facility] is not contracted with [employer] and [employer] will not cover medications here. Spoke with physician, patient to get a dose of pyridium here and begin PO antibiotic tomorrow. Discussed with patient and daughter who verbalized understanding. Patient taken down to front entry via wheelchair and left in private vehicle with daughter.

## 2023-02-12 NOTE — CARE PLAN
Problem: Knowledge Deficit - Standard  Goal: Patient and family/care givers will demonstrate understanding of plan of care, disease process/condition, diagnostic tests and medications  Outcome: Progressing     Problem: Fall Risk  Goal: Patient will remain free from falls  Outcome: Progressing     Problem: Skin Integrity  Goal: Skin integrity is maintained or improved  Outcome: Progressing     Problem: Pain - Standard  Goal: Alleviation of pain or a reduction in pain to the patient’s comfort goal  Outcome: Progressing     Problem: Cardiac - Atrial Fibrillation  Goal: Patient will achieve & maintain adequate cardiac output and rate control  Outcome: Progressing  Goal: Complications related to anticoagulation for unconverted atrial fibrillation will be avoided or minimized  Outcome: Progressing     Problem: Mobility  Goal: Patient's ability to tolerate increased activity will improve  Outcome: Progressing     Problem: Self Care  Goal: Patient will have the ability to perform ADLs independently or with assistance (bathe, groom, dress, toilet and feed)  Outcome: Progressing     Problem: Knowledge Deficit - Atrial Fibrillation  Goal: Patient and family/care givers will demonstrate understanding of atrial fibrillation condition and follow-up  Outcome: Progressing     Problem: Urinary Elimination  Goal: Establish and maintain regular urinary output  Outcome: Progressing   The patient is Stable - Low risk of patient condition declining or worsening    Shift Goals  Clinical Goals: abx treatment, mobility, pain management  Patient Goals: rest  Family Goals: felicia    Progress made toward(s) clinical / shift goals:  safety, mobility, PT eval    Patient is not progressing towards the following goals:

## 2023-02-12 NOTE — CARE PLAN
The patient is Stable - Low risk of patient condition declining or worsening    Shift Goals  Clinical Goals: Monitor I/Os, monitor labs  Patient Goals: comfort  Family Goals: n/a    Progress made toward(s) clinical / shift goals:  BM today, possible DC      Problem: Knowledge Deficit - Standard  Goal: Patient and family/care givers will demonstrate understanding of plan of care, disease process/condition, diagnostic tests and medications  Outcome: Progressing     Problem: Fall Risk  Goal: Patient will remain free from falls  Outcome: Progressing     Problem: Skin Integrity  Goal: Skin integrity is maintained or improved  Outcome: Progressing     Problem: Pain - Standard  Goal: Alleviation of pain or a reduction in pain to the patient’s comfort goal  Outcome: Progressing     Problem: Cardiac - Atrial Fibrillation  Goal: Patient will achieve & maintain adequate cardiac output and rate control  Outcome: Progressing  Goal: Complications related to anticoagulation for unconverted atrial fibrillation will be avoided or minimized  Outcome: Progressing     Problem: Mobility  Goal: Patient's ability to tolerate increased activity will improve  Outcome: Progressing     Problem: Self Care  Goal: Patient will have the ability to perform ADLs independently or with assistance (bathe, groom, dress, toilet and feed)  Outcome: Progressing     Problem: Knowledge Deficit - Atrial Fibrillation  Goal: Patient and family/care givers will demonstrate understanding of atrial fibrillation condition and follow-up  Outcome: Progressing     Problem: Urinary Elimination  Goal: Establish and maintain regular urinary output  Outcome: Progressing       Patient is not progressing towards the following goals:

## 2023-02-12 NOTE — DISCHARGE INSTRUCTIONS
Diet    Heart Healthy Diet    A Heart-Healthy diet includes increasing healthy fats and limiting unhealthy fats.  Focus on eating a balance of foods, including fruits and vegetables, low-fat or nonfat dairy, lean protein, nuts and legumes, whole grains, and heart-healthy oils and fats.  Monitor your salt (sodium) intake, especially if you have high blood pressure.  Lose weight if you are overweight. Losing just 5-10% of your body weight can help your overall health and prevent diseases such as diabetes and heart disease.    You have been prescribed the drug warfarin. Please understand the importance of monitoring warfarin with scheduled PT/INR blood draws.  Follow-up with a call to your personal Doctor's office in 3 days to schedule a PT/INR. .    What You Need to Know About Warfarin  Warfarin is a blood thinner (anticoagulant). Anticoagulants help to prevent the formation of blood clots. They also help to stop the growth of blood clots.  Who should use warfarin?  Warfarin is prescribed for people who are at risk for developing harmful blood clots, such as people who have:  Surgically implanted mechanical heart valves.  Irregular heart rhythms (atrial fibrillation).  Certain clotting disorders.  A history of harmful blood clotting in the past. This includes people who have had:  A stroke.  Blood clot in the lungs (pulmonary embolism, or PE).  Blood clot in the legs (deep vein thrombosis, or DVT).  An existing blood clot.  How is warfarin taken?    Warfarin is a medicine that you take by mouth (orally). Warfarin tablets come in different strengths. Each tablet strength is a different color, with the amount of warfarin printed on the tablet. If you get a new prescription filled and the color of your tablet is different than usual, tell your pharmacist or health care provider immediately.  What blood tests do I need while taking warfarin?  The goal of warfarin therapy is to lessen the clotting tendency of blood, but  not to prevent clotting completely. Your health care provider will monitor the anticoagulation effect of warfarin closely and will adjust your dose as needed.  Warfarin is a medicine that needs to be closely monitored, so it is very important to keep all lab visits and follow-up visits with your health care provider. While taking warfarin, you will need to have blood tests (prothrombin tests, or PT tests) regularly to measure your blood clotting time. This type of test can be done with a finger stick or a blood draw.  What does the INR test result mean?  The PT test results will be reported as the International Normalized Ratio (INR). The INR tells your health care provider whether your dosage of warfarin needs to be changed. The longer it takes your blood to clot, the higher the INR.  Your health care provider will tell you your target INR range. If your INR is not in your target range, your health care provider may adjust your dosage.  If your INR is above your target range, there is a risk of bleeding. Your dosage of warfarin may need to be decreased.  If your INR is below your target range, there is a risk of clotting. Your dosage of warfarin may need to be increased.  How often is the INR test needed?  When you first start warfarin, you will usually have your INR checked every few days.  You may need to have INR tests done more than once a week until you are taking the correct dosage of warfarin.  After you have reached your target INR, your INR will be tested less often. However, you will need to have your INR checked at least once every 4-6 weeks for the entire time you are taking warfarin.  What are the side effects of warfarin?  Too much warfarin can cause bleeding (hemorrhage) in any part of the body, such as:  Bleeding from the gums.  Unexplained bruises.  Bruises that get larger.  Blood in the urine.  Bloody or dark stools.  Bleeding in the brain (hemorrhagic stroke).  A nosebleed that is not easily  stopped.  Coughing up blood.  Vomiting blood.  Warfarin use may also cause:  Skin rash or irritations  Nausea that does not go away.  Severe pain in the back or joints.  Painful toes that turn blue or purple (purple toe syndrome).  Painful ulcers that do not go away (skin necrosis).  What are the signs and symptoms of a blood clot?  Too little warfarin can increase the risk of blood clots in your legs, lungs, or arms.  Signs and symptoms of a DVT in your leg or arm may include:  Pain or swelling in your leg or arm.  Skin that is red or warm to the touch on your arm or leg.  Signs and symptoms of a pulmonary embolism may include:  Shortness of breath or difficulty breathing.  Chest pain.  Unexplained fever.  What are the signs and symptoms of a stroke?  If you are taking too much or too little warfarin, you can have a stroke. Signs and symptoms of a stroke may include:  Weakness or numbness of your face, arm, or leg, especially on one side of your body.  Confusion or trouble thinking clearly.  Difficulty seeing with one or both eyes.  Difficulty walking or moving your arms or legs.  Dizziness.  Loss of balance or coordination.  Trouble speaking, trouble understanding speech, or both (aphasia).  Sudden, severe headache with no known cause.  Partial or total loss of consciousness.  What precautions do I need to take while using warfarin?  Take warfarin exactly as told by your health care provider. Doing this helps you avoid bleeding or blood clots that could result in serious injury, pain, or disability.  Take your medicine at the same time every day. If you forget to take your dose of warfarin, take it as soon as you remember that day. If you do not remember on that day, do not take an extra dose the next day.  Contact your health care provider if you miss or take an extra dose. Do not change your dosage on your own to make up for missed or extra doses.  Wear or carry identification that says that you are taking  warfarin.  Make sure that all health care providers, including your dentist, know you are taking warfarin.  If you need surgery, talk with your health care provider about whether you should stop taking warfarin before your surgery.  Avoid situations that cause bleeding. You may bleed more easily while taking warfarin. To limit bleeding, take the following actions:  Use a softer toothbrush.  Floss with waxed floss, not unwaxed floss.  Shave with an electric razor, not with a blade.  Limit your use of sharp objects.  Avoid potentially harmful activities, such as contact sports.  What do I need to know about warfarin and pregnancy or breastfeeding?  Warfarin is not recommended during the first trimester of pregnancy due to an increased risk of birth defects. In certain situations, a woman may take warfarin after her first trimester of pregnancy.  If you are taking warfarin and you become pregnant or plan to become pregnant, contact your health care provider right away.  If you plan to breastfeed while taking warfarin, talk with your health care provider first.  What do I need to know about warfarin and alcohol or drug use?  Avoid drinking alcohol, or limit alcohol intake to no more than 1 drink a day for nonpregnant women and 2 drinks a day for men. One drink equals 12 oz of beer, 5 oz of wine, or 1½ oz of hard liquor.  If you change the amount of alcohol that you drink, tell your health care provider. Your warfarin dosage may need to be changed.  Avoid tobacco products, such as cigarettes, chewing tobacco, and e-cigarettes. If you need help quitting, ask your health care provider.  If you change the amount of nicotine or tobacco that you use, tell your health care provider. Your warfarin dosage may need to be changed.  Avoid street drugs while taking warfarin. The effects of street drugs on warfarin are not known.  What do I need to know about warfarin and other medicines or supplements?  Many prescription and  over-the-counter medicines can interfere with warfarin. Talk with your health care provider or your pharmacist before starting or stopping any new medicines. This includes over-the-counter vitamins, dietary supplements, herbal medicines, and pain medicines. Your warfarin dosage may need to be adjusted.  Some common over-the-counter medicines that may increase the risk of bleeding while taking warfarin include:  Acetaminophen.  Aspirin.  NSAIDs, such as ibuprofen or naproxen.  Vitamin E.  What do I need to know about warfarin and my diet?  It is important to maintain a normal, balanced diet while taking warfarin. Avoid major changes in your diet. If you are going to change your diet, talk with your health care provider before making changes.  Your health care provider may recommend that you work with a diet and nutrition specialist (dietitian).  Vitamin K decreases the effect of warfarin, and it is found in many foods. Eat a consistent amount of foods that contain vitamin K. For example, you may decide to eat 2 vitamin K-containing foods each day.  Most foods that are high in vitamin K are green and leafy. Common foods that contain high amounts of vitamin K include:  Kale, raw or cooked.  Spinach, raw or cooked.  Collards, raw or cooked.  Swiss chard, raw or cooked.  Mustard greens, raw or cooked.  Turnip greens, raw or cooked.  Parsley, raw.  Broccoli, cooked.  Noodles, eggs, and spinach, enriched.  [place] sprouts, raw or cooked.  Beet greens, raw or cooked.  Endive, raw.  Cabbage, cooked.  Asparagus, cooked.  Foods that contain moderate amounts of vitamin K include:  Broccoli, raw.  Cabbage, raw.  Bok leann, cooked.  Green leaf lettuce, raw  Prunes, stewed.  Pickles.  Kiwi.  Edamame, cooked.  Otoniel lettuce, raw.  Avocado.  Tuna, canned in oil.  Okra, cooked.  Black-eyed peas, cooked.  Green beans, cooked or raw.  Blueberries, raw.  Blackberries, raw.  Peas, cooked or raw.  Contact a health care provider if:  You  miss a dose.  You take an extra dose.  You plan to have any kind of surgery or procedure.  You are unable to take your medicine due to nausea, vomiting, or diarrhea.  You have any major changes in your diet or you plan to make any major changes in your diet.  You start or stop any over-the-counter medicine, prescription medicine, or dietary supplement.  You become pregnant, plan to become pregnant, or think you may be pregnant.  You have menstrual periods that are heavier than usual.  You have unusual bruising.  Get help right away if:  You develop symptoms of an allergic reaction, such as:  Swelling of the lips, face, tongue, mouth, or throat.  Rash.  Itching.  Itchy, red, swollen areas of skin (hives).  Trouble breathing.  Chest tightness.  You have:  Signs or symptoms of a stroke.  Signs or symptoms of a blood clot.  A fall or have an accident, especially if you hit your head.  Blood in your urine. Your urine may look reddish, pinkish, or tea-colored.  Blood in your stool. Your stool may be black or bright red.  Bleeding that does not stop after applying pressure to the area for 30 minutes.  Severe pain in your joints or back.  Purple or blue toes.  Skin ulcers that do not go away.  You vomit blood or cough up blood. The blood may be bright red, or it may look like coffee grounds.  These symptoms may represent a serious problem that is an emergency. Do not wait to see if the symptoms will go away. Get medical help right away. Call your local emergency services (911 in the [place] Do not drive yourself to the hospital.  Summary  Warfarin needs to be closely monitored with blood tests. It is very important to keep all lab visits and follow-up visits with your health care provider.  Make sure that you know your target INR range and your warfarin dosage.  Wear or carry identification that says that you are taking warfarin.  Take warfarin at the same time every day. Call your health care provider if you miss a dose or  if you take an extra dose. Do not change the dosage of warfarin on your own.  Know the signs and symptoms of blood clots, bleeding, and a stroke. Know when to get emergency medical help.  Tell all health care providers who care for you that you are taking warfarin.  Talk with your health care provider or your pharmacist before starting or stopping any new medicines.  Monitor how much vitamin K you eat every day. Try to eat the same amount every day.  This information is not intended to replace advice given to you by your health care provider. Make sure you discuss any questions you have with your health care provider.  Document Released: 12/18/2006 Document Revised: 07/31/2018 Document Reviewed: 03/15/2017  Elsevier Patient Education © 2020 [employer]

## 2023-02-12 NOTE — DISCHARGE SUMMARY
Discharge Summary    CHIEF COMPLAINT ON ADMISSION  Chief Complaint   Patient presents with    T-5000 FALL     PT was on toilet and was unable to stand up due to intensity of chronic back pain + weakness, PT intentionally propelled himself off of toilet onto floor with intent to crawl to phone to seek help. PT did strike head, denies LOC. + Blood thinners    Tachycardia     PT found to be in a. Fib RVR upon EMS arrival, PT reports has known hx for a. Fib. PT does report increased weakness for several weeks.        Reason for Admission  ems     Admission Date  2/9/2023    CODE STATUS  Full Code    HPI & HOSPITAL COURSE  Duane Parkinson is a 83 y.o. male who presented 2/9/2023 with fall.  History is significant for A-fib on Coumadin, TAVR, essential hypertension, sleep apnea.  Patient presents today after experiencing a fall and patient subsequently called EMS.  Upon presentation to the ED.  Patient denies loss of consciousness.  Patient endorses dysuria, increased frequency of urination.  Patient states that he has had decreased energy and endorses fatigue over the last week.  Patient sustained a mechanical fall without loss of consciousness today.  Pan scan was negative for acute process of the head, cervical, thoracic spine.  CXR with no evidence of consolidation.  Laboratory evaluation was significant for a neutrophilic leukocytosis with WBC of 25, lactic acid 2.4.  Patient received 1 L of IV fluids given history of TAVR, history of diastolic dysfunction.  Cultures were obtained and patient was started on IV antibiotics.  Discussed case with ER provider and patient will be admitted for further work-up and monitoring.    Sepsis improved after starting antibiotics.  Blood cultures were negative.  Patient was found to have E. coli in the urine.  Sensitivities reviewed.  Patient will discharge on Keflex starting on 2/13 to complete a total of 7 days of antibiotics.  Patient has a front wheel walker at home.  Shower  chair ordered.  Home health ordered for the patient.    Therefore, he is discharged in good and stable condition to home with organized home healthcare and close outpatient follow-up.    The patient met 2-midnight criteria for an inpatient stay at the time of discharge.    Discharge Date  2/12/23    FOLLOW UP ITEMS POST DISCHARGE  UTI-take Keflex as prescribed and follow-up with PCP    DISCHARGE DIAGNOSES  Principal Problem:    Sepsis (HCC) POA: Yes  Active Problems:    Status post transcatheter aortic valve replacement (TAVR) using bioprosthesis POA: Yes    Atrial fibrillation (HCC) POA: Yes    Essential hypertension POA: Yes    LORENA (obstructive sleep apnea) POA: Yes    Acute cystitis POA: Unknown    Cystic lesion of abdominal viscera POA: Unknown    Generalized weakness POA: Unknown  Resolved Problems:    * No resolved hospital problems. *      FOLLOW UP  Future Appointments   Date Time Provider Department Center   2/13/2023  9:15 AM [facility] EXAM 4 [facility] None   2/23/2023 10:45 AM ALEXANDRA Nelson [facility] None   3/2/2023 10:40 AM Dyllan Robles M.D. [facility] Jan Robles M.D.  [address]  [facility] [address]  [facility] [id number] [phone]    Schedule an appointment as soon as possible for a visit        MEDICATIONS ON DISCHARGE     Medication List        START taking these medications        Instructions   cephALEXin 250 MG Caps  Start taking on: February 13, 2023  Commonly known as: KEFLEX   Take 1 Capsule by mouth 4 times a day for 4 days.  Dose: 250 mg     phenazopyridine 200 MG Tabs  Commonly known as: PYRIDIUM   Take 1 Tablet by mouth in the morning, at noon, and at bedtime for 2 days.  Dose: 200 mg            CHANGE how you take these medications        Instructions   pravastatin 80 MG tablet  What changed:   how much to take  how to take this  when to take this  Commonly known as: PRAVACHOL   TAKE 1 TABLET BY MOUTH DAILY     warfarin 4 MG Tabs  What changed:   how much to take  how to  "take this  when to take this  Commonly known as: COUMADIN   Doctor's comments: This prescription is transmitted by a pharmacist under the authority of a collaborative practice agreement.  TAKE 1/2 to 1 TABLET BY MOUTH DAILY AS DIRECTED BY [facility]            CONTINUE taking these medications        Instructions   acetaminophen 500 MG Tabs  Commonly known as: TYLENOL   Take 500-1,000 mg by mouth every 6 hours as needed. Indications: Pain  Dose: 500-1,000 mg     aspirin 81 MG Chew chewable tablet  Commonly known as: ASA   Chew 1 Tablet every day.  Dose: 81 mg     COQ10 PO   Take 1 Tablet by mouth every day.  Dose: 1 Tablet     digoxin 250 MCG Tabs  Commonly known as: LANOXIN   Take 1 Tablet by mouth at bedtime.  Dose: 250 mcg     fish oil 1000 MG Cpdr EC softgel capsule   Take 2 Capsules by mouth every morning with breakfast.  Dose: 2,000 mg     gabapentin 300 MG Caps  Commonly known as: NEURONTIN   Take 900 mg by mouth 4 times a day.  Dose: 900 mg     GLUCOSAMINE HCL PO   Take 1 Tablet by mouth 2 times a day.  Dose: 1 Tablet     LYSINE PO   Take 1 Tablet by mouth every day.  Dose: 1 Tablet     metoprolol SR 50 MG Tb24  Commonly known as: TOPROL XL   Take 1 Tablet by mouth every day.  Dose: 50 mg     multivitamin Tabs   Take 1 Tablet by mouth at bedtime.  Dose: 1 Tablet     VITAMIN B COMPLEX PO   Take 1 Tab by mouth every evening.  Dose: 1 Tablet     VITAMIN C PO   Take 1 Tablet by mouth every day.  Dose: 1 Tablet     VITAMIN D3 PO   Take 1 Tab by mouth every evening.  Dose: 1 Tablet              Allergies  Allergies   Allergen Reactions    Percodan [Oxycodone-Aspirin] Itching and Photosensitivity     \"I sunburn\"       DIET  Orders Placed This Encounter   Procedures    Diet Order Diet: Regular     Standing Status:   Standing     Number of Occurrences:   1     Order Specific Question:   Diet:     Answer:   Regular [1]       ACTIVITY  As tolerated.  Weight bearing as " tolerated    CONSULTATIONS  none    PROCEDURES  none    LABORATORY  Lab Results   Component Value Date    SODIUM 135 02/12/2023    POTASSIUM 3.9 02/12/2023    CHLORIDE 102 02/12/2023    CO2 25 02/12/2023    GLUCOSE 75 02/12/2023    BUN 13 02/12/2023    CREATININE 0.53 02/12/2023        Lab Results   Component Value Date    WBC 9.4 02/12/2023    HEMOGLOBIN 13.0 (L) 02/12/2023    HEMATOCRIT 39.7 (L) 02/12/2023    PLATELETCT 228 02/12/2023        Total time of the discharge process exceeds 34 minutes.

## 2023-02-12 NOTE — FACE TO FACE
Face to Face Note  -  Durable Medical Equipment    Fransisco Lane D.O. - NPI: [id number]  I certify that this patient is under my care and that they had a durable medical equipment(DME)face to face encounter by myself that meets the physician DME face-to-face encounter requirements with this patient on:    Date of encounter:   Patient:                    MRN:                       YOB: 2025  Duane Parkinson  [id number]  1940     The encounter with the patient was in whole, or in part, for the following medical condition, which is the primary reason for durable medical equipment:  Other - sepsis, UTI    I certify that, based on my findings, the following durable medical equipment is medically necessary:    Walkers and Other DME Equipment - Tub/shower seat .    My Clinical findings support the need for the above equipment due to:  Abnormal Gait

## 2023-02-12 NOTE — DISCHARGE PLANNING
Received Choice form at 9096  Agency/Facility Name: [facility]  Referral sent per Choice form @ Unable to fax referral.  Currently no home health order.

## 2023-02-12 NOTE — FACE TO FACE
Face to Face Supporting Documentation - Home Health    The encounter with this patient was in whole or in part the primary reason for home health admission.    Date of encounter:   Patient:                    MRN:                       YOB: 2025  Duane Parkinson  [id number]  1940     Home health to see patient for:  Skilled Nursing care for assessment, interventions & education, Physical Therapy evaluation and treatment, and Occupational therapy evaluation and treatment    Skilled need for:  New Onset Medical Diagnosis Sepsis, UTI    Skilled nursing interventions to include:  Comment: Assessment, intervention and education    Homebound status evidenced by:  Needs the assistance of another person in order to leave the home. Leaving home requires a considerable and taxing effort. There is a normal inability to leave the home.    Community Physician to provide follow up care: Dyllan Robles M.D.     Optional Interventions? No      I certify the face to face encounter for this home health care referral meets the CMS requirements and the encounter/clinical assessment with the patient was, in whole, or in part, for the medical condition(s) listed above, which is the primary reason for home health care. Based on my clinical findings: the service(s) are medically necessary, support the need for home health care, and the homebound criteria are met.  I certify that this patient has had a face to face encounter by myself.  Fransisco Lane D.O. - NPI: [id number]

## 2023-02-12 NOTE — CARE PLAN
The patient is Stable - Low risk of patient condition declining or worsening    Shift Goals  Clinical Goals: Monitor I/Os, monitor labs  Patient Goals: comfort  Family Goals: n/a    Problem: Knowledge Deficit - Standard  Goal: Patient and family/care givers will demonstrate understanding of plan of care, disease process/condition, diagnostic tests and medications  Outcome: Progressing     Problem: Fall Risk  Goal: Patient will remain free from falls  Outcome: Progressing     Problem: Pain - Standard  Goal: Alleviation of pain or a reduction in pain to the patient’s comfort goal  Outcome: Progressing

## 2023-02-12 NOTE — DISCHARGE PLANNING
Received Choice form at 0947  [facility]Facility [facility]   Referral sent per Choice form @ [phone]  [facility]  Spoke To: [facility]   Outcome: DPA informed Amari to cancel order for pt.'    1400  Received Choice form at 1342  [facility]   Referral sent per Choice form @ 1400

## 2023-02-13 ENCOUNTER — APPOINTMENT (OUTPATIENT)
Dept: VASCULAR LAB | Facility: MEDICAL CENTER | Age: 83
End: 2023-02-13
Attending: NURSE PRACTITIONER
Payer: MEDICARE

## 2023-02-13 ENCOUNTER — PATIENT OUTREACH (OUTPATIENT)
Dept: MEDICAL GROUP | Facility: PHYSICIAN GROUP | Age: 83
End: 2023-02-13
Payer: MEDICARE

## 2023-02-13 NOTE — PROGRESS NOTES
Patient outreach for TCM follow up.  Reviewed discharge instructions and new and current medications.  Patient verbalized understanding.  Confirmed follow up appointment for 3/2/23 .   Patient  has good family support with daughter and grandson. Would like to transfer PCP [place] [facility] as it is closer to [place] and more convenient for travel. He will discuss with Dr Robles at his next appointment.

## 2023-02-14 ENCOUNTER — ANTICOAGULATION VISIT (OUTPATIENT)
Dept: VASCULAR LAB | Facility: MEDICAL CENTER | Age: 83
End: 2023-02-14
Attending: NURSE PRACTITIONER
Payer: MEDICARE

## 2023-02-14 DIAGNOSIS — Z95.3 STATUS POST TRANSCATHETER AORTIC VALVE REPLACEMENT (TAVR) USING BIOPROSTHESIS: ICD-10-CM

## 2023-02-14 DIAGNOSIS — I48.0 PAROXYSMAL ATRIAL FIBRILLATION (HCC): ICD-10-CM

## 2023-02-14 DIAGNOSIS — D68.69 SECONDARY HYPERCOAGULABLE STATE (HCC): ICD-10-CM

## 2023-02-14 LAB
BACTERIA BLD CULT: NORMAL
BACTERIA BLD CULT: NORMAL
INR PPP: 1.6 (ref 2–3.5)
SIGNIFICANT IND 70042: NORMAL
SIGNIFICANT IND 70042: NORMAL
SITE SITE: NORMAL
SITE SITE: NORMAL
SOURCE SOURCE: NORMAL
SOURCE SOURCE: NORMAL

## 2023-02-14 PROCEDURE — 99212 OFFICE O/P EST SF 10 MIN: CPT

## 2023-02-14 PROCEDURE — 85610 PROTHROMBIN TIME: CPT

## 2023-02-15 NOTE — PROGRESS NOTES
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 Referring Cardiovascular Disease (Cardiology) [phone]    [facility]ticoagulation Services Responsible  [phone]                  Refer to Patient Findings for HPI:    Pt recently admitted for a fall and sepsis.      pt declined vitals    Verified current warfarin dosing schedule.    Medications reconciled   TAVR 19, on ASA 81 mg - Dr Mendoza requests pt stays on ASA + warfarin      A/P   INR  SUB-therapeutic.     Warfarin dosing recommendation: Bolus today then Pt is to continue with current warfarin dosing regimen.     Pt educated to contact our clinic with any changes in medications or s/s of bleeding or thrombosis. Pt is aware to seek immediate medical attention for falls, head injury or deep cuts.    Follow up appointment in 3 days.     Sin Huitron, PharmD

## 2023-02-16 LAB — INR BLD: 1.6 (ref 0.9–1.2)

## 2023-02-17 ENCOUNTER — ANTICOAGULATION VISIT (OUTPATIENT)
Dept: VASCULAR LAB | Facility: MEDICAL CENTER | Age: 83
End: 2023-02-17
Attending: NURSE PRACTITIONER
Payer: MEDICARE

## 2023-02-17 DIAGNOSIS — D68.69 SECONDARY HYPERCOAGULABLE STATE (HCC): ICD-10-CM

## 2023-02-17 DIAGNOSIS — Z95.3 STATUS POST TRANSCATHETER AORTIC VALVE REPLACEMENT (TAVR) USING BIOPROSTHESIS: ICD-10-CM

## 2023-02-17 LAB
INR BLD: 1.8 (ref 0.9–1.2)
INR PPP: 1.8 (ref 2–3.5)

## 2023-02-17 PROCEDURE — 99211 OFF/OP EST MAY X REQ PHY/QHP: CPT

## 2023-02-17 PROCEDURE — 85610 PROTHROMBIN TIME: CPT

## 2023-02-17 NOTE — PROGRESS NOTES
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 Referring Cardiovascular Disease (Cardiology) [phone]    [facility] Anticoagulation Services Responsible  [phone]                  Refer to Patient Findings for HPI:  Patient Findings       Positives:  Change in health (feels like he is getting a cold), Change in medications (cephalexin, taking high dose vitamin C)    Negatives:  Signs/symptoms of thrombosis, Signs/symptoms of bleeding, Laboratory test error suspected, Change in alcohol use, Change in activity, Upcoming invasive procedure, Emergency department visit, Upcoming dental procedure, Missed doses, Extra doses, Change in diet/appetite, Hospital admission, Bruising, Other complaints            There were no vitals filed for this visit.  pt declined vitals    Verified current warfarin dosing schedule.    Medications reconciled   TAVR 19, on ASA 81 mg - Dr Mendoza requests pt stays on ASA + warfarin      A/P   INR  SUB-therapeutic. Increased from prior INR of 1.6.     Warfarin dosing recommendation: Continue current warfarin regimen as above without changes      Pt educated to contact our clinic with any changes in medications or  s/s of bleeding or thrombosis. Pt is aware to seek immediate medical attention for falls, head injury or deep cuts.    Follow up appointment in 1 week(s).    Abhishek RicoD

## 2023-02-23 ENCOUNTER — HOSPITAL ENCOUNTER (OUTPATIENT)
Dept: CARDIOLOGY | Facility: MEDICAL CENTER | Age: 83
End: 2023-02-23
Payer: MEDICARE

## 2023-02-23 ENCOUNTER — OFFICE VISIT (OUTPATIENT)
Dept: CARDIOLOGY | Facility: MEDICAL CENTER | Age: 83
End: 2023-02-23
Payer: MEDICARE

## 2023-02-23 ENCOUNTER — ANTICOAGULATION VISIT (OUTPATIENT)
Dept: VASCULAR LAB | Facility: MEDICAL CENTER | Age: 83
End: 2023-02-23
Attending: NURSE PRACTITIONER
Payer: MEDICARE

## 2023-02-23 VITALS
WEIGHT: 215 LBS | RESPIRATION RATE: 16 BRPM | BODY MASS INDEX: 28.49 KG/M2 | SYSTOLIC BLOOD PRESSURE: 132 MMHG | OXYGEN SATURATION: 99 % | HEIGHT: 73 IN | DIASTOLIC BLOOD PRESSURE: 86 MMHG | HEART RATE: 72 BPM

## 2023-02-23 DIAGNOSIS — D68.69 SECONDARY HYPERCOAGULABLE STATE (HCC): ICD-10-CM

## 2023-02-23 DIAGNOSIS — I48.0 PAROXYSMAL ATRIAL FIBRILLATION (HCC): ICD-10-CM

## 2023-02-23 DIAGNOSIS — I10 ESSENTIAL HYPERTENSION: ICD-10-CM

## 2023-02-23 DIAGNOSIS — E78.5 DYSLIPIDEMIA: ICD-10-CM

## 2023-02-23 DIAGNOSIS — Z95.3 STATUS POST TRANSCATHETER AORTIC VALVE REPLACEMENT (TAVR) USING BIOPROSTHESIS: ICD-10-CM

## 2023-02-23 DIAGNOSIS — G47.33 OSA (OBSTRUCTIVE SLEEP APNEA): ICD-10-CM

## 2023-02-23 DIAGNOSIS — R54 AGE-RELATED PHYSICAL DEBILITY: ICD-10-CM

## 2023-02-23 DIAGNOSIS — E78.2 MIXED HYPERLIPIDEMIA: ICD-10-CM

## 2023-02-23 PROBLEM — Z86.19 HISTORY OF SEPSIS: Status: ACTIVE | Noted: 2023-02-09

## 2023-02-23 LAB
INR PPP: 2.1 (ref 2–3.5)
LV EJECT FRACT  99904: 60

## 2023-02-23 PROCEDURE — 93306 TTE W/DOPPLER COMPLETE: CPT

## 2023-02-23 PROCEDURE — 99214 OFFICE O/P EST MOD 30 MIN: CPT

## 2023-02-23 PROCEDURE — 99211 OFF/OP EST MAY X REQ PHY/QHP: CPT | Mod: 25

## 2023-02-23 PROCEDURE — 85610 PROTHROMBIN TIME: CPT

## 2023-02-23 PROCEDURE — 93306 TTE W/DOPPLER COMPLETE: CPT | Mod: 26 | Performed by: INTERNAL MEDICINE

## 2023-02-23 RX ORDER — METOPROLOL SUCCINATE 50 MG/1
50 TABLET, EXTENDED RELEASE ORAL
Qty: 100 TABLET | Refills: 3 | Status: SHIPPED | OUTPATIENT
Start: 2023-02-23 | End: 2023-11-21 | Stop reason: SDUPTHER

## 2023-02-23 RX ORDER — DIGOXIN 250 MCG
250 TABLET ORAL
Qty: 100 TABLET | Refills: 3 | Status: SHIPPED | OUTPATIENT
Start: 2023-02-23 | End: 2023-08-22

## 2023-02-23 RX ORDER — ASPIRIN 81 MG/1
81 TABLET, CHEWABLE ORAL DAILY
Qty: 100 TABLET | Refills: 3 | Status: SHIPPED | OUTPATIENT
Start: 2023-02-23

## 2023-02-23 RX ORDER — PRAVASTATIN SODIUM 80 MG/1
80 TABLET ORAL DAILY
Qty: 100 TABLET | Refills: 3 | Status: SHIPPED | OUTPATIENT
Start: 2023-02-23 | End: 2023-11-21 | Stop reason: SDUPTHER

## 2023-02-23 ASSESSMENT — ENCOUNTER SYMPTOMS
NERVOUS/ANXIOUS: 0
PND: 0
MUSCULOSKELETAL NEGATIVE: 1
SHORTNESS OF BREATH: 0
EYES NEGATIVE: 1
DEPRESSION: 0
GASTROINTESTINAL NEGATIVE: 1
NEUROLOGICAL NEGATIVE: 1
ORTHOPNEA: 0
PALPITATIONS: 0

## 2023-02-23 ASSESSMENT — FIBROSIS 4 INDEX: FIB4 SCORE: 3.45

## 2023-02-23 NOTE — PROGRESS NOTES
Anticoagulation Summary  As of 2023      INR goal:  2.0-3.0   TTR:  63.7 % (6.3 y)   INR used for dosin.10 (2023)   Warfarin maintenance plan:  4 mg (4 mg x 1) every day   Weekly warfarin total:  28 mg   Plan last modified:  Sun Molina, PharmD (2023)   Next INR check:  3/9/2023   Target end date:  Indefinite    Indications    Status post transcatheter aortic valve replacement (TAVR) using bioprosthesis [Z95.3]  Atrial fibrillation (HCC) [I48.91]  Secondary hypercoagulable state (HCC) [D68.69]                 Anticoagulation Episode Summary       INR check location:      Preferred lab:      Send INR reminders to:      Comments:  Pt goes by Kinsey renee          Anticoagulation Care Providers       Provider Role Specialty Phone number    Vickey Rutherford M.D. Referring Cardiovascular Disease (Cardiology) [phone]    [facility] Anticoagulation Services Responsible  [phone]                  Refer to Patient Findings for HPI:  Patient Findings       Positives:  Extra doses (pt has been taking warfarin 4mg daily)    Negatives:  Signs/symptoms of thrombosis, Signs/symptoms of bleeding, Laboratory test error suspected, Change in health, Change in alcohol use, Change in activity, Upcoming invasive procedure, Emergency department visit, Upcoming dental procedure, Missed doses, Change in medications, Change in diet/appetite, Hospital admission, Bruising, Other complaints            There were no vitals filed for this visit.    Verified current warfarin dosing schedule.    Medications reconciled   Pt is on ASA 81mg daily as antiplatelet therapy for s/p TAVR per cardiology    A/P   INR  therapeutic.     Warfarin dosing recommendation: Pt is to continue with 4mg daily    Pt educated to contact our clinic with any changes in medications or s/s of bleeding or thrombosis. Pt is aware to seek immediate medical attention for falls, head injury or deep cuts.    Follow up appointment in 2  week(s).    Sun Molina, PharmD

## 2023-02-23 NOTE — PROGRESS NOTES
Chief Complaint   Patient presents with    Aortic Stenosis       Subjective     Kinsey Parkinson is a 83 y.o. male who presents today for routine follow up.  He was recently admitted from 2/9/2023 to 2/12/2023 for a fall and A-fib RVR.  He was found to have urosepsis, E. coli was found in his urine.  He was discharged with a 7-day course of antibiotics.  Additionally they have a history of aortic stenosis status post TAVR 29 mm S3 valve July 2019, paroxysmal atrial fibrillation, hypertension, hyperlipidemia, and falls.    They were a patient of Dr. Rutherford, last seen on 9/7/2022.    Since their admission they have been feeling much better.     Activity: doesn't do anything very active. Feels tired after walking around stores like [employer].    No symptoms of chest pain, palpitations, shortness of breath, exercise intolerance, dyspnea, or lower extremity edema.          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)     Clotting disorder (HCC)     reports nose bleeds    Depression     Lost wife ,still gets tearful    Diabetes (HCC)     diet controlled    Encounter for long-term (current) use of other medications     Gasping for breath     English measles     Heart murmur     Heart valve disease     Hyperlipidemia     Hypertension     Insomnia     Mumps     Nasal drainage     Pain     back & thumb    Pyogenic arthritis, lower leg (HCC)     thumb, back    Restless leg syndrome     Sleep apnea     uses CPAP    Snoring     Tonsillitis     Valvular heart disease      Past Surgical History:   Procedure Laterality Date    GIBRAN N/A 7/29/2019    Procedure: ECHOCARDIOGRAM, TRANSESOPHAGEAL;  Surgeon: Leander Buckner M.D.;  Location: SURGERY [facility];  Service: Cardiac    TRANSCATHETER AORTIC VALVE REPLACEMENT Bilateral 7/29/2019    Procedure: REPLACEMENT, AORTIC VALVE, TRANSCATHETER;  Surgeon: Leander  RADHA Buckner;  Location: SURGERY [facility];  Service: Cardiac    FINGER ARTHROPLASTY Left 2016    Procedure: FINGER ARTHROPLASTY THUMB CARPOMETACARPAL EXCISIONAL, EXCISE TRAPEZIUM;  Surgeon: Raheel Salgado M.D.;  Location: SURGERY SAME DAY [facility];  Service:     CARDIOVERSION      on 06, sinus rhythm until 2007,  paroxysmal atrial fibrillation    KNEE ARTHROPLASTY TOTAL      LAMINOTOMY N/A     multiple lumbar spine    OTHER ORTHOPEDIC SURGERY      lower back    DE PERCUT IMPLNT NEUROELECT,EPIDURAL      TOE ARTHROPLASTY      TURP-VAPOR N/A      Family History   Problem Relation Age of Onset    Other Mother         unknown    Heart Disease Mother     Cancer Father         prostate, skin    No Known Problems Brother     Diabetes Brother     Heart Disease Son     Sleep Apnea Neg Hx      Social History     Socioeconomic History    Marital status:      Spouse name: Not on file    Number of children: Not on file    Years of education: Not on file    Highest education level: Not on file   Occupational History    Not on file   Tobacco Use    Smoking status: Former     Packs/day: 1.00     Years: 20.00     Pack years: 20.00     Types: Cigarettes     Quit date: 1972     Years since quittin.1    Smokeless tobacco: Former   Vaping Use    Vaping Use: Never used   Substance and Sexual Activity    Alcohol use: No     Alcohol/week: 0.0 oz    Drug use: No    Sexual activity: Not Currently     Partners: Female   Other Topics Concern    Not on file   Social History Narrative    Not on file     Social Determinants of Health     Financial Resource Strain: Not on file   Food Insecurity: Not on file   Transportation Needs: Not on file   Physical Activity: Not on file   Stress: Not on file   Social Connections: Not on file   Intimate Partner Violence: Not on file   Housing Stability: Not on file     Allergies   Allergen Reactions    Percodan [Oxycodone-Aspirin] Itching and  "Photosensitivity     \"I sunburn\"     Outpatient Encounter Medications as of 2/23/2023   Medication Sig Dispense Refill    LYSINE PO Take 1 Tablet by mouth every day. As needed      digoxin (LANOXIN) 250 MCG Tab Take 1 Tablet by mouth at bedtime. 90 Tablet 2    warfarin (COUMADIN) 4 MG Tab TAKE 1/2 to 1 TABLET BY MOUTH DAILY AS DIRECTED BY [facility] (Patient taking differently: Take 4 mg by mouth every day. TAKE 1/2 to 1 TABLET BY MOUTH DAILY AS DIRECTED BY [facility]) 100 Tablet 1    pravastatin (PRAVACHOL) 80 MG tablet TAKE 1 TABLET BY MOUTH DAILY (Patient taking differently: Take 80 mg by mouth every day. TAKE 1 TABLET BY MOUTH DAILY) 90 Tablet 3    metoprolol SR (TOPROL XL) 50 MG TABLET SR 24 HR Take 1 Tablet by mouth every day. 90 Tablet 3    aspirin (ASA) 81 MG Chew Tab chewable tablet Chew 1 Tablet every day. 90 Tablet 3    GLUCOSAMINE HCL PO Take 1 Tablet by mouth 2 times a day.      Ascorbic Acid (VITAMIN C PO) Take 1 Tablet by mouth every day.      Coenzyme Q10 (COQ10 PO) Take 1 Tablet by mouth every day.      Omega-3 Fatty Acids (FISH OIL) 1000 MG CAPSULE DELAYED RELEASE EC softgel capsule Take 2 Capsules by mouth every morning with breakfast.      acetaminophen (TYLENOL) 500 MG Tab Take 500-1,000 mg by mouth every 6 hours as needed. Indications: Pain      gabapentin (NEURONTIN) 300 MG Cap Take 900 mg by mouth 4 times a day.      B Complex Vitamins (VITAMIN B COMPLEX PO) Take 1 Tab by mouth every evening.      Cholecalciferol (VITAMIN D3 PO) Take 1 Tab by mouth every evening.      multivitamin (THERAGRAN) TABS Take 1 Tablet by mouth at bedtime.       No facility-administered encounter medications on file as of 2/23/2023.     Review of Systems   Constitutional:  Positive for malaise/fatigue.   HENT: Negative.     Eyes: Negative.    Respiratory:  Negative for shortness of breath.    Cardiovascular:  Negative for chest pain, palpitations, orthopnea, leg swelling and " "PND.   Gastrointestinal: Negative.    Genitourinary: Negative.    Musculoskeletal: Negative.    Skin: Negative.    Neurological: Negative.    Endo/Heme/Allergies: Negative.    Psychiatric/Behavioral:  Negative for depression. The patient is not nervous/anxious.             Objective     /86 (BP Location: Left arm, Patient Position: Sitting, BP Cuff Size: Adult)   Pulse 72   Resp 16   Ht 1.854 m (6' 1\")   Wt 97.5 kg (215 lb)   SpO2 99%   BMI 28.37 kg/m²     Physical Exam  Constitutional:       Appearance: Normal appearance. He is obese.   HENT:      Head: Normocephalic.   Neck:      Vascular: No JVD.   Cardiovascular:      Rate and Rhythm: Normal rate and regular rhythm.      Pulses: Normal pulses.      Heart sounds: Normal heart sounds. No murmur heard.    No friction rub.   Pulmonary:      Effort: Pulmonary effort is normal.      Breath sounds: Normal breath sounds.   Abdominal:      Palpations: Abdomen is soft.   Musculoskeletal:         General: Normal range of motion.      Right lower leg: No edema.      Left lower leg: No edema.   Skin:     General: Skin is warm and dry.   Neurological:      General: No focal deficit present.      Mental Status: He is alert and oriented to person, place, and time.   Psychiatric:         Mood and Affect: Mood normal.         Behavior: Behavior normal.          Lab Results   Component Value Date/Time    CHOLSTRLTOT 130 03/07/2022 09:07 AM    LDL 60 03/07/2022 09:07 AM    HDL 40 03/07/2022 09:07 AM    TRIGLYCERIDE 149 03/07/2022 09:07 AM       Lab Results   Component Value Date/Time    SODIUM 135 02/12/2023 03:50 AM    POTASSIUM 3.9 02/12/2023 03:50 AM    CHLORIDE 102 02/12/2023 03:50 AM    CO2 25 02/12/2023 03:50 AM    GLUCOSE 75 02/12/2023 03:50 AM    BUN 13 02/12/2023 03:50 AM    CREATININE 0.53 02/12/2023 03:50 AM    BUNCREATRAT 15 06/05/2018 08:29 AM     Lab Results   Component Value Date/Time    ALKPHOSPHAT 82 02/10/2023 03:03 AM    ASTSGOT 54 (H) 02/10/2023 " 03:03 AM    ALTSGPT 34 02/10/2023 03:03 AM    TBILIRUBIN 1.0 02/10/2023 03:03 AM      Echocardiogram 4/13/2020  CONCLUSIONS  Normal LVEF.  Normal functioning TAVR valve.    Assessment & Plan     1. Status post transcatheter aortic valve replacement (TAVR) using bioprosthesis        2. Paroxysmal atrial fibrillation (HCC)        3. Essential hypertension        4. Dyslipidemia        5. Secondary hypercoagulable state (HCC)        6. Age-related physical debility        7. LORENA (obstructive sleep apnea)            Medical Decision Making: Today's Assessment/Status/Plan:        S/P TAVR  -cont asa lifelong  -SBE prophylaxis understands  -echo annually, last done in 2020, ordered     Atrial fibrillation  -Type: paroxysmal  -Rhythm and Rate: controlled  -Symptoms:none  -[id number] Score (CHF/CM, HTN, Age, DM, CVA, Vascular disease, Gender):  4  -Medications: digoxin 250 mcg daily, metoprolol SR 50 mg daily  -Anticoagulation: warfarin   -Rate v. Rhythm Control: rate    Hypertension  - good control  - continue metoprolol  - goal < 140/90 due to age and history of falls  - check BP daily 2 hours after taking medication and keep log of measurements     Hyperlipidemia  -Most recent LDL 60  -Continue pravastatin 80 mg  -Goal of less than 70  -Check lipid panel annually, ordered    Follow up with Bonnie DOE in 6 months with lipid panel and echo    This note was dictated using Dragon speech recognition software.

## 2023-02-28 ENCOUNTER — TELEPHONE (OUTPATIENT)
Dept: MEDICAL GROUP | Facility: PHYSICIAN GROUP | Age: 83
End: 2023-02-28
Payer: MEDICARE

## 2023-02-28 NOTE — TELEPHONE ENCOUNTER
VOICEMAIL  1. Caller Name: Kinsey                      Call Back Number: [phone]     2. Message: Pt left vm stating he was in hospital for infection, stated he believes it is coming back. States he has taken Clindamycin and wants to know next steps     3. Patient approves office to leave a detailed voicemail/MyChart message: N\A

## 2023-03-01 NOTE — TELEPHONE ENCOUNTER
Spoke to pt, pt declined UC and ER stated he sees PCP tomorrow, and has antibiotics that he is taking

## 2023-03-02 ENCOUNTER — HOSPITAL ENCOUNTER (OUTPATIENT)
Facility: MEDICAL CENTER | Age: 83
End: 2023-03-02
Attending: FAMILY MEDICINE
Payer: MEDICARE

## 2023-03-02 ENCOUNTER — OFFICE VISIT (OUTPATIENT)
Dept: MEDICAL GROUP | Facility: PHYSICIAN GROUP | Age: 83
End: 2023-03-02
Payer: MEDICARE

## 2023-03-02 VITALS
TEMPERATURE: 97.2 F | DIASTOLIC BLOOD PRESSURE: 60 MMHG | HEART RATE: 75 BPM | OXYGEN SATURATION: 95 % | WEIGHT: 217.4 LBS | BODY MASS INDEX: 28.81 KG/M2 | HEIGHT: 73 IN | SYSTOLIC BLOOD PRESSURE: 110 MMHG

## 2023-03-02 DIAGNOSIS — N40.1 BENIGN PROSTATIC HYPERPLASIA WITH URINARY HESITANCY: ICD-10-CM

## 2023-03-02 DIAGNOSIS — N39.0 RECURRENT UTI: ICD-10-CM

## 2023-03-02 DIAGNOSIS — R30.0 DYSURIA: ICD-10-CM

## 2023-03-02 DIAGNOSIS — R39.11 BENIGN PROSTATIC HYPERPLASIA WITH URINARY HESITANCY: ICD-10-CM

## 2023-03-02 LAB
APPEARANCE UR: NORMAL
BILIRUB UR STRIP-MCNC: NEGATIVE MG/DL
COLOR UR AUTO: NORMAL
GLUCOSE UR STRIP.AUTO-MCNC: NEGATIVE MG/DL
KETONES UR STRIP.AUTO-MCNC: NEGATIVE MG/DL
LEUKOCYTE ESTERASE UR QL STRIP.AUTO: NORMAL
NITRITE UR QL STRIP.AUTO: POSITIVE
PH UR STRIP.AUTO: 6.5 [PH] (ref 5–8)
PROT UR QL STRIP: 100 MG/DL
RBC UR QL AUTO: NORMAL
SP GR UR STRIP.AUTO: 1.02
UROBILINOGEN UR STRIP-MCNC: 0.2 MG/DL

## 2023-03-02 PROCEDURE — 87186 SC STD MICRODIL/AGAR DIL: CPT

## 2023-03-02 PROCEDURE — 87077 CULTURE AEROBIC IDENTIFY: CPT | Mod: 91

## 2023-03-02 PROCEDURE — 87086 URINE CULTURE/COLONY COUNT: CPT

## 2023-03-02 PROCEDURE — 99214 OFFICE O/P EST MOD 30 MIN: CPT | Performed by: FAMILY MEDICINE

## 2023-03-02 PROCEDURE — 81002 URINALYSIS NONAUTO W/O SCOPE: CPT | Performed by: FAMILY MEDICINE

## 2023-03-02 RX ORDER — CEPHALEXIN 500 MG/1
500 CAPSULE ORAL 4 TIMES DAILY
Qty: 40 CAPSULE | Refills: 0 | Status: SHIPPED | OUTPATIENT
Start: 2023-03-02 | End: 2023-03-12

## 2023-03-02 RX ORDER — TAMSULOSIN HYDROCHLORIDE 0.4 MG/1
0.4 CAPSULE ORAL
Qty: 90 CAPSULE | Refills: 1 | Status: SHIPPED
Start: 2023-03-02 | End: 2023-06-21

## 2023-03-02 ASSESSMENT — FIBROSIS 4 INDEX: FIB4 SCORE: 3.45

## 2023-03-02 NOTE — PROGRESS NOTES
Subjective:     Chief Complaint   Patient presents with    Hospital Follow-up    Fall    UTI     1 week       HPI:   Duane presents today to discuss the following.    Recurrent UTI  Recently went to the hospital and found to be septic with leukocytosis and found to have a UTI as the likely source.  Culture positive for e coli.  Was treated with IV antibiotics and ultimately discharged on Keflex for 7 days which he completed.  However over the past couple of days he started having dysuria again and noticing dark-colored urine.    Did take clindamycin that he had left over at home 2 days ago.  This has helped some.  Collected urine today.    Benign prostatic hyperplasia with urinary hesitancy  Chronic issue  Not on any medicine  Getting recurrent infection     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)     Clotting disorder (HCC)     reports nose bleeds    Depression     Lost wife ,still gets tearful    Diabetes (HCC)     diet controlled    Encounter for long-term (current) use of other medications     Gasping for breath     Cymro measles     Heart murmur     Heart valve disease     Hyperlipidemia     Hypertension     Insomnia     Mumps     Nasal drainage     Pain     back & thumb    Pyogenic arthritis, lower leg (HCC)     thumb, back    Restless leg syndrome     Sleep apnea     uses CPAP    Snoring     Tonsillitis     Valvular heart disease        Current Outpatient Medications Ordered in Epic   Medication Sig Dispense Refill    METAMUCIL FIBER PO Take  by mouth.      cephALEXin (KEFLEX) 500 MG Cap Take 1 Capsule by mouth 4 times a day for 10 days. 40 Capsule 0    aspirin (ASA) 81 MG Chew Tab chewable tablet Chew 1 Tablet every day. 100 Tablet 3    digoxin (LANOXIN) 250 MCG Tab Take 1 Tablet by mouth at bedtime. 100 Tablet 3    metoprolol SR (TOPROL XL) 50 MG TABLET SR 24 HR Take 1 Tablet by  "mouth every day. 100 Tablet 3    pravastatin (PRAVACHOL) 80 MG tablet Take 1 Tablet by mouth every day. TAKE 1 TABLET BY MOUTH DAILY 100 Tablet 3    LYSINE PO Take 1 Tablet by mouth every day. As needed      warfarin (COUMADIN) 4 MG Tab TAKE 1/2 to 1 TABLET BY MOUTH DAILY AS DIRECTED BY [facility] (Patient taking differently: Take 4 mg by mouth every day. TAKE 1/2 to 1 TABLET BY MOUTH DAILY AS DIRECTED BY [facility]) 100 Tablet 1    GLUCOSAMINE HCL PO Take 1 Tablet by mouth 2 times a day.      Ascorbic Acid (VITAMIN C PO) Take 1 Tablet by mouth every day.      Coenzyme Q10 (COQ10 PO) Take 1 Tablet by mouth every day.      Omega-3 Fatty Acids (FISH OIL) 1000 MG CAPSULE DELAYED RELEASE EC softgel capsule Take 2 Capsules by mouth every morning with breakfast.      acetaminophen (TYLENOL) 500 MG Tab Take 500-1,000 mg by mouth every 6 hours as needed. Indications: Pain      gabapentin (NEURONTIN) 300 MG Cap Take 900 mg by mouth 4 times a day.      B Complex Vitamins (VITAMIN B COMPLEX PO) Take 1 Tab by mouth every evening.      Cholecalciferol (VITAMIN D3 PO) Take 1 Tab by mouth every evening.      multivitamin (THERAGRAN) TABS Take 1 Tablet by mouth at bedtime.       No current Epic-ordered facility-administered medications on file.       Allergies:  Percodan [oxycodone-aspirin]    Health Maintenance: Completed    ROS:  Gen: no fevers/chills, no changes in weight  Eyes: no changes in vision  Pulm: no sob, no cough  CV: no chest pain, no palpitations  GI: no nausea/vomiting, no diarrhea      Objective:     Exam:  /60 (BP Location: Left arm, Patient Position: Sitting, BP Cuff Size: Large adult)   Pulse 75   Temp 36.2 °C (97.2 °F) (Temporal)   Ht 1.854 m (6' 1\")   Wt 98.6 kg (217 lb 6.4 oz)   SpO2 95%   BMI 28.68 kg/m²  Body mass index is 28.68 kg/m².    Constitutional: Alert, no distress, well-groomed.  Skin: Warm, dry, good turgor, no rashes in visible areas.  Eye: " Equal, round and reactive, conjunctiva clear, lids normal.  ENMT: Lips without lesions, good dentition, moist mucous membranes.  Neck: Trachea midline, no masses, no thyromegaly.  Respiratory: Unlabored respiratory effort, no cough.  MSK: Normal gait, moves all extremities.  Neuro: Grossly non-focal.   Psych: Alert and oriented x3, normal affect and mood.        Assessment & Plan:     83 y.o. male with the following -     1. Dysuria  2. Recurrent UTI  New problem.  Unknown etiology and prognosis.  The patient has been noticing recurrent UTI with no apparent risk factors aside from BPH.  He was recently admitted to the hospital for sepsis and was treated with IV antibiotics and ultimately discharged on Keflex.  He completed this but symptoms have returned.  UA in the office today are consistent with recurrent UTI.  I will send for culture and treat based on most recent urine culture results with Keflex for 10 days.  I will also refer to urology for further evaluation.  I will proceed with ultrasound of the kidneys as well.  I will do a trial of tamsulosin.  - POCT Urinalysis  - URINE CULTURE(NEW); Future  - cephALEXin (KEFLEX) 500 MG Cap; Take 1 Capsule by mouth 4 times a day for 10 days.  Dispense: 40 Capsule; Refill: 0  - Referral to Home Health  - Referral to Urology  - US-RENAL; Future  - cephALEXin (KEFLEX) 500 MG Cap; Take 1 Capsule by mouth 4 times a day for 10 days.  Dispense: 40 Capsule; Refill: 0  - Referral to Home Health    3. Benign prostatic hyperplasia with urinary hesitancy  Chronic, unchanged condition.  Starting treatment with Flomax and referral to urology.  - US-RENAL; Future      No follow-ups on file.          Please note that this dictation was created using voice recognition software. I have made every reasonable attempt to correct obvious errors, but I expect that there are errors of grammar and possibly content that I did not discover before finalizing the note.

## 2023-03-05 LAB
BACTERIA UR CULT: ABNORMAL
BACTERIA UR CULT: ABNORMAL
SIGNIFICANT IND 70042: ABNORMAL
SITE SITE: ABNORMAL
SOURCE SOURCE: ABNORMAL

## 2023-03-06 ENCOUNTER — TELEPHONE (OUTPATIENT)
Dept: MEDICAL GROUP | Facility: PHYSICIAN GROUP | Age: 83
End: 2023-03-06
Payer: MEDICARE

## 2023-03-06 NOTE — TELEPHONE ENCOUNTER
VOICEMAIL  1. Caller Name: Kinsey Parkinson                      Call Back Number: [phone]     2. Message: Pt left 3 voicemail's stating he has appointments 3/9/2023 with neuro and a US requesting a call back     3. Patient approves office to leave a detailed voicemail/MyChart message: N\A      Phone Number Called: [phone]     Call outcome: Left detailed message for patient. Informed to call back with any additional questions.    Message: Left vm for pt to call back and explain what his question is so I can understand and help him

## 2023-03-08 ENCOUNTER — ANTICOAGULATION VISIT (OUTPATIENT)
Dept: VASCULAR LAB | Facility: MEDICAL CENTER | Age: 83
End: 2023-03-08
Attending: INTERNAL MEDICINE
Payer: MEDICARE

## 2023-03-08 DIAGNOSIS — Z95.3 STATUS POST TRANSCATHETER AORTIC VALVE REPLACEMENT (TAVR) USING BIOPROSTHESIS: ICD-10-CM

## 2023-03-08 DIAGNOSIS — I48.0 PAROXYSMAL ATRIAL FIBRILLATION (HCC): ICD-10-CM

## 2023-03-08 DIAGNOSIS — D68.69 SECONDARY HYPERCOAGULABLE STATE (HCC): ICD-10-CM

## 2023-03-08 LAB — INR PPP: 2.2 (ref 2–3.5)

## 2023-03-08 PROCEDURE — 99212 OFFICE O/P EST SF 10 MIN: CPT | Performed by: NURSE PRACTITIONER

## 2023-03-08 PROCEDURE — 85610 PROTHROMBIN TIME: CPT

## 2023-03-08 NOTE — PROGRESS NOTES
Anticoagulation Summary  As of 3/8/2023      INR goal:  2.0-3.0   TTR:  63.9 % (6.4 y)   INR used for dosin.20 (3/8/2023)   Warfarin maintenance plan:  4 mg (4 mg x 1) every day   Weekly warfarin total:  28 mg   Plan last modified:  Sun Molina, PharmD (2023)   Next INR check:  3/29/2023   Target end date:  Indefinite    Indications    Status post transcatheter aortic valve replacement (TAVR) using bioprosthesis [Z95.3]  Atrial fibrillation (HCC) [I48.91]  Secondary hypercoagulable state (HCC) [D68.69]                 Anticoagulation Episode Summary       INR check location:      Preferred lab:      Send INR reminders to:      Comments:  Pt goes by Kinsey renee          Anticoagulation Care Providers       Provider Role Specialty Phone number    Vickey Rutherford M.D. Referring Cardiovascular Disease (Cardiology) [phone]    [facility] Responsible  [phone]                  Refer to Patient Findings for HPI:  Patient Findings       Negatives:  Signs/symptoms of thrombosis, Signs/symptoms of bleeding, Laboratory test error suspected, Change in health, Change in alcohol use, Change in activity, Upcoming invasive procedure, Emergency department visit, Upcoming dental procedure, Missed doses, Extra doses, Change in medications, Change in diet/appetite, Hospital admission, Bruising, Other complaints            There were no vitals filed for this visit.   pt declined vitals    Verified current warfarin dosing schedule.    Medications reconciled   ASA 81 mg daily indefinitely for TAVR per Dr Buckner      A/P   INR  -therapeutic.     Warfarin dosing recommendation: Continue current regimen.    Pt educated to contact our clinic with any changes in medications or s/s of bleeding or thrombosis. Pt is aware to seek immediate medical attention for falls, head injury or deep cuts.    Follow up appointment in 3 week(s).    CLAYTON Jacob

## 2023-03-09 ENCOUNTER — HOSPITAL ENCOUNTER (OUTPATIENT)
Facility: MEDICAL CENTER | Age: 83
End: 2023-03-09
Attending: PHYSICIAN ASSISTANT
Payer: MEDICARE

## 2023-03-09 PROCEDURE — 87086 URINE CULTURE/COLONY COUNT: CPT

## 2023-03-12 LAB
BACTERIA UR CULT: NORMAL
SIGNIFICANT IND 70042: NORMAL
SITE SITE: NORMAL
SOURCE SOURCE: NORMAL

## 2023-03-15 ENCOUNTER — HOSPITAL ENCOUNTER (OUTPATIENT)
Dept: RADIOLOGY | Facility: MEDICAL CENTER | Age: 83
End: 2023-03-15
Attending: FAMILY MEDICINE
Payer: MEDICARE

## 2023-03-15 DIAGNOSIS — N39.0 RECURRENT UTI: ICD-10-CM

## 2023-03-15 DIAGNOSIS — N40.1 BENIGN PROSTATIC HYPERPLASIA WITH URINARY HESITANCY: ICD-10-CM

## 2023-03-15 DIAGNOSIS — R39.11 BENIGN PROSTATIC HYPERPLASIA WITH URINARY HESITANCY: ICD-10-CM

## 2023-03-15 PROCEDURE — 76775 US EXAM ABDO BACK WALL LIM: CPT

## 2023-03-27 ENCOUNTER — TELEPHONE (OUTPATIENT)
Dept: CARDIOLOGY | Facility: MEDICAL CENTER | Age: 83
End: 2023-03-27
Payer: MEDICARE

## 2023-03-27 ENCOUNTER — TELEPHONE (OUTPATIENT)
Dept: CARDIOLOGY | Facility: MEDICAL CENTER | Age: 83
End: 2023-03-27

## 2023-03-27 ENCOUNTER — TELEPHONE (OUTPATIENT)
Dept: VASCULAR LAB | Facility: MEDICAL CENTER | Age: 83
End: 2023-03-27
Payer: MEDICARE

## 2023-03-27 NOTE — TELEPHONE ENCOUNTER
Caller: Marry with Urology    Office Name, phone number, fax number:   : [phone]  Fax: [phone]    Fax clearance to [phone] (sent on 03/23/2023 and again 03/27/2023)    Procedure Name: Prostrate    Procedure Scheduled Date: 04/07/2023    Callback Number: Marry   PH:  [phone]    Thank you  Britney GARCIA

## 2023-03-27 NOTE — TELEPHONE ENCOUNTER
Last OV: 2/23/2023  Proposed Surgery: BTURP  History (cardiac history):    1. Status post transcatheter aortic valve replacement (TAVR) using bioprosthesis          2. Paroxysmal atrial fibrillation (HCC)          3. Essential hypertension          4. Dyslipidemia          5. Secondary hypercoagulable state (HCC)          6. Age-related physical debility          7. LORENA (obstructive sleep apnea)       Prior Clearance Addressed (Y/N): N            Date: N/S  Anticoags/Antiplatelets: Warfarin, aspirin  Outstanding Cardiac Imaging (Y/N): N   What Imaging: N/A  (If pending cardiac imaging forward to provider for review)  Stent, Cardiac Devices, or Catheterization (Y/N): Y             Placement date: 7/23/2019  (If completed in last 6 months - MA to forward to provider for review)  Ablation (Y/N): N but TAVR Y            Procedure Date: 7/29/19  (If completed in the last 3 months and clearance request for dental work- MA to send wait 3 month letter. If completed in the last 6 months - MA to forward to provider for review)  Recent Cardiac Hospitalization (Y/N): Y            Admission Date: 2/9/23  (If hospitalized in last 6 months- MA to forward to provider for review)  Surgery Date: 4/7/2023  Office Name: [facility]    Preference of Location (default is surgery center unless specified by Cardiologist or DEREK)    MA to scan clearance request letter into [employer].     Surgical Clearance Letter Sent (Y/N): N    To LH: Patient was recently hospitalized 2/9/2023, okay to proceed?

## 2023-03-28 ENCOUNTER — APPOINTMENT (OUTPATIENT)
Dept: ADMISSIONS | Facility: MEDICAL CENTER | Age: 83
End: 2023-03-28
Attending: UROLOGY
Payer: MEDICARE

## 2023-03-29 ENCOUNTER — HOSPITAL ENCOUNTER (EMERGENCY)
Facility: MEDICAL CENTER | Age: 83
End: 2023-04-07
Payer: MEDICARE

## 2023-03-29 ENCOUNTER — TELEPHONE (OUTPATIENT)
Dept: VASCULAR LAB | Facility: MEDICAL CENTER | Age: 83
End: 2023-03-29

## 2023-03-29 ENCOUNTER — HOSPITAL ENCOUNTER (OUTPATIENT)
Dept: LAB | Facility: MEDICAL CENTER | Age: 83
End: 2023-03-29
Attending: UROLOGY
Payer: MEDICARE

## 2023-03-29 ENCOUNTER — PRE-ADMISSION TESTING (OUTPATIENT)
Dept: ADMISSIONS | Facility: MEDICAL CENTER | Age: 83
End: 2023-03-29
Attending: UROLOGY
Payer: MEDICARE

## 2023-03-29 ENCOUNTER — ANTICOAGULATION VISIT (OUTPATIENT)
Dept: VASCULAR LAB | Facility: MEDICAL CENTER | Age: 83
End: 2023-03-29
Attending: INTERNAL MEDICINE
Payer: MEDICARE

## 2023-03-29 VITALS — DIASTOLIC BLOOD PRESSURE: 67 MMHG | HEART RATE: 87 BPM | SYSTOLIC BLOOD PRESSURE: 102 MMHG

## 2023-03-29 DIAGNOSIS — Z01.812 PRE-OPERATIVE LABORATORY EXAMINATION: ICD-10-CM

## 2023-03-29 DIAGNOSIS — Z95.3 STATUS POST TRANSCATHETER AORTIC VALVE REPLACEMENT (TAVR) USING BIOPROSTHESIS: ICD-10-CM

## 2023-03-29 DIAGNOSIS — D68.69 SECONDARY HYPERCOAGULABLE STATE (HCC): ICD-10-CM

## 2023-03-29 LAB
ALBUMIN SERPL BCP-MCNC: 3.8 G/DL (ref 3.2–4.9)
ALBUMIN/GLOB SERPL: 1.2 G/DL
ALP SERPL-CCNC: 99 U/L (ref 30–99)
ALT SERPL-CCNC: 49 U/L (ref 2–50)
ANION GAP SERPL CALC-SCNC: 7 MMOL/L (ref 7–16)
APPEARANCE UR: CLEAR
AST SERPL-CCNC: 43 U/L (ref 12–45)
BACTERIA #/AREA URNS HPF: ABNORMAL /HPF
BILIRUB SERPL-MCNC: 0.3 MG/DL (ref 0.1–1.5)
BILIRUB UR QL STRIP.AUTO: NEGATIVE
BUN SERPL-MCNC: 16 MG/DL (ref 8–22)
CALCIUM ALBUM COR SERPL-MCNC: 9.4 MG/DL (ref 8.5–10.5)
CALCIUM SERPL-MCNC: 9.2 MG/DL (ref 8.4–10.2)
CHLORIDE SERPL-SCNC: 104 MMOL/L (ref 96–112)
CO2 SERPL-SCNC: 28 MMOL/L (ref 20–33)
COLOR UR: YELLOW
CREAT SERPL-MCNC: 0.66 MG/DL (ref 0.5–1.4)
EPI CELLS #/AREA URNS HPF: ABNORMAL /HPF
ERYTHROCYTE [DISTWIDTH] IN BLOOD BY AUTOMATED COUNT: 51.5 FL (ref 35.9–50)
FASTING STATUS PATIENT QL REPORTED: NORMAL
GFR SERPLBLD CREATININE-BSD FMLA CKD-EPI: 92 ML/MIN/1.73 M 2
GLOBULIN SER CALC-MCNC: 3.3 G/DL (ref 1.9–3.5)
GLUCOSE SERPL-MCNC: 97 MG/DL (ref 65–99)
GLUCOSE UR STRIP.AUTO-MCNC: NEGATIVE MG/DL
HCT VFR BLD AUTO: 45.7 % (ref 42–52)
HGB BLD-MCNC: 14.6 G/DL (ref 14–18)
INR PPP: 2.1 (ref 2–3.5)
KETONES UR STRIP.AUTO-MCNC: NEGATIVE MG/DL
LEUKOCYTE ESTERASE UR QL STRIP.AUTO: ABNORMAL
MCH RBC QN AUTO: 31.1 PG (ref 27–33)
MCHC RBC AUTO-ENTMCNC: 31.9 G/DL (ref 33.7–35.3)
MCV RBC AUTO: 97.2 FL (ref 81.4–97.8)
MICRO URNS: ABNORMAL
NITRITE UR QL STRIP.AUTO: NEGATIVE
PH UR STRIP.AUTO: 6 [PH] (ref 5–8)
PLATELET # BLD AUTO: 272 K/UL (ref 164–446)
PMV BLD AUTO: 10 FL (ref 9–12.9)
POTASSIUM SERPL-SCNC: 5 MMOL/L (ref 3.6–5.5)
PROT SERPL-MCNC: 7.1 G/DL (ref 6–8.2)
PROT UR QL STRIP: NEGATIVE MG/DL
RBC # BLD AUTO: 4.7 M/UL (ref 4.7–6.1)
RBC # URNS HPF: ABNORMAL /HPF
RBC UR QL AUTO: NEGATIVE
SODIUM SERPL-SCNC: 139 MMOL/L (ref 135–145)
SP GR UR STRIP.AUTO: 1.01
WBC # BLD AUTO: 8.5 K/UL (ref 4.8–10.8)
WBC #/AREA URNS HPF: ABNORMAL /HPF

## 2023-03-29 PROCEDURE — 81001 URINALYSIS AUTO W/SCOPE: CPT

## 2023-03-29 PROCEDURE — 85610 PROTHROMBIN TIME: CPT

## 2023-03-29 PROCEDURE — 83036 HEMOGLOBIN GLYCOSYLATED A1C: CPT | Mod: GA

## 2023-03-29 PROCEDURE — 99212 OFFICE O/P EST SF 10 MIN: CPT

## 2023-03-29 PROCEDURE — 36415 COLL VENOUS BLD VENIPUNCTURE: CPT

## 2023-03-29 PROCEDURE — 87086 URINE CULTURE/COLONY COUNT: CPT | Mod: GA

## 2023-03-29 PROCEDURE — 80053 COMPREHEN METABOLIC PANEL: CPT

## 2023-03-29 PROCEDURE — 85027 COMPLETE CBC AUTOMATED: CPT | Mod: GA

## 2023-03-29 RX ORDER — CEPHALEXIN 250 MG/1
CAPSULE ORAL 4 TIMES DAILY
COMMUNITY
Start: 2023-03-10 | End: 2023-06-08

## 2023-03-29 RX ORDER — PREDNISOLONE ACETATE 10 MG/ML
1 SUSPENSION/ DROPS OPHTHALMIC 2 TIMES DAILY
COMMUNITY
Start: 2023-04-07 | End: 2023-04-07

## 2023-03-29 NOTE — PROGRESS NOTES
Anticoagulation Summary  As of 3/29/2023      INR goal:  2.0-3.0   TTR:  64.2 % (6.4 y)   INR used for dosin.10 (3/29/2023)   Warfarin maintenance plan:  4 mg (4 mg x 1) every day   Weekly warfarin total:  28 mg   Plan last modified:  Sun Molina, PharmD (2023)   Next INR check:  2023   Target end date:  Indefinite    Indications    Status post transcatheter aortic valve replacement (TAVR) using bioprosthesis [Z95.3]  Atrial fibrillation (HCC) [I48.91]  Secondary hypercoagulable state (HCC) [D68.69]                 Anticoagulation Episode Summary       INR check location:      Preferred lab:      Send INR reminders to:      Comments:  Pt goes by Dyke  ASA daily          Anticoagulation Care Providers       Provider Role Specialty Phone number    Vickey Rutherford M.D. Referring Cardiovascular Disease (Cardiology) [phone]    [facility] Responsible  [phone]                  Refer to Patient Findings for HPI:  Patient Findings       Positives:  Upcoming invasive procedure (TURP 23)    Negatives:  Signs/symptoms of thrombosis, Signs/symptoms of bleeding, Laboratory test error suspected, Change in health, Change in alcohol use, Change in activity, Emergency department visit, Upcoming dental procedure, Missed doses, Extra doses, Change in medications, Change in diet/appetite, Hospital admission, Bruising, Other complaints            Vitals:    23 0836   BP: 102/67   Pulse: 87       Verified current warfarin dosing schedule.    Pt is on ASA 81 mg as antiplatelet therapy for TAVR and must be reviewed again on next cardiology appt.      A/P   INR  -therapeutic. Patient was instructed per urology to hold 10 days prior to procedure, has been holding since yesterday.      Chadsvasc: 3, bridge therapy not indicated.     Warfarin dosing recommendation: HOLD warfarin and resume as instructed per dosing calendar.     Pt educated to contact our clinic with any changes in  medications or s/s of bleeding or thrombosis. Pt is aware to seek immediate medical attention for falls, head injury or deep cuts.    Follow up appointment in 2 week(s) s/p procedure.    Carol Dumont PharmD

## 2023-03-29 NOTE — TELEPHONE ENCOUNTER
[facility]    Received VM from pre-admit ABHI Oropeza re: pt's upcoming TURP on 4/7. She is requesting we call pt to give him directions on what to do for his aspirin pre-op.    Our clinic does not manage pt's aspirin, only warfarin. Aspirin is managed by cardiology.    Will fwd this request to [facility] cardiology.    Sun Molina, PharmD

## 2023-03-30 LAB
EST. AVERAGE GLUCOSE BLD GHB EST-MCNC: 108 MG/DL
HBA1C MFR BLD: 5.4 % (ref 4–5.6)

## 2023-03-31 LAB
BACTERIA UR CULT: NORMAL
SIGNIFICANT IND 70042: NORMAL
SITE SITE: NORMAL
SOURCE SOURCE: NORMAL

## 2023-04-07 ENCOUNTER — ANESTHESIA EVENT (OUTPATIENT)
Dept: SURGERY | Facility: MEDICAL CENTER | Age: 83
End: 2023-04-07
Payer: MEDICARE

## 2023-04-07 ENCOUNTER — HOSPITAL ENCOUNTER (EMERGENCY)
Facility: MEDICAL CENTER | Age: 83
End: 2023-04-07
Attending: EMERGENCY MEDICINE
Payer: MEDICARE

## 2023-04-07 ENCOUNTER — ANESTHESIA (OUTPATIENT)
Dept: SURGERY | Facility: MEDICAL CENTER | Age: 83
End: 2023-04-07
Payer: MEDICARE

## 2023-04-07 ENCOUNTER — HOSPITAL ENCOUNTER (OUTPATIENT)
Facility: MEDICAL CENTER | Age: 83
End: 2023-04-07
Attending: UROLOGY | Admitting: UROLOGY
Payer: MEDICARE

## 2023-04-07 VITALS
SYSTOLIC BLOOD PRESSURE: 137 MMHG | OXYGEN SATURATION: 94 % | RESPIRATION RATE: 18 BRPM | TEMPERATURE: 97.2 F | DIASTOLIC BLOOD PRESSURE: 73 MMHG | HEIGHT: 72 IN | HEART RATE: 69 BPM | BODY MASS INDEX: 29.53 KG/M2 | WEIGHT: 218.03 LBS

## 2023-04-07 VITALS
DIASTOLIC BLOOD PRESSURE: 86 MMHG | TEMPERATURE: 97.2 F | BODY MASS INDEX: 28.43 KG/M2 | RESPIRATION RATE: 16 BRPM | SYSTOLIC BLOOD PRESSURE: 134 MMHG | WEIGHT: 214.51 LBS | HEIGHT: 73 IN | OXYGEN SATURATION: 92 % | HEART RATE: 64 BPM

## 2023-04-07 DIAGNOSIS — T83.9XXA FOLEY CATHETER PROBLEM, INITIAL ENCOUNTER (HCC): ICD-10-CM

## 2023-04-07 DIAGNOSIS — R31.9 HEMATURIA, UNSPECIFIED TYPE: ICD-10-CM

## 2023-04-07 LAB
GLUCOSE BLD STRIP.AUTO-MCNC: 100 MG/DL (ref 65–99)
INR PPP: 1.11 (ref 0.87–1.13)
PATHOLOGY CONSULT NOTE: NORMAL
PROTHROMBIN TIME: 14.2 SEC (ref 12–14.6)

## 2023-04-07 PROCEDURE — 160028 HCHG SURGERY MINUTES - 1ST 30 MINS LEVEL 3: Performed by: UROLOGY

## 2023-04-07 PROCEDURE — 51798 US URINE CAPACITY MEASURE: CPT

## 2023-04-07 PROCEDURE — 160009 HCHG ANES TIME/MIN: Performed by: UROLOGY

## 2023-04-07 PROCEDURE — 160046 HCHG PACU - 1ST 60 MINS PHASE II: Performed by: UROLOGY

## 2023-04-07 PROCEDURE — 85610 PROTHROMBIN TIME: CPT

## 2023-04-07 PROCEDURE — 160036 HCHG PACU - EA ADDL 30 MINS PHASE I: Performed by: UROLOGY

## 2023-04-07 PROCEDURE — 36415 COLL VENOUS BLD VENIPUNCTURE: CPT

## 2023-04-07 PROCEDURE — 160035 HCHG PACU - 1ST 60 MINS PHASE I: Performed by: UROLOGY

## 2023-04-07 PROCEDURE — 160047 HCHG PACU  - EA ADDL 30 MINS PHASE II: Performed by: UROLOGY

## 2023-04-07 PROCEDURE — A9270 NON-COVERED ITEM OR SERVICE: HCPCS | Performed by: ANESTHESIOLOGY

## 2023-04-07 PROCEDURE — 700101 HCHG RX REV CODE 250: Performed by: ANESTHESIOLOGY

## 2023-04-07 PROCEDURE — 700105 HCHG RX REV CODE 258: Performed by: UROLOGY

## 2023-04-07 PROCEDURE — 160002 HCHG RECOVERY MINUTES (STAT): Performed by: UROLOGY

## 2023-04-07 PROCEDURE — 99100 ANES PT EXTEME AGE<1 YR&>70: CPT | Performed by: ANESTHESIOLOGY

## 2023-04-07 PROCEDURE — 99284 EMERGENCY DEPT VISIT MOD MDM: CPT

## 2023-04-07 PROCEDURE — 700111 HCHG RX REV CODE 636 W/ 250 OVERRIDE (IP): Performed by: ANESTHESIOLOGY

## 2023-04-07 PROCEDURE — 700101 HCHG RX REV CODE 250: Performed by: UROLOGY

## 2023-04-07 PROCEDURE — 82962 GLUCOSE BLOOD TEST: CPT

## 2023-04-07 PROCEDURE — 160039 HCHG SURGERY MINUTES - EA ADDL 1 MIN LEVEL 3: Performed by: UROLOGY

## 2023-04-07 PROCEDURE — 700102 HCHG RX REV CODE 250 W/ 637 OVERRIDE(OP): Performed by: ANESTHESIOLOGY

## 2023-04-07 PROCEDURE — 160048 HCHG OR STATISTICAL LEVEL 1-5: Performed by: UROLOGY

## 2023-04-07 PROCEDURE — 160025 RECOVERY II MINUTES (STATS): Performed by: UROLOGY

## 2023-04-07 PROCEDURE — 88305 TISSUE EXAM BY PATHOLOGIST: CPT

## 2023-04-07 PROCEDURE — 00914 ANES TRURL PX RESCJ PRST8: CPT | Performed by: ANESTHESIOLOGY

## 2023-04-07 RX ORDER — ONDANSETRON 2 MG/ML
INJECTION INTRAMUSCULAR; INTRAVENOUS PRN
Status: DISCONTINUED | OUTPATIENT
Start: 2023-04-07 | End: 2023-04-07 | Stop reason: SURG

## 2023-04-07 RX ORDER — CEFAZOLIN SODIUM 1 G/3ML
INJECTION, POWDER, FOR SOLUTION INTRAMUSCULAR; INTRAVENOUS PRN
Status: DISCONTINUED | OUTPATIENT
Start: 2023-04-07 | End: 2023-04-07 | Stop reason: SURG

## 2023-04-07 RX ORDER — SODIUM CHLORIDE, SODIUM LACTATE, POTASSIUM CHLORIDE, CALCIUM CHLORIDE 600; 310; 30; 20 MG/100ML; MG/100ML; MG/100ML; MG/100ML
INJECTION, SOLUTION INTRAVENOUS CONTINUOUS
Status: DISCONTINUED | OUTPATIENT
Start: 2023-04-07 | End: 2023-04-07 | Stop reason: HOSPADM

## 2023-04-07 RX ORDER — TRAMADOL HYDROCHLORIDE 50 MG/1
50 TABLET ORAL EVERY 6 HOURS PRN
Status: DISCONTINUED | OUTPATIENT
Start: 2023-04-07 | End: 2023-04-07 | Stop reason: HOSPADM

## 2023-04-07 RX ORDER — LIDOCAINE HYDROCHLORIDE 20 MG/ML
INJECTION, SOLUTION EPIDURAL; INFILTRATION; INTRACAUDAL; PERINEURAL PRN
Status: DISCONTINUED | OUTPATIENT
Start: 2023-04-07 | End: 2023-04-07 | Stop reason: SURG

## 2023-04-07 RX ORDER — LABETALOL HYDROCHLORIDE 5 MG/ML
5 INJECTION, SOLUTION INTRAVENOUS
Status: DISCONTINUED | OUTPATIENT
Start: 2023-04-07 | End: 2023-04-07 | Stop reason: HOSPADM

## 2023-04-07 RX ORDER — HYDRALAZINE HYDROCHLORIDE 20 MG/ML
5 INJECTION INTRAMUSCULAR; INTRAVENOUS
Status: DISCONTINUED | OUTPATIENT
Start: 2023-04-07 | End: 2023-04-07 | Stop reason: HOSPADM

## 2023-04-07 RX ORDER — PREDNISOLONE ACETATE 10 MG/ML
1 SUSPENSION/ DROPS OPHTHALMIC
COMMUNITY
Start: 2023-04-08 | End: 2023-04-10

## 2023-04-07 RX ORDER — EPHEDRINE SULFATE 50 MG/ML
INJECTION, SOLUTION INTRAVENOUS PRN
Status: DISCONTINUED | OUTPATIENT
Start: 2023-04-07 | End: 2023-04-07 | Stop reason: SURG

## 2023-04-07 RX ORDER — ONDANSETRON 2 MG/ML
4 INJECTION INTRAMUSCULAR; INTRAVENOUS
Status: DISCONTINUED | OUTPATIENT
Start: 2023-04-07 | End: 2023-04-07 | Stop reason: HOSPADM

## 2023-04-07 RX ORDER — SODIUM CHLORIDE, SODIUM LACTATE, POTASSIUM CHLORIDE, CALCIUM CHLORIDE 600; 310; 30; 20 MG/100ML; MG/100ML; MG/100ML; MG/100ML
INJECTION, SOLUTION INTRAVENOUS CONTINUOUS
Status: ACTIVE | OUTPATIENT
Start: 2023-04-07 | End: 2023-04-07

## 2023-04-07 RX ORDER — PHENYLEPHRINE HCL IN 0.9% NACL 0.5 MG/5ML
SYRINGE (ML) INTRAVENOUS PRN
Status: DISCONTINUED | OUTPATIENT
Start: 2023-04-07 | End: 2023-04-07 | Stop reason: SURG

## 2023-04-07 RX ORDER — ACETAMINOPHEN 500 MG
1000 TABLET ORAL ONCE
Status: COMPLETED | OUTPATIENT
Start: 2023-04-07 | End: 2023-04-07

## 2023-04-07 RX ADMIN — ONDANSETRON 4 MG: 2 INJECTION INTRAMUSCULAR; INTRAVENOUS at 08:26

## 2023-04-07 RX ADMIN — Medication 200 MCG: at 08:16

## 2023-04-07 RX ADMIN — ACETAMINOPHEN 1000 MG: 500 TABLET ORAL at 06:59

## 2023-04-07 RX ADMIN — LIDOCAINE HYDROCHLORIDE 80 MG: 20 INJECTION, SOLUTION EPIDURAL; INFILTRATION; INTRACAUDAL at 07:54

## 2023-04-07 RX ADMIN — SODIUM CHLORIDE, POTASSIUM CHLORIDE, SODIUM LACTATE AND CALCIUM CHLORIDE: 600; 310; 30; 20 INJECTION, SOLUTION INTRAVENOUS at 06:59

## 2023-04-07 RX ADMIN — FENTANYL CITRATE 50 MCG: 50 INJECTION, SOLUTION INTRAMUSCULAR; INTRAVENOUS at 07:54

## 2023-04-07 RX ADMIN — FENTANYL CITRATE 25 MCG: 50 INJECTION, SOLUTION INTRAMUSCULAR; INTRAVENOUS at 08:36

## 2023-04-07 RX ADMIN — PROPOFOL 100 MG: 10 INJECTION, EMULSION INTRAVENOUS at 07:54

## 2023-04-07 RX ADMIN — Medication 200 MCG: at 08:25

## 2023-04-07 RX ADMIN — EPHEDRINE SULFATE 10 MG: 50 INJECTION, SOLUTION INTRAVENOUS at 08:32

## 2023-04-07 RX ADMIN — CEFAZOLIN 2 G: 330 INJECTION, POWDER, FOR SOLUTION INTRAMUSCULAR; INTRAVENOUS at 07:54

## 2023-04-07 RX ADMIN — LIDOCAINE HYDROCHLORIDE 0.5 ML: 10 INJECTION, SOLUTION EPIDURAL; INFILTRATION; INTRACAUDAL; PERINEURAL at 06:59

## 2023-04-07 RX ADMIN — FENTANYL CITRATE 25 MCG: 50 INJECTION, SOLUTION INTRAMUSCULAR; INTRAVENOUS at 08:26

## 2023-04-07 ASSESSMENT — PAIN DESCRIPTION - PAIN TYPE: TYPE: SURGICAL PAIN

## 2023-04-07 ASSESSMENT — PAIN SCALES - GENERAL: PAIN_LEVEL: 0

## 2023-04-07 ASSESSMENT — FIBROSIS 4 INDEX
FIB4 SCORE: 1.92
FIB4 SCORE: 1.92

## 2023-04-07 NOTE — OP REPORT
Genitourinary Operative Report    Pre-operative Diagnosis: BPH with Lower urinary tract symptoms  Nocturnal frequency  Frequency of urination  Urgency of urination  Urge incontinence of urine  Weakened urinary stream  H/o TURP   Post-operative Diagnosis: Same as above   Procedure: TransUrethral Resection of the Prostate (TURP)      Attending: Mack Pritchett M.D., MD   Assistant: None   Anesthesia: Anesthesiologist: Anna Brown M.D.   General   Estimated Blood Loss: <100cc   IV fluids: See anesthesia record   Drains: 22F 3 way vaughan catheter started on CBI   Specimens: Prostatic chips sent for surgical pathology     Complications: None   Condition: Stable, procedure well tolerated    Disposition:  1. PACU, traction with pink tape for 1 hour in recovery and then discharge home with vaughan if still clear vs observation overnight with CBI prn.  2. Void trial in ~2-3 days   Findings: Normal appearing bladder, moderate trabeculations, ureteral orifice's clearly visualized with efflux clear urine before and after case, prostate with fairly nice prior TUR defect.  In fact with bladder full it felt like already pretty nicely resected prostate.  He did have some mild regrowth of prostatic tissue in the left apical prostate, and his lobes were certainly not resected back to the capsule.  I did resect the bladder neck, left apical lobe and bilateral lateral lobes back to the deeper surface to hopefully make a more open prostate to aid with urination.       Indication:  Duane Parkinson is a 83 y.o. male with history of lower urine tract symptoms status post TURP many years ago who had recurrent symptoms refractory to pharmacological therapies.  The risks, benefits, treatment options, potential complications and expected outcomes were discussed with the patient. The patient concurred with the proposed plan, giving informed consent. Risks discussed, but not limited to, were infection, sepsis, bleeding, bladder injury,  need for secondary procedure, inability to resect all of prostate, injury to the rectum, injury to ureters, need for vaughan post op, TUR syndrome, erectile dysfunction, urinary incontinence, and the cardiovascular and pulmonary complications of anesthesia/surgery including DVT, Mi, PE, and death.    Procedure Details:  The patient was taken to the Operating Room, SCD’s were placed, and a gram of Ancef was administered for kristal-operative antiobiotics.  After induction of general anesthesia the patient was intubated and then placed in the lithotomy position using hakan stirrups. The perineum and genitals were then prepped and draped in the standard sterile fashion.    After calibration of the urethral meatus to 30 Cambodian using male sounds, a 26-Cambodian rigid resectoscope with visual obturator and 30 degree lens was passed per urethra sterilely. The urethra was normal up to the verumontanum at which point his prostate was surveyed with findings as noted above.. The bladder itself had mild trabeculation. Bilateral ureteral orifices were visualized showing normal efflux and orientation and the bladder had no mucosal abnormalities.     The prostate was then methodically resected using a bipolar loop from the bladder neck to the verumontanum. Attention was then turned toward the left lateral lobes and resected circumferentially from 6 o'clock to 12 o'clock and then the right lobe from 12 o'clock to 6 o'clock in a similar fashion. Care was taken not to perforate the capsule, not to injure the ureters, and not to resect past the verumontanum and damage the sphincter.     At the end of resection, an [employer] evacuator was used to evacuate all prostate chips. The resectoscope was then passed back in and there were no chips remaining in the bladder or prostatic fossa. Hemostasis was achieved with loop fulguration.  A final check revealed intact ureteral orifices with clear urine from each side and the Verumontanum and sphincter  appeared intact.     A 22 Welsh 3 way Melgar catheter was introduced per urethra sterilely and its balloon was inflated with 30 mL. The catheter was kept stabilized with pink tape on gentle traction, and it was connected expeditiously to continuous bladder irrigation with efflux of clear urine.        Mack Pritchett M.D.   [address] [address]   [phone]

## 2023-04-07 NOTE — ANESTHESIA POSTPROCEDURE EVALUATION
Patient: Duane Parkinson    Procedure Summary     Date: 04/07/23 Room / Location:  OR 03 / SURGERY [facility]    Anesthesia Start: 0748 Anesthesia Stop: 0844    Procedure: BIPOLAR TRANSURETHRAL RESECTION OF PROSTATE (Groin) Diagnosis: (LOWER URINARY TRACT SYMPTOMS DUE TO BENIGN PROSTATIC HYPERTROPHY)    Surgeons: Mack Pritchett M.D. Responsible Provider: Anna Brown M.D.    Anesthesia Type: general ASA Status: 3          Final Anesthesia Type: general  Last vitals  BP   Blood Pressure : (!) 141/64    Temp   36.2 °C (97.2 °F)    Pulse   78   Resp   12    SpO2   95 %      Anesthesia Post Evaluation    Patient location during evaluation: PACU  Patient participation: complete - patient participated  Level of consciousness: awake  Pain score: 0    Airway patency: patent  Anesthetic complications: no  Cardiovascular status: hemodynamically stable  Respiratory status: acceptable  Hydration status: acceptable    PONV: none          There were no known notable events for this encounter.     Nurse Pain Score: 0 (NPRS)

## 2023-04-07 NOTE — ANESTHESIA TIME REPORT
Anesthesia Start and Stop Event Times     Date Time Event    4/7/2023 0716 Ready for Procedure     0748 Anesthesia Start     0844 Anesthesia Stop        Responsible Staff  04/07/23    Name Role Begin End    Anna Brown M.D. Anesth 0748 0844        Overtime Reason:  no overtime (within assigned shift)    Comments:

## 2023-04-07 NOTE — DISCHARGE INSTRUCTIONS
Dr. Contreras' Discharge Instructions FOLLOWING    TRAnsurethral resection of prostate (TURP)     DIET:  You can resume your regular diet. We encourage you to eat well-balanced and nutritious meals.      ACTIVITY:  Please restrain from strenuous activity or heavy lifting (more than 10 pounds) for two weeks following your TUR procedure.  Please walk daily as much as tolerated, making exercise a part of your daily life. Do not drive while using narcotics for pain control if you are using them.  Please avoid excessive straining with defecation and use stool softeners as directed to prevent constipation!     WOUND CARE:  You have no dressing or wound to manage.     CATHETER CARE:  You have a vaughan catheter in place, and you should care for it as instructed by nurse.  This should be removed on in 2-3 days using a syringe to deflate the catheter balloon as instructed by the RN or at a follow up visit in our clinic. PT MAY REMOVE VAUGHAN CATHETER AT HOME IN 3 DAYS PER DR. CONTRERAS.      MEDICATIONS:  - Please use plain tylenol or ibuprofen for pain control   - Use stool softeners (Miralax and Colace) as directed to prevent constipation and straining  - If you take aspirin, plavix, coumadin or other blood thinners, please do not take for 2 days following your surgery.  But if still having bloody urine, don't restart these until it has been clear for at least 24 hrs.  - You may use the oxycodone prescribed if needed for pain refractory to tylenol or Ibuprofen. Please only use if having pain refractory to these medications.  - You will not be on any antibiotics post-operatively but you did receive a dose during your procedure.    FOLLOW-UP:  We will call you to schedule your follow up appointment in 2-3 days for your void trial and catheter removal. If you have not heard from us in 1-2 business days, please call [phone] to schedule your follow-up appointment. You may also contact this number if you have questions or  concerns that can be answered by Dr. Pritchett’ staff.     WARNING SIGNS:  Fever greater than 101 degrees Fahrenheit, chills, nausea or vomiting, Large amount of clots in urine that make it difficult to urinate or for urine to drain from vaughan, increasing pain, or abdominal swelling. If you are experiencing these symptoms, call the Urology Clinic or go to your local PCP or emergency room.    It is normal to see blood in your urine for up to 2 weeks even from surgery. The urine may clear up entirely, and then turn bloody again a few days later depending on your activity level; do not be alarmed. However, if you experience severe pain or tenderness, have a lot of increased bleeding, or find that you are unable to urinate because of large clots, please notify your doctor immediately     MEDICAL HELP DURING NORMAL BUSINESS HOURS  Between the hours of 8 AM and 5 PM, please call [phone] to speak with Dr. Mack Pritchett’ staff.     MEDICAL HELP AFTER HOURS  If you have a serious emergency such as chest pain, shortness of breath, relentless pain you should call 601. For other urgent problems after hours you may contact the urology physician on call by phoning the [phone]. You may also visit the Emergency room at local hospital for help.    For non-emergent problems such as prescription refills or routine questions, please do your best to contact us during normal business hours. This after-hours number should be used for urgent or emergent questions only.       Mack Pritchett M.D.   [address] [place] [address]   [phone]               If any questions arise, call your provider.  If your provider is not available, please feel free to call the Surgical Center at [phone].    MEDICATIONS: Resume taking daily medication.  Take prescribed pain medication with food.  If no medication is prescribed, you may take non-aspirin pain medication if needed.  PAIN MEDICATION CAN BE VERY CONSTIPATING.  Take a stool  softener or laxative such as senokot, pericolace, or milk of magnesia if needed.    Last pain medication given at ____________    What to Expect Post Anesthesia    Rest and take it easy for the first 24 hours.  A responsible adult is recommended to remain with you during that time.  It is normal to feel sleepy.  We encourage you to not do anything that requires balance, judgment or coordination.    FOR 24 HOURS DO NOT:  Drive, operate machinery or run household appliances.  Drink beer or alcoholic beverages.  Make important decisions or sign legal documents.    To avoid nausea, slowly advance diet as tolerated, avoiding spicy or greasy foods for the first day.  Add more substantial food to your diet according to your provider's instructions.  Babies can be fed formula or breast milk as soon as they are hungry.  INCREASE FLUIDS AND FIBER TO AVOID CONSTIPATION.    MILD FLU-LIKE SYMPTOMS ARE NORMAL.  YOU MAY EXPERIENCE GENERALIZED MUSCLE ACHES, THROAT IRRITATION, HEADACHE AND/OR SOME NAUSEA.

## 2023-04-07 NOTE — OR NURSING
1039: Patient to Phase II from PACU. Patient assisted with dressing with help from CNA.     1152: Discharge education completed and family denies further questions. Went over catheter care and teaching for catheter home removal per MD Pritchett. Patient told to contact MD Pritchett office for any questions that arise during removal or if they become uncomfortable with the process.     1158: Discharged to care of family post uneventful stay in phase II recovery.

## 2023-04-07 NOTE — ANESTHESIA PROCEDURE NOTES
Airway    Date/Time: 4/7/2023 7:56 AM  Performed by: Anna Brown M.D.  Authorized by: Anna Brown M.D.     Location:  OR  Urgency:  Elective  Indications for Airway Management:  Anesthesia      Spontaneous Ventilation: absent    Sedation Level:  Deep  Preoxygenated: Yes    Mask Difficulty Assessment:  0 - not attempted  Final Airway Type:  Supraglottic airway  Final Supraglottic Airway:  Standard LMA    SGA Size:  5  Number of Attempts at Approach:  1

## 2023-04-07 NOTE — ANESTHESIA PREPROCEDURE EVALUATION
Case: [id number] Date/Time: 04/07/23 0715    Procedure: BIPOLAR TRANSURETHRAL RESECTION OF PROSTATE    Pre-op diagnosis: LOWER URINARY TRACT SYMPTOMS DUE TO BENIGN PROSTATIC HYPERTROPHY    Location: [facility] OR 03 / SURGERY [facility] ER    Surgeons: Mack Pritchett M.D.     82 yo w/BPH    Relevant Problems   ANESTHESIA   (positive) LORENA (obstructive sleep apnea)      CARDIAC   (positive) Atrial fibrillation (HCC)   (positive) Essential hypertension      Other   (positive) Benign prostatic hyperplasia with urinary hesitancy   (positive) Dyslipidemia   (positive) IGT (impaired glucose tolerance)   (positive) Lumbar Disc Degeneration   (positive) Neuropathy   (positive) Secondary hypercoagulable state (HCC)   (positive) Status post transcatheter aortic valve replacement (TAVR) using bioprosthesis     Off warfarin 10 days  Spinal cord stimulator- confirmed off    Physical Exam    Airway   Mallampati: II  TM distance: >3 FB  Neck ROM: full       Cardiovascular   Rhythm: irregular  Rate: normal     Dental - normal exam  Comments: Missing one back cap         Pulmonary   Breath sounds clear to auscultation     Abdominal    Neurological - normal exam                 Anesthesia Plan    ASA 3   ASA physical status 3 criteria: a thrombophilic disease requiring anticoagulation    Plan - general       Airway plan will be LMA          Induction: intravenous    Postoperative Plan: Postoperative administration of opioids is intended.    Pertinent diagnostic labs and testing reviewed    Informed Consent:    Anesthetic plan and risks discussed with patient.    Use of blood products discussed with: patient whom consented to blood products.

## 2023-04-07 NOTE — OR NURSING
0842 To PACU from OR by a gurney; pt sleeping, respirations spontaneous and nonlabored via LMA     0857 cont bladder irrigation ongoing, w/ pink tinged output noted, no clots observed.     0912 pt denies any pain or nausea.     0927 pt sleeping, no s/s of pain or discomfort.     0945 CBI being weaned to no flow.     1000 CBI clamped; vaughan bag has clear pink tinged output, no clots observed.     1035 pt meets criteria to transfer to stage 2.

## 2023-04-08 NOTE — ED NOTES
Pt provided with leg bag. Pt discharged, wheeled to lobby in wheelchair. Pt verbalized understanding of discharge instructions.

## 2023-04-08 NOTE — ED TRIAGE NOTES
Chief Complaint   Patient presents with    Other     States that he had a vaughan placed this am and reports that there has been no drainage x2hrs. Denies pain at this time.      Pt ambulatory to triage for above complaint. Pt states that he is concerned that he may have a blockage occluding his urine.     Pt A&Ox4, GCS15, NAD. Pt placed back in ER lobby and encouraged to alert staff of any changes    /65   Pulse 72   Temp 36.2 °C (97.2 °F) (Temporal)   Resp 18   Ht 1.829 m (6')   Wt 98.9 kg (218 lb 0.6 oz)   SpO2 96%   BMI 29.57 kg/m²

## 2023-04-08 NOTE — ED NOTES
Pt resting comfortably. VSS on RA. No s/sx of distress noted. Call bell within reach, side rails up.

## 2023-04-08 NOTE — ED PROVIDER NOTES
"ED Provider Note    CHIEF COMPLAINT  Chief Complaint   Patient presents with    Other     States that he had a vaughan placed this am and reports that there has been no drainage x2hrs. Denies pain at this time.        HPI  Duane Parkinson is a 83 y.o. male who presents for evaluation of Vaughan catheter drainage problem.  Patient had a TURP this morning and a Vaughan catheter introduced.  It had been draining fairly bloody appearing urine until about 2 hours ago when it stopped draining completely.  The patient's family members emptied the bag and has not filled until about 5 minutes ago when it slowly started draining bloody fluid again.  Patient notes no nominal discomfort and no fevers or chills.  He does note he feels that there is a \"tugging\" sensation on the catheter itself.  EXTERNAL RECORDS REVIEWED  Reviewed surgical procedure from earlier today  ROS  Constitutional: No fevers or chills  Skin: No rashes  HEENT: No sore throat, runny nose  Neck: No neck pain  Chest: No pain or rashes  Pulm: No shortness of breath, cough, wheezing, stridor, or pain with inspiration/expiration  Gastrointestinal: No nausea, vomiting, diarrhea, constipation, bloating, melena, hematochezia or abdominal pain.  Genitourinary: Hematuria noted.  Heme: No bleeding or bruising problems.   Immuno: No hx of recurrent infections        LIMITATION TO HISTORY   None  OUTSIDE HISTORIAN(S):  Family        PAST FAM HISTORY  Family History   Problem Relation Age of Onset    Other Mother         unknown    Heart Disease Mother     Cancer Father         prostate, skin    No Known Problems Brother     Diabetes Brother     Heart Disease Son     Sleep Apnea Neg Hx        PAST MEDICAL HISTORY   has a past medical history of Aortic stenosis, Atrial fibrillation (HCC), Back pain, Cataract, Cirrhosis of liver without ascites (HCC), Clotting disorder (HCC), Dental disorder, Depression, Diabetes (HCC), Encounter for long-term (current) use of other " medications, Gasping for breath, Setswana measles, Heart murmur, Heart valve disease, High cholesterol, Hyperlipidemia, Hypertension, Insomnia, Mumps, Nasal drainage, Pain, Pyogenic arthritis, lower leg (HCC), Restless leg syndrome, Sleep apnea, Snoring, Tonsillitis, Urinary incontinence, and Valvular heart disease.    SOCIAL HISTORY  Social History     Tobacco Use    Smoking status: Former     Packs/day: 1.00     Years: 20.00     Pack years: 20.00     Types: Cigarettes     Quit date: 1972     Years since quittin.2    Smokeless tobacco: Never   Vaping Use    Vaping Use: Never used   Substance and Sexual Activity    Alcohol use: No     Alcohol/week: 0.0 oz    Drug use: No    Sexual activity: Not Currently     Partners: Female       SURGICAL HISTORY   has a past surgical history that includes cardioversion; knee arthroplasty total; other orthopedic surgery; laminotomy (N/A); toe arthroplasty; turp-vapor (N/A); percut implnt neuroelect,epidural; finger arthroplasty (Left, 2016); jacquelyn (N/A, 2019); transcatheter aortic valve replacement (Bilateral, 2019); cataract extraction with iol (Bilateral, 2023); and transurethral elec-surg prostatectom (2023).    CURRENT MEDICATIONS  Home Medications       Reviewed by Juan Maher R.N. (Registered Nurse) on 23 at 1930  Med List Status: Not Addressed     Medication Last Dose Status   acetaminophen (TYLENOL) 500 MG Tab  Active   Ascorbic Acid (VITAMIN C PO)  Active   aspirin (ASA) 81 MG Chew Tab chewable tablet  Active   B Complex Vitamins (VITAMIN B COMPLEX PO)  Active   cephALEXin (KEFLEX) 250 MG Cap  Active   Cholecalciferol (VITAMIN D3 PO)  Active   Coenzyme Q10 (COQ10 PO)  Active   digoxin (LANOXIN) 250 MCG Tab  Active   gabapentin (NEURONTIN) 300 MG Cap  Active   GLUCOSAMINE HCL PO  Active   LYSINE PO  Active   METAMUCIL FIBER PO  Active   metoprolol SR (TOPROL XL) 50 MG TABLET SR 24 HR  Active   multivitamin (THERAGRAN) TABS  Active  "  Omega-3 Fatty Acids (FISH OIL) 1000 MG CAPSULE DELAYED RELEASE EC softgel capsule  Active   pravastatin (PRAVACHOL) 80 MG tablet  Active   prednisoLONE acetate (PRED FORTE) 1 % Suspension  Active   prednisoLONE acetate (PRED FORTE) 1 % Suspension  Active   tamsulosin (FLOMAX) 0.4 MG capsule  Active   warfarin (COUMADIN) 4 MG Tab  Active                     ALLERGIES  Allergies   Allergen Reactions    Feldene [Piroxicam] Itching    Percodan [Oxycodone-Aspirin] Itching and Photosensitivity     \"I sunburn\"       PHYSICAL EXAM  VITAL SIGNS: /73   Pulse 69   Temp 36.2 °C (97.2 °F) (Temporal)   Resp 18   Ht 1.829 m (6')   Wt 98.9 kg (218 lb 0.6 oz)   SpO2 94%   BMI 29.57 kg/m²    Gen: Alert in no apparent distress.  HEENT: No signs of trauma, Bilateral external ears normal, Nose normal. Conjunctiva normal, Non-icteric.   Cardiovascular: Regular rate.  Capillary refill less than 3 seconds to all extremities, 2+ distal pulses.  Thorax & Lungs: Normal breath sounds, No respiratory distress, No wheezing bilateral chest rise  Abdomen: Bowel sounds normal, Soft, No tenderness  : No penile or scrotal edema or lesions.  There is a small amount of blood at the meatus but no active bleeding.  Catheter can gently be advanced and retracted about a centimeter without any binding or distress to the patient.  Skin: Warm, Dry, No erythema, No rash noted to exposed areas.   Extremities: Intact distal pulses, No edema  Neurologic: Alert , no facial droop, grossly normal coordination and strength  Psychiatric: Affect pleasant    INITIAL IMPRESSION  Patient arrives for evaluation of what appears to be likely obstruction to the Melgar catheter.  Patient notes that he has no abdominal pain currently and within the past few minutes it is actually started draining bloody fluid.  I suspect this is normal given the recent procedure however we will BladderScan him and gently flush the catheter if.  If it does not continue to drain " or if the catheter appears blocked, we will likely need to perform a diagnostic evaluation beyond bladder scan itself.  If it does flush however I feel it is reasonable to simply run the case by the on-call urologist as these are likely expected issues with a postsurgical TURP.      COURSE & MEDICAL DECISION MAKING  Pertinent Labs & Imaging studies reviewed. (See chart for details)  ED observation? No  9:45 PM  Reevaluated patient at bedside.  His back is now draining dark red urine and there is approximately 300 cc in the bag.  He had less than 100 cc in his bladder upon bladder scan and catheter irrigated easily.  I suspect there was a transient clot and that the patient is safe for discharge without any further evaluation but I will check with the urologist on-call as the patient just had surgery today.    9:58 PM  Case discussed with Dr. Page, urology, who noted that as long as the catheter is flowing and the patient does not have systemic symptoms it is reasonable to discharge him without any diagnostics.  He did recommend that some gentle traction be placed on the catheter itself so that the balloon seats against the bladder wall as this may help with the backflow of blood to the bladder itself.  Patient states understanding of this and is comfortable with the plan for discharge the catheter is now flowing and there was no significant residual in the bladder.        I have discussed management of the patient with the following physicians and DEREK's: Dr. Page, neurology    Escalation of care considered, and ultimately not performed:blood analysis and diagnostic imaging    Barriers to care at this time, including but not limited to: None.     Decision tools and Rx drugs considered including, but not limited to : None     Discussion of management with other QHP or appropriate source(s): None  The patient will return for worsening symptoms and is stable at the time of discharge. The patient verbalizes understanding  and will comply.    FINAL IMPRESSION  1. Melgar catheter problem, initial encounter (HCC)    2. Hematuria, unspecified type        Electronically signed by: Rhys Albarado M.D., 4/7/2023 8:15 PM

## 2023-04-08 NOTE — ED NOTES
Pt bladder scanned for 57ml. Melgar catheter flushed with 100 ml sterile saline. Saline and urine returned.

## 2023-04-12 ENCOUNTER — ANTICOAGULATION VISIT (OUTPATIENT)
Dept: VASCULAR LAB | Facility: MEDICAL CENTER | Age: 83
End: 2023-04-12
Attending: INTERNAL MEDICINE
Payer: MEDICARE

## 2023-04-12 DIAGNOSIS — D68.69 SECONDARY HYPERCOAGULABLE STATE (HCC): ICD-10-CM

## 2023-04-12 DIAGNOSIS — Z95.3 STATUS POST TRANSCATHETER AORTIC VALVE REPLACEMENT (TAVR) USING BIOPROSTHESIS: ICD-10-CM

## 2023-04-12 DIAGNOSIS — I48.0 PAROXYSMAL ATRIAL FIBRILLATION (HCC): ICD-10-CM

## 2023-04-12 LAB — INR PPP: 1.1 (ref 2–3.5)

## 2023-04-12 PROCEDURE — 85610 PROTHROMBIN TIME: CPT

## 2023-04-12 PROCEDURE — 99212 OFFICE O/P EST SF 10 MIN: CPT | Performed by: NURSE PRACTITIONER

## 2023-04-12 NOTE — PROGRESS NOTES
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 Referring Cardiovascular Disease (Cardiology) [phone]    [facility] Responsible  [phone]                  Refer to Patient Findings for HPI:  Patient Findings       Positives:  Signs/symptoms of bleeding, Change in health, Change in medications    Negatives:  Signs/symptoms of thrombosis, Laboratory test error suspected, Change in alcohol use, Change in activity, Upcoming invasive procedure, Emergency department visit, Upcoming dental procedure, Missed doses, Extra doses, Change in diet/appetite, Hospital admission, Bruising, Other complaints    Comments:  Patient had TURP on 23 w/ Dr Cuevas. Notes say to stop warfarin 5 days prior, however he held warfarin 10 days prior which was confirmed in the anesthesiologist's note. He returned to the ER that evening with bloody urine. Dr Page was contacted and pt was discharged home. Patient states he was told to continue to HOLD warfarin (I don't see documentation of this). He no longer has a catheter in. Reports very dark urine. Sees urology this afternoon for f/u and will check when he can resume warfarin. Currently on cephalexin.            There were  no vitals filed for this visit.    Verified current warfarin dosing schedule.    Medications reconciled   ASA 81 mg per cards.      A/P   INR  1.1. He has been off warfarin since 3/28/23. Pt sees his urologist today and will inquire when he can resume warfarin. Pt will let us know. CHADSVASC score = 3 (age, htn). No hx of CVA/TIA.    Warfarin dosing recommendation: resume warfarin when okay with your urologist.    Pt educated to contact our clinic with any changes in medications or s/s of bleeding or thrombosis. Pt is aware to seek immediate medical attention for falls, head injury or deep cuts.    Follow up appointment: patient will call us to schedule a follow up INR appt once he speaks with urology. He does not want to schedule an appt right now because he is unsure when he is resuming warfarin.    MARKELL Jacob.

## 2023-04-13 ENCOUNTER — HOSPITAL ENCOUNTER (OUTPATIENT)
Facility: MEDICAL CENTER | Age: 83
End: 2023-04-13
Attending: PHYSICIAN ASSISTANT
Payer: MEDICARE

## 2023-04-13 PROCEDURE — 87086 URINE CULTURE/COLONY COUNT: CPT

## 2023-04-16 LAB
BACTERIA UR CULT: NORMAL
SIGNIFICANT IND 70042: NORMAL
SITE SITE: NORMAL
SOURCE SOURCE: NORMAL

## 2023-05-02 ENCOUNTER — HOSPITAL ENCOUNTER (OUTPATIENT)
Facility: MEDICAL CENTER | Age: 83
End: 2023-05-02
Attending: STUDENT IN AN ORGANIZED HEALTH CARE EDUCATION/TRAINING PROGRAM
Payer: MEDICARE

## 2023-05-02 PROCEDURE — 87077 CULTURE AEROBIC IDENTIFY: CPT

## 2023-05-02 PROCEDURE — 87186 SC STD MICRODIL/AGAR DIL: CPT

## 2023-05-02 PROCEDURE — 87086 URINE CULTURE/COLONY COUNT: CPT

## 2023-05-11 ENCOUNTER — ANTICOAGULATION VISIT (OUTPATIENT)
Dept: VASCULAR LAB | Facility: MEDICAL CENTER | Age: 83
End: 2023-05-11
Attending: INTERNAL MEDICINE
Payer: MEDICARE

## 2023-05-11 DIAGNOSIS — D68.69 SECONDARY HYPERCOAGULABLE STATE (HCC): ICD-10-CM

## 2023-05-11 DIAGNOSIS — Z95.3 STATUS POST TRANSCATHETER AORTIC VALVE REPLACEMENT (TAVR) USING BIOPROSTHESIS: ICD-10-CM

## 2023-05-11 DIAGNOSIS — I48.0 PAROXYSMAL ATRIAL FIBRILLATION (HCC): ICD-10-CM

## 2023-05-11 LAB — INR PPP: 2.3 (ref 2–3.5)

## 2023-05-11 PROCEDURE — 85610 PROTHROMBIN TIME: CPT

## 2023-05-11 PROCEDURE — 99211 OFF/OP EST MAY X REQ PHY/QHP: CPT

## 2023-05-11 NOTE — PROGRESS NOTES
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 Referring Cardiovascular Disease (Cardiology) [phone]    [facility] Responsible  [phone]                  Refer to Patient Findings for HPI:  Patient Findings       Negatives:  Signs/symptoms of thrombosis, Signs/symptoms of bleeding, Laboratory test error suspected, Change in health, Change in alcohol use, Change in activity, Upcoming invasive procedure, Emergency department visit, Upcoming dental procedure, Missed doses, Extra doses, Change in medications, Change in diet/appetite, Hospital admission, Bruising, Other complaints            There were no vitals filed for this visit.    Verified current warfarin dosing schedule.    Medications reconciled   TAVR 19, on ASA 81 mg - Dr Mendoza requests pt stays on ASA + warfarin      A/P   INR  therapeutic.     Warfarin dosing recommendation: Pt is to continue with current warfarin dosing regimen.    Pt educated to contact our clinic with any changes in medications or s/s of bleeding or thrombosis. Pt is aware to seek immediate medical attention for falls, head injury or deep cuts.    Follow up appointment in 4 week(s).    Sun Molina, AbhishekD

## 2023-06-08 ENCOUNTER — ANTICOAGULATION VISIT (OUTPATIENT)
Dept: VASCULAR LAB | Facility: MEDICAL CENTER | Age: 83
End: 2023-06-08
Attending: INTERNAL MEDICINE
Payer: MEDICARE

## 2023-06-08 VITALS — HEART RATE: 53 BPM | SYSTOLIC BLOOD PRESSURE: 102 MMHG | DIASTOLIC BLOOD PRESSURE: 54 MMHG

## 2023-06-08 DIAGNOSIS — Z95.3 STATUS POST TRANSCATHETER AORTIC VALVE REPLACEMENT (TAVR) USING BIOPROSTHESIS: ICD-10-CM

## 2023-06-08 DIAGNOSIS — I48.0 PAROXYSMAL ATRIAL FIBRILLATION (HCC): ICD-10-CM

## 2023-06-08 DIAGNOSIS — D68.69 SECONDARY HYPERCOAGULABLE STATE (HCC): ICD-10-CM

## 2023-06-08 LAB — INR PPP: 1.1 (ref 2–3.5)

## 2023-06-08 PROCEDURE — 99212 OFFICE O/P EST SF 10 MIN: CPT | Performed by: NURSE PRACTITIONER

## 2023-06-08 PROCEDURE — 85610 PROTHROMBIN TIME: CPT

## 2023-06-08 NOTE — PROGRESS NOTES
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 Referring Cardiovascular Disease (Cardiology) [phone]    [facility] Responsible  [phone]                  Refer to Patient Findings for HPI:  Patient Findings       Positives:  Upcoming invasive procedure    Negatives:  Signs/symptoms of thrombosis, Signs/symptoms of bleeding, Laboratory test error suspected, Change in health, Change in alcohol use, Change in activity, Emergency department visit, Upcoming dental procedure, Missed doses, Extra doses, Change in medications, Change in diet/appetite, Hospital admission, Bruising, Other complaints    Comments:  Having a tooth extracted today. Dentist told him to stop 10 days prior. No bridging indicated.            There were no vitals filed for this visit.    Verified current warfarin dosing schedule.    Medications reconciled   ASA 81 mg per cards.      A/P   INR  1.1. CHADSVASC score = 3 so no bridging indicated. Patient to resume warfarin tonight if okay with dentist.    Warfarin dosing recommendation: Resume taking 4 mg daily when okay with dentist.    Pt educated to contact our clinic with any changes in medications or s/s of bleeding or thrombosis. Pt  is aware to seek immediate medical attention for falls, head injury or deep cuts.    Follow up appointment in 1 week(s).    MARKELL Jacob.

## 2023-06-16 ENCOUNTER — ANTICOAGULATION VISIT (OUTPATIENT)
Dept: VASCULAR LAB | Facility: MEDICAL CENTER | Age: 83
End: 2023-06-16
Attending: INTERNAL MEDICINE
Payer: MEDICARE

## 2023-06-16 DIAGNOSIS — Z95.3 STATUS POST TRANSCATHETER AORTIC VALVE REPLACEMENT (TAVR) USING BIOPROSTHESIS: ICD-10-CM

## 2023-06-16 DIAGNOSIS — D68.69 SECONDARY HYPERCOAGULABLE STATE (HCC): ICD-10-CM

## 2023-06-16 DIAGNOSIS — I48.0 PAROXYSMAL ATRIAL FIBRILLATION (HCC): ICD-10-CM

## 2023-06-16 LAB — INR PPP: 1.4 (ref 2–3.5)

## 2023-06-16 PROCEDURE — 85610 PROTHROMBIN TIME: CPT

## 2023-06-16 PROCEDURE — 99212 OFFICE O/P EST SF 10 MIN: CPT

## 2023-06-16 NOTE — PROGRESS NOTES
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 Referring Cardiovascular Disease (Cardiology) [phone]    [facility] Responsible  [phone]                  Refer to Patient Findings for HPI:  Patient Findings       Negatives:  Signs/symptoms of thrombosis, Signs/symptoms of bleeding, Laboratory test error suspected, Change in health, Change in alcohol use, Change in activity, Upcoming invasive procedure, Emergency department visit, Upcoming dental procedure, Missed doses, Extra doses, Change in medications, Change in diet/appetite, Hospital admission, Bruising, Other complaints            There were no vitals filed for this visit.    Verified current warfarin dosing schedule.    Medications reconciled   TAVR 19, on ASA 81 mg - Dr Mendoza requests pt stays on ASA + warfarin      A/P   INR  SUB-therapeutic.     Warfarin dosing recommendation: Instructed pt to BOLUS x 2 doses w/ 6 mg today and tomorrow and to then continue on w/ his current regimen.    CHADSVASC score = 3 so no bridging indicated.    Pt educated to contact our clinic with any changes in medications or s/s of bleeding or thrombosis. Pt is aware to seek immediate medical attention  for falls, head injury or deep cuts.    Follow up appointment in 3 days.    Clifford Anand, PharmD, BCACP

## 2023-06-18 NOTE — PROGRESS NOTES
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 Referring Cardiovascular Disease (Cardiology) [phone]    [facility] Responsible  [phone]                  Refer to Patient Findings for HPI:      Vitals:    22 0956   BP: 119/76   Pulse: 82       Verified current warfarin dosing schedule.    TAVR 19, on ASA 81 mg - Dr Mendoza requests pt stays on ASA + warfarin      A/P   INR therapeutic.     Warfarin dosing recommendation: Pt is to continue with current warfarin dosing regimen.     Pt educated to contact our clinic with any changes in medications or s/s of bleeding or thrombosis. Pt is aware to seek immediate medical attention for falls, head injury or deep cuts.    Follow up appointment in 4 week(s).    Sin Huitron, PharmD     show

## 2023-06-21 ENCOUNTER — ANTICOAGULATION VISIT (OUTPATIENT)
Dept: VASCULAR LAB | Facility: MEDICAL CENTER | Age: 83
End: 2023-06-21
Attending: INTERNAL MEDICINE
Payer: MEDICARE

## 2023-06-21 DIAGNOSIS — D68.69 SECONDARY HYPERCOAGULABLE STATE (HCC): ICD-10-CM

## 2023-06-21 DIAGNOSIS — Z95.3 STATUS POST TRANSCATHETER AORTIC VALVE REPLACEMENT (TAVR) USING BIOPROSTHESIS: ICD-10-CM

## 2023-06-21 DIAGNOSIS — I48.0 PAROXYSMAL ATRIAL FIBRILLATION (HCC): ICD-10-CM

## 2023-06-21 LAB — INR PPP: 2 (ref 2–3.5)

## 2023-06-21 PROCEDURE — 85610 PROTHROMBIN TIME: CPT

## 2023-06-21 PROCEDURE — 99212 OFFICE O/P EST SF 10 MIN: CPT

## 2023-06-21 NOTE — PROGRESS NOTES
Anticoagulation Summary  As of 2023      INR goal:  2.0-3.0   TTR:  62.7 % (6.7 y)   INR used for dosin.00 (2023)   Warfarin maintenance plan:  6 mg (4 mg x 1.5) every Wed; 4 mg (4 mg x 1) all other days   Weekly warfarin total:  30 mg   Plan last modified:  Clifford Anand, PharmD (2023)   Next INR check:  2023   Target end date:  Indefinite    Indications    Status post transcatheter aortic valve replacement (TAVR) using bioprosthesis [Z95.3]  Atrial fibrillation (HCC) [I48.91]  Secondary hypercoagulable state (HCC) [D68.69]                 Anticoagulation Episode Summary       INR check location:      Preferred lab:      Send INR reminders to:      Comments:  Pt goes by Dyke  ASA daily          Anticoagulation Care Providers       Provider Role Specialty Phone number    Vickey Rutherford M.D. Referring Cardiovascular Disease (Cardiology) [phone]    [facility] Responsible  [phone]                  Refer to Patient Findings for HPI:  Patient Findings       Positives:  Change in medications (DC'd cephalexin and Flomax)    Negatives:  Signs/symptoms of thrombosis, Signs/symptoms of bleeding, Laboratory test error suspected, Change in health, Change in alcohol use, Change in activity, Upcoming invasive procedure, Emergency department visit, Upcoming dental procedure, Missed doses, Extra doses, Change in diet/appetite, Hospital admission, Bruising, Other complaints            There were no vitals filed for this visit.    Verified current warfarin dosing schedule.    Medications reconciled   TAVR 19, on ASA 81 mg - Dr Mendoza requests pt stays on ASA + warfarin      A/P   INR  therapeutic.     Warfarin dosing recommendation: Instructed pt to begin newly increased regimen of 6 mg Wed and 4 mg ROW.    Pt educated to contact our clinic with any changes in medications or s/s of bleeding or thrombosis. Pt is aware to seek immediate medical attention for falls,  head injury or deep cuts.    Follow up appointment in 1 week(s).    Clifford Anand, PharmD, BCACP

## 2023-06-28 ENCOUNTER — ANTICOAGULATION VISIT (OUTPATIENT)
Dept: VASCULAR LAB | Facility: MEDICAL CENTER | Age: 83
End: 2023-06-28
Attending: INTERNAL MEDICINE
Payer: MEDICARE

## 2023-06-28 DIAGNOSIS — I48.0 PAROXYSMAL ATRIAL FIBRILLATION (HCC): ICD-10-CM

## 2023-06-28 DIAGNOSIS — D68.69 SECONDARY HYPERCOAGULABLE STATE (HCC): ICD-10-CM

## 2023-06-28 DIAGNOSIS — Z95.3 STATUS POST TRANSCATHETER AORTIC VALVE REPLACEMENT (TAVR) USING BIOPROSTHESIS: ICD-10-CM

## 2023-06-28 LAB — INR PPP: 2 (ref 2–3.5)

## 2023-06-28 PROCEDURE — 85610 PROTHROMBIN TIME: CPT

## 2023-06-28 PROCEDURE — 99211 OFF/OP EST MAY X REQ PHY/QHP: CPT | Performed by: NURSE PRACTITIONER

## 2023-06-28 NOTE — PROGRESS NOTES
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 Referring Cardiovascular Disease (Cardiology) [phone]    [facility] Responsible  [phone]                  Refer to Patient Findings for HPI:  Patient Findings       Negatives:  Signs/symptoms of thrombosis, Signs/symptoms of bleeding, Laboratory test error suspected, Change in health, Change in alcohol use, Change in activity, Upcoming invasive procedure, Emergency department visit, Upcoming dental procedure, Missed doses, Extra doses, Change in medications, Change in diet/appetite, Hospital admission, Bruising, Other complaints            There were no vitals filed for this visit.    Verified current warfarin dosing schedule.    Medications reconciled   ASA 81 mg daily per cards.      A/P   INR  -therapeutic. INR low therapeutic but with shared decision-making we will continue his current regimen.    Warfarin dosing recommendation: Take 6 mg on , 4 mg AODs.    Pt educated to contact our clinic with any changes in medications or s/s of bleeding or thrombosis. Pt is aware to seek immediate medical attention for falls, head injury or deep cuts.    Follow up  appointment in 2 week(s).    MARKELL Jacob.

## 2023-07-11 DIAGNOSIS — Z79.01 CHRONIC ANTICOAGULATION: ICD-10-CM

## 2023-07-12 ENCOUNTER — ANTICOAGULATION VISIT (OUTPATIENT)
Dept: VASCULAR LAB | Facility: MEDICAL CENTER | Age: 83
End: 2023-07-12
Attending: INTERNAL MEDICINE
Payer: MEDICARE

## 2023-07-12 DIAGNOSIS — D68.69 SECONDARY HYPERCOAGULABLE STATE (HCC): ICD-10-CM

## 2023-07-12 DIAGNOSIS — Z95.3 STATUS POST TRANSCATHETER AORTIC VALVE REPLACEMENT (TAVR) USING BIOPROSTHESIS: ICD-10-CM

## 2023-07-12 DIAGNOSIS — I48.0 PAROXYSMAL ATRIAL FIBRILLATION (HCC): ICD-10-CM

## 2023-07-12 LAB — INR PPP: 2.8 (ref 2–3.5)

## 2023-07-12 PROCEDURE — 1170F FXNL STATUS ASSESSED: CPT

## 2023-07-12 PROCEDURE — 99211 OFF/OP EST MAY X REQ PHY/QHP: CPT | Performed by: NURSE PRACTITIONER

## 2023-07-12 PROCEDURE — 85610 PROTHROMBIN TIME: CPT

## 2023-07-12 RX ORDER — WARFARIN SODIUM 4 MG/1
TABLET ORAL
Qty: 110 TABLET | Refills: 1 | Status: SHIPPED | OUTPATIENT
Start: 2023-07-12 | End: 2024-02-15 | Stop reason: SDUPTHER

## 2023-07-12 NOTE — PROGRESS NOTES
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 Referring Cardiovascular Disease (Cardiology) [phone]    [facility] Responsible  [phone]                  Refer to Patient Findings for HPI:  Patient Findings       Negatives:  Signs/symptoms of thrombosis, Signs/symptoms of bleeding, Laboratory test error suspected, Change in health, Change in alcohol use, Change in activity, Upcoming invasive procedure, Emergency department visit, Upcoming dental procedure, Missed doses, Extra doses, Change in medications, Change in diet/appetite, Hospital admission, Bruising, Other complaints            There were no vitals filed for this visit.   pt declined vitals    Verified current warfarin dosing schedule.    Medications reconciled   ASA 81 mg daily for CAD per cards.      A/P   INR  -therapeutic. INR 2.0 last visit. Will have pt continue his current regimen.    Warfarin dosing recommendation: Continue taking 6 mg on , 4 mg AODs.    Pt educated to contact our clinic with any changes in medications or s/s of bleeding or thrombosis. Pt is aware to seek immediate medical attention for falls, head injury or deep  cuts.    Follow up appointment in 2 week(s).    MARKELL Jacob.

## 2023-07-14 NOTE — PREPROCEDURE INSTRUCTIONS
"Preadmit appointment: \" Preparing for your Procedure information\" sheet reviewed with patient with verbal and written instructions- emailed. Patient instructed to continue prescribed medications through the day before surgery, instructed to take the following medications the day of surgery per anesthesia protocol:Digoxin, Gabapentin, Cephalexin- can check with Dr. Pritchett if ok or hold day of.  Pt to check with MD whether or not to hold ASA,  pt has been instructed when to hold Warfarin prior to surgery.  Called patient's [facility] anticoagulation clinic and left message to contact patient to give instructions on ASA preop whether or not to hold (noted that there is a note from them in epic today but no mention of ASA)  Verbal and written instructions on covid symptoms to watch for given to patient and patient instructed to call MD if any symptoms develop prior to surgery/procedure.  METS >4.  Pt denies issues with anesthesia,  pt to get labs at [address] [facility], his preference  " Pharmacy emailed for medrec given pt states he does not know his medications

## 2023-07-26 ENCOUNTER — ANTICOAGULATION VISIT (OUTPATIENT)
Dept: VASCULAR LAB | Facility: MEDICAL CENTER | Age: 83
End: 2023-07-26
Attending: INTERNAL MEDICINE
Payer: MEDICARE

## 2023-07-26 DIAGNOSIS — I48.0 PAROXYSMAL ATRIAL FIBRILLATION (HCC): ICD-10-CM

## 2023-07-26 DIAGNOSIS — Z95.3 STATUS POST TRANSCATHETER AORTIC VALVE REPLACEMENT (TAVR) USING BIOPROSTHESIS: ICD-10-CM

## 2023-07-26 DIAGNOSIS — D68.69 SECONDARY HYPERCOAGULABLE STATE (HCC): ICD-10-CM

## 2023-07-26 LAB — INR PPP: 3.1 (ref 2–3.5)

## 2023-07-26 PROCEDURE — 99212 OFFICE O/P EST SF 10 MIN: CPT | Performed by: NURSE PRACTITIONER

## 2023-07-26 PROCEDURE — 1170F FXNL STATUS ASSESSED: CPT

## 2023-07-26 PROCEDURE — 85610 PROTHROMBIN TIME: CPT

## 2023-07-26 NOTE — PROGRESS NOTES
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 Referring Cardiovascular Disease (Cardiology) [phone]    [facility] Anticoagulation [facility] Responsible  [phone]                  Refer to Patient Findings for HPI:  Patient Findings       Negatives:  Signs/symptoms of thrombosis, Signs/symptoms of bleeding, Laboratory test error suspected, Change in health, Change in alcohol use, Change in activity, Upcoming invasive procedure, Emergency department visit, Upcoming dental procedure, Missed doses, Extra doses, Change in medications, Change in diet/appetite, Hospital admission, Bruising, Other complaints            There were no vitals filed for this visit.   pt declined vitals    Verified current warfarin dosing schedule.    Medications reconciled   ASA 81 mg daily for CAD per cards.      A/P   INR supratherapeutic today. With shared decision-making he will take a one time dose decrease then resume his previous regimen.    Warfarin dosing recommendation: Take 4 mg tonight then resume taking 6 mg on Wednesdays, 4 mg AODs.    Pt educated to contact our clinic with any changes in medications or s/s of bleeding or thrombosis. Pt is aware to seek  immediate medical attention for falls, head injury or deep cuts.    Follow up appointment in 2 week(s).    MARKELL Jacob.

## 2023-08-09 ENCOUNTER — ANTICOAGULATION VISIT (OUTPATIENT)
Dept: VASCULAR LAB | Facility: MEDICAL CENTER | Age: 83
End: 2023-08-09
Attending: INTERNAL MEDICINE
Payer: MEDICARE

## 2023-08-09 DIAGNOSIS — Z95.3 STATUS POST TRANSCATHETER AORTIC VALVE REPLACEMENT (TAVR) USING BIOPROSTHESIS: ICD-10-CM

## 2023-08-09 DIAGNOSIS — D68.69 SECONDARY HYPERCOAGULABLE STATE (HCC): ICD-10-CM

## 2023-08-09 DIAGNOSIS — I48.0 PAROXYSMAL ATRIAL FIBRILLATION (HCC): ICD-10-CM

## 2023-08-09 LAB — INR PPP: 3.6 (ref 2–3.5)

## 2023-08-09 PROCEDURE — 99212 OFFICE O/P EST SF 10 MIN: CPT | Performed by: PHARMACIST

## 2023-08-09 PROCEDURE — 85610 PROTHROMBIN TIME: CPT

## 2023-08-09 RX ORDER — CLINDAMYCIN HYDROCHLORIDE 300 MG/1
CAPSULE ORAL
COMMUNITY
Start: 2023-08-08 | End: 2023-08-22

## 2023-08-09 NOTE — PROGRESS NOTES
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 Referring Cardiovascular Disease (Cardiology) [phone]    [facility] Responsible  [phone]                  Refer to Patient Findings for HPI:  Patient Findings       Positives:  Change in diet/appetite (couple days of no caloric intake secondary to tooth issues)    Negatives:  Signs/symptoms of thrombosis, Signs/symptoms of bleeding, Laboratory test error suspected, Change in health, Change in alcohol use, Change in activity, Upcoming invasive procedure, Emergency department visit, Upcoming dental procedure, Missed doses, Extra doses, Change in medications, Hospital admission, Bruising, Other complaints            There were no vitals filed for this visit.  pt declined vitals    Verified current warfarin dosing schedule.    Medications reconciled  Pt is on ASA 81mg as antiplatelet therapy for TAVR and must be reviewed again on 8/2023.      A/P   INR  remains supra-therapeutic.     Warfarin dosing recommendation: Instructed patient to HOLD X 1, then decrease weekly warfarin regimen as detailed above.    Pt educated to contact our clinic with any changes in medications or s/s of bleeding or thrombosis. Pt  is aware to seek immediate medical attention for falls, head injury or deep cuts.    Follow up appointment in 2 week(s).    Eagle Escobar, PharmD, BCACP

## 2023-08-22 ENCOUNTER — OFFICE VISIT (OUTPATIENT)
Dept: MEDICAL GROUP | Facility: MEDICAL CENTER | Age: 83
End: 2023-08-22
Payer: MEDICARE

## 2023-08-22 ENCOUNTER — HOSPITAL ENCOUNTER (OUTPATIENT)
Dept: LAB | Facility: MEDICAL CENTER | Age: 83
End: 2023-08-22
Attending: FAMILY MEDICINE
Payer: MEDICARE

## 2023-08-22 VITALS
WEIGHT: 213 LBS | SYSTOLIC BLOOD PRESSURE: 126 MMHG | TEMPERATURE: 98.6 F | DIASTOLIC BLOOD PRESSURE: 72 MMHG | OXYGEN SATURATION: 98 % | HEIGHT: 72 IN | HEART RATE: 71 BPM | BODY MASS INDEX: 28.85 KG/M2

## 2023-08-22 DIAGNOSIS — I48.0 PAROXYSMAL ATRIAL FIBRILLATION (HCC): ICD-10-CM

## 2023-08-22 DIAGNOSIS — E78.5 DYSLIPIDEMIA: ICD-10-CM

## 2023-08-22 DIAGNOSIS — I10 ESSENTIAL HYPERTENSION: ICD-10-CM

## 2023-08-22 PROBLEM — D68.69 SECONDARY HYPERCOAGULABLE STATE (HCC): Chronic | Status: ACTIVE | Noted: 2021-11-22

## 2023-08-22 LAB
ALBUMIN SERPL BCP-MCNC: 4.1 G/DL (ref 3.2–4.9)
ALBUMIN/GLOB SERPL: 1.3 G/DL
ALP SERPL-CCNC: 129 U/L (ref 30–99)
ALT SERPL-CCNC: 68 U/L (ref 2–50)
ANION GAP SERPL CALC-SCNC: 7 MMOL/L (ref 7–16)
AST SERPL-CCNC: 67 U/L (ref 12–45)
BILIRUB SERPL-MCNC: 0.5 MG/DL (ref 0.1–1.5)
BUN SERPL-MCNC: 13 MG/DL (ref 8–22)
CALCIUM ALBUM COR SERPL-MCNC: 8.8 MG/DL (ref 8.5–10.5)
CALCIUM SERPL-MCNC: 8.9 MG/DL (ref 8.5–10.5)
CHLORIDE SERPL-SCNC: 106 MMOL/L (ref 96–112)
CHOLEST SERPL-MCNC: 115 MG/DL (ref 100–199)
CO2 SERPL-SCNC: 26 MMOL/L (ref 20–33)
CREAT SERPL-MCNC: 0.74 MG/DL (ref 0.5–1.4)
GFR SERPLBLD CREATININE-BSD FMLA CKD-EPI: 88 ML/MIN/1.73 M 2
GLOBULIN SER CALC-MCNC: 3.2 G/DL (ref 1.9–3.5)
GLUCOSE SERPL-MCNC: 106 MG/DL (ref 65–99)
HDLC SERPL-MCNC: 40 MG/DL
LDLC SERPL CALC-MCNC: 52 MG/DL
POTASSIUM SERPL-SCNC: 4.4 MMOL/L (ref 3.6–5.5)
PROT SERPL-MCNC: 7.3 G/DL (ref 6–8.2)
SODIUM SERPL-SCNC: 139 MMOL/L (ref 135–145)
TRIGL SERPL-MCNC: 117 MG/DL (ref 0–149)

## 2023-08-22 PROCEDURE — 99213 OFFICE O/P EST LOW 20 MIN: CPT | Performed by: FAMILY MEDICINE

## 2023-08-22 PROCEDURE — 3074F SYST BP LT 130 MM HG: CPT | Performed by: FAMILY MEDICINE

## 2023-08-22 PROCEDURE — 3078F DIAST BP <80 MM HG: CPT | Performed by: FAMILY MEDICINE

## 2023-08-22 PROCEDURE — 80053 COMPREHEN METABOLIC PANEL: CPT

## 2023-08-22 PROCEDURE — 80061 LIPID PANEL: CPT

## 2023-08-22 PROCEDURE — 36415 COLL VENOUS BLD VENIPUNCTURE: CPT

## 2023-08-22 PROCEDURE — 1170F FXNL STATUS ASSESSED: CPT | Performed by: FAMILY MEDICINE

## 2023-08-22 RX ORDER — DIGOXIN 250 MCG
250 TABLET ORAL EVERY MORNING
Qty: 90 TABLET | Refills: 3 | Status: SHIPPED | OUTPATIENT
Start: 2023-08-22

## 2023-08-22 ASSESSMENT — FIBROSIS 4 INDEX: FIB4 SCORE: 1.92

## 2023-08-22 NOTE — PROGRESS NOTES
"Subjective:     CC:  Diagnoses of Dyslipidemia and Essential hypertension were pertinent to this visit.    HISTORY OF THE PRESENT ILLNESS: \"Kinsey\" is a 83 y.o. male. This pleasant patient is here today to establish care and discuss his health.     Wearing a brace on left leg, knee cap broke shortly after knee replacement, seeing orthopedics. This does help his stability.    Had tooth ache last night. Took tylenol and went to bed. Feels better this morning. A little tired as he didn't sleep as well.    Had sepsis earlier this year due to UTI, still a little sore from rolling off the toilet  Able to move his arms and legs  No urinary symptoms at this time    Has follow up with specialist over the next few months    Misses his late wife a lot.      Problem   Secondary Hypercoagulable State (Hcc)   Atrial Fibrillation (Hcc)       Current Outpatient Medications Ordered in Epic   Medication Sig Dispense Refill    warfarin (COUMADIN) 4 MG Tab TAKE 1 to 1.5 TABLETS BY MOUTH DAILY AS DIRECTED BY [facility] 110 Tablet 1    METAMUCIL FIBER PO Take  by mouth every day.      aspirin (ASA) 81 MG Chew Tab chewable tablet Chew 1 Tablet every day. 100 Tablet 3    digoxin (LANOXIN) 250 MCG Tab Take 1 Tablet by mouth at bedtime. (Patient taking differently: Take 250 mcg by mouth every morning.) 100 Tablet 3    metoprolol SR (TOPROL XL) 50 MG TABLET SR 24 HR Take 1 Tablet by mouth every day. (Patient taking differently: Take 50 mg by mouth every evening.) 100 Tablet 3    pravastatin (PRAVACHOL) 80 MG tablet Take 1 Tablet by mouth every day. TAKE 1 TABLET BY MOUTH DAILY (Patient taking differently: Take 80 mg by mouth every evening. TAKE 1 TABLET BY MOUTH DAILY) 100 Tablet 3    LYSINE PO Take 1 Tablet by mouth every day. As needed      GLUCOSAMINE HCL PO Take 1 Tablet by mouth 2 times a day.      Ascorbic Acid (VITAMIN C PO) Take 1 Tablet by mouth every morning.      Coenzyme Q10 (COQ10 PO) Take 1 Tablet by mouth " every day.      Omega-3 Fatty Acids (FISH OIL) 1000 MG CAPSULE DELAYED RELEASE EC softgel capsule Take 1,000 mg by mouth every morning with breakfast.      acetaminophen (TYLENOL) 500 MG Tab Take 500-1,000 mg by mouth every 6 hours as needed. Indications: Pain      gabapentin (NEURONTIN) 300 MG Cap Take 900 mg by mouth 4 times a day.      B Complex Vitamins (VITAMIN B COMPLEX PO) Take 1 Tab by mouth every evening.      Cholecalciferol (VITAMIN D3 PO) Take 1 Tab by mouth every evening.      multivitamin (THERAGRAN) TABS Take 1 Tablet by mouth at bedtime.       No current Epic-ordered facility-administered medications on file.       Health Maintenance: Completed    ROS:   ROS      Objective:     Exam: /72 (BP Location: Right arm, Patient Position: Sitting, BP Cuff Size: Adult)   Pulse 71   Temp 37 °C (98.6 °F) (Temporal)   Ht 1.829 m (6')   Wt 96.6 kg (213 lb)   SpO2 98%  Body mass index is 28.89 kg/m².    Physical Exam  Vitals reviewed.   Constitutional:       General: He is not in acute distress.     Appearance: Normal appearance.   HENT:      Head: Normocephalic and atraumatic.   Cardiovascular:      Rate and Rhythm: Normal rate. Rhythm irregular.      Heart sounds: Normal heart sounds.   Pulmonary:      Effort: Pulmonary effort is normal. No respiratory distress.      Breath sounds: Normal breath sounds.   Skin:     General: Skin is warm and dry.   Neurological:      Mental Status: He is alert. Mental status is at baseline.   Psychiatric:         Mood and Affect: Mood normal.         Behavior: Behavior normal.             Assessment & Plan:   83 y.o. male with the following -    1. Dyslipidemia  - Comp Metabolic Panel; Future  - Lipid Profile; Future    2. Essential hypertension  - Comp Metabolic Panel; Future    Patient doing well. He wanted to establish care today. Did not have specific complaints at this time. Feels comfortable with current medications.  Will check his metabolic panel and lipid panel  hopefully today as he sees his cardiologist tomorrow. Plan to see him back in 3 months for annual medicare appointment.    Return in about 3 months (around 11/22/2023) for Annual Medicare, Lab F/U.    Please note that this dictation was created using voice recognition software. I have made every reasonable attempt to correct obvious errors, but I expect that there are errors of grammar and possibly content that I did not discover before finalizing the note.

## 2023-08-23 ENCOUNTER — OFFICE VISIT (OUTPATIENT)
Dept: CARDIOLOGY | Facility: MEDICAL CENTER | Age: 83
End: 2023-08-23
Payer: MEDICARE

## 2023-08-23 VITALS
HEIGHT: 72 IN | BODY MASS INDEX: 29.12 KG/M2 | HEART RATE: 52 BPM | SYSTOLIC BLOOD PRESSURE: 108 MMHG | OXYGEN SATURATION: 97 % | DIASTOLIC BLOOD PRESSURE: 60 MMHG | RESPIRATION RATE: 20 BRPM | WEIGHT: 215 LBS

## 2023-08-23 DIAGNOSIS — I48.0 PAROXYSMAL ATRIAL FIBRILLATION (HCC): Chronic | ICD-10-CM

## 2023-08-23 DIAGNOSIS — E78.5 DYSLIPIDEMIA: ICD-10-CM

## 2023-08-23 DIAGNOSIS — I10 ESSENTIAL HYPERTENSION: ICD-10-CM

## 2023-08-23 DIAGNOSIS — D68.69 SECONDARY HYPERCOAGULABLE STATE (HCC): Chronic | ICD-10-CM

## 2023-08-23 DIAGNOSIS — Z95.3 STATUS POST TRANSCATHETER AORTIC VALVE REPLACEMENT (TAVR) USING BIOPROSTHESIS: ICD-10-CM

## 2023-08-23 PROCEDURE — 99213 OFFICE O/P EST LOW 20 MIN: CPT

## 2023-08-23 PROCEDURE — 3078F DIAST BP <80 MM HG: CPT

## 2023-08-23 PROCEDURE — 1170F FXNL STATUS ASSESSED: CPT

## 2023-08-23 PROCEDURE — 3074F SYST BP LT 130 MM HG: CPT

## 2023-08-23 PROCEDURE — 99214 OFFICE O/P EST MOD 30 MIN: CPT

## 2023-08-23 ASSESSMENT — ENCOUNTER SYMPTOMS
CONSTITUTIONAL NEGATIVE: 1
PALPITATIONS: 0
BACK PAIN: 1
DEPRESSION: 0
GASTROINTESTINAL NEGATIVE: 1
PND: 0
SHORTNESS OF BREATH: 0
NEUROLOGICAL NEGATIVE: 1
NERVOUS/ANXIOUS: 0
ORTHOPNEA: 0
EYES NEGATIVE: 1

## 2023-08-23 ASSESSMENT — FIBROSIS 4 INDEX: FIB4 SCORE: 2.54

## 2023-08-23 NOTE — PROGRESS NOTES
Chief Complaint   Patient presents with    Other     Status post transcatheter aortic valve replacement (TAVR) using bioprosthesis    Atrial Fibrillation    Hypertension       Subjective     Kinsey Parkinson is a 83 y.o. male who presents today for routine follow up.  He was admitted from 2/9/2023 to 2/12/2023 for a fall and A-fib RVR, and urosepsis.  He has a history of aortic stenosis status post TAVR 29 mm S3 valve July 2019, paroxysmal atrial fibrillation, hypertension, hyperlipidemia, and falls.     Last seen by myself on 2/23/2023, at that visit he was feeling much better. However, not very active. Feels tired after walking around stores like [employer].  Ordered his echocardiogram, and annual lipid panel    Today 8/23/2023: He has been feeling much better. He is having some knee pain, but well from a cardiac perspective. Activity limited by knee and back pain     No symptoms of chest pain, palpitations, shortness of breath, exercise intolerance, dyspnea, or lower extremity edema.         Past Medical History:   Diagnosis Date    Aortic stenosis     valve replacement; cardiologist    Atrial fibrillation ([facility])     cardiologist:  THEODORE Mason    Back pain     Cataract     bilateral IOL    Cirrhosis of liver without ascites ([facility])     Clotting disorder ([facility])     reports nose bleeds; 3/29/23 pt reports none in the past year    Dental disorder     left upper gold cap fell off, has appt w/ dentist 4/2023    Depression     Lost wife ,still gets tearful    Diabetes ([facility])     diet controlled    Encounter for long-term (current) use of other medications     Gasping for breath     Filipino measles     Heart murmur     Heart valve disease     High cholesterol     Hyperlipidemia     Hypertension     Insomnia     Mumps     Nasal drainage     Pain     left knee    Pyogenic arthritis, 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[facility])     thumb, back    Restless leg syndrome     Sleep apnea     3/29/2023 pt states he quit using CPAP, had issues with his machine  and did not receive a new CPAP    Snoring     Tonsillitis     Urinary incontinence     Valvular heart disease      Past Surgical History:   Procedure Laterality Date    OK TRANSURETHRAL ELEC-SURG PROSTATECTOM  4/7/2023    Procedure: BIPOLAR TRANSURETHRAL RESECTION OF PROSTATE;  Surgeon: Mack Pritchett M.D.;  Location: SURGERY [facility];  Service: Urology    CATARACT EXTRACTION WITH IOL Bilateral 03/2023    GIBRAN N/A 07/29/2019    Procedure: ECHOCARDIOGRAM, TRANSESOPHAGEAL;  Surgeon: Leander Buckner M.D.;  Location: SURGERY [facility];  Service: Cardiac    TRANSCATHETER AORTIC VALVE REPLACEMENT Bilateral 07/29/2019    Procedure: REPLACEMENT, AORTIC VALVE, TRANSCATHETER;  Surgeon: Leander Buckner M.D.;  Location: SURGERY [facility];  Service: Cardiac    FINGER ARTHROPLASTY Left 05/27/2016    Procedure: FINGER ARTHROPLASTY THUMB CARPOMETACARPAL EXCISIONAL, EXCISE TRAPEZIUM;  Surgeon: Raheel Salgado M.D.;  Location: SURGERY SAME DAY [facility];  Service:     CARDIOVERSION      on 7/17/06, sinus rhythm until January 2007,  paroxysmal atrial fibrillation    KNEE ARTHROPLASTY TOTAL      LAMINOTOMY N/A     multiple lumbar spine    OTHER ORTHOPEDIC SURGERY      lower back    OK PERCUT IMPLNT NEUROELECT,EPIDURAL      TOE ARTHROPLASTY      TURP-VAPOR N/A      Family History   Problem Relation Age of Onset    Other Mother         unknown    Heart Disease Mother     Cancer Father         prostate, skin    No Known Problems Brother     Diabetes Brother     Heart Disease Son     Sleep Apnea Neg Hx      Social History     Socioeconomic History    Marital status:      Spouse name: Not on file    Number of children: Not on file    Years of education: Not on file    Highest education level: Not on file   Occupational History    Not on file   Tobacco Use    Smoking status: Former     Current packs/day: 0.00     Average packs/day: 1 pack/day for 20.0 years (20.0 ttl pk-yrs)     Types: Cigarettes     " Start date: 1952     Quit date: 1972     Years since quittin.6    Smokeless tobacco: Never   Vaping Use    Vaping Use: Never used   Substance and Sexual Activity    Alcohol use: No     Alcohol/week: 0.0 oz    Drug use: No    Sexual activity: Not Currently     Partners: Female   Other Topics Concern    Not on file   Social History Narrative    Not on file     Social Determinants of Health     Financial Resource Strain: Not on file   Food Insecurity: Not on file   Transportation Needs: Not on file   Physical Activity: Not on file   Stress: Not on file   Social Connections: Not on file   Intimate Partner Violence: Not on file   Housing Stability: Not on file     Allergies   Allergen Reactions    Feldene [Piroxicam] Itching    Percodan [Oxycodone-Aspirin] Itching and Photosensitivity     \"I sunburn\"     Outpatient Encounter Medications as of 2023   Medication Sig Dispense Refill    digoxin (LANOXIN) 250 MCG Tab Take 1 Tablet by mouth every morning. 90 Tablet 3    warfarin (COUMADIN) 4 MG Tab TAKE 1 to 1.5 TABLETS BY MOUTH DAILY AS DIRECTED BY [facility] 110 Tablet 1    METAMUCIL FIBER PO Take  by mouth every day.      aspirin (ASA) 81 MG Chew Tab chewable tablet Chew 1 Tablet every day. 100 Tablet 3    metoprolol SR (TOPROL XL) 50 MG TABLET SR 24 HR Take 1 Tablet by mouth every day. (Patient taking differently: Take 50 mg by mouth every evening.) 100 Tablet 3    pravastatin (PRAVACHOL) 80 MG tablet Take 1 Tablet by mouth every day. TAKE 1 TABLET BY MOUTH DAILY (Patient taking differently: Take 80 mg by mouth every evening. TAKE 1 TABLET BY MOUTH DAILY) 100 Tablet 3    LYSINE PO Take 1 Tablet by mouth every day. As needed      GLUCOSAMINE HCL PO Take 1 Tablet by mouth 2 times a day.      Ascorbic Acid (VITAMIN C PO) Take 1 Tablet by mouth every morning.      Coenzyme Q10 (COQ10 PO) Take 1 Tablet by mouth every day.      Omega-3 Fatty Acids (FISH OIL) 1000 MG CAPSULE DELAYED " RELEASE EC softgel capsule Take 1,000 mg by mouth every morning with breakfast.      acetaminophen (TYLENOL) 500 MG Tab Take 500-1,000 mg by mouth every 6 hours as needed. Indications: Pain      gabapentin (NEURONTIN) 300 MG Cap Take 900 mg by mouth 4 times a day.      B Complex Vitamins (VITAMIN B COMPLEX PO) Take 1 Tab by mouth every evening.      Cholecalciferol (VITAMIN D3 PO) Take 1 Tab by mouth every evening.      multivitamin (THERAGRAN) TABS Take 1 Tablet by mouth at bedtime.       No facility-administered encounter medications on file as of 8/23/2023.     Review of Systems   Constitutional: Negative.    HENT: Negative.     Eyes: Negative.    Respiratory:  Negative for shortness of breath.    Cardiovascular:  Negative for chest pain, palpitations, orthopnea, leg swelling and PND.   Gastrointestinal: Negative.    Genitourinary: Negative.    Musculoskeletal:  Positive for back pain and joint pain.   Skin: Negative.    Neurological: Negative.    Endo/Heme/Allergies: Negative.    Psychiatric/Behavioral:  Negative for depression. The patient is not nervous/anxious.               Objective     /60 (BP Location: Left arm, Patient Position: Sitting, BP Cuff Size: Adult)   Pulse (!) 52   Resp 20   Ht 1.829 m (6')   Wt 97.5 kg (215 lb)   SpO2 97%   BMI 29.16 kg/m²     Physical Exam  Constitutional:       Appearance: Normal appearance.   HENT:      Head: Normocephalic.   Neck:      Vascular: No JVD.   Cardiovascular:      Rate and Rhythm: Regular rhythm. Bradycardia present.      Pulses: Normal pulses.      Heart sounds: Normal heart sounds. No murmur heard.     No friction rub.   Pulmonary:      Effort: Pulmonary effort is normal.      Breath sounds: Normal breath sounds.   Abdominal:      Palpations: Abdomen is soft.   Musculoskeletal:         General: Normal range of motion.      Right lower leg: No edema.      Left lower leg: No edema.   Skin:     General: Skin is warm and dry.   Neurological:       General: No focal deficit present.      Mental Status: He is alert and oriented to person, place, and time.   Psychiatric:         Mood and Affect: Mood normal.         Behavior: Behavior normal.            Lab Results   Component Value Date/Time    CHOLSTRLTOT 115 08/22/2023 08:40 AM    LDL 52 08/22/2023 08:40 AM    HDL 40 08/22/2023 08:40 AM    TRIGLYCERIDE 117 08/22/2023 08:40 AM       Lab Results   Component Value Date/Time    SODIUM 139 08/22/2023 08:40 AM    POTASSIUM 4.4 08/22/2023 08:40 AM    CHLORIDE 106 08/22/2023 08:40 AM    CO2 26 08/22/2023 08:40 AM    GLUCOSE 106 (H) 08/22/2023 08:40 AM    BUN 13 08/22/2023 08:40 AM    CREATININE 0.74 08/22/2023 08:40 AM    BUNCREATRAT 15 06/05/2018 08:29 AM     Lab Results   Component Value Date/Time    ALKPHOSPHAT 129 (H) 08/22/2023 08:40 AM    ASTSGOT 67 (H) 08/22/2023 08:40 AM    ALTSGPT 68 (H) 08/22/2023 08:40 AM    TBILIRUBIN 0.5 08/22/2023 08:40 AM      Echocardiogram 2/23/2023  CONCLUSIONS  Compared to the prior study on 4-13-20, no significant changes  Mild concentric LVH with preserved LV systolic function: LVEF estimated   60%.  Known bioprosthetic aortic valve that is functioning normally with   normal transvalvular gradients. No PVL.  MAC without significant stenosis.  Biatrial enlargement  No pericardial effusion  Unable to estimate RVSP. IVC not well visualized.    Assessment & Plan     1. Status post transcatheter aortic valve replacement (TAVR) using bioprosthesis        2. Paroxysmal atrial fibrillation (HCC)        3. Secondary hypercoagulable state (HCC)        4. Essential hypertension        5. Dyslipidemia            Medical Decision Making: Today's Assessment/Status/Plan:        S/P TAVR  -cont asa lifelong  -SBE prophylaxis understands  -echo showed normal valve function   - ordered echo for next year      Atrial fibrillation  -Type: paroxysmal  -Rhythm and Rate: controlled  -Symptoms:none  -[id number] Score (CHF/CM, HTN, Age, DM, CVA,  Vascular disease, Gender):  4  -Medications: digoxin 250 mcg daily, metoprolol SR 50 mg daily  -Anticoagulation: warfarin   -Rate v. Rhythm Control: rate     Hypertension  - good control   - continue metoprolol  - goal < 140/90 due to age and history of falls  - check BP daily 2 hours after taking medication and keep log of measurements      Hyperlipidemia  -Most recent LDL 52  -Continue pravastatin 80 mg  -Goal of less than 70  -Check lipid panel annually     Follow up with Bonnie DOE in 6 months with echo      This note was dictated using Dragon speech recognition software.

## 2023-08-24 ENCOUNTER — TELEPHONE (OUTPATIENT)
Dept: VASCULAR LAB | Facility: MEDICAL CENTER | Age: 83
End: 2023-08-24
Payer: MEDICARE

## 2023-08-24 NOTE — TELEPHONE ENCOUNTER
[facility]    Pt missed their last appointment. Left VM for pt to reschedule.    Sin Huitron, PharmD

## 2023-09-07 ENCOUNTER — ANTICOAGULATION VISIT (OUTPATIENT)
Dept: VASCULAR LAB | Facility: MEDICAL CENTER | Age: 83
End: 2023-09-07
Attending: INTERNAL MEDICINE
Payer: MEDICARE

## 2023-09-07 DIAGNOSIS — I48.0 PAROXYSMAL ATRIAL FIBRILLATION (HCC): Chronic | ICD-10-CM

## 2023-09-07 DIAGNOSIS — D68.69 SECONDARY HYPERCOAGULABLE STATE (HCC): Chronic | ICD-10-CM

## 2023-09-07 DIAGNOSIS — Z95.3 STATUS POST TRANSCATHETER AORTIC VALVE REPLACEMENT (TAVR) USING BIOPROSTHESIS: ICD-10-CM

## 2023-09-07 LAB — INR PPP: 2.5 (ref 2–3.5)

## 2023-09-07 PROCEDURE — 99211 OFF/OP EST MAY X REQ PHY/QHP: CPT

## 2023-09-07 PROCEDURE — 1170F FXNL STATUS ASSESSED: CPT

## 2023-09-07 PROCEDURE — 85610 PROTHROMBIN TIME: CPT

## 2023-09-07 NOTE — PROGRESS NOTES
Anticoagulation Summary  As of 2023      INR goal:  2.0-3.0   TTR:  62.5 % (6.8 y)   INR used for dosin.50 (2023)   Warfarin maintenance plan:  6 mg (4 mg x 1.5) every Wed; 4 mg (4 mg x 1) all other days   Weekly warfarin total:  30 mg   Plan last modified:  Robby Zarate, PharmD (2023)   Next INR check:  10/5/2023   Target end date:  Indefinite    Indications    Status post transcatheter aortic valve replacement (TAVR) using bioprosthesis [Z95.3]  Atrial fibrillation (HCC) [I48.91]  Secondary hypercoagulable state (HCC) [D68.69]                 Anticoagulation Episode Summary       INR check location:      Preferred lab:      Send INR reminders to:      Comments:  Pt goes by Kinsey renee          Anticoagulation Care Providers       Provider Role Specialty Phone number    Vickey Rutherford M.D. Referring Cardiovascular Disease (Cardiology) [phone]    [facility] Anticoagulation Services Responsible  [phone]             Refer to Patient Findings for HPI:  Patient Findings       Positives:  Extra doses (Taking 6 mg every Wed)    Negatives:  Signs/symptoms of thrombosis, Signs/symptoms of bleeding, Laboratory test error suspected, Change in health, Change in alcohol use, Change in activity, Upcoming invasive procedure, Emergency department visit, Upcoming dental procedure, Missed doses, Change in medications, Change in diet/appetite, Hospital admission, Bruising, Other complaints            There were no vitals filed for this visit.  Pt declined vitals    Verified current warfarin dosing schedule.    Medications reconciled: Yes  Pt is on aspirin 81 mg daily as antiplatelet therapy for CAD per cardiology.      A/P   INR is therapeutic despite pt taking higher than instructed doses. Will continue regimen that pt is taking.    Warfarin dosing recommendation: Continue regimen as listed above.    Pt educated to contact our clinic with any changes in medications or s/s of bleeding or  thrombosis. Pt is aware to seek immediate medical attention for falls, head injury or deep cuts.    Follow up appointment in 4 week(s).    Robby Zarate, AbhishekD

## 2023-10-05 ENCOUNTER — ANTICOAGULATION VISIT (OUTPATIENT)
Dept: VASCULAR LAB | Facility: MEDICAL CENTER | Age: 83
End: 2023-10-05
Attending: INTERNAL MEDICINE
Payer: MEDICARE

## 2023-10-05 DIAGNOSIS — I48.0 PAROXYSMAL ATRIAL FIBRILLATION (HCC): Chronic | ICD-10-CM

## 2023-10-05 DIAGNOSIS — Z95.3 STATUS POST TRANSCATHETER AORTIC VALVE REPLACEMENT (TAVR) USING BIOPROSTHESIS: ICD-10-CM

## 2023-10-05 DIAGNOSIS — D68.69 SECONDARY HYPERCOAGULABLE STATE (HCC): Chronic | ICD-10-CM

## 2023-10-05 LAB — INR PPP: 2.6 (ref 2–3.5)

## 2023-10-05 PROCEDURE — 85610 PROTHROMBIN TIME: CPT

## 2023-10-05 PROCEDURE — 99211 OFF/OP EST MAY X REQ PHY/QHP: CPT

## 2023-10-05 NOTE — PROGRESS NOTES
Anticoagulation Summary  As of 10/5/2023      INR goal:  2.0-3.0   TTR:  62.9 % (6.9 y)   INR used for dosin.60 (10/5/2023)   Warfarin maintenance plan:  6 mg (4 mg x 1.5) every Wed; 4 mg (4 mg x 1) all other days   Weekly warfarin total:  30 mg   Plan last modified:  Robby Zarate, PharmD (2023)   Next INR check:  2023   Target end date:  Indefinite    Indications    Status post transcatheter aortic valve replacement (TAVR) using bioprosthesis [Z95.3]  Atrial fibrillation (HCC) [I48.91]  Secondary hypercoagulable state (HCC) [D68.69]                 Anticoagulation Episode Summary       INR check location:      Preferred lab:      Send INR reminders to:      Comments:  Pt goes by Kinsey renee          Anticoagulation Care Providers       Provider Role Specialty Phone number    Vickey Rutherford M.D. Referring Cardiovascular Disease (Cardiology) [phone]    [facility] Anticoagulation Services Responsible  [phone]                  Refer to Patient Findings for HPI:  Patient Findings       Negatives:  Signs/symptoms of thrombosis, Signs/symptoms of bleeding, Laboratory test error suspected, Change in health, Change in alcohol use, Change in activity, Upcoming invasive procedure, Emergency department visit, Upcoming dental procedure, Missed doses, Extra doses, Change in medications, Change in diet/appetite, Hospital admission, Bruising, Other complaints            There were no vitals filed for this visit.  pt declined vitals    Verified current warfarin dosing schedule.    Medications reconciled: No  Pt is on aspirin 81 mg daily as antiplatelet therapy for CAD per cardiology.      A/P   INR is therapeutic    Warfarin dosing recommendation: Continue regimen as listed above.    Pt educated to contact our clinic with any changes in medications or s/s of bleeding or thrombosis. Pt is aware to seek immediate medical attention for falls, head injury or deep cuts.    Follow up appointment in 5  week(s).    Carol Dumont, PharmD

## 2023-10-29 DIAGNOSIS — E78.5 DYSLIPIDEMIA: ICD-10-CM

## 2023-10-29 DIAGNOSIS — I10 ESSENTIAL HYPERTENSION: ICD-10-CM

## 2023-10-31 ENCOUNTER — DOCUMENTATION (OUTPATIENT)
Dept: HEALTH INFORMATION MANAGEMENT | Facility: OTHER | Age: 83
End: 2023-10-31
Payer: MEDICARE

## 2023-11-03 ENCOUNTER — ANTICOAGULATION MONITORING (OUTPATIENT)
Dept: VASCULAR LAB | Facility: MEDICAL CENTER | Age: 83
End: 2023-11-03
Payer: MEDICARE

## 2023-11-03 DIAGNOSIS — I48.0 PAROXYSMAL ATRIAL FIBRILLATION (HCC): Chronic | ICD-10-CM

## 2023-11-03 DIAGNOSIS — D68.69 SECONDARY HYPERCOAGULABLE STATE (HCC): Chronic | ICD-10-CM

## 2023-11-03 DIAGNOSIS — Z95.3 STATUS POST TRANSCATHETER AORTIC VALVE REPLACEMENT (TAVR) USING BIOPROSTHESIS: ICD-10-CM

## 2023-11-09 ENCOUNTER — ANCILLARY PROCEDURE (OUTPATIENT)
Dept: VASCULAR LAB | Facility: MEDICAL CENTER | Age: 83
End: 2023-11-09
Attending: INTERNAL MEDICINE
Payer: MEDICARE

## 2023-11-09 VITALS — HEART RATE: 59 BPM | DIASTOLIC BLOOD PRESSURE: 64 MMHG | SYSTOLIC BLOOD PRESSURE: 119 MMHG

## 2023-11-09 DIAGNOSIS — I48.0 PAROXYSMAL ATRIAL FIBRILLATION (HCC): Chronic | ICD-10-CM

## 2023-11-09 DIAGNOSIS — D68.69 SECONDARY HYPERCOAGULABLE STATE (HCC): Chronic | ICD-10-CM

## 2023-11-09 DIAGNOSIS — Z95.3 STATUS POST TRANSCATHETER AORTIC VALVE REPLACEMENT (TAVR) USING BIOPROSTHESIS: ICD-10-CM

## 2023-11-09 LAB — INR PPP: 3 (ref 2–3.5)

## 2023-11-09 PROCEDURE — 85610 PROTHROMBIN TIME: CPT

## 2023-11-09 PROCEDURE — 99212 OFFICE O/P EST SF 10 MIN: CPT | Performed by: NURSE PRACTITIONER

## 2023-11-09 NOTE — PROGRESS NOTES
Anticoagulation Summary  As of 11/9/2023      INR goal:  2.0-3.0   TTR:  63.4 % (7 y)   INR used for dosing:  3.00 (11/9/2023)   Warfarin maintenance plan:  6 mg (4 mg x 1.5) every Wed; 4 mg (4 mg x 1) all other days   Weekly warfarin total:  30 mg   Plan last modified:  Robby Zarate, PharmD (9/7/2023)   Next INR check:  11/30/2023   Target end date:  Indefinite    Indications    Status post transcatheter aortic valve replacement (TAVR) using bioprosthesis [Z95.3]  Atrial fibrillation (HCC) [I48.91]  Secondary hypercoagulable state (HCC) [D68.69]                 Anticoagulation Episode Summary       INR check location:      Preferred lab:      Send INR reminders to:      Comments:  Pt goes by Dyke  ASA daily          Anticoagulation Care Providers       Provider Role Specialty Phone number    Vickey Rutherford M.D. Referring Cardiovascular Disease (Cardiology) [phone]    [facility] Responsible  [phone]                  Refer to Patient Findings for HPI:  Patient Findings       Positives:  Bruising    Negatives:  Signs/symptoms of thrombosis, Signs/symptoms of bleeding, Laboratory test error suspected, Change in health, Change in alcohol use, Change in activity, Upcoming invasive procedure, Emergency department visit, Upcoming dental procedure, Missed doses, Extra doses, Change in medications, Change in diet/appetite, Hospital admission, Other complaints    Comments:  Has noticed more bruising to his hands and arms lately.            Vitals:    11/09/23 0858   BP: 119/64   Pulse: (!) 59     Pt declined vitals    Verified current warfarin dosing schedule.    Medications reconciled: Yes  Pt is on antiplatelet therapy with ASA 81 mg for TAVR per Dr Buckner.      A/P   INR is therapeutic. Pt would like a dose decrease tonight due to bruising which is reasonable.    Warfarin dosing recommendation: Take 2 mg tonight, then resume previous regimen.    Pt educated to contact our  clinic with any changes in medications or s/s of bleeding or thrombosis. Pt is aware to seek immediate medical attention for falls, head injury or deep cuts.    Request pt to return in 3 week(s). Pt agrees.    MARKELL Jacob.

## 2023-11-21 ENCOUNTER — HOSPITAL ENCOUNTER (OUTPATIENT)
Dept: LAB | Facility: MEDICAL CENTER | Age: 83
End: 2023-11-21
Attending: FAMILY MEDICINE
Payer: MEDICARE

## 2023-11-21 ENCOUNTER — OFFICE VISIT (OUTPATIENT)
Dept: MEDICAL GROUP | Facility: MEDICAL CENTER | Age: 83
End: 2023-11-21
Payer: MEDICARE

## 2023-11-21 VITALS
WEIGHT: 219.8 LBS | TEMPERATURE: 97.4 F | BODY MASS INDEX: 29.77 KG/M2 | HEIGHT: 72 IN | SYSTOLIC BLOOD PRESSURE: 118 MMHG | DIASTOLIC BLOOD PRESSURE: 66 MMHG | HEART RATE: 54 BPM | OXYGEN SATURATION: 97 %

## 2023-11-21 DIAGNOSIS — G31.9 DEGENERATIVE DISEASE OF NERVOUS SYSTEM, UNSPECIFIED (HCC): ICD-10-CM

## 2023-11-21 DIAGNOSIS — Z23 NEED FOR VACCINATION: ICD-10-CM

## 2023-11-21 DIAGNOSIS — I10 ESSENTIAL HYPERTENSION: ICD-10-CM

## 2023-11-21 DIAGNOSIS — K75.4 AUTOIMMUNE HEPATITIS (HCC): ICD-10-CM

## 2023-11-21 DIAGNOSIS — H61.20 CERUMEN IN AUDITORY CANAL ON EXAMINATION: ICD-10-CM

## 2023-11-21 DIAGNOSIS — E78.5 DYSLIPIDEMIA: ICD-10-CM

## 2023-11-21 LAB
ALBUMIN SERPL BCP-MCNC: 4.1 G/DL (ref 3.2–4.9)
ALP SERPL-CCNC: 116 U/L (ref 30–99)
ALT SERPL-CCNC: 74 U/L (ref 2–50)
ANION GAP SERPL CALC-SCNC: 6 MMOL/L (ref 7–16)
AST SERPL-CCNC: 56 U/L (ref 12–45)
BILIRUB CONJ SERPL-MCNC: <0.2 MG/DL (ref 0.1–0.5)
BILIRUB INDIRECT SERPL-MCNC: ABNORMAL MG/DL (ref 0–1)
BILIRUB SERPL-MCNC: 0.6 MG/DL (ref 0.1–1.5)
BUN SERPL-MCNC: 12 MG/DL (ref 8–22)
CALCIUM SERPL-MCNC: 9.4 MG/DL (ref 8.5–10.5)
CHLORIDE SERPL-SCNC: 104 MMOL/L (ref 96–112)
CO2 SERPL-SCNC: 28 MMOL/L (ref 20–33)
CREAT SERPL-MCNC: 0.73 MG/DL (ref 0.5–1.4)
GFR SERPLBLD CREATININE-BSD FMLA CKD-EPI: 89 ML/MIN/1.73 M 2
GLUCOSE SERPL-MCNC: 113 MG/DL (ref 65–99)
POTASSIUM SERPL-SCNC: 4.6 MMOL/L (ref 3.6–5.5)
PROT SERPL-MCNC: 7.8 G/DL (ref 6–8.2)
SODIUM SERPL-SCNC: 138 MMOL/L (ref 135–145)

## 2023-11-21 PROCEDURE — 99214 OFFICE O/P EST MOD 30 MIN: CPT | Mod: 25 | Performed by: FAMILY MEDICINE

## 2023-11-21 PROCEDURE — 36415 COLL VENOUS BLD VENIPUNCTURE: CPT

## 2023-11-21 PROCEDURE — 69210 REMOVE IMPACTED EAR WAX UNI: CPT | Performed by: FAMILY MEDICINE

## 2023-11-21 PROCEDURE — G0008 ADMIN INFLUENZA VIRUS VAC: HCPCS | Performed by: FAMILY MEDICINE

## 2023-11-21 PROCEDURE — 3074F SYST BP LT 130 MM HG: CPT | Performed by: FAMILY MEDICINE

## 2023-11-21 PROCEDURE — 80048 BASIC METABOLIC PNL TOTAL CA: CPT

## 2023-11-21 PROCEDURE — 80076 HEPATIC FUNCTION PANEL: CPT

## 2023-11-21 PROCEDURE — 90662 IIV NO PRSV INCREASED AG IM: CPT | Performed by: FAMILY MEDICINE

## 2023-11-21 PROCEDURE — 3078F DIAST BP <80 MM HG: CPT | Performed by: FAMILY MEDICINE

## 2023-11-21 PROCEDURE — 1170F FXNL STATUS ASSESSED: CPT | Performed by: FAMILY MEDICINE

## 2023-11-21 RX ORDER — PRAVASTATIN SODIUM 80 MG/1
80 TABLET ORAL NIGHTLY
Qty: 90 TABLET | Refills: 3 | Status: SHIPPED | OUTPATIENT
Start: 2023-11-21

## 2023-11-21 RX ORDER — METOPROLOL SUCCINATE 50 MG/1
50 TABLET, EXTENDED RELEASE ORAL NIGHTLY
Qty: 90 TABLET | Refills: 3 | Status: SHIPPED | OUTPATIENT
Start: 2023-11-21 | End: 2024-02-21 | Stop reason: SDUPTHER

## 2023-11-21 ASSESSMENT — FIBROSIS 4 INDEX: FIB4 SCORE: 2.54

## 2023-11-21 NOTE — PROCEDURES
Ear Cerumen Removal    Date/Time: 11/21/2023 9:41 AM    Performed by: Toñito Espinoza D.O.  Authorized by: Toñito Espinoza D.O.    Anesthesia:  Local Anesthetic: none  Location details: right ear  Patient tolerance: patient tolerated the procedure well with no immediate complications  Procedure type: curette   Sedation:  Patient sedated: no

## 2023-11-21 NOTE — PROGRESS NOTES
"Subjective:     CC: \"ear cleaning, left side pain\"    HPI:   Duane presents today with:    Earlier this year had sepsis and fell off the toilet (this was in April)  Patient still sore on left side. Walking and moving arm ok. Admits he doesn't move around much.  No recent falls  He would like his ears cleaned today  Still has some back pain but seeing orthopedics soon        Problem   Degenerative Disease of Nervous System, Unspecified (Hcc)   Autoimmune Hepatitis (Hcc)       Current Outpatient Medications Ordered in Epic   Medication Sig Dispense Refill    metoprolol SR (TOPROL XL) 50 MG TABLET SR 24 HR Take 1 Tablet by mouth every evening. 90 Tablet 3    pravastatin (PRAVACHOL) 80 MG tablet Take 1 Tablet by mouth every evening. TAKE 1 TABLET BY MOUTH DAILY 90 Tablet 3    digoxin (LANOXIN) 250 MCG Tab Take 1 Tablet by mouth every morning. 90 Tablet 3    warfarin (COUMADIN) 4 MG Tab TAKE 1 to 1.5 TABLETS BY MOUTH DAILY AS DIRECTED BY [facility] 110 Tablet 1    METAMUCIL FIBER PO Take  by mouth every day.      aspirin (ASA) 81 MG Chew Tab chewable tablet Chew 1 Tablet every day. 100 Tablet 3    LYSINE PO Take 1 Tablet by mouth every day. As needed      GLUCOSAMINE HCL PO Take 1 Tablet by mouth 2 times a day.      Ascorbic Acid (VITAMIN C PO) Take 1 Tablet by mouth every morning.      Coenzyme Q10 (COQ10 PO) Take 1 Tablet by mouth every day.      Omega-3 Fatty Acids (FISH OIL) 1000 MG CAPSULE DELAYED RELEASE EC softgel capsule Take 1,000 mg by mouth every morning with breakfast.      acetaminophen (TYLENOL) 500 MG Tab Take 500-1,000 mg by mouth every 6 hours as needed. Indications: Pain      gabapentin (NEURONTIN) 300 MG Cap Take 900 mg by mouth 4 times a day.      B Complex Vitamins (VITAMIN B COMPLEX PO) Take 1 Tab by mouth every evening.      Cholecalciferol (VITAMIN D3 PO) Take 1 Tab by mouth every evening.      multivitamin (THERAGRAN) TABS Take 1 Tablet by mouth at bedtime.       No current " Epic-ordered facility-administered medications on file.       Health Maintenance: Influenza HD    ROS:  ROS see HPI    Objective:     Exam:  /66   Pulse (!) 54   Temp 36.3 °C (97.4 °F) (Temporal)   Ht 1.829 m (6')   Wt 99.7 kg (219 lb 12.8 oz)   SpO2 97%   BMI 29.81 kg/m²  Body mass index is 29.81 kg/m².    Physical Exam  Vitals reviewed.   Constitutional:       General: He is not in acute distress.     Appearance: Normal appearance.   HENT:      Head: Normocephalic and atraumatic.      Right Ear: Tympanic membrane normal.      Left Ear: Tympanic membrane, ear canal and external ear normal.      Ears:      Comments: Large non-impacted or obstructing wax ball in right canal  Cardiovascular:      Rate and Rhythm: Normal rate and regular rhythm.      Heart sounds: Normal heart sounds.   Pulmonary:      Effort: Pulmonary effort is normal. No respiratory distress.      Breath sounds: Normal breath sounds.   Skin:     General: Skin is warm and dry.   Neurological:      Mental Status: He is alert. Mental status is at baseline.      Gait: Gait normal.   Psychiatric:         Mood and Affect: Mood normal.         Behavior: Behavior normal.         Assessment & Plan:     83 y.o. male with the following -     1. Essential hypertension  This is a chronic condition, controlled.  Blood pressure is at goal under 140/90 in the office today.  We will continue the current regimen.  - metoprolol SR (TOPROL XL) 50 MG TABLET SR 24 HR; Take 1 Tablet by mouth every evening.  Dispense: 90 Tablet; Refill: 3    2. Dyslipidemia  This is a chronic condition, controlled. He is on a statin for primary prevention and tolerating it well. The  last cholesterol panel in August was all within normal limits so we will continue the current dosing.   - pravastatin (PRAVACHOL) 80 MG tablet; Take 1 Tablet by mouth every evening. TAKE 1 TABLET BY MOUTH DAILY  Dispense: 90 Tablet; Refill: 3    3. Degenerative disease of nervous system,  unspecified (HCC)  Chronic, stable. Continue with current defined treatment plan: noted atrophy on imaging. Per chart review patient may have long and/or heavy drinking history that may have exacerbated this. Admits memory is not great his son and daughter live with him. He also keeps track of things on a white board. Follow-up at least annually.    4. Autoimmune hepatitis (HCC)  Chronic, stable. Continue with current defined treatment plan: Liver damage may be exacerbated by past alcohol use. Patient not aware of this history at this time. No abdominal complaints. Reprinted labs I placed earlier to monitor his liver enzymes. Follow-up at least annually.    5. Cerumen in auditory canal on examination  See procedure note  - Ear Cerumen Removal    6. Need for vaccination  - INFLUENZA VACCINE, HIGH DOSE (65+ ONLY)          Return in about 3 months (around 2/21/2024), or if symptoms worsen or fail to improve, for Annual Medicare.    Please note that this dictation was created using voice recognition software. I have made every reasonable attempt to correct obvious errors, but I expect that there are errors of grammar and possibly content that I did not discover before finalizing the note.

## 2023-11-22 ENCOUNTER — APPOINTMENT (RX ONLY)
Dept: URBAN - METROPOLITAN AREA CLINIC 4 | Facility: CLINIC | Age: 83
Setting detail: DERMATOLOGY
End: 2023-11-22

## 2023-11-22 DIAGNOSIS — Z71.89 OTHER SPECIFIED COUNSELING: ICD-10-CM

## 2023-11-22 DIAGNOSIS — L82.1 OTHER SEBORRHEIC KERATOSIS: ICD-10-CM

## 2023-11-22 DIAGNOSIS — D22 MELANOCYTIC NEVI: ICD-10-CM

## 2023-11-22 DIAGNOSIS — L81.4 OTHER MELANIN HYPERPIGMENTATION: ICD-10-CM

## 2023-11-22 DIAGNOSIS — Z85.828 PERSONAL HISTORY OF OTHER MALIGNANT NEOPLASM OF SKIN: ICD-10-CM

## 2023-11-22 DIAGNOSIS — D18.0 HEMANGIOMA: ICD-10-CM

## 2023-11-22 PROBLEM — D22.5 MELANOCYTIC NEVI OF TRUNK: Status: ACTIVE | Noted: 2023-11-22

## 2023-11-22 PROBLEM — D18.01 HEMANGIOMA OF SKIN AND SUBCUTANEOUS TISSUE: Status: ACTIVE | Noted: 2023-11-22

## 2023-11-22 PROBLEM — D48.5 NEOPLASM OF UNCERTAIN BEHAVIOR OF SKIN: Status: ACTIVE | Noted: 2023-11-22

## 2023-11-22 PROCEDURE — ? COUNSELING

## 2023-11-22 PROCEDURE — ? ADDITIONAL NOTES

## 2023-11-22 PROCEDURE — 99213 OFFICE O/P EST LOW 20 MIN: CPT | Mod: 25

## 2023-11-22 PROCEDURE — ? SUNSCREEN RECOMMENDATIONS

## 2023-11-22 PROCEDURE — 11102 TANGNTL BX SKIN SINGLE LES: CPT

## 2023-11-22 PROCEDURE — ? BIOPSY BY SHAVE METHOD AND DESTRUCTION

## 2023-11-22 ASSESSMENT — LOCATION SIMPLE DESCRIPTION DERM
LOCATION SIMPLE: RIGHT FOREARM
LOCATION SIMPLE: UPPER BACK
LOCATION SIMPLE: LEFT SCALP
LOCATION SIMPLE: RIGHT CHEEK
LOCATION SIMPLE: SCALP
LOCATION SIMPLE: NOSE
LOCATION SIMPLE: LEFT FOREARM
LOCATION SIMPLE: RIGHT LOWER BACK
LOCATION SIMPLE: RIGHT SHOULDER
LOCATION SIMPLE: RIGHT EAR
LOCATION SIMPLE: LEFT SHOULDER
LOCATION SIMPLE: ABDOMEN

## 2023-11-22 ASSESSMENT — LOCATION DETAILED DESCRIPTION DERM
LOCATION DETAILED: RIGHT VENTRAL DISTAL FOREARM
LOCATION DETAILED: LEFT POSTERIOR SHOULDER
LOCATION DETAILED: RIGHT ANTERIOR SHOULDER
LOCATION DETAILED: LEFT VENTRAL PROXIMAL FOREARM
LOCATION DETAILED: INFERIOR THORACIC SPINE
LOCATION DETAILED: RIGHT POSTERIOR SHOULDER
LOCATION DETAILED: RIGHT CRUS OF HELIX
LOCATION DETAILED: EPIGASTRIC SKIN
LOCATION DETAILED: RIGHT PROXIMAL DORSAL FOREARM
LOCATION DETAILED: LEFT CENTRAL FRONTAL SCALP
LOCATION DETAILED: RIGHT CENTRAL FRONTAL SCALP
LOCATION DETAILED: RIGHT SUPERIOR LATERAL MIDBACK
LOCATION DETAILED: LEFT PROXIMAL RADIAL DORSAL FOREARM
LOCATION DETAILED: LEFT DISTAL DORSAL FOREARM
LOCATION DETAILED: RIGHT DISTAL DORSAL FOREARM
LOCATION DETAILED: RIGHT PROXIMAL RADIAL DORSAL FOREARM
LOCATION DETAILED: NASAL DORSUM
LOCATION DETAILED: RIGHT MEDIAL MALAR CHEEK
LOCATION DETAILED: LEFT PROXIMAL DORSAL FOREARM

## 2023-11-22 ASSESSMENT — LOCATION ZONE DERM
LOCATION ZONE: ARM
LOCATION ZONE: FACE
LOCATION ZONE: TRUNK
LOCATION ZONE: EAR
LOCATION ZONE: NOSE
LOCATION ZONE: SCALP

## 2023-11-22 NOTE — PROCEDURE: COUNSELING
Next Appointment- Visit date not found  Last Appointment- 04/06/2023    Refill request for metoprolol tartrate is being routed to the providers nurse pool to review  Beta Blocker Refill Protocol - 12 Month Protocol Failed 03/21/2023 05:49 PM   Protocol Details  Last BP was under 140/90 or if patient has diabetes, CAD, or PVD, BP under 130/80 in past year -- IF CRITERIA FAILED REFER TO PROTOCOL DETAILS      Disp Refills Start End    metoPROLOL tartrate (LOPRESSOR) 50 MG tablet 186 tablet 1 7/1/2022     Sig - Route: Take 1 tablet by mouth in the morning and 1 tablet before bedtime. - Oral      
Detail Level: Zone
Detail Level: Generalized
Detail Level: Detailed

## 2023-11-22 NOTE — PROCEDURE: BIOPSY BY SHAVE METHOD AND DESTRUCTION

## 2023-11-29 ENCOUNTER — PATIENT MESSAGE (OUTPATIENT)
Dept: HEALTH INFORMATION MANAGEMENT | Facility: OTHER | Age: 83
End: 2023-11-29

## 2023-11-30 ENCOUNTER — ANTICOAGULATION VISIT (OUTPATIENT)
Dept: VASCULAR LAB | Facility: MEDICAL CENTER | Age: 83
End: 2023-11-30
Attending: INTERNAL MEDICINE
Payer: MEDICARE

## 2023-11-30 DIAGNOSIS — I48.0 PAROXYSMAL ATRIAL FIBRILLATION (HCC): Chronic | ICD-10-CM

## 2023-11-30 DIAGNOSIS — D68.69 SECONDARY HYPERCOAGULABLE STATE (HCC): Chronic | ICD-10-CM

## 2023-11-30 DIAGNOSIS — Z95.3 STATUS POST TRANSCATHETER AORTIC VALVE REPLACEMENT (TAVR) USING BIOPROSTHESIS: ICD-10-CM

## 2023-11-30 LAB — INR PPP: 3.2 (ref 2–3.5)

## 2023-11-30 PROCEDURE — 85610 PROTHROMBIN TIME: CPT

## 2023-11-30 PROCEDURE — 99212 OFFICE O/P EST SF 10 MIN: CPT | Performed by: NURSE PRACTITIONER

## 2023-11-30 NOTE — PROGRESS NOTES
Anticoagulation Summary  As of 11/30/2023      INR goal:  2.0-3.0   TTR:  62.9 % (7.1 y)   INR used for dosing:  3.20 (11/30/2023)   Warfarin maintenance plan:  4 mg (4 mg x 1) every day   Weekly warfarin total:  28 mg   Plan last modified:  CLAYTON Jacob (11/30/2023)   Next INR check:  12/7/2023   Target end date:  Indefinite    Indications    Status post transcatheter aortic valve replacement (TAVR) using bioprosthesis [Z95.3]  Atrial fibrillation (HCC) [I48.91]  Secondary hypercoagulable state (HCC) [D68.69]                 Anticoagulation Episode Summary       INR check location:      Preferred lab:      Send INR reminders to:      Comments:  Pt goes by Dyke  ASA daily          Anticoagulation Care Providers       Provider Role Specialty Phone number    Vickey Rutherford M.D. Referring Cardiovascular Disease (Cardiology) [phone]    [facility] Responsible  [phone]                  Refer to Patient Findings for HPI:  Patient Findings       Positives:  Missed doses, Bruising    Negatives:  Signs/symptoms of thrombosis, Signs/symptoms of bleeding, Laboratory test error suspected, Change in health, Change in alcohol use, Change in activity, Upcoming invasive procedure, Emergency department visit, Upcoming dental procedure, Extra doses, Change in medications, Change in diet/appetite, Hospital admission, Other complaints    Comments:  He missed 3 doses of warfarin last week by mistake. Has also noticed more bruising to his arms.            There were no vitals filed for this visit.    Verified current warfarin dosing schedule.    Medications reconciled: Yes  Pt is not on antiplatelet therapy.      A/P   INR is supratherapeutic despite missing 3 doses last week. Will reduce his regimen and have him return sooner.    Warfarin dosing recommendation: Take 2 mg tonight, then began taking 4 mg daily.    Pt educated to contact our clinic with any changes in medications or s/s of  bleeding or thrombosis. Pt is aware to seek immediate medical attention for falls, head injury or deep cuts.    Request pt to return in 1 week(s). Pt agrees.    MARKELL Jacob.

## 2023-12-11 ENCOUNTER — ANTICOAGULATION VISIT (OUTPATIENT)
Dept: VASCULAR LAB | Facility: MEDICAL CENTER | Age: 83
End: 2023-12-11
Attending: INTERNAL MEDICINE
Payer: MEDICARE

## 2023-12-11 DIAGNOSIS — Z95.3 STATUS POST TRANSCATHETER AORTIC VALVE REPLACEMENT (TAVR) USING BIOPROSTHESIS: ICD-10-CM

## 2023-12-11 DIAGNOSIS — D68.69 SECONDARY HYPERCOAGULABLE STATE (HCC): Chronic | ICD-10-CM

## 2023-12-11 DIAGNOSIS — I48.0 PAROXYSMAL ATRIAL FIBRILLATION (HCC): Chronic | ICD-10-CM

## 2023-12-11 LAB — INR PPP: 3.9 (ref 2–3.5)

## 2023-12-11 PROCEDURE — 99212 OFFICE O/P EST SF 10 MIN: CPT

## 2023-12-11 PROCEDURE — 85610 PROTHROMBIN TIME: CPT

## 2023-12-11 NOTE — PROGRESS NOTES
Anticoagulation Summary  As of 12/11/2023      INR goal:  2.0-3.0   TTR:  62.6 % (7.1 y)   INR used for dosing:  3.90 (12/11/2023)   Warfarin maintenance plan:  4 mg (4 mg x 1) every day   Weekly warfarin total:  28 mg   Plan last modified:  CLAYTON Jacob (11/30/2023)   Next INR check:  12/19/2023   Target end date:  Indefinite    Indications    Status post transcatheter aortic valve replacement (TAVR) using bioprosthesis [Z95.3]  Atrial fibrillation (HCC) [I48.91]  Secondary hypercoagulable state (HCC) [D68.69]                 Anticoagulation Episode Summary       INR check location:      Preferred lab:      Send INR reminders to:      Comments:  Pt goes by Dyke  ASA daily          Anticoagulation Care Providers       Provider Role Specialty Phone number    Vickey Ruthreford M.D. Referring Cardiovascular Disease (Cardiology) [phone]    [facility] Responsible  [phone]                  Refer to Patient Findings for HPI:  Patient Findings       Positives:  Extra doses (has been taking 6mg on Wednesday instead of 4mg)    Negatives:  Signs/symptoms of thrombosis, Signs/symptoms of bleeding, Laboratory test error suspected, Change in health, Change in alcohol use, Change in activity, Upcoming invasive procedure, Emergency department visit, Upcoming dental procedure, Missed doses, Change in medications, Change in diet/appetite, Hospital admission, Bruising, Other complaints            There were no vitals filed for this visit.  Pt declined vitals    Verified current warfarin dosing schedule.    Medications reconciled: Yes  Pt is not on antiplatelet therapy.      A/P   INR is supratherapeutic    Warfarin dosing recommendation: Hold x1 day, then Decrease to 4mg daily    Pt educated to contact our clinic with any changes in medications or s/s of bleeding or thrombosis. Pt is aware to seek immediate medical attention for falls, head injury or deep cuts.    Request pt to return in 1  week(s). Pt agrees.    Sun Molina, PharmD

## 2023-12-18 ENCOUNTER — HOSPITAL ENCOUNTER (OUTPATIENT)
Dept: LAB | Facility: MEDICAL CENTER | Age: 83
End: 2023-12-18
Attending: ORTHOPAEDIC SURGERY
Payer: MEDICARE

## 2023-12-18 DIAGNOSIS — Z96.653 PRESENCE OF ARTIFICIAL KNEE JOINT, BILATERAL: ICD-10-CM

## 2023-12-18 DIAGNOSIS — M25.561 PAIN IN BOTH KNEES, UNSPECIFIED CHRONICITY: ICD-10-CM

## 2023-12-18 DIAGNOSIS — M25.562 PAIN IN BOTH KNEES, UNSPECIFIED CHRONICITY: ICD-10-CM

## 2023-12-18 LAB
CRP SERPL HS-MCNC: 0.31 MG/DL (ref 0–0.75)
ERYTHROCYTE [SEDIMENTATION RATE] IN BLOOD BY WESTERGREN METHOD: 5 MM/HOUR (ref 0–20)

## 2023-12-18 PROCEDURE — 86140 C-REACTIVE PROTEIN: CPT

## 2023-12-18 PROCEDURE — 36415 COLL VENOUS BLD VENIPUNCTURE: CPT

## 2023-12-18 PROCEDURE — 85652 RBC SED RATE AUTOMATED: CPT

## 2023-12-19 ENCOUNTER — ANTICOAGULATION VISIT (OUTPATIENT)
Dept: VASCULAR LAB | Facility: MEDICAL CENTER | Age: 83
End: 2023-12-19
Attending: INTERNAL MEDICINE
Payer: MEDICARE

## 2023-12-19 DIAGNOSIS — I48.0 PAROXYSMAL ATRIAL FIBRILLATION (HCC): Chronic | ICD-10-CM

## 2023-12-19 DIAGNOSIS — Z95.3 STATUS POST TRANSCATHETER AORTIC VALVE REPLACEMENT (TAVR) USING BIOPROSTHESIS: ICD-10-CM

## 2023-12-19 DIAGNOSIS — D68.69 SECONDARY HYPERCOAGULABLE STATE (HCC): Chronic | ICD-10-CM

## 2023-12-19 LAB — INR PPP: 3.4 (ref 2–3.5)

## 2023-12-19 PROCEDURE — 85610 PROTHROMBIN TIME: CPT

## 2023-12-19 PROCEDURE — 99212 OFFICE O/P EST SF 10 MIN: CPT

## 2023-12-19 NOTE — PROGRESS NOTES
Anticoagulation Summary  As of 12/19/2023      INR goal:  2.0-3.0   TTR:  62.4 % (7.1 y)   INR used for dosing:  3.40 (12/19/2023)   Warfarin maintenance plan:  2 mg (4 mg x 0.5) every Wed; 4 mg (4 mg x 1) all other days   Weekly warfarin total:  26 mg   Plan last modified:  Sun Molina, PharmD (12/19/2023)   Next INR check:  1/3/2024   Target end date:  Indefinite    Indications    Status post transcatheter aortic valve replacement (TAVR) using bioprosthesis [Z95.3]  Atrial fibrillation (HCC) [I48.91]  Secondary hypercoagulable state (HCC) [D68.69]                 Anticoagulation Episode Summary       INR check location:      Preferred lab:      Send INR reminders to:      Comments:  Pt goes by Dyke  ASA daily          Anticoagulation Care Providers       Provider Role Specialty Phone number    Vickey Rutherford M.D. Referring Cardiovascular Disease (Cardiology) [phone]    [facility] Responsible  [phone]                  Refer to Patient Findings for HPI:  Patient Findings       Positives:  Upcoming invasive procedure (epidurals on 1/19, 2/2, 2/16 with [place]; INR needs to be < 3.0)    Negatives:  Signs/symptoms of thrombosis, Signs/symptoms of bleeding, Laboratory test error suspected, Change in health, Change in alcohol use, Change in activity, Emergency department visit, Upcoming dental procedure, Missed doses, Extra doses, Change in medications, Change in diet/appetite, Hospital admission, Bruising, Other complaints            There were no vitals filed for this visit.  Pt declined vitals    Verified current warfarin dosing schedule.    Medications reconciled: Yes  Pt is on antiplatelet therapy with ASA for TAVR per cardiology.      A/P   INR is supratherapeutic    Warfarin dosing recommendation: Hold x1 day, then Decrease to 2mg Wed, 4mg AOD    Pt educated to contact our clinic with any changes in medications or s/s of bleeding or thrombosis. Pt is aware to seek  immediate medical attention for falls, head injury or deep cuts.    Request pt to return in 2 week(s). Pt agrees.    Sun Molina, AbhishekD

## 2024-01-03 ENCOUNTER — ANTICOAGULATION VISIT (OUTPATIENT)
Dept: VASCULAR LAB | Facility: MEDICAL CENTER | Age: 84
End: 2024-01-03
Attending: INTERNAL MEDICINE
Payer: MEDICARE

## 2024-01-03 DIAGNOSIS — Z95.3 STATUS POST TRANSCATHETER AORTIC VALVE REPLACEMENT (TAVR) USING BIOPROSTHESIS: ICD-10-CM

## 2024-01-03 DIAGNOSIS — I48.0 PAROXYSMAL ATRIAL FIBRILLATION (HCC): Chronic | ICD-10-CM

## 2024-01-03 DIAGNOSIS — D68.69 SECONDARY HYPERCOAGULABLE STATE (HCC): Chronic | ICD-10-CM

## 2024-01-03 LAB — INR PPP: 2.4 (ref 2–3.5)

## 2024-01-03 PROCEDURE — 99212 OFFICE O/P EST SF 10 MIN: CPT | Performed by: NURSE PRACTITIONER

## 2024-01-03 PROCEDURE — 85610 PROTHROMBIN TIME: CPT

## 2024-01-03 NOTE — PROGRESS NOTES
Anticoagulation Summary  As of 1/3/2024      INR goal:  2.0-3.0   TTR:  62.4 % (7.2 y)   INR used for dosin.40 (1/3/2024)   Warfarin maintenance plan:  2 mg (4 mg x 0.5) every Mon, Wed, Fri; 4 mg (4 mg x 1) all other days   Weekly warfarin total:  22 mg   Plan last modified:  CLAYTON Jacob (1/3/2024)   Next INR check:  2024   Target end date:  Indefinite    Indications    Status post transcatheter aortic valve replacement (TAVR) using bioprosthesis [Z95.3]  Atrial fibrillation (HCC) [I48.91]  Secondary hypercoagulable state (HCC) [D68.69]                 Anticoagulation Episode Summary       INR check location:      Preferred lab:      Send INR reminders to:      Comments:  Pt goes by Kinsey renee          Anticoagulation Care Providers       Provider Role Specialty Phone number    Vickey Rutherford M.D. Referring Cardiovascular Disease (Cardiology) [phone]    [facility] Responsible  [phone]                  Refer to Patient Findings for HPI:  Patient Findings       Positives:  Upcoming invasive procedure    Negatives:  Signs/symptoms of thrombosis, Signs/symptoms of bleeding, Laboratory test error suspected, Change in health, Change in alcohol use, Change in activity, Emergency department visit, Upcoming dental procedure, Missed doses, Extra doses, Change in medications, Change in diet/appetite, Hospital admission, Bruising, Other complaints    Comments:  He took a smaller than prescribed dose to help his INR come down. Upcoming epidurals on 24, 24, 24 with [place]; INR needs to be < 3.0.            Verified current warfarin dosing schedule.    Medications reconciled: Yes  Pt is on antiplatelet therapy with ASA 81 mg for TAVR to be reviewed next visit with cards.      A/P   INR is therapeutic. INR down from 3.4 last visit. He is not certain of the dose he took this past week but did take a smaller than prescribed dose. Will have pt began the  following regimen.    Warfarin dosing recommendation: Take 2 mg M-W-F, 4 mg AODs.    Pt educated to contact our clinic with any changes in medications or s/s of bleeding or thrombosis. Pt is aware to seek immediate medical attention for falls, head injury or deep cuts.    Request pt to return in 2 week(s) for pre-procedure INR. Pt agrees. Offered to check pt in 1 week but he prefers 2 weeks.    MARKELL Jacob.

## 2024-01-18 ENCOUNTER — ANTICOAGULATION VISIT (OUTPATIENT)
Dept: VASCULAR LAB | Facility: MEDICAL CENTER | Age: 84
End: 2024-01-18
Attending: INTERNAL MEDICINE
Payer: MEDICARE

## 2024-01-18 VITALS — HEART RATE: 82 BPM | SYSTOLIC BLOOD PRESSURE: 112 MMHG | DIASTOLIC BLOOD PRESSURE: 68 MMHG

## 2024-01-18 DIAGNOSIS — I48.0 PAROXYSMAL ATRIAL FIBRILLATION (HCC): Chronic | ICD-10-CM

## 2024-01-18 DIAGNOSIS — Z95.3 STATUS POST TRANSCATHETER AORTIC VALVE REPLACEMENT (TAVR) USING BIOPROSTHESIS: ICD-10-CM

## 2024-01-18 DIAGNOSIS — D68.69 SECONDARY HYPERCOAGULABLE STATE (HCC): Chronic | ICD-10-CM

## 2024-01-18 LAB — INR PPP: 2.2 (ref 2–3.5)

## 2024-01-18 PROCEDURE — 85610 PROTHROMBIN TIME: CPT

## 2024-01-18 PROCEDURE — 99211 OFF/OP EST MAY X REQ PHY/QHP: CPT | Performed by: NURSE PRACTITIONER

## 2024-01-18 NOTE — PROGRESS NOTES
Anticoagulation Summary  As of 2024      INR goal:  2.0-3.0   TTR:  62.6 % (7.2 y)   INR used for dosin.20 (2024)   Warfarin maintenance plan:  2 mg (4 mg x 0.5) every Mon, Wed, Fri; 4 mg (4 mg x 1) all other days   Weekly warfarin total:  22 mg   Plan last modified:  CLAYTON Jacob (1/3/2024)   Next INR check:  2024   Target end date:  Indefinite    Indications    Status post transcatheter aortic valve replacement (TAVR) using bioprosthesis [Z95.3]  Atrial fibrillation (HCC) [I48.91]  Secondary hypercoagulable state (HCC) [D68.69]                 Anticoagulation Episode Summary       INR check location:      Preferred lab:      Send INR reminders to:      Comments:  Pt goes by Dyke  ASA daily          Anticoagulation Care Providers       Provider Role Specialty Phone number    Vickey Rutherford M.D. Referring Cardiovascular Disease (Cardiology) [phone]    [facility] Responsible  [phone]                  Refer to Patient Findings for HPI:  Patient Findings       Positives:  Upcoming invasive procedure    Negatives:  Signs/symptoms of thrombosis, Signs/symptoms of bleeding, Laboratory test error suspected, Change in health, Change in alcohol use, Change in activity, Emergency department visit, Upcoming dental procedure, Missed doses, Extra doses, Change in medications, Change in diet/appetite, Hospital admission, Bruising, Other complaints    Comments:  Epidural tomorrow, 24 and 24. Needs INR < 3.0 for procedures.            Vitals:    24 0745   BP: 112/68   Pulse: 82     Verified current warfarin dosing schedule.    Medications reconciled: Yes  Pt is on antiplatelet therapy with ASA 81 mg for TAVR per cards.      A/P   INR is therapeutic at 2.2. Will have him continue his current regimen.    Warfarin dosing recommendation: Continue regimen as listed above.    Pt educated to contact our clinic with any changes in medications or s/s of bleeding  or thrombosis. Pt is aware to seek immediate medical attention for falls, head injury or deep cuts.    Request pt to return in 2 week(s) for another pre-op INR. Pt agrees.    MARKELL Jacob.

## 2024-01-31 ENCOUNTER — HOSPITAL ENCOUNTER (OUTPATIENT)
Dept: CARDIOLOGY | Facility: MEDICAL CENTER | Age: 84
End: 2024-01-31
Payer: MEDICARE

## 2024-01-31 DIAGNOSIS — Z95.3 STATUS POST TRANSCATHETER AORTIC VALVE REPLACEMENT (TAVR) USING BIOPROSTHESIS: ICD-10-CM

## 2024-01-31 LAB
LV EJECT FRACT  99904: 55
LV EJECT FRACT MOD 2C 99903: 54.2
LV EJECT FRACT MOD 4C 99902: 54.19
LV EJECT FRACT MOD BP 99901: 52.28

## 2024-01-31 PROCEDURE — 93306 TTE W/DOPPLER COMPLETE: CPT | Mod: 26 | Performed by: INTERNAL MEDICINE

## 2024-01-31 PROCEDURE — 93306 TTE W/DOPPLER COMPLETE: CPT

## 2024-02-01 ENCOUNTER — ANTICOAGULATION VISIT (OUTPATIENT)
Dept: VASCULAR LAB | Facility: MEDICAL CENTER | Age: 84
End: 2024-02-01
Attending: INTERNAL MEDICINE
Payer: MEDICARE

## 2024-02-01 VITALS — DIASTOLIC BLOOD PRESSURE: 73 MMHG | SYSTOLIC BLOOD PRESSURE: 110 MMHG | HEART RATE: 82 BPM

## 2024-02-01 DIAGNOSIS — Z95.3 STATUS POST TRANSCATHETER AORTIC VALVE REPLACEMENT (TAVR) USING BIOPROSTHESIS: ICD-10-CM

## 2024-02-01 DIAGNOSIS — D68.69 SECONDARY HYPERCOAGULABLE STATE (HCC): Chronic | ICD-10-CM

## 2024-02-01 DIAGNOSIS — I48.0 PAROXYSMAL ATRIAL FIBRILLATION (HCC): Chronic | ICD-10-CM

## 2024-02-01 LAB — INR PPP: 1.9 (ref 2–3.5)

## 2024-02-01 PROCEDURE — 99211 OFF/OP EST MAY X REQ PHY/QHP: CPT | Performed by: NURSE PRACTITIONER

## 2024-02-01 PROCEDURE — 85610 PROTHROMBIN TIME: CPT

## 2024-02-01 NOTE — PROGRESS NOTES
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 Referring Cardiovascular Disease (Cardiology) [phone]    [facility] Responsible  [phone]                  Refer to Patient Findings for HPI:  Patient Findings       Positives:  Upcoming invasive procedure, Missed doses    Negatives:  Signs/symptoms of thrombosis, Signs/symptoms of bleeding, Laboratory test error suspected, Change in health, Change in alcohol use, Change in activity, Emergency department visit, Upcoming dental procedure, Extra doses, Change in medications, Change in diet/appetite, Hospital admission, Bruising, Other complaints    Comments:  Epidural tomorrow. INR needs to be < 3.0. He took 2 mg on  instead of 4 mg.            Vitals:    24 0744   BP: 110/73   Pulse: 82       Verified current warfarin dosing schedule.    Medications reconciled: Yes  Pt is on antiplatelet therapy with ASA 81 mg for TAVR per cards.      A/P   INR is 1.9 today. Will have him continue his current regimen for now. Upcoming epidural again in 2 weeks.    Warfarin dosing recommendation: Continue regimen as listed above.    Pt educated to contact our clinic  with any changes in medications or s/s of bleeding or thrombosis. Pt is aware to seek immediate medical attention for falls, head injury or deep cuts.    Request pt to return in 2 week(s). Pt agrees.    MARKELL Jacob.

## 2024-02-15 ENCOUNTER — ANTICOAGULATION VISIT (OUTPATIENT)
Dept: VASCULAR LAB | Facility: MEDICAL CENTER | Age: 84
End: 2024-02-15
Attending: INTERNAL MEDICINE
Payer: MEDICARE

## 2024-02-15 VITALS — HEART RATE: 62 BPM | SYSTOLIC BLOOD PRESSURE: 101 MMHG | DIASTOLIC BLOOD PRESSURE: 68 MMHG

## 2024-02-15 DIAGNOSIS — Z95.3 STATUS POST TRANSCATHETER AORTIC VALVE REPLACEMENT (TAVR) USING BIOPROSTHESIS: ICD-10-CM

## 2024-02-15 DIAGNOSIS — D68.69 SECONDARY HYPERCOAGULABLE STATE (HCC): Chronic | ICD-10-CM

## 2024-02-15 DIAGNOSIS — I48.0 PAROXYSMAL ATRIAL FIBRILLATION (HCC): Chronic | ICD-10-CM

## 2024-02-15 DIAGNOSIS — Z79.01 CHRONIC ANTICOAGULATION: ICD-10-CM

## 2024-02-15 LAB — INR PPP: 2 (ref 2–3.5)

## 2024-02-15 PROCEDURE — 99211 OFF/OP EST MAY X REQ PHY/QHP: CPT | Performed by: NURSE PRACTITIONER

## 2024-02-15 PROCEDURE — 85610 PROTHROMBIN TIME: CPT

## 2024-02-15 RX ORDER — WARFARIN SODIUM 4 MG/1
TABLET ORAL
Qty: 110 TABLET | Refills: 1 | Status: SHIPPED | OUTPATIENT
Start: 2024-02-15

## 2024-02-15 NOTE — PROGRESS NOTES
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 Referring Cardiovascular Disease (Cardiology) [phone]    [facility] Responsible  [phone]                  Refer to Patient Findings for HPI:  Patient Findings       Positives:  Upcoming invasive procedure    Negatives:  Signs/symptoms of thrombosis, Signs/symptoms of bleeding, Laboratory test error suspected, Change in health, Change in alcohol use, Change in activity, Emergency department visit, Upcoming dental procedure, Missed doses, Extra doses, Change in medications, Change in diet/appetite, Hospital admission, Bruising, Other complaints    Comments:  Has an epidural tomorrow and again on 3/1/24. Needs INR < 3.0.            Vitals:    02/15/24 0800   BP: 101/68   Pulse: 62     Verified current warfarin dosing schedule.    Medications reconciled: Yes  Pt is on antiplatelet therapy with ASA 81 mg for TAVR per cards.      A/P   INR is 2.0 today for procedure tomorrow. Will have him continue his current regimen for now. Upcoming back procedure again in 2 weeks. Will plan to have INR < 3.0 again.    Warfarin dosing recommendation: Continue regimen as listed  above.    Pt educated to contact our clinic with any changes in medications or s/s of bleeding or thrombosis. Pt is aware to seek immediate medical attention for falls, head injury or deep cuts.    Request pt to return in 2 week(s). Pt agrees.    MARKELL Jacob.

## 2024-02-21 ENCOUNTER — OFFICE VISIT (OUTPATIENT)
Dept: MEDICAL GROUP | Facility: MEDICAL CENTER | Age: 84
End: 2024-02-21
Payer: MEDICARE

## 2024-02-21 ENCOUNTER — PATIENT OUTREACH (OUTPATIENT)
Dept: HEALTH INFORMATION MANAGEMENT | Facility: OTHER | Age: 84
End: 2024-02-21

## 2024-02-21 VITALS
BODY MASS INDEX: 29.93 KG/M2 | HEART RATE: 62 BPM | WEIGHT: 221 LBS | DIASTOLIC BLOOD PRESSURE: 62 MMHG | SYSTOLIC BLOOD PRESSURE: 110 MMHG | HEIGHT: 72 IN | RESPIRATION RATE: 16 BRPM | OXYGEN SATURATION: 97 % | TEMPERATURE: 96.5 F

## 2024-02-21 DIAGNOSIS — R41.3 MEMORY DIFFICULTY: ICD-10-CM

## 2024-02-21 DIAGNOSIS — I10 ESSENTIAL HYPERTENSION: ICD-10-CM

## 2024-02-21 DIAGNOSIS — K59.00 CONSTIPATION, UNSPECIFIED CONSTIPATION TYPE: ICD-10-CM

## 2024-02-21 DIAGNOSIS — F43.21 GRIEF: ICD-10-CM

## 2024-02-21 DIAGNOSIS — M51.37 DEGENERATION OF LUMBAR OR LUMBOSACRAL INTERVERTEBRAL DISC: ICD-10-CM

## 2024-02-21 DIAGNOSIS — K75.4 AUTOIMMUNE HEPATITIS (HCC): Chronic | ICD-10-CM

## 2024-02-21 DIAGNOSIS — M19.90 ARTHRITIS: ICD-10-CM

## 2024-02-21 DIAGNOSIS — E78.5 DYSLIPIDEMIA: ICD-10-CM

## 2024-02-21 DIAGNOSIS — G31.9 DEGENERATIVE DISEASE OF NERVOUS SYSTEM, UNSPECIFIED (HCC): ICD-10-CM

## 2024-02-21 DIAGNOSIS — R73.02 IGT (IMPAIRED GLUCOSE TOLERANCE): ICD-10-CM

## 2024-02-21 PROCEDURE — 3078F DIAST BP <80 MM HG: CPT | Performed by: FAMILY MEDICINE

## 2024-02-21 PROCEDURE — 1170F FXNL STATUS ASSESSED: CPT | Performed by: FAMILY MEDICINE

## 2024-02-21 PROCEDURE — 3074F SYST BP LT 130 MM HG: CPT | Performed by: FAMILY MEDICINE

## 2024-02-21 PROCEDURE — 99214 OFFICE O/P EST MOD 30 MIN: CPT | Performed by: FAMILY MEDICINE

## 2024-02-21 RX ORDER — METOPROLOL SUCCINATE 50 MG/1
50 TABLET, EXTENDED RELEASE ORAL NIGHTLY
Qty: 90 TABLET | Refills: 3 | Status: SHIPPED | OUTPATIENT
Start: 2024-02-21

## 2024-02-21 ASSESSMENT — FIBROSIS 4 INDEX: FIB4 SCORE: 2.06

## 2024-02-21 ASSESSMENT — PATIENT HEALTH QUESTIONNAIRE - PHQ9: CLINICAL INTERPRETATION OF PHQ2 SCORE: 0

## 2024-02-21 NOTE — PROGRESS NOTES
Verbal consent was acquired by the patient to use [employer] ambient listening note generation during this visit     Patient is a 84 y.o.male.established patient who presents today to discuss memory and labs.    History of Present Illness  The patient is an 84-year-old male who presents for evaluation of multiple medical concerns. He is accompanied by his daughter, Karolina.    He has short-term memory issues, but not long-term memory. He remembers schoolteacher's names the other day. He has a whiteboard and keeps track of things on all the time. He needs help getting his appointments on his phone. He can still remember what he had for breakfast. He loses places that he has not been before or only once. His daughter states he can not place it. He will have conversations and then in the next week, he will have the same conversation. He complains about it. His hearing is good, but when he takes his hearing aid out, it is hard for him to hear. He thinks he needs to exercise his brain more by reading and trying to remember what he reads. He likes to read about politics and rifle magazines. He is able to retain what he is reading and understand. He does not feel lost on his way home or when he is home. There has never been a time where he does not recognize his family. He takes CoQ10 and wonders if he should take more than 300 mg. He has been taking fish oil for years.    He has autoimmune hepatitis. He does not see a gastroenterologist. He had biopsies of his liver years ago. He quit drinking. He rarely drank beer and scotch.    He was getting his INR for his ablation, so his blood thinner is down. He is going through an ablation on his back. He did the first actual ablation last Friday. He will have the second half next Friday. His back hurts so much that he spends a lot of time sitting. He seems to be very active and constantly working on something. The last ablation helped longer. He does not have pain when he is  sitting. He has pain when he gets up and starts moving. He wants to get a back replacement. He can not lay on his back or stomach, but he can lay on his right side for half a night and then roll over and lay on his left side.    He was diabetic for a while right after he was prediabetic. He was prescribed to take insulin pills. He did not like it when his wife passed away in 2005. He sat in a chair and looked out of the window for almost a year.    He would like to get a tattoo on his finger. He has arthritis in his little finger.    He is taking 1 tablespoon of fiber supplement. He quit taking it for a while, but then his bowel movements got hard. He had to take mineral oil. He is still taking mineral oil. He does not think he is drinking enough water.   He does not drink alcohol.   He has had bilateral knee replacements.          /62   Pulse 62   Temp 35.8 °C (96.5 °F) (Temporal)   Resp 16   Ht 1.829 m (6')   Wt 100 kg (221 lb)   SpO2 97% , Body mass index is 29.97 kg/m².    Physical Exam  Vitals reviewed.   Constitutional:       General: He is not in acute distress.     Appearance: Normal appearance.   HENT:      Head: Normocephalic and atraumatic.   Cardiovascular:      Rate and Rhythm: Normal rate and regular rhythm.      Heart sounds: Normal heart sounds.   Pulmonary:      Effort: Pulmonary effort is normal. No respiratory distress.      Breath sounds: Normal breath sounds.   Skin:     General: Skin is warm and dry.   Neurological:      Mental Status: He is alert. Mental status is at baseline.   Psychiatric:         Mood and Affect: Mood normal.         Behavior: Behavior normal.              Results  Laboratory Studies  Labs from 11/2023 were reviewed with the patient.            Assessment & Plan    1. Memory difficulty  Discussed there is Namenda and Donepezil as potential medications. Discussed risks and benefits and just not sure it would be helpful. He was advised to watch out for confusion,  drowsiness, headaches, and agitation. He was encouraged to stay active both mentally and physically. Can refer to neurology when he is ready  - CBC WITHOUT DIFFERENTIAL; Future  - Comp Metabolic Panel; Future  - HEMOGLOBIN A1C; Future  - Lipid Profile; Future    2. Degenerative disease of nervous system, unspecified (HCC)  See #1    3. Autoimmune hepatitis (HCC)  Per Note in 2018 by Dr. Graham, patient had a liver biopsy that did not show any active disease but was still being called autoimmune hepatitis. He does have evidence for fatty liver disease. No jaundice, belly pain, nausea or vomiting. LFT's have been mildly elevated but seem to stay stable so far.  - Comp Metabolic Panel; Future    4. Lumbar Disc Degeneration  Chronic, hopeful that ablation will help provide relief. We all would like to avoid prescription pain medication.    5. IGT (impaired glucose tolerance)  Patient is careful with his diet  - Comp Metabolic Panel; Future  - HEMOGLOBIN A1C; Future    6. Dyslipidemia  This is a chronic condition, controlled. He is on a statin for primary prevention and tolerating it well. The  last cholesterol panel in August of 2023 was all within normal limits so we will continue the current dosing. Aspirin 81 mg and pravastatin 80 mg.  - Comp Metabolic Panel; Future  - Lipid Profile; Future    7. Essential hypertension  This is a chronic condition, controlled.  Blood pressure is at goal under 140/90 in the office today.  We will continue the current regimen. Metoprolol 50 mg.  - Comp Metabolic Panel; Future  - HEMOGLOBIN A1C; Future  - MICROALBUMIN CREAT RATIO URINE; Future  - metoprolol SR (TOPROL XL) 50 MG TABLET SR 24 HR; Take 1 Tablet by mouth every evening.  Dispense: 90 Tablet; Refill: 3    8. Arthritis  Chronic and stable, no signs of flare or infection.    9. Grief  Has been since 2005 since he lost his wife. He would like to get a tattoo on his left ring finger of her name or her initials. Requested he check  with his vascular medicine team as I do not know the rules of Warfarin and tattoos otherwise I would not deny this to him.    10. Constipation, unspecified constipation type  He was recommended to take a fiber supplement. He was advised to drink 64 ounces of water daily.       Return in about 6 months (around 8/21/2024), or if symptoms worsen or fail to improve, for Annual Medicare, Lab F/U.     This note was created using voice recognition software (Dragon). The accuracy of the dictation is limited by the abilities of the software. I have reviewed the note prior to signing, however some errors in grammar and context are still possible. If you have any questions related to this note please do not hesitate to contact our office.

## 2024-02-21 NOTE — PROGRESS NOTES
Community Health Worker Follow-Up    Reason for outreach: CHW met with pt in to introduce the CCM program.    CHW Interventions: CHW discussed the program with the pt and his Daughter. CHW discussed the benefits for the pt with the program.    Specific Resources Provided:  Housing/Shelter: n/a  Transportation: n/a  Food: n/a  Financial: n/a  Social Supports: n/a  Other: Pamphlet with the PCM information and number.    Plan: Pt and his daughter declined the program. CHW will not follow at this time.

## 2024-02-29 ENCOUNTER — ANTICOAGULATION VISIT (OUTPATIENT)
Dept: VASCULAR LAB | Facility: MEDICAL CENTER | Age: 84
End: 2024-02-29
Attending: INTERNAL MEDICINE
Payer: MEDICARE

## 2024-02-29 DIAGNOSIS — D68.69 SECONDARY HYPERCOAGULABLE STATE (HCC): Chronic | ICD-10-CM

## 2024-02-29 DIAGNOSIS — I48.0 PAROXYSMAL ATRIAL FIBRILLATION (HCC): Chronic | ICD-10-CM

## 2024-02-29 DIAGNOSIS — Z95.3 STATUS POST TRANSCATHETER AORTIC VALVE REPLACEMENT (TAVR) USING BIOPROSTHESIS: ICD-10-CM

## 2024-02-29 LAB — INR PPP: 1.8 (ref 2–3.5)

## 2024-02-29 PROCEDURE — 99212 OFFICE O/P EST SF 10 MIN: CPT | Performed by: NURSE PRACTITIONER

## 2024-02-29 PROCEDURE — 85610 PROTHROMBIN TIME: CPT

## 2024-02-29 NOTE — PROGRESS NOTES
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 Referring Cardiovascular Disease (Cardiology) [phone]    [facility] Anticoagulation Services Responsible  [phone]                  Refer to Patient Findings for HPI:  Patient Findings       Positives:  Upcoming invasive procedure, Missed doses    Negatives:  Signs/symptoms of thrombosis, Signs/symptoms of bleeding, Laboratory test error suspected, Change in health, Change in alcohol use, Change in activity, Emergency department visit, Upcoming dental procedure, Extra doses, Change in medications, Change in diet/appetite, Hospital admission, Bruising, Other complaints    Comments:  Has a back ablation tomorrow and needs INR < 3.0. He is taking a smaller than prescribed dose of warfarin by mistake.            There were no vitals filed for this visit.    Verified current warfarin dosing schedule.    Medications reconciled: Yes  Pt is on antiplatelet therapy with ASA 81 mg for TAVR per cardiology.      A/P   INR is slightly subtherapeutic. His INR is < 3.0 for his ablation tomorrow. Will have him take a one time dose increase tomorrow evening then began taking his previously intended  dose. Asked that he refer to his dosing calendar when taking his warfarin so he can ensure he's taking the correct dose. He verbalizes understanding.    Warfarin dosing recommendation: Take 4 mg tomorrow evening, then take 2 mg M-W-F, 4 mg AODs as outlined on your calendar.    Pt educated to contact our clinic with any changes in medications or s/s of bleeding or thrombosis. Pt is aware to seek immediate medical attention for falls, head injury or deep cuts.    Request pt to return in 1 week(s). Pt agrees.    MARKELL Jacob.

## 2024-03-08 ENCOUNTER — TELEPHONE (OUTPATIENT)
Dept: VASCULAR LAB | Facility: MEDICAL CENTER | Age: 84
End: 2024-03-08
Payer: MEDICARE

## 2024-03-14 ENCOUNTER — ANTICOAGULATION VISIT (OUTPATIENT)
Dept: VASCULAR LAB | Facility: MEDICAL CENTER | Age: 84
End: 2024-03-14
Attending: INTERNAL MEDICINE
Payer: MEDICARE

## 2024-03-14 DIAGNOSIS — Z95.3 STATUS POST TRANSCATHETER AORTIC VALVE REPLACEMENT (TAVR) USING BIOPROSTHESIS: ICD-10-CM

## 2024-03-14 DIAGNOSIS — I48.0 PAROXYSMAL ATRIAL FIBRILLATION (HCC): Chronic | ICD-10-CM

## 2024-03-14 DIAGNOSIS — D68.69 SECONDARY HYPERCOAGULABLE STATE (HCC): Chronic | ICD-10-CM

## 2024-03-14 LAB — INR PPP: 2.3 (ref 2–3.5)

## 2024-03-14 PROCEDURE — 85610 PROTHROMBIN TIME: CPT

## 2024-03-14 PROCEDURE — 99211 OFF/OP EST MAY X REQ PHY/QHP: CPT

## 2024-03-14 NOTE — PROGRESS NOTES
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 Referring Cardiovascular Disease (Cardiology) [phone]    [facility] Anticoagulation Services Responsible  [phone]            Refer to Patient Findings for HPI:  Patient Findings       Negatives:  Signs/symptoms of thrombosis, Signs/symptoms of bleeding, Laboratory test error suspected, Change in health, Change in alcohol use, Change in activity, Upcoming invasive procedure, Emergency department visit, Upcoming dental procedure, Missed doses, Extra doses, Change in medications, Change in diet/appetite, Hospital admission, Bruising, Other complaints            There were no vitals filed for this visit.  Pt declined vitals    Verified current warfarin dosing schedule.    Medications reconciled: No  Pt is on antiplatelet therapy with ASA 81 mg for TAVR per cardiology.       A/P   INR is therapeutic    Warfarin dosing recommendation: Continue regimen as listed above.    Pt educated to contact our clinic with any changes in medications or s/s of bleeding or thrombosis. Pt is aware to seek immediate medical attention for falls, head injury or deep cuts.    Follow up appointment in 3  week(s).    Robby Zarate, PharmD

## 2024-04-04 ENCOUNTER — ANTICOAGULATION VISIT (OUTPATIENT)
Dept: VASCULAR LAB | Facility: MEDICAL CENTER | Age: 84
End: 2024-04-04
Attending: INTERNAL MEDICINE
Payer: MEDICARE

## 2024-04-04 VITALS — HEART RATE: 62 BPM | SYSTOLIC BLOOD PRESSURE: 112 MMHG | DIASTOLIC BLOOD PRESSURE: 64 MMHG

## 2024-04-04 DIAGNOSIS — Z95.3 STATUS POST TRANSCATHETER AORTIC VALVE REPLACEMENT (TAVR) USING BIOPROSTHESIS: ICD-10-CM

## 2024-04-04 DIAGNOSIS — I48.0 PAROXYSMAL ATRIAL FIBRILLATION (HCC): Chronic | ICD-10-CM

## 2024-04-04 DIAGNOSIS — D68.69 SECONDARY HYPERCOAGULABLE STATE (HCC): Chronic | ICD-10-CM

## 2024-04-04 LAB — INR PPP: 1.8 (ref 2–3.5)

## 2024-04-04 PROCEDURE — 99212 OFFICE O/P EST SF 10 MIN: CPT | Performed by: NURSE PRACTITIONER

## 2024-04-04 PROCEDURE — 85610 PROTHROMBIN TIME: CPT

## 2024-04-04 NOTE — PROGRESS NOTES
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 Referring Cardiovascular Disease (Cardiology) [phone]    [facility]ticoagulation [facility] Responsible  [phone]                  Refer to Patient Findings for HPI:  Patient Findings       Negatives:  Signs/symptoms of thrombosis, Signs/symptoms of bleeding, Laboratory test error suspected, Change in health, Change in alcohol use, Change in activity, Upcoming invasive procedure, Emergency department visit, Upcoming dental procedure, Missed doses, Extra doses, Change in medications, Change in diet/appetite, Hospital admission, Bruising, Other complaints            Vitals:    24 0814   BP: 112/64   Pulse: 62     Verified current warfarin dosing schedule.    Medications reconciled: Yes  Pt is on antiplatelet therapy with ASA 81 mg for TAVR.      A/P   INR is subtherapeutic. Previous INR therapeutic. Unsure why INR decreased. Will have him take a one time dose increase.    Warfarin dosing recommendation: Take 4 mg tomorrow, then resume your previous regimen.    Pt educated to contact our clinic with any changes in medications or s/s of bleeding or thrombosis. Pt is aware to seek  immediate medical attention for falls, head injury or deep cuts.    Request pt to return in 2 week(s). Pt agrees.    MARKELL Jacob.

## 2024-04-18 ENCOUNTER — ANTICOAGULATION VISIT (OUTPATIENT)
Dept: VASCULAR LAB | Facility: MEDICAL CENTER | Age: 84
End: 2024-04-18
Attending: INTERNAL MEDICINE
Payer: MEDICARE

## 2024-04-18 DIAGNOSIS — D68.69 SECONDARY HYPERCOAGULABLE STATE (HCC): Chronic | ICD-10-CM

## 2024-04-18 DIAGNOSIS — Z95.3 STATUS POST TRANSCATHETER AORTIC VALVE REPLACEMENT (TAVR) USING BIOPROSTHESIS: ICD-10-CM

## 2024-04-18 DIAGNOSIS — I48.0 PAROXYSMAL ATRIAL FIBRILLATION (HCC): Chronic | ICD-10-CM

## 2024-04-18 LAB — INR PPP: 2.5 (ref 2–3.5)

## 2024-04-18 PROCEDURE — 99211 OFF/OP EST MAY X REQ PHY/QHP: CPT

## 2024-04-18 PROCEDURE — 85610 PROTHROMBIN TIME: CPT

## 2024-04-18 NOTE — PROGRESS NOTES
Anticoagulation Summary  As of 2024      INR goal:  2.0-3.0   TTR:  62.0% (7.5 y)   INR used for dosin.50 (2024)   Warfarin maintenance plan:  2 mg (4 mg x 0.5) every Mon, Wed, Fri; 4 mg (4 mg x 1) all other days   Weekly warfarin total:  22 mg   Plan last modified:  CLAYTON Jacob (1/3/2024)   Next INR check:  2024   Target end date:  Indefinite    Indications    Status post transcatheter aortic valve replacement (TAVR) using bioprosthesis [Z95.3]  Atrial fibrillation (HCC) [I48.91]  Secondary hypercoagulable state (HCC) [D68.69]                 Anticoagulation Episode Summary       INR check location:      Preferred lab:      Send INR reminders to:      Comments:  Pt goes by Kinsey renee          Anticoagulation Care Providers       Provider Role Specialty Phone number    Vickey Rutherford M.D. Referring Cardiovascular Disease (Cardiology) [phone]    [facility] Anticoagulation Services Responsible  [phone]          Refer to Patient Findings for HPI:  Patient Findings       Negatives:  Signs/symptoms of thrombosis, Signs/symptoms of bleeding, Laboratory test error suspected, Change in health, Change in alcohol use, Change in activity, Upcoming invasive procedure, Emergency department visit, Upcoming dental procedure, Missed doses, Extra doses, Change in medications, Change in diet/appetite, Hospital admission, Bruising, Other complaints            There were no vitals filed for this visit.  Pt declined vitals    Verified current warfarin dosing schedule.    Medications reconciled: Yes  Pt is on antiplatelet therapy with ASA 81 mg for TAVR.       A/P   INR is therapeutic    Warfarin dosing recommendation: Continue regimen as listed above.    Pt educated to contact our clinic with any changes in medications or s/s of bleeding or thrombosis. Pt is aware to seek immediate medical attention for falls, head injury or deep cuts.    Follow up appointment in 3 week(s).    Robby KUMAR  Salonga, PharmD

## 2024-05-03 ENCOUNTER — OFFICE VISIT (OUTPATIENT)
Dept: MEDICAL GROUP | Facility: MEDICAL CENTER | Age: 84
End: 2024-05-03
Payer: MEDICARE

## 2024-05-03 ENCOUNTER — HOSPITAL ENCOUNTER (OUTPATIENT)
Dept: LAB | Facility: MEDICAL CENTER | Age: 84
End: 2024-05-03
Attending: FAMILY MEDICINE
Payer: MEDICARE

## 2024-05-03 VITALS
RESPIRATION RATE: 16 BRPM | WEIGHT: 219 LBS | BODY MASS INDEX: 29.03 KG/M2 | OXYGEN SATURATION: 97 % | HEIGHT: 73 IN | DIASTOLIC BLOOD PRESSURE: 58 MMHG | SYSTOLIC BLOOD PRESSURE: 110 MMHG | HEART RATE: 73 BPM | TEMPERATURE: 97.1 F

## 2024-05-03 DIAGNOSIS — R19.5 CHANGE IN STOOL: ICD-10-CM

## 2024-05-03 DIAGNOSIS — K75.4 AUTOIMMUNE HEPATITIS (HCC): Chronic | ICD-10-CM

## 2024-05-03 DIAGNOSIS — R54 AGE-RELATED PHYSICAL DEBILITY: ICD-10-CM

## 2024-05-03 LAB — ERYTHROCYTE [SEDIMENTATION RATE] IN BLOOD BY WESTERGREN METHOD: 8 MM/HOUR (ref 0–20)

## 2024-05-03 PROCEDURE — 1170F FXNL STATUS ASSESSED: CPT | Performed by: FAMILY MEDICINE

## 2024-05-03 PROCEDURE — 3074F SYST BP LT 130 MM HG: CPT | Performed by: FAMILY MEDICINE

## 2024-05-03 PROCEDURE — 99214 OFFICE O/P EST MOD 30 MIN: CPT | Performed by: FAMILY MEDICINE

## 2024-05-03 PROCEDURE — 3078F DIAST BP <80 MM HG: CPT | Performed by: FAMILY MEDICINE

## 2024-05-03 ASSESSMENT — FIBROSIS 4 INDEX: FIB4 SCORE: 2.06

## 2024-05-03 NOTE — PROGRESS NOTES
"Verbal consent was acquired by the patient to use [employer] ambient listening note generation during this visit    Subjective:     CC: \"yellow stool\"    History of Present Illness  The patient is an 84-year-old male who presents for evaluation of multiple medical concerns. He is accompanied by his grandson.    The patient reports the recent change in his stool color, noting a yellow hue, which he first noticed approximately 3 weeks ago. Initially, the color was brownish-yellow, but it has since darkened to brown. He continues to experience mild constipation, which he manages with mineral oil. He denies experiencing abdominal pain, but describes his usual urge to defecate. He denies any presence of blood in his stools and reports no changes in his diet, with the exception of occasional consumption of greens. He denies any upper abdominal pain, except during flatulence. He has not previously consulted with a gastroenterologist.    The patient has observed sloughing skin on his abdomen, which he manages with Cetaphil. He denies any associated redness or rash.    The patient reports instability in his feet, which he attributes to an ablation procedure on his back. The accompanying adult female notes that shandra injections have provided relief for a longer duration than anticipated. The patient experiences difficulty walking to the neighbor's residence, necessitating rest due to severe back pain. He owns a walker, but does not use it, instead opting to use a cane. He expresses interest in attending a gym, having only attended a gym for aortic valve replacement 10 years ago. He has a treadmill and free weights at home.    Supplemental Information  He was in the hospital on 02/09/2024 for 4 days due to sepsis from a UTI. He was unable to get up off the floor due to lack of strength. He had prostate surgery.          Objective:     Exam:  /58   Pulse 73   Temp 36.2 °C (97.1 °F) (Temporal)   Resp 16   Ht 1.854 m " "(6' 1\")   Wt 99.3 kg (219 lb)   SpO2 97%   BMI 28.89 kg/m²  Body mass index is 28.89 kg/m².    Physical Exam  Vitals reviewed.   Constitutional:       General: He is not in acute distress.     Appearance: Normal appearance.   HENT:      Head: Normocephalic and atraumatic.   Cardiovascular:      Rate and Rhythm: Normal rate and regular rhythm.      Heart sounds: Normal heart sounds.   Pulmonary:      Effort: Pulmonary effort is normal. No respiratory distress.      Breath sounds: Normal breath sounds.   Abdominal:      General: Bowel sounds are normal. There is no distension.      Palpations: Abdomen is soft.      Tenderness: There is no abdominal tenderness. There is no guarding.   Skin:     General: Skin is warm and dry.   Neurological:      Mental Status: He is alert. Mental status is at baseline.      Gait: Gait abnormal.   Psychiatric:         Mood and Affect: Mood normal.         Behavior: Behavior normal.               Results        Assessment & Plan:       1. Change in stool  - Referral to Gastroenterology  - HEPATIC FUNCTION PANEL; Future  - LIPASE; Future  - Basic Metabolic Panel; Future  - CRP QUANTITIVE (NON-CARDIAC); Future  - Sed Rate; Future  - CALPROTECTIN,FECAL; Future    2. Autoimmune hepatitis (HCC)  - Referral to Gastroenterology  - HEPATIC FUNCTION PANEL; Future  - LIPASE; Future  - Basic Metabolic Panel; Future  - CRP QUANTITIVE (NON-CARDIAC); Future  - Sed Rate; Future  - CALPROTECTIN,FECAL; Future    3. Age-related physical debility      Assessment & Plan  1. Changes in stool color.  The patient was informed that the color of his stool could be indicative of a concerning disease process, and its characteristics varying, duration, and characteristics. He was educated about the potential causes of yellow stools, such as the consumption of yellow-colored foods, and potential issues with bile or fat malabsorption. Non-fasting laboratory tests will be ordered to assess liver enzymes, fecal " calprotectin, and lipase levels. A referral to a gastroenterologist will be made for further evaluation. Should any abnormalities be detected, follow-up or imaging may be considered.    2. Unsteadiness.  The patient was advised to increase his physical activity to prevent falls. He was also advised to focus on strengthening his back and balance, and to practice squatting motion to keep his legs strong. He was also advised to use his walker as a tool to improve his mobility.         Return if symptoms worsen or fail to improve.      This note was created using voice recognition software (Dragon). The accuracy of the dictation is limited by the abilities of the software. I have reviewed the note prior to signing, however some errors in grammar and context are still possible. If you have any questions related to this note please do not hesitate to contact our office.

## 2024-05-04 LAB
ALBUMIN SERPL BCP-MCNC: 4 G/DL (ref 3.2–4.9)
ALP SERPL-CCNC: 104 U/L (ref 30–99)
ALT SERPL-CCNC: 58 U/L (ref 2–50)
ANION GAP SERPL CALC-SCNC: 11 MMOL/L (ref 7–16)
AST SERPL-CCNC: 55 U/L (ref 12–45)
BILIRUB CONJ SERPL-MCNC: <0.2 MG/DL (ref 0.1–0.5)
BILIRUB INDIRECT SERPL-MCNC: ABNORMAL MG/DL (ref 0–1)
BILIRUB SERPL-MCNC: 0.4 MG/DL (ref 0.1–1.5)
BUN SERPL-MCNC: 16 MG/DL (ref 8–22)
CALCIUM SERPL-MCNC: 9.3 MG/DL (ref 8.5–10.5)
CHLORIDE SERPL-SCNC: 105 MMOL/L (ref 96–112)
CO2 SERPL-SCNC: 24 MMOL/L (ref 20–33)
CREAT SERPL-MCNC: 0.6 MG/DL (ref 0.5–1.4)
CRP SERPL HS-MCNC: <0.3 MG/DL (ref 0–0.75)
GFR SERPLBLD CREATININE-BSD FMLA CKD-EPI: 94 ML/MIN/1.73 M 2
GLUCOSE SERPL-MCNC: 78 MG/DL (ref 65–99)
LIPASE SERPL-CCNC: 12 U/L (ref 11–82)
POTASSIUM SERPL-SCNC: 4.6 MMOL/L (ref 3.6–5.5)
PROT SERPL-MCNC: 7.2 G/DL (ref 6–8.2)
SODIUM SERPL-SCNC: 140 MMOL/L (ref 135–145)

## 2024-05-06 ENCOUNTER — HOSPITAL ENCOUNTER (OUTPATIENT)
Facility: MEDICAL CENTER | Age: 84
End: 2024-05-06
Attending: FAMILY MEDICINE
Payer: MEDICARE

## 2024-05-06 DIAGNOSIS — K75.4 AUTOIMMUNE HEPATITIS (HCC): Chronic | ICD-10-CM

## 2024-05-06 DIAGNOSIS — R19.5 CHANGE IN STOOL: ICD-10-CM

## 2024-05-08 LAB — CALPROTECTIN STL-MCNT: 29 UG/G

## 2024-05-09 ENCOUNTER — ANTICOAGULATION VISIT (OUTPATIENT)
Dept: VASCULAR LAB | Facility: MEDICAL CENTER | Age: 84
End: 2024-05-09
Attending: INTERNAL MEDICINE
Payer: MEDICARE

## 2024-05-09 VITALS — HEART RATE: 73 BPM | SYSTOLIC BLOOD PRESSURE: 108 MMHG | DIASTOLIC BLOOD PRESSURE: 58 MMHG

## 2024-05-09 DIAGNOSIS — Z95.3 STATUS POST TRANSCATHETER AORTIC VALVE REPLACEMENT (TAVR) USING BIOPROSTHESIS: ICD-10-CM

## 2024-05-09 DIAGNOSIS — I48.0 PAROXYSMAL ATRIAL FIBRILLATION (HCC): Chronic | ICD-10-CM

## 2024-05-09 DIAGNOSIS — D68.69 SECONDARY HYPERCOAGULABLE STATE (HCC): Chronic | ICD-10-CM

## 2024-05-09 LAB — INR PPP: 2.1 (ref 2–3.5)

## 2024-05-09 NOTE — PROGRESS NOTES
Anticoagulation Summary  As of 2024      INR goal:  2.0-3.0   TTR:  62.3% (7.5 y)   INR used for dosin.10 (2024)   Warfarin maintenance plan:  2 mg (4 mg x 0.5) every Mon, Wed, Fri; 4 mg (4 mg x 1) all other days   Weekly warfarin total:  22 mg   Plan last modified:  CLAYTON Jacob (1/3/2024)   Next INR check:  2024   Target end date:  Indefinite    Indications    Status post transcatheter aortic valve replacement (TAVR) using bioprosthesis [Z95.3]  Atrial fibrillation (HCC) [I48.91]  Secondary hypercoagulable state (HCC) [D68.69]                 Anticoagulation Episode Summary       INR check location:      Preferred lab:      Send INR reminders to:      Comments:  Pt goes by Kinsey renee          Anticoagulation Care Providers       Provider Role Specialty Phone number    Vickey Rutherford M.D. Referring Cardiovascular Disease (Cardiology) [phone]    [facility] Anticoagulation Services Responsible  [phone]                  Refer to Patient Findings for HPI:  Patient Findings       Negatives:  Signs/symptoms of thrombosis, Signs/symptoms of bleeding, Laboratory test error suspected, Change in health, Change in alcohol use, Change in activity, Upcoming invasive procedure, Emergency department visit, Upcoming dental procedure, Missed doses, Extra doses, Change in medications, Change in diet/appetite, Hospital admission, Bruising, Other complaints            Vitals:    24 0757   BP: 108/58   Pulse: 73       Verified current warfarin dosing schedule.    Medications reconciled: Yes  Pt is on antiplatelet therapy with ASA 81 mg  for TAVR per cards.      A/P   INR is therapeutic    Warfarin dosing recommendation: Continue regimen as listed above.    Pt educated to contact our clinic with any changes in medications or s/s of bleeding or thrombosis. Pt is aware to seek immediate medical attention for falls, head injury or deep cuts.    Request pt to return in 3 week(s). Pt  agrees.    CLAYTON Jacob

## 2024-05-16 ENCOUNTER — HOSPITAL ENCOUNTER (OUTPATIENT)
Dept: LAB | Facility: MEDICAL CENTER | Age: 84
End: 2024-05-16
Attending: INTERNAL MEDICINE
Payer: MEDICARE

## 2024-05-16 LAB
ALBUMIN SERPL BCP-MCNC: 4 G/DL (ref 3.2–4.9)
ALP SERPL-CCNC: 100 U/L (ref 30–99)
ALT SERPL-CCNC: 61 U/L (ref 2–50)
AST SERPL-CCNC: 58 U/L (ref 12–45)
BASOPHILS # BLD AUTO: 0.7 % (ref 0–1.8)
BASOPHILS # BLD: 0.06 K/UL (ref 0–0.12)
BILIRUB CONJ SERPL-MCNC: 0.2 MG/DL (ref 0.1–0.5)
BILIRUB INDIRECT SERPL-MCNC: 0.8 MG/DL (ref 0–1)
BILIRUB SERPL-MCNC: 1 MG/DL (ref 0.1–1.5)
EOSINOPHIL # BLD AUTO: 0.32 K/UL (ref 0–0.51)
EOSINOPHIL NFR BLD: 4 % (ref 0–6.9)
ERYTHROCYTE [DISTWIDTH] IN BLOOD BY AUTOMATED COUNT: 47.9 FL (ref 35.9–50)
HCT VFR BLD AUTO: 48 % (ref 42–52)
HGB BLD-MCNC: 15.9 G/DL (ref 14–18)
IMM GRANULOCYTES # BLD AUTO: 0.03 K/UL (ref 0–0.11)
IMM GRANULOCYTES NFR BLD AUTO: 0.4 % (ref 0–0.9)
LIPASE SERPL-CCNC: 12 U/L (ref 11–82)
LYMPHOCYTES # BLD AUTO: 2.24 K/UL (ref 1–4.8)
LYMPHOCYTES NFR BLD: 27.9 % (ref 22–41)
MCH RBC QN AUTO: 31.8 PG (ref 27–33)
MCHC RBC AUTO-ENTMCNC: 33.1 G/DL (ref 32.3–36.5)
MCV RBC AUTO: 96 FL (ref 81.4–97.8)
MONOCYTES # BLD AUTO: 0.8 K/UL (ref 0–0.85)
MONOCYTES NFR BLD AUTO: 10 % (ref 0–13.4)
NEUTROPHILS # BLD AUTO: 4.59 K/UL (ref 1.82–7.42)
NEUTROPHILS NFR BLD: 57 % (ref 44–72)
NRBC # BLD AUTO: 0 K/UL
NRBC BLD-RTO: 0 /100 WBC (ref 0–0.2)
PLATELET # BLD AUTO: 285 K/UL (ref 164–446)
PMV BLD AUTO: 10.5 FL (ref 9–12.9)
PROT SERPL-MCNC: 7.1 G/DL (ref 6–8.2)
RBC # BLD AUTO: 5 M/UL (ref 4.7–6.1)
WBC # BLD AUTO: 8 K/UL (ref 4.8–10.8)

## 2024-05-18 LAB
AFP-TM SERPL-MCNC: 3 NG/ML (ref 0–9)
HGB A1 MFR BLD: 97.3 % (ref 95–97.9)
HGB A2 MFR BLD: 2.6 % (ref 2–3.5)
HGB C MFR BLD: 0 % (ref 0–0)
HGB E MFR BLD: 0 % (ref 0–0)
HGB F MFR BLD: 0.1 % (ref 0–2.1)
HGB FRACT BLD ELPH-IMP: NORMAL
HGB OTHER MFR BLD: 0 % (ref 0–0)
HGB S BLD QL SOLY: NORMAL
HGB S MFR BLD: 0 % (ref 0–0)
PATH INTERP BLD-IMP: NORMAL

## 2024-05-24 ENCOUNTER — HOSPITAL ENCOUNTER (OUTPATIENT)
Dept: RADIOLOGY | Facility: MEDICAL CENTER | Age: 84
End: 2024-05-24
Attending: INTERNAL MEDICINE
Payer: MEDICARE

## 2024-05-24 DIAGNOSIS — K86.2 PANCREATIC CYST: ICD-10-CM

## 2024-05-24 DIAGNOSIS — D49.0 IPMN (INTRADUCTAL PAPILLARY MUCINOUS NEOPLASM): ICD-10-CM

## 2024-05-24 RX ADMIN — IOHEXOL 100 ML: 350 INJECTION, SOLUTION INTRAVENOUS at 10:06

## 2024-05-30 ENCOUNTER — DOCUMENTATION (OUTPATIENT)
Dept: VASCULAR LAB | Facility: MEDICAL CENTER | Age: 84
End: 2024-05-30
Payer: MEDICARE

## 2024-05-30 NOTE — PROGRESS NOTES
Pt missed his INR appt this AM. Spoke with him by phone and rescheduled for tomorrow.    Britney DOE

## 2024-05-31 ENCOUNTER — ANTICOAGULATION VISIT (OUTPATIENT)
Dept: VASCULAR LAB | Facility: MEDICAL CENTER | Age: 84
End: 2024-05-31
Attending: INTERNAL MEDICINE
Payer: MEDICARE

## 2024-05-31 DIAGNOSIS — D68.69 SECONDARY HYPERCOAGULABLE STATE (HCC): Chronic | ICD-10-CM

## 2024-05-31 DIAGNOSIS — Z95.3 STATUS POST TRANSCATHETER AORTIC VALVE REPLACEMENT (TAVR) USING BIOPROSTHESIS: ICD-10-CM

## 2024-05-31 LAB — INR PPP: 1.7 (ref 2–3.5)

## 2024-05-31 NOTE — PROGRESS NOTES
Anticoagulation Summary  As of 2024      INR goal:  2.0-3.0   TTR:  62.0% (7.6 y)   INR used for dosin.70 (2024)   Warfarin maintenance plan:  2 mg (4 mg x 0.5) every Mon, Wed, Fri; 4 mg (4 mg x 1) all other days   Weekly warfarin total:  22 mg   Plan last modified:  CLAYTON Jacob (1/3/2024)   Next INR check:  2024   Target end date:  Indefinite    Indications    Status post transcatheter aortic valve replacement (TAVR) using bioprosthesis [Z95.3]  Atrial fibrillation (HCC) [I48.91]  Secondary hypercoagulable state (HCC) [D68.69]                 Anticoagulation Episode Summary       INR check location:      Preferred lab:      Send INR reminders to:      Comments:  Pt goes by Kinsey renee          Anticoagulation Care Providers       Provider Role Specialty Phone number    Vickey Rutherford M.D. Referring Cardiovascular Disease (Cardiology) [phone]    [facility] Anticoagulation Services Responsible  [phone]            Refer to Patient Findings for HPI:  Patient Findings       Positives:  Missed doses    Negatives:  Signs/symptoms of thrombosis, Signs/symptoms of bleeding, Laboratory test error suspected, Change in health, Change in alcohol use, Change in activity, Upcoming invasive procedure, Emergency department visit, Upcoming dental procedure, Extra doses, Change in medications, Change in diet/appetite, Hospital admission, Bruising, Other complaints            There were no vitals filed for this visit.  Pt declined vitals    Verified current warfarin dosing schedule.    Medications reconciled: Yes  Pt is on antiplatelet therapy with ASA 81 mg  for TAVR per cards.       A/P   INR is slightly subtherapeutic most likely due to missed doses    Warfarin dosing recommendation: Bolus tonight with 4 mg, then Pt is to continue with current warfarin dosing regimen.      Pt educated to contact our clinic with any changes in medications or s/s of bleeding or thrombosis. Pt is aware to  seek immediate medical attention for falls, head injury or deep cuts.    Request pt to return in 2 week(s). Pt agrees.    Sergio Pan, AbhishekD

## 2024-06-11 ENCOUNTER — APPOINTMENT (RX ONLY)
Dept: URBAN - METROPOLITAN AREA CLINIC 4 | Facility: CLINIC | Age: 84
Setting detail: DERMATOLOGY
End: 2024-06-11

## 2024-06-11 DIAGNOSIS — Z85.828 PERSONAL HISTORY OF OTHER MALIGNANT NEOPLASM OF SKIN: ICD-10-CM

## 2024-06-11 DIAGNOSIS — L82.0 INFLAMED SEBORRHEIC KERATOSIS: ICD-10-CM

## 2024-06-11 PROCEDURE — ? DIAGNOSIS COMMENT

## 2024-06-11 PROCEDURE — ? ADDITIONAL NOTES

## 2024-06-11 PROCEDURE — 17111 DESTRUCTION B9 LESIONS 15/>: CPT

## 2024-06-11 PROCEDURE — ? LIQUID NITROGEN

## 2024-06-11 PROCEDURE — ? COUNSELING

## 2024-06-11 ASSESSMENT — LOCATION DETAILED DESCRIPTION DERM
LOCATION DETAILED: RIGHT INFERIOR PARIETAL SCALP
LOCATION DETAILED: RIGHT CENTRAL LATERAL NECK
LOCATION DETAILED: RIGHT LATERAL FRONTAL SCALP
LOCATION DETAILED: LEFT SUPERIOR FRONTAL SCALP
LOCATION DETAILED: LEFT INFERIOR MEDIAL LOWER BACK
LOCATION DETAILED: LEFT CENTRAL FRONTAL SCALP
LOCATION DETAILED: RIGHT DISTAL RADIAL DORSAL FOREARM
LOCATION DETAILED: RIGHT INFERIOR UPPER BACK
LOCATION DETAILED: RIGHT MEDIAL MALAR CHEEK
LOCATION DETAILED: RIGHT SUPERIOR OCCIPITAL SCALP
LOCATION DETAILED: LEFT CLAVICULAR SKIN
LOCATION DETAILED: RIGHT SUPERIOR FRONTAL SCALP
LOCATION DETAILED: LEFT VENTRAL PROXIMAL FOREARM
LOCATION DETAILED: RIGHT SUPERIOR UPPER BACK
LOCATION DETAILED: LEFT INFERIOR LATERAL FOREHEAD
LOCATION DETAILED: LEFT POSTERIOR PARIETAL SCALP
LOCATION DETAILED: RIGHT CLAVICULAR SKIN
LOCATION DETAILED: LEFT CLAVICULAR NECK
LOCATION DETAILED: RIGHT CENTRAL PARIETAL SCALP
LOCATION DETAILED: NASAL DORSUM
LOCATION DETAILED: LEFT CENTRAL TEMPLE
LOCATION DETAILED: LEFT SUPERIOR PARIETAL SCALP
LOCATION DETAILED: LEFT CENTRAL PARIETAL SCALP
LOCATION DETAILED: POSTERIOR MID-PARIETAL SCALP
LOCATION DETAILED: RIGHT MID-UPPER BACK
LOCATION DETAILED: RIGHT PROXIMAL RADIAL DORSAL FOREARM
LOCATION DETAILED: INFERIOR THORACIC SPINE
LOCATION DETAILED: LEFT SUPERIOR UPPER BACK
LOCATION DETAILED: RIGHT INFERIOR ANTERIOR NECK
LOCATION DETAILED: RIGHT SUPERIOR MEDIAL UPPER BACK
LOCATION DETAILED: LEFT SUPERIOR MEDIAL UPPER BACK
LOCATION DETAILED: LEFT LATERAL FOREHEAD
LOCATION DETAILED: RIGHT SUPERIOR PARIETAL SCALP
LOCATION DETAILED: RIGHT CENTRAL FRONTAL SCALP
LOCATION DETAILED: RIGHT CLAVICULAR NECK
LOCATION DETAILED: LEFT INFERIOR MEDIAL UPPER BACK

## 2024-06-11 ASSESSMENT — LOCATION SIMPLE DESCRIPTION DERM
LOCATION SIMPLE: LEFT UPPER BACK
LOCATION SIMPLE: NOSE
LOCATION SIMPLE: LEFT CLAVICULAR SKIN
LOCATION SIMPLE: RIGHT SCALP
LOCATION SIMPLE: LEFT FOREARM
LOCATION SIMPLE: LEFT FOREHEAD
LOCATION SIMPLE: RIGHT CHEEK
LOCATION SIMPLE: SCALP
LOCATION SIMPLE: RIGHT ANTERIOR NECK
LOCATION SIMPLE: NECK
LOCATION SIMPLE: RIGHT CLAVICULAR SKIN
LOCATION SIMPLE: LEFT TEMPLE
LOCATION SIMPLE: LEFT LOWER BACK
LOCATION SIMPLE: UPPER BACK
LOCATION SIMPLE: LEFT ANTERIOR NECK
LOCATION SIMPLE: RIGHT UPPER BACK
LOCATION SIMPLE: POSTERIOR SCALP
LOCATION SIMPLE: RIGHT FOREARM

## 2024-06-11 ASSESSMENT — LOCATION ZONE DERM
LOCATION ZONE: NOSE
LOCATION ZONE: FACE
LOCATION ZONE: SCALP
LOCATION ZONE: TRUNK
LOCATION ZONE: ARM
LOCATION ZONE: NECK

## 2024-06-11 NOTE — PROCEDURE: DIAGNOSIS COMMENT
Render Risk Assessment In Note?: no
Comment: [id number] injected with Bleo 10/06/21 with Dr. Patterson
Detail Level: Simple

## 2024-06-11 NOTE — PROCEDURE: LIQUID NITROGEN
Medical Necessity Information: It is in your best interest to select a reason for this procedure from the list below. All of these items fulfill various CMS LCD requirements except the new and changing color options.
Consent: The patient's consent was obtained including but not limited to risks of crusting, scabbing, blistering, scarring, darker or lighter pigmentary change, recurrence, incomplete removal and infection.
Show Spray Paint Technique Variable?: Yes
Post-Care Instructions: I reviewed with the patient in detail post-care instructions. Patient is to wear sunprotection, and avoid picking at any of the treated lesions. Pt may apply Vaseline to crusted or scabbing areas.
Render Post-Care Instructions In Note?: no
Detail Level: Detailed
Spray Paint Text: The liquid nitrogen was applied to the skin utilizing a spray paint frosting technique.
Medical Necessity Clause: This procedure was medically necessary because the lesions that were treated were: inflamed and itchy and very bothersome

## 2024-06-11 NOTE — PROCEDURE: ADDITIONAL NOTES
Detail Level: Simple
Render Risk Assessment In Note?: no
Additional Notes: [id number] Mohs with Dr. Patterson

## 2024-06-14 ENCOUNTER — ANTICOAGULATION VISIT (OUTPATIENT)
Dept: VASCULAR LAB | Facility: MEDICAL CENTER | Age: 84
End: 2024-06-14
Attending: INTERNAL MEDICINE
Payer: MEDICARE

## 2024-06-14 VITALS — SYSTOLIC BLOOD PRESSURE: 99 MMHG | HEART RATE: 68 BPM | DIASTOLIC BLOOD PRESSURE: 61 MMHG

## 2024-06-14 DIAGNOSIS — I48.0 PAROXYSMAL ATRIAL FIBRILLATION (HCC): Chronic | ICD-10-CM

## 2024-06-14 DIAGNOSIS — Z95.3 STATUS POST TRANSCATHETER AORTIC VALVE REPLACEMENT (TAVR) USING BIOPROSTHESIS: ICD-10-CM

## 2024-06-14 DIAGNOSIS — D68.69 SECONDARY HYPERCOAGULABLE STATE (HCC): Chronic | ICD-10-CM

## 2024-06-14 LAB — INR PPP: 1.7 (ref 2–3.5)

## 2024-06-14 PROCEDURE — 99212 OFFICE O/P EST SF 10 MIN: CPT

## 2024-06-14 PROCEDURE — 85610 PROTHROMBIN TIME: CPT

## 2024-06-14 NOTE — PROGRESS NOTES
Anticoagulation Summary  As of 2024      INR goal:  2.0-3.0   TTR:  61.7% (7.6 y)   INR used for dosin.70 (2024)   Warfarin maintenance plan:  2 mg (4 mg x 0.5) every Mon, Wed; 4 mg (4 mg x 1) all other days   Weekly warfarin total:  24 mg   Plan last modified:  Sergio Pan, PharmD (2024)   Next INR check:  2024   Target end date:  Indefinite    Indications    Status post transcatheter aortic valve replacement (TAVR) using bioprosthesis [Z95.3]  Atrial fibrillation (HCC) [I48.91]  Secondary hypercoagulable state (HCC) [D68.69]                 Anticoagulation Episode Summary       INR check location:      Preferred lab:      Send INR reminders to:      Comments:  Pt goes by Dyke  ASA daily          Anticoagulation Care Providers       Provider Role Specialty Phone number    Vickey Rutherford M.D. Referring Cardiovascular Disease (Cardiology) [phone]    [facility] Anticoagulation Services Responsible  [phone]            Refer to Patient Findings for HPI:  Patient Findings       Negatives:  Signs/symptoms of thrombosis, Signs/symptoms of bleeding, Laboratory test error suspected, Change in health, Change in alcohol use, Change in activity, Upcoming invasive procedure, Emergency department visit, Upcoming dental procedure, Missed doses, Extra doses, Change in medications, Change in diet/appetite, Hospital admission, Bruising, Other complaints            Vitals:    24 0819   BP: 99/61   Pulse: 68         Verified current warfarin dosing schedule.    Medications reconciled: Yes  Pt is on antiplatelet therapy with ASA 81 mg  for TAVR per cards.       A/P   INR is subtherapeutic    Warfarin dosing recommendation: Increase to weekly regimen by ~9% to 2 mg Mon/Wed, 4 mg all other days     Pt educated to contact our clinic with any changes in medications or s/s of bleeding or thrombosis. Pt is aware to seek immediate medical attention for falls, head injury or deep  cuts.    Request pt to return in 2 week(s). Pt agrees.    Abhishek OchoaD

## 2024-06-25 ENCOUNTER — HOSPITAL ENCOUNTER (OUTPATIENT)
Dept: LAB | Facility: MEDICAL CENTER | Age: 84
End: 2024-06-25
Attending: INTERNAL MEDICINE
Payer: MEDICARE

## 2024-06-25 PROCEDURE — 86015 ACTIN ANTIBODY EACH: CPT

## 2024-06-25 PROCEDURE — 86256 FLUORESCENT ANTIBODY TITER: CPT

## 2024-06-25 PROCEDURE — 86381 MITOCHONDRIAL ANTIBODY EACH: CPT

## 2024-06-25 PROCEDURE — 36415 COLL VENOUS BLD VENIPUNCTURE: CPT

## 2024-06-26 LAB — MITOCHONDRIA M2 IGG SER-ACNC: 120.3 UNITS (ref 0–24.9)

## 2024-06-27 LAB
SMA IGG SER-ACNC: >100 UNITS (ref 0–19)
SMOOTH MUSCLE IGG TITR SER: ABNORMAL {TITER}

## 2024-06-28 ENCOUNTER — ANTICOAGULATION VISIT (OUTPATIENT)
Dept: VASCULAR LAB | Facility: MEDICAL CENTER | Age: 84
End: 2024-06-28
Attending: INTERNAL MEDICINE
Payer: MEDICARE

## 2024-06-28 ENCOUNTER — HOSPITAL ENCOUNTER (OUTPATIENT)
Dept: LAB | Facility: MEDICAL CENTER | Age: 84
End: 2024-06-28
Attending: FAMILY MEDICINE
Payer: MEDICARE

## 2024-06-28 VITALS — HEART RATE: 76 BPM | DIASTOLIC BLOOD PRESSURE: 57 MMHG | SYSTOLIC BLOOD PRESSURE: 98 MMHG

## 2024-06-28 DIAGNOSIS — E78.5 DYSLIPIDEMIA: ICD-10-CM

## 2024-06-28 DIAGNOSIS — R41.3 MEMORY DIFFICULTY: ICD-10-CM

## 2024-06-28 DIAGNOSIS — Z95.3 STATUS POST TRANSCATHETER AORTIC VALVE REPLACEMENT (TAVR) USING BIOPROSTHESIS: ICD-10-CM

## 2024-06-28 DIAGNOSIS — R73.02 IGT (IMPAIRED GLUCOSE TOLERANCE): ICD-10-CM

## 2024-06-28 DIAGNOSIS — I10 ESSENTIAL HYPERTENSION: ICD-10-CM

## 2024-06-28 DIAGNOSIS — K75.4 AUTOIMMUNE HEPATITIS (HCC): Chronic | ICD-10-CM

## 2024-06-28 DIAGNOSIS — D68.69 SECONDARY HYPERCOAGULABLE STATE (HCC): Chronic | ICD-10-CM

## 2024-06-28 DIAGNOSIS — I48.0 PAROXYSMAL ATRIAL FIBRILLATION (HCC): Chronic | ICD-10-CM

## 2024-06-28 LAB
ALBUMIN SERPL BCP-MCNC: 4 G/DL (ref 3.2–4.9)
ALBUMIN/GLOB SERPL: 1.2 G/DL
ALP SERPL-CCNC: 105 U/L (ref 30–99)
ALT SERPL-CCNC: 54 U/L (ref 2–50)
ANION GAP SERPL CALC-SCNC: 10 MMOL/L (ref 7–16)
AST SERPL-CCNC: 46 U/L (ref 12–45)
BILIRUB SERPL-MCNC: 0.7 MG/DL (ref 0.1–1.5)
BUN SERPL-MCNC: 11 MG/DL (ref 8–22)
CALCIUM ALBUM COR SERPL-MCNC: 9.1 MG/DL (ref 8.5–10.5)
CALCIUM SERPL-MCNC: 9.1 MG/DL (ref 8.5–10.5)
CHLORIDE SERPL-SCNC: 104 MMOL/L (ref 96–112)
CHOLEST SERPL-MCNC: 124 MG/DL (ref 100–199)
CO2 SERPL-SCNC: 23 MMOL/L (ref 20–33)
CREAT SERPL-MCNC: 0.68 MG/DL (ref 0.5–1.4)
CREAT UR-MCNC: 120 MG/DL
ERYTHROCYTE [DISTWIDTH] IN BLOOD BY AUTOMATED COUNT: 46.6 FL (ref 35.9–50)
EST. AVERAGE GLUCOSE BLD GHB EST-MCNC: 108 MG/DL
GFR SERPLBLD CREATININE-BSD FMLA CKD-EPI: 90 ML/MIN/1.73 M 2
GLOBULIN SER CALC-MCNC: 3.3 G/DL (ref 1.9–3.5)
GLUCOSE SERPL-MCNC: 145 MG/DL (ref 65–99)
HBA1C MFR BLD: 5.4 % (ref 4–5.6)
HCT VFR BLD AUTO: 48.1 % (ref 42–52)
HDLC SERPL-MCNC: 35 MG/DL
HGB BLD-MCNC: 15.7 G/DL (ref 14–18)
INR PPP: 1.6 (ref 2–3.5)
LDLC SERPL CALC-MCNC: 54 MG/DL
MCH RBC QN AUTO: 31.7 PG (ref 27–33)
MCHC RBC AUTO-ENTMCNC: 32.6 G/DL (ref 32.3–36.5)
MCV RBC AUTO: 97 FL (ref 81.4–97.8)
MICROALBUMIN UR-MCNC: <1.2 MG/DL
MICROALBUMIN/CREAT UR: NORMAL MG/G (ref 0–30)
PLATELET # BLD AUTO: 262 K/UL (ref 164–446)
PMV BLD AUTO: 10.8 FL (ref 9–12.9)
POTASSIUM SERPL-SCNC: 4.4 MMOL/L (ref 3.6–5.5)
PROT SERPL-MCNC: 7.3 G/DL (ref 6–8.2)
RBC # BLD AUTO: 4.96 M/UL (ref 4.7–6.1)
SODIUM SERPL-SCNC: 137 MMOL/L (ref 135–145)
TRIGL SERPL-MCNC: 174 MG/DL (ref 0–149)
WBC # BLD AUTO: 7.6 K/UL (ref 4.8–10.8)

## 2024-06-28 PROCEDURE — 82043 UR ALBUMIN QUANTITATIVE: CPT

## 2024-06-28 PROCEDURE — 85027 COMPLETE CBC AUTOMATED: CPT

## 2024-06-28 PROCEDURE — 36415 COLL VENOUS BLD VENIPUNCTURE: CPT

## 2024-06-28 PROCEDURE — 99212 OFFICE O/P EST SF 10 MIN: CPT

## 2024-06-28 PROCEDURE — 85610 PROTHROMBIN TIME: CPT

## 2024-06-28 PROCEDURE — 80053 COMPREHEN METABOLIC PANEL: CPT

## 2024-06-28 PROCEDURE — 82570 ASSAY OF URINE CREATININE: CPT

## 2024-06-28 PROCEDURE — 83036 HEMOGLOBIN GLYCOSYLATED A1C: CPT | Mod: GA

## 2024-06-28 PROCEDURE — 80061 LIPID PANEL: CPT

## 2024-06-28 NOTE — PROGRESS NOTES
Anticoagulation Summary  As of 2024      INR goal:  2.0-3.0   TTR:  61.4% (7.7 y)   INR used for dosin.60 (2024)   Warfarin maintenance plan:  2 mg (4 mg x 0.5) every Wed; 4 mg (4 mg x 1) all other days   Weekly warfarin total:  26 mg   Plan last modified:  Carli Kendall (2024)   Next INR check:  2024   Target end date:  Indefinite    Indications    Status post transcatheter aortic valve replacement (TAVR) using bioprosthesis [Z95.3]  Atrial fibrillation (HCC) [I48.91]  Secondary hypercoagulable state (HCC) [D68.69]                 Anticoagulation Episode Summary       INR check location:      Preferred lab:      Send INR reminders to:      Comments:  Pt goes by Dyke  ASA daily          Anticoagulation Care Providers       Provider Role Specialty Phone number    Vickey Rutherford M.D. Referring Cardiovascular Disease (Cardiology) [phone]    [facility] Anticoagulation Services Responsible  [phone]          Refer to Patient Findings for HPI:  Patient Findings       Positives:  Change in diet/appetite (been eating more greens and salad)    Negatives:  Signs/symptoms of thrombosis, Signs/symptoms of bleeding, Laboratory test error suspected, Change in health, Change in alcohol use, Change in activity, Upcoming invasive procedure, Emergency department visit, Upcoming dental procedure, Missed doses, Extra doses, Change in medications, Hospital admission, Bruising, Other complaints          Vitals:    24 0812   BP: 98/57   Pulse: 76       Patient seen in clinic today for follow up on anticoagulation therapy with warfarin (a high risk medication) for hx of AF, TAVR  Verified current warfarin dosing schedule.  Patient denies any missed doses of warfarin, but did report eating more greens in diet lately.     Medications reconciled   Pt is on antiplatelet therapy with ASA 81 mg  for TAVR per cards.          A/P   INR is SUB-therapeutic today at 1.6.     Warfarin dosing  recommendation: Patient instructed to bolus with 8mg TONIGHT, as a one time boost, then to begin increased weekly regimen of 2mg on Wed and 4mg ROW.   Patient will follow up again in 2 weeks.     Pt educated to contact our clinic with any changes in medications or s/s of bleeding or thrombosis. Pt is aware to seek immediate medical attention for falls, head injury or deep cuts.    Follow up appointment in 2 week(s).    Next appt: Friday, July 12 @ 8:15am     Sampson Kendall PharmD

## 2024-07-08 DIAGNOSIS — Z79.01 CHRONIC ANTICOAGULATION: ICD-10-CM

## 2024-07-08 RX ORDER — WARFARIN SODIUM 4 MG/1
TABLET ORAL
Qty: 110 TABLET | Refills: 1 | Status: SHIPPED | OUTPATIENT
Start: 2024-07-08

## 2024-07-12 ENCOUNTER — ANTICOAGULATION VISIT (OUTPATIENT)
Dept: VASCULAR LAB | Facility: MEDICAL CENTER | Age: 84
End: 2024-07-12
Attending: INTERNAL MEDICINE
Payer: MEDICARE

## 2024-07-12 DIAGNOSIS — Z95.3 STATUS POST TRANSCATHETER AORTIC VALVE REPLACEMENT (TAVR) USING BIOPROSTHESIS: ICD-10-CM

## 2024-07-12 DIAGNOSIS — D68.69 SECONDARY HYPERCOAGULABLE STATE (HCC): Chronic | ICD-10-CM

## 2024-07-12 LAB — INR PPP: 2.1 (ref 2–3.5)

## 2024-07-12 PROCEDURE — 99211 OFF/OP EST MAY X REQ PHY/QHP: CPT

## 2024-07-12 PROCEDURE — 85610 PROTHROMBIN TIME: CPT

## 2024-07-30 ENCOUNTER — TELEPHONE (OUTPATIENT)
Dept: MEDICAL GROUP | Facility: MEDICAL CENTER | Age: 84
End: 2024-07-30
Payer: MEDICARE

## 2024-07-30 NOTE — TELEPHONE ENCOUNTER
Caller Name: Duane Parkinson    Call Back Number: [phone] (home)       How would the patient prefer to be contacted with a response: Phone call OK to leave a detailed message    Patient grandson called would like to know the Doc the saw Kinsey Parkinson for his back , Dr James  .     I Called  on 7/30/24 at 13:35

## 2024-08-02 ENCOUNTER — ANTICOAGULATION VISIT (OUTPATIENT)
Dept: VASCULAR LAB | Facility: MEDICAL CENTER | Age: 84
End: 2024-08-02
Attending: INTERNAL MEDICINE
Payer: MEDICARE

## 2024-08-02 DIAGNOSIS — Z95.3 STATUS POST TRANSCATHETER AORTIC VALVE REPLACEMENT (TAVR) USING BIOPROSTHESIS: ICD-10-CM

## 2024-08-02 DIAGNOSIS — I48.0 PAROXYSMAL ATRIAL FIBRILLATION (HCC): Chronic | ICD-10-CM

## 2024-08-02 DIAGNOSIS — D68.69 SECONDARY HYPERCOAGULABLE STATE (HCC): Chronic | ICD-10-CM

## 2024-08-02 LAB — INR PPP: 3 (ref 2–3.5)

## 2024-08-02 PROCEDURE — 85610 PROTHROMBIN TIME: CPT

## 2024-08-02 PROCEDURE — 99211 OFF/OP EST MAY X REQ PHY/QHP: CPT

## 2024-08-02 NOTE — PROGRESS NOTES
Anticoagulation Summary  As of 8/2/2024      INR goal:  2.0-3.0   TTR:  61.5% (7.7 y)   INR used for dosing:  3.00 (8/2/2024)   Warfarin maintenance plan:  2 mg (4 mg x 0.5) every Wed; 4 mg (4 mg x 1) all other days   Weekly warfarin total:  26 mg   Plan last modified:  Carli Kendall (6/28/2024)   Next INR check:  8/30/2024   Target end date:  Indefinite    Indications    Status post transcatheter aortic valve replacement (TAVR) using bioprosthesis [Z95.3]  Atrial fibrillation (HCC) [I48.91]  Secondary hypercoagulable state (HCC) [D68.69]                 Anticoagulation Episode Summary       INR check location:      Preferred lab:      Send INR reminders to:      Comments:  Pt goes by Dyke  ASA daily          Anticoagulation Care Providers       Provider Role Specialty Phone number    Vickey Rutherford M.D. Referring Cardiovascular Disease (Cardiology) [phone]    [facility] Anticoagulation Services Responsible  [phone]                  Refer to Patient Findings for HPI:  Patient Findings       Negatives:  Signs/symptoms of thrombosis, Signs/symptoms of bleeding, Laboratory test error suspected, Change in health, Change in alcohol use, Change in activity, Upcoming invasive procedure, Emergency department visit, Upcoming dental procedure, Missed doses, Extra doses, Change in medications, Change in diet/appetite, Hospital admission, Bruising, Other complaints            There were no vitals filed for this visit.  Pt declined vitals    Verified current warfarin dosing schedule.    Medications reconciled: Yes  Pt is on antiplatelet therapy with aspirin 81mg for TAVR.      A/P   INR is therapeutic    Warfarin dosing recommendation: Continue regimen as listed above.    Pt educated to contact our clinic with any changes in medications or s/s of bleeding or thrombosis. Pt is aware to seek immediate medical attention for falls, head injury or deep cuts.    Request pt to return in 4 week(s). Pt  agrees.    Visit completed with Clifford Anand, AbhishekD.   Marilee Nova, AbhishekD

## 2024-08-15 DIAGNOSIS — I48.0 PAROXYSMAL ATRIAL FIBRILLATION (HCC): ICD-10-CM

## 2024-08-15 NOTE — TELEPHONE ENCOUNTER
Received request via: Pharmacy    Was the patient seen in the last year in this department? Yes    Does the patient have an active prescription (recently filled or refills available) for medication(s) requested? No    [facility] Name: [facility]    Does the patient have assisted Plus and need 100-day supply? (This applies to ALL medications) Patient does not have SCP

## 2024-08-16 RX ORDER — DIGOXIN 250 MCG
250 TABLET ORAL EVERY MORNING
Qty: 90 TABLET | Refills: 3 | Status: SHIPPED | OUTPATIENT
Start: 2024-08-16

## 2024-08-20 ENCOUNTER — DOCUMENTATION (OUTPATIENT)
Dept: VASCULAR LAB | Facility: MEDICAL CENTER | Age: 84
End: 2024-08-20
Payer: MEDICARE

## 2024-08-20 NOTE — PROGRESS NOTES
[facility]    Received anticoagulation clearance from [facility] regarding upcoming endoscopy.    Procedure date 09/19/24    Pt is on warfarin for atrial fibrillation and TAVR .    Per current CHEST guidelines, pt is at low risk of VTE/stroke given CHADsVASC = 4 (htn, age, ifg) and no hx of CVA.             IF PT ON WARFARIN  Given pt's hx, will hold warfarin x 5 days prior to procedure and will not bridge w/ Lovenox.        Faxed clearance to [phone]; (will send to [place] to scan in media)      Sun Molina, AbhishekD

## 2024-08-21 ENCOUNTER — OFFICE VISIT (OUTPATIENT)
Dept: MEDICAL GROUP | Facility: MEDICAL CENTER | Age: 84
End: 2024-08-21
Payer: MEDICARE

## 2024-08-21 VITALS
HEART RATE: 60 BPM | OXYGEN SATURATION: 98 % | HEIGHT: 72 IN | TEMPERATURE: 97 F | SYSTOLIC BLOOD PRESSURE: 110 MMHG | BODY MASS INDEX: 29.26 KG/M2 | DIASTOLIC BLOOD PRESSURE: 74 MMHG | WEIGHT: 216 LBS

## 2024-08-21 DIAGNOSIS — N40.1 BENIGN PROSTATIC HYPERPLASIA WITH URINARY HESITANCY: ICD-10-CM

## 2024-08-21 DIAGNOSIS — M51.37 DEGENERATION OF LUMBAR OR LUMBOSACRAL INTERVERTEBRAL DISC: ICD-10-CM

## 2024-08-21 DIAGNOSIS — I48.0 PAROXYSMAL ATRIAL FIBRILLATION (HCC): Chronic | ICD-10-CM

## 2024-08-21 DIAGNOSIS — K75.4 AUTOIMMUNE HEPATITIS (HCC): Chronic | ICD-10-CM

## 2024-08-21 DIAGNOSIS — E78.5 DYSLIPIDEMIA: ICD-10-CM

## 2024-08-21 DIAGNOSIS — R73.02 IGT (IMPAIRED GLUCOSE TOLERANCE): ICD-10-CM

## 2024-08-21 DIAGNOSIS — G62.9 NEUROPATHY: ICD-10-CM

## 2024-08-21 DIAGNOSIS — R54 AGE-RELATED PHYSICAL DEBILITY: ICD-10-CM

## 2024-08-21 DIAGNOSIS — D68.69 SECONDARY HYPERCOAGULABLE STATE (HCC): Chronic | ICD-10-CM

## 2024-08-21 DIAGNOSIS — R39.11 BENIGN PROSTATIC HYPERPLASIA WITH URINARY HESITANCY: ICD-10-CM

## 2024-08-21 PROBLEM — Z86.19 HISTORY OF SEPSIS: Status: RESOLVED | Noted: 2023-02-09 | Resolved: 2024-08-21

## 2024-08-21 PROCEDURE — 3078F DIAST BP <80 MM HG: CPT | Performed by: FAMILY MEDICINE

## 2024-08-21 PROCEDURE — 1170F FXNL STATUS ASSESSED: CPT | Performed by: FAMILY MEDICINE

## 2024-08-21 PROCEDURE — 3074F SYST BP LT 130 MM HG: CPT | Performed by: FAMILY MEDICINE

## 2024-08-21 PROCEDURE — 99214 OFFICE O/P EST MOD 30 MIN: CPT | Performed by: FAMILY MEDICINE

## 2024-08-21 ASSESSMENT — FIBROSIS 4 INDEX: FIB4 SCORE: 2.05

## 2024-08-21 NOTE — PATIENT INSTRUCTIONS
- Schedule with your Cardiologist Sandra Shay it has been one year since you have seen her  - follow schedule for biopsy, labs and follow up with GI provider  - follow instructions for when to hold your Warfarin for up coming procedures  - follow up with urology as schedule

## 2024-08-21 NOTE — PROGRESS NOTES
"Verbal consent was acquired by the patient to use [employer] ambient listening note generation during this visit    Subjective:     CC: \"Follow-up from GI\"    History of Present Illness  The patient presents for evaluation of multiple medical concerns.    He has been experiencing changes in his bowel movements, which have now returned to normal. He was informed that the yellow color of his stools is not a cause for concern. His last visit to the gastroenterologist was on 08/19/2024. A liver biopsy has been scheduled for next month, and he will undergo lab tests every three months. He has been advised to discontinue warfarin five days prior to any procedures. He reports no chest pain or breathing difficulties.    He experienced abdominal discomfort this morning, which was relieved after urination. He reports a slow urine stream but has no history of urinary tract infection. He has an upcoming appointment with his urologist and is unsure about his past use of finasteride or tamsulosin.    He has been diagnosed with cirrhosis, despite not being a heavy drinker. He quit alcohol 1-2 years ago.    He has poor balance and struggles with walking due to neuropathy. He uses a walker and a cane for support. He experiences constant back pain and has an appointment with his orthopedic doctor tomorrow. He had a fall from a pole in the 1970s, which resulted in a belt injury. He retired in 2000, and his back pain has progressively worsened since then. He visited his podiatrist this morning.    SOCIAL HISTORY  He quit smoking in the 1970s. He started smoking at age 13 or 14.          Objective:     Exam:  /74   Pulse 60   Temp 36.1 °C (97 °F) (Temporal)   Ht 1.829 m (6')   Wt 98 kg (216 lb)   SpO2 98%   BMI 29.29 kg/m²  Body mass index is 29.29 kg/m².    Physical Exam  Vitals reviewed.   Constitutional:       General: He is not in acute distress.     Appearance: Normal appearance.   HENT:      Head: Normocephalic and " atraumatic.   Cardiovascular:      Rate and Rhythm: Normal rate and regular rhythm.      Heart sounds: Normal heart sounds.   Pulmonary:      Effort: Pulmonary effort is normal.      Breath sounds: Normal breath sounds.   Skin:     General: Skin is warm and dry.   Neurological:      Mental Status: He is alert. Mental status is at baseline.      Gait: Gait abnormal.   Psychiatric:         Mood and Affect: Mood normal.         Behavior: Behavior normal.           Results  Laboratory Studies  Antimitochondrial antibody was positive. Anti-smooth muscle antibody was positive. Mild elevation of liver enzymes. A1c was 5.4. Cholesterol levels were good, slight increase in triglycerides.      Assessment & Plan:       1. Autoimmune hepatitis (HCC)    2. Dyslipidemia    3. IGT (impaired glucose tolerance)    4. Paroxysmal atrial fibrillation (HCC)    5. Secondary hypercoagulable state (HCC)    6. Age-related physical debility    7. Benign prostatic hyperplasia with urinary hesitancy    8. Neuropathy    9. Lumbar Disc Degeneration    Other orders  - Glucosamine HCl (GLUCOSAMINE PO); Take  by mouth.  - GINKGO BILOBA COMPLEX PO; Take  by mouth.      Assessment & Plan  1. Autoimmune Hepatitis.  Positive antimitochondrial and anti-smooth muscle antibodies, along with mildly elevated liver enzymes, suggest a diagnosis of autoimmune hepatitis. His liver enzymes have been stable. He is scheduled for a liver biopsy next month and will have labs done every 3 months to monitor his liver function. He should follow the gastroenterologist's instructions regarding holding warfarin before procedures.    2.  Dyslipidemia  His triglycerides are slightly elevated. Continuation of pravastatin is advised to manage his cholesterol levels.    3.  Impaired glucose intolerance  His A1c in June was 5.4, indicating good blood sugar control. No changes to his current diabetes management plan are needed.    4.  A-fib and secondary hypercoagulable  state  He is currently taking metoprolol and digoxin for heart management. He is also on aspirin and Coumadin. He has not experienced any chest pain or trouble breathing recently. He needs to schedule an annual visit with his cardiologist, as his last visit was in August 2023.    5.  Age-related physical debility  He reports poor balance and occasional near-falls, particularly in the morning. He is encouraged to use his walker more frequently to prevent falls. Physical therapy is an option but will be considered after his upcoming visit with the back specialist.    6.  BPH with urinary Hesitancy.  He reports slow urine flow but no pain during urination. He will follow up with his urologist soon to discuss potential treatments.    7.  Neuropathy  Chronic and stable.  May be related to his back.  He does see specialist for this will continue to follow along.    8.  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  Patient to follow-up with orthopedics as scheduled.       Return in about 6 months (around 2/21/2025), or if symptoms worsen or fail to improve, for Annual Medicare.      This note was created using voice recognition software (Dragon). The accuracy of the dictation is limited by the abilities of the software. I have reviewed the note prior to signing, however some errors in grammar and context are still possible. If you have any questions related to this note please do not hesitate to contact our office.

## 2024-08-26 ENCOUNTER — HOSPITAL ENCOUNTER (OUTPATIENT)
Dept: LAB | Facility: MEDICAL CENTER | Age: 84
End: 2024-08-26
Attending: INTERNAL MEDICINE
Payer: MEDICARE

## 2024-08-26 LAB
ALBUMIN SERPL BCP-MCNC: 3.9 G/DL (ref 3.2–4.9)
ALBUMIN/GLOB SERPL: 1.1 G/DL
ALP SERPL-CCNC: 114 U/L (ref 30–99)
ALT SERPL-CCNC: 66 U/L (ref 2–50)
ANION GAP SERPL CALC-SCNC: 9 MMOL/L (ref 7–16)
AST SERPL-CCNC: 64 U/L (ref 12–45)
BASOPHILS # BLD AUTO: 0.6 % (ref 0–1.8)
BASOPHILS # BLD: 0.06 K/UL (ref 0–0.12)
BILIRUB SERPL-MCNC: 0.4 MG/DL (ref 0.1–1.5)
BUN SERPL-MCNC: 12 MG/DL (ref 8–22)
CALCIUM ALBUM COR SERPL-MCNC: 9.5 MG/DL (ref 8.5–10.5)
CALCIUM SERPL-MCNC: 9.4 MG/DL (ref 8.5–10.5)
CHLORIDE SERPL-SCNC: 105 MMOL/L (ref 96–112)
CO2 SERPL-SCNC: 22 MMOL/L (ref 20–33)
CREAT SERPL-MCNC: 0.56 MG/DL (ref 0.5–1.4)
EOSINOPHIL # BLD AUTO: 0.44 K/UL (ref 0–0.51)
EOSINOPHIL NFR BLD: 4.6 % (ref 0–6.9)
ERYTHROCYTE [DISTWIDTH] IN BLOOD BY AUTOMATED COUNT: 49.1 FL (ref 35.9–50)
GFR SERPLBLD CREATININE-BSD FMLA CKD-EPI: 96 ML/MIN/1.73 M 2
GLOBULIN SER CALC-MCNC: 3.7 G/DL (ref 1.9–3.5)
GLUCOSE SERPL-MCNC: 93 MG/DL (ref 65–99)
HCT VFR BLD AUTO: 47.9 % (ref 42–52)
HGB BLD-MCNC: 15.6 G/DL (ref 14–18)
IMM GRANULOCYTES # BLD AUTO: 0.05 K/UL (ref 0–0.11)
IMM GRANULOCYTES NFR BLD AUTO: 0.5 % (ref 0–0.9)
INR PPP: 3.04 (ref 0.87–1.13)
LYMPHOCYTES # BLD AUTO: 2.67 K/UL (ref 1–4.8)
LYMPHOCYTES NFR BLD: 28 % (ref 22–41)
MCH RBC QN AUTO: 31.3 PG (ref 27–33)
MCHC RBC AUTO-ENTMCNC: 32.6 G/DL (ref 32.3–36.5)
MCV RBC AUTO: 96 FL (ref 81.4–97.8)
MONOCYTES # BLD AUTO: 1.09 K/UL (ref 0–0.85)
MONOCYTES NFR BLD AUTO: 11.4 % (ref 0–13.4)
NEUTROPHILS # BLD AUTO: 5.21 K/UL (ref 1.82–7.42)
NEUTROPHILS NFR BLD: 54.9 % (ref 44–72)
NRBC # BLD AUTO: 0 K/UL
NRBC BLD-RTO: 0 /100 WBC (ref 0–0.2)
PLATELET # BLD AUTO: 270 K/UL (ref 164–446)
PMV BLD AUTO: 10.5 FL (ref 9–12.9)
POTASSIUM SERPL-SCNC: 4.6 MMOL/L (ref 3.6–5.5)
PROT SERPL-MCNC: 7.6 G/DL (ref 6–8.2)
PROTHROMBIN TIME: 31.9 SEC (ref 12–14.6)
RBC # BLD AUTO: 4.99 M/UL (ref 4.7–6.1)
SODIUM SERPL-SCNC: 136 MMOL/L (ref 135–145)
WBC # BLD AUTO: 9.5 K/UL (ref 4.8–10.8)

## 2024-08-26 PROCEDURE — 80053 COMPREHEN METABOLIC PANEL: CPT

## 2024-08-26 PROCEDURE — 85610 PROTHROMBIN TIME: CPT

## 2024-08-26 PROCEDURE — 85025 COMPLETE CBC W/AUTO DIFF WBC: CPT

## 2024-08-26 PROCEDURE — 36415 COLL VENOUS BLD VENIPUNCTURE: CPT

## 2024-08-30 ENCOUNTER — ANTICOAGULATION VISIT (OUTPATIENT)
Dept: VASCULAR LAB | Facility: MEDICAL CENTER | Age: 84
End: 2024-08-30
Attending: INTERNAL MEDICINE
Payer: MEDICARE

## 2024-08-30 ENCOUNTER — PATIENT MESSAGE (OUTPATIENT)
Dept: MEDICAL GROUP | Facility: MEDICAL CENTER | Age: 84
End: 2024-08-30

## 2024-08-30 VITALS — SYSTOLIC BLOOD PRESSURE: 122 MMHG | HEART RATE: 76 BPM | DIASTOLIC BLOOD PRESSURE: 80 MMHG

## 2024-08-30 DIAGNOSIS — I48.0 PAROXYSMAL ATRIAL FIBRILLATION (HCC): Chronic | ICD-10-CM

## 2024-08-30 DIAGNOSIS — Z95.3 STATUS POST TRANSCATHETER AORTIC VALVE REPLACEMENT (TAVR) USING BIOPROSTHESIS: ICD-10-CM

## 2024-08-30 DIAGNOSIS — D68.69 SECONDARY HYPERCOAGULABLE STATE (HCC): Chronic | ICD-10-CM

## 2024-08-30 LAB — INR PPP: 3.4 (ref 2–3.5)

## 2024-08-30 PROCEDURE — 99212 OFFICE O/P EST SF 10 MIN: CPT

## 2024-08-30 PROCEDURE — 85610 PROTHROMBIN TIME: CPT

## 2024-08-30 NOTE — PROGRESS NOTES
Anticoagulation Summary  As of 8/30/2024      INR goal:  2.0-3.0   TTR:  60.9% (7.8 y)   INR used for dosing:  3.40 (8/30/2024)   Warfarin maintenance plan:  2 mg (4 mg x 0.5) every Wed; 4 mg (4 mg x 1) all other days   Weekly warfarin total:  26 mg   Plan last modified:  Carli Kendall (6/28/2024)   Next INR check:  9/12/2024   Target end date:  Indefinite    Indications    Status post transcatheter aortic valve replacement (TAVR) using bioprosthesis [Z95.3]  Atrial fibrillation (HCC) [I48.91]  Secondary hypercoagulable state (HCC) [D68.69]                 Anticoagulation Episode Summary       INR check location:  --    Preferred lab:  --    Send INR reminders to:  --    Comments:  Pt goes by Kinsey renee          Anticoagulation Care Providers       Provider Role Specialty Phone number    Vickey Rutherford M.D. Referring Cardiovascular Disease (Cardiology) [phone]    [facility] Responsible  [phone]             Refer to Patient Findings for HPI:  Patient Findings       Negatives:  Signs/symptoms of thrombosis, Signs/symptoms of bleeding, Laboratory test error suspected, Change in health, Change in alcohol use, Change in activity, Upcoming invasive procedure, Emergency department visit, Upcoming dental procedure, Missed doses, Extra doses, Change in medications, Change in diet/appetite, Hospital admission, Bruising, Other complaints            There were no vitals filed for this visit.  Pt declined vitals    Verified current warfarin dosing schedule.    Medications reconciled.  TAVR 7/29/19, on ASA 81 mg - Dr Mendoza requests pt stays on ASA + warfarin       A/P   INR is supratherapeutic at 3.4  Reason(s) for out of range INR today: No obvious causes      Warfarin dosing recommendation: As INR has been borderline elevated Decrease to weekly regimen   by ~8% to 2 mg W/F 4 mg all other days.    Received anticoagulation clearance from [facility] regarding upcoming endoscopy.  Procedure date 09/19/24. Pt is on warfarin for atrial fibrillation and TAVR .     Per current CHEST guidelines, pt is at low risk of VTE/stroke given CHADsVASC = 4 (htn, age, ifg) and no hx of CVA.     Begin holding warfarin 09/14.       Pt educated to contact our clinic with any changes in medications or s/s of bleeding or thrombosis. Pt is aware to seek immediate medical attention for falls, head injury or deep cuts.    Request pt to return in 2 week(s) prior to warfarin hold.  Pt agrees.    Sergio Pan, AbhishekD

## 2024-09-11 ENCOUNTER — OFFICE VISIT (OUTPATIENT)
Dept: CARDIOLOGY | Facility: MEDICAL CENTER | Age: 84
End: 2024-09-11
Payer: MEDICARE

## 2024-09-11 VITALS
RESPIRATION RATE: 18 BRPM | SYSTOLIC BLOOD PRESSURE: 130 MMHG | DIASTOLIC BLOOD PRESSURE: 72 MMHG | HEIGHT: 72 IN | WEIGHT: 216 LBS | OXYGEN SATURATION: 96 % | BODY MASS INDEX: 29.26 KG/M2 | HEART RATE: 72 BPM

## 2024-09-11 DIAGNOSIS — I10 ESSENTIAL HYPERTENSION: ICD-10-CM

## 2024-09-11 DIAGNOSIS — Z95.3 STATUS POST TRANSCATHETER AORTIC VALVE REPLACEMENT (TAVR) USING BIOPROSTHESIS: ICD-10-CM

## 2024-09-11 DIAGNOSIS — E78.5 DYSLIPIDEMIA: ICD-10-CM

## 2024-09-11 DIAGNOSIS — I48.0 PAROXYSMAL ATRIAL FIBRILLATION (HCC): Chronic | ICD-10-CM

## 2024-09-11 PROCEDURE — 1170F FXNL STATUS ASSESSED: CPT

## 2024-09-11 PROCEDURE — 99213 OFFICE O/P EST LOW 20 MIN: CPT

## 2024-09-11 PROCEDURE — 99214 OFFICE O/P EST MOD 30 MIN: CPT

## 2024-09-11 PROCEDURE — 3075F SYST BP GE 130 - 139MM HG: CPT

## 2024-09-11 PROCEDURE — 3078F DIAST BP <80 MM HG: CPT

## 2024-09-11 PROCEDURE — 93005 ELECTROCARDIOGRAM TRACING: CPT

## 2024-09-11 ASSESSMENT — ENCOUNTER SYMPTOMS
DEPRESSION: 0
EYES NEGATIVE: 1
SHORTNESS OF BREATH: 0
PND: 0
MYALGIAS: 1
ORTHOPNEA: 0
CONSTITUTIONAL NEGATIVE: 1
BACK PAIN: 1
PALPITATIONS: 0
NEUROLOGICAL NEGATIVE: 1
GASTROINTESTINAL NEGATIVE: 1
NERVOUS/ANXIOUS: 0

## 2024-09-11 ASSESSMENT — FIBROSIS 4 INDEX: FIB4 SCORE: 2.51

## 2024-09-11 NOTE — PROGRESS NOTES
Chief Complaint   Patient presents with    Preoperative CV Eval       Subjective     Kinsey Parkinson is a 84 y.o. male who presents today follow up.  He has a history of aortic stenosis status post TAVR 29 mm S3 valve July 2019, paroxysmal atrial fibrillation, hypertension, hyperlipidemia, and falls.     Last seen by myself on 8/23/2023: He has been feeling much better. He is having some knee pain, but well from a cardiac perspective. Activity limited by knee and back pain     Today 9/11/2024 he has been having joint and muscle pain for over a year. He needs clearance for a colonoscopy and an E[facility] with [facility]. Liver biopsy potentially.  He has been doing really well from cardiac perspective    Past Medical History:   Diagnosis Date    Aortic stenosis     valve replacement; cardiologist    Atrial fibrillation (formerly [facility])     cardiologist:  THEODORE Mason    Back pain     Cataract     bilateral IOL    Cirrhosis of liver without ascites (HCC)     Clotting disorder (formerly [facility])     reports nose bleeds; 3/29/23 pt reports none in the past year    Dental disorder     left upper gold cap fell off, has appt w/ dentist 4/2023    Depression     Lost wife ,still gets tearful    Diabetes (formerly [facility])     diet controlled    Encounter for long-term (current) use of other medications     Gasping for breath     Urdu measles     Heart murmur     Heart valve disease     High cholesterol     History of sepsis 02/09/2023    Hyperlipidemia     Hypertension     Insomnia     Mumps     Nasal drainage     Pain     left knee    Pyogenic arthritis, lower leg (HCC)     thumb, back    Restless leg syndrome     Sleep apnea     3/29/2023 pt states he quit using CPAP, had issues with his machine and did not receive a new CPAP    Snoring     Tonsillitis     Urinary incontinence     Valvular heart disease      Past Surgical History:   Procedure Laterality Date    NE TRANSURETHRAL ELEC-SURG PROSTATECTOM  4/7/2023    Procedure: BIPOLAR  TRANSURETHRAL RESECTION OF PROSTATE;  Surgeon: Mack Pritchett M.D.;  Location: SURGERY [facility];  Service: Urology    CATARACT EXTRACTION WITH IOL Bilateral 2023    GIBRAN N/A 2019    Procedure: ECHOCARDIOGRAM, TRANSESOPHAGEAL;  Surgeon: Leander Buckner M.D.;  Location: SURGERY [facility];  Service: Cardiac    TRANSCATHETER AORTIC VALVE REPLACEMENT Bilateral 2019    Procedure: REPLACEMENT, AORTIC VALVE, TRANSCATHETER;  Surgeon: Leander Buckner M.D.;  Location: SURGERY [facility];  Service: Cardiac    FINGER ARTHROPLASTY Left 2016    Procedure: FINGER ARTHROPLASTY THUMB CARPOMETACARPAL EXCISIONAL, EXCISE TRAPEZIUM;  Surgeon: Raheel Salgado M.D.;  Location: SURGERY [facility] DAY [facility];  Service:     CARDIOVERSION      on 06, sinus rhythm until 2007,  paroxysmal atrial fibrillation    KNEE ARTHROPLASTY TOTAL      LAMINOTOMY N/A     multiple lumbar spine    OTHER ORTHOPEDIC SURGERY      lower back    NM PERCUT IMPLNT NEUROELECT,EPIDURAL      TOE ARTHROPLASTY      TURP-VAPOR N/A      Family History   Problem Relation Age of Onset    Other Mother         unknown    Heart Disease Mother     Cancer Father         prostate, skin    No Known Problems Brother     Diabetes Brother     Heart Disease Son     Sleep Apnea Neg Hx      Social History     Socioeconomic History    Marital status:      Spouse name: Not on file    Number of children: Not on file    Years of education: Not on file    Highest education level: Not on file   Occupational History    Not on file   Tobacco Use    Smoking status: Former     Current packs/day: 0.00     Average packs/day: 1 pack/day for 20.0 years (20.0 ttl pk-yrs)     Types: Cigarettes     Start date: 1952     Quit date: 1972     Years since quittin.6    Smokeless tobacco: Never   Vaping Use    Vaping status: Never Used   Substance and Sexual Activity    Alcohol use: No     Alcohol/week: 0.0 oz    Drug use: No  "   Sexual activity: Not Currently     Partners: Female   Other Topics Concern    Not on file   Social History Narrative    Not on file     Social Determinants of Health     Financial Resource Strain: Not on file   Food Insecurity: Not on file   Transportation Needs: Not on file   Physical Activity: Not on file   Stress: Not on file   Social Connections: Not on file   Intimate Partner Violence: Not on file   Housing Stability: Not on file     Allergies   Allergen Reactions    Feldene [Piroxicam] Itching    Percodan [Oxycodone-Aspirin] Itching and Photosensitivity     \"I sunburn\"     Outpatient Encounter Medications as of 9/11/2024   Medication Sig Dispense Refill    Glucosamine HCl (GLUCOSAMINE PO) Take  by mouth.      GINKGO BILOBA COMPLEX PO Take  by mouth.      digoxin (LANOXIN) 250 MCG Tab Take 1 Tablet by mouth every morning. 90 Tablet 3    warfarin (COUMADIN) 4 MG Tab TAKE 1 to 1.5 TABLETS BY MOUTH DAILY AS DIRECTED BY [facility] 110 Tablet 1    metoprolol SR (TOPROL XL) 50 MG TABLET SR 24 HR Take 1 Tablet by mouth every evening. 90 Tablet 3    pravastatin (PRAVACHOL) 80 MG tablet Take 1 Tablet by mouth every evening. TAKE 1 TABLET BY MOUTH DAILY 90 Tablet 3    METAMUCIL FIBER PO Take  by mouth every day.      aspirin (ASA) 81 MG Chew Tab chewable tablet Chew 1 Tablet every day. 100 Tablet 3    LYSINE PO Take 1 Tablet by mouth every day. As needed      Ascorbic Acid (VITAMIN C PO) Take 1 Tablet by mouth every morning.      Coenzyme Q10 (COQ10 PO) Take 1 Tablet by mouth every day.      Omega-3 Fatty Acids (FISH OIL) 1000 MG CAPSULE DELAYED RELEASE EC softgel capsule Take 1,000 mg by mouth every morning with breakfast.      acetaminophen (TYLENOL) 500 MG Tab Take 500-1,000 mg by mouth every 6 hours as needed. Indications: Pain      gabapentin (NEURONTIN) 300 MG Cap Take 900 mg by mouth 4 times a day.      B Complex Vitamins (VITAMIN B COMPLEX PO) Take 1 Tab by mouth every evening.      " Cholecalciferol (VITAMIN D3 PO) Take 1 Tab by mouth every evening.      multivitamin (THERAGRAN) TABS Take 1 Tablet by mouth at bedtime.       No facility-administered encounter medications on file as of 9/11/2024.     Review of Systems   Constitutional: Negative.    HENT: Negative.     Eyes: Negative.    Respiratory:  Negative for shortness of breath.    Cardiovascular:  Negative for chest pain, palpitations, orthopnea, leg swelling and PND.   Gastrointestinal: Negative.    Genitourinary: Negative.    Musculoskeletal:  Positive for back pain, joint pain and myalgias.   Skin: Negative.    Neurological: Negative.    Endo/Heme/Allergies: Negative.    Psychiatric/Behavioral:  Negative for depression. The patient is not nervous/anxious.               Objective     /72 (BP Location: Left arm, Patient Position: Sitting, BP Cuff Size: Adult)   Pulse 72   Resp 18   Ht 1.829 m (6')   Wt 98 kg (216 lb)   SpO2 96%   BMI 29.29 kg/m²     Physical Exam  Constitutional:       Appearance: Normal appearance. He is obese.   HENT:      Head: Normocephalic.   Neck:      Vascular: No JVD.   Cardiovascular:      Rate and Rhythm: Normal rate. Rhythm irregular.      Pulses: Normal pulses.      Heart sounds: Normal heart sounds. No murmur heard.     No friction rub.   Pulmonary:      Effort: Pulmonary effort is normal.      Breath sounds: Normal breath sounds.   Abdominal:      Palpations: Abdomen is soft.   Musculoskeletal:         General: Normal range of motion.      Right lower leg: No edema.      Left lower leg: No edema.   Skin:     General: Skin is warm and dry.   Neurological:      General: No focal deficit present.      Mental Status: He is alert and oriented to person, place, and time.   Psychiatric:         Mood and Affect: Mood normal.         Behavior: Behavior normal.          Lab Results   Component Value Date/Time    CHOLSTRLTOT 124 06/28/2024 08:56 AM    LDL 54 06/28/2024 08:56 AM    HDL 35 (A) 06/28/2024 08:56  AM    TRIGLYCERIDE 174 (H) 06/28/2024 08:56 AM       Lab Results   Component Value Date/Time    SODIUM 136 08/26/2024 04:03 PM    POTASSIUM 4.6 08/26/2024 04:03 PM    CHLORIDE 105 08/26/2024 04:03 PM    CO2 22 08/26/2024 04:03 PM    GLUCOSE 93 08/26/2024 04:03 PM    BUN 12 08/26/2024 04:03 PM    CREATININE 0.56 08/26/2024 04:03 PM    BUNCREATRAT 15 06/05/2018 08:29 AM     Lab Results   Component Value Date/Time    ALKPHOSPHAT 114 (H) 08/26/2024 04:03 PM    ASTSGOT 64 (H) 08/26/2024 04:03 PM    ALTSGPT 66 (H) 08/26/2024 04:03 PM    TBILIRUBIN 0.4 08/26/2024 04:03 PM      Lab Results   Component Value Date/Time    WBC 9.5 08/26/2024 04:03 PM    RBC 4.99 08/26/2024 04:03 PM    HEMOGLOBIN 15.6 08/26/2024 04:03 PM    HEMATOCRIT 47.9 08/26/2024 04:03 PM    MCV 96.0 08/26/2024 04:03 PM    MCH 31.3 08/26/2024 04:03 PM    MCHC 32.6 08/26/2024 04:03 PM    MPV 10.5 08/26/2024 04:03 PM    NEUTSPOLYS 54.90 08/26/2024 04:03 PM    LYMPHOCYTES 28.00 08/26/2024 04:03 PM    MONOCYTES 11.40 08/26/2024 04:03 PM    EOSINOPHILS 4.60 08/26/2024 04:03 PM    BASOPHILS 0.60 08/26/2024 04:03 PM      Echocardiogram 1/31/2024  CONCLUSIONS  Compared to the prior study on 2/23/2023, no changes.  Normal left ventricular systolic function.   Known TAVR aortic valve that is functioning normally with normal   transvalvular gradients.   Estimated right ventricular systolic pressure is 25 mmHg.    Assessment & Plan     1. Status post transcatheter aortic valve replacement (TAVR) using bioprosthesis        2. Paroxysmal atrial fibrillation (HCC)  EKG      3. Dyslipidemia        4. Essential hypertension            Medical Decision Making: Today's Assessment/Status/Plan:        S/P TAVR  -No aspirin, on warfarin  -SBE prophylaxis understands  -echo showed normal valve function   - ordered echo for next year      Atrial fibrillation  -Type: paroxysmal  -Rhythm and Rate: controlled  -Symptoms:none  -[id number] Score (CHF/CM, HTN, Age, DM, CVA, Vascular  disease, Gender):  4  -Medications: digoxin 250 mcg daily, metoprolol SR 50 mg daily  -Anticoagulation: warfarin   -Rate v. Rhythm Control: rate     Hypertension  - good control   - continue metoprolol  - goal < 140/90 due to age and history of falls  - check BP daily 2 hours after taking medication and keep log of measurements      Hyperlipidemia  -Most recent LDL 54  -Continue pravastatin 80 mg  -Goal of less than 70  -Check lipid panel annually    Surgical clearance for EGD, colonoscopy, liver biopsy  -Patient is likely a moderate risk for intended procedures  -Recommend minimizing interruption of anticoagulation as much as feasible  -If deemed a high risk for bleeding, patient may hold their warfarin for 5 days preoperatively, and resume ASAP preferably no later than POD 1, high thrombotic risk patients may require bridging with lovenox         Follow up with Bonnie DOE in 6 months with echo      This note was dictated using Dragon speech recognition software.

## 2024-09-12 ENCOUNTER — ANTICOAGULATION VISIT (OUTPATIENT)
Dept: VASCULAR LAB | Facility: MEDICAL CENTER | Age: 84
End: 2024-09-12
Attending: INTERNAL MEDICINE
Payer: MEDICARE

## 2024-09-12 VITALS — HEART RATE: 62 BPM | DIASTOLIC BLOOD PRESSURE: 61 MMHG | SYSTOLIC BLOOD PRESSURE: 107 MMHG

## 2024-09-12 DIAGNOSIS — Z95.3 STATUS POST TRANSCATHETER AORTIC VALVE REPLACEMENT (TAVR) USING BIOPROSTHESIS: ICD-10-CM

## 2024-09-12 DIAGNOSIS — D68.69 SECONDARY HYPERCOAGULABLE STATE (HCC): Chronic | ICD-10-CM

## 2024-09-12 DIAGNOSIS — I48.0 PAROXYSMAL ATRIAL FIBRILLATION (HCC): Chronic | ICD-10-CM

## 2024-09-12 LAB
EKG IMPRESSION: NORMAL
INR PPP: 1.9 (ref 2–3.5)

## 2024-09-12 PROCEDURE — 93010 ELECTROCARDIOGRAM REPORT: CPT | Performed by: INTERNAL MEDICINE

## 2024-09-12 PROCEDURE — 99211 OFF/OP EST MAY X REQ PHY/QHP: CPT | Performed by: NURSE PRACTITIONER

## 2024-09-12 PROCEDURE — 85610 PROTHROMBIN TIME: CPT

## 2024-09-12 NOTE — PROGRESS NOTES
Anticoagulation Summary  As of 2024      INR goal:  2.0-3.0   TTR:  60.9% (7.9 y)   INR used for dosin.90 (2024)   Warfarin maintenance plan:  2 mg (4 mg x 0.5) every Wed, Fri; 4 mg (4 mg x 1) all other days   Weekly warfarin total:  24 mg   Plan last modified:  Sergio Pan, PharmD (2024)   Next INR check:  2024   Target end date:  Indefinite    Indications    Status post transcatheter aortic valve replacement (TAVR) using bioprosthesis [Z95.3]  Atrial fibrillation (HCC) [I48.91]  Secondary hypercoagulable state (HCC) [D68.69]                 Anticoagulation Episode Summary       INR check location:  --    Preferred lab:  --    Send INR reminders to:  --    Comments:  Pt goes by Dyke  ASA daily          Anticoagulation Care Providers       Provider Role Specialty Phone number    Vickey Rutherford M.D. Referring Cardiovascular Disease (Cardiology) [phone]    [facility] Anticoagulation Services Responsible  [phone]                  Refer to Patient Findings for HPI:  Patient Findings       Positives:  Upcoming invasive procedure    Negatives:  Signs/symptoms of thrombosis, Signs/symptoms of bleeding, Laboratory test error suspected, Change in health, Change in alcohol use, Change in activity, Emergency department visit, Upcoming dental procedure, Missed doses, Extra doses, Change in medications, Change in diet/appetite, Hospital admission, Bruising, Other complaints    Comments:  Upcoming endoscopy on 24. Needs to stop warfarin 5 days prior. CHADVASC score = 4 (htn, age, pre-dm). Will not bridge with Lovenox. He thinks he is having injections in the coming weeks but isn't sure on the date. He will check his calendar at home and get back to us.            Vitals:    24 0746   BP: 107/61   Pulse: 62       Verified current warfarin dosing schedule.    Medications reconciled.  Pt is not on antiplatelet therapy.      A/P   INR is slightly subtherapeutic today at 1.9.  Given he will be stopping warfarin in 2 days for GI procedure, we will not proceed with a dose change.  Reason(s) for out of range INR today: N/A      Warfarin dosing recommendation: Take your warfarin per usual tonight and tomorrow, then began HOLD on Saturday (9/14/24).    Pt educated to contact our clinic with any changes in medications or s/s of bleeding or thrombosis. Pt is aware to seek immediate medical attention for falls, head injury or deep cuts.    Request pt to return the day prior to endoscopy for pre-procedure INR. Pt agrees.    MARKELL Jacob.

## 2024-09-18 ENCOUNTER — APPOINTMENT (OUTPATIENT)
Dept: LAB | Facility: MEDICAL CENTER | Age: 84
End: 2024-09-18
Payer: MEDICARE

## 2024-09-18 ENCOUNTER — ANTICOAGULATION VISIT (OUTPATIENT)
Dept: VASCULAR LAB | Facility: MEDICAL CENTER | Age: 84
End: 2024-09-18
Attending: INTERNAL MEDICINE
Payer: MEDICARE

## 2024-09-18 DIAGNOSIS — I48.0 PAROXYSMAL ATRIAL FIBRILLATION (HCC): Chronic | ICD-10-CM

## 2024-09-18 DIAGNOSIS — D68.69 SECONDARY HYPERCOAGULABLE STATE (HCC): Chronic | ICD-10-CM

## 2024-09-18 DIAGNOSIS — Z95.3 STATUS POST TRANSCATHETER AORTIC VALVE REPLACEMENT (TAVR) USING BIOPROSTHESIS: ICD-10-CM

## 2024-09-18 LAB — INR PPP: 1.2 (ref 2–3.5)

## 2024-09-18 PROCEDURE — 85610 PROTHROMBIN TIME: CPT

## 2024-09-18 PROCEDURE — 99212 OFFICE O/P EST SF 10 MIN: CPT

## 2024-09-18 NOTE — PROGRESS NOTES
Yarelis Cantor, Med Ass't  P Amb Anticoag Pool  Cc: Cherelle EDGAR Avinash  Patient is maria eugenia for a liver biopsy on 10/08  Please follow up with patient's grandson with stop and restart of therapy instructions  Thank you    ______________________________________________________________________________________________________________________________________    Will d/w pt at f/u appt on 9/24/24.    Clifford Anand PharmD, BCACP

## 2024-09-18 NOTE — PROGRESS NOTES
Anticoagulation Summary  As of 2024      INR goal:  2.0-3.0   TTR:  60.8% (7.9 y)   INR used for dosin.20 (2024)   Warfarin maintenance plan:  2 mg (4 mg x 0.5) every Wed, Fri; 4 mg (4 mg x 1) all other days   Weekly warfarin total:  24 mg   Plan last modified:  Sergio Pan, PharmD (2024)   Next INR check:  2024   Target end date:  Indefinite    Indications    Status post transcatheter aortic valve replacement (TAVR) using bioprosthesis [Z95.3]  Atrial fibrillation (HCC) [I48.91]  Secondary hypercoagulable state (HCC) [D68.69]                 Anticoagulation Episode Summary       INR check location:  --    Preferred lab:  --    Send INR reminders to:  --    Comments:  Pt goes by Dyke  ASA daily          Anticoagulation Care Providers       Provider Role Specialty Phone number    Vickey Rutherford M.D. Referring Cardiovascular Disease (Cardiology) [phone]    [facility] Anticoagulation Services Responsible  [phone]                  Refer to Patient Findings for HPI:  Patient Findings       Positives:  Upcoming invasive procedure (Endoscopy on  - holding warfarin x 5 days prior w/ no Lovenox bridge as CHADSVASc < 7 w/ no recent hx of VTE/stroke. Of note, pt to have liver bx - date TBD), Missed doses (As instructed prior to procedure tomorrow)    Negatives:  Signs/symptoms of thrombosis, Signs/symptoms of bleeding, Laboratory test error suspected, Change in health, Change in alcohol use, Change in activity, Emergency department visit, Upcoming dental procedure, Extra doses, Change in medications, Change in diet/appetite, Hospital admission, Bruising, Other complaints            There were no vitals filed for this visit.  (Taken if pt amenable)    Verified current warfarin dosing schedule.    Medications reconciled: Yes  Pt is not on antiplatelet therapy      A/P   INR is subtherapeutic as expected.    Warfarin dosing recommendation: Instructed pt to BOLUS post-op w/ 6 mg on  9/19 and 4 mg on 9/20 (if okay w/ operating provider). Pt to then continue on w/ his current regimen.    Pt educated to contact our clinic with any changes in medications or s/s of bleeding or thrombosis. Pt is aware to seek immediate medical attention for falls, head injury or deep cuts.    Request pt to return in ~ 1 week(s). Pt agrees.    Clifford Anand, AbhishekD, BCACP

## 2024-09-20 ENCOUNTER — APPOINTMENT (OUTPATIENT)
Dept: ADMISSIONS | Facility: MEDICAL CENTER | Age: 84
End: 2024-09-20
Payer: MEDICARE

## 2024-09-20 ENCOUNTER — APPOINTMENT (OUTPATIENT)
Dept: ADMISSIONS | Facility: MEDICAL CENTER | Age: 84
End: 2024-09-20
Attending: INTERNAL MEDICINE
Payer: MEDICARE

## 2024-09-24 ENCOUNTER — ANTICOAGULATION VISIT (OUTPATIENT)
Dept: VASCULAR LAB | Facility: MEDICAL CENTER | Age: 84
End: 2024-09-24
Attending: INTERNAL MEDICINE
Payer: MEDICARE

## 2024-09-24 VITALS — SYSTOLIC BLOOD PRESSURE: 107 MMHG | DIASTOLIC BLOOD PRESSURE: 55 MMHG | HEART RATE: 75 BPM

## 2024-09-24 DIAGNOSIS — Z95.3 STATUS POST TRANSCATHETER AORTIC VALVE REPLACEMENT (TAVR) USING BIOPROSTHESIS: ICD-10-CM

## 2024-09-24 DIAGNOSIS — I48.0 PAROXYSMAL ATRIAL FIBRILLATION (HCC): Chronic | ICD-10-CM

## 2024-09-24 DIAGNOSIS — D68.69 SECONDARY HYPERCOAGULABLE STATE (HCC): Chronic | ICD-10-CM

## 2024-09-24 LAB — INR PPP: 2 (ref 2–3.5)

## 2024-09-24 PROCEDURE — 85610 PROTHROMBIN TIME: CPT

## 2024-09-24 PROCEDURE — 99211 OFF/OP EST MAY X REQ PHY/QHP: CPT

## 2024-09-24 NOTE — PROGRESS NOTES
Anticoagulation Summary  As of 2024      INR goal:  2.0-3.0   TTR:  60.7% (7.9 y)   INR used for dosin.00 (2024)   Warfarin maintenance plan:  2 mg (4 mg x 0.5) every Wed, Fri; 4 mg (4 mg x 1) all other days   Weekly warfarin total:  24 mg   Plan last modified:  Sergio Pan, PharmD (2024)   Next INR check:  10/14/2024   Target end date:  Indefinite    Indications    Status post transcatheter aortic valve replacement (TAVR) using bioprosthesis [Z95.3]  Atrial fibrillation (HCC) [I48.91]  Secondary hypercoagulable state (HCC) [D68.69]                 Anticoagulation Episode Summary       INR check location:  --    Preferred lab:  --    Send INR reminders to:  --    Comments:  Pt goes by Dyke  ASA daily          Anticoagulation Care Providers       Provider Role Specialty Phone number    Vickey Rutherford M.D. Referring Cardiovascular Disease (Cardiology) [phone]    [facility]ticoagulation [facility] Responsible  [phone]                  Refer to Patient Findings for HPI:  Patient Findings       Negatives:  Signs/symptoms of thrombosis, Signs/symptoms of bleeding, Laboratory test error suspected, Change in health, Change in alcohol use, Change in activity, Upcoming invasive procedure, Emergency department visit, Upcoming dental procedure, Missed doses, Extra doses, Change in medications, Change in diet/appetite, Hospital admission, Bruising, Other complaints            Vitals:    24 0811   BP: 107/55   Pulse: 75         Verified current warfarin dosing schedule.    Medications reconciled: Yes  Pt is not on antiplatelet therapy.      A/P   INR is therapeutic  Reason(s) for out of range INR today: N/A      Warfarin dosing recommendation: Continue regimen as listed above.    Pt educated to contact our clinic with any changes in medications or s/s of bleeding or thrombosis. Pt is aware to seek immediate medical attention for falls, head injury or deep cuts.    Request pt to  return in 3 week(s). Pt agrees. Patient has liver biopsy ion 10/8, and will call to let us know if he needs to have his INR checked prior to his procedure (pre-admit appt tomorrow).     Jaclyn Gonsalez, PharmD

## 2024-09-25 ENCOUNTER — PRE-ADMISSION TESTING (OUTPATIENT)
Dept: ADMISSIONS | Facility: MEDICAL CENTER | Age: 84
End: 2024-09-25
Attending: INTERNAL MEDICINE
Payer: MEDICARE

## 2024-09-25 RX ORDER — TAMSULOSIN HYDROCHLORIDE 0.4 MG/1
0.4 CAPSULE ORAL DAILY
COMMUNITY
Start: 2024-09-12

## 2024-09-26 ENCOUNTER — DOCUMENTATION (OUTPATIENT)
Dept: VASCULAR LAB | Facility: MEDICAL CENTER | Age: 84
End: 2024-09-26
Payer: MEDICARE

## 2024-09-26 NOTE — PROGRESS NOTES
[facility]    Received anticoagulation clearance from [facility] regarding upcoming epidural.    Procedure date TBD    Pt is on warfarin for atrial fibrillation.    Per current CHEST guidelines, pt is at low risk of VTE/stroke given CHADSVASC <7 w/o hx of CVA.             IF PT ON WARFARIN  Given pt's hx, will hold warfarin x 5 days prior to procedure and will not bridge w/ Lovenox.          Faxed clearance to [phone]; (will send to [facility] to scan in media)  I did note on clearance that pt has liver biopsy 10/8 and asked them to update us w/ procedure date    Sun Molina, AbhishekD

## 2024-10-07 ENCOUNTER — ANTICOAGULATION VISIT (OUTPATIENT)
Dept: VASCULAR LAB | Facility: MEDICAL CENTER | Age: 84
End: 2024-10-07
Attending: INTERNAL MEDICINE
Payer: MEDICARE

## 2024-10-07 VITALS — DIASTOLIC BLOOD PRESSURE: 85 MMHG | SYSTOLIC BLOOD PRESSURE: 124 MMHG | HEART RATE: 76 BPM

## 2024-10-07 DIAGNOSIS — Z95.3 STATUS POST TRANSCATHETER AORTIC VALVE REPLACEMENT (TAVR) USING BIOPROSTHESIS: ICD-10-CM

## 2024-10-07 DIAGNOSIS — I48.0 PAROXYSMAL ATRIAL FIBRILLATION (HCC): Chronic | ICD-10-CM

## 2024-10-07 DIAGNOSIS — D68.69 SECONDARY HYPERCOAGULABLE STATE (HCC): Chronic | ICD-10-CM

## 2024-10-07 LAB — INR PPP: 1.2 (ref 2–3.5)

## 2024-10-07 PROCEDURE — 85610 PROTHROMBIN TIME: CPT

## 2024-10-07 PROCEDURE — 99211 OFF/OP EST MAY X REQ PHY/QHP: CPT

## 2024-10-08 ENCOUNTER — HOSPITAL ENCOUNTER (OUTPATIENT)
Facility: MEDICAL CENTER | Age: 84
End: 2024-10-08
Attending: INTERNAL MEDICINE | Admitting: INTERNAL MEDICINE
Payer: MEDICARE

## 2024-10-08 ENCOUNTER — APPOINTMENT (OUTPATIENT)
Dept: RADIOLOGY | Facility: MEDICAL CENTER | Age: 84
End: 2024-10-08
Attending: INTERNAL MEDICINE
Payer: MEDICARE

## 2024-10-08 VITALS
SYSTOLIC BLOOD PRESSURE: 146 MMHG | HEIGHT: 72 IN | BODY MASS INDEX: 28.44 KG/M2 | DIASTOLIC BLOOD PRESSURE: 69 MMHG | WEIGHT: 210 LBS | RESPIRATION RATE: 13 BRPM | OXYGEN SATURATION: 95 % | HEART RATE: 52 BPM

## 2024-10-08 DIAGNOSIS — K70.30 ALCOHOLIC CIRRHOSIS OF LIVER WITHOUT ASCITES (HCC): ICD-10-CM

## 2024-10-08 LAB
ERYTHROCYTE [DISTWIDTH] IN BLOOD BY AUTOMATED COUNT: 50.3 FL (ref 35.9–50)
HCT VFR BLD AUTO: 43.9 % (ref 42–52)
HGB BLD-MCNC: 14.4 G/DL (ref 14–18)
MCH RBC QN AUTO: 31.6 PG (ref 27–33)
MCHC RBC AUTO-ENTMCNC: 32.8 G/DL (ref 32.3–36.5)
MCV RBC AUTO: 96.5 FL (ref 81.4–97.8)
PATHOLOGY CONSULT NOTE: NORMAL
PLATELET # BLD AUTO: 184 K/UL (ref 164–446)
PMV BLD AUTO: 11.8 FL (ref 9–12.9)
RBC # BLD AUTO: 4.55 M/UL (ref 4.7–6.1)
WBC # BLD AUTO: 7.2 K/UL (ref 4.8–10.8)

## 2024-10-08 PROCEDURE — 700111 HCHG RX REV CODE 636 W/ 250 OVERRIDE (IP): Mod: JZ | Performed by: STUDENT IN AN ORGANIZED HEALTH CARE EDUCATION/TRAINING PROGRAM

## 2024-10-08 PROCEDURE — 76942 ECHO GUIDE FOR BIOPSY: CPT

## 2024-10-08 PROCEDURE — 85027 COMPLETE CBC AUTOMATED: CPT

## 2024-10-08 PROCEDURE — 88313 SPECIAL STAINS GROUP 2: CPT | Mod: 91

## 2024-10-08 PROCEDURE — 88307 TISSUE EXAM BY PATHOLOGIST: CPT

## 2024-10-08 PROCEDURE — 4401636 IR-BIOPSY-LIVER PERCUTANEOUS(FL)

## 2024-10-08 PROCEDURE — 47000 NEEDLE BIOPSY OF LIVER PERQ: CPT

## 2024-10-08 PROCEDURE — 700111 HCHG RX REV CODE 636 W/ 250 OVERRIDE (IP): Mod: JZ

## 2024-10-08 RX ORDER — SODIUM CHLORIDE 9 MG/ML
500 INJECTION, SOLUTION INTRAVENOUS
Status: DISCONTINUED | OUTPATIENT
Start: 2024-10-08 | End: 2024-10-08 | Stop reason: HOSPADM

## 2024-10-08 RX ORDER — ONDANSETRON 2 MG/ML
4 INJECTION INTRAMUSCULAR; INTRAVENOUS PRN
Status: DISCONTINUED | OUTPATIENT
Start: 2024-10-08 | End: 2024-10-08 | Stop reason: HOSPADM

## 2024-10-08 RX ORDER — MIDAZOLAM HYDROCHLORIDE 1 MG/ML
INJECTION INTRAMUSCULAR; INTRAVENOUS
Status: COMPLETED
Start: 2024-10-08 | End: 2024-10-08

## 2024-10-08 RX ORDER — MIDAZOLAM HYDROCHLORIDE 1 MG/ML
.5-2 INJECTION INTRAMUSCULAR; INTRAVENOUS PRN
Status: DISCONTINUED | OUTPATIENT
Start: 2024-10-08 | End: 2024-10-08 | Stop reason: HOSPADM

## 2024-10-08 RX ADMIN — MIDAZOLAM HYDROCHLORIDE 1 MG: 1 INJECTION, SOLUTION INTRAMUSCULAR; INTRAVENOUS at 09:02

## 2024-10-08 RX ADMIN — FENTANYL CITRATE 50 MCG: 50 INJECTION, SOLUTION INTRAMUSCULAR; INTRAVENOUS at 09:08

## 2024-10-08 RX ADMIN — FENTANYL CITRATE 50 MCG: 50 INJECTION, SOLUTION INTRAMUSCULAR; INTRAVENOUS at 09:02

## 2024-10-08 RX ADMIN — MIDAZOLAM HYDROCHLORIDE 1 MG: 2 INJECTION, SOLUTION INTRAMUSCULAR; INTRAVENOUS at 09:08

## 2024-10-08 RX ADMIN — MIDAZOLAM HYDROCHLORIDE 1 MG: 2 INJECTION, SOLUTION INTRAMUSCULAR; INTRAVENOUS at 09:02

## 2024-10-08 ASSESSMENT — FIBROSIS 4 INDEX: FIB4 SCORE: 2.51

## 2024-10-14 ENCOUNTER — ANTICOAGULATION VISIT (OUTPATIENT)
Dept: VASCULAR LAB | Facility: MEDICAL CENTER | Age: 84
End: 2024-10-14
Attending: INTERNAL MEDICINE
Payer: MEDICARE

## 2024-10-14 DIAGNOSIS — D68.69 SECONDARY HYPERCOAGULABLE STATE (HCC): Chronic | ICD-10-CM

## 2024-10-14 DIAGNOSIS — Z95.3 STATUS POST TRANSCATHETER AORTIC VALVE REPLACEMENT (TAVR) USING BIOPROSTHESIS: ICD-10-CM

## 2024-10-14 DIAGNOSIS — I48.0 PAROXYSMAL ATRIAL FIBRILLATION (HCC): Chronic | ICD-10-CM

## 2024-10-14 LAB — INR PPP: 1.9 (ref 2–3.5)

## 2024-10-14 PROCEDURE — 99212 OFFICE O/P EST SF 10 MIN: CPT

## 2024-10-14 PROCEDURE — 85610 PROTHROMBIN TIME: CPT

## 2024-10-16 ENCOUNTER — TELEPHONE (OUTPATIENT)
Dept: VASCULAR LAB | Facility: MEDICAL CENTER | Age: 84
End: 2024-10-16
Payer: MEDICARE

## 2024-10-21 ENCOUNTER — ANTICOAGULATION VISIT (OUTPATIENT)
Dept: VASCULAR LAB | Facility: MEDICAL CENTER | Age: 84
End: 2024-10-21
Attending: INTERNAL MEDICINE
Payer: MEDICARE

## 2024-10-21 DIAGNOSIS — I48.0 PAROXYSMAL ATRIAL FIBRILLATION (HCC): Chronic | ICD-10-CM

## 2024-10-21 DIAGNOSIS — D68.69 SECONDARY HYPERCOAGULABLE STATE (HCC): Chronic | ICD-10-CM

## 2024-10-21 DIAGNOSIS — Z95.3 STATUS POST TRANSCATHETER AORTIC VALVE REPLACEMENT (TAVR) USING BIOPROSTHESIS: ICD-10-CM

## 2024-10-21 LAB — INR PPP: 2.2 (ref 2–3.5)

## 2024-10-21 PROCEDURE — 99212 OFFICE O/P EST SF 10 MIN: CPT

## 2024-10-21 PROCEDURE — 85610 PROTHROMBIN TIME: CPT

## 2024-10-31 ENCOUNTER — ANTICOAGULATION VISIT (OUTPATIENT)
Dept: VASCULAR LAB | Facility: MEDICAL CENTER | Age: 84
End: 2024-10-31
Attending: INTERNAL MEDICINE
Payer: MEDICARE

## 2024-10-31 DIAGNOSIS — D68.69 SECONDARY HYPERCOAGULABLE STATE (HCC): Chronic | ICD-10-CM

## 2024-10-31 DIAGNOSIS — Z95.3 STATUS POST TRANSCATHETER AORTIC VALVE REPLACEMENT (TAVR) USING BIOPROSTHESIS: ICD-10-CM

## 2024-10-31 DIAGNOSIS — I48.0 PAROXYSMAL ATRIAL FIBRILLATION (HCC): Chronic | ICD-10-CM

## 2024-10-31 LAB — INR PPP: 1.3 (ref 2–3.5)

## 2024-10-31 PROCEDURE — 99212 OFFICE O/P EST SF 10 MIN: CPT | Performed by: NURSE PRACTITIONER

## 2024-10-31 PROCEDURE — 85610 PROTHROMBIN TIME: CPT

## 2024-11-01 NOTE — PROGRESS NOTES
Anticoagulation Summary  As of 2020    INR goal:   2.0-3.0   TTR:   58.3 % (3.7 y)   INR used for dosin.00 (2020)   Warfarin maintenance plan:   2 mg (4 mg x 0.5) every Tue, Th; 4 mg (4 mg x 1) all other days   Weekly warfarin total:   24 mg   Plan last modified:   TARA JacobPMARIUM (2020)   Next INR check:   2020   Target end date:   Indefinite    Indications    Atrial fibrillation (HCC) [I48.91]  Status post transcatheter aortic valve replacement (TAVR) using bioprosthesis [Z95.3]             Anticoagulation Episode Summary     INR check location:       Preferred lab:       Send INR reminders to:       Comments:   Pt goes by Kinsey      Anticoagulation Care Providers     Provider Role Specialty Phone number    Vickey Rutherford M.D. Referring Cardiology [phone]    [facility] Anticoagulation Services Responsible  [phone]        Anticoagulation Patient Findings      HPI:  Duane Parkinson seen in clinic today for follow up on anticoagulation therapy in the presence of AF, TAVR.   Denies any changes to current medical/health status since last appointment.   Denies any medication or diet changes.   No current symptoms of bleeding or thrombosis reported.    A/P:   INR therapeutic.   Continue current regimen.     Follow up appointment in 2 week(s).    Next Appointment: Thursday, 2020 at 7:45 am.    Britney DOE                     no

## 2024-11-05 DIAGNOSIS — E78.5 DYSLIPIDEMIA: ICD-10-CM

## 2024-11-05 RX ORDER — PRAVASTATIN SODIUM 80 MG/1
TABLET ORAL
Qty: 90 TABLET | Refills: 3 | Status: SHIPPED | OUTPATIENT
Start: 2024-11-05

## 2024-11-05 NOTE — TELEPHONE ENCOUNTER
Received request via: Pharmacy    Was the patient seen in the last year in this department? Yes    Does the patient have an active prescription (recently filled or refills available) for medication(s) requested? No    Pharmacy Name:   [id number] - [place] - [address] [facility]  [address] [place] [address]  [place] [address]  Phone: [phone] Fax: [phone]     Does the patient have FDC Plus and need 100-day supply? (This applies to ALL medications) Patient does not have SCP

## 2024-11-06 ENCOUNTER — ANTICOAGULATION VISIT (OUTPATIENT)
Dept: VASCULAR LAB | Facility: MEDICAL CENTER | Age: 84
End: 2024-11-06
Attending: INTERNAL MEDICINE
Payer: MEDICARE

## 2024-11-06 VITALS — HEART RATE: 69 BPM | SYSTOLIC BLOOD PRESSURE: 134 MMHG | DIASTOLIC BLOOD PRESSURE: 87 MMHG

## 2024-11-06 DIAGNOSIS — D68.69 SECONDARY HYPERCOAGULABLE STATE (HCC): Chronic | ICD-10-CM

## 2024-11-06 DIAGNOSIS — I48.0 PAROXYSMAL ATRIAL FIBRILLATION (HCC): Chronic | ICD-10-CM

## 2024-11-06 DIAGNOSIS — Z95.3 STATUS POST TRANSCATHETER AORTIC VALVE REPLACEMENT (TAVR) USING BIOPROSTHESIS: ICD-10-CM

## 2024-11-06 LAB — INR PPP: 2.1 (ref 2–3.5)

## 2024-11-06 PROCEDURE — 99211 OFF/OP EST MAY X REQ PHY/QHP: CPT | Performed by: NURSE PRACTITIONER

## 2024-11-06 PROCEDURE — 85610 PROTHROMBIN TIME: CPT

## 2024-11-06 NOTE — PROGRESS NOTES
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 Referring Cardiovascular Disease (Cardiology) [phone]    [facility] Anticoagulation Services Responsible  [phone]                  Refer to Patient Findings for HPI:  Patient Findings       Negatives:  Signs/symptoms of thrombosis, Signs/symptoms of bleeding, Laboratory test error suspected, Change in health, Change in alcohol use, Change in activity, Upcoming invasive procedure, Emergency department visit, Upcoming dental procedure, Missed doses, Extra doses, Change in medications, Change in diet/appetite, Hospital admission, Bruising, Other complaints    Comments:  He had his epidural last Friday. Resumed warfarin the evening of the procedure.            Vitals:    24 0849   BP: 134/87   Pulse: 69     Verified current warfarin dosing schedule.    Medications reconciled.  Pt is not on antiplatelet therapy.      A/P   INR is therapeutic today at 2.1.  Reason(s) for out of range INR today: N/A      Warfarin dosing recommendation: Continue regimen as listed above.    Pt educated to contact our clinic with any changes in medications or s/s of bleeding or  thrombosis. Pt is aware to seek immediate medical attention for falls, head injury or deep cuts.    Request pt to return in 2 week(s). Pt agrees.    MARKELL Jacob.

## 2024-11-20 ENCOUNTER — ANTICOAGULATION VISIT (OUTPATIENT)
Dept: VASCULAR LAB | Facility: MEDICAL CENTER | Age: 84
End: 2024-11-20
Attending: INTERNAL MEDICINE
Payer: MEDICARE

## 2024-11-20 DIAGNOSIS — D68.69 SECONDARY HYPERCOAGULABLE STATE (HCC): Chronic | ICD-10-CM

## 2024-11-20 DIAGNOSIS — I48.0 PAROXYSMAL ATRIAL FIBRILLATION (HCC): Chronic | ICD-10-CM

## 2024-11-20 DIAGNOSIS — Z95.3 STATUS POST TRANSCATHETER AORTIC VALVE REPLACEMENT (TAVR) USING BIOPROSTHESIS: ICD-10-CM

## 2024-11-20 LAB — INR PPP: 2.7 (ref 2–3.5)

## 2024-11-20 PROCEDURE — 99211 OFF/OP EST MAY X REQ PHY/QHP: CPT | Performed by: NURSE PRACTITIONER

## 2024-11-20 PROCEDURE — 85610 PROTHROMBIN TIME: CPT

## 2024-11-20 NOTE — PROGRESS NOTES
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 Referring Cardiovascular Disease (Cardiology) [phone]    [facility] Anticoagulation Services Responsible  [phone]                  Refer to Patient Findings for HPI:  Patient Findings       Negatives:  Signs/symptoms of thrombosis, Signs/symptoms of bleeding, Laboratory test error suspected, Change in health, Change in alcohol use, Change in activity, Upcoming invasive procedure, Emergency department visit, Upcoming dental procedure, Missed doses, Extra doses, Change in medications, Change in diet/appetite, Hospital admission, Bruising, Other complaints            There were no vitals filed for this visit.    Verified current warfarin dosing schedule.    Medications reconciled.  Pt is not on antiplatelet therapy.      A/P   INR is therapeutic today at 2.7.  Reason(s) for out of range INR today: N/A      Warfarin dosing recommendation: Continue regimen as listed above.    Pt educated to contact our clinic with any changes in medications or s/s of bleeding or thrombosis. Pt is aware to seek immediate medical attention for falls, head injury or deep  cuts.    Request pt to return in 2 week(s). Pt agrees.    MARKELL Jacob.

## 2024-11-29 ENCOUNTER — OFFICE VISIT (OUTPATIENT)
Dept: URGENT CARE | Facility: CLINIC | Age: 84
End: 2024-11-29
Payer: MEDICARE

## 2024-11-29 ENCOUNTER — TELEPHONE (OUTPATIENT)
Dept: MEDICAL GROUP | Facility: CLINIC | Age: 84
End: 2024-11-29
Payer: MEDICARE

## 2024-11-29 VITALS
OXYGEN SATURATION: 96 % | RESPIRATION RATE: 20 BRPM | TEMPERATURE: 98 F | DIASTOLIC BLOOD PRESSURE: 64 MMHG | BODY MASS INDEX: 29.68 KG/M2 | HEART RATE: 80 BPM | SYSTOLIC BLOOD PRESSURE: 116 MMHG | WEIGHT: 212 LBS | HEIGHT: 71 IN

## 2024-11-29 DIAGNOSIS — U07.1 COVID-19: ICD-10-CM

## 2024-11-29 DIAGNOSIS — R05.1 ACUTE COUGH: ICD-10-CM

## 2024-11-29 LAB
FLUAV RNA SPEC QL NAA+PROBE: NEGATIVE
FLUBV RNA SPEC QL NAA+PROBE: NEGATIVE
RSV RNA SPEC QL NAA+PROBE: NEGATIVE
SARS-COV-2 RNA RESP QL NAA+PROBE: POSITIVE

## 2024-11-29 PROCEDURE — 0241U POCT CEPHEID COV-2, FLU A/B, RSV - PCR: CPT | Performed by: NURSE PRACTITIONER

## 2024-11-29 PROCEDURE — 99213 OFFICE O/P EST LOW 20 MIN: CPT | Performed by: NURSE PRACTITIONER

## 2024-11-29 ASSESSMENT — VISUAL ACUITY: OU: 1

## 2024-11-29 ASSESSMENT — FIBROSIS 4 INDEX: FIB4 SCORE: 3.68

## 2024-11-29 ASSESSMENT — ENCOUNTER SYMPTOMS: COUGH: 1

## 2024-11-29 NOTE — TELEPHONE ENCOUNTER
Received on call page.  Grandtony Steward called concerned that patient was lethargic, had a sore throat and has been coughing.  He has lost taste.  Concerned with age and symptoms.  Advised that patient should at least be seen at urgent care.  Zoe verbalized understanding.

## 2024-11-30 ENCOUNTER — TELEPHONE (OUTPATIENT)
Dept: URGENT CARE | Facility: CLINIC | Age: 84
End: 2024-11-30
Payer: MEDICARE

## 2024-11-30 NOTE — PROGRESS NOTES
Subjective:     Duane Parkinson is a 84 y.o. male who presents for Cough (Cough, fever, chills, chest congestion, lost voice, runny nose, x1day, fatigue, loss of appetite, unstable)       Cough      History of Present Illness  The patient presents for evaluation of respiratory symptoms. He is accompanied by his grandson.    Respiratory Symptoms  He began experiencing symptoms of a cough and sore throat on Tuesday, which have since improved. He has been taking over-the-counter daytime multisymptom cold medicine and Tylenol for pain and headaches.  - Onset: Symptoms began on Tuesday.  - Location: Throat (sore throat).  - Duration: Since Tuesday.  - Character: Cough and sore throat.  - Alleviating Factors: Over-the-counter daytime multisymptom cold medicine and Tylenol.  - Severity: Symptoms have since improved.    Systemic Symptoms  His grandson reports that he had hot flashes, cold sweats, a severe headache, and a lack of balance and energy last night. He also had a diminished appetite.  - Onset: Last night.  - Character: Hot flashes, cold sweats, severe headache, lack of balance and energy, diminished appetite.    Chest Pain and Shortness of Breath  He experienced chest pain and shortness of breath a day or two ago but does not have difficulty swallowing. He reports no history of chronic lung issues or disorders such as asthma.  - Onset: A day or two ago.  - Character: Chest pain and shortness of breath.  - Severity: No difficulty swallowing; no history of chronic lung issues or disorders such as asthma.    Ambient listening software ([employer]) used during this encounter with the consent of the patient***.    Review of Systems   Respiratory:  Positive for cough.      Refer to HPI for additional details.    During this visit, appropriate PPE was worn, and hand hygiene was performed.    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    He has no past medical history of Arthritis or Depression.    MEDS:   Current Outpatient Medications:     molnupiravir 200 MG capsule, Take 4 Capsules by mouth 2 times a day for 5 days., Disp: 40 Capsule, Rfl: 0    pravastatin (PRAVACHOL) 80 MG tablet, TAKE ONE TABLET BY MOUTH EVERY EVENING (PRAVACHOL), Disp: 90 Tablet, Rfl: 3    tamsulosin (FLOMAX) 0.4 MG capsule, Take 0.4 mg by mouth every day., Disp: , Rfl:     Glucosamine HCl (GLUCOSAMINE PO), Take  by mouth every day., Disp: , Rfl:     GINKGO BILOBA COMPLEX PO, Take  by mouth every day., Disp: , Rfl:     digoxin (LANOXIN) 250 MCG Tab, Take 1 Tablet by mouth every morning., Disp: 90 Tablet, Rfl: 3    warfarin (COUMADIN) 4 MG Tab, TAKE 1 to 1.5 TABLETS BY MOUTH DAILY AS DIRECTED BY [facility]TICOAGUL[facility], Disp: 110 Tablet, Rfl: 1    metoprolol SR (TOPROL XL) 50 MG TABLET SR 24 HR, Take 1 Tablet by mouth every evening., Disp: 90 Tablet, Rfl: 3    METAMUCIL FIBER PO, Take  by mouth every day., Disp: , Rfl:     LYSINE PO, Take 1 Tablet by mouth every day. As needed, Disp: , Rfl:     Ascorbic Acid (VITAMIN C PO), Take 1 Tablet by mouth every morning., Disp: , Rfl:     Coenzyme Q10 (COQ10 PO), Take 1 Tablet by mouth every day., Disp: , Rfl:     acetaminophen (TYLENOL) 500 MG Tab, Take 500-1,000 mg by mouth every 6 hours as needed. Indications: Pain, Disp: , Rfl:     gabapentin (NEURONTIN) 300 MG Cap, Take 600 mg by mouth 4 times a day., Disp: , Rfl:     B Complex Vitamins  "(VITAMIN B COMPLEX PO), Take 1 Tab by mouth every evening., Disp: , Rfl:     Cholecalciferol (VITAMIN D3 PO), Take 1 Tab by mouth every evening., Disp: , Rfl:     multivitamin (THERAGRAN) TABS, Take 1 Tablet by mouth at bedtime., Disp: , Rfl:     ALLERGIES:   Allergies   Allergen Reactions    Feldene [Piroxicam] Itching    Percodan [Oxycodone-Aspirin] Itching and Photosensitivity     \"I sunburn\"     SURGHX:   Past Surgical History:   Procedure Laterality Date    NC TRANSURETHRAL ELEC-SURG PROSTATECTOM  4/7/2023    Procedure: BIPOLAR TRANSURETHRAL RESECTION OF PROSTATE;  Surgeon: Mack Pritchett M.D.;  Location: SURGERY [facility];  Service: Urology    CATARACT EXTRACTION WITH IOL Bilateral 03/2023    GIBRAN N/A 07/29/2019    Procedure: ECHOCARDIOGRAM, TRANSESOPHAGEAL;  Surgeon: Leander Buckner M.D.;  Location: SURGERY [facility];  Service: Cardiac    TRANSCATHETER AORTIC VALVE REPLACEMENT Bilateral 07/29/2019    Procedure: REPLACEMENT, AORTIC VALVE, TRANSCATHETER;  Surgeon: Leander Buckner M.D.;  Location: SURGERY [facility];  Service: Cardiac    FINGER ARTHROPLASTY Left 05/27/2016    Procedure: FINGER ARTHROPLASTY THUMB CARPOMETACARPAL EXCISIONAL, EXCISE TRAPEZIUM;  Surgeon: Raheel Salgado M.D.;  Location: SURGERY [facility];  Service:     CARDIOVERSION      on 7/17/06, sinus rhythm until January 2007,  paroxysmal atrial fibrillation    KNEE ARTHROPLASTY TOTAL      LAMINOTOMY N/A     multiple lumbar spine    OTHER ORTHOPEDIC SURGERY      lower back    NC PERCUT IMPLNT NEUROELECT,EPIDURAL      TOE ARTHROPLASTY      TURP-VAPOR N/A      SOCHX:  reports that he quit smoking about 52 years ago. His smoking use included cigarettes. He started smoking about 72 years ago. He has a 20 pack-year smoking history. He has never used smokeless tobacco. He reports that he does not drink alcohol and does not use drugs.    FH: Per HPI as applicable/pertinent.      Objective:     /64 (BP " "Location: Left arm, Patient Position: Sitting, BP Cuff Size: Adult)   Pulse 80   Temp 36.7 °C (98 °F) (Temporal)   Resp 20   Ht 1.803 m (5' 11\")   Wt 96.2 kg (212 lb)   SpO2 96%   BMI 29.57 kg/m²     Physical Exam  Nursing note reviewed.   Constitutional:       General: He is not in acute distress.     Appearance: He is well-developed. He is not ill-appearing or toxic-appearing.   HENT:      Mouth/Throat:      Mouth: Mucous membranes are moist.      Pharynx: Oropharynx is clear. Posterior oropharyngeal erythema present.   Eyes:      General: Vision grossly intact.      Extraocular Movements: Extraocular movements intact.      Conjunctiva/sclera: Conjunctivae normal.   Neck:      Trachea: Phonation normal.   Cardiovascular:      Rate and Rhythm: Normal rate and regular rhythm.      Heart sounds: Normal heart sounds.   Pulmonary:      Effort: Pulmonary effort is normal. No respiratory distress.      Breath sounds: Normal breath sounds. No stridor. No decreased breath sounds, wheezing, rhonchi or rales.   Musculoskeletal:         General: No deformity. Normal range of motion.      Cervical back: Normal range of motion.   Skin:     General: Skin is warm and dry.      Coloration: Skin is not pale.   Neurological:      Mental Status: He is alert and oriented to person, place, and time.      Motor: No weakness.   Psychiatric:         Behavior: Behavior normal. Behavior is cooperative.     POCT Cepheid CoV-2, Flu A/B, RSV by PCR: positive Covid    Physical Exam  - Lungs are clear  - No wheezing    Results          Assessment/Plan:     1. Acute cough  - POCT CEPHEID COV-2, FLU A/B, RSV - PCR    2. COVID-19  - molnupiravir 200 MG capsule; Take 4 Capsules by mouth 2 times a day for 5 days.  Dispense: 40 Capsule; Refill: 0    ***    Assessment & Plan  Upper respiratory infection  - Symptoms: cough, sore throat, headache, and fatigue suggestive of a common cold or upper respiratory viral infection  - Lungs clear upon " examination  - No chest pain or shortness of breath reported  - Nasal swab to rule out COVID-19 or influenza, results in approximately one hour    - If positive for COVID-19: initiate antiviral treatment    - If positive for influenza: beyond the window for antiviral therapy    - If positive for RSV: supportive care including rest, fluids, and over-the-counter medications  - Monitor symptoms until next Tuesday (one week since onset)  - Return for further evaluation if condition does not improve or worsens    Spoke with son over the phone regarding recent test results.  Positive COVID-19.  Advised to isolate and mask per CDC guidelines.  Advised COVID-19 is a likely self-limiting viral illness.  Emphasize supportive measures.  Discussed alternative treatments including antiviral therapy.  Unfortunately, unable to use Paxlovid because patient is currently on medication such as warfarin and tamsulosin.  Rx for molnupiravir sent electronically to pharmacy.  Advised that this should be started within the first 5 days of symptom onset.  Warning signs reviewed.  Return precautions advised.    Differential diagnosis, natural history, supportive care, ***over-the-counter symptom management per 's instructions, ***close monitoring, and indications for immediate follow-up discussed.     ***    All questions answered. Patient/grandson agrees with the plan of care.    ***

## 2024-11-30 NOTE — TELEPHONE ENCOUNTER
Patient was seen yesterday and prescribed a medication that is not covered by insurance, would like to discuss other options. Would like to be called at [phone] instead of messaged via [employer]. MRN is [id number].

## 2024-12-01 DIAGNOSIS — I10 ESSENTIAL HYPERTENSION: ICD-10-CM

## 2024-12-01 NOTE — TELEPHONE ENCOUNTER
Spoke with Ruddy, patient's grandson, over the phone.  Molnupiravir not covered by patient's insurance and out-of-pocket cost is $2000.  Paxlovid contraindicated with patient's current medications including warfarin.  Advised that it is not detrimental that patient does not take an antiviral for COVID-19.  Symptoms improving overall.physical exam benign.  Vitals were stable.  Discussed natural course of COVID-19 and advised to supportive measures.  Advised to monitor symptoms.  Advised strict return/ER precautions.  All questions answered.

## 2024-12-02 RX ORDER — METOPROLOL SUCCINATE 50 MG/1
50 TABLET, EXTENDED RELEASE ORAL NIGHTLY
Qty: 90 TABLET | Refills: 3 | Status: SHIPPED | OUTPATIENT
Start: 2024-12-02

## 2024-12-04 ENCOUNTER — DOCUMENTATION (OUTPATIENT)
Dept: VASCULAR LAB | Facility: MEDICAL CENTER | Age: 84
End: 2024-12-04
Payer: MEDICARE

## 2024-12-04 ASSESSMENT — ENCOUNTER SYMPTOMS
CHILLS: 1
SORE THROAT: 1
FEVER: 1

## 2024-12-04 NOTE — PROGRESS NOTES
Pt missed his INR appt this AM in the anticoagulation clinic. Called pt and left a vm letting him know I rescheduled him for Friday, 12/6/24 at 10:45 am but to call us at [phone] if this day/time doesn't work for him. Otherwise, will f/u with pt then.    Britney DOE

## 2024-12-06 ENCOUNTER — ANTICOAGULATION VISIT (OUTPATIENT)
Dept: VASCULAR LAB | Facility: MEDICAL CENTER | Age: 84
End: 2024-12-06
Attending: INTERNAL MEDICINE
Payer: MEDICARE

## 2024-12-06 DIAGNOSIS — Z95.3 STATUS POST TRANSCATHETER AORTIC VALVE REPLACEMENT (TAVR) USING BIOPROSTHESIS: ICD-10-CM

## 2024-12-06 DIAGNOSIS — I48.0 PAROXYSMAL ATRIAL FIBRILLATION (HCC): Chronic | ICD-10-CM

## 2024-12-06 DIAGNOSIS — D68.69 SECONDARY HYPERCOAGULABLE STATE (HCC): Chronic | ICD-10-CM

## 2024-12-06 LAB — INR PPP: 2.4 (ref 2–3.5)

## 2024-12-06 PROCEDURE — 99211 OFF/OP EST MAY X REQ PHY/QHP: CPT

## 2024-12-06 PROCEDURE — 85610 PROTHROMBIN TIME: CPT

## 2024-12-06 NOTE — PROGRESS NOTES
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 Referring Cardiovascular Disease (Cardiology) [phone]    [facility] Anticoagulation Services Responsible  [phone]                  Refer to Patient Findings for HPI:  Patient Findings       Negatives:  Signs/symptoms of thrombosis, Signs/symptoms of bleeding, Laboratory test error suspected, Change in health, Change in alcohol use, Change in activity, Upcoming invasive procedure, Emergency department visit, Upcoming dental procedure, Missed doses, Extra doses, Change in medications, Change in diet/appetite, Hospital admission, Bruising, Other complaints            There were no vitals filed for this visit.  Pt declined vitals    Verified current warfarin dosing schedule.    Medications reconciled.  Pt is not on antiplatelet therapy.      A/P   INR is therapeutic      Warfarin dosing recommendation: Continue regimen as listed above.    Pt educated to contact our clinic with any changes in medications or s/s of bleeding or thrombosis. Pt is aware to seek immediate medical attention for falls, head injury or deep cuts.    Request pt to return in 2 week(s).  Pt agrees.    Sergio Pan, AbhishekD

## 2024-12-06 NOTE — PROGRESS NOTES
Pt's grandson called back. Advised to continue warfarin regimen. He confirmed that they will come tomorrow for his appt. No further action needed at this time.    Robby Zarate, AbhishekD, BCACP

## 2024-12-11 DIAGNOSIS — I48.0 PAROXYSMAL ATRIAL FIBRILLATION (HCC): ICD-10-CM

## 2024-12-12 ENCOUNTER — APPOINTMENT (OUTPATIENT)
Dept: URBAN - METROPOLITAN AREA CLINIC 4 | Facility: CLINIC | Age: 84
Setting detail: DERMATOLOGY
End: 2024-12-12

## 2024-12-12 ENCOUNTER — HOSPITAL ENCOUNTER (OUTPATIENT)
Dept: LAB | Facility: MEDICAL CENTER | Age: 84
End: 2024-12-12
Attending: INTERNAL MEDICINE
Payer: MEDICARE

## 2024-12-12 DIAGNOSIS — L81.4 OTHER MELANIN HYPERPIGMENTATION: ICD-10-CM

## 2024-12-12 DIAGNOSIS — L85.3 XEROSIS CUTIS: ICD-10-CM

## 2024-12-12 DIAGNOSIS — Z85.828 PERSONAL HISTORY OF OTHER MALIGNANT NEOPLASM OF SKIN: ICD-10-CM

## 2024-12-12 DIAGNOSIS — L82.1 OTHER SEBORRHEIC KERATOSIS: ICD-10-CM

## 2024-12-12 DIAGNOSIS — L82.0 INFLAMED SEBORRHEIC KERATOSIS: ICD-10-CM

## 2024-12-12 DIAGNOSIS — L57.0 ACTINIC KERATOSIS: ICD-10-CM

## 2024-12-12 PROCEDURE — ? LIQUID NITROGEN

## 2024-12-12 PROCEDURE — 99213 OFFICE O/P EST LOW 20 MIN: CPT | Mod: 25

## 2024-12-12 PROCEDURE — ? PRESCRIPTION

## 2024-12-12 PROCEDURE — 17000 DESTRUCT PREMALG LESION: CPT | Mod: 59

## 2024-12-12 PROCEDURE — 17110 DESTRUCTION B9 LES UP TO 14: CPT

## 2024-12-12 PROCEDURE — 36415 COLL VENOUS BLD VENIPUNCTURE: CPT

## 2024-12-12 PROCEDURE — 17003 DESTRUCT PREMALG LES 2-14: CPT | Mod: 59

## 2024-12-12 PROCEDURE — ? COUNSELING

## 2024-12-12 PROCEDURE — 80053 COMPREHEN METABOLIC PANEL: CPT

## 2024-12-12 RX ORDER — AMMONIUM LACTATE 12 G/100G
CREAM TOPICAL BID
Qty: 385 | Refills: 5 | Status: ERX | COMMUNITY
Start: 2024-12-12

## 2024-12-12 RX ADMIN — AMMONIUM LACTATE: 12 CREAM TOPICAL at 00:00

## 2024-12-12 ASSESSMENT — LOCATION SIMPLE DESCRIPTION DERM
LOCATION SIMPLE: RIGHT ANTERIOR NECK
LOCATION SIMPLE: UPPER BACK
LOCATION SIMPLE: CHEST
LOCATION SIMPLE: RIGHT CHEEK
LOCATION SIMPLE: RIGHT FOREARM
LOCATION SIMPLE: RIGHT HAND
LOCATION SIMPLE: NOSE
LOCATION SIMPLE: LEFT CHEEK
LOCATION SIMPLE: LEFT ANTERIOR NECK
LOCATION SIMPLE: LEFT FOREARM
LOCATION SIMPLE: LEFT SCALP

## 2024-12-12 ASSESSMENT — LOCATION DETAILED DESCRIPTION DERM
LOCATION DETAILED: NASAL DORSUM
LOCATION DETAILED: LEFT PROXIMAL DORSAL FOREARM
LOCATION DETAILED: LEFT MEDIAL FRONTAL SCALP
LOCATION DETAILED: RIGHT CENTRAL MALAR CHEEK
LOCATION DETAILED: RIGHT CLAVICULAR NECK
LOCATION DETAILED: RIGHT PROXIMAL DORSAL FOREARM
LOCATION DETAILED: INFERIOR THORACIC SPINE
LOCATION DETAILED: RIGHT LATERAL MALAR CHEEK
LOCATION DETAILED: RIGHT DISTAL DORSAL FOREARM
LOCATION DETAILED: RIGHT RADIAL DORSAL HAND
LOCATION DETAILED: LEFT LATERAL MALAR CHEEK
LOCATION DETAILED: LEFT DISTAL DORSAL FOREARM
LOCATION DETAILED: LEFT CLAVICULAR NECK
LOCATION DETAILED: RIGHT MEDIAL MALAR CHEEK
LOCATION DETAILED: LEFT MEDIAL INFERIOR CHEST

## 2024-12-12 ASSESSMENT — LOCATION ZONE DERM
LOCATION ZONE: HAND
LOCATION ZONE: SCALP
LOCATION ZONE: FACE
LOCATION ZONE: NECK
LOCATION ZONE: TRUNK
LOCATION ZONE: NOSE
LOCATION ZONE: ARM

## 2024-12-12 NOTE — PROCEDURE: LIQUID NITROGEN
Application Tool (Optional): Cry-AC
Render Note In Bullet Format When Appropriate: No
Show Aperture Variable?: Yes
Aperture Size (Optional): C
Detail Level: Detailed
Consent: The patient's consent was obtained including but not limited to risks of crusting, scabbing, blistering, scarring, darker or lighter pigmentary change, recurrence, incomplete removal and infection.
Post-Care Instructions: I reviewed with the patient in detail post-care instructions. Patient is to wear sunprotection, and avoid picking at any of the treated lesions. Pt may apply Vaseline to crusted or scabbing areas.
Duration Of Freeze Thaw-Cycle (Seconds): 3
Number Of Freeze-Thaw Cycles: 1 freeze-thaw cycle
Medical Necessity Clause: This procedure was medically necessary because the lesions that were treated were:
Medical Necessity Information: It is in your best interest to select a reason for this procedure from the list below. All of these items fulfill various CMS LCD requirements except the new and changing color options.
Spray Paint Text: The liquid nitrogen was applied to the skin utilizing a spray paint frosting technique.

## 2024-12-13 LAB
ALBUMIN SERPL BCP-MCNC: 3.8 G/DL (ref 3.2–4.9)
ALBUMIN/GLOB SERPL: 1.1 G/DL
ALP SERPL-CCNC: 97 U/L (ref 30–99)
ALT SERPL-CCNC: 76 U/L (ref 2–50)
ANION GAP SERPL CALC-SCNC: 11 MMOL/L (ref 7–16)
AST SERPL-CCNC: 61 U/L (ref 12–45)
BILIRUB SERPL-MCNC: 0.5 MG/DL (ref 0.1–1.5)
BUN SERPL-MCNC: 15 MG/DL (ref 8–22)
CALCIUM ALBUM COR SERPL-MCNC: 9.9 MG/DL (ref 8.5–10.5)
CALCIUM SERPL-MCNC: 9.7 MG/DL (ref 8.5–10.5)
CHLORIDE SERPL-SCNC: 103 MMOL/L (ref 96–112)
CO2 SERPL-SCNC: 25 MMOL/L (ref 20–33)
CREAT SERPL-MCNC: 0.8 MG/DL (ref 0.5–1.4)
GFR SERPLBLD CREATININE-BSD FMLA CKD-EPI: 86 ML/MIN/1.73 M 2
GLOBULIN SER CALC-MCNC: 3.4 G/DL (ref 1.9–3.5)
GLUCOSE SERPL-MCNC: 88 MG/DL (ref 65–99)
POTASSIUM SERPL-SCNC: 4.9 MMOL/L (ref 3.6–5.5)
PROT SERPL-MCNC: 7.2 G/DL (ref 6–8.2)
SODIUM SERPL-SCNC: 139 MMOL/L (ref 135–145)

## 2024-12-18 ENCOUNTER — ANTICOAGULATION VISIT (OUTPATIENT)
Dept: VASCULAR LAB | Facility: MEDICAL CENTER | Age: 84
End: 2024-12-18
Attending: INTERNAL MEDICINE
Payer: MEDICARE

## 2024-12-18 VITALS — DIASTOLIC BLOOD PRESSURE: 72 MMHG | SYSTOLIC BLOOD PRESSURE: 128 MMHG | HEART RATE: 57 BPM

## 2024-12-18 DIAGNOSIS — Z95.3 STATUS POST TRANSCATHETER AORTIC VALVE REPLACEMENT (TAVR) USING BIOPROSTHESIS: ICD-10-CM

## 2024-12-18 DIAGNOSIS — D68.69 SECONDARY HYPERCOAGULABLE STATE (HCC): Chronic | ICD-10-CM

## 2024-12-18 LAB — INR PPP: 4.8 (ref 2–3.5)

## 2024-12-18 PROCEDURE — 99212 OFFICE O/P EST SF 10 MIN: CPT

## 2024-12-18 PROCEDURE — 85610 PROTHROMBIN TIME: CPT

## 2024-12-18 NOTE — PROGRESS NOTES
Anticoagulation Summary  As of 2024      INR goal:  2.0-3.0   TTR:  60.3% (8.1 y)   INR used for dosin.80 (2024)   Warfarin maintenance plan:  2 mg (4 mg x 0.5) every Wed, Fri; 4 mg (4 mg x 1) all other days   Weekly warfarin total:  24 mg   Plan last modified:  Sergio Pan, PharmD (2024)   Next INR check:  1/10/2025   Priority:  Routine   Target end date:  Indefinite    Indications    Status post transcatheter aortic valve replacement (TAVR) using bioprosthesis [Z95.3]  Atrial fibrillation (HCC) [I48.91]  Secondary hypercoagulable state (HCC) [D68.69]                 Anticoagulation Episode Summary       INR check location:  --    Preferred lab:  --    Send INR reminders to:  --    Comments:  Pt goes by Dyke  ASA daily          Anticoagulation Care Providers       Provider Role Specialty Phone number    Vickey Rutherford M.D. Referring Cardiovascular Disease (Cardiology) [phone]    [facility] Anticoagulation Services Responsible  [phone]                  Refer to Patient Findings for HPI:  Patient Findings       Positives:  Upcoming invasive procedure (spinal injections requiring 5 days of holding warfarin)    Negatives:  Signs/symptoms of thrombosis, Signs/symptoms of bleeding, Laboratory test error suspected, Change in health, Change in alcohol use, Change in activity, Emergency department visit, Upcoming dental procedure, Missed doses, Extra doses, Change in medications, Change in diet/appetite, Hospital admission, Bruising, Other complaints            Vitals:    24 1542   BP: 128/72   Pulse: (!) 57       Verified current warfarin dosing schedule.    Medications reconciled.  Pt is not on antiplatelet therapy.      A/P   INR is supratherapeutic  Reason(s) for out of range INR today: No obvious causes      Warfarin dosing recommendation: Hold Warfarin x 2 days, then continue previous warfarin regimen.     Pt educated to contact our clinic with any changes in  medications or s/s of bleeding or thrombosis. Pt is aware to seek immediate medical attention for falls, head injury or deep cuts.    Request pt to return in 1 week(s). Pt agrees.    Abhishek RicoD

## 2024-12-27 ENCOUNTER — ANTICOAGULATION VISIT (OUTPATIENT)
Dept: VASCULAR LAB | Facility: MEDICAL CENTER | Age: 84
End: 2024-12-27
Attending: INTERNAL MEDICINE
Payer: MEDICARE

## 2024-12-27 VITALS — SYSTOLIC BLOOD PRESSURE: 114 MMHG | HEART RATE: 63 BPM | DIASTOLIC BLOOD PRESSURE: 60 MMHG

## 2024-12-27 DIAGNOSIS — D68.69 SECONDARY HYPERCOAGULABLE STATE (HCC): Chronic | ICD-10-CM

## 2024-12-27 DIAGNOSIS — Z95.3 STATUS POST TRANSCATHETER AORTIC VALVE REPLACEMENT (TAVR) USING BIOPROSTHESIS: ICD-10-CM

## 2024-12-27 LAB — INR PPP: 2.2 (ref 2–3.5)

## 2024-12-27 PROCEDURE — 85610 PROTHROMBIN TIME: CPT

## 2024-12-27 PROCEDURE — 99212 OFFICE O/P EST SF 10 MIN: CPT

## 2024-12-27 NOTE — PROGRESS NOTES
Anticoagulation Summary  As of 2024      INR goal:  2.0-3.0   TTR:  60.2% (8.2 y)   INR used for dosin.20 (2024)   Warfarin maintenance plan:  2 mg (4 mg x 0.5) every Wed, Fri; 4 mg (4 mg x 1) all other days   Weekly warfarin total:  24 mg   Plan last modified:  Sergio Pan, PharmD (2024)   Next INR check:  1/10/2025   Priority:  Routine   Target end date:  Indefinite    Indications    Status post transcatheter aortic valve replacement (TAVR) using bioprosthesis [Z95.3]  Atrial fibrillation (HCC) [I48.91]  Secondary hypercoagulable state (HCC) [D68.69]                 Anticoagulation Episode Summary       INR check location:  --    Preferred lab:  --    Send INR reminders to:  --    Comments:  Pt goes by Dyke  ASA daily          Anticoagulation Care Providers       Provider Role Specialty Phone number    Vickey Rutherford M.D. Referring Cardiovascular Disease (Cardiology) [phone]    [facility] Anticoagulation Services Responsible  [phone]                  Refer to Patient Findings for HPI:  Patient Findings       Positives:  Upcoming invasive procedure (CT with possible spinal injections (provider recommend to hold for 5 days prior))    Negatives:  Signs/symptoms of thrombosis, Signs/symptoms of bleeding, Laboratory test error suspected, Change in health, Change in alcohol use, Change in activity, Emergency department visit, Upcoming dental procedure, Missed doses, Extra doses, Change in medications, Change in diet/appetite, Hospital admission, Bruising, Other complaints            Vitals:    24 0945   BP: 114/60   Pulse: 63       Verified current warfarin dosing schedule.    Medications reconciled.  Pt is not on antiplatelet therapy.      A/P   INR is therapeutic  Reason(s) for out of range INR today: N/A      Warfarin dosing recommendation: Continue regimen as listed above. Follow holding instructions per dosing calendar - no lovenox bridging indicated.     Pt educated  to contact our clinic with any changes in medications or s/s of bleeding or thrombosis. Pt is aware to seek immediate medical attention for falls, head injury or deep cuts.    Request pt to return in 2 week(s). Pt agrees.    Abhishek RicoD

## 2025-01-10 ENCOUNTER — ANTICOAGULATION VISIT (OUTPATIENT)
Dept: VASCULAR LAB | Facility: MEDICAL CENTER | Age: 85
End: 2025-01-10
Attending: INTERNAL MEDICINE
Payer: MEDICARE

## 2025-01-10 DIAGNOSIS — Z95.3 STATUS POST TRANSCATHETER AORTIC VALVE REPLACEMENT (TAVR) USING BIOPROSTHESIS: ICD-10-CM

## 2025-01-10 DIAGNOSIS — D68.69 SECONDARY HYPERCOAGULABLE STATE (HCC): Chronic | ICD-10-CM

## 2025-01-10 DIAGNOSIS — I48.0 PAROXYSMAL ATRIAL FIBRILLATION (HCC): Chronic | ICD-10-CM

## 2025-01-10 LAB — INR PPP: 1.6 (ref 2–3.5)

## 2025-01-10 PROCEDURE — 85610 PROTHROMBIN TIME: CPT

## 2025-01-10 PROCEDURE — 99211 OFF/OP EST MAY X REQ PHY/QHP: CPT

## 2025-01-10 NOTE — PROGRESS NOTES
Anticoagulation Summary  As of 1/10/2025      INR goal:  2.0-3.0   TTR:  60.1% (8.2 y)   INR used for dosin.60 (1/10/2025)   Warfarin maintenance plan:  2 mg (4 mg x 0.5) every Wed, Fri; 4 mg (4 mg x 1) all other days   Weekly warfarin total:  24 mg   Plan last modified:  Sergio Pan, PharmD (2024)   Next INR check:  2025   Priority:  Routine   Target end date:  Indefinite    Indications    Status post transcatheter aortic valve replacement (TAVR) using bioprosthesis [Z95.3]  Atrial fibrillation (HCC) [I48.91]  Secondary hypercoagulable state (HCC) [D68.69]                 Anticoagulation Episode Summary       INR check location:  --    Preferred lab:  --    Send INR reminders to:  --    Comments:  Pt goes by Dyke  ASA daily          Anticoagulation Care Providers       Provider Role Specialty Phone number    Vickey Rutherford M.D. Referring Cardiovascular Disease (Cardiology) [phone]    [facility] Anticoagulation Services Responsible  [phone]              Refer to Patient Findings for HPI:  Patient Findings       Positives:  Missed doses    Negatives:  Signs/symptoms of thrombosis, Signs/symptoms of bleeding, Laboratory test error suspected, Change in health, Change in alcohol use, Change in activity, Upcoming invasive procedure, Emergency department visit, Upcoming dental procedure, Extra doses, Change in medications, Change in diet/appetite, Hospital admission, Bruising, Other complaints            There were no vitals filed for this visit.  Pt declined vitals    Verified current warfarin dosing schedule.    Medications reconciled.  Pt is not on antiplatelet therapy.      A/P   INR is subtherapeutic  Reason(s) for out of range INR today: Patient held doses for a procedure that was unfortunately canceled    Warfarin dosing recommendation: Bolus tonight with 4 mg , then continue on current regimen.    Pt educated to contact our clinic with any changes in medications or s/s of  bleeding or thrombosis. Pt is aware to seek immediate medical attention for falls, head injury or deep cuts.    Request pt to return in 1 week(s). Pt agrees.    Sergio Pan, AbhishekD

## 2025-01-17 ENCOUNTER — ANTICOAGULATION VISIT (OUTPATIENT)
Dept: VASCULAR LAB | Facility: MEDICAL CENTER | Age: 85
End: 2025-01-17
Attending: INTERNAL MEDICINE
Payer: MEDICARE

## 2025-01-17 VITALS — HEART RATE: 70 BPM | DIASTOLIC BLOOD PRESSURE: 67 MMHG | SYSTOLIC BLOOD PRESSURE: 107 MMHG

## 2025-01-17 DIAGNOSIS — Z95.3 STATUS POST TRANSCATHETER AORTIC VALVE REPLACEMENT (TAVR) USING BIOPROSTHESIS: ICD-10-CM

## 2025-01-17 DIAGNOSIS — D68.69 SECONDARY HYPERCOAGULABLE STATE (HCC): Chronic | ICD-10-CM

## 2025-01-17 DIAGNOSIS — I48.0 PAROXYSMAL ATRIAL FIBRILLATION (HCC): Chronic | ICD-10-CM

## 2025-01-17 LAB — INR PPP: 2.6 (ref 2–3.5)

## 2025-01-17 PROCEDURE — 85610 PROTHROMBIN TIME: CPT

## 2025-01-17 PROCEDURE — 99211 OFF/OP EST MAY X REQ PHY/QHP: CPT

## 2025-01-17 PROCEDURE — 99999 PR NO CHARGE: CPT | Performed by: INTERNAL MEDICINE

## 2025-01-17 NOTE — PROGRESS NOTES
Anticoagulation Summary  As of 2025      INR goal:  2.0-3.0   TTR:  60.1% (8.2 y)   INR used for dosin.60 (2025)   Warfarin maintenance plan:  2 mg (4 mg x 0.5) every Wed, Fri; 4 mg (4 mg x 1) all other days   Weekly warfarin total:  24 mg   Plan last modified:  Sergio Pan, PharmD (2024)   Next INR check:  2025   Priority:  Routine   Target end date:  Indefinite    Indications    Status post transcatheter aortic valve replacement (TAVR) using bioprosthesis [Z95.3]  Atrial fibrillation (HCC) [I48.91]  Secondary hypercoagulable state (HCC) [D68.69]                 Anticoagulation Episode Summary       INR check location:  --    Preferred lab:  --    Send INR reminders to:  --    Comments:  Pt goes by Dyke  ASA daily          Anticoagulation Care Providers       Provider Role Specialty Phone number    Vickey Rutherford M.D. Referring Cardiovascular Disease (Cardiology) [phone]    [facility] Anticoagulation Services Responsible  [phone]          Refer to Patient Findings for HPI:  Patient Findings       Negatives:  Signs/symptoms of thrombosis, Signs/symptoms of bleeding, Laboratory test error suspected, Change in health, Change in alcohol use, Change in activity, Upcoming invasive procedure, Emergency department visit, Upcoming dental procedure, Missed doses, Extra doses, Change in medications, Change in diet/appetite, Hospital admission, Bruising, Other complaints          Vitals:    25 1427   BP: 107/67   Pulse: 70       Patient seen in clinic today for follow up on anticoagulation therapy with warfarin (a high risk medication) for hx of TAVR, AF  Verified current warfarin dosing schedule.  Patient denies any missed doses of warfarin.    Medications reconciled   Pt is not on antiplatelet therapy      A/P   INR is therapeutic today at 2.6.     Warfarin dosing recommendation: Patient will continue with the current dosing regimen.  Patient reports he has rescheduled his  procedure for 2/7/25 and will be OFF of warfarin x 5 days and no bridging will be necessary.   Patient will begin HOLDing warfarin on 2/2/25 x 5 days and will return for pre-procedural INR on 2/6/25 (day prior to procedure).     Pt educated to contact our clinic with any changes in medications or s/s of bleeding or thrombosis. Pt is aware to seek immediate medical attention for falls, head injury or deep cuts.    Follow up appointment in 3 week(s).    Next appt: Thurs, Feb 6 @ 8:30am     Sampson Kendall PharmD

## 2025-01-29 ENCOUNTER — HOSPITAL ENCOUNTER (OUTPATIENT)
Dept: CARDIOLOGY | Facility: MEDICAL CENTER | Age: 85
End: 2025-01-29
Payer: MEDICARE

## 2025-01-29 DIAGNOSIS — Z95.3 STATUS POST TRANSCATHETER AORTIC VALVE REPLACEMENT (TAVR) USING BIOPROSTHESIS: ICD-10-CM

## 2025-01-29 PROCEDURE — 93306 TTE W/DOPPLER COMPLETE: CPT

## 2025-01-29 PROCEDURE — 700117 HCHG RX CONTRAST REV CODE 255

## 2025-01-29 RX ADMIN — HUMAN ALBUMIN MICROSPHERES AND PERFLUTREN 3 ML: 10; .22 INJECTION, SOLUTION INTRAVENOUS at 17:31

## 2025-01-30 ENCOUNTER — TELEPHONE (OUTPATIENT)
Dept: CARDIOLOGY | Facility: MEDICAL CENTER | Age: 85
End: 2025-01-30
Payer: MEDICARE

## 2025-01-30 DIAGNOSIS — G47.33 OSA (OBSTRUCTIVE SLEEP APNEA): ICD-10-CM

## 2025-01-31 NOTE — TELEPHONE ENCOUNTER
Phone Number Called: [phone]     Call outcome: Left detailed message for patient. Informed to call back with any additional questions.    Message: Called to inform patient of SC recommendations. Referral placed. Left VM for patient informing of this and to call back with any further questions.     -------------------    JUDIT Torres.  You18 hours ago (4:44 PM)      Recommend follow up with pulmonary, refer back please. SC

## 2025-01-31 NOTE — TELEPHONE ENCOUNTER
Phone Number Called: [phone]    Call outcome: Spoke to patient regarding message below.    Message: Called to inform patient of SC recommendations. Patient verbalized that he does not have a CPAP or BiPap machine. He states is has been a very long time since he has had his machine. RN to ask SC is patient needs repeat sleep study or if he should go through his PCP. Patient verbalized understanding. No further questions at this time.       ----- Message from Nurse Practitioner ALEXANDRA Carreon sent at 1/30/2025 11:17 AM PST -----  Echo shows normal heart function and TAVR working well but noted to have dilated LA, make sure he is treating his sleep apnea.     Follow up with  in March, schedulers please make apt with . SC

## 2025-02-06 ENCOUNTER — ANTICOAGULATION VISIT (OUTPATIENT)
Dept: VASCULAR LAB | Facility: MEDICAL CENTER | Age: 85
End: 2025-02-06
Attending: INTERNAL MEDICINE
Payer: MEDICARE

## 2025-02-06 DIAGNOSIS — D68.69 SECONDARY HYPERCOAGULABLE STATE (HCC): Chronic | ICD-10-CM

## 2025-02-06 DIAGNOSIS — Z95.3 STATUS POST TRANSCATHETER AORTIC VALVE REPLACEMENT (TAVR) USING BIOPROSTHESIS: ICD-10-CM

## 2025-02-06 DIAGNOSIS — I48.0 PAROXYSMAL ATRIAL FIBRILLATION (HCC): Chronic | ICD-10-CM

## 2025-02-06 LAB — INR PPP: 1.1 (ref 2–3.5)

## 2025-02-06 PROCEDURE — 99212 OFFICE O/P EST SF 10 MIN: CPT | Performed by: NURSE PRACTITIONER

## 2025-02-06 PROCEDURE — 85610 PROTHROMBIN TIME: CPT

## 2025-02-06 PROCEDURE — 99999 PR NO CHARGE: CPT | Performed by: INTERNAL MEDICINE

## 2025-02-06 NOTE — PROGRESS NOTES
Anticoagulation Summary  As of 2025      INR goal:  2.0-3.0   TTR:  59.9% (8.3 y)   INR used for dosin.10 (2025)   Warfarin maintenance plan:  2 mg (4 mg x 0.5) every Wed, Fri; 4 mg (4 mg x 1) all other days   Weekly warfarin total:  24 mg   Plan last modified:  Sergio Pan, PharmD (2024)   Next INR check:  2025   Priority:  Routine   Target end date:  Indefinite    Indications    Status post transcatheter aortic valve replacement (TAVR) using bioprosthesis [Z95.3]  Atrial fibrillation (HCC) [I48.91]  Secondary hypercoagulable state (HCC) [D68.69]                 Anticoagulation Episode Summary       INR check location:  --    Preferred lab:  --    Send INR reminders to:  --    Comments:  Pt goes by Dyke  ASA daily          Anticoagulation Care Providers       Provider Role Specialty Phone number    Vickey Rutherford M.D. Referring Cardiovascular Disease (Cardiology) [phone]    [facility] Anticoagulation Services Responsible  [phone]                Refer to Patient Findings for HPI:  Patient Findings       Positives:  Upcoming invasive procedure    Negatives:  Signs/symptoms of thrombosis, Signs/symptoms of bleeding, Laboratory test error suspected, Change in health, Change in alcohol use, Change in activity, Emergency department visit, Upcoming dental procedure, Missed doses, Extra doses, Change in medications, Change in diet/appetite, Hospital admission, Bruising, Other complaints    Comments:  He is scheduled for a back procedure tomorrow for pain management. He has been off warfarin since . NoLovenox bridge as CHADSVASc < 7 w/ no recent hx of VTE/stroke.             Date Referral Placed: 24      Vitals:  There were no vitals filed for this visit.  Pt declined vitals    Verified current warfarin dosing schedule.    Medications reconciled.  Pt is not on antiplatelet therapy.      A/P   INR is 1.1 today in anticipation for his pain management procedure tomorrow.  He doesn't remember the name of the physician or office performing his procedure tomorrow. He will bring a copy of his INR to them tomorrow. Will fax to [facility] as they have done his procedures in the recent past.     Warfarin dosing recommendation: HOLD warfarin tonight then resume warfarin the day after the procedure or when okay with your pain management doctor.     Pt educated to contact our clinic with any changes in medications or s/s of bleeding or thrombosis. Pt is aware to seek immediate medical attention for falls, head injury or deep cuts.    Request pt to return in 1 week(s). Pt agrees.    MARKELL Jacob.    Cc: [facility] fax [phone]

## 2025-02-13 ENCOUNTER — APPOINTMENT (OUTPATIENT)
Dept: SLEEP MEDICINE | Facility: MEDICAL CENTER | Age: 85
End: 2025-02-13
Attending: NURSE PRACTITIONER
Payer: MEDICARE

## 2025-02-14 ENCOUNTER — ANTICOAGULATION VISIT (OUTPATIENT)
Dept: VASCULAR LAB | Facility: MEDICAL CENTER | Age: 85
End: 2025-02-14
Attending: INTERNAL MEDICINE
Payer: MEDICARE

## 2025-02-14 VITALS — DIASTOLIC BLOOD PRESSURE: 80 MMHG | SYSTOLIC BLOOD PRESSURE: 118 MMHG | HEART RATE: 61 BPM

## 2025-02-14 DIAGNOSIS — Z95.3 STATUS POST TRANSCATHETER AORTIC VALVE REPLACEMENT (TAVR) USING BIOPROSTHESIS: ICD-10-CM

## 2025-02-14 DIAGNOSIS — D68.69 SECONDARY HYPERCOAGULABLE STATE (HCC): Chronic | ICD-10-CM

## 2025-02-14 DIAGNOSIS — I48.0 PAROXYSMAL ATRIAL FIBRILLATION (HCC): Chronic | ICD-10-CM

## 2025-02-14 LAB — INR PPP: 1.5 (ref 2–3.5)

## 2025-02-14 PROCEDURE — 85610 PROTHROMBIN TIME: CPT

## 2025-02-14 PROCEDURE — 99999 PR NO CHARGE: CPT | Performed by: INTERNAL MEDICINE

## 2025-02-14 PROCEDURE — 99212 OFFICE O/P EST SF 10 MIN: CPT

## 2025-02-14 NOTE — PROGRESS NOTES
Anticoagulation Summary  As of 2025      INR goal:  2.0-3.0   TTR:  59.8% (8.3 y)   INR used for dosin.50 (2025)   Warfarin maintenance plan:  2 mg (4 mg x 0.5) every Wed, Fri; 4 mg (4 mg x 1) all other days   Weekly warfarin total:  24 mg   Plan last modified:  Sergio Pan, PharmD (2025)   Next INR check:  2025   Priority:  Routine   Target end date:  Indefinite    Indications    Status post transcatheter aortic valve replacement (TAVR) using bioprosthesis [Z95.3]  Atrial fibrillation (HCC) [I48.91]  Secondary hypercoagulable state (HCC) [D68.69]                 Anticoagulation Episode Summary       INR check location:  --    Preferred lab:  --    Send INR reminders to:  --    Comments:  Pt goes by Dyke  ASA daily          Anticoagulation Care Providers       Provider Role Specialty Phone number    Vickey Rutherford M.D. Referring Cardiovascular Disease (Cardiology) [phone]    [facility] Anticoagulation Services Responsible  [phone]                Refer to Patient Findings for HPI:  Patient Findings       Negatives:  Signs/symptoms of thrombosis, Signs/symptoms of bleeding, Laboratory test error suspected, Change in health, Change in alcohol use, Change in activity, Upcoming invasive procedure, Emergency department visit, Upcoming dental procedure, Missed doses, Extra doses, Change in medications, Change in diet/appetite, Hospital admission, Bruising, Other complaints            Date Referral Placed: 2024      Vitals:  Vitals:    25 0852   BP: 118/80   Pulse: 61       Verified current warfarin dosing schedule.    Medications reconciled.  Pt is not on antiplatelet therapy.      A/P   INR is subtherapeutic  Reason(s) for out of range INR today:  Patient held warfarin for, back injections and restarted taking it on 2025    Warfarin dosing recommendation: Bolus tonight with 4 mg , then resume regular regimen    Pt educated to contact our clinic with  any changes in medications or s/s of bleeding or thrombosis. Pt is aware to seek immediate medical attention for falls, head injury or deep cuts.    Request pt to return in 1 week(s). Pt agrees.    Abhishek OchoaD

## 2025-02-20 ENCOUNTER — OFFICE VISIT (OUTPATIENT)
Dept: MEDICAL GROUP | Facility: MEDICAL CENTER | Age: 85
End: 2025-02-20
Payer: MEDICARE

## 2025-02-20 VITALS
BODY MASS INDEX: 28.44 KG/M2 | HEART RATE: 64 BPM | SYSTOLIC BLOOD PRESSURE: 112 MMHG | WEIGHT: 210 LBS | OXYGEN SATURATION: 97 % | TEMPERATURE: 97 F | DIASTOLIC BLOOD PRESSURE: 76 MMHG | HEIGHT: 72 IN

## 2025-02-20 DIAGNOSIS — E78.5 DYSLIPIDEMIA: ICD-10-CM

## 2025-02-20 DIAGNOSIS — G62.9 NEUROPATHY: ICD-10-CM

## 2025-02-20 DIAGNOSIS — N40.1 BENIGN PROSTATIC HYPERPLASIA WITH URINARY HESITANCY: ICD-10-CM

## 2025-02-20 DIAGNOSIS — N50.89 TESTICULAR SWELLING, RIGHT: ICD-10-CM

## 2025-02-20 DIAGNOSIS — D68.69 SECONDARY HYPERCOAGULABLE STATE (HCC): Chronic | ICD-10-CM

## 2025-02-20 DIAGNOSIS — Z00.00 ENCOUNTER FOR MEDICARE ANNUAL WELLNESS EXAM: ICD-10-CM

## 2025-02-20 DIAGNOSIS — R09.89 PHLEGM IN THROAT: ICD-10-CM

## 2025-02-20 DIAGNOSIS — I48.0 PAROXYSMAL ATRIAL FIBRILLATION (HCC): Chronic | ICD-10-CM

## 2025-02-20 DIAGNOSIS — R39.11 BENIGN PROSTATIC HYPERPLASIA WITH URINARY HESITANCY: ICD-10-CM

## 2025-02-20 DIAGNOSIS — I10 ESSENTIAL HYPERTENSION: ICD-10-CM

## 2025-02-20 DIAGNOSIS — K75.4 AUTOIMMUNE HEPATITIS (HCC): Chronic | ICD-10-CM

## 2025-02-20 DIAGNOSIS — R73.02 IGT (IMPAIRED GLUCOSE TOLERANCE): ICD-10-CM

## 2025-02-20 PROCEDURE — 3074F SYST BP LT 130 MM HG: CPT | Performed by: FAMILY MEDICINE

## 2025-02-20 PROCEDURE — 3078F DIAST BP <80 MM HG: CPT | Performed by: FAMILY MEDICINE

## 2025-02-20 PROCEDURE — G0439 PPPS, SUBSEQ VISIT: HCPCS | Performed by: FAMILY MEDICINE

## 2025-02-20 PROCEDURE — 1170F FXNL STATUS ASSESSED: CPT | Performed by: FAMILY MEDICINE

## 2025-02-20 RX ORDER — PREDNISONE 20 MG/1
40 TABLET ORAL
COMMUNITY
Start: 2025-02-11

## 2025-02-20 RX ORDER — AMMONIUM LACTATE 12 G/100G
CREAM TOPICAL
COMMUNITY
Start: 2024-12-12

## 2025-02-20 ASSESSMENT — FIBROSIS 4 INDEX: FIB4 SCORE: 3.31

## 2025-02-20 ASSESSMENT — PATIENT HEALTH QUESTIONNAIRE - PHQ9: CLINICAL INTERPRETATION OF PHQ2 SCORE: 0

## 2025-02-20 ASSESSMENT — ACTIVITIES OF DAILY LIVING (ADL): BATHING_REQUIRES_ASSISTANCE: 0

## 2025-02-20 ASSESSMENT — ENCOUNTER SYMPTOMS: GENERAL WELL-BEING: GOOD

## 2025-02-20 NOTE — PROGRESS NOTES
Chief Complaint   Patient presents with    Annual Wellness Visit       HPI:  Duane Parkinson is a 85 y.o. here for Medicare Annual Wellness Visit     History of Present Illness  The patient is an 85-year-old male who presents for an annual Medicare wellness visit. He is accompanied by his granddaughter via phone.    His dietary habits include a high-fiber breakfast, typically consisting of hot cereal, cream of wheat, wheatena, and rolled oats. His granddaughter notes that he occasionally skips lunch and dinner due to a lack of appetite. He maintains a regular exercise routine, which includes using a treadmill and bicycle. He has been advised by his chiropractor to transition to a walker to improve his posture. He also uses a wheelchair and several canes. He reports no difficulty swallowing but mentions a sensation of food getting stuck in his throat when taking large supplement pills. He has a history of COVID-19 infection, which required urgent care treatment and resulted in a period of bed rest. He has been experiencing phlegm accumulation in his throat for the past 2 to 3 months, which he does not believe is seasonal. He reports no nasal congestion or abdominal pain, heartburn, or reflux. He occasionally coughs up phlegm and finds relief from over-the-counter chest decongestants. He has not tried gargling with salt water. He uses a CPAP machine for sleep apnea and has an upcoming appointment with a pulmonologist. He reports no feelings of depression or anxiety. He occasionally forgets to take his medications throughout the day. He has a history of atrial fibrillation and follows up with cardiology every 6 months. He recently had an echocardiogram and is scheduled for a follow-up appointment next month.    He reports a recent episode of right testicular swelling, which has since subsided to near-normal size. He attributes this to a possible constriction caused by his underwear. The initial incident occurred a  few weeks ago and was associated with pain, which was less severe during a subsequent episode. He did not seek medical attention for this issue. He does not experience any discomfort during urination.    He has neuropathy in both feet. He takes gabapentin 600 mg 4 times a day.    He had a liver biopsy and was diagnosed with autoimmune hepatitis and liver cirrhosis. He just started taking prednisone 40 mg a day (two 20 mg tablets in the morning). He is going to have lab work done in about 4 weeks to see if his numbers are reducing. The first steroid he was on did lower the numbers like the doctors wanted. They are trying prednisone to see if they can get him in the range they want and then if it is going down, they are planning on lowering that, going up to 1.5 and hopefully just 1.    MEDICATIONS  Current: digoxin, gabapentin, metoprolol, pravastatin, tamsulosin, warfarin, prednisone        Patient Active Problem List    Diagnosis Date Noted    Degenerative disease of nervous system, unspecified (HCC) 11/21/2023    Benign prostatic hyperplasia with urinary hesitancy 03/02/2023    Age-related physical debility 02/11/2023    Recurrent UTI 02/09/2023    Cystic lesion of abdominal viscera 02/09/2023    Skin lesions 11/08/2022    Chronic pain of left knee 11/08/2022    LORENA (obstructive sleep apnea) 11/22/2021    Secondary hypercoagulable state (HCC) 11/22/2021    Skin cancer 12/17/2020    Autoimmune hepatitis (HCC)     Neuropathy 12/22/2017    Localized primary osteoarthritis of carpometacarpal joint of left thumb 05/27/2016    Essential hypertension 01/07/2015    Dyslipidemia 01/07/2015    IGT (impaired glucose tolerance) 01/07/2015    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      ammonium lactate (AMLACTIN) 12 % cream       metoprolol SR (TOPROL XL) 50 MG TABLET SR 24 HR Take 1 Tablet by mouth every evening. 90 Tablet 3    pravastatin (PRAVACHOL) 80 MG tablet TAKE ONE TABLET BY MOUTH EVERY EVENING (PRAVACHOL) 90 Tablet 3    tamsulosin (FLOMAX) 0.4 MG capsule Take 0.4 mg by mouth every day.      Glucosamine HCl (GLUCOSAMINE PO) Take  by mouth every day.      digoxin (LANOXIN) 250 MCG Tab Take 1 Tablet by mouth every morning. 90 Tablet 3    warfarin (COUMADIN) 4 MG Tab TAKE 1 to 1.5 TABLETS BY MOUTH DAILY AS DIRECTED BY [facility] 110 Tablet 1    METAMUCIL FIBER PO Take  by mouth every day.      LYSINE PO Take 1 Tablet by mouth every day. As needed      Ascorbic Acid (VITAMIN C PO) Take 1 Tablet by mouth every morning.      Coenzyme Q10 (COQ10 PO) Take 1 Tablet by mouth every day.      acetaminophen (TYLENOL) 500 MG Tab Take 500-1,000 mg by mouth every 6 hours as needed. Indications: Pain      gabapentin (NEURONTIN) 300 MG Cap Take 600 mg by mouth 4 times a day.      B Complex Vitamins (VITAMIN B COMPLEX PO) Take 1 Tab by mouth every evening.      Cholecalciferol (VITAMIN D3 PO) Take 1 Tab by mouth every evening.      multivitamin (THERAGRAN) TABS Take 1 Tablet by mouth at bedtime.       No current facility-administered medications for this visit.          Current supplements as per medication list.     Allergies: Feldene [piroxicam] and Percodan [oxycodone-aspirin]    Current social contact/activities:      He  reports that he quit smoking about 53 years ago. His smoking use included cigarettes. He started smoking about 73 years ago. He has a 20 pack-year smoking history. He has never used smokeless tobacco. He reports that he does not drink alcohol and does not use drugs.  Counseling given: Not Answered      ROS:    Gait: Uses a cane  Ostomy: No  Other tubes: No  Amputations: No  Chronic oxygen use: No  Last eye exam: February  2025  Wears hearing aids: Yes   : Denies any urinary leakage during the last 6 months    Screening:    Depression Screening  Little interest or pleasure in doing things?  0 - not at all  Feeling down, depressed , or hopeless? 0 - not at all  Patient Health Questionnaire Score: 0     If depressive symptoms identified deferred to follow up visit unless specifically addressed in assessment and plan.    Interpretation of PHQ-9 Total Score   Score Severity   1-4 No Depression   5-9 Mild Depression   10-14 Moderate Depression   15-19 Moderately Severe Depression   20-27 Severe Depression    Screening for Cognitive Impairment  Do you or any of your friends or family members have any concern about your memory? Yes  Three Minute Recall (Village, Kitchen, Baby) 1/3    Braydon clock face with all 12 numbers and set the hands to show 10 minutes past 11.  Yes    Cognitive concerns identified deferred for follow up unless specifically addressed in assessment and plan.    Fall Risk Assessment  Has the patient had two or more falls in the last year or any fall with injury in the last year?  No    Safety Assessment  Do you always wear your seatbelt?  Yes  Any changes to home needed to function safely? No  Difficulty hearing.  Yes  Patient counseled about all safety risks that were identified.    Functional Assessment ADLs  Are there any barriers preventing you from cooking for yourself or meeting nutritional needs?  No.    Are there any barriers preventing you from driving safely or obtaining transportation?  No.    Are there any barriers preventing you from using a telephone or calling for help?  No    Are there any barriers preventing you from shopping?  No.    Are there any barriers preventing you from taking care of your own finances?  No    Are there any barriers preventing you from managing your medications?  No    Are there any barriers preventing you from showering, bathing or dressing yourself? No    Are there any barriers  preventing you from doing housework or laundry? No  Are there any barriers preventing you from using the toilet?No  Are you currently engaging in any exercise or physical activity?  Yes. Pt uses his treadmill and bicycle at home       Self-Assessment of Health  What is your perception of your health? Good    Do you sleep more than six hours a night? Yes    In the past 7 days, how much did pain keep you from doing your normal work? Some    Do you spend quality time with family or friends (virtually or in person)? Yes    Do you usually eat a heart healthy diet that constists of a variety of fruits, vegetables, whole grains and fiber? Yes    Do you eat foods high in fat and/or Fast Food more than three times per week? No    How concerned are you that your medical conditions are not being well managed? Not at all    Are you worried that in the next 2 months, you may not have stable housing that you own, rent, or stay in as part of a household? No      Advance Care Planning  Do you have an Advance Directive, Living Will, Durable Power of , or POLST? Yes  Advance Directive       is on file      Health Maintenance Summary            Current Care Gaps       Influenza Vaccine (1) Overdue since 9/1/2024 11/21/2023  Imm Admin: Influenza Vaccine Adult HD    10/06/2021  Imm Admin: Influenza Vaccine Adult HD    10/05/2021  Imm Admin: Influenza Vaccine Adult HD    10/27/2020  Imm Admin: Influenza Vaccine Adult HD    10/26/2020  Imm Admin: Influenza Vaccine Adult HD     Only the first 5 history entries have been loaded, but more history exists.            COVID-19 Vaccine (5 - 2024-25 season) Overdue since 9/1/2024      03/10/2021  Imm Admin: PFIZER PURPLE CAP SARS-COV-2 VACCINATION (12+)    03/09/2021  Imm Admin: PFIZER PURPLE CAP SARS-COV-2 VACCINATION (12+)    02/17/2021  Imm Admin: PFIZER PURPLE CAP SARS-COV-2 VACCINATION (12+)    02/16/2021  Imm Admin: PFIZER PURPLE CAP SARS-COV-2 VACCINATION (12+)               Hepatitis A Vaccine (Hep A) (1 of 2 - Risk 2-dose series) Never done      No completion history exists for this topic.              Hepatitis B Vaccine (Hep B) (1 of 3 - Risk 3-dose series) Never done     No completion history exists for this topic.                      Upcoming       Zoster (Shingles) Vaccines (2 of 3) Postponed until 9/13/2030 04/01/2014  Imm Admin: Zoster Vaccine Live (ZVL) (Zostavax) - HISTORICAL DATA    03/31/2014  Imm Admin: Zoster Vaccine Live (ZVL) (Zostavax) - HISTORICAL DATA              IMM DTaP/Tdap/Td Vaccine (3 - Td or Tdap) Postponed until 8/12/2037 06/29/2009  Imm Admin: Tdap Vaccine    06/28/2009  Imm Admin: Tdap Vaccine              Annual Wellness Visit (Yearly) Next due on 2/20/2026 02/20/2025  Visit Dx: Encounter for Medicare annual wellness exam    05/02/2022  Visit Dx: Medicare annual wellness visit, subsequent    05/02/2022  Level of Service: ID ANNUAL WELLNESS VISIT-INCLUDES PPPS SUBSEQUE*    09/13/2021  Visit Dx: Medicare annual wellness visit, subsequent    09/13/2021  Level of Service: ID ANNUAL WELLNESS VISIT-INCLUDES PPPS SUBSEQUE*     Only the first 5 history entries have been loaded, but more history exists.                    Completed or No Longer Recommended       Pneumococcal Vaccine: 50+ Years (Series Information) Completed      12/22/2016  Imm Admin: Pneumococcal Conjugate Vaccine (Prevnar/PCV-13)    12/21/2016  Imm Admin: Pneumococcal Conjugate Vaccine (Prevnar/PCV-13)    11/04/2011  Imm Admin: Pneumococcal polysaccharide vaccine (PPSV-23)    11/03/2011  Imm Admin: Pneumococcal polysaccharide vaccine (PPSV-23)              HPV Vaccines (Series Information) Aged Out      No completion history exists for this topic.              Polio Vaccine (Inactivated Polio) (Series Information) Aged Out     No completion history exists for this topic.              Meningococcal Immunization (Series Information) Aged Out     No completion history exists for  this topic.              Colorectal Cancer Screening  Discontinued        Frequency changed to Never automatically (Topic No Longer Applies)    2012  Colonoscopy (Reason not specified)                            Patient Care Team:  Toñito Espinoza D.O. as PCP - General (Family Medicine)  Gus Irvin M.D. as Consulting Physician (Phys Med and Rehab)  Jarad Joseph M.D. (Inactive) as Consulting Physician (Gastroenterology)  CLAYTON Navarrete as Consulting Physician (Dermatology)  Alessio Cutler M.D. (Orthopedic Surgery)  Neville Minor D.P.M. (Podiatry)  Vickey Rutherford M.D. as Consulting Physician (Cardiovascular Disease (Cardiology))  [facility] as Consulting Physician  Neville James M.D. (Neurosurgery)  Fransisco Bearden, BIBI (Inactive) as Physical Therapist (Physical Therapy)        Social History     Tobacco Use    Smoking status: Former     Current packs/day: 0.00     Average packs/day: 1 pack/day for 20.0 years (20.0 ttl pk-yrs)     Types: Cigarettes     Start date: 1952     Quit date: 1972     Years since quittin.1    Smokeless tobacco: Never   Vaping Use    Vaping status: Never Used   Substance Use Topics    Alcohol use: No     Alcohol/week: 0.0 oz    Drug use: No     Family History   Problem Relation Age of Onset    Other Mother         unknown    Heart Disease Mother     Cancer Father         prostate, skin    No Known Problems Brother     Diabetes Brother     Heart Disease Son     Sleep Apnea Neg Hx      He  has a past medical history of Aortic stenosis, Atrial fibrillation (HCC), Back pain, Cancer (HCC), Cataract, Cirrhosis of liver without ascites (HCC), Clotting disorder (HCC), Dental disorder, Diabetes (HCC), Encounter for long-term (current) use of other medications, Fatty liver, Gasping for breath, [place] measles, Heart murmur, Heart valve disease, High cholesterol, History of sepsis (2023), Hyperlipidemia, Hypertension, Insomnia,  Jaundice, Mumps, Nasal drainage, Pain, Restless leg syndrome, Sleep apnea, Snoring, Tonsillitis, 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    He has no past medical history of Arthritis or Depression.   Past Surgical History:   Procedure Laterality Date    MT TRANSURETHRAL ELEC-SURG PROSTATECTOM  4/7/2023    Procedure: BIPOLAR TRANSURETHRAL RESECTION OF PROSTATE;  Surgeon: Mack Pritchett M.D.;  Location: SURGERY [facility];  Service: Urology    CATARACT EXTRACTION WITH IOL Bilateral 03/2023    GIBRAN N/A 07/29/2019    Procedure: ECHOCARDIOGRAM, TRANSESOPHAGEAL;  Surgeon: Leander Buckner M.D.;  Location: SURGERY [facility];  Service: Cardiac    TRANSCATHETER AORTIC VALVE REPLACEMENT Bilateral 07/29/2019    Procedure: REPLACEMENT, AORTIC VALVE, TRANSCATHETER;  Surgeon: Leander Buckner M.D.;  Location: SURGERY [facility];  Service: Cardiac    FINGER ARTHROPLASTY Left 05/27/2016    Procedure: FINGER ARTHROPLASTY THUMB CARPOMETACARPAL EXCISIONAL, EXCISE TRAPEZIUM;  Surgeon: Raheel Salgado M.D.;  Location: SURGERY SAME DAY [facility];  Service:     CARDIOVERSION      on 7/17/06, sinus rhythm until January 2007,  paroxysmal atrial fibrillation    KNEE ARTHROPLASTY TOTAL      LAMINOTOMY N/A     multiple lumbar spine    OTHER ORTHOPEDIC SURGERY      lower back    MT PERCUT IMPLNT NEUROELECT,EPIDURAL      TOE ARTHROPLASTY      TURP-VAPOR N/A        Exam:   /76   Pulse 64   Temp 36.1 °C (97 °F) (Temporal)   Ht 1.829 m (6')   Wt 95.3 kg (210 lb)   SpO2 97%  Body mass index is 28.48 kg/m².    Hearing fair.    Dentition good  Alert, oriented in no acute distress.  Eye contact is good, speech goal directed, affect calm    Results  Imaging  Echocardiogram on January 29 showed normal left ventricular size, mild increased muscle thickness, appropriate function, ejection fraction 65 to 70%, arrhythmia,  dilated left atrium, and normally functioning TAVR aortic valve.       Assessment and Plan. The following treatment and monitoring plan is recommended:    1. Encounter for Medicare annual wellness exam    2. Testicular swelling, right    3. Neuropathy  - CBC WITHOUT DIFFERENTIAL; Future  - Comp Metabolic Panel; Future    4. Autoimmune hepatitis (HCC)  - predniSONE (DELTASONE) 20 MG Tab; 40 mg.  - Comp Metabolic Panel; Future    5. Paroxysmal atrial fibrillation (HCC)  - CBC WITHOUT DIFFERENTIAL; Future  - Comp Metabolic Panel; Future  - TSH WITH REFLEX TO FT4; Future  - DIGOXIN; Future    6. Secondary hypercoagulable state (HCC)  - CBC WITHOUT DIFFERENTIAL; Future    7. Dyslipidemia  - CBC WITHOUT DIFFERENTIAL; Future  - Comp Metabolic Panel; Future  - Lipid Profile; Future    8. Phlegm in throat  - Referral to ENT  - CBC WITHOUT DIFFERENTIAL; Future  - TSH WITH REFLEX TO FT4; Future    9. Benign prostatic hyperplasia with urinary hesitancy  - Comp Metabolic Panel; Future    10. Essential hypertension  - Comp Metabolic Panel; Future    11. IGT (impaired glucose tolerance)  - Comp Metabolic Panel; Future  - HEMOGLOBIN A1C; Future    Other orders  - ammonium lactate (AMLACTIN) 12 % cream      Assessment & Plan  1. Annual Medicare wellness visit.  Age-appropriate guidance counseling was discussed including diet, exercise, fall prevention, medication list, chronic disease management. His weight has decreased from 221 pounds in February 2024 to 210 pounds today. His BMI is 28.4, indicating overweight status. He has been experiencing phlegm accumulation in his throat for the past 2 to 3 months, which may be exacerbated by prednisone. His echocardiogram results from January 29, 2025, show no significant change compared to the previous year, with normal left ventricular size, mild increased muscle thickness, appropriate function, ejection fraction of 65 to 70%, arrhythmia, dilated left atrium likely due to atrial  fibrillation, and normally functioning TAVR aortic valve. He was advised to maintain a balanced diet and regular exercise regimen. He was encouraged to use a walker for mobility and to avoid falls. He was informed about the potential side effects of prednisone, including increased appetite, elevated blood sugar levels, fluid retention, worsening heartburn, and mood changes. He was advised to use saline spray in his nose before bedtime and to gargle with salt water. He was also advised to continue using his CPAP machine and to follow up with his pulmonologist. He was advised to set reminders for his medications and to ensure he takes them as prescribed. A lab panel will be ordered for him to complete this summer before his next appointment. A referral to an ENT specialist was provided.    2. Right testicular swelling.  The differential diagnosis includes testicular torsion, epididymitis, or orchitis that would represent acute concerns this may also be a hydrocele or varicocele.. Given the improvement in symptoms, immediate intervention may not be necessary. An ultrasound of the testicle was discussed as an option to ensure there are no underlying issues patient declines for now telemeter is better.    3. Neuropathy.  He is currently taking gabapentin 600 mg four times a day for neuropathy in both feet. He was advised to continue this medication as it helps with nerve pain.    4. Autoimmune hepatitis and liver cirrhosis.  He was recently diagnosed with autoimmune hepatitis and liver cirrhosis following a liver biopsy. He has started taking prednisone 40 mg daily (two 20 mg tablets in the morning). Lab work will be done in about 4 weeks to assess the effectiveness of the treatment. If the numbers are reducing, the plan is to taper the prednisone dosage.    5. Atrial fibrillation.  He is on digoxin for rhythm control and metoprolol 50 mg nightly for rate control. He is also taking warfarin, managed by the  anticoagulation clinic, to prevent stroke. He was advised to continue these medications and follow up with cardiology as scheduled.    6. Hyperlipidemia.  He is taking pravastatin 80 mg for cholesterol control. He was advised to continue this medication.    7. Benign prostatic hyperplasia (BPH).  He is taking tamsulosin 0.4 mg to help with urine flow. He was advised to continue this medication.    8.  Phlegm in throat  Patient denies heartburn.  Does not necessarily feel like his postnasal drip.  He tells me it feels like there is mucus coating his vocal cords.  I did request that he use saline nasal sprays and/or rinses in the interim prior to seeing ENT.    Follow-up  The patient will follow up in 6 months.    PROCEDURE  The patient has a history of TAVR aortic valve replacement, which is functioning normally as per the recent echocardiogram.        Services suggested: No services needed at this time  Health Care Screening: Age-appropriate preventive services recommended by USPTF and ACIP covered by Medicare were discussed today. Services ordered if indicated and agreed upon by the patient.  Referrals offered: Community-based lifestyle interventions to reduce health risks and promote self-management and wellness, fall prevention, nutrition, physical activity, tobacco-use cessation, weight loss, and mental health services as per orders if indicated.    Discussion today about general wellness and lifestyle habits:    Prevent falls and reduce trip hazards; Cautioned about securing or removing rugs.  Have a working fire alarm and carbon monoxide detector;   Engage in regular physical activity and social activities     Follow-up: Return in about 6 months (around 8/20/2025), or if symptoms worsen or fail to improve, for Lab F/U, Medication F/U.

## 2025-02-21 ENCOUNTER — ANTICOAGULATION VISIT (OUTPATIENT)
Dept: VASCULAR LAB | Facility: MEDICAL CENTER | Age: 85
End: 2025-02-21
Attending: INTERNAL MEDICINE
Payer: MEDICARE

## 2025-02-21 VITALS — HEART RATE: 75 BPM | SYSTOLIC BLOOD PRESSURE: 101 MMHG | DIASTOLIC BLOOD PRESSURE: 60 MMHG

## 2025-02-21 DIAGNOSIS — D68.69 SECONDARY HYPERCOAGULABLE STATE (HCC): Chronic | ICD-10-CM

## 2025-02-21 DIAGNOSIS — Z95.3 STATUS POST TRANSCATHETER AORTIC VALVE REPLACEMENT (TAVR) USING BIOPROSTHESIS: ICD-10-CM

## 2025-02-21 DIAGNOSIS — I48.0 PAROXYSMAL ATRIAL FIBRILLATION (HCC): Chronic | ICD-10-CM

## 2025-02-21 LAB — INR PPP: 2.1 (ref 2–3.5)

## 2025-02-21 PROCEDURE — 99999 PR NO CHARGE: CPT | Performed by: INTERNAL MEDICINE

## 2025-02-21 PROCEDURE — 99211 OFF/OP EST MAY X REQ PHY/QHP: CPT

## 2025-02-21 PROCEDURE — 85610 PROTHROMBIN TIME: CPT

## 2025-02-21 NOTE — PROGRESS NOTES
Anticoagulation Summary  As of 2025      INR goal:  2.0-3.0   TTR:  59.7% (8.3 y)   INR used for dosin.10 (2025)   Warfarin maintenance plan:  2 mg (4 mg x 0.5) every Wed, Fri; 4 mg (4 mg x 1) all other days   Weekly warfarin total:  24 mg   Plan last modified:  Sergio Pan, PharmD (2025)   Next INR check:  3/7/2025   Priority:  Routine   Target end date:  Indefinite    Indications    Status post transcatheter aortic valve replacement (TAVR) using bioprosthesis [Z95.3]  Atrial fibrillation (HCC) [I48.91]  Secondary hypercoagulable state (HCC) [D68.69]                 Anticoagulation Episode Summary       INR check location:  --    Preferred lab:  --    Send INR reminders to:  --    Comments:  Pt goes by Dyke  ASA daily          Anticoagulation Care Providers       Provider Role Specialty Phone number    Vickey Rutherford M.D. Referring Cardiovascular Disease (Cardiology) [phone]    [facility] Responsible  [phone]                  [id number] Stroke Risk Points: 3   Values used to calculate this score:    Points  Metrics       0        Has Congestive Heart Failure: No       1        Has Hypertension: Yes       2        Age: 85       0        Has Diabetes: No       0        Had Stroke: No                 Had TIA: No                 Had Thromboembolism: No       0        Has Vascular Disease: No       0        Clinically Relevant Sex: Male     No data recorded     Lab Results   Component Value Date/Time    CHOLSTRLTOT 124 2024 08:56 AM    LDL 54 2024 08:56 AM    HDL 35 (A) 2024 08:56 AM    TRIGLYCERIDE 174 (H) 2024 08:56 AM       Lab Results   Component Value Date/Time    SODIUM 139 2024 11:53 AM    POTASSIUM 4.9 2024 11:53 AM    CHLORIDE 103 2024 11:53 AM    CO2 25 2024 11:53 AM    GLUCOSE 88 2024 11:53 AM    BUN 15 2024 11:53 AM    CREATININE 0.80 2024 11:53 AM    BUNCREATRAT 15 2018  08:29 AM     Lab Results   Component Value Date/Time    ALKPHOSPHAT 97 12/12/2024 11:53 AM    ASTSGOT 61 (H) 12/12/2024 11:53 AM    ALTSGPT 76 (H) 12/12/2024 11:53 AM    TBILIRUBIN 0.5 12/12/2024 11:53 AM          Current Outpatient Medications:     predniSONE, 40 mg.    ammonium lactate,     metoprolol SR, 50 mg, Oral, Nightly    pravastatin, TAKE ONE TABLET BY MOUTH EVERY EVENING (PRAVACHOL)    tamsulosin, 0.4 mg, Oral, DAILY    Glucosamine HCl (GLUCOSAMINE PO), Take  by mouth every day.    digoxin, 250 mcg, Oral, QAM    warfarin, TAKE 1 to 1.5 TABLETS BY MOUTH DAILY AS DIRECTED BY [facility]    METAMUCIL FIBER PO, Take  by mouth every day.    LYSINE PO, 1 Tablet, Oral, DAILY    Ascorbic Acid (VITAMIN C PO), 1 Tablet, Oral, QAM    Coenzyme Q10 (COQ10 PO), 1 Tablet, Oral, DAILY    acetaminophen, 500-1,000 mg, Oral, Q6HRS PRN    gabapentin, 600 mg, Oral, 4XDAY    B Complex Vitamins (VITAMIN B COMPLEX PO), 1 Tablet, Oral, Q EVENING    Cholecalciferol (VITAMIN D3 PO), 1 Tablet, Oral, Q EVENING    multivitamin, 1 Tablet, Oral, QHS    Refer to Patient Findings for HPI:  Patient Findings       Negatives:  Signs/symptoms of thrombosis, Signs/symptoms of bleeding, Laboratory test error suspected, Change in health, Change in alcohol use, Change in activity, Upcoming invasive procedure, Emergency department visit, Upcoming dental procedure, Missed doses, Extra doses, Change in medications, Change in diet/appetite, Hospital admission, Bruising, Other complaints            Vitals:    02/21/25 0930   BP: 101/60   Pulse: 75     Had difficulty with the cuff reading a BP on the patient, patient did report some lightheadedness today. Encouraged adequate hydration.     Verified current warfarin dosing schedule.    Medications reconciled: Yes  Pt is not on antiplatelet therapy.  Is patient on NSAID?: No       A/P   INR is therapeutic  Reason(s) for out of range INR today: N/A      Warfarin dosing recommendation:  Continue regimen as listed above.     Pt educated to contact our clinic with any changes in medications or s/s of bleeding or thrombosis. Pt is aware to seek immediate medical attention for falls, head injury or deep cuts.    Request pt to return in 2 week(s). Pt agrees.    Jaclyn Gonsalez, PharmD

## 2025-02-24 NOTE — Clinical Note
REFERRAL APPROVAL NOTICE         Sent on February 24, 2025                   Kinsey Parkinson  [address]  [place] [address]                   Dear Mr. Parkinson,    After a careful review of the medical information and benefit coverage, [facility] has processed your referral. See below for additional details.    If applicable, you must be actively enrolled with your insurance for coverage of the authorized service. If you have any questions regarding your coverage, please contact your insurance directly.    REFERRAL INFORMATION   Referral #:  [id number]  Referred-To Provider    Referred-By Provider:  Otolaryngology    [facility]      [address] [place] [address] [address]  [place] [address]  [phone] [address]  [place] [address]  [phone]    Referral Start Date:  02/20/2025  Referral End Date:   02/20/2026             SCHEDULING  If you do not already have an appointment, please call [phone] to make an appointment.     MORE INFORMATION  If you do not already have a [email or web address] account, sign up at: [email or web address]  You can access your medical information, make appointments, see lab results, billing information, and more.  If you have questions regarding this referral, please contact  the [facility] Referrals department at:             [phone]. Monday - Friday 8:00AM - 5:00PM.     Sincerely,    [facility]

## 2025-03-05 ENCOUNTER — OFFICE VISIT (OUTPATIENT)
Dept: SLEEP MEDICINE | Facility: MEDICAL CENTER | Age: 85
End: 2025-03-05
Attending: NURSE PRACTITIONER
Payer: MEDICARE

## 2025-03-05 VITALS
RESPIRATION RATE: 16 BRPM | HEART RATE: 76 BPM | BODY MASS INDEX: 27.77 KG/M2 | SYSTOLIC BLOOD PRESSURE: 124 MMHG | WEIGHT: 205 LBS | OXYGEN SATURATION: 95 % | DIASTOLIC BLOOD PRESSURE: 84 MMHG | HEIGHT: 72 IN

## 2025-03-05 DIAGNOSIS — G47.33 OSA (OBSTRUCTIVE SLEEP APNEA): ICD-10-CM

## 2025-03-05 PROCEDURE — 99214 OFFICE O/P EST MOD 30 MIN: CPT

## 2025-03-05 PROCEDURE — 99212 OFFICE O/P EST SF 10 MIN: CPT | Performed by: NURSE PRACTITIONER

## 2025-03-05 ASSESSMENT — FIBROSIS 4 INDEX: FIB4 SCORE: 3.31

## 2025-03-05 NOTE — PROGRESS NOTES
[facility] Consult Note     Date: 3/5/2025 / Time: 1:10 PM      Thank you for requesting a sleep medicine consultation on Duane Parkinson at the sleep center. Presents today with the chief complaints of daytime fatigue and previous history of LORENA. He is referred by ALEXANDRA Torres  [address]  [address]  [place] [address] for evaluation.     HISTORY OF PRESENT ILLNESS:     Duane Parkinson is a 85 y.o. male with history of LORENA, A-fib, DLD, hypertension, neuropathy.  Kinsey resents to Sleep Clinic to reestablish care.  He had a dreamstation which was recalled and has had trouble receiving a new one.  Patient is unable to recall exactly which type of therapy he has been on in the past and why he was switched.  Chart review indicates on multiple occasions that he did not bring machine or ST card so there was limited data that was able to be reviewed.  Patient brings an old ResMed machine that he was told not to use and he is unsure why.  Review of machine shows BiPAP settings and last documented treatment was ASV.  Patient has not use any type of therapy for several years.  Patient would like to know next steps to treatment    As per supplemental questionnaire to be scanned or imported into chart:    [place] Sleepiness Score: 9    Sleep Schedule  Bedtime: Weekday 9PM  Weekend 9PM  Wake time: Weekday 6AM Weekend 6AM  Sleep-onset latency: < 10 min  Awakenings from sleep: 2-3 to urinate  Difficulty falling back asleep: no  Bedroom partner: no  Naps: yes, inadvertently falls asleep when watching TV    DAYTIME SYMPTOMS:   Excessive daytime sleepiness: sometimes  Daytime fatigue: yes  Difficulty concentrating: no  Memory problems: yes  Irritability:no  Work/school performance issues: no  Sleepiness with driving: no  Caffeine/stimulant use: no  Alcohol use:No     SLEEP RELATED SYMPTOMS  Snoring: yes  Witnessed apnea or gasping/choking: No   Dry mouth or mouth breathing: yes  Sweating:  "no  Teeth grinding/biting: no  Morning headaches: no  Refreshed Upon Awakening: no     SLEEP RELATED BEHAVIORS:  Parasomnias (walking, talking, eating, violence): No   Leg kicking: no  Restless legs - \"urge to move\": yes, gabapentin   Nightmares: no Recurrent: No   Dream enactment: No      NARCOLEPSY:  Cataplexy: No   Sleep paralysis: No   Sleep attacks: No   Hypnagogic/hypnopompic hallucinations: No     MEDICAL HISTORY  Past Medical History:   Diagnosis Date    Aortic stenosis     valve replacement; cardiologist    Atrial fibrillation (HCC)     cardiologist:  THEODORE Mason    Back pain     Cancer (HCC)     skin cancer (nose)    Cataract     bilateral IOL    Cirrhosis of liver without ascites (HCC)     Clotting disorder (HCC)     reports nose bleeds; 3/29/23 pt reports none in the past year    Dental disorder     left upper gold cap fell off, has appt w/ dentist 4/2023    Diabetes (HCC)     diet controlled    Encounter for long-term (current) use of other medications     Fatty liver     Gasping for breath     Syriac measles     Heart murmur     Heart valve disease     High cholesterol     History of sepsis 02/09/2023    Hyperlipidemia     Hypertension     Insomnia     Jaundice     Mumps     Nasal drainage     Pain     left knee    Restless leg syndrome     Sleep apnea     3/29/2023 pt states he quit using CPAP, had issues with his machine and did not receive a new CPAP    Snoring     Tonsillitis     Treatment-emergent central sleep apnea 04/21/2016    cpap      Urinary bladder disorder     uti occasionally    Urinary incontinence     Valvular heart disease         SURGICAL HISTORY  Past Surgical History:   Procedure Laterality Date    IL TRANSURETHRAL ELEC-SURG PROSTATECTOM  4/7/2023    Procedure: BIPOLAR TRANSURETHRAL RESECTION OF PROSTATE;  Surgeon: Mack Pritchett M.D.;  Location: SURGERY [facility];  Service: Urology    CATARACT EXTRACTION WITH IOL Bilateral 03/2023    GIBRAN N/A 07/29/2019    " Procedure: ECHOCARDIOGRAM, TRANSESOPHAGEAL;  Surgeon: Leander Buckner M.D.;  Location: SURGERY [facility];  Service: Cardiac    TRANSCATHETER AORTIC VALVE REPLACEMENT Bilateral 2019    Procedure: REPLACEMENT, AORTIC VALVE, TRANSCATHETER;  Surgeon: Leander Buckner M.D.;  Location: SURGERY [facility];  Service: Cardiac    FINGER ARTHROPLASTY Left 2016    Procedure: FINGER ARTHROPLASTY THUMB CARPOMETACARPAL EXCISIONAL, EXCISE TRAPEZIUM;  Surgeon: Raheel Salgado M.D.;  Location: SURGERY [facility] DAY [facility];  Service:     CARDIOVERSION      on 06, sinus rhythm until 2007,  paroxysmal atrial fibrillation    KNEE ARTHROPLASTY TOTAL      LAMINOTOMY N/A     multiple lumbar spine    OTHER ORTHOPEDIC SURGERY      lower back    NM PERCUT IMPLNT NEUROELECT,EPIDURAL      TOE ARTHROPLASTY      TURP-VAPOR N/A         FAMILY HISTORY  Family History   Problem Relation Age of Onset    Other Mother         unknown    Heart Disease Mother     Cancer Father         prostate, skin    No Known Problems Brother     Diabetes Brother     Heart Disease Son     Sleep Apnea Neg Hx        SOCIAL HISTORY  Social History     Socioeconomic History    Marital status:    Tobacco Use    Smoking status: Former     Current packs/day: 0.00     Average packs/day: 1 pack/day for 20.0 years (20.0 ttl pk-yrs)     Types: Cigarettes     Start date: 1952     Quit date: 1972     Years since quittin.1    Smokeless tobacco: Never   Vaping Use    Vaping status: Never Used   Substance and Sexual Activity    Alcohol use: No     Alcohol/week: 0.0 oz    Drug use: No    Sexual activity: Not Currently     Partners: Female        Occupation: retired    CURRENT MEDICATIONS  Current Outpatient Medications   Medication Sig    predniSONE (DELTASONE) 20 MG Tab 40 mg.    ammonium lactate (AMLACTIN) 12 % cream     metoprolol SR (TOPROL XL) 50 MG TABLET SR 24 HR Take 1 Tablet by mouth every evening.     pravastatin (PRAVACHOL) 80 MG tablet TAKE ONE TABLET BY MOUTH EVERY EVENING (PRAVACHOL)    tamsulosin (FLOMAX) 0.4 MG capsule Take 0.4 mg by mouth every day.    Glucosamine HCl (GLUCOSAMINE PO) Take  by mouth every day.    digoxin (LANOXIN) 250 MCG Tab Take 1 Tablet by mouth every morning.    warfarin (COUMADIN) 4 MG Tab TAKE 1 to 1.5 TABLETS BY MOUTH DAILY AS DIRECTED BY [facility]    METAMUCIL FIBER PO Take  by mouth every day.    LYSINE PO Take 1 Tablet by mouth every day. As needed    Ascorbic Acid (VITAMIN C PO) Take 1 Tablet by mouth every morning.    Coenzyme Q10 (COQ10 PO) Take 1 Tablet by mouth every day.    acetaminophen (TYLENOL) 500 MG Tab Take 500-1,000 mg by mouth every 6 hours as needed. Indications: Pain    gabapentin (NEURONTIN) 300 MG Cap Take 600 mg by mouth 4 times a day.    B Complex Vitamins (VITAMIN B COMPLEX PO) Take 1 Tab by mouth every evening.    Cholecalciferol (VITAMIN D3 PO) Take 1 Tab by mouth every evening.    multivitamin (THERAGRAN) TABS Take 1 Tablet by mouth at bedtime.       REVIEW OF SYSTEMS  Constitutional: Denies fevers, Denies weight changes  Ears/Nose/Throat/Mouth: Denies nasal congestion or sore throat   Cardiovascular: Denies chest pain  Respiratory: Denies shortness of breath, Denies cough  Gastrointestinal/Hepatic: Denies nausea, vomiting  Sleep: see HPI    Physical Examination:  Vitals/ General Appearance:   Weight/BMI: Body mass index is 27.8 kg/m².  /84 (BP Location: Left arm, Patient Position: Sitting, BP Cuff Size: Adult)   Pulse 76   Resp 16   Ht 1.829 m (6')   Wt 93 kg (205 lb)   SpO2 95%   Vitals:    03/05/25 1311   BP: 124/84   BP Location: Left arm   Patient Position: Sitting   BP Cuff Size: Adult   Pulse: 76   Resp: 16   SpO2: 95%   Weight: 93 kg (205 lb)   Height: 1.829 m (6')       Pt. is alert and oriented to time, place and person. Cooperative and in no apparent distress.     Constitutional: Alert, no distress,  "well-groomed.  Skin: No rashes in visible areas.  Eye: Round. Conjunctiva clear, lids normal. No icterus.   ENT EXAM  Nasal alae/valves collapsible: No   Nasal septum deviation: No   Nasal turbinate hypertrophy: Left: Grade 1   Right: Grade 1  Hard palate narrow: No   Hard palate high: No   Soft palate/uvula (Mallampati score): 2  Tongue Scalloping: No   Retrognathia: No   Micrognathia: No   Cardiovascular:regular rate   Pulmonary: symmetrical chest expansion  Neurologic:Awake, alert and oriented x 3  Extremities: No clubbing, cyanosis, or edema     Bicarb:   Lab Results   Component Value Date/Time    CO2 25 12/12/2024 1153    CO2 22 08/26/2024 1603    CO2 23 06/28/2024 0856     TSH:   Lab Results   Component Value Date/Time    TSHULTRASEN 1.520 09/27/2019 1108     CREATININE:   Lab Results   Component Value Date/Time    CREATININE 0.80 12/12/2024 1153     VIT D: No results found for: \"25HYDROXY\"  H/H:  Lab Results   Component Value Date/Time    HEMOGLOBIN 14.4 10/08/2024 08:00 AM       ASSESSMENT AND PLAN   1. Duane Parkinson  has symptoms of Obstructive Sleep Apnea (LORENA).   - [place] Sleepiness Scale: No data recorded   - Reported symptoms include dry mouth and unrefreshed upon awakening and previous diagnosis of LORENA. These interfere with activities of daily living.   - Patient has risk factors for LORENA including elevated BMI.   - The pathophysiology of LORENA and the increased risk of cardiovascular morbidity from untreated LORENA has been discussed with the patient.   - We have discussed diagnostic options including in-laboratory, attended polysomnography and home sleep testing. We have also discussed treatment options including airway pressurization. Subsequently,treatment options will be discussed based on the diagnostic study.      Plan:  -  He  will be scheduled for an overnight PSG to assess sleep related breathing disorder since he has not used PAP for many years and is unsure of the settings he was on most " recently as well as chart review indicates sporadic usage without being able to review data from the machine regularly.  - Follow up 1-2 weeks after sleep study to discuss results and treatment options moving forward   - Advised to reach out via MyChart or by phone with any questions or concerns.   - The patient is urged to avoid supine sleep, weight gain and alcoholic beverages since all of these can worsen LORENA. If the patient feels sleepy while driving, advised must pull over for a break/nap, rather than persist on the road, in the interest of patient's own safety and that of others on the road.    2.  Regarding treatment of other past medical problems and general health maintenance,  patient is urged to follow up with PCP.      Please note portions of this record was created using voice recognition software. I have made every reasonable attempt to correct obvious errors, but I expect that there are errors of grammar and possibly content I did not discover before finalizing the note.

## 2025-03-07 ENCOUNTER — ANTICOAGULATION VISIT (OUTPATIENT)
Dept: VASCULAR LAB | Facility: MEDICAL CENTER | Age: 85
End: 2025-03-07
Attending: INTERNAL MEDICINE
Payer: MEDICARE

## 2025-03-07 DIAGNOSIS — Z95.3 STATUS POST TRANSCATHETER AORTIC VALVE REPLACEMENT (TAVR) USING BIOPROSTHESIS: ICD-10-CM

## 2025-03-07 DIAGNOSIS — D68.69 SECONDARY HYPERCOAGULABLE STATE (HCC): Chronic | ICD-10-CM

## 2025-03-07 LAB — INR PPP: 3.7 (ref 2–3.5)

## 2025-03-07 PROCEDURE — 99212 OFFICE O/P EST SF 10 MIN: CPT

## 2025-03-07 PROCEDURE — 85610 PROTHROMBIN TIME: CPT

## 2025-03-07 NOTE — PROGRESS NOTES
Anticoagulation Summary  As of 3/7/2025      INR goal:  2.0-3.0   TTR:  59.7% (8.3 y)   INR used for dosing:  3.70 (3/7/2025)   Warfarin maintenance plan:  2 mg (4 mg x 0.5) every Wed, Fri; 4 mg (4 mg x 1) all other days   Weekly warfarin total:  24 mg   Plan last modified:  Sergio Pan, PharmD (2/14/2025)   Next INR check:  3/13/2025   Priority:  Routine   Target end date:  Indefinite    Indications    Status post transcatheter aortic valve replacement (TAVR) using bioprosthesis [Z95.3]  Atrial fibrillation (HCC) [I48.91]  Secondary hypercoagulable state (HCC) [D68.69]                 Anticoagulation Episode Summary       INR check location:  --    Preferred lab:  --    Send INR reminders to:  --    Comments:  Pt goes by Dyke  ASA daily          Anticoagulation Care Providers       Provider Role Specialty Phone number    Vickey Rutherford M.D. Referring Cardiovascular Disease (Cardiology) [phone]    [facility]ticoagulation [facility] Responsible  [phone]                Refer to Patient Findings for HPI:  Patient Findings       Positives:  Change in health (Pt reports diarrhea for last few days.), Change in diet/appetite (Pt reports reduced appetite for the last few days.)    Negatives:  Signs/symptoms of thrombosis, Signs/symptoms of bleeding, Laboratory test error suspected, Change in alcohol use, Change in activity, Upcoming invasive procedure, Emergency department visit, Upcoming dental procedure, Missed doses, Extra doses, Change in medications, Hospital admission, Bruising, Other complaints            Date Referral Placed: 12/11/24      Vitals:  There were no vitals filed for this visit.  Pt declined vitals    Verified current warfarin dosing schedule.    Medications reconciled.  Pt is not on antiplatelet therapy.      A/P   INR is supratherapeutic  Reason(s) for out of range INR today: Possible Decreased vitamin K intake secondary to reduced appetite.       Warfarin dosing recommendation:  Hold ONCE today and take REDUCED dose of 2 mg ONCE tomorrow, then resume current warfarin dosing regimen.       Pt educated to contact our clinic with any changes in medications or s/s of bleeding or thrombosis. Pt is aware to seek immediate medical attention for falls, head injury or deep cuts.    Request pt to return in 6 day(s). Pt agrees.    Joel Hodge, PharmD

## 2025-03-10 ENCOUNTER — HOSPITAL ENCOUNTER (OUTPATIENT)
Dept: LAB | Facility: MEDICAL CENTER | Age: 85
End: 2025-03-10
Attending: INTERNAL MEDICINE
Payer: MEDICARE

## 2025-03-10 LAB
ALBUMIN SERPL BCP-MCNC: 3.6 G/DL (ref 3.2–4.9)
ALBUMIN/GLOB SERPL: 1.2 G/DL
ALP SERPL-CCNC: 66 U/L (ref 30–99)
ALT SERPL-CCNC: 77 U/L (ref 2–50)
ANION GAP SERPL CALC-SCNC: 9 MMOL/L (ref 7–16)
AST SERPL-CCNC: 45 U/L (ref 12–45)
BILIRUB SERPL-MCNC: 0.4 MG/DL (ref 0.1–1.5)
BUN SERPL-MCNC: 22 MG/DL (ref 8–22)
CALCIUM ALBUM COR SERPL-MCNC: 9.9 MG/DL (ref 8.5–10.5)
CALCIUM SERPL-MCNC: 9.6 MG/DL (ref 8.5–10.5)
CHLORIDE SERPL-SCNC: 104 MMOL/L (ref 96–112)
CO2 SERPL-SCNC: 24 MMOL/L (ref 20–33)
CREAT SERPL-MCNC: 0.82 MG/DL (ref 0.5–1.4)
GFR SERPLBLD CREATININE-BSD FMLA CKD-EPI: 85 ML/MIN/1.73 M 2
GLOBULIN SER CALC-MCNC: 3 G/DL (ref 1.9–3.5)
GLUCOSE SERPL-MCNC: 173 MG/DL (ref 65–99)
POTASSIUM SERPL-SCNC: 4.7 MMOL/L (ref 3.6–5.5)
PROT SERPL-MCNC: 6.6 G/DL (ref 6–8.2)
SODIUM SERPL-SCNC: 137 MMOL/L (ref 135–145)

## 2025-03-10 PROCEDURE — 36415 COLL VENOUS BLD VENIPUNCTURE: CPT

## 2025-03-10 PROCEDURE — 80053 COMPREHEN METABOLIC PANEL: CPT

## 2025-03-12 DIAGNOSIS — I10 ESSENTIAL HYPERTENSION: ICD-10-CM

## 2025-03-13 ENCOUNTER — ANTICOAGULATION VISIT (OUTPATIENT)
Dept: VASCULAR LAB | Facility: MEDICAL CENTER | Age: 85
End: 2025-03-13
Attending: INTERNAL MEDICINE
Payer: MEDICARE

## 2025-03-13 DIAGNOSIS — D68.69 SECONDARY HYPERCOAGULABLE STATE (HCC): Chronic | ICD-10-CM

## 2025-03-13 DIAGNOSIS — I48.0 PAROXYSMAL ATRIAL FIBRILLATION (HCC): Chronic | ICD-10-CM

## 2025-03-13 DIAGNOSIS — Z95.3 STATUS POST TRANSCATHETER AORTIC VALVE REPLACEMENT (TAVR) USING BIOPROSTHESIS: ICD-10-CM

## 2025-03-13 LAB — INR PPP: 3.3 (ref 2–3.5)

## 2025-03-13 PROCEDURE — 99212 OFFICE O/P EST SF 10 MIN: CPT | Performed by: NURSE PRACTITIONER

## 2025-03-13 PROCEDURE — 85610 PROTHROMBIN TIME: CPT

## 2025-03-13 RX ORDER — METOPROLOL SUCCINATE 50 MG/1
50 TABLET, EXTENDED RELEASE ORAL NIGHTLY
Qty: 90 TABLET | Refills: 3 | Status: SHIPPED | OUTPATIENT
Start: 2025-03-13

## 2025-03-13 NOTE — PROGRESS NOTES
Anticoagulation Summary  As of 3/13/2025      INR goal:  2.0-3.0   TTR:  59.5% (8.4 y)   INR used for dosing:  3.30 (3/13/2025)   Warfarin maintenance plan:  2 mg (4 mg x 0.5) every Mon, Wed, Fri; 4 mg (4 mg x 1) all other days   Weekly warfarin total:  22 mg   Plan last modified:  CLAYTON Jacob (3/13/2025)   Next INR check:  3/21/2025   Priority:  Routine   Target end date:  Indefinite    Indications    Status post transcatheter aortic valve replacement (TAVR) using bioprosthesis [Z95.3]  Atrial fibrillation (HCC) [I48.91]  Secondary hypercoagulable state (HCC) [D68.69]                 Anticoagulation Episode Summary       INR check location:  --    Preferred lab:  --    Send INR reminders to:  --    Comments:  Pt goes by Kinsey            Anticoagulation Care Providers       Provider Role Specialty Phone number    Vickey Rutherford M.D. Referring Cardiovascular Disease (Cardiology) [phone]    [facility] Anticoagulation Services Responsible  [phone]                Refer to Patient Findings for HPI:  Patient Findings       Negatives:  Signs/symptoms of thrombosis, Signs/symptoms of bleeding, Laboratory test error suspected, Change in health, Change in alcohol use, Change in activity, Upcoming invasive procedure, Emergency department visit, Upcoming dental procedure, Missed doses, Extra doses, Change in medications, Change in diet/appetite, Hospital admission, Bruising, Other complaints            Date Referral Placed: 12/11/24      Vitals:  There were no vitals filed for this visit.  Pt declined vitals    Verified current warfarin dosing schedule.    Medications reconciled.  Pt is not on antiplatelet therapy.      A/P   INR is supratherapeutic today at 3.3. INR down from 3.7 last visit.   Reason(s) for out of range INR today: determining dose requirements    Warfarin dosing recommendation: take 2 mg tonight then began slightly reduce regimen as outlined on calendar.      Pt educated to contact our  clinic with any changes in medications or s/s of bleeding or thrombosis. Pt is aware to seek immediate medical attention for falls, head injury or deep cuts.    Request pt to return in 1 week. Pt agrees.    MARKELL Jacob.       No

## 2025-03-13 NOTE — TELEPHONE ENCOUNTER
Received request via: Pharmacy    Was the patient seen in the last year in this department? Yes    Does the patient have an active prescription (recently filled or refills available) for medication(s) requested? No    Pharmacy Name:    [id number] - [place], [place] - [address] [place]          Does the patient have senior living Plus and need 100-day supply? (This applies to ALL medications) Patient does not have SCP

## 2025-03-17 ENCOUNTER — TELEPHONE (OUTPATIENT)
Dept: CARDIOLOGY | Facility: MEDICAL CENTER | Age: 85
End: 2025-03-17
Payer: MEDICARE

## 2025-03-17 NOTE — TELEPHONE ENCOUNTER
Caller: Pau - Granddaughter    Topic/issue: Susu wanted to know when  wanted to see patient again. He recently had an echo in January and wanted to discuss this with her. Please advise.    Callback Number: [phone]    Thank you,  Rica REYES

## 2025-03-18 ENCOUNTER — TELEPHONE (OUTPATIENT)
Dept: VASCULAR LAB | Facility: MEDICAL CENTER | Age: 85
End: 2025-03-18
Payer: MEDICARE

## 2025-03-18 NOTE — TELEPHONE ENCOUNTER
Called and s/w Pau - Pt had single tooth extraction around noon today. Pau states that pt is not soaking gauze pads. He's changed it out twice since his dental appt. Notes that the bleeding appears to be slowing.     Advised they CTM and hold x 1 dose of warfarin if pt still noticing bleeding sx by the time he typically takes his warfarin tonight. If bleeding has resolved then pt to continue current warfarin dosing regimen.    Clifford Anand, PharmD, BCACP

## 2025-03-18 NOTE — TELEPHONE ENCOUNTER
Phone Number Called: [phone]     Call outcome: Left detailed message for patient. Informed to call back with any additional questions.    Message: Called to inform patient's granddaughter Pau that pt is due for follow up visit. LVM with call back number and scheduling's number to get follow up appt scheduled.

## 2025-03-18 NOTE — TELEPHONE ENCOUNTER
Caller: Pau - Granddaughter     Topic/issue: Pt had a tooth pooled today and its not clotting, at time of extraction it did not overly bleed, PT would lik e to know if he should take his Warfarin tonight?     Callback Number: [phone]    Thank you,   Melina GARCIA

## 2025-03-21 ENCOUNTER — PATIENT MESSAGE (OUTPATIENT)
Dept: SLEEP MEDICINE | Facility: MEDICAL CENTER | Age: 85
End: 2025-03-21

## 2025-03-21 ENCOUNTER — ANTICOAGULATION VISIT (OUTPATIENT)
Dept: VASCULAR LAB | Facility: MEDICAL CENTER | Age: 85
End: 2025-03-21
Attending: INTERNAL MEDICINE
Payer: MEDICARE

## 2025-03-21 VITALS — HEART RATE: 86 BPM | SYSTOLIC BLOOD PRESSURE: 113 MMHG | DIASTOLIC BLOOD PRESSURE: 65 MMHG

## 2025-03-21 DIAGNOSIS — D68.69 SECONDARY HYPERCOAGULABLE STATE (HCC): Chronic | ICD-10-CM

## 2025-03-21 DIAGNOSIS — Z95.3 STATUS POST TRANSCATHETER AORTIC VALVE REPLACEMENT (TAVR) USING BIOPROSTHESIS: ICD-10-CM

## 2025-03-21 DIAGNOSIS — I48.0 PAROXYSMAL ATRIAL FIBRILLATION (HCC): Chronic | ICD-10-CM

## 2025-03-21 LAB — INR PPP: 1.2 (ref 2–3.5)

## 2025-03-21 PROCEDURE — 85610 PROTHROMBIN TIME: CPT

## 2025-03-21 PROCEDURE — 99212 OFFICE O/P EST SF 10 MIN: CPT

## 2025-03-21 NOTE — PROGRESS NOTES
Anticoagulation Summary  As of 3/21/2025      INR goal:  2.0-3.0   TTR:  59.5% (8.4 y)   INR used for dosin.20 (3/21/2025)   Warfarin maintenance plan:  2 mg (4 mg x 0.5) every Mon, Wed, Fri; 4 mg (4 mg x 1) all other days   Weekly warfarin total:  22 mg   Plan last modified:  CLAYTON Jacob (3/13/2025)   Next INR check:  3/28/2025   Priority:  Routine   Target end date:  Indefinite    Indications    Status post transcatheter aortic valve replacement (TAVR) using bioprosthesis [Z95.3]  Atrial fibrillation (HCC) [I48.91]  Secondary hypercoagulable state (HCC) [D68.69]                 Anticoagulation Episode Summary       INR check location:  --    Preferred lab:  --    Send INR reminders to:  --    Comments:  Pt goes by Kinsey            Anticoagulation Care Providers       Provider Role Specialty Phone number    Vickey Rutherford M.D. Referring Cardiovascular Disease (Cardiology) [phone]    [facility] Responsible  [phone]          Refer to Patient Findings for HPI:  Patient Findings       Positives:  Change in activity (pt had tooth pulled 2 days ago), Missed doses    Negatives:  Signs/symptoms of thrombosis, Signs/symptoms of bleeding, Laboratory test error suspected, Change in health, Change in alcohol use, Upcoming invasive procedure, Emergency department visit, Upcoming dental procedure, Extra doses, Change in medications, Change in diet/appetite, Hospital admission, Bruising, Other complaints          Date Referral Placed: 24    Vitals:  Vitals:    25 0916   BP: 113/65   Pulse: 86       Verified current warfarin dosing schedule.    Medications reconciled.  Pt is not on antiplatelet therapy.      A/P   INR is subtherapeutic at 1.2  Reason(s) for out of range INR today: Missed dose(s)      Warfarin dosing recommendation: Bolus with 4mg TONIGHT, then resume his current dosing regimen thereafter. Patient will return again in 1 week.     Pt educated to contact  our clinic with any changes in medications or s/s of bleeding or thrombosis. Pt is aware to seek immediate medical attention for falls, head injury or deep cuts.    Request pt to return in 1 week(s). Pt agrees.    Sampson WeissD

## 2025-03-24 ENCOUNTER — SLEEP STUDY (OUTPATIENT)
Dept: SLEEP MEDICINE | Facility: MEDICAL CENTER | Age: 85
End: 2025-03-24
Payer: MEDICARE

## 2025-03-24 DIAGNOSIS — G47.33 OSA (OBSTRUCTIVE SLEEP APNEA): ICD-10-CM

## 2025-03-24 PROCEDURE — 95811 POLYSOM 6/>YRS CPAP 4/> PARM: CPT | Performed by: STUDENT IN AN ORGANIZED HEALTH CARE EDUCATION/TRAINING PROGRAM

## 2025-03-25 NOTE — PROCEDURES
Patient: ALICIA SHAH  ID: [id number] Date: 3/24/2025 Exam No.:   MONTAGE: Standard  STUDY TYPE: Split Night  RECORDING TECHNIQUE:   After the scalp was prepared, gold plated electrodes were applied to the scalp according to the International 10-20 System. EEG (electroencephalogram) was continuously monitored from the O1-M2, O2-M1, C3-M2, C4-M1, F3-M2, and F4-M1. EOGs (electrooculograms) were monitored by electrodes placed at the left and right outer canthi. Chin EMG (electromyogram) was monitored by electrodes placed on the mentalis and sub-mentalis muscles. Nasal and oral airflow were monitored using a triple port thermocouple as well as oronasal pressure transducer. Respiratory effort was measured by inductive plethysmography technology employing abdominal and thoracic belts. Blood oxygen saturation and pulse were monitored by pulse oximetry. Heart rhythm was monitored by surface electrocardiogram. Leg EMG was studied using surface electrodes placed on left and right anterior tibialis. A microphone was used to monitor tracheal sounds and snoring. Body position was monitored and documented by technician observation.   SCORING CRITERIA:   A modification of the AASM manual for scoring of sleep and associated events was used. Obstructive apneas were scored by cessation of airflow for at least 10 seconds with continuing respiratory effort. Central apneas were scored by cessation of airflow for at least 10 seconds with no respiratory effort. Hypopneas were scored by a 30% or more reduction in airflow for at least 10 seconds accompanied by arterial oxygen desaturation of 3% or an arousal. For CMS (Medicare) patients, per AASM rule 1B, hypopneas are scored by 30% with mild reduction in airflow for at least 10 seconds accompanied by arterial saturation decreased at 4%.  DIAGNOSTIC  Study start time was 08:53:48 PM. Diagnostic recording time was 197 minutes with a total sleep time of 152 minutes resulting in a sleep  efficiency of 77.16%%. Sleep latency from the start of the study was 09 minutes and the latency from sleep to REM was 150 minutes. In total,8 arousals were scored for an arousal index of 3.2.  Respiratory:  There were a total of 0 apneas consisting of 0 obstructive apneas, 0 mixed apneas, and 0 central apneas. A total of 27 hypopneas were scored. The apnea index was 0.00 per hour and the hypopnea index was 10.66 per hour resulting in an overall AHI of 10.66. AHI during REM was 20.5 and AHI while supine was 0.00.  Oximetry:  There was a mean oxygen saturation of 93.0%. The minimum oxygen saturation during NREM sleep was 87.0% and in REM was 88.0%. Time spent during sleep with oxygen saturations <88% was 1.6 minutes.   Cardiac:  The highest heart rate seen while awake was 101 BPM while the highest heart rate during sleep was 94 BPM with an average sleeping heart rate of 78 BPM.  Limb Movements:  There were a total of 277 PLMs during sleep, which resulted in a PLM index of 109.3. There were 1 PLMs associated with arousals which resulted in a PLMS arousal index of 0.4.  TREATMENT:  Treatment recording time was 5h 39.5m (339 minutes) with a total sleep time of 5h 25.0m (325 minutes) resulting in a sleep efficiency of 95.7%. Sleep latency from the start of treatment was 00 minutes and REM latency from sleep onset was 0h 44.0m. The patient had 29 arousals in total for an arousal index of 5.4.  Respiratory:   There were 3 apneas in total consisting of 0 obstructive apneas, 3 central apneas, and 0 mixed apneas for an apnea index of 0.55. The patient had 19 hypopneas in total, which resulted in a hypopnea index of 3.51. The overall AHI was 4.06, with a REM AHI of 0.91, and a supine AHI of 0.00.   Oximetry:  The mean SaO2 during treatment was 93.0%. The minimum oxygen saturation in NREM was 87.0 % and in REM was 89.0%. Patient spent 0.3 minutes of TST with SaO2 <88%.  Cardiac:  The highest heart rate during sleep was 94 BPM  with an average sleeping heart rate of 76BPM.  Limb Movements:  There were a total of 0 PLMS during titration sleep time that resulted in an index of 0.0. There were 2 PLMS associated with arousals. This resulted in a PLM arousal index of 0.4.  Titration:   CPAP was tried from 5 to 7  This was a fully attended sleep study. This test was technically adequate. This patient was titrated on CPAP starting at *** cm of water pressure. Patient was titrated up to *** cm of water pressure. Patient did best at *** cm of water pressure. Patient spent *** minutes at that pressure and the AHI was *** which is considered *** obstructive sleep apnea.   Assessment:   DIAGNOSTIC: ***Obstructive Sleep Apnea Hypopnea - AHI*** ***Nocturnal desaturation - zachery saturation ***% - saturations <88% below for *** minutes of TST.  TREATMENT: ***Obstructive Sleep Apnea Hypopnea - AHI*** ***Nocturnal desaturation - zachery saturation ***% - saturations <88% below for *** minutes of TST.  Recommendation:

## 2025-03-28 ENCOUNTER — ANTICOAGULATION VISIT (OUTPATIENT)
Dept: VASCULAR LAB | Facility: MEDICAL CENTER | Age: 85
End: 2025-03-28
Attending: INTERNAL MEDICINE
Payer: MEDICARE

## 2025-03-28 VITALS — SYSTOLIC BLOOD PRESSURE: 99 MMHG | DIASTOLIC BLOOD PRESSURE: 64 MMHG | HEART RATE: 96 BPM

## 2025-03-28 DIAGNOSIS — D68.69 SECONDARY HYPERCOAGULABLE STATE (HCC): Chronic | ICD-10-CM

## 2025-03-28 DIAGNOSIS — I48.0 PAROXYSMAL ATRIAL FIBRILLATION (HCC): Chronic | ICD-10-CM

## 2025-03-28 DIAGNOSIS — Z95.3 STATUS POST TRANSCATHETER AORTIC VALVE REPLACEMENT (TAVR) USING BIOPROSTHESIS: ICD-10-CM

## 2025-03-28 LAB — INR PPP: 2 (ref 2–3.5)

## 2025-03-28 PROCEDURE — 85610 PROTHROMBIN TIME: CPT

## 2025-03-28 PROCEDURE — 99211 OFF/OP EST MAY X REQ PHY/QHP: CPT

## 2025-03-28 NOTE — PROGRESS NOTES
Anticoagulation Summary  As of 3/28/2025      INR goal:  2.0-3.0   TTR:  59.4% (8.4 y)   INR used for dosin.00 (3/28/2025)   Warfarin maintenance plan:  2 mg (4 mg x 0.5) every Mon, Wed, Fri; 4 mg (4 mg x 1) all other days   Weekly warfarin total:  22 mg   Plan last modified:  CLAYTON Jacob (3/13/2025)   Next INR check:  2025   Priority:  Routine   Target end date:  Indefinite    Indications    Status post transcatheter aortic valve replacement (TAVR) using bioprosthesis [Z95.3]  Atrial fibrillation (HCC) [I48.91]  Secondary hypercoagulable state (HCC) [D68.69]                 Anticoagulation Episode Summary       INR check location:  --    Preferred lab:  --    Send INR reminders to:  --    Comments:  Pt goes by Kinsey            Anticoagulation Care Providers       Provider Role Specialty Phone number    Vickey Rutherford M.D. Referring Cardiovascular Disease (Cardiology) [phone]    [facility] Anticoagulation Services Responsible  [phone]           Refer to Patient Findings for HPI:  Patient Findings       Positives:  Missed doses (missed dose on wed night)    Negatives:  Signs/symptoms of thrombosis, Signs/symptoms of bleeding, Laboratory test error suspected, Change in health, Change in alcohol use, Change in activity, Upcoming invasive procedure, Emergency department visit, Upcoming dental procedure, Extra doses, Change in medications, Change in diet/appetite, Hospital admission, Bruising, Other complaints          Date Referral Placed: 24    Vitals:  Vitals:    25 0918   BP: 99/64   Pulse: 96       Verified current warfarin dosing schedule.    Medications reconciled.  Pt is not on antiplatelet therapy.      A/P   INR is therapeutic at 2.0  Reason(s) for out of range INR today: N/A      Warfarin dosing recommendation: Continue regimen as listed above.    Pt educated to contact our clinic with any changes in medications or s/s of bleeding or thrombosis. Pt is aware to seek  immediate medical attention for falls, head injury or deep cuts.    Request pt to return in 2 week(s). Pt agrees.    Sampson WeissD

## 2025-04-05 NOTE — PROGRESS NOTES
Anticoagulation Summary  As of 11/27/2018    INR goal:   2.0-3.0   TTR:   65.5 % (2.1 y)   Today's INR:   2.2   Warfarin maintenance plan:   4 mg (4 mg x 1) every day   Weekly warfarin total:   28 mg   Plan last modified:   Sin Huitron, PharmD (10/18/2018)   Next INR check:   12/25/2018   Target end date:   Indefinite    Indications    Atrial fibrillation (HCC) [I48.91]             Anticoagulation Episode Summary     INR check location:       Preferred lab:       Send INR reminders to:       Comments:   Pt goes by Kisney      Anticoagulation Care Providers     Provider Role Specialty Phone number    Vickey Rutherford M.D. Referring Cardiology [phone]    [facility] Anticoagulation [facility] Responsible  [phone]        Anticoagulation Patient Findings       INR  therapeutic.   Denies signs/symptoms of bleeding and/or thrombosis.   Denies changes to diet or medications.   Follow up appointment in 4 week(s).    Continue weekly warfarin dose as noted      Sean Guzman, PharmD       Self

## 2025-04-11 ENCOUNTER — ANTICOAGULATION VISIT (OUTPATIENT)
Dept: VASCULAR LAB | Facility: MEDICAL CENTER | Age: 85
End: 2025-04-11
Attending: INTERNAL MEDICINE
Payer: MEDICARE

## 2025-04-11 VITALS — SYSTOLIC BLOOD PRESSURE: 112 MMHG | HEART RATE: 87 BPM | DIASTOLIC BLOOD PRESSURE: 72 MMHG

## 2025-04-11 DIAGNOSIS — Z95.3 STATUS POST TRANSCATHETER AORTIC VALVE REPLACEMENT (TAVR) USING BIOPROSTHESIS: ICD-10-CM

## 2025-04-11 DIAGNOSIS — D68.69 SECONDARY HYPERCOAGULABLE STATE (HCC): Chronic | ICD-10-CM

## 2025-04-11 DIAGNOSIS — I48.0 PAROXYSMAL ATRIAL FIBRILLATION (HCC): Chronic | ICD-10-CM

## 2025-04-11 LAB — INR PPP: 1.6 (ref 2–3.5)

## 2025-04-11 PROCEDURE — 99212 OFFICE O/P EST SF 10 MIN: CPT

## 2025-04-11 NOTE — PROGRESS NOTES
Anticoagulation Summary  As of 2025      INR goal:  2.0-3.0   TTR:  59.1% (8.4 y)   INR used for dosin.60 (2025)   Warfarin maintenance plan:  2 mg (4 mg x 0.5) every Mon, Wed, Fri; 4 mg (4 mg x 1) all other days   Weekly warfarin total:  22 mg   Plan last modified:  CLAYTON Jacob (3/13/2025)   Next INR check:  2025   Priority:  Routine   Target end date:  Indefinite    Indications    Status post transcatheter aortic valve replacement (TAVR) using bioprosthesis [Z95.3]  Atrial fibrillation (HCC) [I48.91]  Secondary hypercoagulable state (HCC) [D68.69]                 Anticoagulation Episode Summary       INR check location:  --    Preferred lab:  --    Send INR reminders to:  --    Comments:  Pt goes by Kinsey            Anticoagulation Care Providers       Provider Role Specialty Phone number    Vickey Rutherford M.D. Referring Cardiovascular Disease (Cardiology) [phone]    [facility] Anticoagulation Services Responsible  [phone]                  [id number] Stroke Risk Points: 3   Values used to calculate this score:    Points  Metrics       0        Has Congestive Heart Failure: No       1        Has Hypertension: Yes       2        Age: 85       0        Has Diabetes: No       0        Had Stroke: No                 Had TIA: No                 Had Thromboembolism: No       0        Has Vascular Disease: No       0        Clinically Relevant Sex: Male     No data recorded     Lab Results   Component Value Date/Time    CHOLSTRLTOT 124 2024 08:56 AM    LDL 54 2024 08:56 AM    HDL 35 (A) 2024 08:56 AM    TRIGLYCERIDE 174 (H) 2024 08:56 AM       Lab Results   Component Value Date/Time    SODIUM 137 03/10/2025 03:27 PM    POTASSIUM 4.7 03/10/2025 03:27 PM    CHLORIDE 104 03/10/2025 03:27 PM    CO2 24 03/10/2025 03:27 PM    GLUCOSE 173 (H) 03/10/2025 03:27 PM    BUN 22 03/10/2025 03:27 PM    CREATININE 0.82 03/10/2025 03:27 PM    BUNCREATRAT 15 2018  08:29 AM     Lab Results   Component Value Date/Time    ALKPHOSPHAT 66 03/10/2025 03:27 PM    ASTSGOT 45 03/10/2025 03:27 PM    ALTSGPT 77 (H) 03/10/2025 03:27 PM    TBILIRUBIN 0.4 03/10/2025 03:27 PM          Current Outpatient Medications:     metoprolol SR, 50 mg, Oral, Nightly    predniSONE, 40 mg.    ammonium lactate,     pravastatin, TAKE ONE TABLET BY MOUTH EVERY EVENING (PRAVACHOL)    tamsulosin, 0.4 mg, Oral, DAILY    Glucosamine HCl (GLUCOSAMINE PO), Take  by mouth every day.    digoxin, 250 mcg, Oral, QAM    warfarin, TAKE 1 to 1.5 TABLETS BY MOUTH DAILY AS DIRECTED BY [facility]    METAMUCIL FIBER PO, Take  by mouth every day.    LYSINE PO, 1 Tablet, Oral, DAILY    Ascorbic Acid (VITAMIN C PO), 1 Tablet, Oral, QAM    Coenzyme Q10 (COQ10 PO), 1 Tablet, Oral, DAILY    acetaminophen, 500-1,000 mg, Oral, Q6HRS PRN    gabapentin, 600 mg, Oral, 4XDAY    B Complex Vitamins (VITAMIN B COMPLEX PO), 1 Tablet, Oral, Q EVENING    Cholecalciferol (VITAMIN D3 PO), 1 Tablet, Oral, Q EVENING    multivitamin, 1 Tablet, Oral, QHS    Refer to Patient Findings for HPI:  Patient Findings       Positives:  Change in diet/appetite (More greens, kale salad)    Negatives:  Signs/symptoms of thrombosis, Signs/symptoms of bleeding, Laboratory test error suspected, Change in health, Change in alcohol use, Change in activity, Upcoming invasive procedure, Emergency department visit, Upcoming dental procedure, Missed doses, Extra doses, Change in medications, Hospital admission, Bruising, Other complaints            Vitals:    04/11/25 0912   BP: 112/72   Pulse: 87         Verified current warfarin dosing schedule.    Medications reconciled: Yes  Pt is not on antiplatelet therapy.  Is patient on NSAID?: No       A/P   INR is subtherapeutic  Reason(s) for out of range INR today: Increased vitamin K intake, has been eating kale salads from [employer].     Warfarin dosing recommendation: Bolus 4 mg today, then Continue  regimen as listed above. Discussed importance of consistent diet with regards to greens, not planning on regular kale consumption going forward.     Pt educated to contact our clinic with any changes in medications or s/s of bleeding or thrombosis. Pt is aware to seek immediate medical attention for falls, head injury or deep cuts.    Request pt to return in 2 week(s). Pt agrees.    Jaclyn Gonsalez, PharmD

## 2025-04-14 ENCOUNTER — OFFICE VISIT (OUTPATIENT)
Dept: SLEEP MEDICINE | Facility: MEDICAL CENTER | Age: 85
End: 2025-04-14
Payer: MEDICARE

## 2025-04-14 VITALS
OXYGEN SATURATION: 94 % | WEIGHT: 224.8 LBS | HEIGHT: 72 IN | RESPIRATION RATE: 14 BRPM | DIASTOLIC BLOOD PRESSURE: 72 MMHG | BODY MASS INDEX: 30.45 KG/M2 | SYSTOLIC BLOOD PRESSURE: 122 MMHG | HEART RATE: 85 BPM

## 2025-04-14 DIAGNOSIS — G47.33 OSA (OBSTRUCTIVE SLEEP APNEA): ICD-10-CM

## 2025-04-14 PROCEDURE — 1170F FXNL STATUS ASSESSED: CPT

## 2025-04-14 PROCEDURE — 3078F DIAST BP <80 MM HG: CPT

## 2025-04-14 PROCEDURE — 3074F SYST BP LT 130 MM HG: CPT

## 2025-04-14 PROCEDURE — 99213 OFFICE O/P EST LOW 20 MIN: CPT

## 2025-04-14 PROCEDURE — 99212 OFFICE O/P EST SF 10 MIN: CPT

## 2025-04-14 ASSESSMENT — FIBROSIS 4 INDEX: FIB4 SCORE: 2.42

## 2025-04-14 NOTE — PROGRESS NOTES
[facility] Follow-up Visit    CC:   Chief Complaint   Patient presents with    Follow-Up     Last Office Visit 3/5/2025 with Shivani DOE     Study Complete on 3/24/2025            HPI:    Duane Parkinson is a 85 y.o.male present today for ongoing management of LORENA. Patient was last seen by sleep medicine 3/5/24 by myself. At that time he reported being off therapy for several years. His machine was recalled and he had trouble getting a new one. He was a poor historian regarding what type of therapy he was on prior and if he found it helpful. The primary reason for today's visit is to discuss sleep study results.  Patient completed in lab study which showed AHI 10.66, worse during REM. Recommendations were made for autoCPAP.     Sleep History  11/22/17 - PSG titration. This study demonstrates a significant but perhaps incomplete response to servo adaptive BiPAP ventilation in a patient with previously demonstrated sleep apnea hypopnea unresponsive to CPAP and BiPAP therapy, consistent with complex sleep apnea. Recommendations: EPAP 5-10, PS 4-15  2/24/21 - PSG, AHI 9.8, zachery O2 85%, Time spent with oxygen saturations below 89% was 11.8 minutes. Recommendations: Treatment options for mild LORENA include CPAP, oral appliance, possible UPPP  3/24/25 - PSG split night, 4% AHI 10.66 (REM AHI 20.5), zachery O2 87%, Patient spent 0.3 minutes of TST with SaO2 <88%. Rec: CPAP 4-10       Patient Active Problem List    Diagnosis Date Noted    Degenerative disease of nervous system, unspecified (HCC) 11/21/2023    Benign prostatic hyperplasia with urinary hesitancy 03/02/2023    Age-related physical debility 02/11/2023    Recurrent UTI 02/09/2023    Cystic lesion of abdominal viscera 02/09/2023    Skin lesions 11/08/2022    Chronic pain of left knee 11/08/2022    LORENA (obstructive sleep apnea) 11/22/2021    Secondary hypercoagulable state (HCC) 11/22/2021    Skin cancer 12/17/2020    Autoimmune hepatitis (HCC)      Neuropathy 12/22/2017    Localized primary osteoarthritis of carpometacarpal joint of left thumb 05/27/2016    Essential hypertension 01/07/2015    Dyslipidemia 01/07/2015    IGT (impaired glucose tolerance) 01/07/2015    Status post transcatheter aortic valve replacement (TAVR) using bioprosthesis 12/30/2011    Atrial fibrillation (HCC) 12/30/2011    Lumbar Disc Degeneration 12/30/2011    Osteoarthrosis involving lower leg 12/30/2011       Past Medical History:   Diagnosis Date    Aortic stenosis     valve replacement; cardiologist    Atrial fibrillation (HCC)     cardiologist:  THEODORE Mason    Back pain     Cancer (HCC)     skin cancer (nose)    Cataract     bilateral IOL    Cirrhosis of liver without ascites (HCC)     Clotting disorder (HCC)     reports nose bleeds; 3/29/23 pt reports none in the past year    Dental disorder     left upper gold cap fell off, has appt w/ dentist 4/2023    Diabetes (HCC)     diet controlled    Encounter for long-term (current) use of other medications     Fatty liver     Gasping for breath     [place] measles     Heart murmur     Heart valve disease     High cholesterol     History of sepsis 02/09/2023    Hyperlipidemia     Hypertension     Insomnia     Jaundice     Mumps     Nasal drainage     Pain     left knee    Restless leg syndrome     Sleep apnea     3/29/2023 pt states he quit using CPAP, had issues with his machine and did not receive a new CPAP    Snoring     Tonsillitis     Treatment-emergent central sleep apnea 04/21/2016    cpap      Urinary bladder disorder     uti occasionally    Urinary incontinence     Valvular heart disease         Past Surgical History:   Procedure Laterality Date    SC TRANSURETHRAL ELEC-SURG PROSTATECTOM  4/7/2023    Procedure: BIPOLAR TRANSURETHRAL RESECTION OF PROSTATE;  Surgeon: Mack Pritchett M.D.;  Location: SURGERY [facility];  Service: Urology    CATARACT EXTRACTION WITH IOL Bilateral 03/2023    GIBRAN N/A 07/29/2019     Procedure: ECHOCARDIOGRAM, TRANSESOPHAGEAL;  Surgeon: Leander Buckner M.D.;  Location: SURGERY [facility];  Service: Cardiac    TRANSCATHETER AORTIC VALVE REPLACEMENT Bilateral 2019    Procedure: REPLACEMENT, AORTIC VALVE, TRANSCATHETER;  Surgeon: Leander Buckner M.D.;  Location: SURGERY [facility];  Service: Cardiac    FINGER ARTHROPLASTY Left 2016    Procedure: FINGER ARTHROPLASTY THUMB CARPOMETACARPAL EXCISIONAL, EXCISE TRAPEZIUM;  Surgeon: Raheel Salgado M.D.;  Location: SURGERY [facility] DAY [facility];  Service:     CARDIOVERSION      on 06, sinus rhythm until 2007,  paroxysmal atrial fibrillation    KNEE ARTHROPLASTY TOTAL      LAMINOTOMY N/A     multiple lumbar spine    OTHER ORTHOPEDIC SURGERY      lower back    SD PERCUT IMPLNT NEUROELECT,EPIDURAL      TOE ARTHROPLASTY      TURP-VAPOR N/A        Family History   Problem Relation Age of Onset    Other Mother         unknown    Heart Disease Mother     Cancer Father         prostate, skin    No Known Problems Brother     Diabetes Brother     Heart Disease Son     Sleep Apnea Neg Hx        Social History     Socioeconomic History    Marital status:      Spouse name: Not on file    Number of children: Not on file    Years of education: Not on file    Highest education level: Not on file   Occupational History    Not on file   Tobacco Use    Smoking status: Former     Current packs/day: 0.00     Average packs/day: 1 pack/day for 20.0 years (20.0 ttl pk-yrs)     Types: Cigarettes     Start date: 1952     Quit date: 1972     Years since quittin.2    Smokeless tobacco: Never   Vaping Use    Vaping status: Never Used   Substance and Sexual Activity    Alcohol use: No     Alcohol/week: 0.0 oz    Drug use: No    Sexual activity: Not Currently     Partners: Female   Other Topics Concern    Not on file   Social History Narrative    Not on file     Social Drivers of Health     Financial Resource  Strain: Not on file   Food Insecurity: Not on file   Transportation Needs: Not on file   Physical Activity: Not on file   Stress: Not on file   Social Connections: Not on file   Intimate Partner Violence: Not on file   Housing Stability: Not on file       Current Outpatient Medications   Medication Sig Dispense Refill    metoprolol SR (TOPROL XL) 50 MG TABLET SR 24 HR Take 1 Tablet by mouth every evening. 90 Tablet 3    predniSONE (DELTASONE) 20 MG Tab 40 mg.      ammonium lactate (AMLACTIN) 12 % cream       pravastatin (PRAVACHOL) 80 MG tablet TAKE ONE TABLET BY MOUTH EVERY EVENING (PRAVACHOL) 90 Tablet 3    tamsulosin (FLOMAX) 0.4 MG capsule Take 0.4 mg by mouth every day.      Glucosamine HCl (GLUCOSAMINE PO) Take  by mouth every day.      digoxin (LANOXIN) 250 MCG Tab Take 1 Tablet by mouth every morning. 90 Tablet 3    warfarin (COUMADIN) 4 MG Tab TAKE 1 to 1.5 TABLETS BY MOUTH DAILY AS DIRECTED BY [facility] 110 Tablet 1    METAMUCIL FIBER PO Take  by mouth every day.      LYSINE PO Take 1 Tablet by mouth every day. As needed      Ascorbic Acid (VITAMIN C PO) Take 1 Tablet by mouth every morning.      Coenzyme Q10 (COQ10 PO) Take 1 Tablet by mouth every day.      acetaminophen (TYLENOL) 500 MG Tab Take 500-1,000 mg by mouth every 6 hours as needed. Indications: Pain      gabapentin (NEURONTIN) 300 MG Cap Take 600 mg by mouth 4 times a day.      B Complex Vitamins (VITAMIN B COMPLEX PO) Take 1 Tab by mouth every evening.      Cholecalciferol (VITAMIN D3 PO) Take 1 Tab by mouth every evening.      multivitamin (THERAGRAN) TABS Take 1 Tablet by mouth at bedtime.       No current facility-administered medications for this visit.        ALLERGIES: Feldene [piroxicam] and Percodan [oxycodone-aspirin]    ROS  Constitutional: Denies fevers, Denies weight changes  Ears/Nose/Throat/Mouth: Denies nasal congestion or sore throat   Cardiovascular: Denies chest pain  Respiratory: Denies shortness of  breath, Denies cough  Gastrointestinal/Hepatic: Denies nausea, vomiting  Sleep: see HPI      PHYSICAL EXAM  /72 (BP Location: Left arm, Patient Position: Sitting, BP Cuff Size: Adult)   Pulse 85   Resp 14   Ht 1.829 m (6') Comment: per patient  Wt 102 kg (224 lb 12.8 oz)   SpO2 94%   BMI 30.49 kg/m²   Constitutional: Alert, no distress, well-groomed.  Skin: Warm, dry, good turgor, no rashes in visible areas.  Eye: Equal, round and reactive, conjunctiva clear, lids normal.  ENMT: Lips without lesions, good dentition, moist mucous membranes.  Neck: Trachea midline, no masses, no thyromegaly.  Respiratory: Unlabored respiratory effort, no cough.  MSK: Normal gait, moves all extremities.  Neuro: Grossly non-focal.   Psych: Alert and oriented x3, normal affect and mood.        Medical Decision Making   Assessment and Plan  The medical record was reviewed.    Obstructive sleep apnea  - Diagnostic and titration nocturnal polysomnogram's  and home sleep apnea tests reviewed. Based on results, it is recommended that patient start autoCPAP. Patient is agreeable.  - Patients with LORENA are at increased risk of cardiovascular disease including coronary artery disease, systemic arterial hypertension, pulmonary arterial hypertension, cardiac arrythmias, and stroke. Positive airway pressure will favorably impact many of the adverse conditions and effects provoked by LORENA. Avoid driving a motor vehicle when drowsy  - Order placed for mask and supplies and new machine   - Clean equipment frequently, and get new mask and supplies as allowed by insurance and DME. Advised to let DME company know in the first 30 days if any issues with mask fit arise so that new mask can be tried.   - Discussed compliance requirements for insurance purposes as well as ultimate clinical goal of wearing and tolerating PAP device nightly for full night of sleep to achieve optimal symptomatic and prophylactic benefit.   - Advised to reach out via  [facility] with questions       Return in about 3 months (around 7/14/2025) for initial compliance after using machine for at least 30 days.   - Recommend an earlier appointment, if significant treatment barriers develop.  - Patient to follow up with the appropriate healthcare practitioners for all other medical problems and issues.    Please note portions of this record was created using voice recognition software. I have made every reasonable attempt to correct obvious errors, but I expect that there are errors of grammar and possibly content I did not discover before finalizing the note.

## 2025-04-22 ENCOUNTER — OFFICE VISIT (OUTPATIENT)
Dept: CARDIOLOGY | Facility: MEDICAL CENTER | Age: 85
End: 2025-04-22
Payer: MEDICARE

## 2025-04-22 VITALS
SYSTOLIC BLOOD PRESSURE: 104 MMHG | BODY MASS INDEX: 30.75 KG/M2 | HEIGHT: 72 IN | OXYGEN SATURATION: 97 % | HEART RATE: 92 BPM | WEIGHT: 227 LBS | RESPIRATION RATE: 16 BRPM | DIASTOLIC BLOOD PRESSURE: 62 MMHG

## 2025-04-22 DIAGNOSIS — I10 ESSENTIAL HYPERTENSION: ICD-10-CM

## 2025-04-22 DIAGNOSIS — E78.5 DYSLIPIDEMIA: ICD-10-CM

## 2025-04-22 DIAGNOSIS — R54 AGE-RELATED PHYSICAL DEBILITY: ICD-10-CM

## 2025-04-22 DIAGNOSIS — Z95.3 STATUS POST TRANSCATHETER AORTIC VALVE REPLACEMENT (TAVR) USING BIOPROSTHESIS: ICD-10-CM

## 2025-04-22 DIAGNOSIS — D68.69 SECONDARY HYPERCOAGULABLE STATE (HCC): Chronic | ICD-10-CM

## 2025-04-22 DIAGNOSIS — I48.0 PAROXYSMAL ATRIAL FIBRILLATION (HCC): Chronic | ICD-10-CM

## 2025-04-22 PROCEDURE — 99214 OFFICE O/P EST MOD 30 MIN: CPT

## 2025-04-22 PROCEDURE — 3078F DIAST BP <80 MM HG: CPT

## 2025-04-22 PROCEDURE — 99213 OFFICE O/P EST LOW 20 MIN: CPT

## 2025-04-22 PROCEDURE — 3074F SYST BP LT 130 MM HG: CPT

## 2025-04-22 PROCEDURE — 1170F FXNL STATUS ASSESSED: CPT

## 2025-04-22 ASSESSMENT — ENCOUNTER SYMPTOMS
GASTROINTESTINAL NEGATIVE: 1
NERVOUS/ANXIOUS: 0
SHORTNESS OF BREATH: 0
PALPITATIONS: 0
PND: 0
ORTHOPNEA: 0
BACK PAIN: 1
CONSTITUTIONAL NEGATIVE: 1
DEPRESSION: 0
EYES NEGATIVE: 1
NEUROLOGICAL NEGATIVE: 1

## 2025-04-22 ASSESSMENT — FIBROSIS 4 INDEX: FIB4 SCORE: 2.42

## 2025-04-22 NOTE — PROGRESS NOTES
Chief Complaint   Patient presents with    Follow-Up     FV DX:Status post transcatheter aortic valve replacement (TAVR) using bioprosthesis       Subjective     Kinsey Parkinson is a 85 y.o. male who presents today for follow up.  He has a history of aortic stenosis status post TAVR in 2019, paroxysmal atrial fibrillation, hypertension, hyperlipidemia, and falls.     Seen by myself on 8/23/2023: He has been feeling much better. He is having some knee pain, but well from a cardiac perspective. Activity limited by knee and back pain     9/11/2024 he has been having joint and muscle pain for over a year. He needs clearance for a colonoscopy and an EGD with [facility]. Liver biopsy potentially.  He has been doing really well from cardiac perspective    4/22/2025 presents today with his granddaughter, he has been doing really well overall.  He does report that he has not been using his CPAP because he is waiting for a new machine.  No acute cardiovascular symptoms, activity is limited by joint and back pain    Past Medical History:   Diagnosis Date    Aortic stenosis     valve replacement; cardiologist    Atrial fibrillation (HCC)     cardiologist:  THEODORE Mason    Back pain     Cancer (HCC)     skin cancer (nose)    Cataract     bilateral IOL    Cirrhosis of liver without ascites (HCC)     Clotting disorder (HCC)     reports nose bleeds; 3/29/23 pt reports none in the past year    Dental disorder     left upper gold cap fell off, has appt w/ dentist 4/2023    Diabetes (HCC)     diet controlled    Encounter for long-term (current) use of other medications     Fatty liver     Gasping for breath     Citizen of [place] measles     Heart murmur     Heart valve disease     High cholesterol     History of sepsis 02/09/2023    Hyperlipidemia     Hypertension     Insomnia     Jaundice     Mumps     Nasal drainage     Pain     left knee    Restless leg syndrome     Sleep apnea     3/29/2023 pt states he quit using  CPAP, had issues with his machine and did not receive a new CPAP    Snoring     Tonsillitis     Treatment-emergent central sleep apnea 04/21/2016    cpap      Urinary bladder disorder     uti occasionally    Urinary incontinence     Valvular heart disease      Past Surgical History:   Procedure Laterality Date    [place] TRANSURETHRAL ELEC-SURG PROSTATECTOM  4/7/2023    Procedure: BIPOLAR TRANSURETHRAL RESECTION OF PROSTATE;  Surgeon: Mack Pritchett M.D.;  Location: SURGERY [facility];  Service: Urology    CATARACT EXTRACTION WITH IOL Bilateral 03/2023    GIBRAN N/A 07/29/2019    Procedure: ECHOCARDIOGRAM, TRANSESOPHAGEAL;  Surgeon: Leander Buckner M.D.;  Location: SURGERY [facility];  Service: Cardiac    TRANSCATHETER AORTIC VALVE REPLACEMENT Bilateral 07/29/2019    Procedure: REPLACEMENT, AORTIC VALVE, TRANSCATHETER;  Surgeon: Leander Buckner M.D.;  Location: SURGERY [facility];  Service: Cardiac    FINGER ARTHROPLASTY Left 05/27/2016    Procedure: FINGER ARTHROPLASTY THUMB CARPOMETACARPAL EXCISIONAL, EXCISE TRAPEZIUM;  Surgeon: Raheel Salgado M.D.;  Location: SURGERY SAME DAY [facility];  Service:     CARDIOVERSION      on 7/17/06, sinus rhythm until January 2007,  paroxysmal atrial fibrillation    KNEE ARTHROPLASTY TOTAL      LAMINOTOMY N/A     multiple lumbar spine    OTHER ORTHOPEDIC SURGERY      lower back    KS PERCUT IMPLNT NEUROELECT,EPIDURAL      TOE ARTHROPLASTY      TURP-VAPOR N/A      Family History   Problem Relation Age of Onset    Other Mother         unknown    Heart Disease Mother     Cancer Father         prostate, skin    No Known Problems Brother     Diabetes Brother     Heart Disease Son     Sleep Apnea Neg Hx      Social History     Socioeconomic History    Marital status:      Spouse name: Not on file    Number of children: Not on file    Years of education: Not on file    Highest education level: Not on file   Occupational History    Not on file   Tobacco Use  "   Smoking status: Former     Current packs/day: 0.00     Average packs/day: 1 pack/day for 20.0 years (20.0 ttl pk-yrs)     Types: Cigarettes     Start date: 1952     Quit date: 1972     Years since quittin.3    Smokeless tobacco: Never   Vaping Use    Vaping status: Never Used   Substance and Sexual Activity    Alcohol use: No     Alcohol/week: 0.0 oz    Drug use: No    Sexual activity: Not Currently     Partners: Female   Other Topics Concern    Not on file   Social History Narrative    Not on file     Social Drivers of Health     Financial Resource Strain: Not on file   Food Insecurity: Not on file   Transportation Needs: Not on file   Physical Activity: Not on file   Stress: Not on file   Social Connections: Not on file   Intimate Partner Violence: Not on file   Housing Stability: Not on file     Allergies   Allergen Reactions    Feldene [Piroxicam] Itching    Percodan [Oxycodone-Aspirin] Itching and Photosensitivity     \"I sunburn\"     Outpatient Encounter Medications as of 2025   Medication Sig Dispense Refill    metoprolol SR (TOPROL XL) 50 MG TABLET SR 24 HR Take 1 Tablet by mouth every evening. 90 Tablet 3    predniSONE (DELTASONE) 20 MG Tab 40 mg.      ammonium lactate (AMLACTIN) 12 % cream       pravastatin (PRAVACHOL) 80 MG tablet TAKE ONE TABLET BY MOUTH EVERY EVENING (PRAVACHOL) 90 Tablet 3    tamsulosin (FLOMAX) 0.4 MG capsule Take 0.4 mg by mouth every day.      Glucosamine HCl (GLUCOSAMINE PO) Take  by mouth every day.      digoxin (LANOXIN) 250 MCG Tab Take 1 Tablet by mouth every morning. 90 Tablet 3    warfarin (COUMADIN) 4 MG Tab TAKE 1 to 1.5 TABLETS BY MOUTH DAILY AS DIRECTED BY [facility] 110 Tablet 1    METAMUCIL FIBER PO Take  by mouth every day.      LYSINE PO Take 1 Tablet by mouth every day. As needed      Ascorbic Acid (VITAMIN C PO) Take 1 Tablet by mouth every morning.      Coenzyme Q10 (COQ10 PO) Take 1 Tablet by mouth every day.      " acetaminophen (TYLENOL) 500 MG Tab Take 500-1,000 mg by mouth every 6 hours as needed. Indications: Pain      gabapentin (NEURONTIN) 300 MG Cap Take 600 mg by mouth 4 times a day.      B Complex Vitamins (VITAMIN B COMPLEX PO) Take 1 Tab by mouth every evening.      Cholecalciferol (VITAMIN D3 PO) Take 1 Tab by mouth every evening.      multivitamin (THERAGRAN) TABS Take 1 Tablet by mouth at bedtime.       No facility-administered encounter medications on file as of 4/22/2025.     Review of Systems   Constitutional: Negative.    HENT: Negative.     Eyes: Negative.    Respiratory:  Negative for shortness of breath.    Cardiovascular:  Negative for chest pain, palpitations, orthopnea, leg swelling and PND.   Gastrointestinal: Negative.    Genitourinary: Negative.    Musculoskeletal:  Positive for back pain and joint pain.   Skin: Negative.    Neurological: Negative.    Endo/Heme/Allergies: Negative.    Psychiatric/Behavioral:  Negative for depression. The patient is not nervous/anxious.               Objective     /62 (BP Location: Left arm, Patient Position: Sitting, BP Cuff Size: Adult long)   Pulse 92   Resp 16   Ht 1.829 m (6')   Wt 103 kg (227 lb)   SpO2 97%   BMI 30.79 kg/m²     Physical Exam  Constitutional:       Appearance: Normal appearance.   HENT:      Head: Normocephalic.   Neck:      Vascular: No JVD.   Cardiovascular:      Rate and Rhythm: Normal rate and regular rhythm.      Pulses: Normal pulses.      Heart sounds: Normal heart sounds. No murmur heard.     No friction rub.   Pulmonary:      Effort: Pulmonary effort is normal.      Breath sounds: Normal breath sounds.   Abdominal:      Palpations: Abdomen is soft.   Musculoskeletal:         General: Normal range of motion.      Right lower leg: No edema.      Left lower leg: No edema.   Skin:     General: Skin is warm and dry.   Neurological:      General: No focal deficit present.      Mental Status: He is alert and oriented to person,  place, and time.   Psychiatric:         Mood and Affect: Mood normal.         Behavior: Behavior normal.            Lab Results   Component Value Date/Time    WBC 7.2 10/08/2024 08:00 AM    RBC 4.55 (L) 10/08/2024 08:00 AM    HEMOGLOBIN 14.4 10/08/2024 08:00 AM    HEMATOCRIT 43.9 10/08/2024 08:00 AM    MCV 96.5 10/08/2024 08:00 AM    MCH 31.6 10/08/2024 08:00 AM    MCHC 32.8 10/08/2024 08:00 AM    MPV 11.8 10/08/2024 08:00 AM    NEUTSPOLYS 54.90 08/26/2024 04:03 PM    LYMPHOCYTES 28.00 08/26/2024 04:03 PM    MONOCYTES 11.40 08/26/2024 04:03 PM    EOSINOPHILS 4.60 08/26/2024 04:03 PM    BASOPHILS 0.60 08/26/2024 04:03 PM      Lab Results   Component Value Date/Time    CHOLSTRLTOT 124 06/28/2024 08:56 AM    LDL 54 06/28/2024 08:56 AM    HDL 35 (A) 06/28/2024 08:56 AM    TRIGLYCERIDE 174 (H) 06/28/2024 08:56 AM       Lab Results   Component Value Date/Time    SODIUM 137 03/10/2025 03:27 PM    POTASSIUM 4.7 03/10/2025 03:27 PM    CHLORIDE 104 03/10/2025 03:27 PM    CO2 24 03/10/2025 03:27 PM    GLUCOSE 173 (H) 03/10/2025 03:27 PM    BUN 22 03/10/2025 03:27 PM    CREATININE 0.82 03/10/2025 03:27 PM    BUNCREATRAT 15 06/05/2018 08:29 AM     Lab Results   Component Value Date/Time    ALKPHOSPHAT 66 03/10/2025 03:27 PM    ASTSGOT 45 03/10/2025 03:27 PM    ALTSGPT 77 (H) 03/10/2025 03:27 PM    TBILIRUBIN 0.4 03/10/2025 03:27 PM      Echocardiogram 1/29/2025  CONCLUSIONS  Compared to the prior study on 1/31/2024, no significant changes.   Normal left ventricular chamber size. Mild concentric left ventricular   hypertrophy. Normal left ventricular systolic function. The left   ventricular ejection fraction is visually estimated to be 65-70%.   Normal regional wall motion. Diastolic function is difficult to assess   with arrhythmia.  Severely dilated left atrium  Known TAVR aortic valve that is functioning normally with normal   transvalvular gradients    Assessment & Plan     1. Status post transcatheter aortic valve  replacement (TAVR) using bioprosthesis        2. Age-related physical debility        3. Paroxysmal atrial fibrillation (HCC)        4. Essential hypertension        5. Dyslipidemia        6. Secondary hypercoagulable state (HCC)            Medical Decision Making: Today's Assessment/Status/Plan:        S/P TAVR  -No aspirin, on warfarin  -SBE prophylaxis understands  -echo showed normal valve function   - ordered echo for next year      Atrial fibrillation  -Type: paroxysmal  -Symptoms:none  -[id number] 4  -Medications: digoxin 250 mcg daily, metoprolol SR 50 mg daily  -Anticoagulation: warfarin      Hypertension  - good control   - continue metoprolol  - goal < 140/90 due to age and history of falls  - check BP daily 2 hours after taking medication and keep log of measurements      Hyperlipidemia  -Most recent LDL 54  -Continue pravastatin 80 mg  -Goal of less than 70  -Check lipid panel annually     I did recommend that he try to find ways to stay more active with therapy going on short walks, using his treadmill/stationary bike at home, or finding a class such as yosef chi or water aerobics     Follow up with Bonnie DOE in 1 year     This note was dictated using Dragon speech recognition software.

## 2025-04-24 ENCOUNTER — ANTICOAGULATION VISIT (OUTPATIENT)
Dept: VASCULAR LAB | Facility: MEDICAL CENTER | Age: 85
End: 2025-04-24
Attending: INTERNAL MEDICINE
Payer: MEDICARE

## 2025-04-24 DIAGNOSIS — D68.69 SECONDARY HYPERCOAGULABLE STATE (HCC): Chronic | ICD-10-CM

## 2025-04-24 DIAGNOSIS — Z95.3 STATUS POST TRANSCATHETER AORTIC VALVE REPLACEMENT (TAVR) USING BIOPROSTHESIS: ICD-10-CM

## 2025-04-24 DIAGNOSIS — I48.0 PAROXYSMAL ATRIAL FIBRILLATION (HCC): Chronic | ICD-10-CM

## 2025-04-24 LAB — INR PPP: 2.9 (ref 2–3.5)

## 2025-04-24 PROCEDURE — 99211 OFF/OP EST MAY X REQ PHY/QHP: CPT | Performed by: NURSE PRACTITIONER

## 2025-04-24 PROCEDURE — 85610 PROTHROMBIN TIME: CPT

## 2025-04-24 NOTE — PROGRESS NOTES
Anticoagulation Summary  As of 2025      INR goal:  2.0-3.0   TTR:  59.2% (8.5 y)   INR used for dosin.90 (2025)   Warfarin maintenance plan:  2 mg (4 mg x 0.5) every Mon, Wed, Fri; 4 mg (4 mg x 1) all other days   Weekly warfarin total:  22 mg   Plan last modified:  Britney Ortiz A.P.NBruce (3/13/2025)   Next INR check:  2025   Priority:  Routine   Target end date:  Indefinite    Indications    Status post transcatheter aortic valve replacement (TAVR) using bioprosthesis [Z95.3]  Atrial fibrillation (HCC) [I48.91]  Secondary hypercoagulable state (HCC) [D68.69]                 Anticoagulation Episode Summary       INR check location:  --    Preferred lab:  --    Send INR reminders to:  --    Comments:  Pt goes by Kinsey            Anticoagulation Care Providers       Provider Role Specialty Phone number    Vickey Rutherford M.D. Referring Cardiovascular Disease (Cardiology) [phone]    [facility] Anticoagulation Services Responsible  [phone]           Refer to Patient Findings for HPI:      Date Referral Placed: 24    Vitals:  There were no vitals filed for this visit.    Verified current warfarin dosing schedule.    Medications reconciled.  Pt is not on antiplatelet therapy.      A/P   INR is therapeutic at 2.9.  Reason(s) for out of range INR today: N/A      Warfarin dosing recommendation: Continue regimen as listed above.    Pt educated to contact our clinic with any changes in medications or s/s of bleeding or thrombosis. Pt is aware to seek immediate medical attention for falls, head injury or deep cuts.    Request pt to return in 2 week(s). Pt agrees.    Britney DOE

## 2025-05-08 ENCOUNTER — HOSPITAL ENCOUNTER (OUTPATIENT)
Dept: LAB | Facility: MEDICAL CENTER | Age: 85
End: 2025-05-08
Attending: INTERNAL MEDICINE
Payer: MEDICARE

## 2025-05-08 ENCOUNTER — ANTICOAGULATION VISIT (OUTPATIENT)
Dept: VASCULAR LAB | Facility: MEDICAL CENTER | Age: 85
End: 2025-05-08
Attending: INTERNAL MEDICINE
Payer: MEDICARE

## 2025-05-08 DIAGNOSIS — D68.69 SECONDARY HYPERCOAGULABLE STATE (HCC): Chronic | ICD-10-CM

## 2025-05-08 DIAGNOSIS — Z79.01 CHRONIC ANTICOAGULATION: ICD-10-CM

## 2025-05-08 DIAGNOSIS — Z95.3 STATUS POST TRANSCATHETER AORTIC VALVE REPLACEMENT (TAVR) USING BIOPROSTHESIS: ICD-10-CM

## 2025-05-08 DIAGNOSIS — I48.0 PAROXYSMAL ATRIAL FIBRILLATION (HCC): Chronic | ICD-10-CM

## 2025-05-08 LAB — INR PPP: 3.9 (ref 2–3.5)

## 2025-05-08 PROCEDURE — 36415 COLL VENOUS BLD VENIPUNCTURE: CPT

## 2025-05-08 PROCEDURE — 99212 OFFICE O/P EST SF 10 MIN: CPT

## 2025-05-08 PROCEDURE — 85610 PROTHROMBIN TIME: CPT

## 2025-05-08 PROCEDURE — 80053 COMPREHEN METABOLIC PANEL: CPT

## 2025-05-08 RX ORDER — WARFARIN SODIUM 4 MG/1
TABLET ORAL
Qty: 100 TABLET | Refills: 3 | Status: SHIPPED | OUTPATIENT
Start: 2025-05-08

## 2025-05-08 NOTE — PROGRESS NOTES
Anticoagulation Summary  As of 5/8/2025      INR goal:  2.0-3.0   TTR:  58.9% (8.5 y)   INR used for dosing:  3.90 (5/8/2025)   Warfarin maintenance plan:  2 mg (4 mg x 0.5) every Mon, Wed, Fri; 4 mg (4 mg x 1) all other days   Weekly warfarin total:  22 mg   Plan last modified:  CLAYTON Jacob (3/13/2025)   Next INR check:  5/21/2025   Priority:  Routine   Target end date:  Indefinite    Indications    Status post transcatheter aortic valve replacement (TAVR) using bioprosthesis [Z95.3]  Atrial fibrillation (HCC) [I48.91]  Secondary hypercoagulable state (HCC) [D68.69]                 Anticoagulation Episode Summary       INR check location:  --    Preferred lab:  --    Send INR reminders to:  --    Comments:  Pt goes by Kinsey            Anticoagulation Care Providers       Provider Role Specialty Phone number    Vickey Rutherford M.D. Referring Cardiovascular Disease (Cardiology) [phone]    [facility] Anticoagulation Services Responsible  [phone]           Refer to Patient Findings for HPI:  Patient Findings       Negatives:  Signs/symptoms of thrombosis, Signs/symptoms of bleeding, Laboratory test error suspected, Change in health, Change in alcohol use, Change in activity, Upcoming invasive procedure, Emergency department visit, Upcoming dental procedure, Missed doses, Extra doses, Change in medications, Change in diet/appetite, Hospital admission, Bruising, Other complaints          Clinical Outcomes       Negatives:  Major bleeding event, Thromboembolic event, Anticoagulation-related hospital admission, Anticoagulation-related ED visit, Anticoagulation-related fatality            Date Referral Placed: 12/11/24      Vitals:  There were no vitals filed for this visit.  Pt declined vitals    Verified current warfarin dosing schedule.    Medications reconciled.  Pt is not on antiplatelet therapy.      A/P   INR is therapeutic  Reason(s) for out of range INR today: No obvious causes      Warfarin  dosing recommendation: Hold today, then Continue regimen as listed above.    Pt educated to contact our clinic with any changes in medications or s/s of bleeding or thrombosis. Pt is aware to seek immediate medical attention for falls, head injury or deep cuts.    Request pt to return in 2 week(s). Pt agrees.    Robby Zarate, AbhishekD

## 2025-05-09 LAB
ALBUMIN SERPL BCP-MCNC: 3.6 G/DL (ref 3.2–4.9)
ALBUMIN/GLOB SERPL: 1.3 G/DL
ALP SERPL-CCNC: 50 U/L (ref 30–99)
ALT SERPL-CCNC: 62 U/L (ref 2–50)
ANION GAP SERPL CALC-SCNC: 7 MMOL/L (ref 7–16)
AST SERPL-CCNC: 38 U/L (ref 12–45)
BILIRUB SERPL-MCNC: 0.5 MG/DL (ref 0.1–1.5)
BUN SERPL-MCNC: 18 MG/DL (ref 8–22)
CALCIUM ALBUM COR SERPL-MCNC: 9.7 MG/DL (ref 8.5–10.5)
CALCIUM SERPL-MCNC: 9.4 MG/DL (ref 8.5–10.5)
CHLORIDE SERPL-SCNC: 105 MMOL/L (ref 96–112)
CO2 SERPL-SCNC: 28 MMOL/L (ref 20–33)
CREAT SERPL-MCNC: 0.87 MG/DL (ref 0.5–1.4)
GFR SERPLBLD CREATININE-BSD FMLA CKD-EPI: 83 ML/MIN/1.73 M 2
GLOBULIN SER CALC-MCNC: 2.8 G/DL (ref 1.9–3.5)
GLUCOSE SERPL-MCNC: 127 MG/DL (ref 65–99)
POTASSIUM SERPL-SCNC: 4.1 MMOL/L (ref 3.6–5.5)
PROT SERPL-MCNC: 6.4 G/DL (ref 6–8.2)
SODIUM SERPL-SCNC: 140 MMOL/L (ref 135–145)

## 2025-05-16 ENCOUNTER — HOSPITAL ENCOUNTER (OUTPATIENT)
Dept: LAB | Facility: MEDICAL CENTER | Age: 85
End: 2025-05-16
Attending: INTERNAL MEDICINE
Payer: MEDICARE

## 2025-05-16 PROCEDURE — 84433 ASY THIOPURIN S-MTHYLTRNSFRS: CPT

## 2025-05-16 PROCEDURE — 36415 COLL VENOUS BLD VENIPUNCTURE: CPT

## 2025-05-21 ENCOUNTER — ANTICOAGULATION VISIT (OUTPATIENT)
Dept: VASCULAR LAB | Facility: MEDICAL CENTER | Age: 85
End: 2025-05-21
Attending: INTERNAL MEDICINE
Payer: MEDICARE

## 2025-05-21 VITALS — HEART RATE: 89 BPM | SYSTOLIC BLOOD PRESSURE: 120 MMHG | DIASTOLIC BLOOD PRESSURE: 71 MMHG

## 2025-05-21 DIAGNOSIS — D68.69 SECONDARY HYPERCOAGULABLE STATE (HCC): Chronic | ICD-10-CM

## 2025-05-21 DIAGNOSIS — I48.0 PAROXYSMAL ATRIAL FIBRILLATION (HCC): Chronic | ICD-10-CM

## 2025-05-21 DIAGNOSIS — Z95.3 STATUS POST TRANSCATHETER AORTIC VALVE REPLACEMENT (TAVR) USING BIOPROSTHESIS: Primary | ICD-10-CM

## 2025-05-21 LAB — INR PPP: 3.2 (ref 2–3.5)

## 2025-05-21 PROCEDURE — 85610 PROTHROMBIN TIME: CPT

## 2025-05-21 PROCEDURE — 99212 OFFICE O/P EST SF 10 MIN: CPT | Performed by: NURSE PRACTITIONER

## 2025-05-21 NOTE — PROGRESS NOTES
Anticoagulation Summary  As of 5/21/2025      INR goal:  2.0-3.0   TTR:  58.7% (8.6 y)   INR used for dosing:  3.20 (5/21/2025)   Warfarin maintenance plan:  4 mg (4 mg x 1) every Sun, Tue, Thu; 2 mg (4 mg x 0.5) all other days   Weekly warfarin total:  20 mg   Plan last modified:  ATRA JacobPMARIUM (5/21/2025)   Next INR check:  6/5/2025   Priority:  Routine   Target end date:  Indefinite    Indications    Status post transcatheter aortic valve replacement (TAVR) using bioprosthesis [Z95.3]  Atrial fibrillation (HCC) [I48.91]  Secondary hypercoagulable state (HCC) [D68.69]                 Anticoagulation Episode Summary       INR check location:  --    Preferred lab:  --    Send INR reminders to:  --    Comments:  Pt goes by Kinsey            Anticoagulation Care Providers       Provider Role Specialty Phone number    Vickey Rutherford M.D. Referring Cardiovascular Disease (Cardiology) [phone]    [facility] Anticoagulation Services Responsible  [phone]           Refer to Patient Findings for HPI:  Patient Findings       Negatives:  Signs/symptoms of thrombosis, Signs/symptoms of bleeding, Laboratory test error suspected, Change in health, Change in alcohol use, Change in activity, Upcoming invasive procedure, Emergency department visit, Upcoming dental procedure, Missed doses, Extra doses, Change in medications, Change in diet/appetite, Hospital admission, Bruising, Other complaints    Comments:  He is on prednisone 40 mg daily for autoimmune hepatitis, however, he has been on this since February.          Clinical Outcomes       Negatives:  Major bleeding event, Thromboembolic event, Anticoagulation-related hospital admission, Anticoagulation-related ED visit, Anticoagulation-related fatality    Comments:  He is on prednisone 40 mg daily for autoimmune hepatitis, however, he has been on this since February.            Date Referral Placed: 12/11/24      Vitals:  Vitals:    05/21/25 0910   BP: 120/71    Pulse: 89       Verified current warfarin dosing schedule.    Medications reconciled.  Pt is not on antiplatelet therapy.      A/P   INR is supratherapeutic today at 3.2 which is down from 3.9 last visit after a dose hold. Since INR still high, will reduce his regimen slightly.  Reason(s) for out of range INR today: No obvious causes      Warfarin dosing recommendation: Began taking 2 mg M-W-F-Sat, 4m g AODs.    Pt educated to contact our clinic with any changes in medications or s/s of bleeding or thrombosis. Pt is aware to seek immediate medical attention for falls, head injury or deep cuts.    Request pt to return in 2 week(s). Pt agrees.    MARKELL Jacob.         No

## 2025-05-22 LAB — TPMT BLD-CCNC: 20.9 U/ML (ref 24–44)

## 2025-06-05 ENCOUNTER — ANTICOAGULATION VISIT (OUTPATIENT)
Dept: VASCULAR LAB | Facility: MEDICAL CENTER | Age: 85
End: 2025-06-05
Attending: INTERNAL MEDICINE
Payer: MEDICARE

## 2025-06-05 DIAGNOSIS — I48.0 PAROXYSMAL ATRIAL FIBRILLATION (HCC): Chronic | ICD-10-CM

## 2025-06-05 DIAGNOSIS — Z95.3 STATUS POST TRANSCATHETER AORTIC VALVE REPLACEMENT (TAVR) USING BIOPROSTHESIS: Primary | ICD-10-CM

## 2025-06-05 DIAGNOSIS — D68.69 SECONDARY HYPERCOAGULABLE STATE (HCC): Chronic | ICD-10-CM

## 2025-06-05 LAB — INR PPP: 3.7 (ref 2–3.5)

## 2025-06-05 PROCEDURE — 85610 PROTHROMBIN TIME: CPT

## 2025-06-05 PROCEDURE — 99212 OFFICE O/P EST SF 10 MIN: CPT | Performed by: NURSE PRACTITIONER

## 2025-06-05 NOTE — PROGRESS NOTES
Anticoagulation Summary  As of 6/5/2025      INR goal:  2.0-3.0   TTR:  58.4% (8.6 y)   INR used for dosing:  3.70 (6/5/2025)   Warfarin maintenance plan:  4 mg (4 mg x 1) every Sun, Tue, Thu; 2 mg (4 mg x 0.5) all other days   Weekly warfarin total:  20 mg   Plan last modified:  Britney Ortiz ABrucePBruceNBruce (5/21/2025)   Next INR check:  6/12/2025   Priority:  Routine   Target end date:  Indefinite    Indications    Status post transcatheter aortic valve replacement (TAVR) using bioprosthesis [Z95.3]  Atrial fibrillation (HCC) [I48.91]  Secondary hypercoagulable state (HCC) [D68.69]                 Anticoagulation Episode Summary       INR check location:  --    Preferred lab:  --    Send INR reminders to:  --    Comments:  Pt goes by Kinsey            Anticoagulation Care Providers       Provider Role Specialty Phone number    Vickey Rutherford M.D. Referring Cardiovascular Disease (Cardiology) [phone]    [facility] Anticoagulation Services Responsible  [phone]           Refer to Patient Findings for HPI:  Patient Findings       Positives:  Change in diet/appetite    Negatives:  Signs/symptoms of thrombosis, Signs/symptoms of bleeding, Laboratory test error suspected, Change in health, Change in alcohol use, Change in activity, Upcoming invasive procedure, Emergency department visit, Upcoming dental procedure, Missed doses, Extra doses, Change in medications, Hospital admission, Bruising, Other complaints    Comments:  Appetite decreased lately.          Clinical Outcomes       Negatives:  Major bleeding event, Thromboembolic event, Anticoagulation-related hospital admission, Anticoagulation-related ED visit, Anticoagulation-related fatality    Comments:  Appetite decreased lately.            Date Referral Placed: 12/11/24      Vitals:  There were no vitals filed for this visit.    Verified current warfarin dosing schedule.    Medications reconciled.  Pt is not on antiplatelet therapy.      A/P   INR is  supratherapeutic today at 3.7. INR trending high despite dose decreases.  Reason(s) for out of range INR today: Decrease vit k intake.       Warfarin dosing recommendation: HOLD tonight's dose then resume previus regimen for now.    Pt educated to contact our clinic with any changes in medications or s/s of bleeding or thrombosis. Pt is aware to seek immediate medical attention for falls, head injury or deep cuts.    Request pt to return in 1 week(s). Pt agrees.    MARKELL Jacob.

## 2025-06-12 ENCOUNTER — ANTICOAGULATION VISIT (OUTPATIENT)
Dept: VASCULAR LAB | Facility: MEDICAL CENTER | Age: 85
End: 2025-06-12
Attending: INTERNAL MEDICINE
Payer: MEDICARE

## 2025-06-12 DIAGNOSIS — D68.69 SECONDARY HYPERCOAGULABLE STATE (HCC): Chronic | ICD-10-CM

## 2025-06-12 DIAGNOSIS — Z95.3 STATUS POST TRANSCATHETER AORTIC VALVE REPLACEMENT (TAVR) USING BIOPROSTHESIS: Primary | ICD-10-CM

## 2025-06-12 LAB — INR PPP: 3.5 (ref 2–3.5)

## 2025-06-12 PROCEDURE — 85610 PROTHROMBIN TIME: CPT

## 2025-06-12 PROCEDURE — 99212 OFFICE O/P EST SF 10 MIN: CPT

## 2025-06-12 NOTE — PROGRESS NOTES
Anticoagulation Summary  As of 6/12/2025      INR goal:  2.0-3.0   TTR:  58.3% (8.6 y)   INR used for dosing:  3.50 (6/12/2025)   Warfarin maintenance plan:  4 mg (4 mg x 1) every Tue, Sat; 2 mg (4 mg x 0.5) all other days   Weekly warfarin total:  18 mg   Plan last modified:  Abhishek CoelhoD (6/12/2025)   Next INR check:  6/19/2025   Priority:  Routine   Target end date:  Indefinite    Indications    Status post transcatheter aortic valve replacement (TAVR) using bioprosthesis [Z95.3]  Atrial fibrillation (HCC) [I48.91]  Secondary hypercoagulable state (HCC) [D68.69]                 Anticoagulation Episode Summary       INR check location:  --    Preferred lab:  --    Send INR reminders to:  --    Comments:  Pt goes by Kinsey            Anticoagulation Care Providers       Provider Role Specialty Phone number    Vickey Rutherford M.D. Referring Cardiovascular Disease (Cardiology) [phone]    [facility] Anticoagulation Services Responsible  [phone]                Refer to Patient Findings for HPI:  Patient Findings       Positives:  Change in medications (azathioprine - for autoimmune hep; transitioning off of predinsone)    Negatives:  Signs/symptoms of thrombosis, Signs/symptoms of bleeding, Laboratory test error suspected, Change in health, Change in alcohol use, Change in activity, Upcoming invasive procedure, Emergency department visit, Upcoming dental procedure, Missed doses, Extra doses, Change in diet/appetite, Hospital admission, Bruising, Other complaints          Clinical Outcomes       Negatives:  Major bleeding event, Thromboembolic event, Anticoagulation-related hospital admission, Anticoagulation-related ED visit, Anticoagulation-related fatality            Date Referral Placed: 12/11/24      Vitals:  There were no vitals filed for this visit.  Pt declined vitals    Verified current warfarin dosing schedule.    Medications reconciled.  Pt is not on antiplatelet therapy.      A/P   INR  is supratherapeutic  Reason(s) for out of range INR today: Drug interaction with azathioprine, patient is also titrating off of prednisone     Prednisone interacts with warfarin by increasing warfarin effects. Since patient has been on 20 mg for 2 months there should be little to no interaction. In July if patient decreases prednisone could consider increasing warfarin TWD.  Azathioprine interacts with warfarin by decreasing warfarin effects. Patient has started this medication for autoimmune hepatitis. At upcoming appointment increasing TWD of warfarin may be considered.    Warfarin dosing recommendation: Decrease to 4 mg (1 tablet) on Tues, and SAT, then take 2 mg(0.5 tablet) ROW    Pt educated to contact our clinic with any changes in medications or s/s of bleeding or thrombosis. Pt is aware to seek immediate medical attention for falls, head injury or deep cuts.    Request pt to return in 1 week(s). Pt agrees.    Zaina Francisco, Pharmacy Intern  Robby Zarate, AbhishekD

## 2025-06-19 ENCOUNTER — ANTICOAGULATION VISIT (OUTPATIENT)
Dept: VASCULAR LAB | Facility: MEDICAL CENTER | Age: 85
End: 2025-06-19
Attending: INTERNAL MEDICINE
Payer: MEDICARE

## 2025-06-19 VITALS — HEART RATE: 76 BPM | DIASTOLIC BLOOD PRESSURE: 84 MMHG | SYSTOLIC BLOOD PRESSURE: 121 MMHG

## 2025-06-19 DIAGNOSIS — I10 ESSENTIAL HYPERTENSION: ICD-10-CM

## 2025-06-19 DIAGNOSIS — I48.0 PAROXYSMAL ATRIAL FIBRILLATION (HCC): Chronic | ICD-10-CM

## 2025-06-19 DIAGNOSIS — D68.69 SECONDARY HYPERCOAGULABLE STATE (HCC): Chronic | ICD-10-CM

## 2025-06-19 DIAGNOSIS — Z95.3 STATUS POST TRANSCATHETER AORTIC VALVE REPLACEMENT (TAVR) USING BIOPROSTHESIS: Primary | ICD-10-CM

## 2025-06-19 LAB — INR PPP: 4 (ref 2–3.5)

## 2025-06-19 PROCEDURE — 99212 OFFICE O/P EST SF 10 MIN: CPT | Performed by: NURSE PRACTITIONER

## 2025-06-19 PROCEDURE — 85610 PROTHROMBIN TIME: CPT

## 2025-06-19 NOTE — PROGRESS NOTES
Anticoagulation Summary  As of 2025      INR goal:  2.0-3.0   TTR:  58.2% (8.6 y)   INR used for dosin.00 (2025)   Warfarin maintenance plan:  4 mg (4 mg x 1) every Tue; 2 mg (4 mg x 0.5) all other days   Weekly warfarin total:  16 mg   Plan last modified:  CLAYTON Jacob (2025)   Next INR check:  2025   Priority:  Routine   Target end date:  Indefinite    Indications    Status post transcatheter aortic valve replacement (TAVR) using bioprosthesis [Z95.3]  Atrial fibrillation (HCC) [I48.91]  Secondary hypercoagulable state (HCC) [D68.69]                 Anticoagulation Episode Summary       INR check location:  --    Preferred lab:  --    Send INR reminders to:  --    Comments:  Pt goes by Kinsey            Anticoagulation Care Providers       Provider Role Specialty Phone number    Vickey Rutherford M.D. Referring Cardiovascular Disease (Cardiology) [phone]    [facility] Anticoagulation Services Responsible  [phone]                Refer to Patient Findings for HPI:  Patient Findings       Positives:  Change in diet/appetite    Negatives:  Signs/symptoms of thrombosis, Signs/symptoms of bleeding, Laboratory test error suspected, Change in health, Change in alcohol use, Change in activity, Upcoming invasive procedure, Emergency department visit, Upcoming dental procedure, Missed doses, Extra doses, Change in medications, Hospital admission, Bruising, Other complaints    Comments:  Still has a decreased appetite overall but ate two servings of salad since his last appointment. In July patient's prednisone dose may decrease. He will let us know.  Of note, he recently started azathioprine for autoimmune hepatitis.           Clinical Outcomes       Negatives:  Major bleeding event, Thromboembolic event, Anticoagulation-related hospital admission, Anticoagulation-related ED visit, Anticoagulation-related fatality    Comments:  Still has a decreased appetite overall but ate two  servings of [place] since his last appointment. In July patient's prednisone dose may decrease. He will let us know.  Of note, he recently started azathioprine for autoimmune hepatitis.             Date Referral Placed: 12/11/24      Vitals:  Vitals:    06/19/25 0731   BP: 121/84   Pulse: 76     Pt declined vitals    Verified current warfarin dosing schedule.    Medications reconciled.  Pt is not on antiplatelet therapy.      A/P   INR is supratherapeutic today at 4.0. INR increased from 3.5 last week despite a dose decrease.  Reason(s) for out of range INR today: Decreased vit k intake.    Warfarin dosing recommendation: HOLD tonight's dose then began taking 4 mg on Tuesdays, 2 mg AODs.    Pt educated to contact our clinic with any changes in medications or s/s of bleeding or thrombosis. Pt is aware to seek immediate medical attention for falls, head injury or deep cuts.    Request pt to return in 1 week(s). Pt agrees.      MARKELL Jacob.

## 2025-06-20 RX ORDER — METOPROLOL SUCCINATE 50 MG/1
50 TABLET, EXTENDED RELEASE ORAL NIGHTLY
Qty: 90 TABLET | Refills: 3 | Status: SHIPPED | OUTPATIENT
Start: 2025-06-20

## 2025-06-25 ENCOUNTER — ANTICOAGULATION VISIT (OUTPATIENT)
Dept: VASCULAR LAB | Facility: MEDICAL CENTER | Age: 85
End: 2025-06-25
Attending: INTERNAL MEDICINE
Payer: MEDICARE

## 2025-06-25 ENCOUNTER — APPOINTMENT (OUTPATIENT)
Dept: URBAN - METROPOLITAN AREA CLINIC 4 | Facility: CLINIC | Age: 85
Setting detail: DERMATOLOGY
End: 2025-06-25

## 2025-06-25 DIAGNOSIS — L82.0 INFLAMED SEBORRHEIC KERATOSIS: ICD-10-CM

## 2025-06-25 DIAGNOSIS — Z71.89 OTHER SPECIFIED COUNSELING: ICD-10-CM

## 2025-06-25 DIAGNOSIS — Z85.828 PERSONAL HISTORY OF OTHER MALIGNANT NEOPLASM OF SKIN: ICD-10-CM

## 2025-06-25 DIAGNOSIS — L57.0 ACTINIC KERATOSIS: ICD-10-CM

## 2025-06-25 DIAGNOSIS — Z95.3 STATUS POST TRANSCATHETER AORTIC VALVE REPLACEMENT (TAVR) USING BIOPROSTHESIS: Primary | ICD-10-CM

## 2025-06-25 DIAGNOSIS — D18.0 HEMANGIOMA: ICD-10-CM

## 2025-06-25 DIAGNOSIS — D22 MELANOCYTIC NEVI: ICD-10-CM

## 2025-06-25 DIAGNOSIS — L82.1 OTHER SEBORRHEIC KERATOSIS: ICD-10-CM

## 2025-06-25 DIAGNOSIS — D68.69 SECONDARY HYPERCOAGULABLE STATE (HCC): Chronic | ICD-10-CM

## 2025-06-25 DIAGNOSIS — L81.4 OTHER MELANIN HYPERPIGMENTATION: ICD-10-CM

## 2025-06-25 PROBLEM — D22.5 MELANOCYTIC NEVI OF TRUNK: Status: ACTIVE | Noted: 2025-06-25

## 2025-06-25 PROBLEM — D18.01 HEMANGIOMA OF SKIN AND SUBCUTANEOUS TISSUE: Status: ACTIVE | Noted: 2025-06-25

## 2025-06-25 LAB — INR PPP: 2.5 (ref 2–3.5)

## 2025-06-25 PROCEDURE — ? COUNSELING

## 2025-06-25 PROCEDURE — 85610 PROTHROMBIN TIME: CPT

## 2025-06-25 PROCEDURE — 99211 OFF/OP EST MAY X REQ PHY/QHP: CPT

## 2025-06-25 PROCEDURE — ? LIQUID NITROGEN

## 2025-06-25 ASSESSMENT — LOCATION SIMPLE DESCRIPTION DERM
LOCATION SIMPLE: CHEST
LOCATION SIMPLE: LEFT THIGH
LOCATION SIMPLE: RIGHT LOWER BACK
LOCATION SIMPLE: NOSE
LOCATION SIMPLE: LEFT UPPER BACK
LOCATION SIMPLE: ABDOMEN
LOCATION SIMPLE: LEFT UPPER ARM
LOCATION SIMPLE: RIGHT CHEEK
LOCATION SIMPLE: LEFT FOREARM
LOCATION SIMPLE: LEFT POSTERIOR THIGH
LOCATION SIMPLE: LEFT CALF
LOCATION SIMPLE: LEFT SHOULDER
LOCATION SIMPLE: LEFT LOWER BACK
LOCATION SIMPLE: RIGHT UPPER ARM
LOCATION SIMPLE: RIGHT POSTERIOR THIGH
LOCATION SIMPLE: RIGHT CALF
LOCATION SIMPLE: RIGHT THIGH
LOCATION SIMPLE: RIGHT UPPER BACK
LOCATION SIMPLE: RIGHT HAND

## 2025-06-25 ASSESSMENT — LOCATION ZONE DERM
LOCATION ZONE: FACE
LOCATION ZONE: NOSE
LOCATION ZONE: ARM
LOCATION ZONE: TRUNK
LOCATION ZONE: HAND
LOCATION ZONE: LEG

## 2025-06-25 ASSESSMENT — LOCATION DETAILED DESCRIPTION DERM
LOCATION DETAILED: UPPER STERNUM
LOCATION DETAILED: LEFT DISTAL POSTERIOR UPPER ARM
LOCATION DETAILED: LEFT SUPERIOR UPPER BACK
LOCATION DETAILED: RIGHT RADIAL DORSAL HAND
LOCATION DETAILED: RIGHT DISTAL POSTERIOR UPPER ARM
LOCATION DETAILED: RIGHT INFERIOR LATERAL UPPER BACK
LOCATION DETAILED: RIGHT SUPERIOR UPPER BACK
LOCATION DETAILED: LEFT ANTERIOR PROXIMAL THIGH
LOCATION DETAILED: RIGHT ANTERIOR DISTAL UPPER ARM
LOCATION DETAILED: LEFT DISTAL DORSAL FOREARM
LOCATION DETAILED: RIGHT PROXIMAL CALF
LOCATION DETAILED: RIGHT MEDIAL INFERIOR CHEST
LOCATION DETAILED: LEFT PROXIMAL DORSAL FOREARM
LOCATION DETAILED: RIGHT DISTAL POSTERIOR THIGH
LOCATION DETAILED: RIGHT MEDIAL MALAR CHEEK
LOCATION DETAILED: PERIUMBILICAL SKIN
LOCATION DETAILED: RIGHT SUPERIOR MEDIAL UPPER BACK
LOCATION DETAILED: LEFT MEDIAL SUPERIOR CHEST
LOCATION DETAILED: LEFT PROXIMAL POSTERIOR THIGH
LOCATION DETAILED: RIGHT SUPERIOR LATERAL LOWER BACK
LOCATION DETAILED: LEFT PROXIMAL CALF
LOCATION DETAILED: LEFT SUPERIOR LATERAL LOWER BACK
LOCATION DETAILED: LEFT ANTERIOR SHOULDER
LOCATION DETAILED: NASAL DORSUM
LOCATION DETAILED: RIGHT MEDIAL UPPER BACK
LOCATION DETAILED: RIGHT ANTERIOR DISTAL THIGH

## 2025-06-25 NOTE — PROGRESS NOTES
Anticoagulation Summary  As of 2025      INR goal:  2.0-3.0   TTR:  58.1% (8.6 y)   INR used for dosin.50 (2025)   Warfarin maintenance plan:  4 mg (4 mg x 1) every Tue; 2 mg (4 mg x 0.5) all other days   Weekly warfarin total:  16 mg   Plan last modified:  CLAYTON Jacob (2025)   Next INR check:  2025   Priority:  Routine   Target end date:  Indefinite    Indications    Status post transcatheter aortic valve replacement (TAVR) using bioprosthesis [Z95.3]  Atrial fibrillation (HCC) [I48.91]  Secondary hypercoagulable state (HCC) [D68.69]                 Anticoagulation Episode Summary       INR check location:  --    Preferred lab:  --    Send INR reminders to:  --    Comments:  Pt goes by Kinsey            Anticoagulation Care Providers       Provider Role Specialty Phone number    Vickey Rutherford M.D. Referring Cardiovascular Disease (Cardiology) [phone]    [facility] Anticoagulation [facility] Responsible  [phone]                Refer to Patient Findings for HPI:  Patient Findings       Negatives:  Signs/symptoms of thrombosis, Signs/symptoms of bleeding, Laboratory test error suspected, Change in health, Change in alcohol use, Change in activity, Upcoming invasive procedure, Emergency department visit, Upcoming dental procedure, Missed doses, Extra doses, Change in medications, Change in diet/appetite, Hospital admission, Bruising, Other complaints          Clinical Outcomes       Negatives:  Major bleeding event, Thromboembolic event, Anticoagulation-related hospital admission, Anticoagulation-related ED visit, Anticoagulation-related fatality            Date Referral Placed: 24      Vitals:  There were no vitals filed for this visit.  Pt declined vitals    Verified current warfarin dosing schedule.    Medications reconciled.  Pt is not on antiplatelet therapy.      A/P   INR is therapeutic  Reason(s) for out of range INR today: N/A      Warfarin dosing  recommendation: Take 2 mg daily, then re-check INR. Previous 7 day total of 14 mg therefore will match that total for upcoming 7 day total.     Pt educated to contact our clinic with any changes in medications or s/s of bleeding or thrombosis. Pt is aware to seek immediate medical attention for falls, head injury or deep cuts.    Request pt to return in 1 week(s). Pt agrees.    Joel Hodge, PharmD

## 2025-06-25 NOTE — PROCEDURE: LIQUID NITROGEN
Duration Of Freeze Thaw-Cycle (Seconds): 4
Consent: The patient's consent was obtained including but not limited to risks of crusting, scabbing, blistering, scarring, darker or lighter pigmentary change, recurrence, incomplete removal and infection.
Detail Level: Detailed
Post-Care Instructions: I reviewed with the patient in detail post-care instructions. Patient is to wear sunprotection, and avoid picking at any of the treated lesions. Pt may apply Vaseline to crusted or scabbing areas.
Render Post-Care Instructions In Note?: no
Show Applicator Variable?: Yes
Number Of Freeze-Thaw Cycles: 1 freeze-thaw cycle
Application Tool (Optional): Liquid Nitrogen Sprayer
Spray Paint Text: The liquid nitrogen was applied to the skin utilizing a spray paint frosting technique.
Medical Necessity Information: It is in your best interest to select a reason for this procedure from the list below. All of these items fulfill various CMS LCD requirements except the new and changing color options.
Medical Necessity Clause: This procedure was medically necessary because the lesions that were treated were:

## 2025-07-02 ENCOUNTER — ANTICOAGULATION VISIT (OUTPATIENT)
Dept: VASCULAR LAB | Facility: MEDICAL CENTER | Age: 85
End: 2025-07-02
Attending: INTERNAL MEDICINE
Payer: MEDICARE

## 2025-07-02 VITALS — SYSTOLIC BLOOD PRESSURE: 103 MMHG | DIASTOLIC BLOOD PRESSURE: 69 MMHG | HEART RATE: 93 BPM

## 2025-07-02 DIAGNOSIS — D68.69 SECONDARY HYPERCOAGULABLE STATE (HCC): Chronic | ICD-10-CM

## 2025-07-02 DIAGNOSIS — Z95.3 STATUS POST TRANSCATHETER AORTIC VALVE REPLACEMENT (TAVR) USING BIOPROSTHESIS: Primary | ICD-10-CM

## 2025-07-02 LAB — INR PPP: 2 (ref 2–3.5)

## 2025-07-02 PROCEDURE — 85610 PROTHROMBIN TIME: CPT

## 2025-07-02 PROCEDURE — 99212 OFFICE O/P EST SF 10 MIN: CPT

## 2025-07-02 NOTE — PROGRESS NOTES
Anticoagulation Summary  As of 2025      INR goal:  2.0-3.0   TTR:  58.2% (8.7 y)   INR used for dosin.00 (2025)   Warfarin maintenance plan:  4 mg (4 mg x 1) every Wed; 2 mg (4 mg x 0.5) all other days   Weekly warfarin total:  16 mg   Plan last modified:  Abhishek RicoD (2025)   Next INR check:  7/10/2025   Priority:  Routine   Target end date:  Indefinite    Indications    Status post transcatheter aortic valve replacement (TAVR) using bioprosthesis [Z95.3]  Atrial fibrillation (HCC) [I48.91]  Secondary hypercoagulable state (HCC) [D68.69]                 Anticoagulation Episode Summary       INR check location:  --    Preferred lab:  --    Send INR reminders to:  --    Comments:  Pt goes by Kinsey            Anticoagulation Care Providers       Provider Role Specialty Phone number    Vickey Rutherford M.D. Referring Cardiovascular Disease (Cardiology) [phone]    [facility]tico[facility] Responsible  [phone]                Refer to Patient Findings for HPI:  Patient Findings       Negatives:  Signs/symptoms of thrombosis, Signs/symptoms of bleeding, Laboratory test error suspected, Change in health, Change in alcohol use, Change in activity, Upcoming invasive procedure, Emergency department visit, Upcoming dental procedure, Missed doses, Extra doses, Change in medications, Change in diet/appetite, Hospital admission, Bruising, Other complaints          Clinical Outcomes       Negatives:  Major bleeding event, Thromboembolic event, Anticoagulation-related hospital admission, Anticoagulation-related ED visit, Anticoagulation-related fatality            Date Referral Placed: 24      Vitals:  Vitals:    25 1500   BP: 103/69   Pulse: 93       Verified current warfarin dosing schedule.    Medications reconciled.  Pt is not on antiplatelet therapy.      A/P   INR is therapeutic - trending down from previous.   Reason(s) for out of range INR today: N/A        Warfarin  dosing recommendation: Increase to Warfarin 4 mg on Wednesday and 2 mg AOD.     Pt educated to contact our clinic with any changes in medications or s/s of bleeding or thrombosis. Pt is aware to seek immediate medical attention for falls, head injury or deep cuts.    Request pt to return in 1 week(s). Pt agrees.    Carol Dumont, AbhishekD

## 2025-07-07 ENCOUNTER — HOSPITAL ENCOUNTER (OUTPATIENT)
Dept: LAB | Facility: MEDICAL CENTER | Age: 85
End: 2025-07-07
Attending: FAMILY MEDICINE
Payer: MEDICARE

## 2025-07-07 DIAGNOSIS — D68.69 SECONDARY HYPERCOAGULABLE STATE (HCC): Chronic | ICD-10-CM

## 2025-07-07 DIAGNOSIS — I48.0 PAROXYSMAL ATRIAL FIBRILLATION (HCC): Chronic | ICD-10-CM

## 2025-07-07 DIAGNOSIS — R09.89 PHLEGM IN THROAT: ICD-10-CM

## 2025-07-07 DIAGNOSIS — R73.02 IGT (IMPAIRED GLUCOSE TOLERANCE): ICD-10-CM

## 2025-07-07 DIAGNOSIS — G62.9 NEUROPATHY: ICD-10-CM

## 2025-07-07 DIAGNOSIS — E78.5 DYSLIPIDEMIA: ICD-10-CM

## 2025-07-07 LAB
DIGOXIN SERPL-MCNC: 1 NG/ML (ref 0.8–2)
ERYTHROCYTE [DISTWIDTH] IN BLOOD BY AUTOMATED COUNT: 52.4 FL (ref 35.9–50)
EST. AVERAGE GLUCOSE BLD GHB EST-MCNC: 128 MG/DL
HBA1C MFR BLD: 6.1 % (ref 4–5.6)
HCT VFR BLD AUTO: 47 % (ref 42–52)
HGB BLD-MCNC: 14.9 G/DL (ref 14–18)
MCH RBC QN AUTO: 31.8 PG (ref 27–33)
MCHC RBC AUTO-ENTMCNC: 31.7 G/DL (ref 32.3–36.5)
MCV RBC AUTO: 100.4 FL (ref 81.4–97.8)
PLATELET # BLD AUTO: 229 K/UL (ref 164–446)
PMV BLD AUTO: 10.5 FL (ref 9–12.9)
RBC # BLD AUTO: 4.68 M/UL (ref 4.7–6.1)
TSH SERPL DL<=0.005 MIU/L-ACNC: 1.18 UIU/ML (ref 0.38–5.33)
WBC # BLD AUTO: 14.5 K/UL (ref 4.8–10.8)

## 2025-07-07 PROCEDURE — 83036 HEMOGLOBIN GLYCOSYLATED A1C: CPT | Mod: GA

## 2025-07-07 PROCEDURE — 85027 COMPLETE CBC AUTOMATED: CPT

## 2025-07-07 PROCEDURE — 84443 ASSAY THYROID STIM HORMONE: CPT

## 2025-07-07 PROCEDURE — 36415 COLL VENOUS BLD VENIPUNCTURE: CPT

## 2025-07-07 PROCEDURE — 80162 ASSAY OF DIGOXIN TOTAL: CPT

## 2025-07-08 ENCOUNTER — HOSPITAL ENCOUNTER (OUTPATIENT)
Dept: LAB | Facility: MEDICAL CENTER | Age: 85
End: 2025-07-08
Attending: FAMILY MEDICINE
Payer: MEDICARE

## 2025-07-08 ENCOUNTER — HOSPITAL ENCOUNTER (OUTPATIENT)
Dept: LAB | Facility: MEDICAL CENTER | Age: 85
End: 2025-07-08
Attending: INTERNAL MEDICINE
Payer: MEDICARE

## 2025-07-08 DIAGNOSIS — I48.0 PAROXYSMAL ATRIAL FIBRILLATION (HCC): Chronic | ICD-10-CM

## 2025-07-08 DIAGNOSIS — G62.9 NEUROPATHY: ICD-10-CM

## 2025-07-08 DIAGNOSIS — E78.5 DYSLIPIDEMIA: ICD-10-CM

## 2025-07-08 DIAGNOSIS — N40.1 BENIGN PROSTATIC HYPERPLASIA WITH URINARY HESITANCY: ICD-10-CM

## 2025-07-08 DIAGNOSIS — I10 ESSENTIAL HYPERTENSION: ICD-10-CM

## 2025-07-08 DIAGNOSIS — K75.4 AUTOIMMUNE HEPATITIS (HCC): Chronic | ICD-10-CM

## 2025-07-08 DIAGNOSIS — R39.11 BENIGN PROSTATIC HYPERPLASIA WITH URINARY HESITANCY: ICD-10-CM

## 2025-07-08 DIAGNOSIS — R73.02 IGT (IMPAIRED GLUCOSE TOLERANCE): ICD-10-CM

## 2025-07-08 LAB
ALBUMIN SERPL BCP-MCNC: 3.6 G/DL (ref 3.2–4.9)
ALBUMIN SERPL BCP-MCNC: 3.6 G/DL (ref 3.2–4.9)
ALBUMIN/GLOB SERPL: 1.3 G/DL
ALBUMIN/GLOB SERPL: 1.3 G/DL
ALP SERPL-CCNC: 44 U/L (ref 30–99)
ALP SERPL-CCNC: 44 U/L (ref 30–99)
ALT SERPL-CCNC: 33 U/L (ref 2–50)
ALT SERPL-CCNC: 33 U/L (ref 2–50)
ANION GAP SERPL CALC-SCNC: 10 MMOL/L (ref 7–16)
ANION GAP SERPL CALC-SCNC: 10 MMOL/L (ref 7–16)
AST SERPL-CCNC: 22 U/L (ref 12–45)
AST SERPL-CCNC: 23 U/L (ref 12–45)
BASOPHILS # BLD AUTO: 0.5 % (ref 0–1.8)
BASOPHILS # BLD: 0.07 K/UL (ref 0–0.12)
BILIRUB SERPL-MCNC: 0.6 MG/DL (ref 0.1–1.5)
BILIRUB SERPL-MCNC: 0.6 MG/DL (ref 0.1–1.5)
BUN SERPL-MCNC: 15 MG/DL (ref 8–22)
BUN SERPL-MCNC: 16 MG/DL (ref 8–22)
CALCIUM ALBUM COR SERPL-MCNC: 9.6 MG/DL (ref 8.5–10.5)
CALCIUM ALBUM COR SERPL-MCNC: 9.8 MG/DL (ref 8.5–10.5)
CALCIUM SERPL-MCNC: 9.3 MG/DL (ref 8.5–10.5)
CALCIUM SERPL-MCNC: 9.5 MG/DL (ref 8.5–10.5)
CHLORIDE SERPL-SCNC: 107 MMOL/L (ref 96–112)
CHLORIDE SERPL-SCNC: 107 MMOL/L (ref 96–112)
CHOLEST SERPL-MCNC: 152 MG/DL (ref 100–199)
CO2 SERPL-SCNC: 25 MMOL/L (ref 20–33)
CO2 SERPL-SCNC: 26 MMOL/L (ref 20–33)
CREAT SERPL-MCNC: 0.84 MG/DL (ref 0.5–1.4)
CREAT SERPL-MCNC: 0.85 MG/DL (ref 0.5–1.4)
EOSINOPHIL # BLD AUTO: 0.17 K/UL (ref 0–0.51)
EOSINOPHIL NFR BLD: 1.2 % (ref 0–6.9)
ERYTHROCYTE [DISTWIDTH] IN BLOOD BY AUTOMATED COUNT: 51.4 FL (ref 35.9–50)
FASTING STATUS PATIENT QL REPORTED: NORMAL
GFR SERPLBLD CREATININE-BSD FMLA CKD-EPI: 84 ML/MIN/1.73 M 2
GFR SERPLBLD CREATININE-BSD FMLA CKD-EPI: 84 ML/MIN/1.73 M 2
GLOBULIN SER CALC-MCNC: 2.7 G/DL (ref 1.9–3.5)
GLOBULIN SER CALC-MCNC: 2.8 G/DL (ref 1.9–3.5)
GLUCOSE SERPL-MCNC: 75 MG/DL (ref 65–99)
GLUCOSE SERPL-MCNC: 76 MG/DL (ref 65–99)
HCT VFR BLD AUTO: 46.7 % (ref 42–52)
HDLC SERPL-MCNC: 44 MG/DL
HGB BLD-MCNC: 14.9 G/DL (ref 14–18)
IMM GRANULOCYTES # BLD AUTO: 0.34 K/UL (ref 0–0.11)
IMM GRANULOCYTES NFR BLD AUTO: 2.4 % (ref 0–0.9)
LDLC SERPL CALC-MCNC: 77 MG/DL
LYMPHOCYTES # BLD AUTO: 3.58 K/UL (ref 1–4.8)
LYMPHOCYTES NFR BLD: 25.5 % (ref 22–41)
MCH RBC QN AUTO: 31.8 PG (ref 27–33)
MCHC RBC AUTO-ENTMCNC: 31.9 G/DL (ref 32.3–36.5)
MCV RBC AUTO: 99.8 FL (ref 81.4–97.8)
MONOCYTES # BLD AUTO: 1.24 K/UL (ref 0–0.85)
MONOCYTES NFR BLD AUTO: 8.8 % (ref 0–13.4)
NEUTROPHILS # BLD AUTO: 8.66 K/UL (ref 1.82–7.42)
NEUTROPHILS NFR BLD: 61.6 % (ref 44–72)
NRBC # BLD AUTO: 0 K/UL
NRBC BLD-RTO: 0 /100 WBC (ref 0–0.2)
PLATELET # BLD AUTO: 215 K/UL (ref 164–446)
PMV BLD AUTO: 10.2 FL (ref 9–12.9)
POTASSIUM SERPL-SCNC: 4.2 MMOL/L (ref 3.6–5.5)
POTASSIUM SERPL-SCNC: 4.3 MMOL/L (ref 3.6–5.5)
PROT SERPL-MCNC: 6.3 G/DL (ref 6–8.2)
PROT SERPL-MCNC: 6.4 G/DL (ref 6–8.2)
RBC # BLD AUTO: 4.68 M/UL (ref 4.7–6.1)
SODIUM SERPL-SCNC: 142 MMOL/L (ref 135–145)
SODIUM SERPL-SCNC: 143 MMOL/L (ref 135–145)
TRIGL SERPL-MCNC: 156 MG/DL (ref 0–149)
WBC # BLD AUTO: 14.1 K/UL (ref 4.8–10.8)

## 2025-07-08 PROCEDURE — 80061 LIPID PANEL: CPT

## 2025-07-08 PROCEDURE — 80053 COMPREHEN METABOLIC PANEL: CPT

## 2025-07-08 PROCEDURE — 80053 COMPREHEN METABOLIC PANEL: CPT | Mod: 91

## 2025-07-08 PROCEDURE — 36415 COLL VENOUS BLD VENIPUNCTURE: CPT

## 2025-07-08 PROCEDURE — 85025 COMPLETE CBC W/AUTO DIFF WBC: CPT

## 2025-07-10 ENCOUNTER — ANTICOAGULATION VISIT (OUTPATIENT)
Dept: VASCULAR LAB | Facility: MEDICAL CENTER | Age: 85
End: 2025-07-10
Attending: INTERNAL MEDICINE
Payer: MEDICARE

## 2025-07-10 VITALS — SYSTOLIC BLOOD PRESSURE: 130 MMHG | HEART RATE: 62 BPM | DIASTOLIC BLOOD PRESSURE: 79 MMHG

## 2025-07-10 DIAGNOSIS — I48.0 PAROXYSMAL ATRIAL FIBRILLATION (HCC): Chronic | ICD-10-CM

## 2025-07-10 DIAGNOSIS — Z95.3 STATUS POST TRANSCATHETER AORTIC VALVE REPLACEMENT (TAVR) USING BIOPROSTHESIS: Primary | ICD-10-CM

## 2025-07-10 DIAGNOSIS — D68.69 SECONDARY HYPERCOAGULABLE STATE (HCC): Chronic | ICD-10-CM

## 2025-07-10 LAB — INR PPP: 2.5 (ref 2–3.5)

## 2025-07-10 PROCEDURE — 99211 OFF/OP EST MAY X REQ PHY/QHP: CPT | Performed by: NURSE PRACTITIONER

## 2025-07-10 PROCEDURE — 85610 PROTHROMBIN TIME: CPT

## 2025-07-10 NOTE — PROGRESS NOTES
Anticoagulation Summary  As of 7/10/2025      INR goal:  2.0-3.0   TTR:  58.3% (8.7 y)   INR used for dosin.50 (7/10/2025)   Warfarin maintenance plan:  4 mg (4 mg x 1) every Wed; 2 mg (4 mg x 0.5) all other days   Weekly warfarin total:  16 mg   Plan last modified:  Abhishek RicoD (2025)   Next INR check:  2025   Priority:  Routine   Target end date:  Indefinite    Indications    Status post transcatheter aortic valve replacement (TAVR) using bioprosthesis [Z95.3]  Atrial fibrillation (HCC) [I48.91]  Secondary hypercoagulable state (HCC) [D68.69]                 Anticoagulation Episode Summary       INR check location:  --    Preferred lab:  --    Send INR reminders to:  --    Comments:  Pt goes by Kinsey            Anticoagulation Care Providers       Provider Role Specialty Phone number    Vickey Rutherford M.D. Referring Cardiovascular Disease (Cardiology) [phone]    [facility] Anticoagulation Services Responsible  [phone]                Refer to Patient Findings for HPI:  Patient Findings       Negatives:  Signs/symptoms of thrombosis, Signs/symptoms of bleeding, Laboratory test error suspected, Change in health, Change in alcohol use, Change in activity, Upcoming invasive procedure, Emergency department visit, Upcoming dental procedure, Missed doses, Extra doses, Change in medications, Change in diet/appetite, Hospital admission, Bruising, Other complaints          Clinical Outcomes       Negatives:  Major bleeding event, Thromboembolic event, Anticoagulation-related hospital admission, Anticoagulation-related ED visit, Anticoagulation-related fatality            Date Referral Placed: 24      Vitals:  There were no vitals filed for this visit.      Verified current warfarin dosing schedule.    Medications reconciled.  Pt is not on antiplatelet therapy.      A/P   INR is therapeutic 2.5 today. INR up from 2.0. Will have pt continue his current regimen.  Reason(s) for out of  range INR today: N/A        Warfarin dosing recommendation: Continue current regimen as outlined above.    Pt educated to contact our clinic with any changes in medications or s/s of bleeding or thrombosis. Pt is aware to seek immediate medical attention for falls, head injury or deep cuts.    Request pt to return in 2 week(s). Pt agrees.    MARKELL Jacob.

## 2025-07-15 ENCOUNTER — OFFICE VISIT (OUTPATIENT)
Dept: SLEEP MEDICINE | Facility: MEDICAL CENTER | Age: 85
End: 2025-07-15
Payer: MEDICARE

## 2025-07-15 ENCOUNTER — RESULTS FOLLOW-UP (OUTPATIENT)
Dept: MEDICAL GROUP | Facility: MEDICAL CENTER | Age: 85
End: 2025-07-15

## 2025-07-15 ENCOUNTER — APPOINTMENT (OUTPATIENT)
Dept: MEDICAL GROUP | Facility: MEDICAL CENTER | Age: 85
End: 2025-07-15
Payer: MEDICARE

## 2025-07-15 VITALS
SYSTOLIC BLOOD PRESSURE: 124 MMHG | RESPIRATION RATE: 16 BRPM | DIASTOLIC BLOOD PRESSURE: 82 MMHG | OXYGEN SATURATION: 95 % | HEIGHT: 72 IN | WEIGHT: 220 LBS | BODY MASS INDEX: 29.8 KG/M2 | HEART RATE: 82 BPM

## 2025-07-15 DIAGNOSIS — G47.33 OSA (OBSTRUCTIVE SLEEP APNEA): Primary | ICD-10-CM

## 2025-07-15 PROCEDURE — 3074F SYST BP LT 130 MM HG: CPT

## 2025-07-15 PROCEDURE — 3079F DIAST BP 80-89 MM HG: CPT

## 2025-07-15 PROCEDURE — 99214 OFFICE O/P EST MOD 30 MIN: CPT

## 2025-07-15 PROCEDURE — 1170F FXNL STATUS ASSESSED: CPT

## 2025-07-15 ASSESSMENT — FIBROSIS 4 INDEX: FIB4 SCORE: 1.62

## 2025-07-15 NOTE — PROGRESS NOTES
[facility] Follow-up Visit    CC:   Chief Complaint   Patient presents with    Follow-Up     Last Office Visit 7/11/2025 with Jodie DOE   Auto cpap 4-10          HPI:    Duane Parkinson is a 85 y.o.male present today for ongoing management of LORENA on CPAP. Patient was last seen by sleep medicine 4/14/25 by myself. The primary reason for today's visit is routine follow up for data evaluation and renewal of mask and supplies. Patient uses the machine nightly and reports benefit. Patient reports mask leaking into his eyes. In the last week or so he has been able to adjust the mask to get a better seal but does feel that it leaks later on when the pressure ramps up. He is using a hybrid FFM and finds the holes on the nose piece not well positioned to get air through his nostrils. Patient denies aerophagia, residual snoring, skin irritation , and dry mouth . Generally sleep quality is fair.     DME provider: CPAP and more   Device: airsense 10   Settings: APAP 4-10  Mask: hybrid FFM  Chin strap/lip strap: no    Sleep History  11/22/17 - PSG titration. This study demonstrates a significant but perhaps incomplete response to servo adaptive BiPAP ventilation in a patient with previously demonstrated sleep apnea hypopnea unresponsive to CPAP and BiPAP therapy, consistent with complex sleep apnea. Recommendations: EPAP 5-10, PS 4-15  2/24/21 - PSG, AHI 9.8, zachery O2 85%, Time spent with oxygen saturations below 89% was 11.8 minutes. Recommendations: Treatment options for mild LORENA include CPAP, oral appliance, possible UPPP  3/24/25 - PSG split night, 4% AHI 10.66 (REM AHI 20.5), zachery O2 87%, Patient spent 0.3 minutes of TST with SaO2 <88%. Rec: CPAP 4-10    Patient Active Problem List    Diagnosis Date Noted    Degenerative disease of nervous system, unspecified (HCC) 11/21/2023    Benign prostatic hyperplasia with urinary hesitancy 03/02/2023    Age-related physical debility 02/11/2023    Recurrent UTI  02/09/2023    Cystic lesion of abdominal viscera 02/09/2023    Skin lesions 11/08/2022    Chronic pain of left knee 11/08/2022    LORENA (obstructive sleep apnea) 11/22/2021    Secondary hypercoagulable state (HCC) 11/22/2021    Skin cancer 12/17/2020    Autoimmune hepatitis (HCC)     Neuropathy 12/22/2017    Localized primary osteoarthritis of carpometacarpal joint of left thumb 05/27/2016    Essential hypertension 01/07/2015    Dyslipidemia 01/07/2015    IGT (impaired glucose tolerance) 01/07/2015    Status post transcatheter aortic valve replacement (TAVR) using bioprosthesis 12/30/2011    Atrial fibrillation (HCC) 12/30/2011    Lumbar Disc Degeneration 12/30/2011    Osteoarthrosis involving lower leg 12/30/2011       Past Medical History[1]     Past Surgical History[2]    Family History   Problem Relation Age of Onset    Other Mother         unknown    Heart Disease Mother     Cancer Father         prostate, skin    No Known Problems Brother     Diabetes Brother     Heart Disease Son     Sleep Apnea Neg Hx        Current Medications[3]     ALLERGIES: Feldene [piroxicam] and Percodan [oxycodone-aspirin]    ROS  Constitutional: Denies fevers, Denies weight changes  Ears/Nose/Throat/Mouth: Denies nasal congestion or sore throat   Cardiovascular: Denies chest pain  Respiratory: Denies shortness of breath, Denies cough  Gastrointestinal/Hepatic: Denies nausea, vomiting  Sleep: see HPI      PHYSICAL EXAM  /82 (BP Location: Left arm, Patient Position: Sitting, BP Cuff Size: Adult)   Pulse 82   Resp 16   Ht 1.829 m (6') Comment: per patient  Wt 99.8 kg (220 lb)   SpO2 95%   BMI 29.84 kg/m²   Constitutional: Alert, no distress, well-groomed.  Skin: Warm, dry, good turgor, no rashes in visible areas.  Eye: Equal, round and reactive, conjunctiva clear, lids normal.  ENMT: Lips without lesions, good dentition, moist mucous membranes.  Neck: Trachea midline, no masses, no thyromegaly.  Respiratory: Unlabored  respiratory effort, no cough.  MSK: Normal gait, moves all extremities.  Neuro: Grossly non-focal.   Psych: Alert and oriented x3, normal affect and mood.      Medical Decision Making   Assessment and Plan  The medical record was reviewed including Diagnostic and titration nocturnal polysomnogram's  and compliance reports .    Obstructive sleep apnea  - Compliance data reviewed showing 70% usage > 4hours during initial compliance period of 30 days. Average AHI 7.7 (HI 2.3, Central 3.6, Obstructive 0.5) events/hour.  Leak 95th percentile 25. Pressure 95% 9.4  - Discussed potential causes of excessive mask leak including poorly fitting mask, mouth opening, position changes while sleeping, and pressure related leak. Discussed that excessive leak can prevent the machine from working as well and can make it difficult to assess residual events (AHI) or determine why they are occurring. I recommended mask fitting, decreasing the pressure, and turning off the ramp. Provided patient with F20 large mask. Advised to go to CPAP and more if this is the wrong size but he likes the shape better as this is the only size we have in the office.   - Current Settings APAP 4-10. Recommend changing settings to CPAP 8.   - Pt continues to use and benefit from machine.   - Orders placed for mask and supplies and updated pressure settings   - Advised to reach out via MyChart with questions     LORENA/PAP EDUCATION:  - Clean equipment frequently, and get new mask and supplies as allowed by insurance and DME.   - Avoid driving a motor vehicle when drowsy  - Patients with LORENA are at increased risk of cardiovascular disease including coronary artery disease, systemic arterial hypertension, pulmonary arterial hypertension, cardiac arrythmias, and stroke. Positive airway pressure will favorably impact many of the adverse conditions and effects provoked by LORENA.       Return in about 3 months (around 10/15/2025), or if symptoms worsen or fail to  improve.   - Recommend an earlier appointment, if significant treatment barriers develop.  - Patient to follow up with the appropriate healthcare practitioners for all other medical problems and issues.    Please note portions of this record was created using voice recognition software. I have made every reasonable attempt to correct obvious errors, but I expect that there are errors of grammar and possibly content I did not discover before finalizing the note.    Total time care for the patient this date was 30 minutes. Time spent includes chart review, lab review, discussion with myself. This time was spent separate from device analysis /programming on this date.               [1]   Past Medical History:  Diagnosis Date    Aortic stenosis     valve replacement; cardiologist    Atrial fibrillation (HCC)     cardiologist:  THEODORE Mason    Back pain     Cancer (HCC)     skin cancer (nose)    Cataract     bilateral IOL    Cirrhosis of liver without ascites (HCC)     Clotting disorder (HCC)     reports nose bleeds; 3/29/23 pt reports none in the past year    Dental disorder     left upper gold cap fell off, has appt w/ dentist 4/2023    Diabetes (HCC)     diet controlled    Encounter for long-term (current) use of other medications     Fatty liver     Gasping for breath     [place] measles     Heart murmur     Heart valve disease     High cholesterol     History of sepsis 02/09/2023    Hyperlipidemia     Hypertension     Insomnia     Jaundice     Mumps     Nasal drainage     Pain     left knee    Restless leg syndrome     Sleep apnea     3/29/2023 pt states he quit using CPAP, had issues with his machine and did not receive a new CPAP    Snoring     Tonsillitis     Treatment-emergent central sleep apnea 04/21/2016    cpap      Urinary bladder disorder     uti occasionally    Urinary incontinence     Valvular heart disease    [2]   Past Surgical History:  Procedure Laterality Date    TN TRANSURETHRAL ELEC-SURG  PROSTATECTOM  4/7/2023    Procedure: BIPOLAR TRANSURETHRAL RESECTION OF PROSTATE;  Surgeon: Mack Pritchett M.D.;  Location: SURGERY [facility];  Service: Urology    CATARACT EXTRACTION WITH IOL Bilateral 03/2023    GIBRAN N/A 07/29/2019    Procedure: ECHOCARDIOGRAM, TRANSESOPHAGEAL;  Surgeon: Leander Buckner M.D.;  Location: SURGERY [facility];  Service: Cardiac    TRANSCATHETER AORTIC VALVE REPLACEMENT Bilateral 07/29/2019    Procedure: REPLACEMENT, AORTIC VALVE, TRANSCATHETER;  Surgeon: Leander Buckner M.D.;  Location: SURGERY [facility];  Service: Cardiac    FINGER ARTHROPLASTY Left 05/27/2016    Procedure: FINGER ARTHROPLASTY THUMB CARPOMETACARPAL EXCISIONAL, EXCISE TRAPEZIUM;  Surgeon: Raheel Salgado M.D.;  Location: SURGERY SAME DAY [facility];  Service:     CARDIOVERSION      on 7/17/06, sinus rhythm until January 2007,  paroxysmal atrial fibrillation    KNEE ARTHROPLASTY TOTAL      LAMINOTOMY N/A     multiple lumbar spine    OTHER ORTHOPEDIC SURGERY      lower back    DC PERCUT IMPLNT NEUROELECT,EPIDURAL      TOE ARTHROPLASTY      TURP-VAPOR N/A    [3]   Current Outpatient Medications   Medication Sig Dispense Refill    metoprolol SR (TOPROL XL) 50 MG TABLET SR 24 HR Take 1 Tablet by mouth every evening. 90 Tablet 3    AZATHIOPRINE PO Take  by mouth.      warfarin (COUMADIN) 4 MG Tab TAKE 0.5 to 1 TABLET BY MOUTH DAILY AS DIRECTED BY [facility] 100 Tablet 3    predniSONE (DELTASONE) 20 MG Tab 40 mg.      ammonium lactate (AMLACTIN) 12 % cream       pravastatin (PRAVACHOL) 80 MG tablet TAKE ONE TABLET BY MOUTH EVERY EVENING (PRAVACHOL) 90 Tablet 3    tamsulosin (FLOMAX) 0.4 MG capsule Take 0.4 mg by mouth every day.      Glucosamine HCl (GLUCOSAMINE PO) Take  by mouth every day.      digoxin (LANOXIN) 250 MCG Tab Take 1 Tablet by mouth every morning. 90 Tablet 3    METAMUCIL FIBER PO Take  by mouth every day.      LYSINE PO Take 1 Tablet by mouth every day.  As needed      Ascorbic Acid (VITAMIN C PO) Take 1 Tablet by mouth every morning.      Coenzyme Q10 (COQ10 PO) Take 1 Tablet by mouth every day.      acetaminophen (TYLENOL) 500 MG Tab Take 500-1,000 mg by mouth every 6 hours as needed. Indications: Pain      gabapentin (NEURONTIN) 300 MG Cap Take 600 mg by mouth 4 times a day.      B Complex Vitamins (VITAMIN B COMPLEX PO) Take 1 Tab by mouth every evening.      Cholecalciferol (VITAMIN D3 PO) Take 1 Tab by mouth every evening.      multivitamin (THERAGRAN) TABS Take 1 Tablet by mouth at bedtime.       No current facility-administered medications for this visit.

## 2025-07-17 ENCOUNTER — APPOINTMENT (OUTPATIENT)
Dept: MEDICAL GROUP | Facility: MEDICAL CENTER | Age: 85
End: 2025-07-17
Payer: MEDICARE

## 2025-07-18 ENCOUNTER — OFFICE VISIT (OUTPATIENT)
Dept: MEDICAL GROUP | Facility: MEDICAL CENTER | Age: 85
End: 2025-07-18
Payer: MEDICARE

## 2025-07-18 VITALS
SYSTOLIC BLOOD PRESSURE: 102 MMHG | DIASTOLIC BLOOD PRESSURE: 70 MMHG | BODY MASS INDEX: 30.37 KG/M2 | RESPIRATION RATE: 14 BRPM | HEART RATE: 76 BPM | HEIGHT: 72 IN | OXYGEN SATURATION: 94 % | WEIGHT: 224.2 LBS | TEMPERATURE: 97.4 F

## 2025-07-18 DIAGNOSIS — R41.89 COGNITIVE DECLINE: Primary | ICD-10-CM

## 2025-07-18 PROCEDURE — 1170F FXNL STATUS ASSESSED: CPT | Performed by: FAMILY MEDICINE

## 2025-07-18 PROCEDURE — 99214 OFFICE O/P EST MOD 30 MIN: CPT | Performed by: FAMILY MEDICINE

## 2025-07-18 PROCEDURE — 3074F SYST BP LT 130 MM HG: CPT | Performed by: FAMILY MEDICINE

## 2025-07-18 PROCEDURE — 3078F DIAST BP <80 MM HG: CPT | Performed by: FAMILY MEDICINE

## 2025-07-18 ASSESSMENT — MONTREAL COGNITIVE ASSESSMENT (MOCA)
4. NAME EACH OF THE THREE ANIMALS SHOWN: 2/3
1. ALTERNATING TRAIL MAKING: 0/1
DELAYED RECALL SUBSCORE: 3/5
11. FOR EACH PAIR OF WORDS, WHAT CATEGORY DO THEY BELONG TO (OUT OF 2): 2/2
WHAT IS THE VERSION OF MOCA ADMINISTERED: 8.3
10. [FLUENCY] NAME WORDS STARTING WITH DESIGNATED LETTER: 0/1
3. DRAW A CLOCK: CONTOUR, NUMBERS, HANDS: 1/3
7. [VIGILENCE] TAP WHEN HEARING DESIGNATED LETTER: 1/1
5. MEMORY TRIALS: FIRST TRIAL;SECOND TRIAL
6. READ LIST OF DIGITS [FORWARD/BACKWARD]: 2/2
9. REPEAT EACH SENTENCE: 1/2
8. SERIAL SUBTRACTION OF 7S: 1/5
2. COPY DRAWING: 0/1
CATEGORY CUE (IF APPLICABLE): 1
WHAT IS THE TOTAL SCORE (OUT OF 30): 19
CATEGORY CUE (IF APPLICABLE): 1
ORIENTATION SUBSCORE: 6/6

## 2025-07-18 ASSESSMENT — FIBROSIS 4 INDEX: FIB4 SCORE: 1.62

## 2025-07-18 NOTE — PROGRESS NOTES
"Verbal consent was acquired by the patient to use [employer] ambient listening note generation during this visit    Subjective:     CC: \"memory issues\"    History of Present Illness  The patient presents for memory issues. He presents with granddaughter.    Memory Issues  He was scheduled for his annual appointment in August 2025, with lab work completed during the summer. However, an incident occurred where he forgot his INR, prompting a desire to advance the appointment. He reports frequent forgetfulness but has not experienced disorientation or getting lost for a significant period. He recalls one recent instance of getting lost while driving. Granddaughter reports more recently him driving to an appointment, not making it to the appointment and not being able to report where he did go. He resides with his granddaughter and her  in a house he has occupied for 30 years and does not experience any confusion about his surroundings. He has some college education and spends most of his time watching television. He is not currently under the care of a neurologist and is not interested in pursuing this option. He expresses a desire to reduce his medication intake. He also mentions feeling fatigued upon waking up and going to bed.  - Onset: Incident of forgetting INR prompted advancing the appointment.  - Character: Frequent forgetfulness, one recent instance of getting lost while driving.  - Alleviating/Aggravating Factors: Not interested in pursuing care from a neurologist; desires to reduce medication intake.  - Severity: Patient reports: No disorientation or getting lost for a significant period; no confusion about surroundings. Family has different sense of situation.    Social History:  Marital Status:   Education Level: Some college  Hobbies: Watching television, particularly scientific programs  Sleep: Reports feeling fatigued upon waking up and going to bed  Living Condition: Lives with his "  in a house they have occupied for 30 years    FAMILY HISTORY  His father lived until 97 or 89 years old.          Objective:     Exam:  /70   Pulse 76   Temp 36.3 °C (97.4 °F) (Temporal)   Resp 14   Ht 1.829 m (6')   Wt 102 kg (224 lb 3.2 oz)   SpO2 94%   BMI 30.41 kg/m²  Body mass index is 30.41 kg/m².    Physical Exam  General Appearance: Normal.  Vital signs: Within normal limits.  HEENT: Within normal limits.  Respiratory: Within normal limits.  Skin: Warm and dry, no rash.  Neurological: Patient oriented to person, place, and city but incorrect on date and year. Difficulty with memory recall and performing serial subtraction. Able to repeat sentences and identify similarities between objects.  Physical Exam      Results  [place] Cognitive Assessment (MOCA) Version Number: 8.3   VISUOSPACIAL / EXECUTIVE   Clock Drawin/3  Spatial Drawin/1  Cube Drawin/1    NAMING  Namin/3    MEMORY  Memory: First trial;Second trial (was able to get words on second trial)    ATTENTION  Digits: 2/2  Letters: 1/1  Subtraction: 1/5    LANGUAGE  Repeat Phrases: 1/2  Fluency: 0/1 (7)    ABSTRACTION  Abstraction: 2/2    DELAYED RECALL  Recall words: 3/5  Category Cue (if applicable): 1  Multiple Choice Cue (if applicable):1    ORIENTATION  Orientation: 6/6    Add 1 point if less than or equal to 12 yr education level:     MOCA TOTAL SCORE:  19 /30  Biomechanical/Visual Limitations (if applicable):          Assessment & Plan:       1. Cognitive decline  - Referral to Occupational Therapy      Assessment & Plan  1. Cognitive decline: Chronic.  - Referral for a driving assessment with an occupational therapist.  - Advised against driving due to safety concerns; form will be submitted to the DMV.  - Encouraged to engage in activities that stimulate the brain.  - Possibility of reducing medication intake will be explored at the next appointment.    Follow-up  - Follow up next month.         Return in  about 4 weeks (around 8/15/2025), or if symptoms worsen or fail to improve, for Lifestyle.      This note was created using voice recognition software (Dragon). The accuracy of the dictation is limited by the abilities of the software. I have reviewed the note prior to signing, however some errors in grammar and context are still possible. If you have any questions related to this note please do not hesitate to contact our office.

## 2025-07-24 ENCOUNTER — ANTICOAGULATION VISIT (OUTPATIENT)
Dept: VASCULAR LAB | Facility: MEDICAL CENTER | Age: 85
End: 2025-07-24
Attending: INTERNAL MEDICINE
Payer: MEDICARE

## 2025-07-24 VITALS — HEART RATE: 78 BPM | DIASTOLIC BLOOD PRESSURE: 66 MMHG | SYSTOLIC BLOOD PRESSURE: 102 MMHG

## 2025-07-24 DIAGNOSIS — D68.69 SECONDARY HYPERCOAGULABLE STATE (HCC): Chronic | ICD-10-CM

## 2025-07-24 DIAGNOSIS — I48.0 PAROXYSMAL ATRIAL FIBRILLATION (HCC): Chronic | ICD-10-CM

## 2025-07-24 DIAGNOSIS — Z95.3 STATUS POST TRANSCATHETER AORTIC VALVE REPLACEMENT (TAVR) USING BIOPROSTHESIS: Primary | ICD-10-CM

## 2025-07-24 LAB — INR PPP: 2.5 (ref 2–3.5)

## 2025-07-24 PROCEDURE — 85610 PROTHROMBIN TIME: CPT

## 2025-07-24 PROCEDURE — 99213 OFFICE O/P EST LOW 20 MIN: CPT

## 2025-07-24 NOTE — Clinical Note
REFERRAL APPROVAL NOTICE         Sent on July 24, 2025                   Kinsey Parkinson  [address]  [place] [address]                   Dear Mr. Parkinson,    After a careful review of the medical information and benefit coverage, [facility] has processed your referral. See below for additional details.    If applicable, you must be actively enrolled with your insurance for coverage of the authorized service. If you have any questions regarding your coverage, please contact your insurance directly.    REFERRAL INFORMATION   Referral #:  [id number]  Referred-To Department    Referred-By Provider:  Occupational Therapy    Toñito Espinoza D.O.   [facility] [address]      [address] [facility]  [address]  [place] [address] [phone] [address]  [address]  [place] [id number] [phone]    Referral Start Date:  07/18/2025  Referral End Date:   07/18/2026             SCHEDULING  If you do not already have an appointment, please call [phone] to make an appointment.     MORE INFORMATION  If you do not already have a [employer] account, sign up at: [email or web address]  You can access your medical information, make appointments, see lab results, billing information, and more.  If you have questions regarding this referral, please contact  the [facility] Referrals department at:             [phone]. Monday - Friday 8:00AM - 5:00PM.     Sincerely,    [facility]

## 2025-07-24 NOTE — PROGRESS NOTES
Anticoagulation Summary  As of 2025      INR goal:  2.0-3.0   TTR:  58.5% (8.7 y)   INR used for dosin.50 (2025)   Warfarin maintenance plan:  4 mg (4 mg x 1) every Wed; 2 mg (4 mg x 0.5) all other days   Weekly warfarin total:  16 mg   Plan last modified:  Abhishek RicoD (2025)   Next INR check:  2025   Priority:  Routine   Target end date:  Indefinite    Indications    Status post transcatheter aortic valve replacement (TAVR) using bioprosthesis [Z95.3]  Atrial fibrillation (HCC) [I48.91]  Secondary hypercoagulable state (HCC) [D68.69]                 Anticoagulation Episode Summary       INR check location:  --    Preferred lab:  --    Send INR reminders to:  --    Comments:  Pt goes by Kinsey            Anticoagulation Care Providers       Provider Role Specialty Phone number    Vickey Rutherford M.D. Referring Cardiovascular Disease (Cardiology) [phone]    [facility] Responsible  [phone]                  [id number] Stroke Risk Points: 3   Values used to calculate this score:    Points  Metrics       0        Has Congestive Heart Failure: No       1        Has Hypertension: Yes       2        Age: 85       0        Has Diabetes: No       0        Had Stroke: No                 Had TIA: No                 Had Thromboembolism: No       0        Has Vascular Disease: No       0        Clinically Relevant Sex: Male     No data recorded     Lab Results   Component Value Date/Time    CHOLSTRLTOT 152 2025 09:11 AM    LDL 77 2025 09:11 AM    HDL 44 2025 09:11 AM    TRIGLYCERIDE 156 (H) 2025 09:11 AM       Lab Results   Component Value Date/Time    SODIUM 143 2025 09:11 AM    SODIUM 142 2025 09:11 AM    POTASSIUM 4.3 2025 09:11 AM    POTASSIUM 4.2 2025 09:11 AM    CHLORIDE 107 2025 09:11 AM    CHLORIDE 107 2025 09:11 AM    CO2 26 2025 09:11 AM    CO2 25 2025 09:11 AM    GLUCOSE 75  07/08/2025 09:11 AM    GLUCOSE 76 07/08/2025 09:11 AM    BUN 16 07/08/2025 09:11 AM    BUN 15 07/08/2025 09:11 AM    CREATININE 0.85 07/08/2025 09:11 AM    CREATININE 0.84 07/08/2025 09:11 AM    BUNCREATRAT 15 06/05/2018 08:29 AM     Lab Results   Component Value Date/Time    ALKPHOSPHAT 44 07/08/2025 09:11 AM    ALKPHOSPHAT 44 07/08/2025 09:11 AM    ASTSGOT 22 07/08/2025 09:11 AM    ASTSGOT 23 07/08/2025 09:11 AM    ALTSGPT 33 07/08/2025 09:11 AM    ALTSGPT 33 07/08/2025 09:11 AM    TBILIRUBIN 0.6 07/08/2025 09:11 AM    TBILIRUBIN 0.6 07/08/2025 09:11 AM          Current Outpatient Medications:     metoprolol SR, 50 mg, Oral, Nightly    AZATHIOPRINE PO, Take  by mouth.    warfarin, TAKE 0.5 to 1 TABLET BY MOUTH DAILY AS DIRECTED BY [facility]    predniSONE, 40 mg.    ammonium lactate,     pravastatin, TAKE ONE TABLET BY MOUTH EVERY EVENING (PRAVACHOL)    tamsulosin, 0.4 mg, Oral, DAILY    Glucosamine HCl (GLUCOSAMINE PO), Take  by mouth every day.    digoxin, 250 mcg, Oral, QAM    METAMUCIL FIBER PO, Take  by mouth every day.    LYSINE PO, 1 Tablet, Oral, DAILY    Ascorbic Acid (VITAMIN C PO), 1 Tablet, Oral, QAM    Coenzyme Q10 (COQ10 PO), 1 Tablet, Oral, DAILY    acetaminophen, 500-1,000 mg, Oral, Q6HRS PRN    gabapentin, 600 mg, Oral, 4XDAY    B Complex Vitamins (VITAMIN B COMPLEX PO), 1 Tablet, Oral, Q EVENING    Cholecalciferol (VITAMIN D3 PO), 1 Tablet, Oral, Q EVENING    multivitamin, 1 Tablet, Oral, QHS    Refer to Patient Findings for HPI:  Patient Findings       Negatives:  Signs/symptoms of thrombosis, Signs/symptoms of bleeding, Laboratory test error suspected, Change in health, Change in alcohol use, Change in activity, Upcoming invasive procedure, Emergency department visit, Upcoming dental procedure, Missed doses, Extra doses, Change in medications, Change in diet/appetite, Hospital admission, Bruising, Other complaints    Comments:  Still on prednisone            Vitals:     07/24/25 0748   BP: 102/66   Pulse: 78   Patient does report increased fatigue of late. Encouraged him to speak to PCP at upcoming appointment    Verified current warfarin dosing schedule.    Medications reconciled: Yes  Pt is not on antiplatelet therapy.  Is patient on NSAID?: No       A/P   INR is therapeutic  Reason(s) for out of range INR today: N/A      Warfarin dosing recommendation: Continue regimen as listed above.     Pt educated to contact our clinic with any changes in medications or s/s of bleeding or thrombosis. Pt is aware to seek immediate medical attention for falls, head injury or deep cuts.    Request pt to return in 3 week(s). Pt agrees.    Jaclyn Gonsalez, PharmD

## 2025-08-08 ENCOUNTER — OCCUPATIONAL THERAPY (OUTPATIENT)
Dept: OCCUPATIONAL THERAPY | Facility: REHABILITATION | Age: 85
End: 2025-08-08
Attending: FAMILY MEDICINE
Payer: MEDICARE

## 2025-08-08 DIAGNOSIS — R41.89 COGNITIVE DECLINE: Primary | ICD-10-CM

## 2025-08-08 PROCEDURE — 97750 PHYSICAL PERFORMANCE TEST: CPT

## 2025-08-08 ASSESSMENT — BALANCE ASSESSMENTS
BALANCE - SITTING STATIC: NORMAL
BALANCE - STANDING DYNAMIC: FAIR +
BALANCE - STANDING STATIC: GOOD
BALANCE - SITTING DYNAMIC: NORMAL

## 2025-08-13 ENCOUNTER — TELEPHONE (OUTPATIENT)
Dept: MEDICAL GROUP | Facility: MEDICAL CENTER | Age: 85
End: 2025-08-13

## 2025-08-13 ENCOUNTER — APPOINTMENT (OUTPATIENT)
Dept: MEDICAL GROUP | Facility: MEDICAL CENTER | Age: 85
End: 2025-08-13
Payer: MEDICARE

## 2025-08-13 ASSESSMENT — FIBROSIS 4 INDEX: FIB4 SCORE: 1.62

## 2025-08-14 ENCOUNTER — ANTICOAGULATION VISIT (OUTPATIENT)
Dept: VASCULAR LAB | Facility: MEDICAL CENTER | Age: 85
End: 2025-08-14
Attending: INTERNAL MEDICINE
Payer: MEDICARE

## 2025-08-14 ENCOUNTER — HOSPITAL ENCOUNTER (OUTPATIENT)
Dept: LAB | Facility: MEDICAL CENTER | Age: 85
End: 2025-08-14
Attending: FAMILY MEDICINE
Payer: MEDICARE

## 2025-08-14 ENCOUNTER — HOSPITAL ENCOUNTER (OUTPATIENT)
Dept: LAB | Facility: MEDICAL CENTER | Age: 85
End: 2025-08-14
Attending: INTERNAL MEDICINE
Payer: MEDICARE

## 2025-08-14 DIAGNOSIS — I48.0 PAROXYSMAL ATRIAL FIBRILLATION (HCC): Chronic | ICD-10-CM

## 2025-08-14 DIAGNOSIS — Z95.3 STATUS POST TRANSCATHETER AORTIC VALVE REPLACEMENT (TAVR) USING BIOPROSTHESIS: Primary | ICD-10-CM

## 2025-08-14 DIAGNOSIS — D68.69 SECONDARY HYPERCOAGULABLE STATE (HCC): Chronic | ICD-10-CM

## 2025-08-14 DIAGNOSIS — D72.829 LEUKOCYTOSIS, UNSPECIFIED TYPE: ICD-10-CM

## 2025-08-14 DIAGNOSIS — R53.83 OTHER FATIGUE: ICD-10-CM

## 2025-08-14 LAB
ALBUMIN SERPL BCP-MCNC: 3.7 G/DL (ref 3.2–4.9)
ALBUMIN/GLOB SERPL: 1.4 G/DL
ALP SERPL-CCNC: 48 U/L (ref 30–99)
ALT SERPL-CCNC: 33 U/L (ref 2–50)
ANION GAP SERPL CALC-SCNC: 12 MMOL/L (ref 7–16)
AST SERPL-CCNC: 34 U/L (ref 12–45)
BASOPHILS # BLD AUTO: 0.3 % (ref 0–1.8)
BASOPHILS # BLD AUTO: 0.5 % (ref 0–1.8)
BASOPHILS # BLD: 0.04 K/UL (ref 0–0.12)
BASOPHILS # BLD: 0.06 K/UL (ref 0–0.12)
BILIRUB SERPL-MCNC: 0.5 MG/DL (ref 0.1–1.5)
BUN SERPL-MCNC: 12 MG/DL (ref 8–22)
CALCIUM ALBUM COR SERPL-MCNC: 9.3 MG/DL (ref 8.5–10.5)
CALCIUM SERPL-MCNC: 9.1 MG/DL (ref 8.5–10.5)
CHLORIDE SERPL-SCNC: 109 MMOL/L (ref 96–112)
CO2 SERPL-SCNC: 21 MMOL/L (ref 20–33)
CREAT SERPL-MCNC: 0.73 MG/DL (ref 0.5–1.4)
EOSINOPHIL # BLD AUTO: 0.12 K/UL (ref 0–0.51)
EOSINOPHIL # BLD AUTO: 0.18 K/UL (ref 0–0.51)
EOSINOPHIL NFR BLD: 1.1 % (ref 0–6.9)
EOSINOPHIL NFR BLD: 1.5 % (ref 0–6.9)
ERYTHROCYTE [DISTWIDTH] IN BLOOD BY AUTOMATED COUNT: 47.6 FL (ref 35.9–50)
ERYTHROCYTE [DISTWIDTH] IN BLOOD BY AUTOMATED COUNT: 48.1 FL (ref 35.9–50)
GFR SERPLBLD CREATININE-BSD FMLA CKD-EPI: 88 ML/MIN/1.73 M 2
GLOBULIN SER CALC-MCNC: 2.7 G/DL (ref 1.9–3.5)
GLUCOSE SERPL-MCNC: 83 MG/DL (ref 65–99)
HCT VFR BLD AUTO: 45.8 % (ref 42–52)
HCT VFR BLD AUTO: 46.9 % (ref 42–52)
HGB BLD-MCNC: 15.1 G/DL (ref 14–18)
HGB BLD-MCNC: 15.5 G/DL (ref 14–18)
IMM GRANULOCYTES # BLD AUTO: 0.09 K/UL (ref 0–0.11)
IMM GRANULOCYTES # BLD AUTO: 0.11 K/UL (ref 0–0.11)
IMM GRANULOCYTES NFR BLD AUTO: 0.8 % (ref 0–0.9)
IMM GRANULOCYTES NFR BLD AUTO: 0.9 % (ref 0–0.9)
INR PPP: 2.4 (ref 2–3.5)
LYMPHOCYTES # BLD AUTO: 2.97 K/UL (ref 1–4.8)
LYMPHOCYTES # BLD AUTO: 2.98 K/UL (ref 1–4.8)
LYMPHOCYTES NFR BLD: 25 % (ref 22–41)
LYMPHOCYTES NFR BLD: 26.2 % (ref 22–41)
MCH RBC QN AUTO: 31.8 PG (ref 27–33)
MCH RBC QN AUTO: 32.1 PG (ref 27–33)
MCHC RBC AUTO-ENTMCNC: 33 G/DL (ref 32.3–36.5)
MCHC RBC AUTO-ENTMCNC: 33 G/DL (ref 32.3–36.5)
MCV RBC AUTO: 96.4 FL (ref 81.4–97.8)
MCV RBC AUTO: 97.1 FL (ref 81.4–97.8)
MONOCYTES # BLD AUTO: 0.9 K/UL (ref 0–0.85)
MONOCYTES # BLD AUTO: 1.09 K/UL (ref 0–0.85)
MONOCYTES NFR BLD AUTO: 7.9 % (ref 0–13.4)
MONOCYTES NFR BLD AUTO: 9.2 % (ref 0–13.4)
NEUTROPHILS # BLD AUTO: 7.22 K/UL (ref 1.82–7.42)
NEUTROPHILS # BLD AUTO: 7.51 K/UL (ref 1.82–7.42)
NEUTROPHILS NFR BLD: 63.1 % (ref 44–72)
NEUTROPHILS NFR BLD: 63.5 % (ref 44–72)
NRBC # BLD AUTO: 0 K/UL
NRBC # BLD AUTO: 0 K/UL
NRBC BLD-RTO: 0 /100 WBC (ref 0–0.2)
NRBC BLD-RTO: 0 /100 WBC (ref 0–0.2)
PLATELET # BLD AUTO: 231 K/UL (ref 164–446)
PLATELET # BLD AUTO: 247 K/UL (ref 164–446)
PMV BLD AUTO: 10 FL (ref 9–12.9)
PMV BLD AUTO: 10.1 FL (ref 9–12.9)
POTASSIUM SERPL-SCNC: 3.9 MMOL/L (ref 3.6–5.5)
PROT SERPL-MCNC: 6.4 G/DL (ref 6–8.2)
RBC # BLD AUTO: 4.75 M/UL (ref 4.7–6.1)
RBC # BLD AUTO: 4.83 M/UL (ref 4.7–6.1)
SODIUM SERPL-SCNC: 142 MMOL/L (ref 135–145)
VIT B12 SERPL-MCNC: 856 PG/ML (ref 211–911)
WBC # BLD AUTO: 11.4 K/UL (ref 4.8–10.8)
WBC # BLD AUTO: 11.9 K/UL (ref 4.8–10.8)

## 2025-08-14 PROCEDURE — 36415 COLL VENOUS BLD VENIPUNCTURE: CPT

## 2025-08-14 PROCEDURE — 85025 COMPLETE CBC W/AUTO DIFF WBC: CPT

## 2025-08-14 PROCEDURE — 99213 OFFICE O/P EST LOW 20 MIN: CPT | Performed by: NURSE PRACTITIONER

## 2025-08-14 PROCEDURE — 85025 COMPLETE CBC W/AUTO DIFF WBC: CPT | Mod: 91

## 2025-08-14 PROCEDURE — 85610 PROTHROMBIN TIME: CPT

## 2025-08-14 PROCEDURE — 82607 VITAMIN B-12: CPT

## 2025-08-14 PROCEDURE — 80053 COMPREHEN METABOLIC PANEL: CPT

## 2025-08-15 ENCOUNTER — RESULTS FOLLOW-UP (OUTPATIENT)
Dept: MEDICAL GROUP | Facility: MEDICAL CENTER | Age: 85
End: 2025-08-15
Payer: MEDICARE

## 2025-08-26 ENCOUNTER — OFFICE VISIT (OUTPATIENT)
Dept: SLEEP MEDICINE | Facility: MEDICAL CENTER | Age: 85
End: 2025-08-26
Payer: MEDICARE

## 2025-08-26 VITALS
WEIGHT: 215 LBS | DIASTOLIC BLOOD PRESSURE: 62 MMHG | RESPIRATION RATE: 16 BRPM | BODY MASS INDEX: 29.12 KG/M2 | SYSTOLIC BLOOD PRESSURE: 100 MMHG | OXYGEN SATURATION: 94 % | HEART RATE: 102 BPM | HEIGHT: 72 IN

## 2025-08-26 DIAGNOSIS — G47.33 OSA (OBSTRUCTIVE SLEEP APNEA): Primary | ICD-10-CM

## 2025-08-26 PROCEDURE — 1170F FXNL STATUS ASSESSED: CPT

## 2025-08-26 PROCEDURE — 99214 OFFICE O/P EST MOD 30 MIN: CPT

## 2025-08-26 PROCEDURE — 3074F SYST BP LT 130 MM HG: CPT

## 2025-08-26 PROCEDURE — 3078F DIAST BP <80 MM HG: CPT

## 2025-08-26 RX ORDER — LACTULOSE 10 G/15ML
10 SOLUTION ORAL 2 TIMES DAILY
COMMUNITY
Start: 2025-08-21

## 2025-08-26 ASSESSMENT — FIBROSIS 4 INDEX: FIB4 SCORE: 2.08

## (undated) DEVICE — TOWEL STOP TIMEOUT SAFETY FLAG (40EA/CA)

## (undated) DEVICE — ELECTRODE RADIOLUCNT SOLID GEL DEFIB PADS (12EA/CA)

## (undated) DEVICE — BAG RESUSCITATION DISPOSABLE - WITH MASK (10 EA/CA)

## (undated) DEVICE — BOVIE BLADE COATED - (50/PK)

## (undated) DEVICE — BLANKET UNDERBODY FULL ACCES - (5/CA)

## (undated) DEVICE — GLOVE BIOGEL INDICATOR SZ 8 SURGICAL PF LTX - (50/BX 4BX/CA)

## (undated) DEVICE — SPONGE DRAIN 4 X 4IN 6-PLY - (2/PK25PK/BX12BX/CS)

## (undated) DEVICE — DETERGENT RENUZYME PLUS 10 OZ PACKET (50/BX)

## (undated) DEVICE — SUTURE 5 SURGICAL STEEL V-40 - (12/BX) CCS CURRENT

## (undated) DEVICE — DEVICE CLOSER PERCLOSE - (10/BX) PROGLIDE SYSTEM

## (undated) DEVICE — SET EXTENSION WITH 2 PORTS (48EA/CA) ***PART #2C8610 IS A SUBSTITUTE*****

## (undated) DEVICE — STOPCOCK IV 400 PSI 3W ROT (50EA/BX)

## (undated) DEVICE — TRANSDUCER BIFURCATED MONITORING KIT (10EA/CA)

## (undated) DEVICE — GOWN WARMING STANDARD FLEX - (30/CA)

## (undated) DEVICE — SLEEVE VASO CALF MED - (10PR/CA)

## (undated) DEVICE — GLOVE BIOGEL ECLIPSE PF LATEX SIZE 9.0

## (undated) DEVICE — GLOVE BIOGEL INDICATOR SZ 7SURGICAL PF LTX - (50/BX 4BX/CA)

## (undated) DEVICE — PACK MAJOR BASIN - (2EA/CA)

## (undated) DEVICE — SUCTION INSTRUMENT YANKAUER OPEN TIP W/O VENT (50EA/CA)

## (undated) DEVICE — ARMBOARD  SMALL IV 9 INLONG - (25EA/CA)

## (undated) DEVICE — SODIUM CHL IRRIGATION 0.9% 1000ML (12EA/CA)

## (undated) DEVICE — CORDS BIPOLAR COAGULATION - 12FT STERILE DISP. (10EA/BX)

## (undated) DEVICE — SENSOR OXIMETER ADULT SPO2 RD SET (20EA/BX)

## (undated) DEVICE — GOWN SURGEONS X-LARGE - DISP. (30/CA)

## (undated) DEVICE — ELECTRODE 850 FOAM ADHESIVE - HYDROGEL RADIOTRNSPRNT (50/PK)

## (undated) DEVICE — CABLE TEMPORARY PACING

## (undated) DEVICE — TOWELS CLOTH SURGICAL - (4/PK 20PK/CA)

## (undated) DEVICE — GLOVE BIOGEL SZ 6.5 SURGICAL PF LTX (50PR/BX 4BX/CA)

## (undated) DEVICE — JELLY SURGILUBE STERILE TUBE 4.25 OZ (1/EA)

## (undated) DEVICE — DRAPE LAPAROTOMY T SHEET - (12EA/CA)

## (undated) DEVICE — SUCTION INSTRUMENT YANKAUER BULBOUS TIP W/O VENT (50EA/CA)

## (undated) DEVICE — CANISTER SUCTION 3000ML MECHANICAL FILTER AUTO SHUTOFF MEDI-VAC NONSTERILE LF DISP  (40EA/CA)

## (undated) DEVICE — GLOVE BIOGEL ECLIPSE  PF LATEX SIZE 6.5 (50PR/BX)

## (undated) DEVICE — WIRE GUIDE LUNDQST.035X180 - TSMG-35-180-4-LES ORDER BY BOX (5EA/BX)

## (undated) DEVICE — SODIUM CHL. INJ. 0.9% 500ML (24EA/CA 50CA/PF)

## (undated) DEVICE — NEEDLE PERCUTANEOUS THINWALL 7CM 18GA BSDN 18-7.0

## (undated) DEVICE — SET IRRIGATION CYSTOSCOPY Y-TYPE L81 IN (20EA/CA)

## (undated) DEVICE — TRAY SRGPRP PVP IOD WT PRP - (20/CA)

## (undated) DEVICE — SUTURE OHS

## (undated) DEVICE — MASK ANESTHESIA ADULT  - (100/CA)

## (undated) DEVICE — SPONGE GAUZESTER 4 X 4 4PLY - (128PK/CA)

## (undated) DEVICE — TUBING CLEARLINK DUO-VENT - C-FLO (48EA/CA)

## (undated) DEVICE — GOWN SURGICAL X-LARGE ULTRA - FILM-REINFORCED (20/CA)

## (undated) DEVICE — TUBE CONNECT SUCTION CLEAR 120 X 1/4" (50EA/CA)"

## (undated) DEVICE — SUTURE GENERAL

## (undated) DEVICE — ELECTRODE DUAL RETURN W/ CORD - (50/PK)

## (undated) DEVICE — GUIDEWIRE STARTER STRAIGHT FIXED CORE .035 150CM 4 STRAIGHT PTFE/HEPARIN COATED (10/BX)

## (undated) DEVICE — BAG SPONGE COUNT 10.25 X 32 - BLUE (250/CA)

## (undated) DEVICE — KIT ROOM DECONTAMINATION

## (undated) DEVICE — LACTATED RINGERS INJ 1000 ML - (14EA/CA 60CA/PF)

## (undated) DEVICE — TUBE CONNECTING SUCTION - CLEAR PLASTIC STERILE 72 IN (50EA/CA)

## (undated) DEVICE — SENSOR SPO2 NEO LNCS ADHESIVE (20/BX) SEE USER NOTES

## (undated) DEVICE — SODIUM CHL. IRRIGATION 0.9% 3000ML (4EA/CA 65CA/PF)

## (undated) DEVICE — KIT ANESTHESIA W/CIRCUIT & 3/LT BAG W/FILTER (20EA/CA)

## (undated) DEVICE — COVER FOOT UNIVERSAL DISP. - (25EA/CA)

## (undated) DEVICE — GUIDEWIRE STARTER J CURVED FIXED CORE .035 260CM 10 3MM PTFE/HEPARIN COATED (5EA/BX)

## (undated) DEVICE — KIT RADIAL ARTERY 20GA W/MAX BARRIER AND BIOPATCH  (5EA/CA) #10740 IS FOR THE SET RADIAL ARTERIAL

## (undated) DEVICE — DRESSING TRANSPARENT FILM TEGADERM 4 X 4.75" (50EA/BX)"

## (undated) DEVICE — DRAPE MAYO STAND - (30/CA)

## (undated) DEVICE — IV SET, NON-VENT PLUMB PUMP

## (undated) DEVICE — SLEEVE, VASO, THIGH, MED

## (undated) DEVICE — WIRE GUIDE AES .035 260CM WITH 3MM J TIP"

## (undated) DEVICE — INTRODUCER SHEATH 6FR 2.5CM - DILATOR PROTRUDING (10/BX)

## (undated) DEVICE — PROTECTOR ULNA NERVE - (36PR/CA)

## (undated) DEVICE — MICRODRIP PRIMARY VENTED 60 (48EA/CA) THIS WAS PART #2C8428 WHICH WAS DISCONTINUED

## (undated) DEVICE — CANISTER SUCTION RIGID RED 1500CC (40EA/CA)

## (undated) DEVICE — SYS DLV COST CLS RM TEMP - INJECTATE (CO-SET II) (10EA/CA)

## (undated) DEVICE — BLADE SURGICAL #11 - (50/BX)

## (undated) DEVICE — EVACUATOR BLADDER ELLIK - (10/BX)

## (undated) DEVICE — DRAIN CHEST ADULT (6EA/CA) DELETED ITEM  ORDER #15909

## (undated) DEVICE — CHLORAPREP 26 ML APPLICATOR - ORANGE TINT(25/CA)

## (undated) DEVICE — PACK TAVR (3EA/CA)

## (undated) DEVICE — INTRODUCER CATHETER  DILATOR PROTRUDING 8FR 2.5CM (10EA/BX)

## (undated) DEVICE — DERMABOND ADVANCED - (12EA/BX)

## (undated) DEVICE — DRAPE CLEAR W/ELASTIC BAND RAD CARM 40 X40" (20EA/CA)"

## (undated) DEVICE — HEAD HOLDER JUNIOR/ADULT

## (undated) DEVICE — SYRINGE DISP. 12 CC LL - (100/BX)

## (undated) DEVICE — SET FLUID WARMING STANDARD FLOW - (10/CA)

## (undated) DEVICE — COVER LIGHT HANDLE FLEXIBLE - SOFT (2EA/PK 80PK/CA)

## (undated) DEVICE — GLOVE BIOGEL SZ 6 PF LATEX - (50EA/BX 4BX/CA)

## (undated) DEVICE — SET LEADWIRE 5 LEAD BEDSIDE DISPOSABLE ECG (1SET OF 5/EA)

## (undated) DEVICE — WATER IRRIGATION STERILE 1000ML (12EA/CA)

## (undated) DEVICE — TUBING PRSS MNTR 72IN M/ M LL - (25/BX) MONIT. LINE W/MALE L/L

## (undated) DEVICE — SUTURE 0 ETHIBOND MO6 C/R - (12/BX) 8-18 INCH ETHICON

## (undated) DEVICE — BAG DECANTER (50EA/CS)

## (undated) DEVICE — CATHETER PIGTAIL 6FR 145 (5EA/BX)

## (undated) DEVICE — STOPCOCK 3-WAY W/SWIVEL LEVER LOCK (50EA/CA)

## (undated) DEVICE — ELECTRODE DFBR ADLT 6.5X5IN (12PR/CA) *8900-4003 PART # FOR CA QTY. PART #8900-4004 IS EACH QTY

## (undated) DEVICE — SET BIFURCATED BLOOD - (48EA/CS)

## (undated) DEVICE — CONTAINER, SPECIMEN, STERILE

## (undated) DEVICE — CATHETER TROCAR 28FR.

## (undated) DEVICE — SYRINGE 50ML CATHETER TIP (40EA/BX 4BX/CA)

## (undated) DEVICE — SUTURE 4-0 VICRYL PLUS SH - UNDYED 27 INCH (36PK/BX)

## (undated) DEVICE — SOD. CHL. INJ. 0.9% 1000 ML - (14EA/CA 60CA/PF)

## (undated) DEVICE — SUTURE 4-0 PROLENE PS-2 18 (36PK/BX)"

## (undated) DEVICE — GLOVE BIOGEL PI INDICATOR SZ 7.5 SURGICAL PF LF -(50/BX 4BX/CA)

## (undated) DEVICE — GLOVE BIOGEL SZ 7.5 SURGICAL PF LTX - (50PR/BX 4BX/CA)

## (undated) DEVICE — HUMID-VENT HEAT AND MOISTURE EXCHANGE- (50/BX)

## (undated) DEVICE — NEPTUNE 4 PORT MANIFOLD - (20/PK)

## (undated) DEVICE — CATHETER 6FR AL1 100CM (5/BX)